# Patient Record
Sex: MALE | Race: WHITE | Employment: FULL TIME | URBAN - METROPOLITAN AREA
[De-identification: names, ages, dates, MRNs, and addresses within clinical notes are randomized per-mention and may not be internally consistent; named-entity substitution may affect disease eponyms.]

---

## 2017-10-30 ENCOUNTER — HOSPITAL ENCOUNTER (OUTPATIENT)
Dept: OCCUPATIONAL MEDICINE | Age: 32
Discharge: HOME | End: 2017-10-30

## 2017-10-30 DIAGNOSIS — T14.90XA INJURY: ICD-10-CM

## 2018-07-25 ENCOUNTER — HOSPITAL ENCOUNTER (OUTPATIENT)
Dept: OCCUPATIONAL MEDICINE | Age: 33
Discharge: HOME | End: 2018-07-25

## 2018-07-25 DIAGNOSIS — T14.90XA INJURY: ICD-10-CM

## 2018-12-03 ENCOUNTER — HOSPITAL ENCOUNTER (OUTPATIENT)
Dept: SURGERY | Age: 33
Discharge: HOME | End: 2018-12-03

## 2018-12-03 VITALS — BODY MASS INDEX: 27.2 KG/M2 | HEIGHT: 70 IN | WEIGHT: 190 LBS

## 2018-12-03 RX ORDER — GABAPENTIN 300 MG/1
300 CAPSULE ORAL AS NEEDED
COMMUNITY
End: 2021-02-08 | Stop reason: ALTCHOICE

## 2018-12-03 NOTE — PERIOP NOTES
Patient verified name and . Patient provided medical/health information and PTA medications to the best of their ability. TYPE  CASE: 1A  Order for consent No Orders found in EHR Labs per surgeon:  None   Labs per anesthesia protocol: none  EKG  :  NA    Instructed patient to continue previous medications as prescribed prior to surgery unless otherwise directed and to take the following medications the day of surgery according to anesthesia guidelines : Neurontin or Norco as instructed if needed . Instructed patient to hold  the following medications: All vitamins,supplements, NSAIDs. Patient instructed on the following:  Arrive at Outpatient entrance, time of arrival to be called the day before by 1700. Responsible adult must drive patient to and from the hospital, stay during surgery, and       patient will need supervision 24 hours after anesthesia  NPO after midnight including gum, mints and ice chips. Shower the night before and the morning of surgery with a non-moisturizing soap. Leave all valuables at home. Bring insurance card and ID. No jewelry and body piercings are to be removed prior to arrival on DOS. No perfumes, oil, powder, colognes, makeup or  lotions on the skin. Patient teach back successful and patient demonstrates knowledge of instruction.

## 2020-07-04 ENCOUNTER — HOSPITAL ENCOUNTER (EMERGENCY)
Age: 35
Discharge: HOME | End: 2020-07-04
Attending: EMERGENCY MEDICINE
Payer: SELF-PAY

## 2020-07-04 VITALS
HEART RATE: 78 BPM | TEMPERATURE: 97.8 F | HEIGHT: 70 IN | SYSTOLIC BLOOD PRESSURE: 166 MMHG | DIASTOLIC BLOOD PRESSURE: 110 MMHG | RESPIRATION RATE: 18 BRPM | OXYGEN SATURATION: 99 % | BODY MASS INDEX: 25.77 KG/M2 | WEIGHT: 180 LBS

## 2020-07-04 DIAGNOSIS — K85.20 ALCOHOL-INDUCED ACUTE PANCREATITIS WITHOUT INFECTION OR NECROSIS: Primary | ICD-10-CM

## 2020-07-04 DIAGNOSIS — R31.9 HEMATURIA, UNSPECIFIED TYPE: ICD-10-CM

## 2020-07-04 LAB
ALBUMIN SERPL-MCNC: 4.5 G/DL (ref 3.5–5)
ALBUMIN/GLOB SERPL: 1.2 {RATIO} (ref 1.2–3.5)
ALP SERPL-CCNC: 91 U/L (ref 50–136)
ALT SERPL-CCNC: 82 U/L (ref 12–65)
ANION GAP SERPL CALC-SCNC: 10 MMOL/L (ref 7–16)
AST SERPL-CCNC: 36 U/L (ref 15–37)
BASOPHILS # BLD: 0.1 K/UL (ref 0–0.2)
BASOPHILS NFR BLD: 1 % (ref 0–2)
BILIRUB SERPL-MCNC: 1.3 MG/DL (ref 0.2–1.1)
BUN SERPL-MCNC: 15 MG/DL (ref 6–23)
CALCIUM SERPL-MCNC: 10.3 MG/DL (ref 8.3–10.4)
CHLORIDE SERPL-SCNC: 98 MMOL/L (ref 98–107)
CO2 SERPL-SCNC: 26 MMOL/L (ref 21–32)
CREAT SERPL-MCNC: 0.99 MG/DL (ref 0.8–1.5)
DIFFERENTIAL METHOD BLD: ABNORMAL
EOSINOPHIL # BLD: 0 K/UL (ref 0–0.8)
EOSINOPHIL NFR BLD: 0 % (ref 0.5–7.8)
ERYTHROCYTE [DISTWIDTH] IN BLOOD BY AUTOMATED COUNT: 12.3 % (ref 11.9–14.6)
GLOBULIN SER CALC-MCNC: 3.7 G/DL (ref 2.3–3.5)
GLUCOSE SERPL-MCNC: 98 MG/DL (ref 65–100)
HCT VFR BLD AUTO: 45.4 % (ref 41.1–50.3)
HGB BLD-MCNC: 16.6 G/DL (ref 13.6–17.2)
IMM GRANULOCYTES # BLD AUTO: 0 K/UL (ref 0–0.5)
IMM GRANULOCYTES NFR BLD AUTO: 0 % (ref 0–5)
LIPASE SERPL-CCNC: 2345 U/L (ref 73–393)
LYMPHOCYTES # BLD: 1 K/UL (ref 0.5–4.6)
LYMPHOCYTES NFR BLD: 9 % (ref 13–44)
MCH RBC QN AUTO: 32.7 PG (ref 26.1–32.9)
MCHC RBC AUTO-ENTMCNC: 36.6 G/DL (ref 31.4–35)
MCV RBC AUTO: 89.4 FL (ref 79.6–97.8)
MONOCYTES # BLD: 0.9 K/UL (ref 0.1–1.3)
MONOCYTES NFR BLD: 8 % (ref 4–12)
NEUTS SEG # BLD: 8.9 K/UL (ref 1.7–8.2)
NEUTS SEG NFR BLD: 82 % (ref 43–78)
NRBC # BLD: 0 K/UL (ref 0–0.2)
PLATELET # BLD AUTO: 212 K/UL (ref 150–450)
PMV BLD AUTO: 9.6 FL (ref 9.4–12.3)
POTASSIUM SERPL-SCNC: 3.4 MMOL/L (ref 3.5–5.1)
PROT SERPL-MCNC: 8.2 G/DL (ref 6.3–8.2)
RBC # BLD AUTO: 5.08 M/UL (ref 4.23–5.6)
SODIUM SERPL-SCNC: 134 MMOL/L (ref 136–145)
WBC # BLD AUTO: 10.9 K/UL (ref 4.3–11.1)

## 2020-07-04 PROCEDURE — 96376 TX/PRO/DX INJ SAME DRUG ADON: CPT

## 2020-07-04 PROCEDURE — 85025 COMPLETE CBC W/AUTO DIFF WBC: CPT

## 2020-07-04 PROCEDURE — 80053 COMPREHEN METABOLIC PANEL: CPT

## 2020-07-04 PROCEDURE — 74011250636 HC RX REV CODE- 250/636: Performed by: EMERGENCY MEDICINE

## 2020-07-04 PROCEDURE — 96374 THER/PROPH/DIAG INJ IV PUSH: CPT

## 2020-07-04 PROCEDURE — 83690 ASSAY OF LIPASE: CPT

## 2020-07-04 PROCEDURE — 99284 EMERGENCY DEPT VISIT MOD MDM: CPT

## 2020-07-04 PROCEDURE — 96372 THER/PROPH/DIAG INJ SC/IM: CPT

## 2020-07-04 PROCEDURE — 96375 TX/PRO/DX INJ NEW DRUG ADDON: CPT

## 2020-07-04 PROCEDURE — 81003 URINALYSIS AUTO W/O SCOPE: CPT

## 2020-07-04 RX ORDER — HYDROMORPHONE HYDROCHLORIDE 1 MG/ML
0.5 INJECTION, SOLUTION INTRAMUSCULAR; INTRAVENOUS; SUBCUTANEOUS
Status: COMPLETED | OUTPATIENT
Start: 2020-07-04 | End: 2020-07-04

## 2020-07-04 RX ORDER — PROMETHAZINE HYDROCHLORIDE 25 MG/1
25 TABLET ORAL
Qty: 12 TAB | Refills: 0 | Status: SHIPPED | OUTPATIENT
Start: 2020-07-04 | End: 2020-07-22 | Stop reason: SDUPTHER

## 2020-07-04 RX ORDER — SUCRALFATE 1 G/1
1 TABLET ORAL 3 TIMES DAILY
Qty: 42 TAB | Refills: 0 | Status: SHIPPED | OUTPATIENT
Start: 2020-07-04 | End: 2020-07-18

## 2020-07-04 RX ORDER — MORPHINE SULFATE 4 MG/ML
4 INJECTION INTRAVENOUS
Status: COMPLETED | OUTPATIENT
Start: 2020-07-04 | End: 2020-07-04

## 2020-07-04 RX ORDER — PROMETHAZINE HYDROCHLORIDE 25 MG/ML
25 INJECTION, SOLUTION INTRAMUSCULAR; INTRAVENOUS
Status: COMPLETED | OUTPATIENT
Start: 2020-07-04 | End: 2020-07-04

## 2020-07-04 RX ADMIN — MORPHINE SULFATE 4 MG: 4 INJECTION INTRAVENOUS at 15:04

## 2020-07-04 RX ADMIN — HYDROMORPHONE HYDROCHLORIDE 0.5 MG: 1 INJECTION, SOLUTION INTRAMUSCULAR; INTRAVENOUS; SUBCUTANEOUS at 15:45

## 2020-07-04 RX ADMIN — PROMETHAZINE HYDROCHLORIDE 25 MG: 25 INJECTION INTRAMUSCULAR; INTRAVENOUS at 15:04

## 2020-07-04 RX ADMIN — SODIUM CHLORIDE 1000 ML: 900 INJECTION, SOLUTION INTRAVENOUS at 15:03

## 2020-07-04 NOTE — ED NOTES
I have reviewed discharge instructions with the patient. The patient verbalized understanding. Patient left ED via Discharge Method: ambulatory to Home with self. Opportunity for questions and clarification provided. Patient given 2 scripts. To continue your aftercare when you leave the hospital, you may receive an automated call from our care team to check in on how you are doing. This is a free service and part of our promise to provide the best care and service to meet your aftercare needs.  If you have questions, or wish to unsubscribe from this service please call [phone]. Thank you for Choosing our [address] [place] [address] Emergency Department.

## 2020-07-04 NOTE — ED PROVIDER NOTES
50-year-old gentleman presents with concerns about pain in his epigastric region. He says that he knows when he drinks alcohol it sometimes gives him pancreatitis but he drank some wine a couple of days ago and started to get this pain yesterday. With this pain he has had some nausea and vomiting but no diarrhea. No fevers or chills. No blood in his stools. I asked if he was an alcoholic but he said he did not think so. I then advised him that if he was willing to suffer through the consequences of pancreatitis knowing that alcohol caused that for him that he likely was an alcoholic. No other associated symptoms. Elements of this note were created using speech recognition software. As such, errors of speech recognition may be present.            Past Medical History:   Diagnosis Date    Carpal tunnel syndrome, left     Cigarette smoker     GERD (gastroesophageal reflux disease)     occassionally with spicy foods    History of pancreatitis     X2    Hypertension     managed well with Lisinopril       Past Surgical History:   Procedure Laterality Date    HX CHOLECYSTECTOMY  2012    HX ORTHOPAEDIC           Family History:   Problem Relation Age of Onset    Hypertension Mother     Heart Attack Father        Social History     Socioeconomic History    Marital status: SINGLE     Spouse name: Not on file    Number of children: Not on file    Years of education: Not on file    Highest education level: Not on file   Occupational History    Not on file   Social Needs    Financial resource strain: Not on file    Food insecurity     Worry: Not on file     Inability: Not on file    Transportation needs     Medical: Not on file     Non-medical: Not on file   Tobacco Use    Smoking status: Current Every Day Smoker     Packs/day: 1.00     Start date: 2/9/2007    Smokeless tobacco: Never Used   Substance and Sexual Activity    Alcohol use: Yes     Comment: social, 3 or less per week    Drug use: No    Sexual activity: Not on file   Lifestyle    Physical activity     Days per week: Not on file     Minutes per session: Not on file    Stress: Not on file   Relationships    Social connections     Talks on phone: Not on file     Gets together: Not on file     Attends Muslim service: Not on file     Active member of club or organization: Not on file     Attends meetings of clubs or organizations: Not on file     Relationship status: Not on file    Intimate partner violence     Fear of current or ex partner: Not on file     Emotionally abused: Not on file     Physically abused: Not on file     Forced sexual activity: Not on file   Other Topics Concern    Not on file   Social History Narrative    Not on file         ALLERGIES: Patient has no known allergies. Review of Systems   Constitutional: Negative for chills, diaphoresis and fever. HENT: Negative for congestion, rhinorrhea and sore throat. Eyes: Negative for redness and visual disturbance. Respiratory: Negative for cough, chest tightness, shortness of breath and wheezing. Cardiovascular: Negative for chest pain and palpitations. Gastrointestinal: Positive for abdominal pain and nausea. Negative for blood in stool, diarrhea and vomiting. Endocrine: Negative for polydipsia and polyuria. Genitourinary: Negative for dysuria and hematuria. Musculoskeletal: Negative for arthralgias, myalgias and neck stiffness. Skin: Negative for rash. Allergic/Immunologic: Negative for environmental allergies and food allergies. Neurological: Negative for dizziness, weakness and headaches. Hematological: Negative for adenopathy. Does not bruise/bleed easily. Psychiatric/Behavioral: Negative for confusion and sleep disturbance. The patient is not nervous/anxious.         Vitals:    07/04/20 1326   BP: (!) 157/99   Pulse: 87   Resp: 16   Temp: 97.4 °F (36.3 °C)   SpO2: 99%   Weight: 81.6 kg (180 lb)   Height: 5' 10\" (1.778 m) Physical Exam  Vitals signs and nursing note reviewed. Constitutional:       General: He is not in acute distress. Appearance: He is well-developed. He is not toxic-appearing. HENT:      Head: Normocephalic and atraumatic. Eyes:      General: No scleral icterus. Right eye: No discharge. Left eye: No discharge. Conjunctiva/sclera: Conjunctivae normal.      Pupils: Pupils are equal, round, and reactive to light. Neck:      Musculoskeletal: Normal range of motion. No neck rigidity. Cardiovascular:      Rate and Rhythm: Normal rate and regular rhythm. Heart sounds: Normal heart sounds. Pulmonary:      Effort: Pulmonary effort is normal. No respiratory distress. Breath sounds: Normal breath sounds. No wheezing or rales. Chest:      Chest wall: No tenderness. Abdominal:      General: Bowel sounds are normal. There is no distension. Palpations: Abdomen is soft. Tenderness: There is abdominal tenderness. There is no guarding or rebound. Comments: Mild diffuse abdominal tenderness with no rebound or guarding   Musculoskeletal: Normal range of motion. General: No tenderness. Lymphadenopathy:      Cervical: No cervical adenopathy. Skin:     General: Skin is warm and dry. Neurological:      General: No focal deficit present. Mental Status: He is alert and oriented to person, place, and time. Psychiatric:         Mood and Affect: Mood normal.         Behavior: Behavior normal.          MDM  Number of Diagnoses or Management Options  Diagnosis management comments: His lipase is elevated. I will treat with some morphine and Phenergan.          Procedures

## 2020-07-04 NOTE — DISCHARGE INSTRUCTIONS
As we discussed, I am concerned about your alcohol use and I think you should contact [facility] to discuss possible rehab options. Please return to the emergency department with any vomiting, fevers, worsening symptoms, or additional concerns. Follow up with [facility], for substance abuse help. Ceasar Henry   [phone]   They have multiple resources to help you. Please call.  www[facility]. org     Other local resources that are available are: The [address] [facility] for inpatient and outpatient substance abuse issues. [place] [phone]  Medication assisted treatment    [address] [place] [address]  [phone]     Suicide Hotline   2-627-HTKSUUO     Narcotics Anonymous   www.na. org  Alcoholics Anonymous  www.aa.org

## 2020-07-04 NOTE — ED TRIAGE NOTES
Patient complaining of upper abdominal pain with nausea and vomiting starting yesterday after drinking 4 glasses of wine on 7/2. Patient advises chronic pancreatitis history. Mask on during triage.

## 2020-07-08 NOTE — PROGRESS NOTES
Called patient to inform him of trace hematuria and need for follow up. Left VM for patient to return call.

## 2020-07-08 NOTE — PROGRESS NOTES
Patient returned call, he provided his name and  for ID. He was informed of the trace hematuria. He will follow up with Dr. Dhruv Pires for recheck. Referral made.

## 2021-10-03 ENCOUNTER — APPOINTMENT (OUTPATIENT)
Dept: ULTRASOUND IMAGING | Age: 36
DRG: 439 | End: 2021-10-03
Attending: INTERNAL MEDICINE

## 2021-10-03 ENCOUNTER — HOSPITAL ENCOUNTER (INPATIENT)
Age: 36
LOS: 3 days | Discharge: HOME | DRG: 439 | End: 2021-10-06
Attending: EMERGENCY MEDICINE | Admitting: INTERNAL MEDICINE

## 2021-10-03 ENCOUNTER — HOSPITAL ENCOUNTER (EMERGENCY)
Age: 36
Discharge: HOME | End: 2021-10-03
Attending: EMERGENCY MEDICINE

## 2021-10-03 VITALS
HEIGHT: 70 IN | SYSTOLIC BLOOD PRESSURE: 185 MMHG | RESPIRATION RATE: 16 BRPM | BODY MASS INDEX: 27.2 KG/M2 | OXYGEN SATURATION: 95 % | HEART RATE: 94 BPM | TEMPERATURE: 98 F | DIASTOLIC BLOOD PRESSURE: 111 MMHG | WEIGHT: 190 LBS

## 2021-10-03 DIAGNOSIS — K85.20 ALCOHOL-INDUCED ACUTE PANCREATITIS, UNSPECIFIED COMPLICATION STATUS: Primary | ICD-10-CM

## 2021-10-03 DIAGNOSIS — K85.20 ALCOHOL-INDUCED ACUTE PANCREATITIS WITHOUT INFECTION OR NECROSIS: ICD-10-CM

## 2021-10-03 DIAGNOSIS — K85.20 ALCOHOL-INDUCED ACUTE PANCREATITIS WITHOUT INFECTION OR NECROSIS: Primary | ICD-10-CM

## 2021-10-03 PROBLEM — I16.0 HYPERTENSIVE URGENCY: Status: ACTIVE | Noted: 2021-10-03

## 2021-10-03 PROBLEM — I16.9 HYPERTENSIVE CRISIS: Status: ACTIVE | Noted: 2021-10-03

## 2021-10-03 LAB
ALBUMIN SERPL-MCNC: 4 G/DL (ref 3.5–5)
ALBUMIN SERPL-MCNC: 4.2 G/DL (ref 3.5–5)
ALBUMIN/GLOB SERPL: 0.9 {RATIO} (ref 1.2–3.5)
ALBUMIN/GLOB SERPL: 1.1 {RATIO} (ref 1.2–3.5)
ALP SERPL-CCNC: 52 U/L (ref 50–136)
ALP SERPL-CCNC: 53 U/L (ref 50–136)
ALT SERPL-CCNC: 52 U/L (ref 12–65)
ALT SERPL-CCNC: 55 U/L (ref 12–65)
ANION GAP SERPL CALC-SCNC: 7 MMOL/L (ref 7–16)
ANION GAP SERPL CALC-SCNC: 8 MMOL/L (ref 7–16)
AST SERPL-CCNC: 42 U/L (ref 15–37)
AST SERPL-CCNC: 60 U/L (ref 15–37)
BASOPHILS # BLD: 0 K/UL (ref 0–0.2)
BASOPHILS # BLD: 0.1 K/UL (ref 0–0.2)
BASOPHILS NFR BLD: 0 % (ref 0–2)
BASOPHILS NFR BLD: 1 % (ref 0–2)
BILIRUB SERPL-MCNC: 0.5 MG/DL (ref 0.2–1.1)
BILIRUB SERPL-MCNC: 0.6 MG/DL (ref 0.2–1.1)
BUN SERPL-MCNC: 8 MG/DL (ref 6–23)
BUN SERPL-MCNC: 8 MG/DL (ref 6–23)
CALCIUM SERPL-MCNC: 9.1 MG/DL (ref 8.3–10.4)
CALCIUM SERPL-MCNC: 9.7 MG/DL (ref 8.3–10.4)
CHLORIDE SERPL-SCNC: 101 MMOL/L (ref 98–107)
CHLORIDE SERPL-SCNC: 105 MMOL/L (ref 98–107)
CO2 SERPL-SCNC: 20 MMOL/L (ref 21–32)
CO2 SERPL-SCNC: 25 MMOL/L (ref 21–32)
CREAT SERPL-MCNC: 0.85 MG/DL (ref 0.8–1.5)
CREAT SERPL-MCNC: 0.96 MG/DL (ref 0.8–1.5)
DIFFERENTIAL METHOD BLD: ABNORMAL
DIFFERENTIAL METHOD BLD: ABNORMAL
EOSINOPHIL # BLD: 0 K/UL (ref 0–0.8)
EOSINOPHIL # BLD: 0 K/UL (ref 0–0.8)
EOSINOPHIL NFR BLD: 0 % (ref 0.5–7.8)
EOSINOPHIL NFR BLD: 0 % (ref 0.5–7.8)
ERYTHROCYTE [DISTWIDTH] IN BLOOD BY AUTOMATED COUNT: 13.2 % (ref 11.9–14.6)
ERYTHROCYTE [DISTWIDTH] IN BLOOD BY AUTOMATED COUNT: 13.2 % (ref 11.9–14.6)
ETHANOL SERPL-MCNC: <3 MG/DL
GLOBULIN SER CALC-MCNC: 4 G/DL (ref 2.3–3.5)
GLOBULIN SER CALC-MCNC: 4.3 G/DL (ref 2.3–3.5)
GLUCOSE SERPL-MCNC: 103 MG/DL (ref 65–100)
GLUCOSE SERPL-MCNC: 94 MG/DL (ref 65–100)
HCT VFR BLD AUTO: 53.7 % (ref 41.1–50.3)
HCT VFR BLD AUTO: 55.1 % (ref 41.1–50.3)
HGB BLD-MCNC: 18 G/DL (ref 13.6–17.2)
HGB BLD-MCNC: 18.5 G/DL (ref 13.6–17.2)
IMM GRANULOCYTES # BLD AUTO: 0 K/UL (ref 0–0.5)
IMM GRANULOCYTES # BLD AUTO: 0 K/UL (ref 0–0.5)
IMM GRANULOCYTES NFR BLD AUTO: 0 % (ref 0–5)
IMM GRANULOCYTES NFR BLD AUTO: 0 % (ref 0–5)
LIPASE SERPL-CCNC: 1707 U/L (ref 73–393)
LIPASE SERPL-CCNC: 594 U/L (ref 73–393)
LYMPHOCYTES # BLD: 1.3 K/UL (ref 0.5–4.6)
LYMPHOCYTES # BLD: 1.3 K/UL (ref 0.5–4.6)
LYMPHOCYTES NFR BLD: 11 % (ref 13–44)
LYMPHOCYTES NFR BLD: 18 % (ref 13–44)
MAGNESIUM SERPL-MCNC: 2.2 MG/DL (ref 1.8–2.4)
MCH RBC QN AUTO: 30.7 PG (ref 26.1–32.9)
MCH RBC QN AUTO: 31.4 PG (ref 26.1–32.9)
MCHC RBC AUTO-ENTMCNC: 33.5 G/DL (ref 31.4–35)
MCHC RBC AUTO-ENTMCNC: 33.6 G/DL (ref 31.4–35)
MCV RBC AUTO: 91.6 FL (ref 79.6–97.8)
MCV RBC AUTO: 93.5 FL (ref 79.6–97.8)
MONOCYTES # BLD: 0.6 K/UL (ref 0.1–1.3)
MONOCYTES # BLD: 1 K/UL (ref 0.1–1.3)
MONOCYTES NFR BLD: 9 % (ref 4–12)
MONOCYTES NFR BLD: 9 % (ref 4–12)
NEUTS SEG # BLD: 5.1 K/UL (ref 1.7–8.2)
NEUTS SEG # BLD: 9.7 K/UL (ref 1.7–8.2)
NEUTS SEG NFR BLD: 73 % (ref 43–78)
NEUTS SEG NFR BLD: 80 % (ref 43–78)
NRBC # BLD: 0 K/UL (ref 0–0.2)
NRBC # BLD: 0 K/UL (ref 0–0.2)
PLATELET # BLD AUTO: 356 K/UL (ref 150–450)
PLATELET # BLD AUTO: 391 K/UL (ref 150–450)
PMV BLD AUTO: 8.8 FL (ref 9.4–12.3)
PMV BLD AUTO: 8.9 FL (ref 9.4–12.3)
POTASSIUM SERPL-SCNC: 3.9 MMOL/L (ref 3.5–5.1)
POTASSIUM SERPL-SCNC: 5.5 MMOL/L (ref 3.5–5.1)
PROT SERPL-MCNC: 8.2 G/DL (ref 6.3–8.2)
PROT SERPL-MCNC: 8.3 G/DL (ref 6.3–8.2)
RBC # BLD AUTO: 5.86 M/UL (ref 4.23–5.6)
RBC # BLD AUTO: 5.89 M/UL (ref 4.23–5.6)
SODIUM SERPL-SCNC: 132 MMOL/L (ref 136–145)
SODIUM SERPL-SCNC: 134 MMOL/L (ref 136–145)
WBC # BLD AUTO: 12.1 K/UL (ref 4.3–11.1)
WBC # BLD AUTO: 7.1 K/UL (ref 4.3–11.1)

## 2021-10-03 PROCEDURE — 83735 ASSAY OF MAGNESIUM: CPT

## 2021-10-03 PROCEDURE — 96375 TX/PRO/DX INJ NEW DRUG ADDON: CPT

## 2021-10-03 PROCEDURE — 85025 COMPLETE CBC W/AUTO DIFF WBC: CPT

## 2021-10-03 PROCEDURE — 76705 ECHO EXAM OF ABDOMEN: CPT

## 2021-10-03 PROCEDURE — 74011250637 HC RX REV CODE- 250/637: Performed by: INTERNAL MEDICINE

## 2021-10-03 PROCEDURE — 65270000029 HC RM PRIVATE

## 2021-10-03 PROCEDURE — 96374 THER/PROPH/DIAG INJ IV PUSH: CPT

## 2021-10-03 PROCEDURE — 80053 COMPREHEN METABOLIC PANEL: CPT

## 2021-10-03 PROCEDURE — 99283 EMERGENCY DEPT VISIT LOW MDM: CPT

## 2021-10-03 PROCEDURE — 74011250636 HC RX REV CODE- 250/636: Performed by: EMERGENCY MEDICINE

## 2021-10-03 PROCEDURE — 81003 URINALYSIS AUTO W/O SCOPE: CPT

## 2021-10-03 PROCEDURE — 74011250637 HC RX REV CODE- 250/637: Performed by: EMERGENCY MEDICINE

## 2021-10-03 PROCEDURE — 96376 TX/PRO/DX INJ SAME DRUG ADON: CPT

## 2021-10-03 PROCEDURE — 82077 ASSAY SPEC XCP UR&BREATH IA: CPT

## 2021-10-03 PROCEDURE — 74011250636 HC RX REV CODE- 250/636: Performed by: NURSE PRACTITIONER

## 2021-10-03 PROCEDURE — 99284 EMERGENCY DEPT VISIT MOD MDM: CPT

## 2021-10-03 PROCEDURE — 83690 ASSAY OF LIPASE: CPT

## 2021-10-03 PROCEDURE — 74011250636 HC RX REV CODE- 250/636: Performed by: INTERNAL MEDICINE

## 2021-10-03 RX ORDER — POLYETHYLENE GLYCOL 3350 17 G/17G
17 POWDER, FOR SOLUTION ORAL DAILY PRN
Status: DISCONTINUED | OUTPATIENT
Start: 2021-10-03 | End: 2021-10-06 | Stop reason: HOSPADM

## 2021-10-03 RX ORDER — ONDANSETRON 2 MG/ML
4 INJECTION INTRAMUSCULAR; INTRAVENOUS
Status: DISCONTINUED | OUTPATIENT
Start: 2021-10-03 | End: 2021-10-06 | Stop reason: HOSPADM

## 2021-10-03 RX ORDER — HYDROCODONE BITARTRATE AND ACETAMINOPHEN 5; 325 MG/1; MG/1
1 TABLET ORAL
Status: DISCONTINUED | OUTPATIENT
Start: 2021-10-03 | End: 2021-10-04

## 2021-10-03 RX ORDER — ONDANSETRON 4 MG/1
4 TABLET, ORALLY DISINTEGRATING ORAL
Qty: 6 TABLET | Refills: 1 | Status: SHIPPED | OUTPATIENT
Start: 2021-10-03 | End: 2021-10-06

## 2021-10-03 RX ORDER — LISINOPRIL 20 MG/1
40 TABLET ORAL DAILY
Status: DISCONTINUED | OUTPATIENT
Start: 2021-10-04 | End: 2021-10-06 | Stop reason: HOSPADM

## 2021-10-03 RX ORDER — SODIUM CHLORIDE 0.9 % (FLUSH) 0.9 %
5-40 SYRINGE (ML) INJECTION EVERY 8 HOURS
Status: DISCONTINUED | OUTPATIENT
Start: 2021-10-03 | End: 2021-10-06 | Stop reason: HOSPADM

## 2021-10-03 RX ORDER — MORPHINE SULFATE 4 MG/ML
4 INJECTION INTRAVENOUS
Status: COMPLETED | OUTPATIENT
Start: 2021-10-03 | End: 2021-10-03

## 2021-10-03 RX ORDER — SODIUM CHLORIDE 0.9 % (FLUSH) 0.9 %
5-40 SYRINGE (ML) INJECTION AS NEEDED
Status: DISCONTINUED | OUTPATIENT
Start: 2021-10-03 | End: 2021-10-06 | Stop reason: HOSPADM

## 2021-10-03 RX ORDER — ONDANSETRON 2 MG/ML
4 INJECTION INTRAMUSCULAR; INTRAVENOUS
Status: COMPLETED | OUTPATIENT
Start: 2021-10-03 | End: 2021-10-03

## 2021-10-03 RX ORDER — HYDROMORPHONE HYDROCHLORIDE 1 MG/ML
1 INJECTION, SOLUTION INTRAMUSCULAR; INTRAVENOUS; SUBCUTANEOUS ONCE
Status: DISCONTINUED | OUTPATIENT
Start: 2021-10-03 | End: 2021-10-03 | Stop reason: ALTCHOICE

## 2021-10-03 RX ORDER — ACETAMINOPHEN 325 MG/1
650 TABLET ORAL
Status: DISCONTINUED | OUTPATIENT
Start: 2021-10-03 | End: 2021-10-06 | Stop reason: HOSPADM

## 2021-10-03 RX ORDER — SUCRALFATE 1 G/1
1 TABLET ORAL ONCE
Status: COMPLETED | OUTPATIENT
Start: 2021-10-03 | End: 2021-10-03

## 2021-10-03 RX ORDER — KETOROLAC TROMETHAMINE 15 MG/ML
15 INJECTION, SOLUTION INTRAMUSCULAR; INTRAVENOUS
Status: DISCONTINUED | OUTPATIENT
Start: 2021-10-03 | End: 2021-10-06 | Stop reason: HOSPADM

## 2021-10-03 RX ORDER — HYDRALAZINE HYDROCHLORIDE 25 MG/1
25 TABLET, FILM COATED ORAL
Status: DISCONTINUED | OUTPATIENT
Start: 2021-10-03 | End: 2021-10-06 | Stop reason: HOSPADM

## 2021-10-03 RX ORDER — KETOROLAC TROMETHAMINE 15 MG/ML
15 INJECTION, SOLUTION INTRAMUSCULAR; INTRAVENOUS
Status: COMPLETED | OUTPATIENT
Start: 2021-10-03 | End: 2021-10-03

## 2021-10-03 RX ORDER — HYDROMORPHONE HYDROCHLORIDE 1 MG/ML
1 INJECTION, SOLUTION INTRAMUSCULAR; INTRAVENOUS; SUBCUTANEOUS ONCE
Status: COMPLETED | OUTPATIENT
Start: 2021-10-03 | End: 2021-10-03

## 2021-10-03 RX ORDER — MORPHINE SULFATE 2 MG/ML
1 INJECTION, SOLUTION INTRAMUSCULAR; INTRAVENOUS
Status: DISCONTINUED | OUTPATIENT
Start: 2021-10-03 | End: 2021-10-03

## 2021-10-03 RX ORDER — ACETAMINOPHEN 650 MG/1
650 SUPPOSITORY RECTAL
Status: DISCONTINUED | OUTPATIENT
Start: 2021-10-03 | End: 2021-10-06 | Stop reason: HOSPADM

## 2021-10-03 RX ORDER — ENOXAPARIN SODIUM 100 MG/ML
40 INJECTION SUBCUTANEOUS EVERY 24 HOURS
Status: DISCONTINUED | OUTPATIENT
Start: 2021-10-03 | End: 2021-10-06 | Stop reason: HOSPADM

## 2021-10-03 RX ORDER — HYDROMORPHONE HYDROCHLORIDE 1 MG/ML
2 INJECTION, SOLUTION INTRAMUSCULAR; INTRAVENOUS; SUBCUTANEOUS
Status: DISCONTINUED | OUTPATIENT
Start: 2021-10-03 | End: 2021-10-03

## 2021-10-03 RX ORDER — PROMETHAZINE HYDROCHLORIDE 25 MG/1
12.5 TABLET ORAL
Status: DISCONTINUED | OUTPATIENT
Start: 2021-10-03 | End: 2021-10-06 | Stop reason: HOSPADM

## 2021-10-03 RX ORDER — HYDROMORPHONE HYDROCHLORIDE 1 MG/ML
3 INJECTION, SOLUTION INTRAMUSCULAR; INTRAVENOUS; SUBCUTANEOUS
Status: DISCONTINUED | OUTPATIENT
Start: 2021-10-03 | End: 2021-10-06 | Stop reason: HOSPADM

## 2021-10-03 RX ORDER — OXYCODONE HYDROCHLORIDE 5 MG/1
5 TABLET ORAL
Status: COMPLETED | OUTPATIENT
Start: 2021-10-03 | End: 2021-10-03

## 2021-10-03 RX ORDER — SODIUM CHLORIDE, SODIUM LACTATE, POTASSIUM CHLORIDE, CALCIUM CHLORIDE 600; 310; 30; 20 MG/100ML; MG/100ML; MG/100ML; MG/100ML
125 INJECTION, SOLUTION INTRAVENOUS CONTINUOUS
Status: DISCONTINUED | OUTPATIENT
Start: 2021-10-03 | End: 2021-10-06

## 2021-10-03 RX ORDER — HYDROMORPHONE HYDROCHLORIDE 1 MG/ML
2 INJECTION, SOLUTION INTRAMUSCULAR; INTRAVENOUS; SUBCUTANEOUS
Status: COMPLETED | OUTPATIENT
Start: 2021-10-03 | End: 2021-10-03

## 2021-10-03 RX ORDER — TRAMADOL HYDROCHLORIDE 50 MG/1
50 TABLET ORAL
Qty: 11 TABLET | Refills: 0 | Status: SHIPPED | OUTPATIENT
Start: 2021-10-03 | End: 2021-10-06

## 2021-10-03 RX ORDER — LORAZEPAM 2 MG/ML
2 INJECTION INTRAMUSCULAR
Status: DISCONTINUED | OUTPATIENT
Start: 2021-10-03 | End: 2021-10-06 | Stop reason: HOSPADM

## 2021-10-03 RX ADMIN — HYDROMORPHONE HYDROCHLORIDE 3 MG: 1 INJECTION, SOLUTION INTRAMUSCULAR; INTRAVENOUS; SUBCUTANEOUS at 21:51

## 2021-10-03 RX ADMIN — HYDRALAZINE HYDROCHLORIDE 25 MG: 25 TABLET, FILM COATED ORAL at 23:23

## 2021-10-03 RX ADMIN — ONDANSETRON 4 MG: 2 INJECTION INTRAMUSCULAR; INTRAVENOUS at 16:24

## 2021-10-03 RX ADMIN — LORAZEPAM 2 MG: 2 INJECTION INTRAMUSCULAR; INTRAVENOUS at 21:57

## 2021-10-03 RX ADMIN — MORPHINE SULFATE 4 MG: 4 INJECTION INTRAVENOUS at 10:41

## 2021-10-03 RX ADMIN — SODIUM CHLORIDE 1000 ML: 900 INJECTION, SOLUTION INTRAVENOUS at 16:24

## 2021-10-03 RX ADMIN — SODIUM CHLORIDE, SODIUM LACTATE, POTASSIUM CHLORIDE, AND CALCIUM CHLORIDE 1000 ML: 600; 310; 30; 20 INJECTION, SOLUTION INTRAVENOUS at 09:33

## 2021-10-03 RX ADMIN — ENOXAPARIN SODIUM 40 MG: 40 INJECTION SUBCUTANEOUS at 21:12

## 2021-10-03 RX ADMIN — HYDROMORPHONE HYDROCHLORIDE 1 MG: 1 INJECTION, SOLUTION INTRAMUSCULAR; INTRAVENOUS; SUBCUTANEOUS at 16:23

## 2021-10-03 RX ADMIN — Medication 1 AMPULE: at 21:11

## 2021-10-03 RX ADMIN — MORPHINE SULFATE 4 MG: 4 INJECTION INTRAVENOUS at 09:32

## 2021-10-03 RX ADMIN — ONDANSETRON 4 MG: 2 INJECTION INTRAMUSCULAR; INTRAVENOUS at 21:57

## 2021-10-03 RX ADMIN — SODIUM CHLORIDE, SODIUM LACTATE, POTASSIUM CHLORIDE, AND CALCIUM CHLORIDE 75 ML/HR: 600; 310; 30; 20 INJECTION, SOLUTION INTRAVENOUS at 18:16

## 2021-10-03 RX ADMIN — HYDRALAZINE HYDROCHLORIDE 25 MG: 25 TABLET, FILM COATED ORAL at 18:22

## 2021-10-03 RX ADMIN — HYDROCODONE BITARTRATE AND ACETAMINOPHEN 1 TABLET: 5; 325 TABLET ORAL at 21:11

## 2021-10-03 RX ADMIN — MORPHINE SULFATE 1 MG: 2 INJECTION, SOLUTION INTRAMUSCULAR; INTRAVENOUS at 17:07

## 2021-10-03 RX ADMIN — HYDROMORPHONE HYDROCHLORIDE 2 MG: 1 INJECTION, SOLUTION INTRAMUSCULAR; INTRAVENOUS; SUBCUTANEOUS at 17:49

## 2021-10-03 RX ADMIN — KETOROLAC TROMETHAMINE 15 MG: 15 INJECTION, SOLUTION INTRAMUSCULAR; INTRAVENOUS at 18:22

## 2021-10-03 RX ADMIN — ONDANSETRON 4 MG: 2 INJECTION INTRAMUSCULAR; INTRAVENOUS at 09:32

## 2021-10-03 RX ADMIN — Medication 10 ML: at 22:00

## 2021-10-03 RX ADMIN — HYDROCODONE BITARTRATE AND ACETAMINOPHEN 1 TABLET: 5; 325 TABLET ORAL at 17:10

## 2021-10-03 RX ADMIN — SODIUM CHLORIDE 1000 ML: 900 INJECTION, SOLUTION INTRAVENOUS at 15:43

## 2021-10-03 RX ADMIN — OXYCODONE 5 MG: 5 TABLET ORAL at 11:21

## 2021-10-03 RX ADMIN — SUCRALFATE 1 G: 1 TABLET ORAL at 09:33

## 2021-10-03 RX ADMIN — KETOROLAC TROMETHAMINE 15 MG: 15 INJECTION, SOLUTION INTRAMUSCULAR; INTRAVENOUS at 10:41

## 2021-10-03 RX ADMIN — Medication 10 ML: at 18:10

## 2021-10-03 NOTE — PROGRESS NOTES
1745-TRANSFER - IN REPORT:    Verbal report received from Mary Jane RN(name) on [address]  being received from ED(unit) for routine progression of care      Report consisted of patients Situation, Background, Assessment and   Recommendations(SBAR). Information from the following report(s) ED Summary was reviewed with the receiving nurse. Opportunity for questions and clarification was provided. Assessment will be completed upon patients arrival to unit and care assumed.

## 2021-10-03 NOTE — ED NOTES
I have reviewed discharge instructions with the patient. The patient verbalized understanding. Patient left ED via Discharge Method: ambulatory to Home with self    Opportunity for questions and clarification provided. Patient given 2 scripts. To continue your aftercare when you leave the hospital, you may receive an automated call from our care team to check in on how you are doing. This is a free service and part of our promise to provide the best care and service to meet your aftercare needs.  If you have questions, or wish to unsubscribe from this service please call [phone]. Thank you for Choosing our [facility] Emergency Department.

## 2021-10-03 NOTE — PROGRESS NOTES
10/03/21 1824   Dual Skin Pressure Injury Assessment   Dual Skin Pressure Injury Assessment WDL   Second Care Provider (Based on [address] [place] [address]) JOSE Figueroa   Skin Integumentary   Skin Integumentary (WDL) WDL    Pressure  Injury Documentation No Pressure Injury Noted-Pressure Ulcer Prevention Initiated   Skin Integrity Tattoos (comment)

## 2021-10-03 NOTE — ED NOTES
39 y.o. M with upper abdominal pain since 0200. Hx of pancreatitis. States had 4 glasses of wine last night. Denies fever or diarrhea. Attempted 5 mg Norco without relief. Patient evaluated initially in triage. Rapid Medical Evaluation was conducted and necessary orders have been placed. I have performed a medical screening exam.  Care will now be transferred to the attending physician in the emergency department.   Maxim Dyson NP 9:14 AM

## 2021-10-03 NOTE — ED PROVIDER NOTES
59-year-old gentleman with a history of alcohol use presents with concerns about recurrent pancreatitis. He says he has had it for 5 times in the past and it always happens after he drinks. He had been drinking last night. He says that he has his burning pain in his epigastric and left upper quadrant. With this he has had some nausea but no vomiting or diarrhea. He said no fevers or chills. No blood in his bowels. No other associated symptoms. Elements of this note were created using speech recognition software. As such, errors of speech recognition may be present.            Past Medical History:   Diagnosis Date    Carpal tunnel syndrome, left     Cigarette smoker     GERD (gastroesophageal reflux disease)     occassionally with spicy foods    History of pancreatitis     X2    Hypertension     managed well with Lisinopril    Pancreatitis        Past Surgical History:   Procedure Laterality Date    HX CHOLECYSTECTOMY  2012    HX ORTHOPAEDIC           Family History:   Problem Relation Age of Onset    Hypertension Mother     Heart Attack Father        Social History     Socioeconomic History    Marital status: SINGLE     Spouse name: Not on file    Number of children: Not on file    Years of education: Not on file    Highest education level: Not on file   Occupational History    Not on file   Tobacco Use    Smoking status: Current Every Day Smoker     Packs/day: 1.00     Start date: 2/9/2007    Smokeless tobacco: Never Used   Substance and Sexual Activity    Alcohol use: Yes     Comment: social, 3 or less per week    Drug use: No    Sexual activity: Not on file   Other Topics Concern    Not on file   Social History Narrative    Not on file     Social Determinants of Health     Financial Resource Strain:     Difficulty of Paying Living Expenses:    Food Insecurity:     Worried About Running Out of Food in the Last Year:     [address] in the Last Year:    Transportation Needs:     Lack of Transportation (Medical):  Lack of Transportation (Non-Medical):    Physical Activity:     Days of Exercise per Week:     Minutes of Exercise per Session:    Stress:     Feeling of Stress :    Social Connections:     Frequency of Communication with Friends and Family:     Frequency of Social Gatherings with Friends and Family:     Attends Congregational Services:     Active Member of Clubs or Organizations:     Attends Club or Organization Meetings:     Marital Status:    Intimate Partner Violence:     Fear of Current or Ex-Partner:     Emotionally Abused:     Physically Abused:     Sexually Abused: ALLERGIES: Patient has no known allergies. Review of Systems   Constitutional: Negative for chills and fever. Respiratory: Negative for cough and shortness of breath. Gastrointestinal: Positive for abdominal pain and nausea. Negative for vomiting. Genitourinary: Negative for dysuria and hematuria. Vitals:    10/03/21 0914   BP: (!) 185/121   Pulse: 94   Resp: 16   Temp: 98 °F (36.7 °C)   SpO2: 98%   Weight: 86.2 kg (190 lb)   Height: 5' 10\" (1.778 m)            Physical Exam  Vitals and nursing note reviewed. Constitutional:       Appearance: He is well-developed. HENT:      Head: Normocephalic and atraumatic. Cardiovascular:      Rate and Rhythm: Normal rate and regular rhythm. Pulmonary:      Effort: Pulmonary effort is normal.      Breath sounds: Normal breath sounds. Abdominal:      Comments: Mild epigastric tenderness with no rebound or guarding. Neurological:      Mental Status: He is alert. MDM  Number of Diagnoses or Management Options  Diagnosis management comments: I will treat with some IV morphine and IV fluids. ED Course as of Oct 03 1249   Olena Alejandra Oct 03, 2021   1014 Patient's lipase is mildly elevated. He was still having some pain after the initial dose of morphine.   I will try some IV Toradol and additional morphine.    [AC] 1113 Patient overall is feeling better. He would like to try some oral pain medicine and liquids and see how it goes. [AC]   3864 Patient is tolerating oral liquids well.   I will discharge him home.    [AC]      ED Course User Index  [AC] Kana Puente MD       Procedures

## 2021-10-03 NOTE — DISCHARGE INSTRUCTIONS
As we discussed, it is important for you to stop drinking alcohol. Please return with any vomiting, blood in your bowels, fevers, worsening symptoms, or additional concerns. Otherwise, please follow-up with your primary care doctor.

## 2021-10-03 NOTE — H&P
[facility] [facility]   History and Physical       Name:  Evi Valencia  Age: 39 y.o. Sex: male   :  1985    MRN:  [id number]   PCP:  Breann Nevarez DO      Admit Date:  10/3/2021  3:26 PM   Presenting Complaint: Abdominal Pain      Reason for Admission:  Acute alcoholic pancreatitis [C07.79]    Assessment & Plan:     Acute alcoholic pancreatitis  Lipase of 590 4 in the AM now increased to 1707 in the afternoon with worsening abdominal pain.  - NPO  - LR IVF  - pain control  - US ABD to look for biliary obstruction  - counseled on alcohol cessation  - ativan PRN for alcohol withdrawal    Hypertensive Crisis   HTN  Likely due to pain. - home lisinopril  - add Hydralazine PRN      Disposition:   Diet: DIET NPO    VTE ppx: Lovenox  CODE STATUS: Full Code    History of Presenting Illness:     Evi Valencia is a 39 y.o. 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 Patient reports drinking 2 beers and 5 glasses of wine yesterday evening and woke up this morning 2 AM with excruciating epigastric pain. He nausea and vomiting and presented to ED. During the initial ED visit, he was noted to have lipase of 594 and was discharged after feeling better in the ED. On returning home, his abdominal pain worsened and was not relieved with oral pain medication before he returned to the emergency room. In the ED, patient is hypertensive and in severe distress due to pain. Laboratory work up is remarkable for lipase of 1707. Review of Systems: 10 systems reviewed and negative except as noted in HPI.       Past Medical History:   Diagnosis Date    Carpal tunnel syndrome, left     Cigarette smoker     GERD (gastroesophageal reflux disease)     occassionally with spicy foods    History of pancreatitis     X2    Hypertension     managed well with Lisinopril    Pancreatitis        Past Surgical History:   Procedure Laterality Date    HX CHOLECYSTECTOMY  2012    HX ORTHOPAEDIC         Family History : reviewed. Family History   Problem Relation Age of Onset    Hypertension Mother     Heart Attack Father         Social History     Tobacco Use    Smoking status: Current Every Day Smoker     Packs/day: 1.00     Start date: 2/9/2007    Smokeless tobacco: Never Used   Substance Use Topics    Alcohol use: Yes     Comment: social, 3 or less per week       No Known Allergies    Immunization History   Administered Date(s) Administered    Covid-19, MODERNA, Mrna, Lnp-s, Pf, 100mcg/0.5mL 08/19/2021    Influenza Vaccine 09/19/2018    Influenza Vaccine (Quad) PF (>6 Mo Flulaval, Fluarix, and >3 Yrs Afluria, Fluzone [id number]) 11/05/2015, 01/18/2020, 10/05/2020    Pneumococcal Polysaccharide (PPSV-23) 02/22/2018         PTA Medications:  Current Outpatient Medications   Medication Instructions    lisinopriL (PRINIVIL, ZESTRIL) 40 mg, Oral, DAILY    ondansetron (ZOFRAN ODT) 4 mg, Oral, EVERY 8 HOURS AS NEEDED    traMADoL (ULTRAM) 50 mg, Oral, EVERY 8 HOURS AS NEEDED       Objective:     Patient Vitals for the past 24 hrs:   Temp Pulse Resp BP SpO2   10/03/21 1639    (!) 189/117 99 %   10/03/21 1528   18     10/03/21 1525 98 °F (36.7 °C) 96  (!) 205/134 99 %       Oxygen Therapy  O2 Sat (%): 99 % (10/03/21 1639)  Pulse via Oximetry: 85 beats per minute (10/03/21 1639)  O2 Device: None (Room air) (10/03/21 1525)    Body mass index is 27.26 kg/m². Physical Exam:     General:     alert, awake, In acute distress. HENT:   normocephalic, atraumatic. Eyes:   anicteric sclerae, moist conjunctiva, PERRL, EOMI  Neck:    supple, non-tender. Trachea midline. Lungs:   CTAB, no wheezing, rhonchi, rales  Cardiac:   RRR, Normal S1 and S2.   Abdomen:   Soft, non distended, TTP in epigastric+RUQ+LUE, +BS, no guarding/rebound  Extremities:   No edema , pedal pulses present, no digital cyanosis  Skin:   Warn, dry, normnal turgor and texture  Neuro:  AAOx3. No gross focal neurological deficit  Psychiatric:  No anxiety, calm, cooperative    Data Reviewed: I have reviewed all labs, meds, and studies. Recent Results (from the past 24 hour(s))   CBC WITH AUTOMATED DIFF    Collection Time: 10/03/21  9:26 AM   Result Value Ref Range    WBC 7.1 4.3 - 11.1 K/uL    RBC 5.89 (H) 4.23 - 5.6 M/uL    HGB 18.5 (H) 13.6 - 17.2 g/dL    HCT 55.1 (H) 41.1 - 50.3 %    MCV 93.5 79.6 - 97.8 FL    MCH 31.4 26.1 - 32.9 PG    MCHC 33.6 31.4 - 35.0 g/dL    RDW 13.2 11.9 - 14.6 %    PLATELET 304 975 - 081 K/uL    MPV 8.8 (L) 9.4 - 12.3 FL    ABSOLUTE NRBC 0.00 0.0 - 0.2 K/uL    DF AUTOMATED      NEUTROPHILS 73 43 - 78 %    LYMPHOCYTES 18 13 - 44 %    MONOCYTES 9 4.0 - 12.0 %    EOSINOPHILS 0 (L) 0.5 - 7.8 %    BASOPHILS 1 0.0 - 2.0 %    IMMATURE GRANULOCYTES 0 0.0 - 5.0 %    ABS. NEUTROPHILS 5.1 1.7 - 8.2 K/UL    ABS. LYMPHOCYTES 1.3 0.5 - 4.6 K/UL    ABS. MONOCYTES 0.6 0.1 - 1.3 K/UL    ABS. EOSINOPHILS 0.0 0.0 - 0.8 K/UL    ABS. BASOPHILS 0.1 0.0 - 0.2 K/UL    ABS. IMM. GRANS. 0.0 0.0 - 0.5 K/UL   METABOLIC PANEL, COMPREHENSIVE    Collection Time: 10/03/21  9:26 AM   Result Value Ref Range    Sodium 132 (L) 136 - 145 mmol/L    Potassium 5.5 (H) 3.5 - 5.1 mmol/L    Chloride 105 98 - 107 mmol/L    CO2 20 (L) 21 - 32 mmol/L    Anion gap 7 7 - 16 mmol/L    Glucose 103 (H) 65 - 100 mg/dL    BUN 8 6 - 23 MG/DL    Creatinine 0.85 0.8 - 1.5 MG/DL    GFR est AA >60 >60 ml/min/1.73m2    GFR est non-AA >60 >60 ml/min/1.73m2    Calcium 9.7 8.3 - 10.4 MG/DL    Bilirubin, total 0.6 0.2 - 1.1 MG/DL    ALT (SGPT) 55 12 - 65 U/L    AST (SGOT) 60 (H) 15 - 37 U/L    Alk.  phosphatase 53 50 - 136 U/L    Protein, total 8.3 (H) 6.3 - 8.2 g/dL    Albumin 4.0 3.5 - 5.0 g/dL    Globulin 4.3 (H) 2.3 - 3.5 g/dL    A-G Ratio 0.9 (L) 1.2 - 3.5     LIPASE    Collection Time: 10/03/21  9:26 AM   Result Value Ref Range    Lipase 594 (H) 73 - 393 U/L CBC WITH AUTOMATED DIFF    Collection Time: 10/03/21  3:38 PM   Result Value Ref Range    WBC 12.1 (H) 4.3 - 11.1 K/uL    RBC 5.86 (H) 4.23 - 5.6 M/uL    HGB 18.0 (H) 13.6 - 17.2 g/dL    HCT 53.7 (H) 41.1 - 50.3 %    MCV 91.6 79.6 - 97.8 FL    MCH 30.7 26.1 - 32.9 PG    MCHC 33.5 31.4 - 35.0 g/dL    RDW 13.2 11.9 - 14.6 %    PLATELET 386 152 - 020 K/uL    MPV 8.9 (L) 9.4 - 12.3 FL    ABSOLUTE NRBC 0.00 0.0 - 0.2 K/uL    DF AUTOMATED      NEUTROPHILS 80 (H) 43 - 78 %    LYMPHOCYTES 11 (L) 13 - 44 %    MONOCYTES 9 4.0 - 12.0 %    EOSINOPHILS 0 (L) 0.5 - 7.8 %    BASOPHILS 0 0.0 - 2.0 %    IMMATURE GRANULOCYTES 0 0.0 - 5.0 %    ABS. NEUTROPHILS 9.7 (H) 1.7 - 8.2 K/UL    ABS. LYMPHOCYTES 1.3 0.5 - 4.6 K/UL    ABS. MONOCYTES 1.0 0.1 - 1.3 K/UL    ABS. EOSINOPHILS 0.0 0.0 - 0.8 K/UL    ABS. BASOPHILS 0.0 0.0 - 0.2 K/UL    ABS. IMM. GRANS. 0.0 0.0 - 0.5 K/UL   LIPASE    Collection Time: 10/03/21  3:38 PM   Result Value Ref Range    Lipase 1,707 (H) 73 - 226 U/L   METABOLIC PANEL, COMPREHENSIVE    Collection Time: 10/03/21  3:38 PM   Result Value Ref Range    Sodium 134 (L) 136 - 145 mmol/L    Potassium 3.9 3.5 - 5.1 mmol/L    Chloride 101 98 - 107 mmol/L    CO2 25 21 - 32 mmol/L    Anion gap 8 7 - 16 mmol/L    Glucose 94 65 - 100 mg/dL    BUN 8 6 - 23 MG/DL    Creatinine 0.96 0.8 - 1.5 MG/DL    GFR est AA >60 >60 ml/min/1.73m2    GFR est non-AA >60 >60 ml/min/1.73m2    Calcium 9.1 8.3 - 10.4 MG/DL    Bilirubin, total 0.5 0.2 - 1.1 MG/DL    ALT (SGPT) 52 12 - 65 U/L    AST (SGOT) 42 (H) 15 - 37 U/L    Alk.  phosphatase 52 50 - 136 U/L    Protein, total 8.2 6.3 - 8.2 g/dL    Albumin 4.2 3.5 - 5.0 g/dL    Globulin 4.0 (H) 2.3 - 3.5 g/dL    A-G Ratio 1.1 (L) 1.2 - 3.5     ETHYL ALCOHOL    Collection Time: 10/03/21  3:38 PM   Result Value Ref Range    ALCOHOL(ETHYL),SERUM <3 MG/DL   MAGNESIUM    Collection Time: 10/03/21  3:38 PM   Result Value Ref Range    Magnesium 2.2 1.8 - 2.4 mg/dL       All Micro Results     None Other Studies:  No results found. Medications:  Medications Administered     HYDROcodone-acetaminophen (NORCO) 5-325 mg per tablet 1 Tablet     Admin Date  10/03/2021 Action  Given Dose  1 Tablet Route  Oral Administered By  Nathan Cantrell RN          HYDROmorphone (DILAUDID) injection 1 mg     Admin Date  10/03/2021 Action  Given Dose  1 mg Route  IntraVENous Administered By  Nathan Cantrell RN          morphine injection 1 mg     Admin Date  10/03/2021 Action  Given Dose  1 mg Route  IntraVENous Administered By  Nathan Cantrell RN          ondansetron [facility]) injection 4 mg     Admin Date  10/03/2021 Action  Given Dose  4 mg Route  IntraVENous Administered By  Nathan Cantrell RN          sodium chloride 0.9 % bolus infusion 1,000 mL     Admin Date  10/03/2021 Action  New Bag Dose  1,000 mL Rate  1,000 mL/hr Route  IntraVENous Administered By  Nathan Cantrell RN           Admin Date  10/03/2021 Action  New Bag Dose  1,000 mL Rate  1,000 mL/hr Route  IntraVENous Administered By  Nathan Cantrell RN                Problem List:     Hospital Problems as of 10/3/2021 Date Reviewed: 2/8/2021        Codes Class Noted - Resolved POA    Acute alcoholic pancreatitis HRG-93-KI: K85.20  ICD-9-CM: 880.0  10/3/2021 - Present Yes        Hypertensive crisis ICD-10-CM: I16.9  ICD-9-CM: 401.9  10/3/2021 - Present Yes        Essential hypertension ICD-10-CM: I10  ICD-9-CM: 401.9  11/30/2016 - Present Yes                 Part of this note was written by using a voice dictation software and the note has been proof read but may still contain some grammatical/other typographical errors.        Robb Huang MD  [facility]  October 3, 2021    5:14 PM

## 2021-10-03 NOTE — ED TRIAGE NOTES
Pt presents to ED c/o epigastric pain and states he feels like he has pancreatitis. Hx of alcohol induced pancreatitis 5 times. States he had 4 glasses of wine yesterday. +Vomiting. No fever/diarrhea. States he probably vomited his lisinopril this morning.

## 2021-10-03 NOTE — ED PROVIDER NOTES
Patient represents to the ER for continued upper abdominal pain and vomiting. Was seen here earlier for acute alcoholic pancreatitis with a lipase of 594. Did get some symptomatic improvement. States that she has been home pain is worsened and now has more vomiting. Denies any hematemesis or melena. Denies any fevers or chills. The history is provided by the patient. Abdominal Pain   This is a recurrent problem. The current episode started more than 2 days ago. The problem has been gradually worsening. The pain is located in the epigastric region and generalized abdominal region. The quality of the pain is aching and cramping. The pain is at a severity of 5/10. The pain is moderate. Associated symptoms include nausea and vomiting. Pertinent negatives include no anorexia, no fever, no flatus, no hematochezia, no constipation, no frequency, no headaches, no arthralgias, no chest pain, no testicular pain and no back pain. The pain is worsened by drinking alcohol. The pain is relieved by nothing. Past workup includes CT scan, ultrasound. His past medical history is significant for pancreatitis.         Past Medical History:   Diagnosis Date    Carpal tunnel syndrome, left     Cigarette smoker     GERD (gastroesophageal reflux disease)     occassionally with spicy foods    History of pancreatitis     X2    Hypertension     managed well with Lisinopril    Pancreatitis        Past Surgical History:   Procedure Laterality Date    HX CHOLECYSTECTOMY  2012    HX ORTHOPAEDIC           Family History:   Problem Relation Age of Onset    Hypertension Mother     Heart Attack Father        Social History     Socioeconomic History    Marital status: SINGLE     Spouse name: Not on file    Number of children: Not on file    Years of education: Not on file    Highest education level: Not on file   Occupational History    Not on file   Tobacco Use    Smoking status: Current Every Day Smoker     Packs/day: 1.00 Start date: 2/9/2007    Smokeless tobacco: Never Used   Substance and Sexual Activity    Alcohol use: Yes     Comment: social, 3 or less per week    Drug use: No    Sexual activity: Not on file   Other Topics Concern    Not on file   Social History Narrative    Not on file     Social Determinants of Health     Financial Resource Strain:     Difficulty of Paying Living Expenses:    Food Insecurity:     Worried About Running Out of Food in the Last Year:     [address] in the Last Year:    Transportation Needs:     Lack of Transportation (Medical):  Lack of Transportation (Non-Medical):    Physical Activity:     Days of Exercise per Week:     Minutes of Exercise per Session:    Stress:     Feeling of Stress :    Social Connections:     Frequency of Communication with Friends and Family:     Frequency of Social Gatherings with Friends and Family:     Attends [employer] Services:     Active Member of Clubs or Organizations:     Attends Club or Organization Meetings:     Marital Status:    Intimate Partner Violence:     Fear of Current or Ex-Partner:     Emotionally Abused:     Physically Abused:     Sexually Abused: ALLERGIES: Patient has no known allergies. Review of Systems   Constitutional: Negative for fever. HENT: Negative for congestion, dental problem, trouble swallowing and voice change. Eyes: Negative for photophobia and redness. Respiratory: Negative for chest tightness, shortness of breath and stridor. Cardiovascular: Negative for chest pain. Gastrointestinal: Positive for abdominal pain, nausea and vomiting. Negative for anorexia, constipation, flatus and hematochezia. Endocrine: Negative for polyuria. Genitourinary: Negative for flank pain, frequency, testicular pain and urgency. Musculoskeletal: Negative for arthralgias, back pain, neck pain and neck stiffness. Skin: Negative for color change and pallor.    Neurological: Negative for tremors, weakness, light-headedness and headaches. Hematological: Negative for adenopathy. Does not bruise/bleed easily. Psychiatric/Behavioral: Negative for behavioral problems and confusion. All other systems reviewed and are negative. Vitals:    10/03/21 1525 10/03/21 1528   BP: (!) 205/134    Pulse: 96    Resp:  18   Temp: 98 °F (36.7 °C)    SpO2: 99%    Weight: 86.2 kg (190 lb)    Height: 5' 10\" (1.778 m)             Physical Exam  Vitals and nursing note reviewed. Constitutional:       General: He is not in acute distress. Appearance: Normal appearance. He is not ill-appearing. HENT:      Head: Normocephalic and atraumatic. Right Ear: External ear normal.      Left Ear: External ear normal.      Nose: Nose normal. No congestion or rhinorrhea. Mouth/Throat:      Pharynx: No oropharyngeal exudate or posterior oropharyngeal erythema. Eyes:      General:         Right eye: No discharge. Left eye: No discharge. Extraocular Movements: Extraocular movements intact. Pupils: Pupils are equal, round, and reactive to light. Cardiovascular:      Rate and Rhythm: Regular rhythm. Tachycardia present. Heart sounds: Normal heart sounds. No murmur heard. No friction rub. Pulmonary:      Effort: Pulmonary effort is normal. No respiratory distress. Breath sounds: Normal breath sounds. No stridor. Abdominal:      General: Abdomen is flat. Tenderness: There is abdominal tenderness. There is no rebound. Musculoskeletal:         General: No swelling, tenderness, deformity or signs of injury. Normal range of motion. Cervical back: Normal range of motion and neck supple. No rigidity or tenderness. Skin:     Capillary Refill: Capillary refill takes less than 2 seconds. Coloration: Skin is not jaundiced or pale. Findings: No bruising. Neurological:      General: No focal deficit present.       Mental Status: He is alert and oriented to person, place, and time. Cranial Nerves: No cranial nerve deficit. Sensory: No sensory deficit. Motor: No weakness. Coordination: Coordination normal.   Psychiatric:         Mood and Affect: Mood normal.         Behavior: Behavior normal.          MDM  Number of Diagnoses or Management Options  Diagnosis management comments: Will repeat labs here, treat symptomatically. Continue to monitor symptoms. Will check blood alcohol level as well    4:45 PM  Labs are significant for an elevated lipase here at 1700 without was 600 before    Patient still endorses pain nausea, still hypertensive here. Plan to discuss case with hospitalist for admission. Amount and/or Complexity of Data Reviewed  Clinical lab tests: ordered and reviewed  Decide to obtain previous medical records or to obtain history from someone other than the patient: yes  Review and summarize past medical records: yes  Discuss the patient with other providers: yes    Risk of Complications, Morbidity, and/or Mortality  Presenting problems: moderate  Diagnostic procedures: moderate  Management options: high  General comments: High risk due to use of parenteral narcotics    Patient Progress  Patient progress: stable         Procedures        Results Include:    Recent Results (from the past 24 hour(s))   CBC WITH AUTOMATED DIFF    Collection Time: 10/03/21  9:26 AM   Result Value Ref Range    WBC 7.1 4.3 - 11.1 K/uL    RBC 5.89 (H) 4.23 - 5.6 M/uL    HGB 18.5 (H) 13.6 - 17.2 g/dL    HCT 55.1 (H) 41.1 - 50.3 %    MCV 93.5 79.6 - 97.8 FL    MCH 31.4 26.1 - 32.9 PG    MCHC 33.6 31.4 - 35.0 g/dL    RDW 13.2 11.9 - 14.6 %    PLATELET 206 277 - 367 K/uL    MPV 8.8 (L) 9.4 - 12.3 FL    ABSOLUTE NRBC 0.00 0.0 - 0.2 K/uL    DF AUTOMATED      NEUTROPHILS 73 43 - 78 %    LYMPHOCYTES 18 13 - 44 %    MONOCYTES 9 4.0 - 12.0 %    EOSINOPHILS 0 (L) 0.5 - 7.8 %    BASOPHILS 1 0.0 - 2.0 %    IMMATURE GRANULOCYTES 0 0.0 - 5.0 %    ABS.  NEUTROPHILS 5.1 1.7 - 8.2 K/UL    ABS. LYMPHOCYTES 1.3 0.5 - 4.6 K/UL    ABS. MONOCYTES 0.6 0.1 - 1.3 K/UL    ABS. EOSINOPHILS 0.0 0.0 - 0.8 K/UL    ABS. BASOPHILS 0.1 0.0 - 0.2 K/UL    ABS. IMM. GRANS. 0.0 0.0 - 0.5 K/UL   METABOLIC PANEL, COMPREHENSIVE    Collection Time: 10/03/21  9:26 AM   Result Value Ref Range    Sodium 132 (L) 136 - 145 mmol/L    Potassium 5.5 (H) 3.5 - 5.1 mmol/L    Chloride 105 98 - 107 mmol/L    CO2 20 (L) 21 - 32 mmol/L    Anion gap 7 7 - 16 mmol/L    Glucose 103 (H) 65 - 100 mg/dL    BUN 8 6 - 23 MG/DL    Creatinine 0.85 0.8 - 1.5 MG/DL    GFR est AA >60 >60 ml/min/1.73m2    GFR est non-AA >60 >60 ml/min/1.73m2    Calcium 9.7 8.3 - 10.4 MG/DL    Bilirubin, total 0.6 0.2 - 1.1 MG/DL    ALT (SGPT) 55 12 - 65 U/L    AST (SGOT) 60 (H) 15 - 37 U/L    Alk. phosphatase 53 50 - 136 U/L    Protein, total 8.3 (H) 6.3 - 8.2 g/dL    Albumin 4.0 3.5 - 5.0 g/dL    Globulin 4.3 (H) 2.3 - 3.5 g/dL    A-G Ratio 0.9 (L) 1.2 - 3.5     LIPASE    Collection Time: 10/03/21  9:26 AM   Result Value Ref Range    Lipase 594 (H) 73 - 393 U/L   CBC WITH AUTOMATED DIFF    Collection Time: 10/03/21  3:38 PM   Result Value Ref Range    WBC 12.1 (H) 4.3 - 11.1 K/uL    RBC 5.86 (H) 4.23 - 5.6 M/uL    HGB 18.0 (H) 13.6 - 17.2 g/dL    HCT 53.7 (H) 41.1 - 50.3 %    MCV 91.6 79.6 - 97.8 FL    MCH 30.7 26.1 - 32.9 PG    MCHC 33.5 31.4 - 35.0 g/dL    RDW 13.2 11.9 - 14.6 %    PLATELET 870 480 - 883 K/uL    MPV 8.9 (L) 9.4 - 12.3 FL    ABSOLUTE NRBC 0.00 0.0 - 0.2 K/uL    DF AUTOMATED      NEUTROPHILS 80 (H) 43 - 78 %    LYMPHOCYTES 11 (L) 13 - 44 %    MONOCYTES 9 4.0 - 12.0 %    EOSINOPHILS 0 (L) 0.5 - 7.8 %    BASOPHILS 0 0.0 - 2.0 %    IMMATURE GRANULOCYTES 0 0.0 - 5.0 %    ABS. NEUTROPHILS 9.7 (H) 1.7 - 8.2 K/UL    ABS. LYMPHOCYTES 1.3 0.5 - 4.6 K/UL    ABS. MONOCYTES 1.0 0.1 - 1.3 K/UL    ABS. EOSINOPHILS 0.0 0.0 - 0.8 K/UL    ABS. BASOPHILS 0.0 0.0 - 0.2 K/UL    ABS. IMM.  GRANS. 0.0 0.0 - 0.5 K/UL   LIPASE    Collection Time: 10/03/21 3:38 PM   Result Value Ref Range    Lipase 1,707 (H) 73 - 763 U/L   METABOLIC PANEL, COMPREHENSIVE    Collection Time: 10/03/21  3:38 PM   Result Value Ref Range    Sodium 134 (L) 136 - 145 mmol/L    Potassium 3.9 3.5 - 5.1 mmol/L    Chloride 101 98 - 107 mmol/L    CO2 25 21 - 32 mmol/L    Anion gap 8 7 - 16 mmol/L    Glucose 94 65 - 100 mg/dL    BUN 8 6 - 23 MG/DL    Creatinine 0.96 0.8 - 1.5 MG/DL    GFR est AA >60 >60 ml/min/1.73m2    GFR est non-AA >60 >60 ml/min/1.73m2    Calcium 9.1 8.3 - 10.4 MG/DL    Bilirubin, total 0.5 0.2 - 1.1 MG/DL    ALT (SGPT) 52 12 - 65 U/L    AST (SGOT) 42 (H) 15 - 37 U/L    Alk. phosphatase 52 50 - 136 U/L    Protein, total 8.2 6.3 - 8.2 g/dL    Albumin 4.2 3.5 - 5.0 g/dL    Globulin 4.0 (H) 2.3 - 3.5 g/dL    A-G Ratio 1.1 (L) 1.2 - 3.5     ETHYL ALCOHOL    Collection Time: 10/03/21  3:38 PM   Result Value Ref Range    ALCOHOL(ETHYL),SERUM <3 MG/DL   MAGNESIUM    Collection Time: 10/03/21  3:38 PM   Result Value Ref Range    Magnesium 2.2 1.8 - 2.4 mg/dL     Voice dictation software was used during the making of this note. This software is not perfect and grammatical and other typographical errors may be present. This note has been proofread, but may still contain errors. Jovon Cool MD; 10/3/2021 @4:47 PM   ===================================================================    I wore appropriate PPE throughout this patient's ED visit.  Jovon Cool MD, 4:47 PM

## 2021-10-03 NOTE — ED NOTES
39 y.o. M with abd pain. Seen here and dx with pancreatitis today. States took two tramadol but pain has worsened. Hypertensive in triage. Appears uncomfortable in triage. Patient evaluated initially in triage. Rapid Medical Evaluation was conducted and necessary orders have been placed. I have performed a medical screening exam.  Care will now be transferred to the attending physician in the emergency department.   Radha Aragon NP 3:26 PM

## 2021-10-03 NOTE — ED TRIAGE NOTES
Return trip to ED d/t increasing pain and vomiting. Pt states he was discharge with tramadol and zofran.

## 2021-10-03 NOTE — ED NOTES
Report called to [facility]. No further questions at this time. [>50% of Time Spent on Counseling and Coordination of Care for  ___] : Greater than 50% of the encounter time was spent on counseling and coordination of care for [unfilled] [Time Spent: ___ minutes] : I have spent [unfilled] minutes of face to face time with the patient

## 2021-10-04 LAB
ANION GAP SERPL CALC-SCNC: 8 MMOL/L (ref 7–16)
BUN SERPL-MCNC: 9 MG/DL (ref 6–23)
CALCIUM SERPL-MCNC: 8.6 MG/DL (ref 8.3–10.4)
CHLORIDE SERPL-SCNC: 103 MMOL/L (ref 98–107)
CO2 SERPL-SCNC: 22 MMOL/L (ref 21–32)
CREAT SERPL-MCNC: 0.74 MG/DL (ref 0.8–1.5)
ERYTHROCYTE [DISTWIDTH] IN BLOOD BY AUTOMATED COUNT: 13.2 % (ref 11.9–14.6)
GLUCOSE SERPL-MCNC: 84 MG/DL (ref 65–100)
HCT VFR BLD AUTO: 48 % (ref 41.1–50.3)
HGB BLD-MCNC: 16.2 G/DL (ref 13.6–17.2)
LIPASE SERPL-CCNC: 2254 U/L (ref 73–393)
MCH RBC QN AUTO: 30.9 PG (ref 26.1–32.9)
MCHC RBC AUTO-ENTMCNC: 33.8 G/DL (ref 31.4–35)
MCV RBC AUTO: 91.6 FL (ref 79.6–97.8)
NRBC # BLD: 0 K/UL (ref 0–0.2)
PLATELET # BLD AUTO: 301 K/UL (ref 150–450)
PMV BLD AUTO: 9.3 FL (ref 9.4–12.3)
POTASSIUM SERPL-SCNC: 4.3 MMOL/L (ref 3.5–5.1)
RBC # BLD AUTO: 5.24 M/UL (ref 4.23–5.6)
SODIUM SERPL-SCNC: 133 MMOL/L (ref 136–145)
WBC # BLD AUTO: 8.7 K/UL (ref 4.3–11.1)

## 2021-10-04 PROCEDURE — 83690 ASSAY OF LIPASE: CPT

## 2021-10-04 PROCEDURE — 65270000029 HC RM PRIVATE

## 2021-10-04 PROCEDURE — 36415 COLL VENOUS BLD VENIPUNCTURE: CPT

## 2021-10-04 PROCEDURE — 80048 BASIC METABOLIC PNL TOTAL CA: CPT

## 2021-10-04 PROCEDURE — 74011250636 HC RX REV CODE- 250/636: Performed by: INTERNAL MEDICINE

## 2021-10-04 PROCEDURE — 85027 COMPLETE CBC AUTOMATED: CPT

## 2021-10-04 PROCEDURE — 74011250637 HC RX REV CODE- 250/637: Performed by: INTERNAL MEDICINE

## 2021-10-04 RX ORDER — HYDROCODONE BITARTRATE AND ACETAMINOPHEN 7.5; 325 MG/1; MG/1
1 TABLET ORAL
Status: DISCONTINUED | OUTPATIENT
Start: 2021-10-04 | End: 2021-10-06 | Stop reason: HOSPADM

## 2021-10-04 RX ORDER — HYDROCODONE BITARTRATE AND ACETAMINOPHEN 5; 325 MG/1; MG/1
2 TABLET ORAL
Status: DISCONTINUED | OUTPATIENT
Start: 2021-10-04 | End: 2021-10-04

## 2021-10-04 RX ORDER — AMLODIPINE BESYLATE 5 MG/1
5 TABLET ORAL DAILY
Status: DISCONTINUED | OUTPATIENT
Start: 2021-10-05 | End: 2021-10-05

## 2021-10-04 RX ADMIN — SODIUM CHLORIDE, SODIUM LACTATE, POTASSIUM CHLORIDE, AND CALCIUM CHLORIDE 125 ML/HR: 600; 310; 30; 20 INJECTION, SOLUTION INTRAVENOUS at 19:35

## 2021-10-04 RX ADMIN — HYDROMORPHONE HYDROCHLORIDE 3 MG: 1 INJECTION, SOLUTION INTRAMUSCULAR; INTRAVENOUS; SUBCUTANEOUS at 12:38

## 2021-10-04 RX ADMIN — SODIUM CHLORIDE, SODIUM LACTATE, POTASSIUM CHLORIDE, AND CALCIUM CHLORIDE 125 ML/HR: 600; 310; 30; 20 INJECTION, SOLUTION INTRAVENOUS at 09:38

## 2021-10-04 RX ADMIN — ENOXAPARIN SODIUM 40 MG: 40 INJECTION SUBCUTANEOUS at 20:25

## 2021-10-04 RX ADMIN — Medication 1 AMPULE: at 20:25

## 2021-10-04 RX ADMIN — HYDROMORPHONE HYDROCHLORIDE 3 MG: 1 INJECTION, SOLUTION INTRAMUSCULAR; INTRAVENOUS; SUBCUTANEOUS at 09:32

## 2021-10-04 RX ADMIN — KETOROLAC TROMETHAMINE 15 MG: 15 INJECTION, SOLUTION INTRAMUSCULAR; INTRAVENOUS at 19:29

## 2021-10-04 RX ADMIN — HYDROCODONE BITARTRATE AND ACETAMINOPHEN 1 TABLET: 7.5; 325 TABLET ORAL at 17:09

## 2021-10-04 RX ADMIN — HYDROCODONE BITARTRATE AND ACETAMINOPHEN 1 TABLET: 5; 325 TABLET ORAL at 03:52

## 2021-10-04 RX ADMIN — ONDANSETRON 4 MG: 2 INJECTION INTRAMUSCULAR; INTRAVENOUS at 09:38

## 2021-10-04 RX ADMIN — LISINOPRIL 40 MG: 20 TABLET ORAL at 07:51

## 2021-10-04 RX ADMIN — HYDRALAZINE HYDROCHLORIDE 25 MG: 25 TABLET, FILM COATED ORAL at 04:00

## 2021-10-04 RX ADMIN — HYDRALAZINE HYDROCHLORIDE 25 MG: 25 TABLET, FILM COATED ORAL at 20:25

## 2021-10-04 RX ADMIN — HYDROCODONE BITARTRATE AND ACETAMINOPHEN 1 TABLET: 5; 325 TABLET ORAL at 07:51

## 2021-10-04 RX ADMIN — HYDROMORPHONE HYDROCHLORIDE 3 MG: 1 INJECTION, SOLUTION INTRAMUSCULAR; INTRAVENOUS; SUBCUTANEOUS at 01:54

## 2021-10-04 RX ADMIN — Medication 1 AMPULE: at 07:51

## 2021-10-04 RX ADMIN — HYDROMORPHONE HYDROCHLORIDE 3 MG: 1 INJECTION, SOLUTION INTRAMUSCULAR; INTRAVENOUS; SUBCUTANEOUS at 16:20

## 2021-10-04 RX ADMIN — KETOROLAC TROMETHAMINE 15 MG: 15 INJECTION, SOLUTION INTRAMUSCULAR; INTRAVENOUS at 13:33

## 2021-10-04 RX ADMIN — Medication 10 ML: at 21:00

## 2021-10-04 RX ADMIN — HYDROMORPHONE HYDROCHLORIDE 3 MG: 1 INJECTION, SOLUTION INTRAMUSCULAR; INTRAVENOUS; SUBCUTANEOUS at 05:41

## 2021-10-04 RX ADMIN — HYDROMORPHONE HYDROCHLORIDE 3 MG: 1 INJECTION, SOLUTION INTRAMUSCULAR; INTRAVENOUS; SUBCUTANEOUS at 20:22

## 2021-10-04 RX ADMIN — HYDROCODONE BITARTRATE AND ACETAMINOPHEN 1 TABLET: 5; 325 TABLET ORAL at 11:56

## 2021-10-04 RX ADMIN — Medication 10 ML: at 05:41

## 2021-10-04 RX ADMIN — HYDROCODONE BITARTRATE AND ACETAMINOPHEN 1 TABLET: 7.5; 325 TABLET ORAL at 21:55

## 2021-10-04 RX ADMIN — KETOROLAC TROMETHAMINE 15 MG: 15 INJECTION, SOLUTION INTRAMUSCULAR; INTRAVENOUS at 06:30

## 2021-10-04 RX ADMIN — KETOROLAC TROMETHAMINE 15 MG: 15 INJECTION, SOLUTION INTRAMUSCULAR; INTRAVENOUS at 00:19

## 2021-10-04 NOTE — PROGRESS NOTES
[facility] Hospitalist Progress Note     Name: Meng Scott Age: 39 y.o. Sex: male  : 1985    MRN:     [id number]    Admit Date:  10/3/2021    Presenting Complaint:   Abdominal Pain      Reason(s) for Admission:   Acute alcoholic pancreatitis [place] [facility] Course and Interval History:     Please refer to the admission H&P for details of presentation. In summary, Meng Scott is a 39 y.o. male with medical history significant for Meng Scott is a 39 y.o. 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 Subjective/24 hr Events (10/04/21) :  Patient is seen and examined at bedside. Reported pain has been somewhat controlled with IV pain medication but still requiring frequent IV and p.o. pain medication to control the pain. Patient denies fever, chills, chest pains, shortness of breath, n/v. Does not have any appetite to try eating. Review of Systems: 10 point review of systems is otherwise negative with the exception of the elements mentioned above. Assessment & Plan   Acute alcoholic pancreatitis  Lipase of 590 4 in the AM now increased to 1707 in the afternoon with worsening abdominal pain. New Lorie with any gallstones or dilated CBD  10/04/21: Continues to have pain and requiring frequent IV Pain meds  - NPO  - LR IVF  - pain control  - counseled on alcohol cessation  - ativan PRN for alcohol withdrawal     Hypertensive Crisis   HTN  Likely due to pain. - home lisinopril  - add Hydralazine PRN     Diet:  DIET NPO  DVT PPx: lovenox  Code: Full Code        I have reviewed and ordered clinical lab tests and independently visualized images, tracing.        Problem List:  Hospital Problems as of 10/4/2021 Date Reviewed: 10/3/2021        Codes Class Noted - Resolved POA    * (Principal) Acute alcoholic pancreatitis APO-91-KW: K85.20  ICD-9-CM: 503.4  10/3/2021 - Present Yes        Hypertensive crisis ICD-10-CM: I16.9  ICD-9-CM: 401.9  10/3/2021 - Present Yes        Essential hypertension ICD-10-CM: I10  ICD-9-CM: 401.9  11/30/2016 - Present Yes                 Objective:     Patient Vitals for the past 24 hrs:   Temp Pulse Resp BP SpO2   10/04/21 1038 98 °F (36.7 °C) 89 18 (!) 165/105 97 %   10/04/21 0745 97.5 °F (36.4 °C) 83 18 (!) 145/86 98 %   10/04/21 0616    (!) 155/108    10/04/21 0336 97.8 °F (36.6 °C) 97 18 (!) 170/105 95 %   10/04/21 0132    (!) 150/114    10/04/21 0130    (!) 160/100    10/04/21 0100    (!) 166/108    10/03/21 2356    (!) 187/117    10/03/21 2317 97.7 °F (36.5 °C) 92 19 (!) 181/106 96 %   10/03/21 1819 97.9 °F (36.6 °C) 87 20 (!) 182/113 96 %   10/03/21 1639    (!) 189/117 99 %   10/03/21 1528   18     10/03/21 1525 98 °F (36.7 °C) 96  (!) 205/134 99 %     Oxygen Therapy  O2 Sat (%): 97 % (10/04/21 1038)  Pulse via Oximetry: 85 beats per minute (10/03/21 1639)  O2 Device: None (Room air) (10/04/21 0024)    Estimated body mass index is 27.26 kg/m² as calculated from the following:    Height as of this encounter: 5' 10\" (1.778 m). Weight as of this encounter: 86.2 kg (190 lb). Intake/Output Summary (Last 24 hours) at 10/4/2021 1419  Last data filed at 10/4/2021 0544  Gross per 24 hour   Intake 492 ml   Output    Net 492 ml           Physical Exam:   General:     alert, awake   Head:   Normocephalic, atraumatic  Eyes:   anicteric sclerae, normal conjunctiva,  EOMI.  ENT:   Nares appear normal, no drainage. Moist oral mucosa  Neck:    supple, non-tender. Trachea midline. Lungs:   CTAB, no wheezing. Respirations even  Cardiac:   RRR, Normal S1 and S2. Abdomen:   Soft, non distended, tender to palpation, +BS, no guarding/rebound  Extremities:   No edema , pedal pulses present, no cyanosis  Skin:   Warm, dry, normal coloration and texture  Neuro:  AAOx3.  No gross focal neurological deficit  Psychiatric:  No anxiety, calm, cooperative    Data Review:  I have reviewed ordered lab tests and independently visualized imaging below:    Last 24hr Labs:  Recent Results (from the past 24 hour(s))   CBC WITH AUTOMATED DIFF    Collection Time: 10/03/21  3:38 PM   Result Value Ref Range    WBC 12.1 (H) 4.3 - 11.1 K/uL    RBC 5.86 (H) 4.23 - 5.6 M/uL    HGB 18.0 (H) 13.6 - 17.2 g/dL    HCT 53.7 (H) 41.1 - 50.3 %    MCV 91.6 79.6 - 97.8 FL    MCH 30.7 26.1 - 32.9 PG    MCHC 33.5 31.4 - 35.0 g/dL    RDW 13.2 11.9 - 14.6 %    PLATELET 425 846 - 297 K/uL    MPV 8.9 (L) 9.4 - 12.3 FL    ABSOLUTE NRBC 0.00 0.0 - 0.2 K/uL    DF AUTOMATED      NEUTROPHILS 80 (H) 43 - 78 %    LYMPHOCYTES 11 (L) 13 - 44 %    MONOCYTES 9 4.0 - 12.0 %    EOSINOPHILS 0 (L) 0.5 - 7.8 %    BASOPHILS 0 0.0 - 2.0 %    IMMATURE GRANULOCYTES 0 0.0 - 5.0 %    ABS. NEUTROPHILS 9.7 (H) 1.7 - 8.2 K/UL    ABS. LYMPHOCYTES 1.3 0.5 - 4.6 K/UL    ABS. MONOCYTES 1.0 0.1 - 1.3 K/UL    ABS. EOSINOPHILS 0.0 0.0 - 0.8 K/UL    ABS. BASOPHILS 0.0 0.0 - 0.2 K/UL    ABS. IMM. GRANS. 0.0 0.0 - 0.5 K/UL   LIPASE    Collection Time: 10/03/21  3:38 PM   Result Value Ref Range    Lipase 1,707 (H) 73 - 786 U/L   METABOLIC PANEL, COMPREHENSIVE    Collection Time: 10/03/21  3:38 PM   Result Value Ref Range    Sodium 134 (L) 136 - 145 mmol/L    Potassium 3.9 3.5 - 5.1 mmol/L    Chloride 101 98 - 107 mmol/L    CO2 25 21 - 32 mmol/L    Anion gap 8 7 - 16 mmol/L    Glucose 94 65 - 100 mg/dL    BUN 8 6 - 23 MG/DL    Creatinine 0.96 0.8 - 1.5 MG/DL    GFR est AA >60 >60 ml/min/1.73m2    GFR est non-AA >60 >60 ml/min/1.73m2    Calcium 9.1 8.3 - 10.4 MG/DL    Bilirubin, total 0.5 0.2 - 1.1 MG/DL    ALT (SGPT) 52 12 - 65 U/L    AST (SGOT) 42 (H) 15 - 37 U/L    Alk.  phosphatase 52 50 - 136 U/L    Protein, total 8.2 6.3 - 8.2 g/dL    Albumin 4.2 3.5 - 5.0 g/dL    Globulin 4.0 (H) 2.3 - 3.5 g/dL    A-G Ratio 1.1 (L) 1.2 - 3.5     ETHYL ALCOHOL    Collection Time: 10/03/21  3:38 PM   Result Value Ref Range    ALCOHOL(ETHYL),SERUM <3 MG/DL   MAGNESIUM    Collection Time: 10/03/21  3:38 PM   Result Value Ref Range    Magnesium 2.2 1.8 - 2.4 mg/dL   METABOLIC PANEL, BASIC    Collection Time: 10/04/21  5:29 AM   Result Value Ref Range    Sodium 133 (L) 136 - 145 mmol/L    Potassium 4.3 3.5 - 5.1 mmol/L    Chloride 103 98 - 107 mmol/L    CO2 22 21 - 32 mmol/L    Anion gap 8 7 - 16 mmol/L    Glucose 84 65 - 100 mg/dL    BUN 9 6 - 23 MG/DL    Creatinine 0.74 (L) 0.8 - 1.5 MG/DL    GFR est AA >60 >60 ml/min/1.73m2    GFR est non-AA >60 >60 ml/min/1.73m2    Calcium 8.6 8.3 - 10.4 MG/DL   CBC W/O DIFF    Collection Time: 10/04/21  5:29 AM   Result Value Ref Range    WBC 8.7 4.3 - 11.1 K/uL    RBC 5.24 4.23 - 5.6 M/uL    HGB 16.2 13.6 - 17.2 g/dL    HCT 48.0 41.1 - 50.3 %    MCV 91.6 79.6 - 97.8 FL    MCH 30.9 26.1 - 32.9 PG    MCHC 33.8 31.4 - 35.0 g/dL    RDW 13.2 11.9 - 14.6 %    PLATELET 685 688 - 427 K/uL    MPV 9.3 (L) 9.4 - 12.3 FL    ABSOLUTE NRBC 0.00 0.0 - 0.2 K/uL   LIPASE    Collection Time: 10/04/21  5:29 AM   Result Value Ref Range    Lipase 2,254 (H) 73 - 393 U/L       All Micro Results     None          EKG Results     None          Other Studies:   ABD LTD    Result Date: 10/3/2021  RIGHT UPPER QUADRANT ULTRASOUND. HISTORY: Acute pancreatitis COMPARISON: None FINDINGS: Status post cholecystectomy. The common bile duct is not dilated, 4.9 mm. Intrahepatic biliary tree is not dilated. Included portion of the pancreas and right kidney are unremarkable. There is diffuse fatty change of the liver. Coarse appearance of the pancreas with possible calcifications. Diffuse fatty change liver. Cholecystectomy. Possible chronic pancreatitis.           Current Meds:   Current Facility-Administered Medications   Medication Dose Route Frequency    HYDROcodone-acetaminophen (NORCO) 7.5-325 mg per tablet 1 Tablet  1 Tablet Oral Q4H PRN    sodium chloride (NS) flush 5-40 mL  5-40 mL IntraVENous Q8H    sodium chloride (NS) flush 5-40 mL  5-40 mL IntraVENous PRN    acetaminophen (TYLENOL) tablet 650 mg  650 mg Oral Q6H PRN    Or    acetaminophen (TYLENOL) suppository 650 mg  650 mg Rectal Q6H PRN    polyethylene glycol (MIRALAX) packet 17 g  17 g Oral DAILY PRN    promethazine (PHENERGAN) tablet 12.5 mg  12.5 mg Oral Q6H PRN    Or    ondansetron (ZOFRAN) injection 4 mg  4 mg IntraVENous Q6H PRN    enoxaparin (LOVENOX) injection 40 mg  40 mg SubCUTAneous Q24H    lactated Ringers infusion  125 mL/hr IntraVENous CONTINUOUS    lisinopriL (PRINIVIL, ZESTRIL) tablet 40 mg  40 mg Oral DAILY    hydrALAZINE (APRESOLINE) tablet 25 mg  25 mg Oral QID PRN    HYDROmorphone (DILAUDID) injection 3 mg  3 mg IntraVENous Q4H PRN    ketorolac (TORADOL) injection 15 mg  15 mg IntraVENous Q6H PRN    LORazepam (ATIVAN) injection 2 mg  2 mg IntraVENous Q4H PRN    alcohol 62% (NOZIN) nasal  1 Ampule  1 Ampule Topical Q12H       Part of this note was written by using a voice dictation software and the note has been proof read but may still contain some grammatical/other typographical errors.     Signed By: Katja Olivier MD   [facility] Hospitalist Service    October 4, 2021  2:19 PM

## 2021-10-04 NOTE — PROGRESS NOTES
END OF SHIFT NOTE:    Intake/Output  10/03 1901 - 10/04 0700  In: 492 [I.V.:492]  Out: -  1x   Voiding: YES  Catheter: NO  Drain:              Stool:  0 occurrences. Emesis:  0 occurrences. VITAL SIGNS  Patient Vitals for the past 12 hrs:   Temp Pulse Resp BP SpO2   10/04/21 0616    (!) 155/108    10/04/21 0336 97.8 °F (36.6 °C) 97 18 (!) 170/105 95 %   10/04/21 0132    (!) 150/114    10/04/21 0130    (!) 160/100    10/04/21 0100    (!) 166/108    10/03/21 2356    (!) 187/117    10/03/21 2317 97.7 °F (36.5 °C) 92 19 (!) 181/106 96 %       Pain Assessment  Pain 1  Pain Scale 1: Numeric (0 - 10) (10/04/21 0630)  Pain Intensity 1: 7 (10/04/21 0630)  Patient Stated Pain Goal: 0 (10/04/21 0611)  Pain Reassessment 1: Yes (10/04/21 0611)  Pain Location 1: Abdomen (10/04/21 0630)  Pain Orientation 1: Mid (10/04/21 0630)  Pain Description 1: Aching;Constant (10/04/21 0630)  Pain Intervention(s) 1: Medication (see MAR) (10/04/21 0630)    Ambulating  Yes    Additional Information: Patient given hydralazine 2x for high bps. MD notified. Pt given dilaudid 3x, norco 2x, and torodol 2x for pain. Patient also given ativan to decrease agitation. Patient's pain went from a 9 to a 6 after 12 hours. Patient resting in bed with no further needs. Shift report given to oncoming nurse at the bedside.     Miguel Jameson RN

## 2021-10-04 NOTE — PROGRESS NOTES
SW reviewed patient's chart and conducted a baseline assessment. Discharge plan at this time is as follows:     Care Management Interventions  PCP Verified by CM: Yes  Mode of Transport at Discharge: Self  Transition of Care Consult (CM Consult): Discharge Planning  Support Systems: Parent(s)  Confirm Follow Up Transport: Family  Discharge Location  Discharge Placement: Home with outpatient services      *Please note that discharge plans can change throughout an inpatient admission.  Ensure that you are referring to the most recent social work/nurse case management note for current discharge plan*     Ana Garcia, [address] [place] [address] Work   [facility]    * [email or web address]

## 2021-10-04 NOTE — PROGRESS NOTES
[facility] met with patient who confirmed demographic information. Patient sees Dr. Wyatt Najera for primary care and is current. Patient is currently uninsured and pays to see his PCP out-of-pocket. AUTUMN provided the patient with self-pay resources including [place], [address]. Patient has already completed a financial assistance program application with Phong Rivera from [facility]. Patient endorses a history of alcohol abuse, advises that this has been an issue for six years. Patient reports that he drinks three 16oz containers of wine a day. He has never accessed addictions recovery resources that are outpatient or inpatient. [facility]UMN provided the patient with a packet of resources including [address] [place] [address], [address], and [employer] with verbal education on each. Patient accepted resources and verbalized understanding. No other needs identified by [facility].      Lori Ray, [facility]    [facility]    * [email or web address]

## 2021-10-04 NOTE — PROGRESS NOTES
Problem: Falls - Risk of  Goal: *Absence of Falls  Description: Document Sakina Pereyra Fall Risk and appropriate interventions in the flowsheet.   Outcome: Progressing Towards Goal  Note: Fall Risk Interventions:            Medication Interventions: Evaluate medications/consider consulting pharmacy         History of Falls Interventions: Evaluate medications/consider consulting pharmacy

## 2021-10-05 LAB
ANION GAP SERPL CALC-SCNC: 8 MMOL/L (ref 7–16)
BUN SERPL-MCNC: 8 MG/DL (ref 6–23)
CALCIUM SERPL-MCNC: 8.6 MG/DL (ref 8.3–10.4)
CHLORIDE SERPL-SCNC: 101 MMOL/L (ref 98–107)
CO2 SERPL-SCNC: 23 MMOL/L (ref 21–32)
CREAT SERPL-MCNC: 0.8 MG/DL (ref 0.8–1.5)
ERYTHROCYTE [DISTWIDTH] IN BLOOD BY AUTOMATED COUNT: 12.9 % (ref 11.9–14.6)
GLUCOSE SERPL-MCNC: 80 MG/DL (ref 65–100)
HCT VFR BLD AUTO: 43.5 % (ref 41.1–50.3)
HGB BLD-MCNC: 14.5 G/DL (ref 13.6–17.2)
LIPASE SERPL-CCNC: 1191 U/L (ref 73–393)
MCH RBC QN AUTO: 30.5 PG (ref 26.1–32.9)
MCHC RBC AUTO-ENTMCNC: 33.3 G/DL (ref 31.4–35)
MCV RBC AUTO: 91.6 FL (ref 79.6–97.8)
NRBC # BLD: 0 K/UL (ref 0–0.2)
PLATELET # BLD AUTO: 262 K/UL (ref 150–450)
PMV BLD AUTO: 9.4 FL (ref 9.4–12.3)
POTASSIUM SERPL-SCNC: 3.6 MMOL/L (ref 3.5–5.1)
RBC # BLD AUTO: 4.75 M/UL (ref 4.23–5.6)
SODIUM SERPL-SCNC: 132 MMOL/L (ref 136–145)
WBC # BLD AUTO: 5.1 K/UL (ref 4.3–11.1)

## 2021-10-05 PROCEDURE — 65270000029 HC RM PRIVATE

## 2021-10-05 PROCEDURE — 74011250636 HC RX REV CODE- 250/636: Performed by: INTERNAL MEDICINE

## 2021-10-05 PROCEDURE — 83690 ASSAY OF LIPASE: CPT

## 2021-10-05 PROCEDURE — 74011250637 HC RX REV CODE- 250/637: Performed by: FAMILY MEDICINE

## 2021-10-05 PROCEDURE — 74011250637 HC RX REV CODE- 250/637: Performed by: INTERNAL MEDICINE

## 2021-10-05 PROCEDURE — 36415 COLL VENOUS BLD VENIPUNCTURE: CPT

## 2021-10-05 PROCEDURE — 80048 BASIC METABOLIC PNL TOTAL CA: CPT

## 2021-10-05 PROCEDURE — 85027 COMPLETE CBC AUTOMATED: CPT

## 2021-10-05 RX ORDER — AMLODIPINE BESYLATE 5 MG/1
5 TABLET ORAL ONCE
Status: COMPLETED | OUTPATIENT
Start: 2021-10-05 | End: 2021-10-05

## 2021-10-05 RX ORDER — AMLODIPINE BESYLATE 10 MG/1
10 TABLET ORAL DAILY
Status: DISCONTINUED | OUTPATIENT
Start: 2021-10-06 | End: 2021-10-06 | Stop reason: HOSPADM

## 2021-10-05 RX ORDER — IBUPROFEN 200 MG
1 TABLET ORAL EVERY 24 HOURS
Status: DISCONTINUED | OUTPATIENT
Start: 2021-10-05 | End: 2021-10-06 | Stop reason: HOSPADM

## 2021-10-05 RX ADMIN — HYDROMORPHONE HYDROCHLORIDE 3 MG: 1 INJECTION, SOLUTION INTRAMUSCULAR; INTRAVENOUS; SUBCUTANEOUS at 00:17

## 2021-10-05 RX ADMIN — AMLODIPINE BESYLATE 5 MG: 5 TABLET ORAL at 08:06

## 2021-10-05 RX ADMIN — KETOROLAC TROMETHAMINE 15 MG: 15 INJECTION, SOLUTION INTRAMUSCULAR; INTRAVENOUS at 14:53

## 2021-10-05 RX ADMIN — HYDROMORPHONE HYDROCHLORIDE 3 MG: 1 INJECTION, SOLUTION INTRAMUSCULAR; INTRAVENOUS; SUBCUTANEOUS at 12:59

## 2021-10-05 RX ADMIN — HYDROMORPHONE HYDROCHLORIDE 3 MG: 1 INJECTION, SOLUTION INTRAMUSCULAR; INTRAVENOUS; SUBCUTANEOUS at 17:03

## 2021-10-05 RX ADMIN — LORAZEPAM 2 MG: 2 INJECTION INTRAMUSCULAR; INTRAVENOUS at 00:27

## 2021-10-05 RX ADMIN — Medication 10 ML: at 05:30

## 2021-10-05 RX ADMIN — HYDROMORPHONE HYDROCHLORIDE 3 MG: 1 INJECTION, SOLUTION INTRAMUSCULAR; INTRAVENOUS; SUBCUTANEOUS at 09:03

## 2021-10-05 RX ADMIN — HYDROMORPHONE HYDROCHLORIDE 3 MG: 1 INJECTION, SOLUTION INTRAMUSCULAR; INTRAVENOUS; SUBCUTANEOUS at 04:20

## 2021-10-05 RX ADMIN — HYDRALAZINE HYDROCHLORIDE 25 MG: 25 TABLET, FILM COATED ORAL at 08:07

## 2021-10-05 RX ADMIN — HYDROCODONE BITARTRATE AND ACETAMINOPHEN 1 TABLET: 7.5; 325 TABLET ORAL at 18:51

## 2021-10-05 RX ADMIN — Medication 10 ML: at 21:56

## 2021-10-05 RX ADMIN — Medication 10 ML: at 14:55

## 2021-10-05 RX ADMIN — HYDROMORPHONE HYDROCHLORIDE 3 MG: 1 INJECTION, SOLUTION INTRAMUSCULAR; INTRAVENOUS; SUBCUTANEOUS at 21:08

## 2021-10-05 RX ADMIN — HYDRALAZINE HYDROCHLORIDE 25 MG: 25 TABLET, FILM COATED ORAL at 14:53

## 2021-10-05 RX ADMIN — SODIUM CHLORIDE, SODIUM LACTATE, POTASSIUM CHLORIDE, AND CALCIUM CHLORIDE 125 ML/HR: 600; 310; 30; 20 INJECTION, SOLUTION INTRAVENOUS at 17:06

## 2021-10-05 RX ADMIN — SODIUM CHLORIDE, SODIUM LACTATE, POTASSIUM CHLORIDE, AND CALCIUM CHLORIDE 125 ML/HR: 600; 310; 30; 20 INJECTION, SOLUTION INTRAVENOUS at 03:15

## 2021-10-05 RX ADMIN — LISINOPRIL 40 MG: 20 TABLET ORAL at 08:06

## 2021-10-05 RX ADMIN — LORAZEPAM 2 MG: 2 INJECTION INTRAMUSCULAR; INTRAVENOUS at 22:38

## 2021-10-05 RX ADMIN — KETOROLAC TROMETHAMINE 15 MG: 15 INJECTION, SOLUTION INTRAMUSCULAR; INTRAVENOUS at 22:38

## 2021-10-05 RX ADMIN — AMLODIPINE BESYLATE 5 MG: 5 TABLET ORAL at 10:15

## 2021-10-05 RX ADMIN — HYDROCODONE BITARTRATE AND ACETAMINOPHEN 1 TABLET: 7.5; 325 TABLET ORAL at 10:15

## 2021-10-05 RX ADMIN — HYDROCODONE BITARTRATE AND ACETAMINOPHEN 1 TABLET: 7.5; 325 TABLET ORAL at 03:15

## 2021-10-05 RX ADMIN — ENOXAPARIN SODIUM 40 MG: 40 INJECTION SUBCUTANEOUS at 21:00

## 2021-10-05 RX ADMIN — SODIUM CHLORIDE, SODIUM LACTATE, POTASSIUM CHLORIDE, AND CALCIUM CHLORIDE 125 ML/HR: 600; 310; 30; 20 INJECTION, SOLUTION INTRAVENOUS at 11:38

## 2021-10-05 RX ADMIN — Medication 1 AMPULE: at 21:00

## 2021-10-05 RX ADMIN — KETOROLAC TROMETHAMINE 15 MG: 15 INJECTION, SOLUTION INTRAMUSCULAR; INTRAVENOUS at 01:35

## 2021-10-05 RX ADMIN — Medication 1 AMPULE: at 08:06

## 2021-10-05 RX ADMIN — KETOROLAC TROMETHAMINE 15 MG: 15 INJECTION, SOLUTION INTRAMUSCULAR; INTRAVENOUS at 08:07

## 2021-10-05 NOTE — PROGRESS NOTES
Problem: Falls - Risk of  Goal: *Absence of Falls  Description: Document Yanira Velarde Fall Risk and appropriate interventions in the flowsheet.   Outcome: Progressing Towards Goal  Note: Fall Risk Interventions:            Medication Interventions: Evaluate medications/consider consulting pharmacy, Patient to call before getting OOB, Teach patient to arise slowly         History of Falls Interventions: Evaluate medications/consider consulting pharmacy, Investigate reason for fall         Problem: Pain  Goal: *Control of Pain  Outcome: Not Progressing Towards Goal

## 2021-10-05 NOTE — PROGRESS NOTES
[facility] Hospitalist Progress Note     Name: Kassidy Uribe Age: 39 y.o. Sex: male  : 1985    MRN:     [id number]    Admit Date:  10/3/2021    Presenting Complaint:   Abdominal Pain      Reason(s) for Admission:   Acute alcoholic pancreatitis [facility] Course and Interval History:     Please refer to the admission H&P for details of presentation. In summary, Kassidy Uribe is a 39 y.o. male with medical history significant for Kassidy Uribe is a 39 y.o. 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 Admitted for acute alcoholic pancreatitis. Ultrasound negative for any gallstones or dilated CBD. Subjective/24 hr Events (10/05/21) :  Patient is seen and examined at bedside. Continues to require frequent IV pain medication but reports improvement in pain. Patient denies fever, chills, chest pains, shortness of breath, n/v.     Review of Systems: 10 point review of systems is otherwise negative with the exception of the elements mentioned above. Assessment & Plan   Acute alcoholic pancreatitis  Lipase of 590 4 in the AM now increased to 1707 in the afternoon with worsening abdominal pain. New Lorie with any gallstones or dilated CBD  10/05/21: Continues to have pain and requiring frequent IV Pain meds  - start clears  - LR IVF  - pain control  - counseled on alcohol cessation  - ativan PRN for alcohol withdrawal     Hypertensive Crisis   HTN  Likely due to pain. - home lisinopril  - add amlodipine due to hypertensive episodes  - add Hydralazine PRN     Diet:  ADULT DIET Clear Liquid  DVT PPx: lovenox  Code: Full Code        I have reviewed and ordered clinical lab tests and independently visualized images, tracing.        Problem List:  Hospital Problems as of 10/5/2021 Date Reviewed: 10/3/2021        Codes Class Noted - Resolved POA    * (Principal) Acute alcoholic pancreatitis ICD-10-CM: K85.20  ICD-9-CM: 540.2  10/3/2021 - Present Yes        Hypertensive crisis ICD-10-CM: I16.9  ICD-9-CM: 401.9  10/3/2021 - Present Yes        Essential hypertension ICD-10-CM: I10  ICD-9-CM: 401.9  11/30/2016 - Present Yes                 Objective:     Patient Vitals for the past 24 hrs:   Temp Pulse Resp BP SpO2   10/05/21 1145 98.1 °F (36.7 °C) 94  (!) 168/97 99 %   10/05/21 0850    (!) 162/108    10/05/21 0744 97.7 °F (36.5 °C) (!) 102 20 (!) 183/123 98 %   10/05/21 0319 97.4 °F (36.3 °C) 85 19 (!) 143/107 99 %   10/05/21 0019 97.3 °F (36.3 °C) 95 20 (!) 157/94 99 %   10/04/21 1943 98.3 °F (36.8 °C) 79 16 (!) 160/106 97 %   10/04/21 1501 97.3 °F (36.3 °C) 94 18 (!) 156/90 97 %     Oxygen Therapy  O2 Sat (%): 99 % (10/05/21 1145)  Pulse via Oximetry: 85 beats per minute (10/03/21 1639)  O2 Device: None (Room air) (10/05/21 0019)    Estimated body mass index is 27.26 kg/m² as calculated from the following:    Height as of this encounter: 5' 10\" (1.778 m). Weight as of this encounter: 86.2 kg (190 lb). Intake/Output Summary (Last 24 hours) at 10/5/2021 1155  Last data filed at 10/5/2021 8253  Gross per 24 hour   Intake 2410 ml   Output    Net 2410 ml           Physical Exam:   General:     alert, awake   Head:   Normocephalic, atraumatic  Eyes:   anicteric sclerae, normal conjunctiva,  EOMI.  ENT:   Nares appear normal, no drainage. Moist oral mucosa  Neck:    supple, non-tender. Trachea midline. Lungs:   CTAB, no wheezing. Respirations even  Cardiac:   RRR, Normal S1 and S2. Abdomen:   Soft, non distended, nontender to palpation, +BS, no guarding/rebound  Extremities:   No edema , pedal pulses present, no cyanosis  Skin:   Warm, dry, normal coloration and texture  Neuro:  AAOx3.  No gross focal neurological deficit  Psychiatric:  No anxiety, calm, cooperative    Data Review:  I have reviewed ordered lab tests and independently visualized imaging below:    Last 24hr Labs:  Recent Results (from the past 24 hour(s))   METABOLIC PANEL, BASIC    Collection Time: 10/05/21  5:34 AM   Result Value Ref Range    Sodium 132 (L) 136 - 145 mmol/L    Potassium 3.6 3.5 - 5.1 mmol/L    Chloride 101 98 - 107 mmol/L    CO2 23 21 - 32 mmol/L    Anion gap 8 7 - 16 mmol/L    Glucose 80 65 - 100 mg/dL    BUN 8 6 - 23 MG/DL    Creatinine 0.80 0.8 - 1.5 MG/DL    GFR est AA >60 >60 ml/min/1.73m2    GFR est non-AA >60 >60 ml/min/1.73m2    Calcium 8.6 8.3 - 10.4 MG/DL   CBC W/O DIFF    Collection Time: 10/05/21  5:34 AM   Result Value Ref Range    WBC 5.1 4.3 - 11.1 K/uL    RBC 4.75 4.23 - 5.6 M/uL    HGB 14.5 13.6 - 17.2 g/dL    HCT 43.5 41.1 - 50.3 %    MCV 91.6 79.6 - 97.8 FL    MCH 30.5 26.1 - 32.9 PG    MCHC 33.3 31.4 - 35.0 g/dL    RDW 12.9 11.9 - 14.6 %    PLATELET 549 151 - 496 K/uL    MPV 9.4 9.4 - 12.3 FL    ABSOLUTE NRBC 0.00 0.0 - 0.2 K/uL   LIPASE    Collection Time: 10/05/21  5:34 AM   Result Value Ref Range    Lipase 1,191 (H) 73 - 393 U/L       All Micro Results     None          EKG Results     None          Other Studies:   ABD LTD    Result Date: 10/3/2021  RIGHT UPPER QUADRANT ULTRASOUND. HISTORY: Acute pancreatitis COMPARISON: None FINDINGS: Status post cholecystectomy. The common bile duct is not dilated, 4.9 mm. Intrahepatic biliary tree is not dilated. Included portion of the pancreas and right kidney are unremarkable. There is diffuse fatty change of the liver. Coarse appearance of the pancreas with possible calcifications. Diffuse fatty change liver. Cholecystectomy. Possible chronic pancreatitis.           Current Meds:   Current Facility-Administered Medications   Medication Dose Route Frequency    [START ON 10/6/2021] amLODIPine (NORVASC) tablet 10 mg  10 mg Oral DAILY    HYDROcodone-acetaminophen (NORCO) 7.5-325 mg per tablet 1 Tablet  1 Tablet Oral Q4H PRN    sodium chloride (NS) flush 5-40 mL  5-40 mL IntraVENous Q8H    sodium chloride (NS) flush 5-40 mL  5-40 mL IntraVENous PRN    acetaminophen (TYLENOL) tablet 650 mg  650 mg Oral Q6H PRN    Or    acetaminophen (TYLENOL) suppository 650 mg  650 mg Rectal Q6H PRN    polyethylene glycol (MIRALAX) packet 17 g  17 g Oral DAILY PRN    promethazine (PHENERGAN) tablet 12.5 mg  12.5 mg Oral Q6H PRN    Or    ondansetron (ZOFRAN) injection 4 mg  4 mg IntraVENous Q6H PRN    enoxaparin (LOVENOX) injection 40 mg  40 mg SubCUTAneous Q24H    lactated Ringers infusion  125 mL/hr IntraVENous CONTINUOUS    lisinopriL (PRINIVIL, ZESTRIL) tablet 40 mg  40 mg Oral DAILY    hydrALAZINE (APRESOLINE) tablet 25 mg  25 mg Oral QID PRN    HYDROmorphone (DILAUDID) injection 3 mg  3 mg IntraVENous Q4H PRN    ketorolac (TORADOL) injection 15 mg  15 mg IntraVENous Q6H PRN    LORazepam (ATIVAN) injection 2 mg  2 mg IntraVENous Q4H PRN    alcohol 62% (NOZIN) nasal  1 Ampule  1 Ampule Topical Q12H       Part of this note was written by using a voice dictation software and the note has been proof read but may still contain some grammatical/other typographical errors.     Signed By: Chilango Max MD   [facility] Hospitalist Service    October 5, 2021  2:19 PM

## 2021-10-05 NOTE — PROGRESS NOTES
END OF SHIFT NOTE:    Intake/Output  10/04 1901 - 10/05 0700  In: 2410 [I.V.:2410]  Out: -  4x per patient  Voiding: YES  Catheter: NO  Drain:              Stool:  0 occurrences. Emesis:  0 occurrences. VITAL SIGNS  Patient Vitals for the past 12 hrs:   Temp Pulse Resp BP SpO2   10/05/21 0319 97.4 °F (36.3 °C) 85 19 (!) 143/107 99 %   10/05/21 0019 97.3 °F (36.3 °C) 95 20 (!) 157/94 99 %   10/04/21 1943 98.3 °F (36.8 °C) 79 16 (!) 160/106 97 %       Pain Assessment  Pain 1  Pain Scale 1: Visual (10/05/21 0454)  Pain Intensity 1: 0 (10/05/21 0454)  Patient Stated Pain Goal: 0 (10/05/21 0454)  Pain Reassessment 1: Patient resting w/respiratory rate greater than 10 (10/05/21 0454)  Pain Location 1: Abdomen (10/05/21 0420)  Pain Orientation 1: Mid (10/05/21 0420)  Pain Description 1: Constant (10/05/21 0420)  Pain Intervention(s) 1: Medication (see MAR) (10/05/21 0420)    Ambulating  Yes    Additional Information: Patient give dilaudid 3x (every 4 hours) for pain. Patient given torodol 2x. Patient given norco 2x. Patient given hydralazine 1x for dbp. Patient resting in bed with no further needs.       Abhay Lees RN

## 2021-10-05 NOTE — PROGRESS NOTES
Patient Vitals for the past 12 hrs:   Temp Pulse Resp BP SpO2   10/05/21 1444 98 °F (36.7 °C) 87 19 (!) 189/109 97 %   10/05/21 1145 98.1 °F (36.7 °C) 94  (!) 168/97 99 %   10/05/21 0850    (!) 162/108    10/05/21 0744 97.7 °F (36.5 °C) (!) 102 20 (!) 183/123 98 %     Pt started on Clear Liquid diet today. Complained of increased pain with attempting to eat. Increased bp throughout the day. PRN hydralazine given x's 2. Dose of norvasc given when MD Mooney paged about continued increased bp despite PRN med. PRN dilaudid 3mg IV given x's 3. PRN toradol 15mg IV given x's 2. PRN norco 1 tablet given x's 2. Pt found to be unhooking himself from his IV pump and leaving the floor. Educated that this was not appropriate or safe and he would not be given pain medicines if he continued to do so for safety reasons. Verbalized understanding.     Bedside shift report given to Elizabeth Mahoney

## 2021-10-06 VITALS
OXYGEN SATURATION: 100 % | HEIGHT: 70 IN | SYSTOLIC BLOOD PRESSURE: 154 MMHG | WEIGHT: 190 LBS | TEMPERATURE: 97.5 F | HEART RATE: 95 BPM | RESPIRATION RATE: 16 BRPM | BODY MASS INDEX: 27.2 KG/M2 | DIASTOLIC BLOOD PRESSURE: 98 MMHG

## 2021-10-06 LAB
ANION GAP SERPL CALC-SCNC: 7 MMOL/L (ref 7–16)
BUN SERPL-MCNC: 7 MG/DL (ref 6–23)
CALCIUM SERPL-MCNC: 9 MG/DL (ref 8.3–10.4)
CHLORIDE SERPL-SCNC: 100 MMOL/L (ref 98–107)
CO2 SERPL-SCNC: 26 MMOL/L (ref 21–32)
CREAT SERPL-MCNC: 0.73 MG/DL (ref 0.8–1.5)
ERYTHROCYTE [DISTWIDTH] IN BLOOD BY AUTOMATED COUNT: 12.8 % (ref 11.9–14.6)
GLUCOSE SERPL-MCNC: 129 MG/DL (ref 65–100)
HCT VFR BLD AUTO: 44.6 % (ref 41.1–50.3)
HGB BLD-MCNC: 15.2 G/DL (ref 13.6–17.2)
MCH RBC QN AUTO: 30.8 PG (ref 26.1–32.9)
MCHC RBC AUTO-ENTMCNC: 34.1 G/DL (ref 31.4–35)
MCV RBC AUTO: 90.3 FL (ref 79.6–97.8)
NRBC # BLD: 0 K/UL (ref 0–0.2)
PLATELET # BLD AUTO: 272 K/UL (ref 150–450)
PMV BLD AUTO: 9.6 FL (ref 9.4–12.3)
POTASSIUM SERPL-SCNC: 3.6 MMOL/L (ref 3.5–5.1)
RBC # BLD AUTO: 4.94 M/UL (ref 4.23–5.6)
SODIUM SERPL-SCNC: 133 MMOL/L (ref 136–145)
WBC # BLD AUTO: 4.9 K/UL (ref 4.3–11.1)

## 2021-10-06 PROCEDURE — 85027 COMPLETE CBC AUTOMATED: CPT

## 2021-10-06 PROCEDURE — 36415 COLL VENOUS BLD VENIPUNCTURE: CPT

## 2021-10-06 PROCEDURE — 74011250637 HC RX REV CODE- 250/637: Performed by: FAMILY MEDICINE

## 2021-10-06 PROCEDURE — 80048 BASIC METABOLIC PNL TOTAL CA: CPT

## 2021-10-06 PROCEDURE — 74011250636 HC RX REV CODE- 250/636: Performed by: INTERNAL MEDICINE

## 2021-10-06 PROCEDURE — 74011250637 HC RX REV CODE- 250/637: Performed by: INTERNAL MEDICINE

## 2021-10-06 RX ORDER — AMLODIPINE BESYLATE 10 MG/1
10 TABLET ORAL DAILY
Qty: 30 TABLET | Refills: 1 | Status: SHIPPED | OUTPATIENT
Start: 2021-10-07

## 2021-10-06 RX ORDER — BISMUTH SUBSALICYLATE 262 MG/15ML
30 LIQUID ORAL ONCE
Status: COMPLETED | OUTPATIENT
Start: 2021-10-06 | End: 2021-10-06

## 2021-10-06 RX ORDER — CLONIDINE HYDROCHLORIDE 0.1 MG/1
0.1 TABLET ORAL
Status: DISCONTINUED | OUTPATIENT
Start: 2021-10-06 | End: 2021-10-06 | Stop reason: HOSPADM

## 2021-10-06 RX ORDER — CARVEDILOL 6.25 MG/1
6.25 TABLET ORAL 2 TIMES DAILY WITH MEALS
Status: DISCONTINUED | OUTPATIENT
Start: 2021-10-06 | End: 2021-10-06

## 2021-10-06 RX ORDER — POLYETHYLENE GLYCOL 3350 17 G/17G
17 POWDER, FOR SOLUTION ORAL ONCE
Status: COMPLETED | OUTPATIENT
Start: 2021-10-06 | End: 2021-10-06

## 2021-10-06 RX ORDER — HYDROCODONE BITARTRATE AND ACETAMINOPHEN 7.5; 325 MG/1; MG/1
1 TABLET ORAL
Qty: 12 TABLET | Refills: 0 | Status: SHIPPED | OUTPATIENT
Start: 2021-10-06 | End: 2021-10-09

## 2021-10-06 RX ADMIN — HYDRALAZINE HYDROCHLORIDE 25 MG: 25 TABLET, FILM COATED ORAL at 01:39

## 2021-10-06 RX ADMIN — HYDROCODONE BITARTRATE AND ACETAMINOPHEN 1 TABLET: 7.5; 325 TABLET ORAL at 06:28

## 2021-10-06 RX ADMIN — HYDROMORPHONE HYDROCHLORIDE 3 MG: 1 INJECTION, SOLUTION INTRAMUSCULAR; INTRAVENOUS; SUBCUTANEOUS at 08:42

## 2021-10-06 RX ADMIN — HYDROCODONE BITARTRATE AND ACETAMINOPHEN 1 TABLET: 7.5; 325 TABLET ORAL at 10:06

## 2021-10-06 RX ADMIN — LISINOPRIL 40 MG: 20 TABLET ORAL at 08:08

## 2021-10-06 RX ADMIN — Medication 30 ML: at 14:00

## 2021-10-06 RX ADMIN — HYDROMORPHONE HYDROCHLORIDE 3 MG: 1 INJECTION, SOLUTION INTRAMUSCULAR; INTRAVENOUS; SUBCUTANEOUS at 00:52

## 2021-10-06 RX ADMIN — Medication 1 AMPULE: at 08:08

## 2021-10-06 RX ADMIN — HYDROMORPHONE HYDROCHLORIDE 3 MG: 1 INJECTION, SOLUTION INTRAMUSCULAR; INTRAVENOUS; SUBCUTANEOUS at 17:27

## 2021-10-06 RX ADMIN — HYDROCODONE BITARTRATE AND ACETAMINOPHEN 1 TABLET: 7.5; 325 TABLET ORAL at 02:45

## 2021-10-06 RX ADMIN — KETOROLAC TROMETHAMINE 15 MG: 15 INJECTION, SOLUTION INTRAMUSCULAR; INTRAVENOUS at 15:46

## 2021-10-06 RX ADMIN — HYDROMORPHONE HYDROCHLORIDE 3 MG: 1 INJECTION, SOLUTION INTRAMUSCULAR; INTRAVENOUS; SUBCUTANEOUS at 04:59

## 2021-10-06 RX ADMIN — HYDROMORPHONE HYDROCHLORIDE 3 MG: 1 INJECTION, SOLUTION INTRAMUSCULAR; INTRAVENOUS; SUBCUTANEOUS at 13:28

## 2021-10-06 RX ADMIN — Medication 10 ML: at 06:00

## 2021-10-06 RX ADMIN — CLONIDINE HYDROCHLORIDE 0.1 MG: 0.1 TABLET ORAL at 06:28

## 2021-10-06 RX ADMIN — LORAZEPAM 2 MG: 2 INJECTION INTRAMUSCULAR; INTRAVENOUS at 03:56

## 2021-10-06 RX ADMIN — AMLODIPINE BESYLATE 10 MG: 10 TABLET ORAL at 08:09

## 2021-10-06 RX ADMIN — HYDROCODONE BITARTRATE AND ACETAMINOPHEN 1 TABLET: 7.5; 325 TABLET ORAL at 14:29

## 2021-10-06 RX ADMIN — KETOROLAC TROMETHAMINE 15 MG: 15 INJECTION, SOLUTION INTRAMUSCULAR; INTRAVENOUS at 03:56

## 2021-10-06 RX ADMIN — KETOROLAC TROMETHAMINE 15 MG: 15 INJECTION, SOLUTION INTRAMUSCULAR; INTRAVENOUS at 10:10

## 2021-10-06 RX ADMIN — Medication 10 ML: at 14:00

## 2021-10-06 RX ADMIN — POLYETHYLENE GLYCOL 3350 17 G: 17 POWDER, FOR SOLUTION ORAL at 14:29

## 2021-10-06 NOTE — DISCHARGE SUMMARY
[facility] Discharge Summary     Name:    Ewa Bello  39 y.o.  male  :    1985     MRN:   [id number]       Admitting Physician: Mago Remy MD Admit Date:  10/3/2021  3:26 PM   Attending Physician: Jacobo Ashford MD  Primary Care Physician: Sissy Gomez DO       Discharge Physician: Mago Remy MD  Discharge date: 10/06/21   Discharged Condition: Stable    Indication for Admission:   Chief Complaint   Patient presents with    Abdominal Pain        Reasons for hospitalization:  Hospital Problems as of 10/6/2021 Date Reviewed: 10/3/2021        Codes Class Noted - Resolved POA    * (Principal) Acute alcoholic pancreatitis [id number]: K85.20  ICD-9-CM: 478.3  10/3/2021 - Present Yes        Hypertensive crisis ICD-10-CM: I16.9  ICD-9-CM: 401.9  10/3/2021 - Present Yes        Essential hypertension ICD-10-CM: I10  ICD-9-CM: 401.9  2016 - Present Yes                 Discharge Diagnosis: Alcoholic Pancreatitis    Did Patient have Sepsis (YES OR NO): No      Hospital Course :  Please refer to the admission H&P for details of presentation. In summary, Ewa Bello is a 39 y.o. male with past medical history significant for hypertension, recurrent pancreatitis due to alcohol use who presented to ED with acute onset of epigastric pain and nausea and vomiting. Admitted for acute alcoholic pancreatitis. Ultrasound negative for any gallstones or dilated CBD. Patient was started on IVF and made NPO. Pain improved and is now tolerating diet. He was noted to be hypertensive despite home anti-HTN meds and amlodipine was added. Hypertensive episodes were likely related to pain as well. He was counseled on alcohol cessation and for close follow up with PCP for BP measurement. Patient is medically stable for discharge. Patient is to continue taking medications as prescribed and to follow up with PCP on discharge.   Patient is instructed to to call a physician or return to ED if any concerns/symptoms worsened. Discharge summary and encounter summary was sent to PCP electronically via \"Comm Mgt\" link in [facility], if possible. Consults: None     Disposition: Home or Self Care  Diet: DIET ADULT   Code Status: Full Code    Discharge Info:     Current Discharge Medication List      START taking these medications    Details   HYDROcodone-acetaminophen (NORCO) 7.5-325 mg per tablet Take 1 Tablet by mouth every six (6) hours as needed for Pain for up to 3 days. Max Daily Amount: 4 Tablets. Qty: 12 Tablet, Refills: 0  Start date: 10/6/2021, End date: 10/9/2021    Associated Diagnoses: Alcohol-induced acute pancreatitis without infection or necrosis      amLODIPine (NORVASC) 10 mg tablet Take 1 Tablet by mouth daily. Qty: 30 Tablet, Refills: 1  Start date: 10/7/2021         CONTINUE these medications which have NOT CHANGED    Details   traMADoL (Ultram) 50 mg tablet Take 1 Tablet by mouth every eight (8) hours as needed for Pain for up to 3 days. Max Daily Amount: 150 mg.  Qty: 11 Tablet, Refills: 0  Start date: 10/3/2021, End date: 10/6/2021    Associated Diagnoses: Alcohol-induced acute pancreatitis without infection or necrosis      lisinopriL (PRINIVIL, ZESTRIL) 40 mg tablet Take 1 Tab by mouth daily.   Qty: 90 Tab, Refills: 3    Associated Diagnoses: Essential hypertension         STOP taking these medications       ondansetron (Zofran ODT) 4 mg disintegrating tablet Comments:   Reason for Stopping:         HYDROcodone-acetaminophen (NORCO) 5-325 mg per tablet Comments:   Reason for Stopping:               Medications Discontinued During This Encounter   Medication Reason    morphine injection 1 mg     HYDROmorphone (DILAUDID) injection 2 mg     HYDROcodone-acetaminophen (NORCO) 5-325 mg per tablet 1 Tablet     HYDROcodone-acetaminophen (NORCO) 5-325 mg per tablet 2 Tablet     amLODIPine (NORVASC) tablet 5 mg     lactated Ringers infusion     carvediloL (COREG) tablet 6.25 mg          Follow Up Orders:  No orders of the defined types were placed in this encounter. Follow-up Information     Follow up With Specialties Details Why Contact Info    Gio Buckleyer, DO Family Medicine In 2 weeks Optimization of BP [address] [place]   [place] [id number] [address]  [phone]              Discharge Exam:    Patient Vitals for the past 24 hrs:   Temp Pulse Resp BP SpO2   10/06/21 1603 97.5 °F (36.4 °C) 95 16 (!) 154/98 100 %   10/06/21 1150 98 °F (36.7 °C) 96 17 (!) 160/93 98 %   10/06/21 0741 97.8 °F (36.6 °C) 96 18 (!) 164/96 98 %   10/06/21 0321 97.6 °F (36.4 °C) 95 16 (!) 168/111 98 %   10/05/21 2346 98.3 °F (36.8 °C) 91 17 (!) 157/113 98 %   10/05/21 2034 97.9 °F (36.6 °C) 91 18 (!) 155/105 99 %     Oxygen Therapy  O2 Sat (%): 100 % (10/06/21 1603)  Pulse via Oximetry: 85 beats per minute (10/03/21 1639)  O2 Device: None (Room air) (10/06/21 0811)    Estimated body mass index is 27.26 kg/m² as calculated from the following:    Height as of this encounter: 5' 10\" (1.778 m). Weight as of this encounter: 86.2 kg (190 lb). Intake/Output Summary (Last 24 hours) at 10/6/2021 1642  Last data filed at 10/6/2021 0811  Gross per 24 hour   Intake 1431 ml   Output    Net 1431 ml       *Note that automatically entered I/Os may not be accurate; dependent on patient compliance with collection and accurate  by assistants. Physical Exam:     General:                     alert, awake   Head:                          Normocephalic, atraumatic  Eyes:                           anicteric sclerae, normal conjunctiva,  EOMI.  ENT:                            Nares appear normal, no drainage. Moist oral mucosa  Neck:                          supple, non-tender. Trachea midline. Lungs:                        CTAB, no wheezing. Respirations even  Cardiac:                      RRR, Normal S1 and S2.     Abdomen:                   Soft, non distended, nontender to palpation, +BS, no guarding/rebound  Extremities:               No edema , pedal pulses present, no cyanosis  Skin:                           Warm, dry, normal coloration and texture  Neuro:              AAOx3. No gross focal neurological deficit  Psychiatric:                No anxiety, calm, cooperative         All Labs from Last 24 Hrs:  Recent Results (from the past 24 hour(s))   METABOLIC PANEL, BASIC    Collection Time: 10/06/21  6:10 AM   Result Value Ref Range    Sodium 133 (L) 136 - 145 mmol/L    Potassium 3.6 3.5 - 5.1 mmol/L    Chloride 100 98 - 107 mmol/L    CO2 26 21 - 32 mmol/L    Anion gap 7 7 - 16 mmol/L    Glucose 129 (H) 65 - 100 mg/dL    BUN 7 6 - 23 MG/DL    Creatinine 0.73 (L) 0.8 - 1.5 MG/DL    GFR est AA >60 >60 ml/min/1.73m2    GFR est non-AA >60 >60 ml/min/1.73m2    Calcium 9.0 8.3 - 10.4 MG/DL   CBC W/O DIFF    Collection Time: 10/06/21  6:10 AM   Result Value Ref Range    WBC 4.9 4.3 - 11.1 K/uL    RBC 4.94 4.23 - 5.6 M/uL    HGB 15.2 13.6 - 17.2 g/dL    HCT 44.6 41.1 - 50.3 %    MCV 90.3 79.6 - 97.8 FL    MCH 30.8 26.1 - 32.9 PG    MCHC 34.1 31.4 - 35.0 g/dL    RDW 12.8 11.9 - 14.6 %    PLATELET 292 194 - 886 K/uL    MPV 9.6 9.4 - 12.3 FL    ABSOLUTE NRBC 0.00 0.0 - 0.2 K/uL       All Micro Results     None          [unfilled]    Diagnostic Imaging/Tests:   US ABD LTD    Result Date: 10/3/2021  RIGHT UPPER QUADRANT ULTRASOUND. HISTORY: Acute pancreatitis COMPARISON: None FINDINGS: Status post cholecystectomy. The common bile duct is not dilated, 4.9 mm. Intrahepatic biliary tree is not dilated. Included portion of the pancreas and right kidney are unremarkable. There is diffuse fatty change of the liver. Coarse appearance of the pancreas with possible calcifications. Diffuse fatty change liver. Cholecystectomy. Possible chronic pancreatitis. Echocardiogram/EKG results:  No results found for this visit on 10/03/21.     EKG Results     None          No results found for this or any previous visit.    Procedures done this admission:  * No surgery found *        Time spent in patient discharge planning and coordination 38 minutes.       Signed By: George Mast MD   [facility]    October 6, 2021  4:42 PM

## 2022-03-18 PROBLEM — K85.20 ACUTE ALCOHOLIC PANCREATITIS: Status: ACTIVE | Noted: 2021-10-03

## 2022-03-20 PROBLEM — I16.9 HYPERTENSIVE CRISIS: Status: ACTIVE | Noted: 2021-10-03

## 2022-07-30 ENCOUNTER — HOSPITAL ENCOUNTER (EMERGENCY)
Age: 37
Discharge: ANOTHER HOSPITAL | End: 2022-07-30
Attending: EMERGENCY MEDICINE | Admitting: EMERGENCY MEDICINE

## 2022-07-30 ENCOUNTER — HOSPITAL ENCOUNTER (INPATIENT)
Age: 37
LOS: 3 days | Discharge: HOME | DRG: 439 | End: 2022-08-02
Attending: INTERNAL MEDICINE | Admitting: INTERNAL MEDICINE

## 2022-07-30 ENCOUNTER — APPOINTMENT (OUTPATIENT)
Dept: CT IMAGING | Age: 37
End: 2022-07-30

## 2022-07-30 VITALS
OXYGEN SATURATION: 98 % | SYSTOLIC BLOOD PRESSURE: 213 MMHG | DIASTOLIC BLOOD PRESSURE: 130 MMHG | RESPIRATION RATE: 16 BRPM | TEMPERATURE: 98.9 F | HEIGHT: 70 IN | WEIGHT: 185 LBS | BODY MASS INDEX: 26.48 KG/M2 | HEART RATE: 90 BPM

## 2022-07-30 DIAGNOSIS — K85.20 ALCOHOL-INDUCED ACUTE PANCREATITIS, UNSPECIFIED COMPLICATION STATUS: Primary | ICD-10-CM

## 2022-07-30 DIAGNOSIS — R10.9 INTRACTABLE ABDOMINAL PAIN: ICD-10-CM

## 2022-07-30 DIAGNOSIS — F10.10 ALCOHOL ABUSE: Primary | ICD-10-CM

## 2022-07-30 PROBLEM — K85.90 ACUTE PANCREATITIS WITHOUT INFECTION OR NECROSIS: Status: ACTIVE | Noted: 2022-07-30

## 2022-07-30 PROBLEM — D75.1 ERYTHROCYTOSIS: Status: ACTIVE | Noted: 2022-07-30

## 2022-07-30 PROBLEM — R93.5 ABNORMAL CT OF THE ABDOMEN: Status: ACTIVE | Noted: 2022-07-30

## 2022-07-30 PROBLEM — D72.829 LEUKOCYTOSIS: Status: ACTIVE | Noted: 2022-07-30

## 2022-07-30 LAB
ALBUMIN SERPL-MCNC: 4.3 G/DL (ref 3.5–5)
ALBUMIN/GLOB SERPL: 1.4 {RATIO}
ALP SERPL-CCNC: 74 U/L (ref 45–117)
ALT SERPL-CCNC: 32 U/L (ref 13–61)
ANION GAP SERPL CALC-SCNC: 11 MMOL/L (ref 7–16)
AST SERPL-CCNC: 63 U/L (ref 15–37)
BASOPHILS # BLD: 0.1 K/UL (ref 0–0.2)
BASOPHILS NFR BLD: 0 % (ref 0–2)
BILIRUB SERPL-MCNC: 1 MG/DL (ref 0.2–1.1)
BUN SERPL-MCNC: 12 MG/DL (ref 7–18)
CALCIUM SERPL-MCNC: 9 MG/DL (ref 8.3–10.4)
CHLORIDE SERPL-SCNC: 93 MMOL/L (ref 98–107)
CO2 SERPL-SCNC: 31 MMOL/L (ref 21–32)
CREAT SERPL-MCNC: 0.72 MG/DL (ref 0.8–1.5)
DIFFERENTIAL METHOD BLD: ABNORMAL
EOSINOPHIL # BLD: 0 K/UL (ref 0–0.8)
EOSINOPHIL NFR BLD: 0 % (ref 0.5–7.8)
ERYTHROCYTE [DISTWIDTH] IN BLOOD BY AUTOMATED COUNT: 13.2 % (ref 11.9–14.6)
GLOBULIN SER CALC-MCNC: 3 G/DL (ref 2.3–3.5)
GLUCOSE SERPL-MCNC: 110 MG/DL (ref 65–100)
HCT VFR BLD AUTO: 52.9 % (ref 41.1–50.3)
HGB BLD-MCNC: 18.3 G/DL (ref 13.6–17.2)
IMM GRANULOCYTES # BLD AUTO: 0 K/UL (ref 0–0.5)
IMM GRANULOCYTES NFR BLD AUTO: 0 % (ref 0–5)
LIPASE SERPL-CCNC: 520 U/L (ref 13–60)
LYMPHOCYTES # BLD: 1.3 K/UL (ref 0.5–4.6)
LYMPHOCYTES NFR BLD: 9 % (ref 13–44)
MAGNESIUM SERPL-MCNC: 1.8 MG/DL (ref 1.2–2.6)
MCH RBC QN AUTO: 29.9 PG (ref 26.1–32.9)
MCHC RBC AUTO-ENTMCNC: 34.6 G/DL (ref 31.4–35)
MCV RBC AUTO: 86.4 FL (ref 79.6–97.8)
MONOCYTES # BLD: 1.4 K/UL (ref 0.1–1.3)
MONOCYTES NFR BLD: 10 % (ref 4–12)
NEUTS SEG # BLD: 11.7 K/UL (ref 1.7–8.2)
NEUTS SEG NFR BLD: 81 % (ref 43–78)
NRBC # BLD: 0 K/UL (ref 0–0.2)
PLATELET # BLD AUTO: 352 K/UL (ref 150–450)
PMV BLD AUTO: 8.9 FL (ref 9.4–12.3)
POTASSIUM SERPL-SCNC: 5 MMOL/L (ref 3.5–5.1)
PROT SERPL-MCNC: 7.3 G/DL (ref 6.4–8.2)
RBC # BLD AUTO: 6.12 M/UL (ref 4.23–5.6)
SODIUM SERPL-SCNC: 135 MMOL/L (ref 138–145)
WBC # BLD AUTO: 14.5 K/UL (ref 4.3–11.1)

## 2022-07-30 PROCEDURE — 74177 CT ABD & PELVIS W/CONTRAST: CPT

## 2022-07-30 PROCEDURE — 96376 TX/PRO/DX INJ SAME DRUG ADON: CPT

## 2022-07-30 PROCEDURE — 99285 EMERGENCY DEPT VISIT HI MDM: CPT

## 2022-07-30 PROCEDURE — 6360000002 HC RX W HCPCS

## 2022-07-30 PROCEDURE — 6360000002 HC RX W HCPCS: Performed by: EMERGENCY MEDICINE

## 2022-07-30 PROCEDURE — 83735 ASSAY OF MAGNESIUM: CPT

## 2022-07-30 PROCEDURE — 6360000004 HC RX CONTRAST MEDICATION: Performed by: EMERGENCY MEDICINE

## 2022-07-30 PROCEDURE — 96375 TX/PRO/DX INJ NEW DRUG ADDON: CPT

## 2022-07-30 PROCEDURE — 1100000000 HC RM PRIVATE

## 2022-07-30 PROCEDURE — 85025 COMPLETE CBC W/AUTO DIFF WBC: CPT

## 2022-07-30 PROCEDURE — 2580000003 HC RX 258: Performed by: EMERGENCY MEDICINE

## 2022-07-30 PROCEDURE — 83690 ASSAY OF LIPASE: CPT

## 2022-07-30 PROCEDURE — 80053 COMPREHEN METABOLIC PANEL: CPT

## 2022-07-30 PROCEDURE — 96374 THER/PROPH/DIAG INJ IV PUSH: CPT

## 2022-07-30 RX ORDER — MORPHINE SULFATE 10 MG/ML
6 INJECTION, SOLUTION INTRAMUSCULAR; INTRAVENOUS
Status: COMPLETED | OUTPATIENT
Start: 2022-07-30 | End: 2022-07-30

## 2022-07-30 RX ORDER — MAGNESIUM SULFATE IN WATER 40 MG/ML
2000 INJECTION, SOLUTION INTRAVENOUS PRN
Status: DISCONTINUED | OUTPATIENT
Start: 2022-07-30 | End: 2022-08-02 | Stop reason: HOSPADM

## 2022-07-30 RX ORDER — LISINOPRIL 20 MG/1
40 TABLET ORAL DAILY
Status: DISCONTINUED | OUTPATIENT
Start: 2022-07-31 | End: 2022-08-02 | Stop reason: HOSPADM

## 2022-07-30 RX ORDER — SODIUM CHLORIDE 0.9 % (FLUSH) 0.9 %
5-40 SYRINGE (ML) INJECTION EVERY 12 HOURS SCHEDULED
Status: DISCONTINUED | OUTPATIENT
Start: 2022-07-31 | End: 2022-08-02 | Stop reason: HOSPADM

## 2022-07-30 RX ORDER — OXYCODONE HYDROCHLORIDE 5 MG/1
10 TABLET ORAL EVERY 6 HOURS PRN
Status: DISCONTINUED | OUTPATIENT
Start: 2022-07-30 | End: 2022-08-02

## 2022-07-30 RX ORDER — AMLODIPINE BESYLATE 10 MG/1
10 TABLET ORAL DAILY
Status: DISCONTINUED | OUTPATIENT
Start: 2022-07-31 | End: 2022-08-02 | Stop reason: HOSPADM

## 2022-07-30 RX ORDER — POTASSIUM CHLORIDE 20 MEQ/1
40 TABLET, EXTENDED RELEASE ORAL PRN
Status: DISCONTINUED | OUTPATIENT
Start: 2022-07-30 | End: 2022-08-02

## 2022-07-30 RX ORDER — 0.9 % SODIUM CHLORIDE 0.9 %
100 INTRAVENOUS SOLUTION INTRAVENOUS ONCE
Status: COMPLETED | OUTPATIENT
Start: 2022-07-30 | End: 2022-07-30

## 2022-07-30 RX ORDER — SODIUM CHLORIDE, SODIUM LACTATE, POTASSIUM CHLORIDE, CALCIUM CHLORIDE 600; 310; 30; 20 MG/100ML; MG/100ML; MG/100ML; MG/100ML
INJECTION, SOLUTION INTRAVENOUS CONTINUOUS
Status: DISCONTINUED | OUTPATIENT
Start: 2022-07-31 | End: 2022-08-01

## 2022-07-30 RX ORDER — ONDANSETRON 2 MG/ML
4 INJECTION INTRAMUSCULAR; INTRAVENOUS EVERY 6 HOURS PRN
Status: DISCONTINUED | OUTPATIENT
Start: 2022-07-30 | End: 2022-08-02 | Stop reason: HOSPADM

## 2022-07-30 RX ORDER — SODIUM CHLORIDE 0.9 % (FLUSH) 0.9 %
5-40 SYRINGE (ML) INJECTION PRN
Status: DISCONTINUED | OUTPATIENT
Start: 2022-07-30 | End: 2022-08-02 | Stop reason: HOSPADM

## 2022-07-30 RX ORDER — ACETAMINOPHEN 325 MG/1
650 TABLET ORAL EVERY 6 HOURS PRN
Status: DISCONTINUED | OUTPATIENT
Start: 2022-07-30 | End: 2022-08-02 | Stop reason: HOSPADM

## 2022-07-30 RX ORDER — ONDANSETRON 2 MG/ML
4 INJECTION INTRAMUSCULAR; INTRAVENOUS
Status: COMPLETED | OUTPATIENT
Start: 2022-07-30 | End: 2022-07-30

## 2022-07-30 RX ORDER — ONDANSETRON 4 MG/1
4 TABLET, ORALLY DISINTEGRATING ORAL EVERY 8 HOURS PRN
Status: DISCONTINUED | OUTPATIENT
Start: 2022-07-30 | End: 2022-08-02 | Stop reason: HOSPADM

## 2022-07-30 RX ORDER — ONDANSETRON 2 MG/ML
INJECTION INTRAMUSCULAR; INTRAVENOUS
Status: COMPLETED
Start: 2022-07-30 | End: 2022-07-30

## 2022-07-30 RX ORDER — POTASSIUM CHLORIDE 7.45 MG/ML
10 INJECTION INTRAVENOUS PRN
Status: DISCONTINUED | OUTPATIENT
Start: 2022-07-30 | End: 2022-08-02

## 2022-07-30 RX ORDER — ONDANSETRON 2 MG/ML
4 INJECTION INTRAMUSCULAR; INTRAVENOUS ONCE
Status: COMPLETED | OUTPATIENT
Start: 2022-07-30 | End: 2022-07-30

## 2022-07-30 RX ORDER — 0.9 % SODIUM CHLORIDE 0.9 %
1000 INTRAVENOUS SOLUTION INTRAVENOUS
Status: COMPLETED | OUTPATIENT
Start: 2022-07-30 | End: 2022-07-30

## 2022-07-30 RX ORDER — SODIUM CHLORIDE, SODIUM LACTATE, POTASSIUM CHLORIDE, CALCIUM CHLORIDE 600; 310; 30; 20 MG/100ML; MG/100ML; MG/100ML; MG/100ML
INJECTION, SOLUTION INTRAVENOUS CONTINUOUS
Status: ACTIVE | OUTPATIENT
Start: 2022-07-31 | End: 2022-07-31

## 2022-07-30 RX ORDER — 0.9 % SODIUM CHLORIDE 0.9 %
1000 INTRAVENOUS SOLUTION INTRAVENOUS ONCE
Status: COMPLETED | OUTPATIENT
Start: 2022-07-30 | End: 2022-07-30

## 2022-07-30 RX ORDER — SODIUM CHLORIDE 9 MG/ML
INJECTION, SOLUTION INTRAVENOUS PRN
Status: DISCONTINUED | OUTPATIENT
Start: 2022-07-30 | End: 2022-08-02 | Stop reason: HOSPADM

## 2022-07-30 RX ORDER — MORPHINE SULFATE 10 MG/ML
8 INJECTION, SOLUTION INTRAMUSCULAR; INTRAVENOUS
Status: COMPLETED | OUTPATIENT
Start: 2022-07-30 | End: 2022-07-30

## 2022-07-30 RX ORDER — HYDRALAZINE HYDROCHLORIDE 20 MG/ML
10 INJECTION INTRAMUSCULAR; INTRAVENOUS
Status: COMPLETED | OUTPATIENT
Start: 2022-07-30 | End: 2022-07-30

## 2022-07-30 RX ORDER — SODIUM CHLORIDE 0.9 % (FLUSH) 0.9 %
10 SYRINGE (ML) INJECTION
Status: COMPLETED | OUTPATIENT
Start: 2022-07-30 | End: 2022-07-30

## 2022-07-30 RX ORDER — ENOXAPARIN SODIUM 100 MG/ML
40 INJECTION SUBCUTANEOUS DAILY
Status: DISCONTINUED | OUTPATIENT
Start: 2022-07-31 | End: 2022-08-02 | Stop reason: HOSPADM

## 2022-07-30 RX ADMIN — IOHEXOL 100 ML: 350 INJECTION, SOLUTION INTRAVENOUS at 20:05

## 2022-07-30 RX ADMIN — ONDANSETRON 4 MG: 2 INJECTION INTRAMUSCULAR; INTRAVENOUS at 18:41

## 2022-07-30 RX ADMIN — SODIUM CHLORIDE 1000 ML: 900 INJECTION, SOLUTION INTRAVENOUS at 18:41

## 2022-07-30 RX ADMIN — SODIUM CHLORIDE 1000 ML: 900 INJECTION, SOLUTION INTRAVENOUS at 19:50

## 2022-07-30 RX ADMIN — HYDRALAZINE HYDROCHLORIDE 10 MG: 20 INJECTION, SOLUTION INTRAMUSCULAR; INTRAVENOUS at 21:24

## 2022-07-30 RX ADMIN — MORPHINE SULFATE 6 MG: 10 INJECTION, SOLUTION INTRAMUSCULAR; INTRAVENOUS at 19:40

## 2022-07-30 RX ADMIN — SODIUM CHLORIDE 100 ML: 9 INJECTION, SOLUTION INTRAVENOUS at 20:05

## 2022-07-30 RX ADMIN — SODIUM CHLORIDE, PRESERVATIVE FREE 10 ML: 5 INJECTION INTRAVENOUS at 20:05

## 2022-07-30 RX ADMIN — MORPHINE SULFATE 8 MG: 10 INJECTION, SOLUTION INTRAMUSCULAR; INTRAVENOUS at 21:13

## 2022-07-30 RX ADMIN — ONDANSETRON 4 MG: 2 INJECTION INTRAMUSCULAR; INTRAVENOUS at 19:41

## 2022-07-30 ASSESSMENT — PAIN DESCRIPTION - PAIN TYPE: TYPE: ACUTE PAIN

## 2022-07-30 ASSESSMENT — PAIN - FUNCTIONAL ASSESSMENT: PAIN_FUNCTIONAL_ASSESSMENT: 0-10

## 2022-07-30 ASSESSMENT — ENCOUNTER SYMPTOMS
DIARRHEA: 0
SORE THROAT: 0
ABDOMINAL PAIN: 1
BACK PAIN: 0
CONSTIPATION: 0
NAUSEA: 1
COUGH: 0
SHORTNESS OF BREATH: 0
VOMITING: 1
RHINORRHEA: 0
COLOR CHANGE: 0

## 2022-07-30 ASSESSMENT — PAIN SCALES - GENERAL
PAINLEVEL_OUTOF10: 6
PAINLEVEL_OUTOF10: 9
PAINLEVEL_OUTOF10: 10
PAINLEVEL_OUTOF10: 8
PAINLEVEL_OUTOF10: 3

## 2022-07-30 ASSESSMENT — PAIN DESCRIPTION - FREQUENCY: FREQUENCY: CONTINUOUS

## 2022-07-30 ASSESSMENT — PAIN DESCRIPTION - LOCATION: LOCATION: ABDOMEN;BACK

## 2022-07-30 ASSESSMENT — PAIN DESCRIPTION - DESCRIPTORS: DESCRIPTORS: SORE;SQUEEZING;STABBING

## 2022-07-30 ASSESSMENT — PAIN DESCRIPTION - ONSET: ONSET: ON-GOING

## 2022-07-30 NOTE — ED NOTES
Patient admits to being an alcoholic. Last drink was last night.      Colletta Coots, RN  07/30/22 3504

## 2022-07-30 NOTE — ED PROVIDER NOTES
[facility] Emergency Department Provider Note                   PCP:                None Provider               Age: 44 y.o. Sex: male       ICD-10-CM    1. Alcohol-induced acute pancreatitis, unspecified complication status  M93.27       2. Intractable abdominal pain  R10.9           DISPOSITION Decision To Transfer 07/30/2022 08:52:41 PM       MDM  Number of Diagnoses or Management Options  Alcohol-induced acute pancreatitis, unspecified complication status  Intractable abdominal pain  Diagnosis management comments: Patient is a chronic alcoholic with recurrent pancreatitis. Lipase only 520 but CT shows enlarged pancreas with a moderate amount of peripancreatic fluid extending to the left paracolic gutter. Patient has epigastric pain with nausea and vomiting difficult to control. Will admit for fluids and pain meds and further treatment. Amount and/or Complexity of Data Reviewed  Clinical lab tests: ordered and reviewed  Tests in the medicine section of CPT®: ordered and reviewed    Patient Progress  Patient progress: stable       Orders Placed This Encounter   Procedures    CT ABDOMEN PELVIS W IV CONTRAST Additional Contrast? None    CBC with Auto Differential    Comprehensive Metabolic Panel    Magnesium    Lipase        Maame Acosta is a 44 y.o. male who presents to the Emergency Department with chief complaint of  No chief complaint on file. Patient with a history of high blood pressure and pancreatitis. Is a chronic alcoholic. Last episode of pancreatitis was February. Started having epigastric pain radiating to his back around 3:30 AM.  Pain is worsened throughout the day. Here for evaluation. Has had his gallbladder out. Does smoke. The history is provided by the patient. No  was used.    Abdominal Pain  Pain location:  Epigastric  Pain quality: aching and sharp    Pain radiates to:  Back  Pain severity:  Moderate  Duration:  16 hours  Timing: Constant  Progression:  Worsening  Chronicity:  Recurrent  Context: alcohol use    Relieved by:  Nothing  Worsened by:  Nothing  Associated symptoms: nausea and vomiting    Associated symptoms: no chest pain, no chills, no constipation, no cough, no diarrhea, no dysuria, no fatigue, no fever, no hematuria, no shortness of breath and no sore throat    Risk factors: alcohol abuse      All other systems reviewed and are negative. Review of Systems   Constitutional:  Negative for chills, fatigue and fever. HENT:  Negative for rhinorrhea and sore throat. Respiratory:  Negative for cough and shortness of breath. Cardiovascular:  Negative for chest pain and palpitations. Gastrointestinal:  Positive for abdominal pain, nausea and vomiting. Negative for constipation and diarrhea. Genitourinary:  Negative for dysuria and hematuria. Musculoskeletal:  Negative for back pain and neck pain. Skin:  Negative for color change and rash. Neurological:  Negative for numbness and headaches. All other systems reviewed and are negative. Past Medical History:   Diagnosis Date    Carpal tunnel syndrome, left     Cigarette smoker     GERD (gastroesophageal reflux disease)     occassionally with spicy foods    History of pancreatitis     X2    Hypertension     managed well with Lisinopril    Pancreatitis         Past Surgical History:   Procedure Laterality Date    CHOLECYSTECTOMY  2012    ORTHOPEDIC SURGERY          Family History   Problem Relation Age of Onset    Hypertension Mother     Heart Attack Father         Social History     Socioeconomic History    Marital status: Single   Tobacco Use    Smoking status: Every Day     Packs/day: 1.00     Types: Cigarettes     Start date: 2/9/2007    Smokeless tobacco: Never   Substance and Sexual Activity    Alcohol use: Yes    Drug use: No        Allergies: Patient has no known allergies.     Previous Medications    AMLODIPINE (NORVASC) 10 MG TABLET    Take 10 mg by mouth daily    LISINOPRIL (PRINIVIL;ZESTRIL) 40 MG TABLET    Take 40 mg by mouth daily        Vitals signs and nursing note reviewed. Patient Vitals for the past 4 hrs:   Temp Pulse Resp BP SpO2   07/30/22 1938 -- -- -- -- 97 %   07/30/22 1815 98.9 °F (37.2 °C) (!) 106 18 (!) 175/122 100 %          Physical Exam  Vitals and nursing note reviewed. Constitutional:       Appearance: Normal appearance. HENT:      Head: Normocephalic and atraumatic. Cardiovascular:      Rate and Rhythm: Normal rate and regular rhythm. Pulmonary:      Effort: Pulmonary effort is normal.      Breath sounds: Normal breath sounds. No wheezing. Abdominal:      General: Bowel sounds are normal.      Palpations: Abdomen is soft. Tenderness: There is abdominal tenderness (epigastric). Musculoskeletal:         General: No swelling. Normal range of motion. Cervical back: Normal range of motion. No tenderness. Skin:     General: Skin is warm and dry. Neurological:      Mental Status: He is alert. Procedures      Labs Reviewed   CBC WITH AUTO DIFFERENTIAL - Abnormal; Notable for the following components:       Result Value    WBC 14.5 (*)     RBC 6.12 (*)     Hemoglobin 18.3 (*)     Hematocrit 52.9 (*)     MPV 8.9 (*)     Seg Neutrophils 81 (*)     Lymphocytes 9 (*)     Eosinophils % 0 (*)     Segs Absolute 11.7 (*)     Absolute Mono # 1.4 (*)     All other components within normal limits   COMPREHENSIVE METABOLIC PANEL - Abnormal; Notable for the following components:    Sodium 135 (*)     Chloride 93 (*)     Glucose 110 (*)     Creatinine 0.72 (*)     AST 63 (*)     All other components within normal limits   LIPASE - Abnormal; Notable for the following components:    Lipase 520 (*)     All other components within normal limits   MAGNESIUM        CT ABDOMEN PELVIS W IV CONTRAST Additional Contrast? None   Final Result   Findings described above are compatible with acute pancreatitis.  I do not   appreciate any convincing evidence to suggest pancreatic necrosis at this time. There are 2 fluid density lesions in the body of the pancreas. These may   represent pseudocysts. Follow-up imaging is recommended to sure that resolution   and to exclude cystic neoplasms. The stomach and distal esophagus are distended with low-density fluid. This may   suggest some degree of gastric outlet obstruction. No convincing evidence of   complete obstruction. Voice dictation software was used during the making of this note. This software is not perfect and grammatical and other typographical errors may be present. This note has not been completely proofread for errors.      Rex Alfaro III, MD  07/30/22 9935

## 2022-07-30 NOTE — ED TRIAGE NOTES
\" I am pretty sure I have pancreatitis. II get it often. It feels like a belt is tightening around my stomach and back. \"  Patient says the pain started this morning. Patient has nausea and vomiting.

## 2022-07-31 PROBLEM — F10.10 ALCOHOL ABUSE: Status: ACTIVE | Noted: 2022-07-31

## 2022-07-31 LAB
ANION GAP SERPL CALC-SCNC: 7 MMOL/L (ref 7–16)
BUN SERPL-MCNC: 7 MG/DL (ref 6–23)
CALCIUM SERPL-MCNC: 8.6 MG/DL (ref 8.3–10.4)
CHLORIDE SERPL-SCNC: 99 MMOL/L (ref 98–107)
CO2 SERPL-SCNC: 25 MMOL/L (ref 21–32)
CREAT SERPL-MCNC: 0.7 MG/DL (ref 0.8–1.5)
ERYTHROCYTE [DISTWIDTH] IN BLOOD BY AUTOMATED COUNT: 13.9 % (ref 11.9–14.6)
GLUCOSE SERPL-MCNC: 79 MG/DL (ref 65–100)
HCT VFR BLD AUTO: 55.7 % (ref 41.1–50.3)
HGB BLD-MCNC: 18.6 G/DL (ref 13.6–17.2)
MCH RBC QN AUTO: 29.2 PG (ref 26.1–32.9)
MCHC RBC AUTO-ENTMCNC: 33.4 G/DL (ref 31.4–35)
MCV RBC AUTO: 87.6 FL (ref 79.6–97.8)
NRBC # BLD: 0 K/UL (ref 0–0.2)
PHOSPHATE SERPL-MCNC: 3.6 MG/DL (ref 2.5–4.5)
PLATELET # BLD AUTO: 338 K/UL (ref 150–450)
PMV BLD AUTO: 10 FL (ref 9.4–12.3)
POTASSIUM SERPL-SCNC: 4.2 MMOL/L (ref 3.5–5.1)
RBC # BLD AUTO: 6.36 M/UL (ref 4.23–5.6)
SODIUM SERPL-SCNC: 131 MMOL/L (ref 138–145)
TRIGL SERPL-MCNC: 103 MG/DL (ref 35–150)
WBC # BLD AUTO: 13 K/UL (ref 4.3–11.1)

## 2022-07-31 PROCEDURE — 6360000002 HC RX W HCPCS: Performed by: FAMILY MEDICINE

## 2022-07-31 PROCEDURE — 85027 COMPLETE CBC AUTOMATED: CPT

## 2022-07-31 PROCEDURE — 2580000003 HC RX 258: Performed by: INTERNAL MEDICINE

## 2022-07-31 PROCEDURE — 1100000000 HC RM PRIVATE

## 2022-07-31 PROCEDURE — 36415 COLL VENOUS BLD VENIPUNCTURE: CPT

## 2022-07-31 PROCEDURE — 84478 ASSAY OF TRIGLYCERIDES: CPT

## 2022-07-31 PROCEDURE — 6370000000 HC RX 637 (ALT 250 FOR IP): Performed by: PHYSICIAN ASSISTANT

## 2022-07-31 PROCEDURE — 94760 N-INVAS EAR/PLS OXIMETRY 1: CPT

## 2022-07-31 PROCEDURE — 80048 BASIC METABOLIC PNL TOTAL CA: CPT

## 2022-07-31 PROCEDURE — 6360000002 HC RX W HCPCS: Performed by: INTERNAL MEDICINE

## 2022-07-31 PROCEDURE — 2500000003 HC RX 250 WO HCPCS: Performed by: FAMILY MEDICINE

## 2022-07-31 PROCEDURE — 6370000000 HC RX 637 (ALT 250 FOR IP): Performed by: INTERNAL MEDICINE

## 2022-07-31 PROCEDURE — 84100 ASSAY OF PHOSPHORUS: CPT

## 2022-07-31 RX ORDER — CLONIDINE HYDROCHLORIDE 0.1 MG/1
0.1 TABLET ORAL 3 TIMES DAILY
Status: DISCONTINUED | OUTPATIENT
Start: 2022-07-31 | End: 2022-08-02 | Stop reason: HOSPADM

## 2022-07-31 RX ORDER — LANOLIN ALCOHOL/MO/W.PET/CERES
100 CREAM (GRAM) TOPICAL DAILY
Status: DISCONTINUED | OUTPATIENT
Start: 2022-07-31 | End: 2022-08-02 | Stop reason: HOSPADM

## 2022-07-31 RX ORDER — FOLIC ACID 1 MG/1
1 TABLET ORAL DAILY
Status: DISCONTINUED | OUTPATIENT
Start: 2022-07-31 | End: 2022-08-01

## 2022-07-31 RX ORDER — NICOTINE 21 MG/24HR
1 PATCH, TRANSDERMAL 24 HOURS TRANSDERMAL DAILY
Status: DISCONTINUED | OUTPATIENT
Start: 2022-07-31 | End: 2022-08-02 | Stop reason: HOSPADM

## 2022-07-31 RX ORDER — METOPROLOL TARTRATE 5 MG/5ML
5 INJECTION INTRAVENOUS ONCE
Status: COMPLETED | OUTPATIENT
Start: 2022-07-31 | End: 2022-07-31

## 2022-07-31 RX ORDER — HYDRALAZINE HYDROCHLORIDE 20 MG/ML
10 INJECTION INTRAMUSCULAR; INTRAVENOUS EVERY 6 HOURS PRN
Status: DISCONTINUED | OUTPATIENT
Start: 2022-07-31 | End: 2022-08-02 | Stop reason: HOSPADM

## 2022-07-31 RX ADMIN — HYDROMORPHONE HYDROCHLORIDE 1 MG: 1 INJECTION, SOLUTION INTRAMUSCULAR; INTRAVENOUS; SUBCUTANEOUS at 18:31

## 2022-07-31 RX ADMIN — AMLODIPINE BESYLATE 10 MG: 10 TABLET ORAL at 07:28

## 2022-07-31 RX ADMIN — HYDROMORPHONE HYDROCHLORIDE 1 MG: 1 INJECTION, SOLUTION INTRAMUSCULAR; INTRAVENOUS; SUBCUTANEOUS at 15:36

## 2022-07-31 RX ADMIN — CLONIDINE HYDROCHLORIDE 0.1 MG: 0.1 TABLET ORAL at 12:16

## 2022-07-31 RX ADMIN — OXYCODONE 10 MG: 5 TABLET ORAL at 07:28

## 2022-07-31 RX ADMIN — HYDROMORPHONE HYDROCHLORIDE 1 MG: 1 INJECTION, SOLUTION INTRAMUSCULAR; INTRAVENOUS; SUBCUTANEOUS at 09:28

## 2022-07-31 RX ADMIN — HYDROMORPHONE HYDROCHLORIDE 1 MG: 1 INJECTION, SOLUTION INTRAMUSCULAR; INTRAVENOUS; SUBCUTANEOUS at 22:13

## 2022-07-31 RX ADMIN — SODIUM CHLORIDE, POTASSIUM CHLORIDE, SODIUM LACTATE AND CALCIUM CHLORIDE: 600; 310; 30; 20 INJECTION, SOLUTION INTRAVENOUS at 03:32

## 2022-07-31 RX ADMIN — OXYCODONE 10 MG: 5 TABLET ORAL at 20:16

## 2022-07-31 RX ADMIN — ENOXAPARIN SODIUM 40 MG: 100 INJECTION SUBCUTANEOUS at 07:28

## 2022-07-31 RX ADMIN — HYDROMORPHONE HYDROCHLORIDE 1 MG: 1 INJECTION, SOLUTION INTRAMUSCULAR; INTRAVENOUS; SUBCUTANEOUS at 12:16

## 2022-07-31 RX ADMIN — HYDROMORPHONE HYDROCHLORIDE 1 MG: 1 INJECTION, SOLUTION INTRAMUSCULAR; INTRAVENOUS; SUBCUTANEOUS at 06:27

## 2022-07-31 RX ADMIN — OXYCODONE 10 MG: 5 TABLET ORAL at 13:57

## 2022-07-31 RX ADMIN — CLONIDINE HYDROCHLORIDE 0.1 MG: 0.1 TABLET ORAL at 20:16

## 2022-07-31 RX ADMIN — SODIUM CHLORIDE, POTASSIUM CHLORIDE, SODIUM LACTATE AND CALCIUM CHLORIDE: 600; 310; 30; 20 INJECTION, SOLUTION INTRAVENOUS at 00:26

## 2022-07-31 RX ADMIN — HYDROMORPHONE HYDROCHLORIDE 1 MG: 1 INJECTION, SOLUTION INTRAMUSCULAR; INTRAVENOUS; SUBCUTANEOUS at 00:26

## 2022-07-31 RX ADMIN — HYDRALAZINE HYDROCHLORIDE 10 MG: 20 INJECTION INTRAMUSCULAR; INTRAVENOUS at 04:15

## 2022-07-31 RX ADMIN — FOLIC ACID 1 MG: 1 TABLET ORAL at 07:28

## 2022-07-31 RX ADMIN — SODIUM CHLORIDE, POTASSIUM CHLORIDE, SODIUM LACTATE AND CALCIUM CHLORIDE: 600; 310; 30; 20 INJECTION, SOLUTION INTRAVENOUS at 12:17

## 2022-07-31 RX ADMIN — OXYCODONE 10 MG: 5 TABLET ORAL at 01:26

## 2022-07-31 RX ADMIN — METOPROLOL TARTRATE 5 MG: 5 INJECTION INTRAVENOUS at 05:42

## 2022-07-31 RX ADMIN — LISINOPRIL 40 MG: 20 TABLET ORAL at 07:28

## 2022-07-31 RX ADMIN — Medication 100 MG: at 07:28

## 2022-07-31 RX ADMIN — HYDROMORPHONE HYDROCHLORIDE 1 MG: 1 INJECTION, SOLUTION INTRAMUSCULAR; INTRAVENOUS; SUBCUTANEOUS at 03:31

## 2022-07-31 RX ADMIN — SODIUM CHLORIDE, PRESERVATIVE FREE 5 ML: 5 INJECTION INTRAVENOUS at 20:21

## 2022-07-31 ASSESSMENT — PAIN SCALES - GENERAL
PAINLEVEL_OUTOF10: 9
PAINLEVEL_OUTOF10: 9
PAINLEVEL_OUTOF10: 8
PAINLEVEL_OUTOF10: 6
PAINLEVEL_OUTOF10: 6
PAINLEVEL_OUTOF10: 8
PAINLEVEL_OUTOF10: 10
PAINLEVEL_OUTOF10: 4
PAINLEVEL_OUTOF10: 10
PAINLEVEL_OUTOF10: 6
PAINLEVEL_OUTOF10: 5
PAINLEVEL_OUTOF10: 3
PAINLEVEL_OUTOF10: 6
PAINLEVEL_OUTOF10: 6
PAINLEVEL_OUTOF10: 3
PAINLEVEL_OUTOF10: 6
PAINLEVEL_OUTOF10: 9
PAINLEVEL_OUTOF10: 8

## 2022-07-31 ASSESSMENT — PAIN DESCRIPTION - DESCRIPTORS
DESCRIPTORS: TENDER
DESCRIPTORS: DISCOMFORT
DESCRIPTORS: ACHING;PRESSURE
DESCRIPTORS: ACHING
DESCRIPTORS: ACHING;PRESSURE;DISCOMFORT
DESCRIPTORS: SHOOTING;SHARP
DESCRIPTORS: STABBING

## 2022-07-31 ASSESSMENT — PAIN DESCRIPTION - ORIENTATION: ORIENTATION: LEFT

## 2022-07-31 ASSESSMENT — PAIN DESCRIPTION - LOCATION
LOCATION: ABDOMEN

## 2022-07-31 ASSESSMENT — PAIN - FUNCTIONAL ASSESSMENT
PAIN_FUNCTIONAL_ASSESSMENT: ACTIVITIES ARE NOT PREVENTED

## 2022-07-31 NOTE — ED NOTES
Dial aware of BP. Patient reports he normally takes lisinopril but has been out of this medicine for a while. Orders received for hydralazine.       Elijah Coley RN  07/30/22 2117 [Dear  ___] : Dear  [unfilled], [Consult Letter:] : I had the pleasure of evaluating your patient, [unfilled]. [( Thank you for referring [unfilled] for consultation for _____ )] : Thank you for referring [unfilled] for consultation for [unfilled] [Please see my note below.] : Please see my note below. [Consult Closing:] : Thank you very much for allowing me to participate in the care of this patient.  If you have any questions, please do not hesitate to contact me. [Sincerely,] : Sincerely, [DrRicardo  ___] : Dr. CARREON [FreeTextEntry3] : Dejon Sparrow MD\par Attending\par [facility]\par  [FreeTextEntry2] : Renae Ruiz MD\par [address] [facility] \[place] [address]

## 2022-07-31 NOTE — ED NOTES
TRANSFER - OUT REPORT:    Verbal report given to Layo Nichols RN on Nikki Vera  being transferred to [facility] for routine progression of patient care       Report consisted of patient's Situation, Background, Assessment and   Recommendations(SBAR). Information from the following report(s) ED SBAR was reviewed with the receiving nurse. Lines:   Peripheral IV 07/30/22 Right Antecubital (Active)        Opportunity for questions and clarification was provided.       Patient transported with:  Brant Vaca RN  07/30/22 2860

## 2022-07-31 NOTE — PROGRESS NOTES
Pt is stable with bed in lowest position, wheels locked, and call light within reach. Hourly rounds completed. Pt's BP have been high. MD notified and meds given per order. PT's BP is still high after meds given manual BP checks ordered. Pain treated per MAR. IV is patent and dressing is clean, dry, and intact. Report to be given to day shift nurse.

## 2022-07-31 NOTE — PROGRESS NOTES
normal, no drainage. Moist oral mucosa  Neck:  No restricted ROM. Trachea midline   CV:   Mild tachycardia; no m/r/g. No jugular venous distension. Lungs:   CTAB. No wheezing, rhonchi, or rales. Symmetric expansion. Abdomen:   Hypoactive Bsx4; no guarding, nondistended. Extremities: No cyanosis or clubbing. No edema  Skin:     No rashes and normal coloration. Warm and dry. Neuro:  CN II-XII grossly intact. Sensation intact. A&Ox3  Psych:  Normal mood and affect.       I have personally reviewed labs and tests showing:  Recent Labs:  Recent Results (from the past 48 hour(s))   CBC with Auto Differential    Collection Time: 07/30/22  6:34 PM   Result Value Ref Range    WBC 14.5 (H) 4.3 - 11.1 K/uL    RBC 6.12 (H) 4.23 - 5.60 M/uL    Hemoglobin 18.3 (H) 13.6 - 17.2 g/dL    Hematocrit 52.9 (H) 41.1 - 50.3 %    MCV 86.4 79.6 - 97.8 FL    MCH 29.9 26.1 - 32.9 PG    MCHC 34.6 31.4 - 35.0 g/dL    RDW 13.2 11.9 - 14.6 %    Platelets 959 779 - 345 K/uL    MPV 8.9 (L) 9.4 - 12.3 FL    nRBC 0.00 0.0 - 0.2 K/uL    Differential Type AUTOMATED      Seg Neutrophils 81 (H) 43 - 78 %    Lymphocytes 9 (L) 13 - 44 %    Monocytes 10 4.0 - 12.0 %    Eosinophils % 0 (L) 0.5 - 7.8 %    Basophils 0 0.0 - 2.0 %    Immature Granulocytes 0 0.0 - 5.0 %    Segs Absolute 11.7 (H) 1.7 - 8.2 K/UL    Absolute Lymph # 1.3 0.5 - 4.6 K/UL    Absolute Mono # 1.4 (H) 0.1 - 1.3 K/UL    Absolute Eos # 0.0 0.0 - 0.8 K/UL    Basophils Absolute 0.1 0.0 - 0.2 K/UL    Absolute Immature Granulocyte 0.0 0.0 - 0.5 K/UL   Comprehensive Metabolic Panel    Collection Time: 07/30/22  6:34 PM   Result Value Ref Range    Sodium 135 (L) 138 - 145 mmol/L    Potassium 5.0 3.5 - 5.1 mmol/L    Chloride 93 (L) 98 - 107 mmol/L    CO2 31 21 - 32 mmol/L    Anion Gap 11 7.0 - 16.0 mmol/L    Glucose 110 (H) 65 - 100 mg/dL    BUN 12 7.0 - 18.0 MG/DL    Creatinine 0.72 (L) 0.8 - 1.5 MG/DL    GFR African American >157 >60 ml/min/1.73m2    GFR Non- >60 >60 ml/min/1.73m2    Calcium 9.0 8.3 - 10.4 MG/DL    Total Bilirubin 1.0 0.2 - 1.1 MG/DL    ALT 32 13.0 - 61.0 U/L    AST 63 (H) 15 - 37 U/L    Alk Phosphatase 74 45.0 - 117.0 U/L    Total Protein 7.3 6.4 - 8.2 g/dL    Albumin 4.3 3.5 - 5.0 g/dL    Globulin 3.0 2.3 - 3.5 g/dL    Albumin/Globulin Ratio 1.4     Magnesium    Collection Time: 07/30/22  6:34 PM   Result Value Ref Range    Magnesium 1.8 1.2 - 2.6 mg/dL   Lipase    Collection Time: 07/30/22  6:34 PM   Result Value Ref Range    Lipase 520 (H) 13 - 60 U/L   Triglyceride    Collection Time: 07/31/22  7:30 AM   Result Value Ref Range    Triglycerides 103 35 - 150 MG/DL   Basic Metabolic Panel w/ Reflex to MG    Collection Time: 07/31/22  7:30 AM   Result Value Ref Range    Sodium 131 (L) 138 - 145 mmol/L    Potassium 4.2 3.5 - 5.1 mmol/L    Chloride 99 98 - 107 mmol/L    CO2 25 21 - 32 mmol/L    Anion Gap 7 7 - 16 mmol/L    Glucose 79 65 - 100 mg/dL    BUN 7 6 - 23 MG/DL    Creatinine 0.70 (L) 0.8 - 1.5 MG/DL    GFR African American >60 >60 ml/min/1.73m2    GFR Non- >60 >60 ml/min/1.73m2    Calcium 8.6 8.3 - 10.4 MG/DL   Phosphorus    Collection Time: 07/31/22  7:30 AM   Result Value Ref Range    Phosphorus 3.6 2.5 - 4.5 MG/DL   CBC    Collection Time: 07/31/22  7:30 AM   Result Value Ref Range    WBC 13.0 (H) 4.3 - 11.1 K/uL    RBC 6.36 (H) 4.23 - 5.6 M/uL    Hemoglobin 18.6 (H) 13.6 - 17.2 g/dL    Hematocrit 55.7 (H) 41.1 - 50.3 %    MCV 87.6 79.6 - 97.8 FL    MCH 29.2 26.1 - 32.9 PG    MCHC 33.4 31.4 - 35.0 g/dL    RDW 13.9 11.9 - 14.6 %    Platelets 304 946 - 232 K/uL    MPV 10.0 9.4 - 12.3 FL    nRBC 0.00 0.0 - 0.2 K/uL       I have personally reviewed imaging studies showing:   Other Studies:  No orders to display       Current Meds:  Current Facility-Administered Medications   Medication Dose Route Frequency    thiamine tablet 100 mg  100 mg Oral Daily    folic acid (FOLVITE) tablet 1 mg  1 mg Oral Daily    hydrALAZINE (APRESOLINE) injection 10 mg  10 mg IntraVENous Q6H PRN    amLODIPine (NORVASC) tablet 10 mg  10 mg Oral Daily    lisinopril (PRINIVIL;ZESTRIL) tablet 40 mg  40 mg Oral Daily    lactated ringers infusion   IntraVENous Continuous    Followed by    lactated ringers infusion   IntraVENous Continuous    sodium chloride flush 0.9 % injection 5-40 mL  5-40 mL IntraVENous 2 times per day    sodium chloride flush 0.9 % injection 5-40 mL  5-40 mL IntraVENous PRN    0.9 % sodium chloride infusion   IntraVENous PRN    ondansetron (ZOFRAN-ODT) disintegrating tablet 4 mg  4 mg Oral Q8H PRN    Or    ondansetron (ZOFRAN) injection 4 mg  4 mg IntraVENous Q6H PRN    enoxaparin (LOVENOX) injection 40 mg  40 mg SubCUTAneous Daily    potassium chloride (KLOR-CON M) extended release tablet 40 mEq  40 mEq Oral PRN    Or    potassium bicarb-citric acid (EFFER-K) effervescent tablet 40 mEq  40 mEq Oral PRN    Or    potassium chloride 10 mEq/100 mL IVPB (Peripheral Line)  10 mEq IntraVENous PRN    magnesium sulfate 2000 mg in 50 mL IVPB premix  2,000 mg IntraVENous PRN    acetaminophen (TYLENOL) tablet 650 mg  650 mg Oral Q6H PRN    HYDROmorphone (DILAUDID) injection 1 mg  1 mg IntraVENous Q3H PRN    oxyCODONE (ROXICODONE) immediate release tablet 10 mg  10 mg Oral Q6H PRN       Signed:  Roxy Bales    Part of this note may have been written by using a voice dictation software. The note has been proof read but may still contain some grammatical/other typographical errors.

## 2022-07-31 NOTE — H&P
Hospitalist History and Physical   Admit Date:  2022 10:22 PM   Name:  Mary Lou Honeycutt   Age:  44 y.o. Sex:  male  :  1985   MRN:  [id number]   Room:  [facility]    Presenting Complaint: No chief complaint on file. Reason(s) for Admission: Acute pancreatitis without infection or necrosis [K85.90]     History of Present Illness:   Mary Lou Honeycutt is a 44 y.o. male with medical history of HTN and ETOH abuse with chronic pancreatitis who presented to [facility] ER on 22 with 24 hours of worsening abdominal and back pain with nausea after drinking the night before. He states that he had some Beatbox drinks and quit around 2200 but then woke up around 0300 with abdominal pain and back pain. He took the day to drink plenty of fluids but the pain didn't go away so he finally went to the ER since it was like previous episodes of pancreatitis. He also complains of persistent nausea but no vomiting. Denies CP/SOB. Denies F/C. Denies diarrhea. Review of Systems:  10 systems reviewed and negative except as noted in HPI.   Assessment & Plan:     Principal Problem:    Acute pancreatitis without infection or necrosis  - Lipase 520  - CTAP consistent with acute pancreatitis with 2 pseudocysts  - Start LR @ 250 ml/hr then to 150 ml/hr  - CLD  - Check triglycerides  - CTAP doesn't show gallstones  - Calcium normal  - Pain control    Active Problems:    Leukocytosis  - Likely reactive  - Start IVFS  - Check CBC daily  - No s/s of active infection      Essential hypertension  - Continue home meds      Erythrocytosis  - Hgb 18.3  - Likely due to dehydration  - Start LR  - Check CBC daily      Abnormal CT of the abdomen  - CTAP shows gastric edema with possible partial GGO  - If continues to have nausea or vomiting, may need GI for EGD      Chronic pancreatitis (HCC)      Alcohol abuse  - No s/s of withdrawal  - Start Thiamine + Folic Acid      Dispo/Discharge Planning:     Home in 2-3 days    Diet: Clear kg).  No intake or output data in the 24 hours ending 07/31/22 0028      Physical Exam:  Height 5' 10\" (1.778 m), weight 185 lb (83.9 kg). General:    Well nourished. Appears in moderate pain. Head:  Normocephalic, atraumatic  Eyes:  Sclerae appear normal.  Pupils equally round. ENT:  Nares appear normal, no drainage. Moist oral mucosa  Neck:  No restricted ROM. Trachea midline   CV:   Tachy, reg rhythm. No m/r/g. No jugular venous distension. Lungs:   CTAB. No wheezing, rhonchi, or rales. Symmetric expansion. Abdomen: Bowel sounds present. Soft, tender, nondistended. Extremities: No cyanosis or clubbing. No edema  Skin:     No rashes and normal coloration. Warm and dry. Neuro:  CN II-XII grossly intact. Sensation intact. A&Ox3  Psych:  Normal mood and affect.       I have personally reviewed labs and tests showing:  Recent Labs:  Recent Results (from the past 24 hour(s))   CBC with Auto Differential    Collection Time: 07/30/22  6:34 PM   Result Value Ref Range    WBC 14.5 (H) 4.3 - 11.1 K/uL    RBC 6.12 (H) 4.23 - 5.60 M/uL    Hemoglobin 18.3 (H) 13.6 - 17.2 g/dL    Hematocrit 52.9 (H) 41.1 - 50.3 %    MCV 86.4 79.6 - 97.8 FL    MCH 29.9 26.1 - 32.9 PG    MCHC 34.6 31.4 - 35.0 g/dL    RDW 13.2 11.9 - 14.6 %    Platelets 945 945 - 266 K/uL    MPV 8.9 (L) 9.4 - 12.3 FL    nRBC 0.00 0.0 - 0.2 K/uL    Differential Type AUTOMATED      Seg Neutrophils 81 (H) 43 - 78 %    Lymphocytes 9 (L) 13 - 44 %    Monocytes 10 4.0 - 12.0 %    Eosinophils % 0 (L) 0.5 - 7.8 %    Basophils 0 0.0 - 2.0 %    Immature Granulocytes 0 0.0 - 5.0 %    Segs Absolute 11.7 (H) 1.7 - 8.2 K/UL    Absolute Lymph # 1.3 0.5 - 4.6 K/UL    Absolute Mono # 1.4 (H) 0.1 - 1.3 K/UL    Absolute Eos # 0.0 0.0 - 0.8 K/UL    Basophils Absolute 0.1 0.0 - 0.2 K/UL    Absolute Immature Granulocyte 0.0 0.0 - 0.5 K/UL   Comprehensive Metabolic Panel    Collection Time: 07/30/22  6:34 PM   Result Value Ref Range    Sodium 135 (L) 138 - 145 mmol/L    Potassium 5.0 3.5 - 5.1 mmol/L    Chloride 93 (L) 98 - 107 mmol/L    CO2 31 21 - 32 mmol/L    Anion Gap 11 7.0 - 16.0 mmol/L    Glucose 110 (H) 65 - 100 mg/dL    BUN 12 7.0 - 18.0 MG/DL    Creatinine 0.72 (L) 0.8 - 1.5 MG/DL    GFR African American >157 >60 ml/min/1.73m2    GFR Non- >60 >60 ml/min/1.73m2    Calcium 9.0 8.3 - 10.4 MG/DL    Total Bilirubin 1.0 0.2 - 1.1 MG/DL    ALT 32 13.0 - 61.0 U/L    AST 63 (H) 15 - 37 U/L    Alk Phosphatase 74 45.0 - 117.0 U/L    Total Protein 7.3 6.4 - 8.2 g/dL    Albumin 4.3 3.5 - 5.0 g/dL    Globulin 3.0 2.3 - 3.5 g/dL    Albumin/Globulin Ratio 1.4     Magnesium    Collection Time: 07/30/22  6:34 PM   Result Value Ref Range    Magnesium 1.8 1.2 - 2.6 mg/dL   Lipase    Collection Time: 07/30/22  6:34 PM   Result Value Ref Range    Lipase 520 (H) 13 - 60 U/L       I have personally reviewed imaging studies showing:  CT ABDOMEN PELVIS W IV CONTRAST Additional Contrast? None    Result Date: 7/30/2022  EXAM: CT ABDOMEN PELVIS W IV CONTRAST HISTORY: abd pain. TECHNIQUE: Axial images of the abdomen and pelvis were performed following the administration of intravenous contrast. Images were obtained in the axial plane and coronal reformatted images were created and reviewed. Dose reduction technique used: Automated exposure control/Adjustment of the mA and/or kV according to patient size/Use of iterative reconstruction technique. 100 mL of Isovue-370 were utilized. COMPARISON: None. FINDINGS: The visualized lung bases and mediastinum are unremarkable. The liver, spleen, adrenal glands, and kidneys are within normal limits. The bladder appears grossly normal. The gallbladder has been removed. The pancreas is enlarged and contains two, circumscribed, hypodense lesions in the body. The largest of these is noted in the proximal body and measures 1.3 cm. There is no convincing evidence of necrosis in the pancreas at this time.  There is a moderate amount of peripancreatic fluid some of which extends to the left paracolic gutter. Distal esophagus and stomach are distended and filled with low density fluid. Small and large bowel are without evidence of obstruction. A structure felt to represent the appendix is seen in the right lower quadrant and appears grossly normal. No evidence of free air. No abdominal aortic aneurysm. There are a few prominent retroperitoneal lymph nodes. Osseous structures are without evidence of acute fracture or suspicious lesion. Findings described above are compatible with acute pancreatitis. I do not appreciate any convincing evidence to suggest pancreatic necrosis at this time. There are 2 fluid density lesions in the body of the pancreas. These may represent pseudocysts. Follow-up imaging is recommended to sure that resolution and to exclude cystic neoplasms. The stomach and distal esophagus are distended with low-density fluid. This may suggest some degree of gastric outlet obstruction. No convincing evidence of complete obstruction. Echocardiogram:  No results found for this or any previous visit.       Meds previously ordered:  Orders Placed This Encounter   Medications    amLODIPine (NORVASC) tablet 10 mg    lisinopril (PRINIVIL;ZESTRIL) tablet 40 mg    FOLLOWED BY Linked Order Group     lactated ringers infusion     lactated ringers infusion    sodium chloride flush 0.9 % injection 5-40 mL    sodium chloride flush 0.9 % injection 5-40 mL    0.9 % sodium chloride infusion    OR Linked Order Group     ondansetron (ZOFRAN-ODT) disintegrating tablet 4 mg     ondansetron (ZOFRAN) injection 4 mg    enoxaparin (LOVENOX) injection 40 mg     Order Specific Question:   Indication of Use     Answer:   Prophylaxis-DVT/PE    OR Linked Order Group     potassium chloride (KLOR-CON M) extended release tablet 40 mEq     potassium bicarb-citric acid (EFFER-K) effervescent tablet 40 mEq     potassium chloride 10 mEq/100 mL IVPB (Peripheral Line) magnesium sulfate 2000 mg in 50 mL IVPB premix    acetaminophen (TYLENOL) tablet 650 mg    HYDROmorphone (DILAUDID) injection 1 mg    oxyCODONE (ROXICODONE) immediate release tablet 10 mg    thiamine tablet 813 mg    folic acid (FOLVITE) tablet 1 mg         Signed:  Tamera Ballard DO    Part of this note may have been written by using a voice dictation software. The note has been proof read but may still contain some grammatical/other typographical errors.

## 2022-07-31 NOTE — PROGRESS NOTES
Hourly rounds completed. Patient is alert and oriented. Ambulates in room. Medicated for pain per MAR. Pt denies needs at this time. Bed in low position, locked and call light/personal items within reach. Will continue to monitor and report to night shift nurse.

## 2022-07-31 NOTE — PROGRESS NOTES
TRANSFER - IN REPORT:    Verbal report received from ELI Avelar on Wyn Puls  being received from ED for routine progression of patient care      Report consisted of patient's Situation, Background, Assessment and   Recommendations(SBAR). Information from the following report(s) Nurse Handoff Report, ED SBAR, Intake/Output, MAR, and Recent Results was reviewed with the receiving nurse. Opportunity for questions and clarification was provided.

## 2022-08-01 LAB
ALBUMIN SERPL-MCNC: 3.1 G/DL (ref 3.5–5)
ALBUMIN/GLOB SERPL: 0.9 {RATIO} (ref 1.2–3.5)
ALP SERPL-CCNC: 52 U/L (ref 50–136)
ALT SERPL-CCNC: 26 U/L (ref 12–65)
ANION GAP SERPL CALC-SCNC: 7 MMOL/L (ref 7–16)
AST SERPL-CCNC: 23 U/L (ref 15–37)
BILIRUB DIRECT SERPL-MCNC: 0.2 MG/DL
BILIRUB SERPL-MCNC: 1 MG/DL (ref 0.2–1.1)
BUN SERPL-MCNC: 9 MG/DL (ref 6–23)
CALCIUM SERPL-MCNC: 8.8 MG/DL (ref 8.3–10.4)
CHLORIDE SERPL-SCNC: 104 MMOL/L (ref 98–107)
CO2 SERPL-SCNC: 23 MMOL/L (ref 21–32)
CREAT SERPL-MCNC: 0.7 MG/DL (ref 0.8–1.5)
ERYTHROCYTE [DISTWIDTH] IN BLOOD BY AUTOMATED COUNT: 13.5 % (ref 11.9–14.6)
GLOBULIN SER CALC-MCNC: 3.4 G/DL (ref 2.3–3.5)
GLUCOSE SERPL-MCNC: 85 MG/DL (ref 65–100)
HCT VFR BLD AUTO: 49.9 % (ref 41.1–50.3)
HGB BLD-MCNC: 16.7 G/DL (ref 13.6–17.2)
MCH RBC QN AUTO: 29.7 PG (ref 26.1–32.9)
MCHC RBC AUTO-ENTMCNC: 33.5 G/DL (ref 31.4–35)
MCV RBC AUTO: 88.6 FL (ref 79.6–97.8)
NRBC # BLD: 0 K/UL (ref 0–0.2)
PLATELET # BLD AUTO: 273 K/UL (ref 150–450)
PMV BLD AUTO: 9.8 FL (ref 9.4–12.3)
POTASSIUM SERPL-SCNC: 4.2 MMOL/L (ref 3.5–5.1)
PROT SERPL-MCNC: 6.5 G/DL (ref 6.3–8.2)
RBC # BLD AUTO: 5.63 M/UL (ref 4.23–5.6)
SODIUM SERPL-SCNC: 134 MMOL/L (ref 136–145)
WBC # BLD AUTO: 8.1 K/UL (ref 4.3–11.1)

## 2022-08-01 PROCEDURE — 6370000000 HC RX 637 (ALT 250 FOR IP): Performed by: HOSPITALIST

## 2022-08-01 PROCEDURE — 80076 HEPATIC FUNCTION PANEL: CPT

## 2022-08-01 PROCEDURE — 6370000000 HC RX 637 (ALT 250 FOR IP): Performed by: INTERNAL MEDICINE

## 2022-08-01 PROCEDURE — 6360000002 HC RX W HCPCS: Performed by: INTERNAL MEDICINE

## 2022-08-01 PROCEDURE — 80048 BASIC METABOLIC PNL TOTAL CA: CPT

## 2022-08-01 PROCEDURE — 2580000003 HC RX 258: Performed by: HOSPITALIST

## 2022-08-01 PROCEDURE — 6370000000 HC RX 637 (ALT 250 FOR IP): Performed by: PHYSICIAN ASSISTANT

## 2022-08-01 PROCEDURE — 2580000003 HC RX 258: Performed by: INTERNAL MEDICINE

## 2022-08-01 PROCEDURE — 85027 COMPLETE CBC AUTOMATED: CPT

## 2022-08-01 PROCEDURE — 36415 COLL VENOUS BLD VENIPUNCTURE: CPT

## 2022-08-01 PROCEDURE — 1100000000 HC RM PRIVATE

## 2022-08-01 RX ORDER — SENNA PLUS 8.6 MG/1
2 TABLET ORAL 2 TIMES DAILY
Status: DISCONTINUED | OUTPATIENT
Start: 2022-08-01 | End: 2022-08-02

## 2022-08-01 RX ORDER — BISACODYL 10 MG
10 SUPPOSITORY, RECTAL RECTAL DAILY PRN
Status: DISCONTINUED | OUTPATIENT
Start: 2022-08-01 | End: 2022-08-02 | Stop reason: HOSPADM

## 2022-08-01 RX ORDER — DIAZEPAM 5 MG/ML
5 INJECTION, SOLUTION INTRAMUSCULAR; INTRAVENOUS EVERY 4 HOURS PRN
Status: DISCONTINUED | OUTPATIENT
Start: 2022-08-01 | End: 2022-08-02

## 2022-08-01 RX ORDER — DEXTROSE AND SODIUM CHLORIDE 5; .45 G/100ML; G/100ML
INJECTION, SOLUTION INTRAVENOUS CONTINUOUS
Status: DISCONTINUED | OUTPATIENT
Start: 2022-08-01 | End: 2022-08-02 | Stop reason: HOSPADM

## 2022-08-01 RX ORDER — ZOLPIDEM TARTRATE 5 MG/1
5 TABLET ORAL NIGHTLY PRN
Status: DISCONTINUED | OUTPATIENT
Start: 2022-08-01 | End: 2022-08-02

## 2022-08-01 RX ORDER — MAGNESIUM HYDROXIDE/ALUMINUM HYDROXICE/SIMETHICONE 120; 1200; 1200 MG/30ML; MG/30ML; MG/30ML
30 SUSPENSION ORAL EVERY 6 HOURS PRN
Status: DISCONTINUED | OUTPATIENT
Start: 2022-08-01 | End: 2022-08-02 | Stop reason: HOSPADM

## 2022-08-01 RX ADMIN — HYDROMORPHONE HYDROCHLORIDE 1 MG: 1 INJECTION, SOLUTION INTRAMUSCULAR; INTRAVENOUS; SUBCUTANEOUS at 01:23

## 2022-08-01 RX ADMIN — HYDROMORPHONE HYDROCHLORIDE 1 MG: 1 INJECTION, SOLUTION INTRAMUSCULAR; INTRAVENOUS; SUBCUTANEOUS at 07:59

## 2022-08-01 RX ADMIN — CLONIDINE HYDROCHLORIDE 0.1 MG: 0.1 TABLET ORAL at 20:35

## 2022-08-01 RX ADMIN — HYDROMORPHONE HYDROCHLORIDE 1 MG: 1 INJECTION, SOLUTION INTRAMUSCULAR; INTRAVENOUS; SUBCUTANEOUS at 23:38

## 2022-08-01 RX ADMIN — ALUMINUM HYDROXIDE, MAGNESIUM HYDROXIDE, AND SIMETHICONE 30 ML: 200; 200; 20 SUSPENSION ORAL at 21:43

## 2022-08-01 RX ADMIN — SODIUM CHLORIDE, PRESERVATIVE FREE 10 ML: 5 INJECTION INTRAVENOUS at 20:33

## 2022-08-01 RX ADMIN — ENOXAPARIN SODIUM 40 MG: 100 INJECTION SUBCUTANEOUS at 07:59

## 2022-08-01 RX ADMIN — OXYCODONE 10 MG: 5 TABLET ORAL at 15:14

## 2022-08-01 RX ADMIN — OXYCODONE 10 MG: 5 TABLET ORAL at 21:44

## 2022-08-01 RX ADMIN — DEXTROSE AND SODIUM CHLORIDE: 5; 450 INJECTION, SOLUTION INTRAVENOUS at 09:24

## 2022-08-01 RX ADMIN — AMLODIPINE BESYLATE 10 MG: 10 TABLET ORAL at 07:59

## 2022-08-01 RX ADMIN — CLONIDINE HYDROCHLORIDE 0.1 MG: 0.1 TABLET ORAL at 14:09

## 2022-08-01 RX ADMIN — FOLIC ACID 1 MG: 1 TABLET ORAL at 07:59

## 2022-08-01 RX ADMIN — OXYCODONE 10 MG: 5 TABLET ORAL at 03:25

## 2022-08-01 RX ADMIN — OXYCODONE 10 MG: 5 TABLET ORAL at 09:25

## 2022-08-01 RX ADMIN — SENNOSIDES 17.2 MG: 8.6 TABLET, FILM COATED ORAL at 09:24

## 2022-08-01 RX ADMIN — DEXTROSE AND SODIUM CHLORIDE: 5; 450 INJECTION, SOLUTION INTRAVENOUS at 23:41

## 2022-08-01 RX ADMIN — HYDROMORPHONE HYDROCHLORIDE 1 MG: 1 INJECTION, SOLUTION INTRAMUSCULAR; INTRAVENOUS; SUBCUTANEOUS at 14:09

## 2022-08-01 RX ADMIN — HYDROMORPHONE HYDROCHLORIDE 1 MG: 1 INJECTION, SOLUTION INTRAMUSCULAR; INTRAVENOUS; SUBCUTANEOUS at 10:58

## 2022-08-01 RX ADMIN — CLONIDINE HYDROCHLORIDE 0.1 MG: 0.1 TABLET ORAL at 07:59

## 2022-08-01 RX ADMIN — Medication 100 MG: at 07:59

## 2022-08-01 RX ADMIN — HYDROMORPHONE HYDROCHLORIDE 1 MG: 1 INJECTION, SOLUTION INTRAMUSCULAR; INTRAVENOUS; SUBCUTANEOUS at 17:23

## 2022-08-01 RX ADMIN — SODIUM CHLORIDE, POTASSIUM CHLORIDE, SODIUM LACTATE AND CALCIUM CHLORIDE: 600; 310; 30; 20 INJECTION, SOLUTION INTRAVENOUS at 00:45

## 2022-08-01 RX ADMIN — SENNOSIDES 17.2 MG: 8.6 TABLET, FILM COATED ORAL at 20:35

## 2022-08-01 RX ADMIN — HYDROMORPHONE HYDROCHLORIDE 1 MG: 1 INJECTION, SOLUTION INTRAMUSCULAR; INTRAVENOUS; SUBCUTANEOUS at 05:00

## 2022-08-01 RX ADMIN — HYDROMORPHONE HYDROCHLORIDE 1 MG: 1 INJECTION, SOLUTION INTRAMUSCULAR; INTRAVENOUS; SUBCUTANEOUS at 20:34

## 2022-08-01 RX ADMIN — LISINOPRIL 40 MG: 20 TABLET ORAL at 07:59

## 2022-08-01 ASSESSMENT — PAIN DESCRIPTION - LOCATION
LOCATION: ABDOMEN
LOCATION: ABDOMEN;BACK
LOCATION: ABDOMEN

## 2022-08-01 ASSESSMENT — PAIN - FUNCTIONAL ASSESSMENT
PAIN_FUNCTIONAL_ASSESSMENT: ACTIVITIES ARE NOT PREVENTED

## 2022-08-01 ASSESSMENT — PAIN SCALES - GENERAL
PAINLEVEL_OUTOF10: 8
PAINLEVEL_OUTOF10: 3
PAINLEVEL_OUTOF10: 5
PAINLEVEL_OUTOF10: 0
PAINLEVEL_OUTOF10: 0
PAINLEVEL_OUTOF10: 7
PAINLEVEL_OUTOF10: 5
PAINLEVEL_OUTOF10: 6
PAINLEVEL_OUTOF10: 6
PAINLEVEL_OUTOF10: 8
PAINLEVEL_OUTOF10: 3
PAINLEVEL_OUTOF10: 8
PAINLEVEL_OUTOF10: 6
PAINLEVEL_OUTOF10: 9
PAINLEVEL_OUTOF10: 8

## 2022-08-01 ASSESSMENT — PAIN DESCRIPTION - ORIENTATION: ORIENTATION: MID

## 2022-08-01 ASSESSMENT — PAIN DESCRIPTION - DESCRIPTORS
DESCRIPTORS: ACHING;SHARP;PRESSURE
DESCRIPTORS: ACHING
DESCRIPTORS: ACHING
DESCRIPTORS: ACHING;PRESSURE;DISCOMFORT
DESCRIPTORS: ACHING
DESCRIPTORS: ACHING;PRESSURE;DISCOMFORT

## 2022-08-01 NOTE — CARE COORDINATION
[facility] 6 MED SURG  [address] [address]  Phone: [phone]             August 1, 2022    Patient: Cyndi Walter   YOB: 1985   Date of Visit: 7/30/2022       To Whom It May Concern:    Mr. Cyndi Walter was seen and treated in our [facility] emergency department  beginning July 30, 2022, and then transferred to [facility]. He is still a patient as of today (August 1, 2022), with discharge date to be determined.           Sincerely,     Homa Silverio RN Case Manager         Signature:__________________________________

## 2022-08-01 NOTE — CARE COORDINATION
Spoke to Mr. Trinity Burgess in room 320 about discharge planning. He lives in [place], [place] ([address]) with his wife and two young children (ages 11 and 10). 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 He does not have health insurance or a PCP. He says he used to have a doctor, but no longer, and that his mother is working to find him one in [place], [address] will ask physician to write for at least a 30 day supply of BP meds (e.g., amlodipine, clonidine), which are inexpensive or free (e.g., [employer]). We discussed that he needs to completely stop ETOH consumption, and he agrees. We discussed AA and FAVOR, and he says he is looking into a program in [place], and also in [facility].        08/01/22 1440   Service Assessment   Patient Orientation Alert and Oriented   Primary Caregiver Self   Support Systems Spouse/Significant Other   Prior Functional Level Independent in ADLs/IADLs   Condition of Participation: Discharge Planning   The Plan for Transition of Care is related to the following treatment goals: home when stable

## 2022-08-01 NOTE — PROGRESS NOTES
Hospitalist Progress Note   Admit Date:  2022 10:22 PM   Name:  Claudeen Brass   Age:  44 y.o. Sex:  male  :  1985   MRN:  [id number]   Room:  [place] [address] Course & Interval History:     44 y.o. male with medical history of HTN and ETOH abuse with chronic pancreatitis who presented to [facility] ER on 22 with 24 hours of worsening abdominal and back pain with nausea after drinking the night before. He states that he had some Beatbox drinks and quit around 2200 but then woke up around 0300 with abdominal pain and back pain. He took the day to drink plenty of fluids but the pain didn't go away so he finally went to the ER since it was like previous episodes of pancreatitis. He also complains of persistent nausea but no vomiting. Denies CP/SOB. Denies F/C. Denies diarrhea.      Today, pain 6/10, no BM, feels better, on clear liquids    Assessment & Plan:       Acute pancreatitis without infection or necrosis, improving  PO  advanced  - cont IVF, prn pain rx  - encourage oral intake as tolerated  - counseled on importance of ETOH abuse cessation  - triglycerides wnl      Uncontrolled hypertension, improving  - start clonidine tid  - cont norvasc / lisinopril as dosed  - partially exacerbated by pain      Polycythemia  - in setting of stress, N/V (volume depletion) and cigarette smoking  - cont IVF, counseled importance of smoking cessation  - f/u AM CBC      Abnormal CT of the abdomen  - CTAP shows gastric edema with possible partial GGO  - consider GI consult if N/V doesn't improve with improvement of acute pancreatitis      Alcohol abuse, counseled to stop  - advised cessation  - cont thiamine / folic acid  - DT precautions      Cigarette smoker  - nicotine patch  - smoking cessation counseled      Discharge Planning:  home possibly tomorrow    Objective:   Patient Vitals for the past 24 hrs:   Temp Pulse Resp BP SpO2   22 0732 98.2 °F (36.8 °C) 83 18 (!) 145/92 99 %   22 Hematocrit 52.9 (H) 41.1 - 50.3 %    MCV 86.4 79.6 - 97.8 FL    MCH 29.9 26.1 - 32.9 PG    MCHC 34.6 31.4 - 35.0 g/dL    RDW 13.2 11.9 - 14.6 %    Platelets 670 640 - 974 K/uL    MPV 8.9 (L) 9.4 - 12.3 FL    nRBC 0.00 0.0 - 0.2 K/uL    Differential Type AUTOMATED      Seg Neutrophils 81 (H) 43 - 78 %    Lymphocytes 9 (L) 13 - 44 %    Monocytes 10 4.0 - 12.0 %    Eosinophils % 0 (L) 0.5 - 7.8 %    Basophils 0 0.0 - 2.0 %    Immature Granulocytes 0 0.0 - 5.0 %    Segs Absolute 11.7 (H) 1.7 - 8.2 K/UL    Absolute Lymph # 1.3 0.5 - 4.6 K/UL    Absolute Mono # 1.4 (H) 0.1 - 1.3 K/UL    Absolute Eos # 0.0 0.0 - 0.8 K/UL    Basophils Absolute 0.1 0.0 - 0.2 K/UL    Absolute Immature Granulocyte 0.0 0.0 - 0.5 K/UL   Comprehensive Metabolic Panel    Collection Time: 07/30/22  6:34 PM   Result Value Ref Range    Sodium 135 (L) 138 - 145 mmol/L    Potassium 5.0 3.5 - 5.1 mmol/L    Chloride 93 (L) 98 - 107 mmol/L    CO2 31 21 - 32 mmol/L    Anion Gap 11 7.0 - 16.0 mmol/L    Glucose 110 (H) 65 - 100 mg/dL    BUN 12 7.0 - 18.0 MG/DL    Creatinine 0.72 (L) 0.8 - 1.5 MG/DL    GFR African American >157 >60 ml/min/1.73m2    GFR Non- >60 >60 ml/min/1.73m2    Calcium 9.0 8.3 - 10.4 MG/DL    Total Bilirubin 1.0 0.2 - 1.1 MG/DL    ALT 32 13.0 - 61.0 U/L    AST 63 (H) 15 - 37 U/L    Alk Phosphatase 74 45.0 - 117.0 U/L    Total Protein 7.3 6.4 - 8.2 g/dL    Albumin 4.3 3.5 - 5.0 g/dL    Globulin 3.0 2.3 - 3.5 g/dL    Albumin/Globulin Ratio 1.4     Magnesium    Collection Time: 07/30/22  6:34 PM   Result Value Ref Range    Magnesium 1.8 1.2 - 2.6 mg/dL   Lipase    Collection Time: 07/30/22  6:34 PM   Result Value Ref Range    Lipase 520 (H) 13 - 60 U/L   Triglyceride    Collection Time: 07/31/22  7:30 AM   Result Value Ref Range    Triglycerides 103 35 - 150 MG/DL   Basic Metabolic Panel w/ Reflex to MG    Collection Time: 07/31/22  7:30 AM   Result Value Ref Range    Sodium 131 (L) 138 - 145 mmol/L    Potassium 4.2 3.5 - 5.1 mmol/L    Chloride 99 98 - 107 mmol/L    CO2 25 21 - 32 mmol/L    Anion Gap 7 7 - 16 mmol/L    Glucose 79 65 - 100 mg/dL    BUN 7 6 - 23 MG/DL    Creatinine 0.70 (L) 0.8 - 1.5 MG/DL    GFR African American >60 >60 ml/min/1.73m2    GFR Non- >60 >60 ml/min/1.73m2    Calcium 8.6 8.3 - 10.4 MG/DL   Phosphorus    Collection Time: 07/31/22  7:30 AM   Result Value Ref Range    Phosphorus 3.6 2.5 - 4.5 MG/DL   CBC    Collection Time: 07/31/22  7:30 AM   Result Value Ref Range    WBC 13.0 (H) 4.3 - 11.1 K/uL    RBC 6.36 (H) 4.23 - 5.6 M/uL    Hemoglobin 18.6 (H) 13.6 - 17.2 g/dL    Hematocrit 55.7 (H) 41.1 - 50.3 %    MCV 87.6 79.6 - 97.8 FL    MCH 29.2 26.1 - 32.9 PG    MCHC 33.4 31.4 - 35.0 g/dL    RDW 13.9 11.9 - 14.6 %    Platelets 343 303 - 204 K/uL    MPV 10.0 9.4 - 12.3 FL    nRBC 0.00 0.0 - 0.2 K/uL   Basic Metabolic Panel w/ Reflex to MG    Collection Time: 08/01/22  5:41 AM   Result Value Ref Range    Sodium 134 (L) 136 - 145 mmol/L    Potassium 4.2 3.5 - 5.1 mmol/L    Chloride 104 98 - 107 mmol/L    CO2 23 21 - 32 mmol/L    Anion Gap 7 7 - 16 mmol/L    Glucose 85 65 - 100 mg/dL    BUN 9 6 - 23 MG/DL    Creatinine 0.70 (L) 0.8 - 1.5 MG/DL    GFR African American >60 >60 ml/min/1.73m2    GFR Non- >60 >60 ml/min/1.73m2    Calcium 8.8 8.3 - 10.4 MG/DL   CBC    Collection Time: 08/01/22  5:41 AM   Result Value Ref Range    WBC 8.1 4.3 - 11.1 K/uL    RBC 5.63 (H) 4.23 - 5.6 M/uL    Hemoglobin 16.7 13.6 - 17.2 g/dL    Hematocrit 49.9 41.1 - 50.3 %    MCV 88.6 79.6 - 97.8 FL    MCH 29.7 26.1 - 32.9 PG    MCHC 33.5 31.4 - 35.0 g/dL    RDW 13.5 11.9 - 14.6 %    Platelets 069 246 - 122 K/uL    MPV 9.8 9.4 - 12.3 FL    nRBC 0.00 0.0 - 0.2 K/uL   Hepatic Function Panel    Collection Time: 08/01/22  5:41 AM   Result Value Ref Range    Total Protein 6.5 6.3 - 8.2 g/dL    Albumin 3.1 (L) 3.5 - 5.0 g/dL    Globulin 3.4 2.3 - 3.5 g/dL    Albumin/Globulin Ratio 0.9 (L) 1.2 - 3.5      Total Bilirubin 1.0 0.2 - 1.1 MG/DL    Bilirubin, Direct 0.2 <0.4 MG/DL    Alk Phosphatase 52 50 - 136 U/L    AST 23 15 - 37 U/L    ALT 26 12 - 65 U/L       I have personally reviewed imaging studies showing:   Other Studies:  No orders to display       Current Meds:  Current Facility-Administered Medications   Medication Dose Route Frequency    diazePAM (VALIUM) injection 5 mg  5 mg IntraVENous Q4H PRN    dextrose 5 % and 0.45 % sodium chloride infusion   IntraVENous Continuous    senna (SENOKOT) tablet 17.2 mg  2 tablet Oral BID    bisacodyl (DULCOLAX) suppository 10 mg  10 mg Rectal Daily PRN    thiamine tablet 100 mg  100 mg Oral Daily    hydrALAZINE (APRESOLINE) injection 10 mg  10 mg IntraVENous Q6H PRN    cloNIDine (CATAPRES) tablet 0.1 mg  0.1 mg Oral TID    nicotine (NICODERM CQ) 14 MG/24HR 1 patch  1 patch TransDERmal Daily    amLODIPine (NORVASC) tablet 10 mg  10 mg Oral Daily    lisinopril (PRINIVIL;ZESTRIL) tablet 40 mg  40 mg Oral Daily    sodium chloride flush 0.9 % injection 5-40 mL  5-40 mL IntraVENous 2 times per day    sodium chloride flush 0.9 % injection 5-40 mL  5-40 mL IntraVENous PRN    0.9 % sodium chloride infusion   IntraVENous PRN    ondansetron (ZOFRAN-ODT) disintegrating tablet 4 mg  4 mg Oral Q8H PRN    Or    ondansetron (ZOFRAN) injection 4 mg  4 mg IntraVENous Q6H PRN    enoxaparin (LOVENOX) injection 40 mg  40 mg SubCUTAneous Daily    potassium chloride (KLOR-CON M) extended release tablet 40 mEq  40 mEq Oral PRN    Or    potassium bicarb-citric acid (EFFER-K) effervescent tablet 40 mEq  40 mEq Oral PRN    Or    potassium chloride 10 mEq/100 mL IVPB (Peripheral Line)  10 mEq IntraVENous PRN    magnesium sulfate 2000 mg in 50 mL IVPB premix  2,000 mg IntraVENous PRN    acetaminophen (TYLENOL) tablet 650 mg  650 mg Oral Q6H PRN    HYDROmorphone (DILAUDID) injection 1 mg  1 mg IntraVENous Q3H PRN    oxyCODONE (ROXICODONE) immediate release tablet 10 mg  10 mg Oral Q6H PRN Signed:  Lynn Butts MD    Part of this note may have been written by using a voice dictation software. The note has been proof read but may still contain some grammatical/other typographical errors.

## 2022-08-01 NOTE — PROGRESS NOTES
Resting in bed. IV infusing. Has c/o pain x5, treated per [facility] and is effective. Pt tolerating diet and fluids well. Hourly rounds completed, all needs met this shift. Bed in L/L with call light in reach. Will give report to oncoming nurse.

## 2022-08-02 VITALS
TEMPERATURE: 97.8 F | HEART RATE: 78 BPM | WEIGHT: 166.01 LBS | DIASTOLIC BLOOD PRESSURE: 94 MMHG | RESPIRATION RATE: 20 BRPM | SYSTOLIC BLOOD PRESSURE: 148 MMHG | BODY MASS INDEX: 23.77 KG/M2 | OXYGEN SATURATION: 99 % | HEIGHT: 70 IN

## 2022-08-02 PROCEDURE — 6370000000 HC RX 637 (ALT 250 FOR IP): Performed by: PHYSICIAN ASSISTANT

## 2022-08-02 PROCEDURE — 6370000000 HC RX 637 (ALT 250 FOR IP): Performed by: HOSPITALIST

## 2022-08-02 PROCEDURE — 2580000003 HC RX 258: Performed by: INTERNAL MEDICINE

## 2022-08-02 PROCEDURE — 6360000002 HC RX W HCPCS: Performed by: INTERNAL MEDICINE

## 2022-08-02 PROCEDURE — 6370000000 HC RX 637 (ALT 250 FOR IP): Performed by: INTERNAL MEDICINE

## 2022-08-02 RX ORDER — HYDROCODONE BITARTRATE AND ACETAMINOPHEN 5; 325 MG/1; MG/1
1 TABLET ORAL EVERY 8 HOURS PRN
Qty: 20 TABLET | Refills: 0 | Status: SHIPPED | OUTPATIENT
Start: 2022-08-02 | End: 2022-08-16

## 2022-08-02 RX ORDER — AMLODIPINE BESYLATE 10 MG/1
10 TABLET ORAL DAILY
Qty: 90 TABLET | Refills: 0 | Status: SHIPPED | OUTPATIENT
Start: 2022-08-02 | End: 2022-10-11 | Stop reason: SDUPTHER

## 2022-08-02 RX ORDER — LISINOPRIL 40 MG/1
40 TABLET ORAL DAILY
Qty: 90 TABLET | Refills: 0 | Status: SHIPPED | OUTPATIENT
Start: 2022-08-02 | End: 2022-10-11 | Stop reason: SDUPTHER

## 2022-08-02 RX ORDER — ALPRAZOLAM 1 MG/1
1 TABLET ORAL 3 TIMES DAILY PRN
Qty: 20 TABLET | Refills: 0 | Status: SHIPPED | OUTPATIENT
Start: 2022-08-02 | End: 2022-09-01

## 2022-08-02 RX ORDER — OXYCODONE HYDROCHLORIDE 5 MG/1
5 TABLET ORAL EVERY 6 HOURS PRN
Status: DISCONTINUED | OUTPATIENT
Start: 2022-08-02 | End: 2022-08-02 | Stop reason: HOSPADM

## 2022-08-02 RX ORDER — LANOLIN ALCOHOL/MO/W.PET/CERES
100 CREAM (GRAM) TOPICAL DAILY
Qty: 30 TABLET | Refills: 3 | Status: SHIPPED | OUTPATIENT
Start: 2022-08-03 | End: 2022-08-02 | Stop reason: HOSPADM

## 2022-08-02 RX ADMIN — AMLODIPINE BESYLATE 10 MG: 10 TABLET ORAL at 08:04

## 2022-08-02 RX ADMIN — Medication 100 MG: at 08:04

## 2022-08-02 RX ADMIN — SENNOSIDES 17.2 MG: 8.6 TABLET, FILM COATED ORAL at 08:03

## 2022-08-02 RX ADMIN — CLONIDINE HYDROCHLORIDE 0.1 MG: 0.1 TABLET ORAL at 08:04

## 2022-08-02 RX ADMIN — HYDROMORPHONE HYDROCHLORIDE 1 MG: 1 INJECTION, SOLUTION INTRAMUSCULAR; INTRAVENOUS; SUBCUTANEOUS at 06:23

## 2022-08-02 RX ADMIN — SODIUM CHLORIDE, PRESERVATIVE FREE 10 ML: 5 INJECTION INTRAVENOUS at 08:05

## 2022-08-02 RX ADMIN — ZOLPIDEM TARTRATE 5 MG: 5 TABLET ORAL at 00:08

## 2022-08-02 RX ADMIN — HYDROMORPHONE HYDROCHLORIDE 1 MG: 1 INJECTION, SOLUTION INTRAMUSCULAR; INTRAVENOUS; SUBCUTANEOUS at 02:26

## 2022-08-02 RX ADMIN — HYDROMORPHONE HYDROCHLORIDE 1 MG: 1 INJECTION, SOLUTION INTRAMUSCULAR; INTRAVENOUS; SUBCUTANEOUS at 09:19

## 2022-08-02 RX ADMIN — OXYCODONE 5 MG: 5 TABLET ORAL at 10:48

## 2022-08-02 RX ADMIN — OXYCODONE 10 MG: 5 TABLET ORAL at 03:44

## 2022-08-02 RX ADMIN — ENOXAPARIN SODIUM 40 MG: 100 INJECTION SUBCUTANEOUS at 08:05

## 2022-08-02 RX ADMIN — LISINOPRIL 40 MG: 20 TABLET ORAL at 08:04

## 2022-08-02 ASSESSMENT — PAIN DESCRIPTION - LOCATION
LOCATION: ABDOMEN
LOCATION: BACK;ABDOMEN
LOCATION: BACK
LOCATION: ABDOMEN
LOCATION: ABDOMEN
LOCATION: BACK;ABDOMEN

## 2022-08-02 ASSESSMENT — PAIN SCALES - GENERAL
PAINLEVEL_OUTOF10: 3
PAINLEVEL_OUTOF10: 7
PAINLEVEL_OUTOF10: 3
PAINLEVEL_OUTOF10: 6
PAINLEVEL_OUTOF10: 3
PAINLEVEL_OUTOF10: 9
PAINLEVEL_OUTOF10: 3
PAINLEVEL_OUTOF10: 8
PAINLEVEL_OUTOF10: 5

## 2022-08-02 ASSESSMENT — PAIN DESCRIPTION - DESCRIPTORS
DESCRIPTORS: CRAMPING
DESCRIPTORS: ACHING
DESCRIPTORS: ACHING;DISCOMFORT
DESCRIPTORS: CRAMPING
DESCRIPTORS: ACHING
DESCRIPTORS: CRAMPING

## 2022-08-02 ASSESSMENT — PAIN - FUNCTIONAL ASSESSMENT
PAIN_FUNCTIONAL_ASSESSMENT: ACTIVITIES ARE NOT PREVENTED
PAIN_FUNCTIONAL_ASSESSMENT: ACTIVITIES ARE NOT PREVENTED
PAIN_FUNCTIONAL_ASSESSMENT: PREVENTS OR INTERFERES SOME ACTIVE ACTIVITIES AND ADLS

## 2022-08-02 ASSESSMENT — PAIN DESCRIPTION - ORIENTATION
ORIENTATION: ANTERIOR
ORIENTATION: ANTERIOR
ORIENTATION: ANTERIOR;UPPER
ORIENTATION: MID

## 2022-08-02 ASSESSMENT — PAIN DESCRIPTION - ONSET: ONSET: ON-GOING

## 2022-08-02 ASSESSMENT — PAIN DESCRIPTION - FREQUENCY: FREQUENCY: CONTINUOUS

## 2022-08-02 ASSESSMENT — PAIN DESCRIPTION - PAIN TYPE: TYPE: ACUTE PAIN

## 2022-08-02 NOTE — DISCHARGE SUMMARY
[unfilled]    Physician Discharge Summary     Patient ID:  Jaqueline Laguerre  [id number]  83 y.o.  1985    Admit date: 7/30/2022    Discharge date: 8-2-2022    Diagnosis:  1-   Acute alcoholic pancreatitis without infection or necrosis, recurrent (6th attack), treated with IV fluids, pain control. Improved. 2- Uncontrolled hypertension, improved with treatment  3- Alcohol and tobacco use, counseled to stop. 4- Hyponatremia, mild, resolved. CT abdomen and pelvis: The visualized lung bases and mediastinum are unremarkable. The liver, spleen, adrenal glands, and kidneys are within normal limits. The   bladder appears grossly normal. The gallbladder has been removed. The pancreas is enlarged and contains two, circumscribed, hypodense lesions in   the body. The largest of these is noted in the proximal body and measures 1.3   cm. There is no convincing evidence of necrosis in the pancreas at this time. There is a moderate amount of peripancreatic fluid some of which extends to the   left paracolic gutter. Distal esophagus and stomach are distended and filled with low density fluid. Small and large bowel are without evidence of obstruction. A structure felt to   represent the appendix is seen in the right lower quadrant and appears grossly   normal.       No evidence of free air. No abdominal aortic aneurysm. There are a few prominent   retroperitoneal lymph nodes. Osseous structures are without evidence of acute fracture or suspicious lesion. Impression   Findings described above are compatible with acute pancreatitis. I do not   appreciate any convincing evidence to suggest pancreatic necrosis at this time. There are 2 fluid density lesions in the body of the pancreas. These may   represent pseudocysts. Follow-up imaging is recommended to sure that resolution   and to exclude cystic neoplasms.        The stomach and distal esophagus are distended with low-density fluid. This may   suggest some degree of gastric outlet obstruction. No convincing evidence of   complete obstruction. Hospital course:   44 y.o. male with medical history of HTN and ETOH abuse with chronic pancreatitis who presented to [facility] ER on 7/30/22 with 24 hours of worsening abdominal and back pain with nausea after drinking the night before. He states that he had some Beatbox drinks and quit around 2200 but then woke up around 0300 with abdominal pain and back pain. He took the day to drink plenty of fluids but the pain didn't go away so he finally went to the ER since it was like previous episodes of pancreatitis. He also complains of persistent nausea but no vomiting. Denies CP/SOB. Denies F/C. Denies diarrhea. Patient admitted and treated as above. On day of discharge, patient exam showed minimal epigastric tenderness, tolerated oral intake. No shakes. PCP: None Provider    Patient Instructions:     START taking these medications    Details      HYDROcodone-acetaminophen (NORCO) 5-325 MG per tablet Take 1 tablet by mouth every 8 hours as needed for Pain for up to 14 days. Intended supply: 3 days. Take lowest dose possible to manage pain  Qty: 20 tablet, Refills: 0    Comments: Reduce doses taken as pain becomes manageable  Associated Diagnoses: Alcohol abuse      ALPRAZolam (XANAX) 1 MG tablet Take 1 tablet by mouth 3 times daily as needed for Sleep for up to 30 days. Qty: 20 tablet, Refills: 0    Associated Diagnoses: Alcohol abuse           CONTINUE these medications which have CHANGED    Details   lisinopril (PRINIVIL;ZESTRIL) 40 MG tablet Take 1 tablet by mouth in the morning. Qty: 90 tablet, Refills: 0      amLODIPine (NORVASC) 10 MG tablet Take 1 tablet by mouth in the morning.   Qty: 90 tablet, Refills: 0           Instructions:     PCP in 1-2 weeks, No alcohol, cardiac diet, activity as tolerated    Time 20 min  Please send copy to primary physician.     Signed:  Jayy Warner MD  8/2/2022  9:43 AM

## 2022-08-02 NOTE — PROGRESS NOTES
Resting in bed. IV infusing. Treated for indigestion, sleep, pain per [facility] and is effective. No c/o nausea or vomiting. Hourly rounds completed, all needs met this shift. Bed in L/L with call light in reach. Will give report to oncoming nurse.

## 2022-08-02 NOTE — DISCHARGE INSTRUCTIONS
Learning About Acute Pancreatitis  What is acute pancreatitis? The pancreas is an organ behind the stomach. It makes hormones like insulin that help control how your body uses sugar. It also makes enzymes that help youdigest food. Usually these enzymes flow from the pancreas to the intestines. But if they leak into the pancreas, they can irritate it and cause pain and swelling. Whenthis happens suddenly, it's called acute pancreatitis. What causes it? Most of the time, acute pancreatitis is caused by gallstones or by heavyalcohol use. But several other things can cause it, such as:  High levels of fats in the blood. An injury. A problem after a surgery or a procedure. Certain medicines. What are the symptoms? The main symptom is pain in the upper belly. The pain can be severe. You also may have a fever, nausea, or vomiting. Some people get so sick that they haveproblems breathing. They also may have low blood pressure. How is it diagnosed? Your doctor will diagnose pancreatitis with an exam and by looking at your lab tests. Your doctor may think that you have this problem based on your symptomsand where you have pain in your belly. You may have blood tests of enzymes called amylase and lipase. In pancreatitis,the level of these enzymes is usually much higher than normal.  You also may have imaging tests of the belly. These may include an ultrasound, a CT scan, or an MRI. A special MRI called MRCP can show images of the bileducts. This test can be very helpful when gallstones are causing the problem. How is it treated? Treatment of acute pancreatitis usually takes place in the hospital. It focuses on taking care of pain and supporting your body while your pancreas heals. In severe cases, treatment may occur in an intensive care unit to supportbreathing, treat serious infections, or help raise very low blood pressure. If a gallstone is causing the problem, the gallstone may need to be removed.  This is done during a procedure called ERCP. The doctor puts a scope in your mouth and moves it gently through the stomach and into the ducts from thepancreas and gallbladder. The doctor is then able to see a stone and remove it. Sometimes the gallbladder, which makes gallstones, needs to be removed bysurgery. People with pancreatitis often need a lot of fluid to help support their other organs and their blood pressure. They get fluids through a vein (intravenous, or IV). Instead of food by mouth, nutrition is sometimes given through a veinwhile the pancreas heals. Where can you learn more? Go to [email or web address] and sign in to your V[employer] account. Enter N582 in the [employer] box to learn more about \"Learning About Acute Pancreatitis. \"     If you do not have an account, please click on the \"Sign Up Now\" link. Current as of: September 8, 2021               Content Version: 13.3  © 2006-2022 Healthwise, Incorporated. Care instructions adapted under license by [facility] ([facility]). If you have questions about a medical condition or this instruction, always ask your healthcare professional. [place] [address] any warranty or liability for your use of this information.

## 2022-08-03 ENCOUNTER — TELEPHONE (OUTPATIENT)
Dept: INTERNAL MEDICINE CLINIC | Facility: CLINIC | Age: 37
End: 2022-08-03

## 2022-08-03 NOTE — TELEPHONE ENCOUNTER
Called patient to schedule TCV appt following discharge from [facility] 08/02/2022. Dx Alcohol Abuse. Per patient discharge report Patient stated he has appt with New PCP in 1 -2 weeks.     L/M for patient to call

## 2022-10-11 ENCOUNTER — OFFICE VISIT (OUTPATIENT)
Dept: INTERNAL MEDICINE CLINIC | Facility: CLINIC | Age: 37
End: 2022-10-11

## 2022-10-11 VITALS
HEIGHT: 70 IN | DIASTOLIC BLOOD PRESSURE: 90 MMHG | OXYGEN SATURATION: 97 % | WEIGHT: 184.6 LBS | BODY MASS INDEX: 26.43 KG/M2 | SYSTOLIC BLOOD PRESSURE: 138 MMHG | HEART RATE: 102 BPM

## 2022-10-11 DIAGNOSIS — F10.10 ALCOHOL ABUSE: ICD-10-CM

## 2022-10-11 DIAGNOSIS — K86.0 ALCOHOL-INDUCED CHRONIC PANCREATITIS (HCC): ICD-10-CM

## 2022-10-11 DIAGNOSIS — Z23 ENCOUNTER FOR VACCINATION: ICD-10-CM

## 2022-10-11 DIAGNOSIS — Z72.0 TOBACCO ABUSE: ICD-10-CM

## 2022-10-11 DIAGNOSIS — Z01.89 ENCOUNTER FOR ROUTINE LABORATORY TESTING: ICD-10-CM

## 2022-10-11 DIAGNOSIS — R93.5 ABNORMAL CT OF THE ABDOMEN: ICD-10-CM

## 2022-10-11 DIAGNOSIS — I10 ESSENTIAL HYPERTENSION: Primary | ICD-10-CM

## 2022-10-11 DIAGNOSIS — F90.2 ATTENTION DEFICIT HYPERACTIVITY DISORDER (ADHD), COMBINED TYPE: ICD-10-CM

## 2022-10-11 PROBLEM — K85.90 ACUTE PANCREATITIS WITHOUT INFECTION OR NECROSIS: Status: RESOLVED | Noted: 2022-07-30 | Resolved: 2022-10-11

## 2022-10-11 PROBLEM — D72.829 LEUKOCYTOSIS: Status: RESOLVED | Noted: 2022-07-30 | Resolved: 2022-10-11

## 2022-10-11 PROBLEM — K85.20 ACUTE ALCOHOLIC PANCREATITIS: Status: RESOLVED | Noted: 2021-10-03 | Resolved: 2022-10-11

## 2022-10-11 PROBLEM — F10.20 ALCOHOL USE DISORDER, SEVERE, DEPENDENCE (HCC): Status: ACTIVE | Noted: 2019-08-21

## 2022-10-11 PROBLEM — D75.1 ERYTHROCYTOSIS: Status: RESOLVED | Noted: 2022-07-30 | Resolved: 2022-10-11

## 2022-10-11 PROBLEM — I16.9 HYPERTENSIVE CRISIS: Status: RESOLVED | Noted: 2021-10-03 | Resolved: 2022-10-11

## 2022-10-11 RX ORDER — LISINOPRIL 40 MG/1
40 TABLET ORAL DAILY
Qty: 90 TABLET | Refills: 0 | Status: ON HOLD | OUTPATIENT
Start: 2022-10-11 | End: 2022-10-31 | Stop reason: HOSPADM

## 2022-10-11 RX ORDER — AMLODIPINE BESYLATE 10 MG/1
10 TABLET ORAL DAILY
Qty: 90 TABLET | Refills: 0 | Status: ON HOLD | OUTPATIENT
Start: 2022-10-11

## 2022-10-11 ASSESSMENT — PATIENT HEALTH QUESTIONNAIRE - PHQ9
SUM OF ALL RESPONSES TO PHQ QUESTIONS 1-9: 0
2. FEELING DOWN, DEPRESSED OR HOPELESS: 0
SUM OF ALL RESPONSES TO PHQ QUESTIONS 1-9: 0
1. LITTLE INTEREST OR PLEASURE IN DOING THINGS: 0
SUM OF ALL RESPONSES TO PHQ9 QUESTIONS 1 & 2: 0

## 2022-10-11 ASSESSMENT — ANXIETY QUESTIONNAIRES
1. FEELING NERVOUS, ANXIOUS, OR ON EDGE: 0
4. TROUBLE RELAXING: 0
7. FEELING AFRAID AS IF SOMETHING AWFUL MIGHT HAPPEN: 0
GAD7 TOTAL SCORE: 0
2. NOT BEING ABLE TO STOP OR CONTROL WORRYING: 0
3. WORRYING TOO MUCH ABOUT DIFFERENT THINGS: 0
5. BEING SO RESTLESS THAT IT IS HARD TO SIT STILL: 0
6. BECOMING EASILY ANNOYED OR IRRITABLE: 0

## 2022-10-11 ASSESSMENT — ENCOUNTER SYMPTOMS
WHEEZING: 0
ABDOMINAL DISTENTION: 0
CONSTIPATION: 0
CHEST TIGHTNESS: 0
SHORTNESS OF BREATH: 0
BLOOD IN STOOL: 0
DIARRHEA: 0

## 2022-10-11 NOTE — ASSESSMENT & PLAN NOTE
Asymptomatic, lifestyle modifications recommended and Continue with alcohol abstinence, will follow up with a CT scan as he had previous abnormal findings and possible pseudocyst or changes for acute pancreatitis back in July. Catina Landeros He was congratulated for his efforts to being sober,.   Needs also willing to discuss with counseling and psychiatrist alcohol abuse

## 2022-10-11 NOTE — PATIENT INSTRUCTIONS
A referral has been placed for you. Someone will call you with the details of the appointment and process. Please keep your appointment or be sure to call if you need to reschedule. If you have not received any call after 1 to 2 weeks please contact us. An appointment for you to have a test is being scheduled. Someone will call you with the details of the appointment. Please keep your appointment or be sure to call if you need to reschedule.

## 2022-10-11 NOTE — PROGRESS NOTES
Chief Complaint   Patient presents with    Establish Care     Referral for ADD medications. Denae Eckert is a 44 y.o. male who presents today for Establish Care (Referral for ADD medications. )     New patient, he is , with 2 kids, 9and 10years old. He is an , and currently working in a [employer]. Has a history of chronic pancreatitis, associated with alcohol use, last admission to the hospital was few months ago, CT scan showed abnormal findings, therapy for follow-up, he has not yet completed a CT scan for follow-up, and is interested to completed at this time,    He has been trying to stay sober, he had episodes of 3 to 5 months of sobriety, and sometimes reports flares due to social events. He is a smoker, smoking 1 packet/day, has tried to quit a couple times, and is not quite sure yet if he wants to quit at this time or not. He has a history of hypertension, currently taking lisinopril, amlodipine, reports no side effects, and reports compliant with medications. Today he is concerned about his diagnosis of ADHD, he reports this was sent for early in his life, he believes that back in 1994 he was a started on medications like Ritalin, and later on changed to Adderall which was 35, he has not been taking these medications over the last 5 years, as he was working on the field, but now that he is back to the office, he feels his deficit of attention has been interfering with his job activities. He would like to have a referral.    He declined at flu shot today, and will get Tdap      CT Result (most recent):  CT ABDOMEN PELVIS W IV CONTRAST 07/30/2022    Narrative  EXAM: CT ABDOMEN PELVIS W IV CONTRAST    HISTORY: abd pain. TECHNIQUE: Axial images of the abdomen and pelvis were performed following the  administration of intravenous contrast. Images were obtained in the axial plane  and coronal reformatted images were created and reviewed.     Dose reduction technique used: Automated exposure control/Adjustment of the mA  and/or kV according to patient size/Use of iterative reconstruction technique. 100 mL of Isovue-370 were utilized. COMPARISON: None. FINDINGS:  The visualized lung bases and mediastinum are unremarkable. The liver, spleen, adrenal glands, and kidneys are within normal limits. The  bladder appears grossly normal. The gallbladder has been removed. The pancreas is enlarged and contains two, circumscribed, hypodense lesions in  the body. The largest of these is noted in the proximal body and measures 1.3  cm. There is no convincing evidence of necrosis in the pancreas at this time. There is a moderate amount of peripancreatic fluid some of which extends to the  left paracolic gutter. Distal esophagus and stomach are distended and filled with low density fluid. Small and large bowel are without evidence of obstruction. A structure felt to  represent the appendix is seen in the right lower quadrant and appears grossly  normal.    No evidence of free air. No abdominal aortic aneurysm. There are a few prominent  retroperitoneal lymph nodes. Osseous structures are without evidence of acute fracture or suspicious lesion. Impression  Findings described above are compatible with acute pancreatitis. I do not  appreciate any convincing evidence to suggest pancreatic necrosis at this time. There are 2 fluid density lesions in the body of the pancreas. These may  represent pseudocysts. Follow-up imaging is recommended to sure that resolution  and to exclude cystic neoplasms. The stomach and distal esophagus are distended with low-density fluid. This may  suggest some degree of gastric outlet obstruction. No convincing evidence of  complete obstruction.         Wt Readings from Last 3 Encounters:   10/11/22 184 lb 9.6 oz (83.7 kg)   08/02/22 166 lb 0.1 oz (75.3 kg)   07/30/22 185 lb (83.9 kg)     Vitals:    10/11/22 0901 10/11/22 0945 BP: (!) 144/90 (!) 138/90   Site: Left Upper Arm Left Upper Arm   Position: Sitting Sitting   Pulse: (!) 102    SpO2: 97%    Weight: 184 lb 9.6 oz (83.7 kg)    Height: 5' 10\" (1.778 m)         Assessment and plan:  1. Essential hypertension  Assessment & Plan:   Uncontrolled, continue current medications, medication adherence emphasized and lifestyle modifications recommended   Key Anti-Hypertensive Meds            amLODIPine (NORVASC) 10 MG tablet (Taking)    Sig - Route: Take 1 tablet by mouth daily - Oral    lisinopril (PRINIVIL;ZESTRIL) 40 MG tablet (Taking)    Sig - Route: Take 1 tablet by mouth daily - Oral          Goal <140/90 , follow-up in 2 to 3 months, he will start monitoring his blood pressure at home, lifestyle modification including smoking cessation was recommended  Orders:  -     amLODIPine (NORVASC) 10 MG tablet; Take 1 tablet by mouth daily, Disp-90 tablet, R-0Normal  -     lisinopril (PRINIVIL;ZESTRIL) 40 MG tablet; Take 1 tablet by mouth daily, Disp-90 tablet, R-0Normal  2. Alcohol-induced chronic pancreatitis [facility])  Assessment & Plan:   Asymptomatic, lifestyle modifications recommended and Continue with alcohol abstinence, will follow up with a CT scan as he had previous abnormal findings and possible pseudocyst or changes for acute pancreatitis back in July. Werner Quinones He was congratulated for his efforts to being sober,. Needs also willing to discuss with counseling and psychiatrist alcohol abuse  Orders:  -     [id number] (Psychiatry)  -     CT ABDOMEN PELVIS W WO CONTRAST Additional Contrast? Radiologist Recommendation; Future  3. Abnormal CT of the abdomen  -     CT ABDOMEN PELVIS W WO CONTRAST Additional Contrast? Radiologist Recommendation; Future  4.  Attention deficit hyperactivity disorder (ADHD), combined type  Assessment & Plan:  Referral will be placed for medication and diagnosis management  Orders:  -     [id number] (Psychiatry)  5. Encounter for routine laboratory testing  -     CBC with Auto Differential; Future  -     Comprehensive Metabolic Panel; Future  -     Lipid Panel; Future  -     Hepatitis C Antibody; Future  -     TSH with Reflex; Future  -     HIV 1/2 Ag/Ab, 4TH Generation,W Rflx Confirm; Future  6. Encounter for vaccination  -     Tdap, [address], (age 8 yrs+), IM  7. Alcohol abuse [F10.10 (ICD-10-CM)]  8. Tobacco abuse [Z72.0 (ICD-10-CM)]  Assessment & Plan:   Tobacco cessation was discussed with patient. Patient was instructed about harms of smoking, benefits of quitting. Possible alternatives like counseling, coaching programs, support of friends and family, and/or medications  At least 3-5 minutes spend in counseling. Return in about 3 months (around 1/11/2023) for HTN, smoking cesation. Review of system:    Review of Systems   Constitutional:  Negative for activity change, fatigue and unexpected weight change. Respiratory:  Negative for chest tightness, shortness of breath and wheezing. Cardiovascular:  Negative for chest pain, palpitations and leg swelling. Gastrointestinal:  Negative for abdominal distention, blood in stool, constipation and diarrhea. Genitourinary:  Negative for difficulty urinating and frequency. Musculoskeletal:  Negative for myalgias. Neurological:  Negative for dizziness and headaches. Psychiatric/Behavioral:  Positive for behavioral problems and decreased concentration. The patient is nervous/anxious.         Immunization history:    Immunization History   Administered Date(s) Administered    COVID-19, MODERNA BLUE border, Primary or Immunocompromised, (age 12y+), IM, 100 mcg/0.5mL 08/19/2021    Influenza Virus Vaccine 11/05/2015, 09/19/2018, 01/18/2020, 01/18/2020, 10/05/2020, 10/05/2020    Influenza, FLUARIX, FLULAVAL, FLUZONE (age 10 mo+) AND AFLURIA, (age 1 y+), PF, 0.5mL 11/05/2015, 01/18/2020, 10/05/2020    Pneumococcal Polysaccharide ([id number]) 02/22/2018    Tdap (Boostrix, Adacel) 10/11/2022       Current medications:      Current Outpatient Medications:     amLODIPine (NORVASC) 10 MG tablet, Take 1 tablet by mouth daily, Disp: 90 tablet, Rfl: 0    lisinopril (PRINIVIL;ZESTRIL) 40 MG tablet, Take 1 tablet by mouth daily, Disp: 90 tablet, Rfl: 0      Family history:    Family History   Problem Relation Age of Onset    Hypertension Mother     Heart Attack Father         Past medical history:    Past Medical History:   Diagnosis Date    Acute alcoholic pancreatitis 50/3/[birth year]50    Carpal tunnel syndrome, left     Cigarette smoker     GERD (gastroesophageal reflux disease)     occassionally with spicy foods    History of pancreatitis     X2    Hypertensive crisis 10/3/2021    Pancreatitis           Physical exam:    BP (!) 138/90 (Site: Left Upper Arm, Position: Sitting)   Pulse (!) 102   Ht 5' 10\" (1.778 m)   Wt 184 lb 9.6 oz (83.7 kg)   SpO2 97%   BMI 26.49 kg/m²     Physical Exam  Vitals reviewed. Constitutional:       Appearance: Normal appearance. HENT:      Head: Normocephalic and atraumatic. Cardiovascular:      Rate and Rhythm: Normal rate and regular rhythm. Pulmonary:      Effort: Pulmonary effort is normal.      Breath sounds: Normal breath sounds. Neurological:      General: No focal deficit present. Mental Status: He is alert and oriented to person, place, and time.    Psychiatric:         Mood and Affect: Mood normal.        Recent labs:    No results found for: CHOL  Lab Results   Component Value Date    TRIG 103 07/31/2022       Lab Results   Component Value Date     (L) 08/01/2022    K 4.2 08/01/2022     08/01/2022    CO2 23 08/01/2022    BUN 9 08/01/2022    CREATININE 0.70 (L) 08/01/2022    GLUCOSE 85 08/01/2022    CALCIUM 8.8 08/01/2022    PROT 6.5 08/01/2022    LABALBU 3.1 (L) 08/01/2022    BILITOT 1.0 08/01/2022    ALKPHOS 52 08/01/2022    AST 23 08/01/2022    ALT 26 08/01/2022    LABGLOM >60 08/01/2022 GFRAA >60 08/01/2022    AGRATIO 1.1 (L) 10/03/2021    GLOB 3.4 08/01/2022     Lab Results   Component Value Date    WBC 8.1 08/01/2022    HGB 16.7 08/01/2022    HCT 49.9 08/01/2022    MCV 88.6 08/01/2022     08/01/2022             This document was generated with the aid of voice recognition software. . Please be aware that there may be inadvertent transcription errors not identified and corrected by the [employer]

## 2022-10-11 NOTE — ASSESSMENT & PLAN NOTE
Tobacco cessation was discussed with patient. Patient was instructed about harms of smoking, benefits of quitting. Possible alternatives like counseling, coaching programs, support of friends and family, and/or medications  At least 3-5 minutes spend in counseling.

## 2022-10-11 NOTE — ASSESSMENT & PLAN NOTE
Uncontrolled, continue current medications, medication adherence emphasized and lifestyle modifications recommended   Key Anti-Hypertensive Meds          amLODIPine (NORVASC) 10 MG tablet (Taking)    Sig - Route: Take 1 tablet by mouth daily - Oral    lisinopril (PRINIVIL;ZESTRIL) 40 MG tablet (Taking)    Sig - Route:  Take 1 tablet by mouth daily - Oral        Goal <140/90 , follow-up in 2 to 3 months, he will start monitoring his blood pressure at home, lifestyle modification including smoking cessation was recommended

## 2022-10-17 ENCOUNTER — HOSPITAL ENCOUNTER (EMERGENCY)
Age: 37
Discharge: HOME | End: 2022-10-17
Attending: EMERGENCY MEDICINE

## 2022-10-17 VITALS
WEIGHT: 190 LBS | HEIGHT: 70 IN | DIASTOLIC BLOOD PRESSURE: 68 MMHG | TEMPERATURE: 98.4 F | BODY MASS INDEX: 27.2 KG/M2 | HEART RATE: 64 BPM | OXYGEN SATURATION: 98 % | RESPIRATION RATE: 16 BRPM | SYSTOLIC BLOOD PRESSURE: 118 MMHG

## 2022-10-17 DIAGNOSIS — E86.0 DEHYDRATION: ICD-10-CM

## 2022-10-17 DIAGNOSIS — K85.20 ALCOHOL-INDUCED ACUTE PANCREATITIS, UNSPECIFIED COMPLICATION STATUS: Primary | ICD-10-CM

## 2022-10-17 LAB
ALBUMIN SERPL-MCNC: 3.9 G/DL (ref 3.5–5)
ALBUMIN/GLOB SERPL: 1.1 {RATIO} (ref 0.4–1.6)
ALP SERPL-CCNC: 57 U/L (ref 45–117)
ALT SERPL-CCNC: 19 U/L (ref 13–61)
ANION GAP SERPL CALC-SCNC: 14 MMOL/L (ref 2–11)
APPEARANCE UR: CLEAR
AST SERPL-CCNC: 40 U/L (ref 15–37)
BACTERIA URNS QL MICRO: 0 /HPF
BASOPHILS # BLD: 0.1 K/UL (ref 0–0.2)
BASOPHILS NFR BLD: 1 % (ref 0–2)
BILIRUB SERPL-MCNC: 0.7 MG/DL (ref 0.2–1.1)
BILIRUB UR QL: ABNORMAL
BUN SERPL-MCNC: 12 MG/DL (ref 7–18)
CALCIUM SERPL-MCNC: 9 MG/DL (ref 8.3–10.4)
CASTS URNS QL MICRO: 0 /LPF
CHLORIDE SERPL-SCNC: 96 MMOL/L (ref 98–107)
CO2 SERPL-SCNC: 25 MMOL/L (ref 21–32)
COLOR UR: ABNORMAL
CREAT SERPL-MCNC: 0.83 MG/DL (ref 0.8–1.5)
CRYSTALS URNS QL MICRO: 0 /LPF
DIFFERENTIAL METHOD BLD: ABNORMAL
EOSINOPHIL # BLD: 0.1 K/UL (ref 0–0.8)
EOSINOPHIL NFR BLD: 1 % (ref 0.5–7.8)
EPI CELLS #/AREA URNS HPF: ABNORMAL /HPF
ERYTHROCYTE [DISTWIDTH] IN BLOOD BY AUTOMATED COUNT: 13.6 % (ref 11.9–14.6)
GLOBULIN SER CALC-MCNC: 3.5 G/DL (ref 2.8–4.5)
GLUCOSE SERPL-MCNC: 83 MG/DL (ref 65–100)
GLUCOSE UR STRIP.AUTO-MCNC: NEGATIVE MG/DL
HCT VFR BLD AUTO: 44.5 % (ref 41.1–50.3)
HGB BLD-MCNC: 15.3 G/DL (ref 13.6–17.2)
HGB UR QL STRIP: ABNORMAL
IMM GRANULOCYTES # BLD AUTO: 0 K/UL (ref 0–0.5)
IMM GRANULOCYTES NFR BLD AUTO: 0 % (ref 0–5)
KETONES UR QL STRIP.AUTO: >160 MG/DL
LEUKOCYTE ESTERASE UR QL STRIP.AUTO: NEGATIVE
LIPASE SERPL-CCNC: 291 U/L (ref 13–60)
LYMPHOCYTES # BLD: 1.3 K/UL (ref 0.5–4.6)
LYMPHOCYTES NFR BLD: 17 % (ref 13–44)
MCH RBC QN AUTO: 29 PG (ref 26.1–32.9)
MCHC RBC AUTO-ENTMCNC: 34.4 G/DL (ref 31.4–35)
MCV RBC AUTO: 84.4 FL (ref 82–102)
MONOCYTES # BLD: 0.8 K/UL (ref 0.1–1.3)
MONOCYTES NFR BLD: 10 % (ref 4–12)
MUCOUS THREADS URNS QL MICRO: ABNORMAL /LPF
NEUTS SEG # BLD: 5.5 K/UL (ref 1.7–8.2)
NEUTS SEG NFR BLD: 71 % (ref 43–78)
NITRITE UR QL STRIP.AUTO: POSITIVE
NRBC # BLD: 0 K/UL (ref 0–0.2)
OTHER OBSERVATIONS: ABNORMAL
PH UR STRIP: 7 [PH] (ref 5–9)
PLATELET # BLD AUTO: 433 K/UL (ref 150–450)
PMV BLD AUTO: 9.1 FL (ref 9.4–12.3)
POTASSIUM SERPL-SCNC: 4.4 MMOL/L (ref 3.5–5.1)
PROT SERPL-MCNC: 7.4 G/DL (ref 6.4–8.2)
PROT UR STRIP-MCNC: 100 MG/DL
RBC # BLD AUTO: 5.27 M/UL (ref 4.23–5.6)
RBC #/AREA URNS HPF: 0 /HPF
SODIUM SERPL-SCNC: 135 MMOL/L (ref 133–143)
SP GR UR REFRACTOMETRY: 1.02 (ref 1–1.02)
UROBILINOGEN UR QL STRIP.AUTO: >=8 EU/DL (ref 0.2–1)
WBC # BLD AUTO: 7.7 K/UL (ref 4.3–11.1)
WBC URNS QL MICRO: ABNORMAL /HPF

## 2022-10-17 PROCEDURE — 6370000000 HC RX 637 (ALT 250 FOR IP): Performed by: EMERGENCY MEDICINE

## 2022-10-17 PROCEDURE — 96374 THER/PROPH/DIAG INJ IV PUSH: CPT

## 2022-10-17 PROCEDURE — 85025 COMPLETE CBC W/AUTO DIFF WBC: CPT

## 2022-10-17 PROCEDURE — 96375 TX/PRO/DX INJ NEW DRUG ADDON: CPT

## 2022-10-17 PROCEDURE — 80053 COMPREHEN METABOLIC PANEL: CPT

## 2022-10-17 PROCEDURE — 83690 ASSAY OF LIPASE: CPT

## 2022-10-17 PROCEDURE — 81001 URINALYSIS AUTO W/SCOPE: CPT

## 2022-10-17 PROCEDURE — 2580000003 HC RX 258: Performed by: EMERGENCY MEDICINE

## 2022-10-17 PROCEDURE — 96361 HYDRATE IV INFUSION ADD-ON: CPT

## 2022-10-17 PROCEDURE — 6360000002 HC RX W HCPCS: Performed by: EMERGENCY MEDICINE

## 2022-10-17 PROCEDURE — 99284 EMERGENCY DEPT VISIT MOD MDM: CPT

## 2022-10-17 RX ORDER — HYDROMORPHONE HYDROCHLORIDE 1 MG/ML
1 INJECTION, SOLUTION INTRAMUSCULAR; INTRAVENOUS; SUBCUTANEOUS
Status: COMPLETED | OUTPATIENT
Start: 2022-10-17 | End: 2022-10-17

## 2022-10-17 RX ORDER — HYDROCODONE BITARTRATE AND ACETAMINOPHEN 5; 325 MG/1; MG/1
1 TABLET ORAL
Status: COMPLETED | OUTPATIENT
Start: 2022-10-17 | End: 2022-10-17

## 2022-10-17 RX ORDER — ONDANSETRON 2 MG/ML
4 INJECTION INTRAMUSCULAR; INTRAVENOUS
Status: COMPLETED | OUTPATIENT
Start: 2022-10-17 | End: 2022-10-17

## 2022-10-17 RX ORDER — ONDANSETRON 4 MG/1
4 TABLET, FILM COATED ORAL 3 TIMES DAILY PRN
Qty: 15 TABLET | Refills: 0 | Status: SHIPPED | OUTPATIENT
Start: 2022-10-17 | End: 2022-11-01

## 2022-10-17 RX ORDER — 0.9 % SODIUM CHLORIDE 0.9 %
2000 INTRAVENOUS SOLUTION INTRAVENOUS
Status: COMPLETED | OUTPATIENT
Start: 2022-10-17 | End: 2022-10-17

## 2022-10-17 RX ORDER — MORPHINE SULFATE 2 MG/ML
2 INJECTION, SOLUTION INTRAMUSCULAR; INTRAVENOUS
Status: COMPLETED | OUTPATIENT
Start: 2022-10-17 | End: 2022-10-17

## 2022-10-17 RX ORDER — HYDROCODONE BITARTRATE AND ACETAMINOPHEN 5; 325 MG/1; MG/1
1 TABLET ORAL EVERY 8 HOURS PRN
Qty: 15 TABLET | Refills: 0 | Status: SHIPPED | OUTPATIENT
Start: 2022-10-17 | End: 2022-10-22

## 2022-10-17 RX ORDER — 0.9 % SODIUM CHLORIDE 0.9 %
500 INTRAVENOUS SOLUTION INTRAVENOUS
Status: DISCONTINUED | OUTPATIENT
Start: 2022-10-17 | End: 2022-10-17

## 2022-10-17 RX ADMIN — MORPHINE SULFATE 2 MG: 2 INJECTION, SOLUTION INTRAMUSCULAR; INTRAVENOUS at 11:02

## 2022-10-17 RX ADMIN — HYDROMORPHONE HYDROCHLORIDE 1 MG: 1 INJECTION, SOLUTION INTRAMUSCULAR; INTRAVENOUS; SUBCUTANEOUS at 13:24

## 2022-10-17 RX ADMIN — ONDANSETRON 4 MG: 2 INJECTION INTRAMUSCULAR; INTRAVENOUS at 11:02

## 2022-10-17 RX ADMIN — SODIUM CHLORIDE 2000 ML: 9 INJECTION, SOLUTION INTRAVENOUS at 11:01

## 2022-10-17 RX ADMIN — HYDROCODONE BITARTRATE AND ACETAMINOPHEN 1 TABLET: 5; 325 TABLET ORAL at 13:24

## 2022-10-17 ASSESSMENT — PAIN - FUNCTIONAL ASSESSMENT
PAIN_FUNCTIONAL_ASSESSMENT: 0-10
PAIN_FUNCTIONAL_ASSESSMENT: 0-10

## 2022-10-17 ASSESSMENT — PAIN SCALES - GENERAL
PAINLEVEL_OUTOF10: 5
PAINLEVEL_OUTOF10: 7
PAINLEVEL_OUTOF10: 6
PAINLEVEL_OUTOF10: 8
PAINLEVEL_OUTOF10: 2

## 2022-10-17 ASSESSMENT — ENCOUNTER SYMPTOMS
NAUSEA: 1
ABDOMINAL PAIN: 1

## 2022-10-17 ASSESSMENT — PAIN DESCRIPTION - LOCATION: LOCATION: ABDOMEN

## 2022-10-17 ASSESSMENT — PAIN DESCRIPTION - ORIENTATION: ORIENTATION: LEFT;RIGHT;UPPER

## 2022-10-17 NOTE — Clinical Note
Tesfaye Mirandabenita was seen and treated in our emergency department on 10/17/2022. He may return to work on 10/20/2022. If you have any questions or concerns, please don't hesitate to call.       Hi Green, DO

## 2022-10-17 NOTE — Clinical Note
Senthil Carrizales was seen and treated in our emergency department on 10/17/2022. He may return to work on 10/20/2022. If you have any questions or concerns, please don't hesitate to call.       Tamanna Thurston, DO

## 2022-10-17 NOTE — ED PROVIDER NOTES
Emergency Department Provider Note                   PCP: Ras Tao MD               Age: 44 y.o. Sex: male       ICD-10-CM    1. Alcohol-induced acute pancreatitis, unspecified complication status  C39.96 HYDROcodone-acetaminophen (NORCO) 5-325 MG per tablet      2. Dehydration  E86.0           DISPOSITION Decision To Discharge 10/17/2022 01:00:02 PM       MDM  Number of Diagnoses or Management Options  Alcohol-induced acute pancreatitis, unspecified complication status  Dehydration  Diagnosis management comments: Gallbladder disease, cholecystitis, diverticulitis, appendicitis, appendicitis with rupture,    UTI, pyelonephritis, renal colic, ureteral stone     Peptic ulcer disease, esophagitis, GERD    Pancreatitis, pancreatic pseudocyst,             Amount and/or Complexity of Data Reviewed  Clinical lab tests: reviewed and ordered  Tests in the medicine section of CPT®: ordered and reviewed  Review and summarize past medical records: yes  Independent visualization of images, tracings, or specimens: yes         ED Course as of 10/17/22 1303   Mon Oct 17, 2022   1258 Patient is continue to get his IV fluids. His pain has decreased but only minimally. The patient states that he still wants to be discharged home after these are done. He notes that he can get his wife to come pick him up and we discussed stronger medicine to see if we can get his abdominal pain to settle down better prior to discharge.  [KH]      ED Course User Index  [KH] Eliza Fitzgerald, DO        Orders Placed This Encounter   Procedures    CBC with Diff    CMP    Lipase    Urinalysis w rflx microscopic    Urinalysis, Micro    Diet NPO    Saline lock IV        Medications   HYDROmorphone HCl PF (DILAUDID) injection 1 mg (has no administration in time range)   HYDROcodone-acetaminophen (NORCO) 5-325 MG per tablet 1 tablet (has no administration in time range)   ondansetron (ZOFRAN) injection 4 mg (4 mg IntraVENous Given 10/17/22 1102)   morphine (PF) injection 2 mg (2 mg IntraVENous Given 10/17/22 1102)   0.9 % sodium chloride bolus (2,000 mLs IntraVENous New Bag 10/17/22 1101)       New Prescriptions    HYDROCODONE-ACETAMINOPHEN (NORCO) 5-325 MG PER TABLET    Take 1 tablet by mouth every 8 hours as needed for Pain for up to 5 days. ONDANSETRON (ZOFRAN) 4 MG TABLET    Take 1 tablet by mouth 3 times daily as needed for Nausea or Vomiting        Monik Aldrich is a 44 y.o. male who presents to the Emergency Department with chief complaint of    Chief Complaint   Patient presents with    Abdominal Pain      26-year-old male presenting to the emergency department today complaining of abdominal pain in the epigastric region radiating to his back. The patient has history of alcohol induced pancreatitis. He says he has still has a glass of wine occasionally. The patient notes that he has been having this pain for the last 5 to 7 days. He says this is not as bad as it has been in the past and is try to decrease his food and fluid intake because he does not want to be admitted to the hospital with 2 small children at home. The patient says he has had some nausea and vomiting as well. The patient denied any fevers or chills. All other systems reviewed and are negative unless otherwise stated in the history of present illness section. Review of Systems   Gastrointestinal:  Positive for abdominal pain and nausea. All other systems reviewed and are negative.     Past Medical History:   Diagnosis Date    Acute alcoholic pancreatitis 23/5/2789    Carpal tunnel syndrome, left     Cigarette smoker     GERD (gastroesophageal reflux disease)     occassionally with spicy foods    History of pancreatitis     X2    Hypertensive crisis 10/3/2021    Pancreatitis         Past Surgical History:   Procedure Laterality Date    CHOLECYSTECTOMY  2012    ORTHOPEDIC SURGERY          Family History   Problem Relation Age of Onset    Hypertension Mother     Heart Attack Father         Social History     Socioeconomic History    Marital status:      Spouse name: None    Number of children: None    Years of education: None    Highest education level: None   Tobacco Use    Smoking status: Every Day     Packs/day: 1.00     Types: Cigarettes     Start date: 2/9/2007    Smokeless tobacco: Never   Substance and Sexual Activity    Alcohol use: Yes    Drug use: No        Allergies: Patient has no known allergies. Previous Medications    AMLODIPINE (NORVASC) 10 MG TABLET    Take 1 tablet by mouth daily    LISINOPRIL (PRINIVIL;ZESTRIL) 40 MG TABLET    Take 1 tablet by mouth daily        Vitals signs and nursing note reviewed. Patient Vitals for the past 4 hrs:   Temp Pulse Resp BP SpO2   10/17/22 1145 -- 68 16 118/75 98 %   10/17/22 1104 -- 78 16 (!) 143/90 97 %   10/17/22 0952 97.8 °F (36.6 °C) 96 18 (!) 146/92 99 %          Physical Exam     GENERAL:The patient has Body mass index is 27.26 kg/m². Well-hydrated. VITAL SIGNS: Heart rate, blood pressure, respiratory rate reviewed as recorded in  nurse's notes  EYES: Pupils reactive. Extraocular motion intact. No conjunctival redness or drainage. MOUTH/THROAT: Pharynx clear; airway patent. NECK: Supple, no meningeal signs. Trachea midline. No masses or thyromegaly. LUNGS: Breath sounds clear and equal bilaterally no accessory muscle use. CHEST: No deformity  CARDIOVASCULAR: Regular rate and rhythm  ABDOMEN: Epigastric abdominal tenderness. No guarding or rigidity present. EXTREMITIES: No clubbing or cyanosis. No joint swelling. Normal muscle tone. No  restricted range of motion appreciated. NEUROLOGIC: Sensation is grossly intact. Cranial nerve exam reveals face is  symmetrical, tongue is midline speech is clear. SKIN: No rash or petechiae. Good skin turgor palpated. PSYCHIATRIC: Alert and oriented. Appropriate behavior and judgment.       Procedures        Results for orders placed or performed during the hospital encounter of 10/17/22   CBC with Diff   Result Value Ref Range    WBC 7.7 4.3 - 11.1 K/uL    RBC 5.27 4.23 - 5.60 M/uL    Hemoglobin 15.3 13.6 - 17.2 g/dL    Hematocrit 44.5 41.1 - 50.3 %    MCV 84.4 82.0 - 102.0 FL    MCH 29.0 26.1 - 32.9 PG    MCHC 34.4 31.4 - 35.0 g/dL    RDW 13.6 11.9 - 14.6 %    Platelets 238 472 - 095 K/uL    MPV 9.1 (L) 9.4 - 12.3 FL    nRBC 0.00 0.0 - 0.2 K/uL    Differential Type AUTOMATED      Seg Neutrophils 71 43 - 78 %    Lymphocytes 17 13 - 44 %    Monocytes 10 4.0 - 12.0 %    Eosinophils % 1 0.5 - 7.8 %    Basophils 1 0.0 - 2.0 %    Immature Granulocytes 0 0.0 - 5.0 %    Segs Absolute 5.5 1.7 - 8.2 K/UL    Absolute Lymph # 1.3 0.5 - 4.6 K/UL    Absolute Mono # 0.8 0.1 - 1.3 K/UL    Absolute Eos # 0.1 0.0 - 0.8 K/UL    Basophils Absolute 0.1 0.0 - 0.2 K/UL    Absolute Immature Granulocyte 0.0 0.0 - 0.5 K/UL   CMP   Result Value Ref Range    Sodium 135 133 - 143 mmol/L    Potassium 4.4 3.5 - 5.1 mmol/L    Chloride 96 (L) 98 - 107 mmol/L    CO2 25 21 - 32 mmol/L    Anion Gap 14 (H) 2 - 11 mmol/L    Glucose 83 65 - 100 mg/dL    BUN 12 7.0 - 18.0 MG/DL    Creatinine 0.83 0.8 - 1.5 MG/DL    Est, Glom Filt Rate >60 >60 ml/min/1.73m2    Calcium 9.0 8.3 - 10.4 MG/DL    Total Bilirubin 0.7 0.2 - 1.1 MG/DL    ALT 19 13.0 - 61.0 U/L    AST 40 (H) 15 - 37 U/L    Alk Phosphatase 57 45.0 - 117.0 U/L    Total Protein 7.4 6.4 - 8.2 g/dL    Albumin 3.9 3.5 - 5.0 g/dL    Globulin 3.5 2.8 - 4.5 g/dL    Albumin/Globulin Ratio 1.1 0.4 - 1.6     Lipase   Result Value Ref Range    Lipase 291 (H) 13 - 60 U/L   Urinalysis w rflx microscopic   Result Value Ref Range    Color, UA DARK YELLOW      Appearance CLEAR      Specific Gravity, UA 1.020 1.001 - 1.023      pH, Urine 7.0 5.0 - 9.0      Protein,  (A) NEG mg/dL    Glucose, UA Negative mg/dL    Ketones, Urine >160 (A) NEG mg/dL    Bilirubin Urine MODERATE (A) NEG      Blood, Urine Trace Intact (A) NEG Urobilinogen, Urine >=8.0 0.2 - 1.0 EU/dL    Nitrite, Urine Positive (A) NEG      Leukocyte Esterase, Urine Negative NEG     Urinalysis, Micro   Result Value Ref Range    WBC, UA 0-3 0 /hpf    RBC, UA 0 0 /hpf    Epithelial Cells UA 0-3 0 /hpf    BACTERIA, URINE 0 0 /hpf    Casts 0 0 /lpf    Crystals 0 0 /LPF    Mucus, UA 1+ (H) 0 /lpf    OTHER OBSERVATIONS RESULTS VERIFIED MANUALLY          No orders to display                         Voice dictation software was used during the making of this note. This software is not perfect and grammatical and other typographical errors may be present. This note has not been completely proofread for errors.        Eliza Fitzgerald, DO  10/17/22 5608

## 2022-10-17 NOTE — ED TRIAGE NOTES
Pt states he has had upper abd pain that wraps around abd for past week, has hx of pancreatitis, nausea without vomiting. Does not have gallbladder. Pain is constant.

## 2022-10-17 NOTE — DISCHARGE INSTRUCTIONS
Take the least amount of narcotic pain medicine possible to control your epigastric abdominal pain. Try to follow a liquid diet until your symptoms improve enough to progress your diet slowly as tolerated. Make sure you are staying well-hydrated and urinating at least 1-2 times a day. If you start develop worsening symptoms return to the emergency department at that time. Risk of opioid analgesics include, but are not limited to: Overdose they can stop or slow your breathing and lead to death; fractures from falls; drowsiness leading to injury; tolerance, dependence and addiction. You should not operate any motorized vehicles or work from a height greater than ground level when taking opioid analgesics as they increase your fall risks.

## 2022-10-25 ENCOUNTER — HOSPITAL ENCOUNTER (EMERGENCY)
Age: 37
Discharge: ANOTHER HOSPITAL | End: 2022-10-25
Attending: EMERGENCY MEDICINE

## 2022-10-25 ENCOUNTER — HOSPITAL ENCOUNTER (INPATIENT)
Age: 37
LOS: 6 days | Discharge: HOME | DRG: 438 | End: 2022-10-31
Attending: INTERNAL MEDICINE | Admitting: FAMILY MEDICINE

## 2022-10-25 ENCOUNTER — APPOINTMENT (OUTPATIENT)
Dept: CT IMAGING | Age: 37
End: 2022-10-25

## 2022-10-25 VITALS
HEART RATE: 99 BPM | RESPIRATION RATE: 18 BRPM | SYSTOLIC BLOOD PRESSURE: 150 MMHG | OXYGEN SATURATION: 100 % | DIASTOLIC BLOOD PRESSURE: 88 MMHG | TEMPERATURE: 98.6 F | HEIGHT: 70 IN | BODY MASS INDEX: 26.48 KG/M2 | WEIGHT: 185 LBS

## 2022-10-25 DIAGNOSIS — K85.20 ALCOHOL-INDUCED ACUTE PANCREATITIS, UNSPECIFIED COMPLICATION STATUS: Primary | ICD-10-CM

## 2022-10-25 DIAGNOSIS — K86.0 ALCOHOL-INDUCED CHRONIC PANCREATITIS (HCC): Primary | ICD-10-CM

## 2022-10-25 DIAGNOSIS — F10.20 ALCOHOLISM (HCC): ICD-10-CM

## 2022-10-25 PROBLEM — K86.1 ACUTE ON CHRONIC PANCREATITIS (HCC): Status: ACTIVE | Noted: 2022-07-30

## 2022-10-25 PROBLEM — R10.9 ABDOMINAL PAIN: Status: ACTIVE | Noted: 2022-10-25

## 2022-10-25 LAB
ALBUMIN SERPL-MCNC: 3.8 G/DL (ref 3.5–5)
ALBUMIN/GLOB SERPL: 1.2 {RATIO} (ref 0.4–1.6)
ALP SERPL-CCNC: 56 U/L (ref 45–117)
ALT SERPL-CCNC: 11 U/L (ref 13–61)
AMPHET UR QL SCN: NEGATIVE
ANION GAP SERPL CALC-SCNC: 16 MMOL/L (ref 2–11)
APPEARANCE UR: CLEAR
AST SERPL-CCNC: 18 U/L (ref 15–37)
BACTERIA URNS QL MICRO: ABNORMAL /HPF
BARBITURATES UR QL SCN: NEGATIVE
BASOPHILS # BLD: 0 K/UL (ref 0–0.2)
BASOPHILS NFR BLD: 0 % (ref 0–2)
BENZODIAZ UR QL: NEGATIVE
BILIRUB SERPL-MCNC: 0.5 MG/DL (ref 0.2–1.1)
BILIRUB UR QL: ABNORMAL
BUN SERPL-MCNC: 9 MG/DL (ref 7–18)
CALCIUM SERPL-MCNC: 9.2 MG/DL (ref 8.3–10.4)
CANNABINOIDS UR QL SCN: NEGATIVE
CASTS URNS QL MICRO: 0 /LPF
CHLORIDE SERPL-SCNC: 95 MMOL/L (ref 98–107)
CO2 SERPL-SCNC: 22 MMOL/L (ref 21–32)
COCAINE UR QL SCN: NEGATIVE
COLOR UR: ABNORMAL
CREAT SERPL-MCNC: 0.82 MG/DL (ref 0.8–1.5)
CRYSTALS URNS QL MICRO: 0 /LPF
DIFFERENTIAL METHOD BLD: ABNORMAL
EOSINOPHIL # BLD: 0.1 K/UL (ref 0–0.8)
EOSINOPHIL NFR BLD: 1 % (ref 0.5–7.8)
EPI CELLS #/AREA URNS HPF: 0 /HPF
ERYTHROCYTE [DISTWIDTH] IN BLOOD BY AUTOMATED COUNT: 13.4 % (ref 11.9–14.6)
ETHANOL SERPL-MCNC: <10 MG/DL (ref 0–0.08)
GLOBULIN SER CALC-MCNC: 3.3 G/DL (ref 2.8–4.5)
GLUCOSE SERPL-MCNC: 92 MG/DL (ref 65–100)
GLUCOSE UR STRIP.AUTO-MCNC: NEGATIVE MG/DL
HCT VFR BLD AUTO: 44.5 % (ref 41.1–50.3)
HGB BLD-MCNC: 15.5 G/DL (ref 13.6–17.2)
HGB UR QL STRIP: ABNORMAL
IMM GRANULOCYTES # BLD AUTO: 0 K/UL (ref 0–0.5)
IMM GRANULOCYTES NFR BLD AUTO: 0 % (ref 0–5)
KETONES UR QL STRIP.AUTO: >160 MG/DL
LEUKOCYTE ESTERASE UR QL STRIP.AUTO: NEGATIVE
LIPASE SERPL-CCNC: 1018 U/L (ref 13–60)
LYMPHOCYTES # BLD: 1.3 K/UL (ref 0.5–4.6)
LYMPHOCYTES NFR BLD: 14 % (ref 13–44)
MAGNESIUM SERPL-MCNC: 1.8 MG/DL (ref 1.2–2.6)
MCH RBC QN AUTO: 29 PG (ref 26.1–32.9)
MCHC RBC AUTO-ENTMCNC: 34.8 G/DL (ref 31.4–35)
MCV RBC AUTO: 83.3 FL (ref 82–102)
METHADONE UR QL: NEGATIVE
MONOCYTES # BLD: 0.9 K/UL (ref 0.1–1.3)
MONOCYTES NFR BLD: 9 % (ref 4–12)
MUCOUS THREADS URNS QL MICRO: 0 /LPF
NEUTS SEG # BLD: 7.3 K/UL (ref 1.7–8.2)
NEUTS SEG NFR BLD: 76 % (ref 43–78)
NITRITE UR QL STRIP.AUTO: NEGATIVE
NRBC # BLD: 0 K/UL (ref 0–0.2)
OPIATES UR QL: NEGATIVE
OTHER OBSERVATIONS: ABNORMAL
PCP UR QL: NEGATIVE
PH UR STRIP: 7 [PH] (ref 5–9)
PHOSPHATE SERPL-MCNC: 2.8 MG/DL (ref 2.5–4.5)
PLATELET # BLD AUTO: 530 K/UL (ref 150–450)
PMV BLD AUTO: 9 FL (ref 9.4–12.3)
POTASSIUM SERPL-SCNC: 4.3 MMOL/L (ref 3.5–5.1)
PROT SERPL-MCNC: 7.1 G/DL (ref 6.4–8.2)
PROT UR STRIP-MCNC: 100 MG/DL
RBC # BLD AUTO: 5.34 M/UL (ref 4.23–5.6)
RBC #/AREA URNS HPF: ABNORMAL /HPF
SODIUM SERPL-SCNC: 133 MMOL/L (ref 133–143)
SP GR UR REFRACTOMETRY: 1.02 (ref 1–1.02)
UROBILINOGEN UR QL STRIP.AUTO: 1 EU/DL (ref 0.2–1)
WBC # BLD AUTO: 9.7 K/UL (ref 4.3–11.1)
WBC URNS QL MICRO: ABNORMAL /HPF

## 2022-10-25 PROCEDURE — 96375 TX/PRO/DX INJ NEW DRUG ADDON: CPT

## 2022-10-25 PROCEDURE — A4216 STERILE WATER/SALINE, 10 ML: HCPCS | Performed by: EMERGENCY MEDICINE

## 2022-10-25 PROCEDURE — 82077 ASSAY SPEC XCP UR&BREATH IA: CPT

## 2022-10-25 PROCEDURE — 96361 HYDRATE IV INFUSION ADD-ON: CPT

## 2022-10-25 PROCEDURE — 94760 N-INVAS EAR/PLS OXIMETRY 1: CPT

## 2022-10-25 PROCEDURE — 2500000003 HC RX 250 WO HCPCS: Performed by: FAMILY MEDICINE

## 2022-10-25 PROCEDURE — C9113 INJ PANTOPRAZOLE SODIUM, VIA: HCPCS | Performed by: EMERGENCY MEDICINE

## 2022-10-25 PROCEDURE — 80053 COMPREHEN METABOLIC PANEL: CPT

## 2022-10-25 PROCEDURE — 85025 COMPLETE CBC W/AUTO DIFF WBC: CPT

## 2022-10-25 PROCEDURE — 74177 CT ABD & PELVIS W/CONTRAST: CPT

## 2022-10-25 PROCEDURE — 2580000003 HC RX 258: Performed by: EMERGENCY MEDICINE

## 2022-10-25 PROCEDURE — 83735 ASSAY OF MAGNESIUM: CPT

## 2022-10-25 PROCEDURE — 6370000000 HC RX 637 (ALT 250 FOR IP): Performed by: FAMILY MEDICINE

## 2022-10-25 PROCEDURE — 81001 URINALYSIS AUTO W/SCOPE: CPT

## 2022-10-25 PROCEDURE — 96374 THER/PROPH/DIAG INJ IV PUSH: CPT

## 2022-10-25 PROCEDURE — 80307 DRUG TEST PRSMV CHEM ANLYZR: CPT

## 2022-10-25 PROCEDURE — 6360000002 HC RX W HCPCS: Performed by: FAMILY MEDICINE

## 2022-10-25 PROCEDURE — 84100 ASSAY OF PHOSPHORUS: CPT

## 2022-10-25 PROCEDURE — 2580000003 HC RX 258: Performed by: FAMILY MEDICINE

## 2022-10-25 PROCEDURE — 6360000004 HC RX CONTRAST MEDICATION: Performed by: EMERGENCY MEDICINE

## 2022-10-25 PROCEDURE — 6360000002 HC RX W HCPCS: Performed by: EMERGENCY MEDICINE

## 2022-10-25 PROCEDURE — 83690 ASSAY OF LIPASE: CPT

## 2022-10-25 PROCEDURE — 99285 EMERGENCY DEPT VISIT HI MDM: CPT

## 2022-10-25 PROCEDURE — A4216 STERILE WATER/SALINE, 10 ML: HCPCS | Performed by: FAMILY MEDICINE

## 2022-10-25 PROCEDURE — 1100000000 HC RM PRIVATE

## 2022-10-25 RX ORDER — ONDANSETRON 2 MG/ML
4 INJECTION INTRAMUSCULAR; INTRAVENOUS EVERY 6 HOURS PRN
Status: DISCONTINUED | OUTPATIENT
Start: 2022-10-25 | End: 2022-10-31 | Stop reason: HOSPADM

## 2022-10-25 RX ORDER — SODIUM CHLORIDE, SODIUM LACTATE, POTASSIUM CHLORIDE, CALCIUM CHLORIDE 600; 310; 30; 20 MG/100ML; MG/100ML; MG/100ML; MG/100ML
INJECTION, SOLUTION INTRAVENOUS CONTINUOUS
Status: DISCONTINUED | OUTPATIENT
Start: 2022-10-26 | End: 2022-10-31 | Stop reason: HOSPADM

## 2022-10-25 RX ORDER — ENOXAPARIN SODIUM 100 MG/ML
40 INJECTION SUBCUTANEOUS EVERY 24 HOURS
Status: DISCONTINUED | OUTPATIENT
Start: 2022-10-25 | End: 2022-10-31 | Stop reason: HOSPADM

## 2022-10-25 RX ORDER — HYDROMORPHONE HYDROCHLORIDE 1 MG/ML
0.5 INJECTION, SOLUTION INTRAMUSCULAR; INTRAVENOUS; SUBCUTANEOUS
Status: DISCONTINUED | OUTPATIENT
Start: 2022-10-25 | End: 2022-10-25

## 2022-10-25 RX ORDER — ONDANSETRON 4 MG/1
4 TABLET, FILM COATED ORAL 3 TIMES DAILY PRN
Status: DISCONTINUED | OUTPATIENT
Start: 2022-10-25 | End: 2022-10-25 | Stop reason: SDUPTHER

## 2022-10-25 RX ORDER — SODIUM CHLORIDE 0.9 % (FLUSH) 0.9 %
5-40 SYRINGE (ML) INJECTION EVERY 12 HOURS SCHEDULED
Status: DISCONTINUED | OUTPATIENT
Start: 2022-10-25 | End: 2022-10-31 | Stop reason: HOSPADM

## 2022-10-25 RX ORDER — SODIUM CHLORIDE 9 MG/ML
INJECTION, SOLUTION INTRAVENOUS PRN
Status: DISCONTINUED | OUTPATIENT
Start: 2022-10-25 | End: 2022-10-31 | Stop reason: HOSPADM

## 2022-10-25 RX ORDER — MORPHINE SULFATE 4 MG/ML
4 INJECTION INTRAVENOUS ONCE
Status: COMPLETED | OUTPATIENT
Start: 2022-10-25 | End: 2022-10-25

## 2022-10-25 RX ORDER — LISINOPRIL 20 MG/1
40 TABLET ORAL DAILY
Status: DISCONTINUED | OUTPATIENT
Start: 2022-10-26 | End: 2022-10-31 | Stop reason: HOSPADM

## 2022-10-25 RX ORDER — AMLODIPINE BESYLATE 10 MG/1
10 TABLET ORAL DAILY
Status: DISCONTINUED | OUTPATIENT
Start: 2022-10-26 | End: 2022-10-31 | Stop reason: HOSPADM

## 2022-10-25 RX ORDER — LANOLIN ALCOHOL/MO/W.PET/CERES
100 CREAM (GRAM) TOPICAL DAILY
Status: DISCONTINUED | OUTPATIENT
Start: 2022-10-25 | End: 2022-10-31 | Stop reason: HOSPADM

## 2022-10-25 RX ORDER — ONDANSETRON 2 MG/ML
4 INJECTION INTRAMUSCULAR; INTRAVENOUS
Status: COMPLETED | OUTPATIENT
Start: 2022-10-25 | End: 2022-10-25

## 2022-10-25 RX ORDER — SODIUM CHLORIDE, SODIUM LACTATE, POTASSIUM CHLORIDE, CALCIUM CHLORIDE 600; 310; 30; 20 MG/100ML; MG/100ML; MG/100ML; MG/100ML
INJECTION, SOLUTION INTRAVENOUS CONTINUOUS
Status: ACTIVE | OUTPATIENT
Start: 2022-10-25 | End: 2022-10-26

## 2022-10-25 RX ORDER — HYDROMORPHONE HYDROCHLORIDE 1 MG/ML
1 INJECTION, SOLUTION INTRAMUSCULAR; INTRAVENOUS; SUBCUTANEOUS
Status: DISCONTINUED | OUTPATIENT
Start: 2022-10-25 | End: 2022-10-27

## 2022-10-25 RX ORDER — SODIUM CHLORIDE 0.9 % (FLUSH) 0.9 %
5-40 SYRINGE (ML) INJECTION 2 TIMES DAILY
Status: DISCONTINUED | OUTPATIENT
Start: 2022-10-25 | End: 2022-10-25 | Stop reason: HOSPADM

## 2022-10-25 RX ORDER — HYDROMORPHONE HYDROCHLORIDE 1 MG/ML
0.5 INJECTION, SOLUTION INTRAMUSCULAR; INTRAVENOUS; SUBCUTANEOUS
Status: DISCONTINUED | OUTPATIENT
Start: 2022-10-25 | End: 2022-10-27

## 2022-10-25 RX ORDER — HYDROMORPHONE HYDROCHLORIDE 1 MG/ML
1 INJECTION, SOLUTION INTRAMUSCULAR; INTRAVENOUS; SUBCUTANEOUS ONCE
Status: COMPLETED | OUTPATIENT
Start: 2022-10-25 | End: 2022-10-25

## 2022-10-25 RX ORDER — POTASSIUM CHLORIDE 7.45 MG/ML
10 INJECTION INTRAVENOUS PRN
Status: DISCONTINUED | OUTPATIENT
Start: 2022-10-25 | End: 2022-10-31 | Stop reason: HOSPADM

## 2022-10-25 RX ORDER — 0.9 % SODIUM CHLORIDE 0.9 %
100 INTRAVENOUS SOLUTION INTRAVENOUS ONCE
Status: COMPLETED | OUTPATIENT
Start: 2022-10-25 | End: 2022-10-25

## 2022-10-25 RX ORDER — ONDANSETRON 4 MG/1
4 TABLET, ORALLY DISINTEGRATING ORAL EVERY 8 HOURS PRN
Status: DISCONTINUED | OUTPATIENT
Start: 2022-10-25 | End: 2022-10-31 | Stop reason: HOSPADM

## 2022-10-25 RX ORDER — SODIUM CHLORIDE 0.9 % (FLUSH) 0.9 %
5-40 SYRINGE (ML) INJECTION PRN
Status: DISCONTINUED | OUTPATIENT
Start: 2022-10-25 | End: 2022-10-31 | Stop reason: HOSPADM

## 2022-10-25 RX ORDER — SODIUM CHLORIDE, SODIUM LACTATE, POTASSIUM CHLORIDE, AND CALCIUM CHLORIDE .6; .31; .03; .02 G/100ML; G/100ML; G/100ML; G/100ML
1000 INJECTION, SOLUTION INTRAVENOUS ONCE
Status: COMPLETED | OUTPATIENT
Start: 2022-10-25 | End: 2022-10-25

## 2022-10-25 RX ORDER — HYDROMORPHONE HYDROCHLORIDE 1 MG/ML
0.25 INJECTION, SOLUTION INTRAMUSCULAR; INTRAVENOUS; SUBCUTANEOUS
Status: DISCONTINUED | OUTPATIENT
Start: 2022-10-25 | End: 2022-10-25

## 2022-10-25 RX ORDER — MAGNESIUM SULFATE IN WATER 40 MG/ML
2000 INJECTION, SOLUTION INTRAVENOUS PRN
Status: DISCONTINUED | OUTPATIENT
Start: 2022-10-25 | End: 2022-10-31 | Stop reason: HOSPADM

## 2022-10-25 RX ORDER — HYDROMORPHONE HYDROCHLORIDE 1 MG/ML
1 INJECTION, SOLUTION INTRAMUSCULAR; INTRAVENOUS; SUBCUTANEOUS
Status: COMPLETED | OUTPATIENT
Start: 2022-10-25 | End: 2022-10-25

## 2022-10-25 RX ORDER — POTASSIUM CHLORIDE 20 MEQ/1
40 TABLET, EXTENDED RELEASE ORAL PRN
Status: DISCONTINUED | OUTPATIENT
Start: 2022-10-25 | End: 2022-10-31 | Stop reason: HOSPADM

## 2022-10-25 RX ADMIN — SODIUM CHLORIDE, PRESERVATIVE FREE 20 MG: 5 INJECTION INTRAVENOUS at 20:46

## 2022-10-25 RX ADMIN — HYDROMORPHONE HYDROCHLORIDE 1 MG: 1 INJECTION, SOLUTION INTRAMUSCULAR; INTRAVENOUS; SUBCUTANEOUS at 21:35

## 2022-10-25 RX ADMIN — SODIUM CHLORIDE, POTASSIUM CHLORIDE, SODIUM LACTATE AND CALCIUM CHLORIDE 1000 ML: 600; 310; 30; 20 INJECTION, SOLUTION INTRAVENOUS at 08:01

## 2022-10-25 RX ADMIN — HYDROMORPHONE HYDROCHLORIDE 1 MG: 1 INJECTION, SOLUTION INTRAMUSCULAR; INTRAVENOUS; SUBCUTANEOUS at 18:43

## 2022-10-25 RX ADMIN — ENOXAPARIN SODIUM 40 MG: 100 INJECTION SUBCUTANEOUS at 13:45

## 2022-10-25 RX ADMIN — SODIUM CHLORIDE, SODIUM LACTATE, POTASSIUM CHLORIDE, AND CALCIUM CHLORIDE: 600; 310; 30; 20 INJECTION, SOLUTION INTRAVENOUS at 13:45

## 2022-10-25 RX ADMIN — HYDROMORPHONE HYDROCHLORIDE 0.5 MG: 1 INJECTION, SOLUTION INTRAMUSCULAR; INTRAVENOUS; SUBCUTANEOUS at 13:45

## 2022-10-25 RX ADMIN — ONDANSETRON 4 MG: 2 INJECTION INTRAMUSCULAR; INTRAVENOUS at 08:01

## 2022-10-25 RX ADMIN — SODIUM CHLORIDE, PRESERVATIVE FREE 20 MG: 5 INJECTION INTRAVENOUS at 13:45

## 2022-10-25 RX ADMIN — SODIUM CHLORIDE, PRESERVATIVE FREE 10 ML: 5 INJECTION INTRAVENOUS at 20:46

## 2022-10-25 RX ADMIN — IOPAMIDOL 100 ML: 755 INJECTION, SOLUTION INTRAVENOUS at 09:27

## 2022-10-25 RX ADMIN — SODIUM CHLORIDE, SODIUM LACTATE, POTASSIUM CHLORIDE, AND CALCIUM CHLORIDE: 600; 310; 30; 20 INJECTION, SOLUTION INTRAVENOUS at 20:47

## 2022-10-25 RX ADMIN — Medication 100 MG: at 13:45

## 2022-10-25 RX ADMIN — HYDROMORPHONE HYDROCHLORIDE 1 MG: 1 INJECTION, SOLUTION INTRAMUSCULAR; INTRAVENOUS; SUBCUTANEOUS at 10:48

## 2022-10-25 RX ADMIN — MORPHINE SULFATE 4 MG: 4 INJECTION INTRAVENOUS at 08:01

## 2022-10-25 RX ADMIN — HYDROMORPHONE HYDROCHLORIDE 1 MG: 1 INJECTION, SOLUTION INTRAMUSCULAR; INTRAVENOUS; SUBCUTANEOUS at 16:07

## 2022-10-25 RX ADMIN — SODIUM CHLORIDE 40 MG: 9 INJECTION, SOLUTION INTRAMUSCULAR; INTRAVENOUS; SUBCUTANEOUS at 08:01

## 2022-10-25 RX ADMIN — SODIUM CHLORIDE 100 ML: 9 INJECTION, SOLUTION INTRAVENOUS at 09:27

## 2022-10-25 RX ADMIN — SODIUM CHLORIDE, PRESERVATIVE FREE 10 ML: 5 INJECTION INTRAVENOUS at 09:27

## 2022-10-25 ASSESSMENT — ENCOUNTER SYMPTOMS
HEMATEMESIS: 0
EYE ITCHING: 0
DIARRHEA: 0
COUGH: 0
CHEST TIGHTNESS: 0
ABDOMINAL PAIN: 1
VOMITING: 1
CONSTIPATION: 0
SHORTNESS OF BREATH: 0
EYE DISCHARGE: 0
HEMATOCHEZIA: 0
NAUSEA: 1

## 2022-10-25 ASSESSMENT — PAIN SCALES - WONG BAKER: WONGBAKER_NUMERICALRESPONSE: 0

## 2022-10-25 ASSESSMENT — PAIN SCALES - GENERAL
PAINLEVEL_OUTOF10: 10
PAINLEVEL_OUTOF10: 5
PAINLEVEL_OUTOF10: 8
PAINLEVEL_OUTOF10: 10
PAINLEVEL_OUTOF10: 10
PAINLEVEL_OUTOF10: 8
PAINLEVEL_OUTOF10: 5
PAINLEVEL_OUTOF10: 10

## 2022-10-25 ASSESSMENT — LIFESTYLE VARIABLES
HOW MANY STANDARD DRINKS CONTAINING ALCOHOL DO YOU HAVE ON A TYPICAL DAY: PATIENT DOES NOT DRINK
HOW OFTEN DO YOU HAVE A DRINK CONTAINING ALCOHOL: NEVER
HOW OFTEN DO YOU HAVE A DRINK CONTAINING ALCOHOL: NEVER
HOW MANY STANDARD DRINKS CONTAINING ALCOHOL DO YOU HAVE ON A TYPICAL DAY: PATIENT DOES NOT DRINK

## 2022-10-25 ASSESSMENT — PAIN DESCRIPTION - LOCATION
LOCATION: ABDOMEN

## 2022-10-25 ASSESSMENT — PAIN DESCRIPTION - DESCRIPTORS
DESCRIPTORS: ACHING;BURNING
DESCRIPTORS: ACHING;STABBING
DESCRIPTORS: SHARP;STABBING

## 2022-10-25 ASSESSMENT — PAIN - FUNCTIONAL ASSESSMENT
PAIN_FUNCTIONAL_ASSESSMENT: 0-10
PAIN_FUNCTIONAL_ASSESSMENT: ACTIVITIES ARE NOT PREVENTED

## 2022-10-25 ASSESSMENT — PAIN DESCRIPTION - PAIN TYPE: TYPE: CHRONIC PAIN;ACUTE PAIN

## 2022-10-25 NOTE — H&P
Hospitalist History and Physical   Admit Date:  10/25/2022  1:02 PM   Name:  Colin Alvarez   Age:  44 y.o. Sex:  male  :  1985   MRN:  [id number]   Room:  [facility]/    Presenting Complaint: No chief complaint on file. Reason(s) for Admission: Abdominal pain [R10.9]     History of Present Illness:   Colin Alvarez is a 44 y.o. male with medical history of chronic pancreatitis, hypertension who presented with severe abdominal pain for 1 week. He has periodic flares of his pancreatitis. He knew not to eat and to try and manage at home with his pain medication. He thought he was getting better but then got severely worse and presented to the ED for evaluation. In the emergency department he underwent CT abdomen showing ascites and pseudocysts. Admitted for further evaluation and management. Review of Systems:  10 systems reviewed and negative except as noted in HPI. Assessment & Plan:   * Acute on chronic pancreatitis [facility])  Assessment & Plan  Third episode this year of acute pancreatitis. Unable to tolerate any alcohol without causing flare.   -NPO, advance as tolerated  -Dilaudid IV  -Famotidine  -Consult gastroenterology    Alcohol use disorder, severe, dependence ([address]  Assessment & Plan  -  complete cessation    Abnormal CT of the abdomen  Assessment & Plan  CT noted pseudocysts and ascites    Chronic pancreatitis ([address]  Assessment & Plan  - Consult gastroenterology    Primary hypertension  Assessment & Plan  - Amlodipine, lisinopril          Anticipated discharge needs:   Home with outpatient follow-up    Diet: Diet NPO Exceptions are: Ice Chips  VTE ppx: Enoxaparin  Code status: Full Code    Hospital Problems:  Principal Problem:    Acute on chronic pancreatitis ([address]  Active Problems:    Abnormal CT of the abdomen    Alcohol use disorder, severe, dependence (HCC)    Abdominal pain    Primary hypertension    Chronic pancreatitis ([address]  Resolved Problems:    * No resolved hospital problems.  *       Past History:     Past Medical History:   Diagnosis Date    Acute alcoholic pancreatitis 63/6/2823    Carpal tunnel syndrome, left     Cigarette smoker     GERD (gastroesophageal reflux disease)     occassionally with spicy foods    History of pancreatitis     X2    Hypertensive crisis 10/3/2021    Pancreatitis        Past Surgical History:   Procedure Laterality Date    CHOLECYSTECTOMY  2012    ORTHOPEDIC SURGERY          Social History     Tobacco Use    Smoking status: Every Day     Packs/day: 1.00     Types: Cigarettes     Start date: 2/9/2007    Smokeless tobacco: Never   Substance Use Topics    Alcohol use: Yes      Social History     Substance and Sexual Activity   Drug Use No       Family History   Problem Relation Age of Onset    Hypertension Mother     Heart Attack Father         Immunization History   Administered Date(s) Administered    COVID-19, MODERNA BLUE border, Primary or Immunocompromised, (age 12y+), IM, 100 mcg/0.5mL 08/19/2021    Influenza Virus Vaccine 11/05/2015, 09/19/2018, 01/18/2020, 01/18/2020, 10/05/2020, 10/05/2020    Influenza, FLUARIX, FLULAVAL, FLUZONE (age 10 mo+) AND AFLURIA, (age 1 y+), PF, 0.5mL 11/05/2015, 01/18/2020, 10/05/2020    Pneumococcal Polysaccharide ([id number]) 02/22/2018    Tdap (Boostrix, Adacel) 10/11/2022     No Known Allergies  Prior to Admit Medications:  Current Outpatient Medications   Medication Instructions    amLODIPine (NORVASC) 10 mg, Oral, DAILY    lisinopril (PRINIVIL;ZESTRIL) 40 mg, Oral, DAILY    ondansetron (ZOFRAN) 4 mg, Oral, 3 TIMES DAILY PRN         Objective:   Patient Vitals for the past 24 hrs:   Temp Pulse Resp BP SpO2   10/25/22 1515 97.5 °F (36.4 °C) 90 18 (!) 154/104 97 %   10/25/22 1252 97.6 °F (36.4 °C) 84 17 (!) 156/93 100 %       Oxygen Therapy  SpO2: 97 %  O2 Device: None (Room air)    Estimated body mass index is 26.54 kg/m² as calculated from the following:    Height as of an earlier encounter on 10/25/22: 5' 10\" (1.778 m). Weight as of an earlier encounter on 10/25/22: 185 lb (83.9 kg). No intake or output data in the 24 hours ending 10/25/22 1548      Blood pressure (!) 154/104, pulse 90, temperature 97.5 °F (36.4 °C), temperature source Oral, resp. rate 18, SpO2 97 %. Physical Exam  Vitals and nursing note reviewed. Constitutional:       General: He is in acute distress (moderate). Appearance: He is ill-appearing. He is not diaphoretic. HENT:      Head: Normocephalic and atraumatic. Eyes:      Extraocular Movements: Extraocular movements intact. Cardiovascular:      Rate and Rhythm: Tachycardia present. Pulmonary:      Effort: Pulmonary effort is normal. No respiratory distress. Abdominal:      General: There is no distension. Tenderness: There is abdominal tenderness. Musculoskeletal:         General: No deformity. Skin:     Coloration: Skin is not jaundiced or pale. Neurological:      General: No focal deficit present. Mental Status: He is alert and oriented to person, place, and time. Psychiatric:         Mood and Affect: Mood normal.         Behavior: Behavior normal. Behavior is cooperative.          I have personally reviewed labs and tests showing:  Recent Labs:  Recent Results (from the past 24 hour(s))   CBC with Auto Differential    Collection Time: 10/25/22  7:50 AM   Result Value Ref Range    WBC 9.7 4.3 - 11.1 K/uL    RBC 5.34 4.23 - 5.60 M/uL    Hemoglobin 15.5 13.6 - 17.2 g/dL    Hematocrit 44.5 41.1 - 50.3 %    MCV 83.3 82.0 - 102.0 FL    MCH 29.0 26.1 - 32.9 PG    MCHC 34.8 31.4 - 35.0 g/dL    RDW 13.4 11.9 - 14.6 %    Platelets 833 (H) 120 - 450 K/uL    MPV 9.0 (L) 9.4 - 12.3 FL    nRBC 0.00 0.0 - 0.2 K/uL    Differential Type AUTOMATED      Seg Neutrophils 76 43 - 78 %    Lymphocytes 14 13 - 44 %    Monocytes 9 4.0 - 12.0 %    Eosinophils % 1 0.5 - 7.8 %    Basophils 0 0.0 - 2.0 %    Immature Granulocytes 0 0.0 - 5.0 %    Segs Absolute 7.3 1.7 - 8.2 K/UL Absolute Lymph # 1.3 0.5 - 4.6 K/UL    Absolute Mono # 0.9 0.1 - 1.3 K/UL    Absolute Eos # 0.1 0.0 - 0.8 K/UL    Basophils Absolute 0.0 0.0 - 0.2 K/UL    Absolute Immature Granulocyte 0.0 0.0 - 0.5 K/UL   Comprehensive Metabolic Panel    Collection Time: 10/25/22  7:50 AM   Result Value Ref Range    Sodium 133 133 - 143 mmol/L    Potassium 4.3 3.5 - 5.1 mmol/L    Chloride 95 (L) 98 - 107 mmol/L    CO2 22 21 - 32 mmol/L    Anion Gap 16 (H) 2 - 11 mmol/L    Glucose 92 65 - 100 mg/dL    BUN 9 7.0 - 18.0 MG/DL    Creatinine 0.82 0.8 - 1.5 MG/DL    Est, Glom Filt Rate >60 >60 ml/min/1.73m2    Calcium 9.2 8.3 - 10.4 MG/DL    Total Bilirubin 0.5 0.2 - 1.1 MG/DL    ALT 11 (L) 13.0 - 61.0 U/L    AST 18 15 - 37 U/L    Alk Phosphatase 56 45.0 - 117.0 U/L    Total Protein 7.1 6.4 - 8.2 g/dL    Albumin 3.8 3.5 - 5.0 g/dL    Globulin 3.3 2.8 - 4.5 g/dL    Albumin/Globulin Ratio 1.2 0.4 - 1.6     Lipase    Collection Time: 10/25/22  7:50 AM   Result Value Ref Range    Lipase 1,018 (H) 13 - 60 U/L   Magnesium    Collection Time: 10/25/22  7:50 AM   Result Value Ref Range    Magnesium 1.8 1.2 - 2.6 mg/dL   Phosphorus    Collection Time: 10/25/22  7:50 AM   Result Value Ref Range    Phosphorus 2.8 2.5 - 4.5 MG/DL   Ethanol    Collection Time: 10/25/22  7:50 AM   Result Value Ref Range    Ethanol Lvl <10 (H) 0 MG/DL   Urinalysis    Collection Time: 10/25/22  7:51 AM   Result Value Ref Range    Color, UA CHEKO      Appearance CLEAR      Specific Gravity, UA 1.020 1.001 - 1.023      pH, Urine 7.0 5.0 - 9.0      Protein,  (A) NEG mg/dL    Glucose, UA Negative mg/dL    Ketones, Urine >160 (A) NEG mg/dL    Bilirubin Urine MODERATE (A) NEG      Blood, Urine TRACE (A) NEG      Urobilinogen, Urine 1.0 0.2 - 1.0 EU/dL    Nitrite, Urine Negative NEG      Leukocyte Esterase, Urine Negative NEG     Urine Drug Screen    Collection Time: 10/25/22  7:51 AM   Result Value Ref Range    PCP, Urine Negative NEG      Benzodiazepines, Urine Negative NEG      Cocaine, Urine Negative NEG      Amphetamine, Urine Negative NEG      Methadone, Urine Negative NEG      THC, TH-Cannabinol, Urine Negative NEG      Opiates, Urine Negative NEG      Barbiturates, Urine Negative NEG     Urinalysis, Micro    Collection Time: 10/25/22  7:51 AM   Result Value Ref Range    WBC, UA 0-3 0 /hpf    RBC, UA 0-3 0 /hpf    Epithelial Cells UA 0 0 /hpf    BACTERIA, URINE 1+ (H) 0 /hpf    Casts 0 0 /lpf    Crystals 0 0 /LPF    Mucus, UA 0 0 /lpf    OTHER OBSERVATIONS RESULTS VERIFIED MANUALLY         I have personally reviewed imaging studies showing:  CT ABDOMEN PELVIS W IV CONTRAST Additional Contrast? Radiologist Recommendation    Result Date: 10/25/2022  CT ABDOMEN AND PELVIS WITH CONTRAST. INDICATION: Pancreatitis. COMPARISON: 30 July 2022 TECHNIQUE: 5 mm axial scans from above the diaphragms to the pubic symphysis following oral and 100 cc intravenous contrast without acute complication. Intravenous contrast was given to increase the sensitivity to acute inflammation. Radiation dose reduction techniques were used for this study. Our CT scanners use one or more of the following: Automated exposure control, adjustment of the mA and or kV according to patient size, iterative reconstruction. FINDINGS: -Lung Bases: The lung bases are clear. -Liver: Appears uniform. -Gallbladder: Cholecystectomy clips. -Bile Ducts: Not dilated. -Pancreas: Glandular parenchyma enhances, except for the pseudocysts. There is a 15 mm and parenchymal pseudocyst in the body and also one in the tail. There is a 7 cm x 11 cm pseudocyst lateral to the greater curvature of the stomach A14 mm pseudocyst is somewhat hemidiaphragm. Other small irregular fluid collections between the stomach and pancreas. -Spleen: Uniform and normal size. -Stomach: Fundus is slightly displaced by the larger pseudocyst. -Bowel: The colon is fairly evacuated. Small bowel loops not dilated. -Adrenals: Are normal size. -Kidneys/Ureters: Enhance symmetrically. No hydronephrosis. -Urinary Bladder: Unremarkable. -Reproductive Organs: Prostate seminal vesicles unremarkable. -Lymph Nodes: No grossly enlarged retroperitoneal, mesenteric, or pelvic adenopathy. -Vasculature: Aorta is normal caliber. -Bones: No gross bony lesions. -Other: Small amount of ascites throughout the abdomen. Several small, 1-2 cm pseudocyst and a large, 7 x 11 cm pseudocyst which is lateral to the greater curvature the stomach. Small amount of ascites throughout the abdomen. Echocardiogram:  No results found for this or any previous visit.         Orders Placed This Encounter   Medications    amLODIPine (NORVASC) tablet 10 mg    lisinopril (PRINIVIL;ZESTRIL) tablet 40 mg    DISCONTD: ondansetron (ZOFRAN) tablet 4 mg    thiamine tablet 100 mg    FOLLOWED BY Linked Order Group     lactated ringers infusion     lactated ringers infusion    sodium chloride flush 0.9 % injection 5-40 mL    sodium chloride flush 0.9 % injection 5-40 mL    0.9 % sodium chloride infusion    OR Linked Order Group     ondansetron (ZOFRAN-ODT) disintegrating tablet 4 mg     ondansetron (ZOFRAN) injection 4 mg    enoxaparin (LOVENOX) injection 40 mg     Order Specific Question:   Indication of Use     Answer:   Prophylaxis-DVT/PE    magnesium sulfate 2000 mg in 50 mL IVPB premix    OR Linked Order Group     potassium chloride (KLOR-CON M) extended release tablet 40 mEq     potassium bicarb-citric acid (EFFER-K) effervescent tablet 40 mEq     potassium chloride 10 mEq/100 mL IVPB (Peripheral Line)    OR Linked Order Group     HYDROmorphone HCl PF (DILAUDID) injection 0.25 mg     HYDROmorphone HCl PF (DILAUDID) injection 0.5 mg    famotidine (PEPCID) 20 mg in sodium chloride (PF) 10 mL injection         Signed:  Rebeca Carbajal MD

## 2022-10-25 NOTE — ED PROVIDER NOTES
Emergency Department Provider Note                   PCP: Francesca Balderas MD               Age: 44 y.o. Sex: male       ICD-10-CM    1. Alcohol-induced acute pancreatitis, unspecified complication status  A37.58       2. Alcoholism ([facility] [address]  F10.20           DISPOSITION Decision To Transfer 10/25/2022 12:04:14 PM       MDM  Number of Diagnoses or Management Options  Alcohol-induced acute pancreatitis, unspecified complication status: established, worsening  Alcoholism ([address]): established, worsening  Diagnosis management comments: 59-year-old male patient with history of alcohol induced pancreatitis  States she was seen over a week ago with an episode of pancreatitis. Slowly got better but then over the last 2 to 3 days his symptoms have worsened again  Concerned that he may be dehydrated as well  No fever.   No trauma  Will check labs  Start IV fluids antiemetics Protonix and a dose of morphine for pain control    12:06 PM  Lipase over thousand today  CT abdomen pelvis performed which reveals multiple pseudocysts and mild inflammatory changes and mild ascites    Patient's pain returned and was given Dilaudid which has improved his pain again  Given the pain nausea vomiting and worsening CT findings I will have the hospitalist admit    Dr. Zunilda Hammonds at [place] [facility] has accepted the patient for transfer and admission       Amount and/or Complexity of Data Reviewed  Clinical lab tests: ordered and reviewed  Tests in the radiology section of CPT®: ordered and reviewed  Tests in the medicine section of CPT®: ordered and reviewed  Decide to obtain previous medical records or to obtain history from someone other than the patient: yes  Review and summarize past medical records: yes  Discuss the patient with other providers: yes  Independent visualization of images, tracings, or specimens: yes    Risk of Complications, Morbidity, and/or Mortality  Presenting problems: moderate  Diagnostic procedures: moderate  Management options: moderate  General comments: Elements of this note have been dictated via voice recognition software. Text and phrases may be limited by the accuracy of the software. The chart has been reviewed, but errors may still be present. Patient Progress  Patient progress: improved             Orders Placed This Encounter   Procedures    CT ABDOMEN PELVIS W IV CONTRAST Additional Contrast? Radiologist Recommendation    CBC with Auto Differential    Comprehensive Metabolic Panel    Lipase    Magnesium    Phosphorus    Ethanol    Urinalysis    Urine Drug Screen    Urinalysis, Micro    EKG 12 Lead        Medications   sodium chloride flush 0.9 % injection 5-40 mL (10 mLs IntraVENous Given 10/25/22 0927)   lactated ringers bolus (0 mLs IntraVENous Stopped 10/25/22 1026)   ondansetron (ZOFRAN) injection 4 mg (4 mg IntraVENous Given 10/25/22 0801)   pantoprazole (PROTONIX) 40 mg in sodium chloride (PF) 10 mL injection (40 mg IntraVENous Given 10/25/22 0801)   morphine injection 4 mg (4 mg IntraVENous Given 10/25/22 0801)   0.9 % sodium chloride bolus (0 mLs IntraVENous Stopped 10/25/22 1137)   iopamidol (ISOVUE-370) 76 % injection 100 mL (100 mLs IntraVENous Given 10/25/22 0927)   HYDROmorphone HCl PF (DILAUDID) injection 1 mg (1 mg IntraVENous Given 10/25/22 1048)       New Prescriptions    No medications on file        Shady Mora is a 44 y.o. male who presents to the Emergency Department with chief complaint of    Chief Complaint   Patient presents with    Abdominal Pain     Wrapped all the way around abd. The history is provided by the patient.    Abdominal Pain  Pain location:  Epigastric  Pain quality: bloating, fullness and pressure    Pain radiates to:  Back  Pain severity:  Moderate  Onset quality:  Gradual  Duration:  3 days  Timing:  Constant  Progression:  Waxing and waning  Chronicity:  Recurrent  Context: alcohol use    Relieved by:  Nothing  Worsened by: Eating and vomiting  Ineffective treatments:  Not moving and liquids  Associated symptoms: anorexia, fatigue, nausea and vomiting    Associated symptoms: no chest pain, no chills, no constipation, no cough, no diarrhea, no dysuria, no fever, no hematemesis, no hematochezia, no hematuria and no shortness of breath    Risk factors: alcohol abuse      All other systems reviewed and are negative unless otherwise stated in the history of present illness section. Review of Systems   Constitutional:  Positive for fatigue. Negative for chills and fever. HENT:  Negative for hearing loss, sneezing and tinnitus. Eyes:  Negative for discharge and itching. Respiratory:  Negative for cough, chest tightness and shortness of breath. Cardiovascular:  Negative for chest pain and palpitations. Gastrointestinal:  Positive for abdominal pain, anorexia, nausea and vomiting. Negative for constipation, diarrhea, hematemesis and hematochezia. Endocrine: Negative for cold intolerance, heat intolerance, polydipsia, polyphagia and polyuria. Genitourinary:  Negative for dysuria, hematuria and urgency. Musculoskeletal:  Negative for arthralgias and myalgias. Skin:  Negative for rash and wound. Allergic/Immunologic: Negative for immunocompromised state. Neurological:  Negative for seizures, syncope and headaches. Hematological: Negative. Psychiatric/Behavioral:  Negative for dysphoric mood and self-injury. The patient is not nervous/anxious. All other systems reviewed and are negative.     Past Medical History:   Diagnosis Date    Acute alcoholic pancreatitis 96/2/1459    Carpal tunnel syndrome, left     Cigarette smoker     GERD (gastroesophageal reflux disease)     occassionally with spicy foods    History of pancreatitis     X2    Hypertensive crisis 10/3/2021    Pancreatitis         Past Surgical History:   Procedure Laterality Date    CHOLECYSTECTOMY  2012    ORTHOPEDIC SURGERY          Family History Problem Relation Age of Onset    Hypertension Mother     Heart Attack Father         Social History     Socioeconomic History    Marital status:    Tobacco Use    Smoking status: Every Day     Packs/day: 1.00     Types: Cigarettes     Start date: 2/9/2007    Smokeless tobacco: Never   Substance and Sexual Activity    Alcohol use: Yes    Drug use: No        Allergies: Patient has no known allergies. Previous Medications    AMLODIPINE (NORVASC) 10 MG TABLET    Take 1 tablet by mouth daily    LISINOPRIL (PRINIVIL;ZESTRIL) 40 MG TABLET    Take 1 tablet by mouth daily    ONDANSETRON (ZOFRAN) 4 MG TABLET    Take 1 tablet by mouth 3 times daily as needed for Nausea or Vomiting        Vitals signs and nursing note reviewed. Patient Vitals for the past 4 hrs:   Pulse Resp BP SpO2   10/25/22 0915 89 19 (!) 144/98 100 %          Physical Exam  Vitals and nursing note reviewed. Constitutional:       General: He is in acute distress. Appearance: Normal appearance. He is well-developed and normal weight. HENT:      Head: Normocephalic and atraumatic. Right Ear: External ear normal.      Left Ear: External ear normal.      Nose: Nose normal.      Mouth/Throat:      Mouth: Mucous membranes are moist.      Pharynx: Oropharynx is clear. Eyes:      General: No scleral icterus. Extraocular Movements: Extraocular movements intact. Conjunctiva/sclera: Conjunctivae normal.      Pupils: Pupils are equal, round, and reactive to light. Cardiovascular:      Rate and Rhythm: Normal rate and regular rhythm. Pulses: Normal pulses. Heart sounds: Normal heart sounds. Pulmonary:      Effort: Pulmonary effort is normal. No respiratory distress. Breath sounds: Normal breath sounds. Abdominal:      General: Abdomen is flat. Bowel sounds are normal. There is no distension. Palpations: Abdomen is soft. There is no mass. Tenderness:  There is abdominal tenderness in the epigastric area and periumbilical area. There is no guarding or rebound. Musculoskeletal:         General: No deformity or signs of injury. Normal range of motion. Cervical back: Normal range of motion and neck supple. Skin:     General: Skin is warm and dry. Capillary Refill: Capillary refill takes less than 2 seconds. Neurological:      General: No focal deficit present. Mental Status: He is alert and oriented to person, place, and time. Psychiatric:         Mood and Affect: Mood normal.         Behavior: Behavior normal.         Thought Content: Thought content normal.         Judgment: Judgment normal.        EKG 12 Lead    Date/Time: 10/25/2022 8:24 AM  Performed by: Ben Roach MD  Authorized by: Ben Roach MD     ECG reviewed by ED Physician in the absence of a cardiologist: yes    Previous ECG:     Previous ECG:  Unavailable  Interpretation:     Interpretation: abnormal    Rate:     ECG rate:  92    ECG rate assessment: normal    Rhythm:     Rhythm: sinus rhythm    Ectopy:     Ectopy: none    QRS:     QRS axis:  Normal    QRS conduction: non-specific conduction delay    ST segments:     ST segments:  Non-specific  Other findings:     Other findings: LVH with strain    Comments:      No acute ischemic changes  No prior tracings available for comparison    ED EKG Interpretation  EKG was interpreted in the absence of a cardiologist.      Results for orders placed or performed during the hospital encounter of 10/25/22   CT ABDOMEN PELVIS W IV CONTRAST Additional Contrast? Radiologist Recommendation    Narrative    CT ABDOMEN AND PELVIS WITH CONTRAST. INDICATION: Pancreatitis. COMPARISON: 30 July 2022    TECHNIQUE: 5 mm axial scans from above the diaphragms to the pubic symphysis  following oral and 100 cc intravenous contrast without acute complication. Intravenous contrast was given to increase the sensitivity to acute  inflammation.   Radiation dose reduction techniques were used for this study.   Our CT scanners use one or more of the following: Automated exposure control,  adjustment of the mA and or kV according to patient size, iterative  reconstruction. FINDINGS:   -Lung Bases: The lung bases are clear.    -Liver: Appears uniform. -Gallbladder: Cholecystectomy clips. -Bile Ducts: Not dilated.    -Pancreas:   Glandular parenchyma enhances, except for the pseudocysts. There is a 15 mm and parenchymal pseudocyst in the body and also one in the  tail. There is a 7 cm x 11 cm pseudocyst lateral to the greater curvature of the  stomach  A14 mm pseudocyst is somewhat hemidiaphragm. Other small irregular fluid collections between the stomach and pancreas.    -Spleen: Uniform and normal size.    -Stomach: Fundus is slightly displaced by the larger pseudocyst.  -Bowel: The colon is fairly evacuated. Small bowel loops not dilated. -Adrenals: Are normal size.  -Kidneys/Ureters: Enhance symmetrically. No hydronephrosis. -Urinary Bladder: Unremarkable. -Reproductive Organs: Prostate seminal vesicles unremarkable.    -Lymph Nodes: No grossly enlarged retroperitoneal, mesenteric, or pelvic  adenopathy.  -Vasculature: Aorta is normal caliber.  -Bones: No gross bony lesions.    -Other: Small amount of ascites throughout the abdomen. Impression    Several small, 1-2 cm pseudocyst and a large, 7 x 11 cm pseudocyst  which is lateral to the greater curvature the stomach. Small amount of ascites  throughout the abdomen.    CBC with Auto Differential   Result Value Ref Range    WBC 9.7 4.3 - 11.1 K/uL    RBC 5.34 4.23 - 5.60 M/uL    Hemoglobin 15.5 13.6 - 17.2 g/dL    Hematocrit 44.5 41.1 - 50.3 %    MCV 83.3 82.0 - 102.0 FL    MCH 29.0 26.1 - 32.9 PG    MCHC 34.8 31.4 - 35.0 g/dL    RDW 13.4 11.9 - 14.6 %    Platelets 867 (H) 854 - 450 K/uL    MPV 9.0 (L) 9.4 - 12.3 FL    nRBC 0.00 0.0 - 0.2 K/uL    Differential Type AUTOMATED      Seg Neutrophils 76 43 - 78 %    Lymphocytes 14 13 - 44 %    Monocytes 9 4.0 - 12.0 %    Eosinophils % 1 0.5 - 7.8 %    Basophils 0 0.0 - 2.0 %    Immature Granulocytes 0 0.0 - 5.0 %    Segs Absolute 7.3 1.7 - 8.2 K/UL    Absolute Lymph # 1.3 0.5 - 4.6 K/UL    Absolute Mono # 0.9 0.1 - 1.3 K/UL    Absolute Eos # 0.1 0.0 - 0.8 K/UL    Basophils Absolute 0.0 0.0 - 0.2 K/UL    Absolute Immature Granulocyte 0.0 0.0 - 0.5 K/UL   Comprehensive Metabolic Panel   Result Value Ref Range    Sodium 133 133 - 143 mmol/L    Potassium 4.3 3.5 - 5.1 mmol/L    Chloride 95 (L) 98 - 107 mmol/L    CO2 22 21 - 32 mmol/L    Anion Gap 16 (H) 2 - 11 mmol/L    Glucose 92 65 - 100 mg/dL    BUN 9 7.0 - 18.0 MG/DL    Creatinine 0.82 0.8 - 1.5 MG/DL    Est, Glom Filt Rate >60 >60 ml/min/1.73m2    Calcium 9.2 8.3 - 10.4 MG/DL    Total Bilirubin 0.5 0.2 - 1.1 MG/DL    ALT 11 (L) 13.0 - 61.0 U/L    AST 18 15 - 37 U/L    Alk Phosphatase 56 45.0 - 117.0 U/L    Total Protein 7.1 6.4 - 8.2 g/dL    Albumin 3.8 3.5 - 5.0 g/dL    Globulin 3.3 2.8 - 4.5 g/dL    Albumin/Globulin Ratio 1.2 0.4 - 1.6     Lipase   Result Value Ref Range    Lipase 1,018 (H) 13 - 60 U/L   Magnesium   Result Value Ref Range    Magnesium 1.8 1.2 - 2.6 mg/dL   Phosphorus   Result Value Ref Range    Phosphorus 2.8 2.5 - 4.5 MG/DL   Ethanol   Result Value Ref Range    Ethanol Lvl <10 (H) 0 MG/DL   Urinalysis   Result Value Ref Range    Color, UA CHEKO      Appearance CLEAR      Specific Gravity, UA 1.020 1.001 - 1.023      pH, Urine 7.0 5.0 - 9.0      Protein,  (A) NEG mg/dL    Glucose, UA Negative mg/dL    Ketones, Urine >160 (A) NEG mg/dL    Bilirubin Urine MODERATE (A) NEG      Blood, Urine TRACE (A) NEG      Urobilinogen, Urine 1.0 0.2 - 1.0 EU/dL    Nitrite, Urine Negative NEG      Leukocyte Esterase, Urine Negative NEG     Urine Drug Screen   Result Value Ref Range    PCP, Urine Negative NEG      Benzodiazepines, Urine Negative NEG      Cocaine, Urine Negative NEG      Amphetamine, Urine Negative NEG      Methadone, Urine Negative NEG THC, TH-Cannabinol, Urine Negative NEG      Opiates, Urine Negative NEG      Barbiturates, Urine Negative NEG     Urinalysis, Micro   Result Value Ref Range    WBC, UA 0-3 0 /hpf    RBC, UA 0-3 0 /hpf    Epithelial Cells UA 0 0 /hpf    BACTERIA, URINE 1+ (H) 0 /hpf    Casts 0 0 /lpf    Crystals 0 0 /LPF    Mucus, UA 0 0 /lpf    OTHER OBSERVATIONS RESULTS VERIFIED MANUALLY          CT ABDOMEN PELVIS W IV CONTRAST Additional Contrast? Radiologist Recommendation   Final Result   Several small, 1-2 cm pseudocyst and a large, 7 x 11 cm pseudocyst   which is lateral to the greater curvature the stomach. Small amount of ascites   throughout the abdomen. Voice dictation software was used during the making of this note. This software is not perfect and grammatical and other typographical errors may be present. This note has not been completely proofread for errors.      Rosario Piedra MD  10/25/22 8127

## 2022-10-25 NOTE — ED TRIAGE NOTES
Patient presents today ambulatory upon entering room. Patient states he was here last week for Pancreatitis and he states it's not going away. He says it got better then he started eating and it came back again. He thinks he's dehydrated also.

## 2022-10-25 NOTE — ED NOTES
Report to Mihai Randhawa at [facility] for room 359. Called [facility] for transport set up.       Renny Plunkett, ELI  10/25/22 5324

## 2022-10-25 NOTE — ED NOTES
ES bed request initiated with ELO Jesus house supervisor. Pt to be admitted to Dr. Ally Arreola for Pancreatitis.  Room assignment given: [facility]  [address] [facility]  10/25/22 2751

## 2022-10-25 NOTE — ASSESSMENT & PLAN NOTE
Third episode this year of acute pancreatitis. Unable to tolerate any alcohol without causing flare.   -NPO, advance as tolerated  -Dilaudid IV  -Famotidine  -Consult gastroenterology  10/30/2022: Able t further o wean Dilaudid, gastroenterology working on scheduling E[facility]

## 2022-10-26 LAB
ALBUMIN SERPL-MCNC: 2.7 G/DL (ref 3.5–5)
ALBUMIN/GLOB SERPL: 0.8 {RATIO} (ref 0.4–1.6)
ALP SERPL-CCNC: 49 U/L (ref 50–136)
ALT SERPL-CCNC: 17 U/L (ref 12–65)
ANION GAP SERPL CALC-SCNC: 7 MMOL/L (ref 2–11)
AST SERPL-CCNC: 13 U/L (ref 15–37)
BILIRUB SERPL-MCNC: 0.6 MG/DL (ref 0.2–1.1)
BUN SERPL-MCNC: 10 MG/DL (ref 6–23)
CALCIUM SERPL-MCNC: 9 MG/DL (ref 8.3–10.4)
CHLORIDE SERPL-SCNC: 100 MMOL/L (ref 101–110)
CO2 SERPL-SCNC: 24 MMOL/L (ref 21–32)
CREAT SERPL-MCNC: 0.75 MG/DL (ref 0.8–1.5)
ERYTHROCYTE [DISTWIDTH] IN BLOOD BY AUTOMATED COUNT: 13.4 % (ref 11.9–14.6)
EST. AVERAGE GLUCOSE BLD GHB EST-MCNC: 120 MG/DL
GLOBULIN SER CALC-MCNC: 3.4 G/DL (ref 2.8–4.5)
GLUCOSE SERPL-MCNC: 84 MG/DL (ref 65–100)
HBA1C MFR BLD: 5.8 % (ref 4.8–5.6)
HCT VFR BLD AUTO: 40.6 % (ref 41.1–50.3)
HGB BLD-MCNC: 13.6 G/DL (ref 13.6–17.2)
MCH RBC QN AUTO: 28.2 PG (ref 26.1–32.9)
MCHC RBC AUTO-ENTMCNC: 33.5 G/DL (ref 31.4–35)
MCV RBC AUTO: 84.1 FL (ref 82–102)
NRBC # BLD: 0 K/UL (ref 0–0.2)
PLATELET # BLD AUTO: 433 K/UL (ref 150–450)
PMV BLD AUTO: 9 FL (ref 9.4–12.3)
POTASSIUM SERPL-SCNC: 4.5 MMOL/L (ref 3.5–5.1)
PROT SERPL-MCNC: 6.1 G/DL (ref 6.3–8.2)
RBC # BLD AUTO: 4.83 M/UL (ref 4.23–5.6)
SODIUM SERPL-SCNC: 131 MMOL/L (ref 133–143)
TRIGL SERPL-MCNC: 140 MG/DL (ref 35–150)
TSH W FREE THYROID IF ABNORMAL: 2.38 UIU/ML (ref 0.36–3.74)
WBC # BLD AUTO: 7.6 K/UL (ref 4.3–11.1)

## 2022-10-26 PROCEDURE — 6370000000 HC RX 637 (ALT 250 FOR IP): Performed by: FAMILY MEDICINE

## 2022-10-26 PROCEDURE — 85027 COMPLETE CBC AUTOMATED: CPT

## 2022-10-26 PROCEDURE — A4216 STERILE WATER/SALINE, 10 ML: HCPCS | Performed by: FAMILY MEDICINE

## 2022-10-26 PROCEDURE — 84478 ASSAY OF TRIGLYCERIDES: CPT

## 2022-10-26 PROCEDURE — 84443 ASSAY THYROID STIM HORMONE: CPT

## 2022-10-26 PROCEDURE — 36415 COLL VENOUS BLD VENIPUNCTURE: CPT

## 2022-10-26 PROCEDURE — 6360000002 HC RX W HCPCS: Performed by: FAMILY MEDICINE

## 2022-10-26 PROCEDURE — 2580000003 HC RX 258: Performed by: FAMILY MEDICINE

## 2022-10-26 PROCEDURE — 83036 HEMOGLOBIN GLYCOSYLATED A1C: CPT

## 2022-10-26 PROCEDURE — 2500000003 HC RX 250 WO HCPCS: Performed by: FAMILY MEDICINE

## 2022-10-26 PROCEDURE — 1100000000 HC RM PRIVATE

## 2022-10-26 PROCEDURE — 80053 COMPREHEN METABOLIC PANEL: CPT

## 2022-10-26 PROCEDURE — 6360000002 HC RX W HCPCS: Performed by: HOSPITALIST

## 2022-10-26 RX ORDER — LORAZEPAM 0.5 MG/1
0.5 TABLET ORAL EVERY 6 HOURS PRN
Status: DISCONTINUED | OUTPATIENT
Start: 2022-10-26 | End: 2022-10-27

## 2022-10-26 RX ORDER — HYDROMORPHONE HYDROCHLORIDE 1 MG/ML
0.5 INJECTION, SOLUTION INTRAMUSCULAR; INTRAVENOUS; SUBCUTANEOUS ONCE
Status: COMPLETED | OUTPATIENT
Start: 2022-10-26 | End: 2022-10-26

## 2022-10-26 RX ORDER — NICOTINE 21 MG/24HR
1 PATCH, TRANSDERMAL 24 HOURS TRANSDERMAL DAILY
Status: DISCONTINUED | OUTPATIENT
Start: 2022-10-27 | End: 2022-10-31 | Stop reason: HOSPADM

## 2022-10-26 RX ADMIN — SODIUM CHLORIDE, PRESERVATIVE FREE 20 MG: 5 INJECTION INTRAVENOUS at 08:59

## 2022-10-26 RX ADMIN — HYDROMORPHONE HYDROCHLORIDE 1 MG: 1 INJECTION, SOLUTION INTRAMUSCULAR; INTRAVENOUS; SUBCUTANEOUS at 03:45

## 2022-10-26 RX ADMIN — SODIUM CHLORIDE, PRESERVATIVE FREE 10 ML: 5 INJECTION INTRAVENOUS at 21:43

## 2022-10-26 RX ADMIN — Medication 100 MG: at 08:59

## 2022-10-26 RX ADMIN — SODIUM CHLORIDE, PRESERVATIVE FREE 10 ML: 5 INJECTION INTRAVENOUS at 08:59

## 2022-10-26 RX ADMIN — AMLODIPINE BESYLATE 10 MG: 10 TABLET ORAL at 08:59

## 2022-10-26 RX ADMIN — SODIUM CHLORIDE, SODIUM LACTATE, POTASSIUM CHLORIDE, AND CALCIUM CHLORIDE: 600; 310; 30; 20 INJECTION, SOLUTION INTRAVENOUS at 16:04

## 2022-10-26 RX ADMIN — HYDROMORPHONE HYDROCHLORIDE 1 MG: 1 INJECTION, SOLUTION INTRAMUSCULAR; INTRAVENOUS; SUBCUTANEOUS at 06:20

## 2022-10-26 RX ADMIN — SODIUM CHLORIDE, PRESERVATIVE FREE 20 MG: 5 INJECTION INTRAVENOUS at 21:43

## 2022-10-26 RX ADMIN — HYDROMORPHONE HYDROCHLORIDE 1 MG: 1 INJECTION, SOLUTION INTRAMUSCULAR; INTRAVENOUS; SUBCUTANEOUS at 09:46

## 2022-10-26 RX ADMIN — SODIUM CHLORIDE, SODIUM LACTATE, POTASSIUM CHLORIDE, AND CALCIUM CHLORIDE: 600; 310; 30; 20 INJECTION, SOLUTION INTRAVENOUS at 21:41

## 2022-10-26 RX ADMIN — HYDROMORPHONE HYDROCHLORIDE 1 MG: 1 INJECTION, SOLUTION INTRAMUSCULAR; INTRAVENOUS; SUBCUTANEOUS at 21:49

## 2022-10-26 RX ADMIN — ENOXAPARIN SODIUM 40 MG: 100 INJECTION SUBCUTANEOUS at 09:13

## 2022-10-26 RX ADMIN — HYDROMORPHONE HYDROCHLORIDE 1 MG: 1 INJECTION, SOLUTION INTRAMUSCULAR; INTRAVENOUS; SUBCUTANEOUS at 12:52

## 2022-10-26 RX ADMIN — HYDROMORPHONE HYDROCHLORIDE 1 MG: 1 INJECTION, SOLUTION INTRAMUSCULAR; INTRAVENOUS; SUBCUTANEOUS at 15:58

## 2022-10-26 RX ADMIN — SODIUM CHLORIDE, SODIUM LACTATE, POTASSIUM CHLORIDE, AND CALCIUM CHLORIDE: 600; 310; 30; 20 INJECTION, SOLUTION INTRAVENOUS at 01:56

## 2022-10-26 RX ADMIN — HYDROMORPHONE HYDROCHLORIDE 1 MG: 1 INJECTION, SOLUTION INTRAMUSCULAR; INTRAVENOUS; SUBCUTANEOUS at 00:32

## 2022-10-26 RX ADMIN — HYDROMORPHONE HYDROCHLORIDE 0.5 MG: 1 INJECTION, SOLUTION INTRAMUSCULAR; INTRAVENOUS; SUBCUTANEOUS at 23:17

## 2022-10-26 RX ADMIN — LISINOPRIL 40 MG: 20 TABLET ORAL at 08:59

## 2022-10-26 RX ADMIN — HYDROMORPHONE HYDROCHLORIDE 1 MG: 1 INJECTION, SOLUTION INTRAMUSCULAR; INTRAVENOUS; SUBCUTANEOUS at 18:59

## 2022-10-26 ASSESSMENT — PAIN SCALES - GENERAL
PAINLEVEL_OUTOF10: 10
PAINLEVEL_OUTOF10: 9
PAINLEVEL_OUTOF10: 8
PAINLEVEL_OUTOF10: 0
PAINLEVEL_OUTOF10: 10
PAINLEVEL_OUTOF10: 8
PAINLEVEL_OUTOF10: 8

## 2022-10-26 ASSESSMENT — PAIN DESCRIPTION - DESCRIPTORS
DESCRIPTORS: ACHING

## 2022-10-26 ASSESSMENT — PAIN SCALES - WONG BAKER
WONGBAKER_NUMERICALRESPONSE: 0
WONGBAKER_NUMERICALRESPONSE: 0

## 2022-10-26 ASSESSMENT — PAIN DESCRIPTION - LOCATION
LOCATION: ABDOMEN

## 2022-10-26 ASSESSMENT — PAIN - FUNCTIONAL ASSESSMENT
PAIN_FUNCTIONAL_ASSESSMENT: ACTIVITIES ARE NOT PREVENTED

## 2022-10-26 NOTE — CONSULTS
[facility] Consult Note       Primary GI Physician: Dr Nimo Prado    Referring Provider:  Rebeca Carbajal MD    Consult Date:  10/26/2022    Admit Date:  10/25/2022    Chief Complaint:  chronic pancreatitis    Subjective:     History of Present Illness:  Patient is a 44 y.o. male with PMH including but not limited to chronic pancreatitis and HTN, who is seen in consultation at the request of Dr. Rebeca Carbajal for chronic pancreatitis. Patient presented to the emergency dept early this morning with c/o severe abdominal pain for 1 week. . He has periodic flares of his pancreatitis. He knew not to eat and to try and manage at home with his pain medication. He thought he was getting better but then got severely worse and decided to come to hospital.  In the emergency department he underwent CT abdomen showing ascites and pseudocysts. Admitted for further evaluation and management. Patient reports that this episode begun roughly 2 weeks ago when he and his wife went out to dinner and he had a glass of wine. He reports that he has these flare ups whenever he drink 1 glass of wine or even 1 beer. He denies hematochezia or melena, denies hematemesis. Endorses yellowish loose stools the past few days. His first episode of pancreatitis was roughly 11 years ago and onset was alcohol induced. Patient last seen in our office on 2/25/2021 with Dr Bryan Thurman for acute recurrent pancreatitis. At that visit he was offered repeat labs and abdominal ultrasound, but opted to wait since he was self pay. No further follow up.     Never had Colonoscopy or EGD        PMH:  Past Medical History:   Diagnosis Date    Acute alcoholic pancreatitis 10/0/8850    Carpal tunnel syndrome, left     Cigarette smoker     GERD (gastroesophageal reflux disease)     occassionally with spicy foods    History of pancreatitis     X2    Hypertensive crisis 10/3/2021    Pancreatitis        PSH:  Past Surgical History: Procedure Laterality Date    CHOLECYSTECTOMY  2012    ORTHOPEDIC SURGERY         Allergies:  No Known Allergies    Home Medications:  Prior to Admission medications    Medication Sig Start Date End Date Taking? Authorizing Provider   ondansetron (ZOFRAN) 4 MG tablet Take 1 tablet by mouth 3 times daily as needed for Nausea or Vomiting 10/17/22   Justin Perdomo DO   amLODIPine (NORVASC) 10 MG tablet Take 1 tablet by mouth daily 10/11/22   Daniel Montenegro MD   lisinopril (PRINIVIL;ZESTRIL) 40 MG tablet Take 1 tablet by mouth daily 10/11/22   Daniel Montenegro MD   thiamine 100 MG tablet Take 1 tablet by mouth in the morning.  8/3/22 8/2/22  Audi Chavarria MD       Hospital Medications:  Current Facility-Administered Medications   Medication Dose Route Frequency    amLODIPine (NORVASC) tablet 10 mg  10 mg Oral Daily    lisinopril (PRINIVIL;ZESTRIL) tablet 40 mg  40 mg Oral Daily    thiamine tablet 100 mg  100 mg Oral Daily    lactated ringers infusion   IntraVENous Continuous    sodium chloride flush 0.9 % injection 5-40 mL  5-40 mL IntraVENous 2 times per day    sodium chloride flush 0.9 % injection 5-40 mL  5-40 mL IntraVENous PRN    0.9 % sodium chloride infusion   IntraVENous PRN    ondansetron (ZOFRAN-ODT) disintegrating tablet 4 mg  4 mg Oral Q8H PRN    Or    ondansetron (ZOFRAN) injection 4 mg  4 mg IntraVENous Q6H PRN    enoxaparin (LOVENOX) injection 40 mg  40 mg SubCUTAneous Q24H    magnesium sulfate 2000 mg in 50 mL IVPB premix  2,000 mg IntraVENous PRN    potassium chloride (KLOR-CON M) extended release tablet 40 mEq  40 mEq Oral PRN    Or    potassium bicarb-citric acid (EFFER-K) effervescent tablet 40 mEq  40 mEq Oral PRN    Or    potassium chloride 10 mEq/100 mL IVPB (Peripheral Line)  10 mEq IntraVENous PRN    famotidine (PEPCID) 20 mg in sodium chloride (PF) 10 mL injection  20 mg IntraVENous BID    HYDROmorphone HCl PF (DILAUDID) injection 0.5 mg  0.5 mg IntraVENous Q3H PRN    Or HYDROmorphone HCl PF (DILAUDID) injection 1 mg  1 mg IntraVENous Q3H PRN       Social History:  Social History     Tobacco Use    Smoking status: Every Day     Packs/day: 1.00     Types: Cigarettes     Start date: 2/9/2007    Smokeless tobacco: Never   Substance Use Topics    Alcohol use: Yes       Family History:  Family History   Problem Relation Age of Onset    Hypertension Mother     Heart Attack Father        Review of Systems:  A detailed 10 system ROS is obtained, with pertinent positives as listed above. All others are negative. Diet:  NPO    Objective:     Physical Exam:  Vitals:  BP (!) 141/94   Pulse 74   Temp 97.7 °F (36.5 °C) (Oral)   Resp 18   SpO2 95%   Gen:  Pt is alert, cooperative, no acute distress  Skin:  Extremities and face reveal no rashes. HEENT: Sclerae anicteric. Extra-occular muscles are intact. No oral ulcers. No abnormal pigmentation of the lips. The neck is supple. Cardiovascular: Regular rate and rhythm. No murmurs, gallops, or rubs. Respiratory:  Comfortable breathing with no accessory muscle use. Clear breath sounds anteriorly with no wheezes, rales, or rhonchi. GI:  Abdomen nondistended, soft, RUQ tenderness. Normal active bowel sounds. No enlargement of the liver or spleen. No masses palpable. Rectal:  Deferred  Musculoskeletal:  No pitting edema of the lower legs. Neurological:  Gross memory appears intact. Patient is alert and oriented. Psychiatric:  Mood appears appropriate with judgement intact. opathy. Laboratory:    Recent Labs     10/25/22  0750 10/26/22  0558   WBC 9.7 7.6   HGB 15.5 13.6   HCT 44.5 40.6*   * 433   MCV 83.3 84.1    131*   K 4.3 4.5   CL 95* 100*   CO2 22 24   BUN 9 10   CREATININE 0.82 0.75*   CALCIUM 9.2 9.0   MG 1.8  --    PHOS 2.8  --    GLUCOSE 92 84   ALKPHOS 56 49*   AST 18 13*   ALT 11* 17   BILITOT 0.5 0.6   ALBUMIN 1.2 0.8   PROT 7.1 6.1*   LIPASE 1,018*  --      CT ABDOMEN AND PELVIS WITH CONTRAST. INDICATION: Pancreatitis. COMPARISON: 30 July 2022       TECHNIQUE: 5 mm axial scans from above the diaphragms to the pubic symphysis   following oral and 100 cc intravenous contrast without acute complication. Intravenous contrast was given to increase the sensitivity to acute   inflammation. Radiation dose reduction techniques were used for this study. Our CT scanners use one or more of the following: Automated exposure control,   adjustment of the mA and or kV according to patient size, iterative   reconstruction. FINDINGS:    -Lung Bases: The lung bases are clear.       -Liver: Appears uniform. -Gallbladder: Cholecystectomy clips. -Bile Ducts: Not dilated.       -Pancreas:    Glandular parenchyma enhances, except for the pseudocysts. There is a 15 mm and parenchymal pseudocyst in the body and also one in the   tail. There is a 7 cm x 11 cm pseudocyst lateral to the greater curvature of the   stomach   A14 mm pseudocyst is somewhat hemidiaphragm. Other small irregular fluid collections between the stomach and pancreas.       -Spleen: Uniform and normal size.       -Stomach: Fundus is slightly displaced by the larger pseudocyst.   -Bowel: The colon is fairly evacuated. Small bowel loops not dilated. -Adrenals: Are normal size.   -Kidneys/Ureters: Enhance symmetrically. No hydronephrosis. -Urinary Bladder: Unremarkable. -Reproductive Organs: Prostate seminal vesicles unremarkable.       -Lymph Nodes: No grossly enlarged retroperitoneal, mesenteric, or pelvic   adenopathy.   -Vasculature: Aorta is normal caliber.   -Bones: No gross bony lesions.       -Other: Small amount of ascites throughout the abdomen. Impression   Several small, 1-2 cm pseudocyst and a large, 7 x 11 cm pseudocyst   which is lateral to the greater curvature the stomach. Small amount of ascites   throughout the abdomen.        Assessment:     Principal Problem:    Acute on chronic pancreatitis [facility])  Active Problems:    Abnormal CT of the abdomen    Alcohol use disorder, severe, dependence (HCC)    Abdominal pain    Primary hypertension    Chronic pancreatitis ([address]  Resolved Problems:    * No resolved hospital problems. *  Patient is a 44 y.o. male patient known to Dr Evan Mireles with PMH including but not limited to chronic pancreatitis and HTN , who is seen in consultation at the request of Dr. Mitch Alva for chronic pancreatis, third episode this year. CT abdomen pelvis performed which reveals multiple pseudocysts and mild inflammatory changes and mild ascites. Onset of these sx after drinking wine. Etiologies of pancreatitis include: Drug induced, hypertriglyceridemia, Gallstones, ETOH, biliary sludge, Trauma, autoimmune. Given his history and recent recurrence after ingesting wine, this is clearly ETOH induced. Plan:     Continue supportive iv fluids and pain medication per primary team  Continue PPI  ETOH abstinence discussed  May do trial of clear liquids    Patient is seen and examined in collaboration with Dr. Kris Dempsey. Assessment and plan as per Dr. Kris Dempsey.

## 2022-10-26 NOTE — PROGRESS NOTES
Hospitalist Progress Note   Admit Date:  10/25/2022  1:02 PM   Name:  Norris Lorenzana   Age:  44 y.o. Sex:  male  :  1985   MRN:  [id number]   Room:  [facility]    Presenting Complaint: No chief complaint on file. Reason(s) for Admission: Abdominal pain [R10.9]     Hospital Course:     Copied from admission history and physical HPI:  Norris Lorenzana is a 44 y.o. male with medical history of chronic pancreatitis, hypertension who presented with severe abdominal pain for 1 week. He has periodic flares of his pancreatitis. He knew not to eat and to try and manage at home with his pain medication. He thought he was getting better but then got severely worse and presented to the ED for evaluation. In the emergency department he underwent CT abdomen showing ascites and pseudocysts. Admitted for further evaluation and management. Subjective & 24hr Events (10/26/22): Feeling better wants to try clear liquids. GI agrees patient may be candidate for a GI liquid trial.  Pain still significant and he reports duration not effective-receiving IV Dilaudid every 3 hours and not increasing dosing. Counseled patient regarding resume strict n.p.o. if abdominal pain worse with liquids. He appears to understand. Denies diarrhea. No shaking chills or high fevers. No chest pain. Review of Systems:  10 systems reviewed and negative except as noted in HPI. Assessment & Plan:   * Acute on chronic pancreatitis [facility])  Assessment & Plan  Third episode this year of acute pancreatitis. Unable to tolerate any alcohol without causing flare. 10/26--likely alcohol induced. Appreciate GI follow-up and recommendations. Try clear liquids. Follow-up lipase tomorrow. Continue current analgesia dosing. Alcohol use disorder, severe, dependence ([facility] [address]  Assessment & Plan  10/26--monitor for withdrawal symptoms. Thiamine.   As needed benzodiazepine     Abnormal CT of the abdomen  Assessment & Plan  CT noted pseudocysts and ascites     Chronic pancreatitis [facility])  Assessment & Plan  10/26--known to [id number] and recommendations appreciated. Primary hypertension  Assessment & Plan  - Amlodipine, lisinopril  10/26-continue same             Anticipated discharge needs:   Home with outpatient follow-up     Diet: Clear liquids. VTE ppx: Enoxaparin  Code status: Full Code              Hospital Problems:  Principal Problem:    Acute on chronic pancreatitis ([facility] [address]  Active Problems:    Abnormal CT of the abdomen    Alcohol use disorder, severe, dependence (HCC)    Abdominal pain    Primary hypertension    Chronic pancreatitis ([facility] [address]  Resolved Problems:    * No resolved hospital problems. *      Objective:   Patient Vitals for the past 24 hrs:   Temp Pulse Resp BP SpO2   10/26/22 1124 98.2 °F (36.8 °C) 86 -- (!) 155/93 98 %   10/26/22 0724 97.7 °F (36.5 °C) 74 18 (!) 141/94 95 %   10/26/22 0650 -- -- 18 -- --   10/26/22 0620 -- -- 18 -- --   10/26/22 0415 -- -- 16 -- --   10/26/22 0358 98.6 °F (37 °C) 80 18 (!) 146/90 96 %   10/26/22 0345 -- -- 18 -- --   10/26/22 0102 -- -- 16 -- --   10/26/22 0032 -- -- 18 -- --   10/25/22 2310 98.4 °F (36.9 °C) 83 16 (!) 156/95 96 %   10/25/22 2205 -- -- 18 -- --   10/25/22 2135 -- -- 18 -- --   10/25/22 1914 99 °F (37.2 °C) 90 18 (!) 161/101 97 %   10/25/22 1608 -- 99 -- -- 99 %   10/25/22 1515 97.5 °F (36.4 °C) 90 18 (!) 154/104 97 %   10/25/22 1252 97.6 °F (36.4 °C) 84 17 (!) 156/93 100 %       Oxygen Therapy  SpO2: 98 %  Pulse Oximeter Device Mode: Intermittent  Pulse Oximeter Device Location: Finger  O2 Device: None (Room air)    Estimated body mass index is 26.54 kg/m² as calculated from the following:    Height as of an earlier encounter on 10/25/22: 5' 10\" (1.778 m). Weight as of an earlier encounter on 10/25/22: 185 lb (83.9 kg).   No intake or output data in the 24 hours ending 10/26/22 1150      Physical Exam:     Blood pressure (!) 155/93, pulse 86, temperature 98.2 °F (36.8 °C), temperature source Oral, resp. rate 18, SpO2 98 %. General:    Well nourished. Head:  Normocephalic, atraumatic  Eyes:  Sclerae appear normal.  Pupils equally round. ENT:  Nares appear normal, no drainage. Moist oral mucosa  Neck:  No restricted ROM. Trachea midline   CV:   RRR. No m/r/g. No jugular venous distension. Lungs:   CTAB. No wheezing, rhonchi, or rales. Symmetric expansion. Abdomen:   Tender diffusely-no distention. Infrequent bowel sounds. No palpable masses. Extremities: No cyanosis or clubbing. No edema  Skin:     No rashes and normal coloration. Warm and dry. Neuro:  CN II-XII grossly intact. Sensation intact. A&Ox3  Psych:  Normal mood and affect.       I have personally reviewed labs and tests showing:  Recent Labs:  Recent Results (from the past 48 hour(s))   CBC with Auto Differential    Collection Time: 10/25/22  7:50 AM   Result Value Ref Range    WBC 9.7 4.3 - 11.1 K/uL    RBC 5.34 4.23 - 5.60 M/uL    Hemoglobin 15.5 13.6 - 17.2 g/dL    Hematocrit 44.5 41.1 - 50.3 %    MCV 83.3 82.0 - 102.0 FL    MCH 29.0 26.1 - 32.9 PG    MCHC 34.8 31.4 - 35.0 g/dL    RDW 13.4 11.9 - 14.6 %    Platelets 919 (H) 481 - 450 K/uL    MPV 9.0 (L) 9.4 - 12.3 FL    nRBC 0.00 0.0 - 0.2 K/uL    Differential Type AUTOMATED      Seg Neutrophils 76 43 - 78 %    Lymphocytes 14 13 - 44 %    Monocytes 9 4.0 - 12.0 %    Eosinophils % 1 0.5 - 7.8 %    Basophils 0 0.0 - 2.0 %    Immature Granulocytes 0 0.0 - 5.0 %    Segs Absolute 7.3 1.7 - 8.2 K/UL    Absolute Lymph # 1.3 0.5 - 4.6 K/UL    Absolute Mono # 0.9 0.1 - 1.3 K/UL    Absolute Eos # 0.1 0.0 - 0.8 K/UL    Basophils Absolute 0.0 0.0 - 0.2 K/UL    Absolute Immature Granulocyte 0.0 0.0 - 0.5 K/UL   Comprehensive Metabolic Panel    Collection Time: 10/25/22  7:50 AM   Result Value Ref Range    Sodium 133 133 - 143 mmol/L    Potassium 4.3 3.5 - 5.1 mmol/L    Chloride 95 (L) 98 - 107 mmol/L    CO2 22 21 - 32 mmol/L    Anion Gap 16 (H) 2 - 11 mmol/L Glucose 92 65 - 100 mg/dL    BUN 9 7.0 - 18.0 MG/DL    Creatinine 0.82 0.8 - 1.5 MG/DL    Est, Glom Filt Rate >60 >60 ml/min/1.73m2    Calcium 9.2 8.3 - 10.4 MG/DL    Total Bilirubin 0.5 0.2 - 1.1 MG/DL    ALT 11 (L) 13.0 - 61.0 U/L    AST 18 15 - 37 U/L    Alk Phosphatase 56 45.0 - 117.0 U/L    Total Protein 7.1 6.4 - 8.2 g/dL    Albumin 3.8 3.5 - 5.0 g/dL    Globulin 3.3 2.8 - 4.5 g/dL    Albumin/Globulin Ratio 1.2 0.4 - 1.6     Lipase    Collection Time: 10/25/22  7:50 AM   Result Value Ref Range    Lipase 1,018 (H) 13 - 60 U/L   Magnesium    Collection Time: 10/25/22  7:50 AM   Result Value Ref Range    Magnesium 1.8 1.2 - 2.6 mg/dL   Phosphorus    Collection Time: 10/25/22  7:50 AM   Result Value Ref Range    Phosphorus 2.8 2.5 - 4.5 MG/DL   Ethanol    Collection Time: 10/25/22  7:50 AM   Result Value Ref Range    Ethanol Lvl <10 (H) 0 MG/DL   Urinalysis    Collection Time: 10/25/22  7:51 AM   Result Value Ref Range    Color, UA CHEKO      Appearance CLEAR      Specific Gravity, UA 1.020 1.001 - 1.023      pH, Urine 7.0 5.0 - 9.0      Protein,  (A) NEG mg/dL    Glucose, UA Negative mg/dL    Ketones, Urine >160 (A) NEG mg/dL    Bilirubin Urine MODERATE (A) NEG      Blood, Urine TRACE (A) NEG      Urobilinogen, Urine 1.0 0.2 - 1.0 EU/dL    Nitrite, Urine Negative NEG      Leukocyte Esterase, Urine Negative NEG     Urine Drug Screen    Collection Time: 10/25/22  7:51 AM   Result Value Ref Range    PCP, Urine Negative NEG      Benzodiazepines, Urine Negative NEG      Cocaine, Urine Negative NEG      Amphetamine, Urine Negative NEG      Methadone, Urine Negative NEG      THC, TH-Cannabinol, Urine Negative NEG      Opiates, Urine Negative NEG      Barbiturates, Urine Negative NEG     Urinalysis, Micro    Collection Time: 10/25/22  7:51 AM   Result Value Ref Range    WBC, UA 0-3 0 /hpf    RBC, UA 0-3 0 /hpf    Epithelial Cells UA 0 0 /hpf    BACTERIA, URINE 1+ (H) 0 /hpf    Casts 0 0 /lpf    Crystals 0 0 /LPF Mucus, UA 0 0 /lpf    OTHER OBSERVATIONS RESULTS VERIFIED MANUALLY     Triglyceride    Collection Time: 10/26/22  5:58 AM   Result Value Ref Range    Triglycerides 140 35 - 150 MG/DL   CBC    Collection Time: 10/26/22  5:58 AM   Result Value Ref Range    WBC 7.6 4.3 - 11.1 K/uL    RBC 4.83 4.23 - 5.6 M/uL    Hemoglobin 13.6 13.6 - 17.2 g/dL    Hematocrit 40.6 (L) 41.1 - 50.3 %    MCV 84.1 82.0 - 102.0 FL    MCH 28.2 26.1 - 32.9 PG    MCHC 33.5 31.4 - 35.0 g/dL    RDW 13.4 11.9 - 14.6 %    Platelets 405 670 - 441 K/uL    MPV 9.0 (L) 9.4 - 12.3 FL    nRBC 0.00 0.0 - 0.2 K/uL   Comprehensive Metabolic Panel w/ Reflex to MG    Collection Time: 10/26/22  5:58 AM   Result Value Ref Range    Sodium 131 (L) 133 - 143 mmol/L    Potassium 4.5 3.5 - 5.1 mmol/L    Chloride 100 (L) 101 - 110 mmol/L    CO2 24 21 - 32 mmol/L    Anion Gap 7 2 - 11 mmol/L    Glucose 84 65 - 100 mg/dL    BUN 10 6 - 23 MG/DL    Creatinine 0.75 (L) 0.8 - 1.5 MG/DL    Est, Glom Filt Rate >60 >60 ml/min/1.73m2    Calcium 9.0 8.3 - 10.4 MG/DL    Total Bilirubin 0.6 0.2 - 1.1 MG/DL    ALT 17 12 - 65 U/L    AST 13 (L) 15 - 37 U/L    Alk Phosphatase 49 (L) 50 - 136 U/L    Total Protein 6.1 (L) 6.3 - 8.2 g/dL    Albumin 2.7 (L) 3.5 - 5.0 g/dL    Globulin 3.4 2.8 - 4.5 g/dL    Albumin/Globulin Ratio 0.8 0.4 - 1.6     Hemoglobin A1C    Collection Time: 10/26/22  5:58 AM   Result Value Ref Range    Hemoglobin A1C 5.8 (H) 4.8 - 5.6 %    eAG 120 mg/dL   TSH with Reflex    Collection Time: 10/26/22  5:58 AM   Result Value Ref Range    TSH w Free Thyroid if Abnormal 2.38 0.358 - 3.740 UIU/ML       I have personally reviewed imaging studies showing:   Other Studies:  No orders to display       Current Meds:  Current Facility-Administered Medications   Medication Dose Route Frequency    amLODIPine (NORVASC) tablet 10 mg  10 mg Oral Daily    lisinopril (PRINIVIL;ZESTRIL) tablet 40 mg  40 mg Oral Daily    thiamine tablet 100 mg  100 mg Oral Daily    lactated ringers infusion   IntraVENous Continuous    sodium chloride flush 0.9 % injection 5-40 mL  5-40 mL IntraVENous 2 times per day    sodium chloride flush 0.9 % injection 5-40 mL  5-40 mL IntraVENous PRN    0.9 % sodium chloride infusion   IntraVENous PRN    ondansetron (ZOFRAN-ODT) disintegrating tablet 4 mg  4 mg Oral Q8H PRN    Or    ondansetron (ZOFRAN) injection 4 mg  4 mg IntraVENous Q6H PRN    enoxaparin (LOVENOX) injection 40 mg  40 mg SubCUTAneous Q24H    magnesium sulfate 2000 mg in 50 mL IVPB premix  2,000 mg IntraVENous PRN    potassium chloride (KLOR-CON M) extended release tablet 40 mEq  40 mEq Oral PRN    Or    potassium bicarb-citric acid (EFFER-K) effervescent tablet 40 mEq  40 mEq Oral PRN    Or    potassium chloride 10 mEq/100 mL IVPB (Peripheral Line)  10 mEq IntraVENous PRN    famotidine (PEPCID) 20 mg in sodium chloride (PF) 10 mL injection  20 mg IntraVENous BID    HYDROmorphone HCl PF (DILAUDID) injection 0.5 mg  0.5 mg IntraVENous Q3H PRN    Or    HYDROmorphone HCl PF (DILAUDID) injection 1 mg  1 mg IntraVENous Q3H PRN       Signed:  Angela Mitchell DO    Part of this note may have been written by using a voice dictation software. The note has been proof read but may still contain some grammatical/other typographical errors.

## 2022-10-26 NOTE — PROGRESS NOTES
Patient requested pain med. States abdomen begin to hurt again after he ate [place]. Dilaudid 1 mg IV given.

## 2022-10-26 NOTE — PROGRESS NOTES
Physician Progress Note      PATIENT:               Gilliland Grand  CSN #:                  [id number]  :                       1985  ADMIT DATE:       10/25/2022 1:02 PM  DISCH DATE:  RESPONDING  PROVIDER #:        Robbin Cristina DO          QUERY TEXT:    Patient admitted with pancreatitis and noted to have tachycardia and   tachypnea. If possible, please document in progress notes and discharge   summary if you are evaluating and/or treating any of the following: The medical record reflects the following:  Risk Factors: 44 y.o. male with medical history of chronic pancreatitis,   hypertension who presented with severe abdominal pain for 1 week. He has   periodic flares of his pancreatitis. Clinical Indicators: Tachycardia and tachypnea  Treatment: Monitor VS    Thank you,  Adeline Wheeler RN, BSN, [email or web address]  . Options provided:  -- SIRS of non-infectious origin due to pancreatitis without acute organ   dysfunction  -- Other - I will add my own diagnosis  -- Disagree - Not applicable / Not valid  -- Disagree - Clinically unable to determine / Unknown  -- Refer to Clinical Documentation Reviewer    PROVIDER RESPONSE TEXT:    This patient has SIRS of non-infectious origin due to pancreatitis without   acute organ dysfunction.     Query created by: Mitra Velazquez on 10/26/2022 9:53 AM      Electronically signed by:  Robbin Cristina DO 10/26/2022 3:30 PM

## 2022-10-26 NOTE — CARE COORDINATION
10/26/22 1210   Service Assessment   Patient Orientation Alert and Oriented   Cognition Alert   History Provided By Patient   Primary Caregiver Self   Support Systems Spouse/Significant Other   PCP Verified by CM Yes   Prior Functional Level Independent in ADLs/IADLs   Current Functional Level Independent in ADLs/IADLs   Can patient return to prior living arrangement Yes   Ability to make needs known: Good   Family able to assist with home care needs: Yes   Would you like for me to discuss the discharge plan with any other family members/significant others, and if so, who? No   Financial Resources Other (Comment)  (None. He confirmed he has spoken with [email or web address] Other (Comment)  (He was provided with resources for substance abuse, including [facility], [place].)   Social/Functional History   Lives With Spouse; Other (comment)  (spouse and children)   Type of [id number]   (None)   ADL Assistance Independent   Mode of Transportation Car   Discharge Planning   Type of [facility] Spouse/Significant Other;Children   Current Services Prior To Admission None   Potential Assistance Needed Prescription Assistance   DME Ordered? No   Potential Assistance Purchasing Medications Yes   Type of Home Care Services None   Patient expects to be discharged to: [facility] Discharge   Services At/After Discharge Community Resources     RN DONNA met with the patient at the bedside and discussed discharge planning. He lives with his wife and children and stated he is independent of ADLs and does not use DMEs or external services. He was provided with resources for [address] [place] [address]. He confirmed he has a PCP. He was provided with resources for substance abuse and a GoodRx card. RN DONNA encouraged him to ask questions.  He verbalized understanding and agreement with the discharge plan.

## 2022-10-27 LAB
ALBUMIN SERPL-MCNC: 2.8 G/DL (ref 3.5–5)
ALBUMIN SERPL-MCNC: 2.9 G/DL (ref 3.5–5)
ALBUMIN/GLOB SERPL: 0.7 {RATIO} (ref 0.4–1.6)
ALBUMIN/GLOB SERPL: 0.8 {RATIO} (ref 0.4–1.6)
ALP SERPL-CCNC: 45 U/L (ref 50–136)
ALP SERPL-CCNC: 49 U/L (ref 50–136)
ALT SERPL-CCNC: 19 U/L (ref 12–65)
ALT SERPL-CCNC: 20 U/L (ref 12–65)
ANION GAP SERPL CALC-SCNC: 7 MMOL/L (ref 2–11)
ANION GAP SERPL CALC-SCNC: 7 MMOL/L (ref 2–11)
AST SERPL-CCNC: 12 U/L (ref 15–37)
AST SERPL-CCNC: 15 U/L (ref 15–37)
BASOPHILS # BLD: 0 K/UL (ref 0–0.2)
BASOPHILS NFR BLD: 0 % (ref 0–2)
BILIRUB SERPL-MCNC: 0.6 MG/DL (ref 0.2–1.1)
BILIRUB SERPL-MCNC: 0.6 MG/DL (ref 0.2–1.1)
BUN SERPL-MCNC: 11 MG/DL (ref 6–23)
BUN SERPL-MCNC: 9 MG/DL (ref 6–23)
CALCIUM SERPL-MCNC: 8.7 MG/DL (ref 8.3–10.4)
CALCIUM SERPL-MCNC: 8.9 MG/DL (ref 8.3–10.4)
CHLORIDE SERPL-SCNC: 100 MMOL/L (ref 101–110)
CHLORIDE SERPL-SCNC: 98 MMOL/L (ref 101–110)
CO2 SERPL-SCNC: 24 MMOL/L (ref 21–32)
CO2 SERPL-SCNC: 26 MMOL/L (ref 21–32)
CREAT SERPL-MCNC: 0.72 MG/DL (ref 0.8–1.5)
CREAT SERPL-MCNC: 0.72 MG/DL (ref 0.8–1.5)
DIFFERENTIAL METHOD BLD: ABNORMAL
EOSINOPHIL # BLD: 0 K/UL (ref 0–0.8)
EOSINOPHIL NFR BLD: 0 % (ref 0.5–7.8)
ERYTHROCYTE [DISTWIDTH] IN BLOOD BY AUTOMATED COUNT: 13.1 % (ref 11.9–14.6)
ERYTHROCYTE [DISTWIDTH] IN BLOOD BY AUTOMATED COUNT: 13.3 % (ref 11.9–14.6)
GLOBULIN SER CALC-MCNC: 3.8 G/DL (ref 2.8–4.5)
GLOBULIN SER CALC-MCNC: 3.8 G/DL (ref 2.8–4.5)
GLUCOSE SERPL-MCNC: 102 MG/DL (ref 65–100)
GLUCOSE SERPL-MCNC: 106 MG/DL (ref 65–100)
HCT VFR BLD AUTO: 39.2 % (ref 41.1–50.3)
HCT VFR BLD AUTO: 41.3 % (ref 41.1–50.3)
HGB BLD-MCNC: 13.6 G/DL (ref 13.6–17.2)
HGB BLD-MCNC: 13.9 G/DL (ref 13.6–17.2)
IMM GRANULOCYTES # BLD AUTO: 0 K/UL (ref 0–0.5)
IMM GRANULOCYTES NFR BLD AUTO: 0 % (ref 0–5)
LIPASE SERPL-CCNC: 863 U/L (ref 73–393)
LIPASE SERPL-CCNC: 936 U/L (ref 73–393)
LYMPHOCYTES # BLD: 0.9 K/UL (ref 0.5–4.6)
LYMPHOCYTES NFR BLD: 7 % (ref 13–44)
MCH RBC QN AUTO: 28 PG (ref 26.1–32.9)
MCH RBC QN AUTO: 28.6 PG (ref 26.1–32.9)
MCHC RBC AUTO-ENTMCNC: 33.7 G/DL (ref 31.4–35)
MCHC RBC AUTO-ENTMCNC: 34.7 G/DL (ref 31.4–35)
MCV RBC AUTO: 82.5 FL (ref 82–102)
MCV RBC AUTO: 83.3 FL (ref 82–102)
MONOCYTES # BLD: 1.4 K/UL (ref 0.1–1.3)
MONOCYTES NFR BLD: 11 % (ref 4–12)
NEUTS SEG # BLD: 9.9 K/UL (ref 1.7–8.2)
NEUTS SEG NFR BLD: 81 % (ref 43–78)
NRBC # BLD: 0 K/UL (ref 0–0.2)
NRBC # BLD: 0 K/UL (ref 0–0.2)
PLATELET # BLD AUTO: 373 K/UL (ref 150–450)
PLATELET # BLD AUTO: 441 K/UL (ref 150–450)
PMV BLD AUTO: 9.1 FL (ref 9.4–12.3)
PMV BLD AUTO: 9.3 FL (ref 9.4–12.3)
POTASSIUM SERPL-SCNC: 3.9 MMOL/L (ref 3.5–5.1)
POTASSIUM SERPL-SCNC: 4.1 MMOL/L (ref 3.5–5.1)
PROT SERPL-MCNC: 6.6 G/DL (ref 6.3–8.2)
PROT SERPL-MCNC: 6.7 G/DL (ref 6.3–8.2)
RBC # BLD AUTO: 4.75 M/UL (ref 4.23–5.6)
RBC # BLD AUTO: 4.96 M/UL (ref 4.23–5.6)
SODIUM SERPL-SCNC: 131 MMOL/L (ref 133–143)
SODIUM SERPL-SCNC: 131 MMOL/L (ref 133–143)
WBC # BLD AUTO: 12.3 K/UL (ref 4.3–11.1)
WBC # BLD AUTO: 13.4 K/UL (ref 4.3–11.1)

## 2022-10-27 PROCEDURE — 6370000000 HC RX 637 (ALT 250 FOR IP): Performed by: INTERNAL MEDICINE

## 2022-10-27 PROCEDURE — 85025 COMPLETE CBC W/AUTO DIFF WBC: CPT

## 2022-10-27 PROCEDURE — 80053 COMPREHEN METABOLIC PANEL: CPT

## 2022-10-27 PROCEDURE — 2580000003 HC RX 258: Performed by: FAMILY MEDICINE

## 2022-10-27 PROCEDURE — 36415 COLL VENOUS BLD VENIPUNCTURE: CPT

## 2022-10-27 PROCEDURE — 94760 N-INVAS EAR/PLS OXIMETRY 1: CPT

## 2022-10-27 PROCEDURE — 6360000002 HC RX W HCPCS: Performed by: FAMILY MEDICINE

## 2022-10-27 PROCEDURE — 2500000003 HC RX 250 WO HCPCS: Performed by: FAMILY MEDICINE

## 2022-10-27 PROCEDURE — 85027 COMPLETE CBC AUTOMATED: CPT

## 2022-10-27 PROCEDURE — 1100000000 HC RM PRIVATE

## 2022-10-27 PROCEDURE — 6360000002 HC RX W HCPCS: Performed by: INTERNAL MEDICINE

## 2022-10-27 PROCEDURE — 6370000000 HC RX 637 (ALT 250 FOR IP): Performed by: FAMILY MEDICINE

## 2022-10-27 PROCEDURE — 2580000003 HC RX 258: Performed by: INTERNAL MEDICINE

## 2022-10-27 PROCEDURE — 83690 ASSAY OF LIPASE: CPT

## 2022-10-27 PROCEDURE — 6370000000 HC RX 637 (ALT 250 FOR IP): Performed by: HOSPITALIST

## 2022-10-27 RX ORDER — LORAZEPAM 2 MG/ML
1 INJECTION INTRAMUSCULAR EVERY 6 HOURS PRN
Status: DISCONTINUED | OUTPATIENT
Start: 2022-10-27 | End: 2022-10-28

## 2022-10-27 RX ORDER — NALOXONE HYDROCHLORIDE 0.4 MG/ML
0.4 INJECTION, SOLUTION INTRAMUSCULAR; INTRAVENOUS; SUBCUTANEOUS PRN
Status: DISCONTINUED | OUTPATIENT
Start: 2022-10-27 | End: 2022-10-31 | Stop reason: HOSPADM

## 2022-10-27 RX ORDER — DIAZEPAM 5 MG/ML
5 INJECTION, SOLUTION INTRAMUSCULAR; INTRAVENOUS EVERY 4 HOURS PRN
Status: DISCONTINUED | OUTPATIENT
Start: 2022-10-27 | End: 2022-10-27 | Stop reason: RX

## 2022-10-27 RX ORDER — PROCHLORPERAZINE EDISYLATE 5 MG/ML
10 INJECTION INTRAMUSCULAR; INTRAVENOUS EVERY 6 HOURS PRN
Status: DISCONTINUED | OUTPATIENT
Start: 2022-10-27 | End: 2022-10-31 | Stop reason: HOSPADM

## 2022-10-27 RX ORDER — PROMETHAZINE HYDROCHLORIDE 25 MG/ML
12.5 INJECTION, SOLUTION INTRAMUSCULAR; INTRAVENOUS ONCE
Status: DISCONTINUED | OUTPATIENT
Start: 2022-10-27 | End: 2022-10-27 | Stop reason: RX

## 2022-10-27 RX ORDER — HYDROMORPHONE HYDROCHLORIDE 1 MG/ML
1 INJECTION, SOLUTION INTRAMUSCULAR; INTRAVENOUS; SUBCUTANEOUS ONCE
Status: COMPLETED | OUTPATIENT
Start: 2022-10-27 | End: 2022-10-27

## 2022-10-27 RX ORDER — HYDROMORPHONE HYDROCHLORIDE 1 MG/ML
1 INJECTION, SOLUTION INTRAMUSCULAR; INTRAVENOUS; SUBCUTANEOUS
Status: DISCONTINUED | OUTPATIENT
Start: 2022-10-27 | End: 2022-10-29

## 2022-10-27 RX ORDER — HYDROMORPHONE HYDROCHLORIDE 1 MG/ML
2 INJECTION, SOLUTION INTRAMUSCULAR; INTRAVENOUS; SUBCUTANEOUS EVERY 4 HOURS PRN
Status: DISCONTINUED | OUTPATIENT
Start: 2022-10-27 | End: 2022-10-29

## 2022-10-27 RX ORDER — PROMETHAZINE HYDROCHLORIDE 25 MG/ML
12.5 INJECTION, SOLUTION INTRAMUSCULAR; INTRAVENOUS EVERY 6 HOURS PRN
Status: DISCONTINUED | OUTPATIENT
Start: 2022-10-27 | End: 2022-10-27 | Stop reason: RX

## 2022-10-27 RX ADMIN — PROCHLORPERAZINE EDISYLATE 10 MG: 5 INJECTION INTRAMUSCULAR; INTRAVENOUS at 18:20

## 2022-10-27 RX ADMIN — HYDROMORPHONE HYDROCHLORIDE 1 MG: 1 INJECTION, SOLUTION INTRAMUSCULAR; INTRAVENOUS; SUBCUTANEOUS at 09:37

## 2022-10-27 RX ADMIN — HYDROMORPHONE HYDROCHLORIDE 1 MG: 1 INJECTION, SOLUTION INTRAMUSCULAR; INTRAVENOUS; SUBCUTANEOUS at 13:07

## 2022-10-27 RX ADMIN — ONDANSETRON 4 MG: 2 INJECTION INTRAMUSCULAR; INTRAVENOUS at 03:01

## 2022-10-27 RX ADMIN — ONDANSETRON 4 MG: 2 INJECTION INTRAMUSCULAR; INTRAVENOUS at 15:20

## 2022-10-27 RX ADMIN — LORAZEPAM 1 MG: 2 INJECTION INTRAMUSCULAR at 18:56

## 2022-10-27 RX ADMIN — LISINOPRIL 40 MG: 20 TABLET ORAL at 09:38

## 2022-10-27 RX ADMIN — HYDROMORPHONE HYDROCHLORIDE 1 MG: 1 INJECTION, SOLUTION INTRAMUSCULAR; INTRAVENOUS; SUBCUTANEOUS at 04:01

## 2022-10-27 RX ADMIN — ENOXAPARIN SODIUM 40 MG: 100 INJECTION SUBCUTANEOUS at 13:26

## 2022-10-27 RX ADMIN — HYDROMORPHONE HYDROCHLORIDE 1 MG: 1 INJECTION, SOLUTION INTRAMUSCULAR; INTRAVENOUS; SUBCUTANEOUS at 15:53

## 2022-10-27 RX ADMIN — LORAZEPAM 0.5 MG: 0.5 TABLET ORAL at 13:26

## 2022-10-27 RX ADMIN — SODIUM CHLORIDE, PRESERVATIVE FREE 20 MG: 5 INJECTION INTRAVENOUS at 08:48

## 2022-10-27 RX ADMIN — HYDROMORPHONE HYDROCHLORIDE 1 MG: 1 INJECTION, SOLUTION INTRAMUSCULAR; INTRAVENOUS; SUBCUTANEOUS at 23:06

## 2022-10-27 RX ADMIN — HYDROMORPHONE HYDROCHLORIDE 1 MG: 1 INJECTION, SOLUTION INTRAMUSCULAR; INTRAVENOUS; SUBCUTANEOUS at 06:46

## 2022-10-27 RX ADMIN — HYDROMORPHONE HYDROCHLORIDE 1 MG: 1 INJECTION, SOLUTION INTRAMUSCULAR; INTRAVENOUS; SUBCUTANEOUS at 18:05

## 2022-10-27 RX ADMIN — SODIUM CHLORIDE, PRESERVATIVE FREE 20 MG: 5 INJECTION INTRAVENOUS at 19:41

## 2022-10-27 RX ADMIN — HYDROMORPHONE HYDROCHLORIDE 1 MG: 1 INJECTION, SOLUTION INTRAMUSCULAR; INTRAVENOUS; SUBCUTANEOUS at 01:04

## 2022-10-27 RX ADMIN — SODIUM CHLORIDE, SODIUM LACTATE, POTASSIUM CHLORIDE, AND CALCIUM CHLORIDE: 600; 310; 30; 20 INJECTION, SOLUTION INTRAVENOUS at 23:08

## 2022-10-27 RX ADMIN — SODIUM CHLORIDE, SODIUM LACTATE, POTASSIUM CHLORIDE, AND CALCIUM CHLORIDE: 600; 310; 30; 20 INJECTION, SOLUTION INTRAVENOUS at 17:18

## 2022-10-27 RX ADMIN — SODIUM CHLORIDE, PRESERVATIVE FREE 10 ML: 5 INJECTION INTRAVENOUS at 19:44

## 2022-10-27 RX ADMIN — Medication 100 MG: at 09:38

## 2022-10-27 RX ADMIN — HYDROMORPHONE HYDROCHLORIDE 1 MG: 1 INJECTION, SOLUTION INTRAMUSCULAR; INTRAVENOUS; SUBCUTANEOUS at 19:41

## 2022-10-27 RX ADMIN — ONDANSETRON 4 MG: 2 INJECTION INTRAMUSCULAR; INTRAVENOUS at 08:56

## 2022-10-27 RX ADMIN — SODIUM CHLORIDE, SODIUM LACTATE, POTASSIUM CHLORIDE, AND CALCIUM CHLORIDE: 600; 310; 30; 20 INJECTION, SOLUTION INTRAVENOUS at 10:49

## 2022-10-27 RX ADMIN — HYDROMORPHONE HYDROCHLORIDE 2 MG: 1 INJECTION, SOLUTION INTRAMUSCULAR; INTRAVENOUS; SUBCUTANEOUS at 21:05

## 2022-10-27 RX ADMIN — AMLODIPINE BESYLATE 10 MG: 10 TABLET ORAL at 09:38

## 2022-10-27 RX ADMIN — SODIUM CHLORIDE, SODIUM LACTATE, POTASSIUM CHLORIDE, AND CALCIUM CHLORIDE: 600; 310; 30; 20 INJECTION, SOLUTION INTRAVENOUS at 04:00

## 2022-10-27 ASSESSMENT — PAIN DESCRIPTION - LOCATION
LOCATION: ABDOMEN

## 2022-10-27 ASSESSMENT — PAIN SCALES - GENERAL
PAINLEVEL_OUTOF10: 9
PAINLEVEL_OUTOF10: 10
PAINLEVEL_OUTOF10: 9
PAINLEVEL_OUTOF10: 8
PAINLEVEL_OUTOF10: 4
PAINLEVEL_OUTOF10: 10

## 2022-10-27 ASSESSMENT — PAIN DESCRIPTION - ORIENTATION: ORIENTATION: RIGHT;LEFT

## 2022-10-27 ASSESSMENT — PAIN DESCRIPTION - DESCRIPTORS
DESCRIPTORS: ACHING

## 2022-10-27 ASSESSMENT — PAIN - FUNCTIONAL ASSESSMENT: PAIN_FUNCTIONAL_ASSESSMENT: PREVENTS OR INTERFERES SOME ACTIVE ACTIVITIES AND ADLS

## 2022-10-27 ASSESSMENT — PAIN DESCRIPTION - PAIN TYPE: TYPE: ACUTE PAIN

## 2022-10-27 NOTE — PLAN OF CARE
Problem: Discharge Planning  Goal: Discharge to home or other facility with appropriate resources  Outcome: Progressing     Problem: Pain  Goal: Verbalizes/displays adequate comfort level or baseline comfort level  Outcome: Progressing   Still with nausea/vomiting, dry heaves, pain, poor appetite.

## 2022-10-27 NOTE — PROGRESS NOTES
Hospitalist Progress Note   Admit Date:  10/25/2022  1:02 PM   Name:  Lazaro Thorne   Age:  44 y.o. Sex:  male  :  1985   MRN:  [id number]   Room:  [facility]/01    Presenting Complaint: No chief complaint on file. Reason(s) for Admission: Abdominal pain [R10.9]     Hospital Course:     Copied from admission history and physical HPI:  Lazaro Thorne is a 44 y.o. male with medical history of chronic pancreatitis, hypertension who presented with severe abdominal pain for 1 week. He has periodic flares of his pancreatitis. He knew not to eat and to try and manage at home with his pain medication. He thought he was getting better but then got severely worse and presented to the ED for evaluation. In the emergency department he underwent CT abdomen showing ascites and pseudocysts. Admitted for further evaluation and management. Subjective & 24hr Events (10/27/22): Claiming duration of IV Dilaudid every 3 hours 1.5 mg needed more frequently. Discussed with him high dose of IV narcotics equivalent to 10 mg IV every 3 hours morphine sulfate. No significant tachycardia or hypotension-we will continue current regimen regarding risk/benefit issues. Respiratory rate is stable. Patient alert. Lipase not significantly improved. GI consult reviewed and felt that etiology is alcohol. Increased CBC noted but no high fevers. No vomiting. No chest pain. No shaking chills. Review of Systems:  10 systems reviewed and negative except as noted in HPI. Assessment & Plan:   * Acute on chronic pancreatitis [facility])  Assessment & Plan  Third episode this year of acute pancreatitis. Unable to tolerate any alcohol without causing flare. 10/26--likely alcohol induced. Continue current care-not advancing diet given persistent pain and persistent elevated lipase. If no better next 24 hours consider reimaging and ask GI for repeat opinion? Follow-up CBC.      Alcohol use disorder, severe, dependence [facility])  Assessment & Plan  10/27--continue thiamine and as needed Ativan oral-would need to convert to IV if clinically worsens. No significant withdrawal symptoms as of this time. Abnormal CT of the abdomen  Assessment & Plan  CT noted pseudocysts and ascites  10/27--as above follow-up leukocytosis and if pain not better consider reimage     Chronic pancreatitis [facility])  Assessment & Plan  10/26--known to [id number] and recommendations appreciated. Primary hypertension  Assessment & Plan  - Amlodipine, lisinopril  10/26-continue same               Anticipated discharge needs:   Home with outpatient follow-up     Diet: Clear liquids. VTE ppx: Enoxaparin  Code status: Full Code              Hospital Problems:  Principal Problem:    Acute on chronic pancreatitis ([facility] [address]  Active Problems:    Abnormal CT of the abdomen    Alcohol use disorder, severe, dependence (HCC)    Abdominal pain    Primary hypertension    Chronic pancreatitis ([facility] [address]  Resolved Problems:    * No resolved hospital problems. *      Objective:   Patient Vitals for the past 24 hrs:   Temp Pulse Resp BP SpO2   10/27/22 1016 -- 65 -- -- 98 %   10/27/22 0734 98.4 °F (36.9 °C) 81 18 134/88 96 %   10/27/22 0431 -- -- 17 -- --   10/27/22 0340 97.7 °F (36.5 °C) 94 18 (!) 150/98 99 %   10/27/22 0134 -- -- 16 -- --   10/26/22 2347 -- -- 16 -- --   10/26/22 2235 98.6 °F (37 °C) 78 16 (!) 148/88 97 %   10/26/22 2219 -- -- 16 -- --   10/26/22 1953 98.2 °F (36.8 °C) 77 16 (!) 143/90 97 %   10/26/22 1929 -- -- 17 -- --   10/26/22 1520 98.2 °F (36.8 °C) 99 18 (!) 142/90 97 %         Oxygen Therapy  SpO2: 98 %  Pulse Oximeter Device Mode: Intermittent  Pulse Oximeter Device Location: Right, Finger  O2 Device: None (Room air)    Estimated body mass index is 26.54 kg/m² as calculated from the following:    Height as of an earlier encounter on 10/25/22: 5' 10\" (1.778 m). Weight as of an earlier encounter on 10/25/22: 185 lb (83.9 kg).     Intake/Output Summary (Last 24 hours) at 10/27/2022 1153  Last data filed at 10/27/2022 0901  Gross per 24 hour   Intake 2840 ml   Output 150 ml   Net 2690 ml           Physical Exam:     Blood pressure 134/88, pulse 65, temperature 98.4 °F (36.9 °C), temperature source Oral, resp. rate 18, SpO2 98 %. physical Exam:   General:                      Alert, cooperative, no distress, appears stated age. Eyes:                                           Conjunctivae/corneas clear. Pupils equally round and reactive to light, extraocular movements intact. Lungs:                       Clear to auscultation bilaterally. No wheezing. Heart:                     Regular rate and rhythm, S1, S2 normal, no murmur, click, rub or gallop. Abdomen:                       Soft, non-tender. Bowel sounds normal. No masses,  No organomegaly. Extremities:                     Extremities normal, atraumatic, no cyanosis or increased peripheral edema. Neurologic:                     CNII-XII intact. Normal strength, sensation and reflexes throughout. Lab/Data Review: All lab results for the last 24 hours reviewed.          I have personally reviewed labs and tests showing:  Recent Labs:  Recent Results (from the past 48 hour(s))   Triglyceride    Collection Time: 10/26/22  5:58 AM   Result Value Ref Range    Triglycerides 140 35 - 150 MG/DL   CBC    Collection Time: 10/26/22  5:58 AM   Result Value Ref Range    WBC 7.6 4.3 - 11.1 K/uL    RBC 4.83 4.23 - 5.6 M/uL    Hemoglobin 13.6 13.6 - 17.2 g/dL    Hematocrit 40.6 (L) 41.1 - 50.3 %    MCV 84.1 82.0 - 102.0 FL    MCH 28.2 26.1 - 32.9 PG    MCHC 33.5 31.4 - 35.0 g/dL    RDW 13.4 11.9 - 14.6 %    Platelets 805 612 - 751 K/uL    MPV 9.0 (L) 9.4 - 12.3 FL    nRBC 0.00 0.0 - 0.2 K/uL   Comprehensive Metabolic Panel w/ Reflex to MG    Collection Time: 10/26/22  5:58 AM   Result Value Ref Range    Sodium 131 (L) 133 - 143 mmol/L    Potassium 4.5 3.5 - 5.1 mmol/L    Chloride 100 (L) 101 - 110 mmol/L CO2 24 21 - 32 mmol/L    Anion Gap 7 2 - 11 mmol/L    Glucose 84 65 - 100 mg/dL    BUN 10 6 - 23 MG/DL    Creatinine 0.75 (L) 0.8 - 1.5 MG/DL    Est, Glom Filt Rate >60 >60 ml/min/1.73m2    Calcium 9.0 8.3 - 10.4 MG/DL    Total Bilirubin 0.6 0.2 - 1.1 MG/DL    ALT 17 12 - 65 U/L    AST 13 (L) 15 - 37 U/L    Alk Phosphatase 49 (L) 50 - 136 U/L    Total Protein 6.1 (L) 6.3 - 8.2 g/dL    Albumin 2.7 (L) 3.5 - 5.0 g/dL    Globulin 3.4 2.8 - 4.5 g/dL    Albumin/Globulin Ratio 0.8 0.4 - 1.6     Hemoglobin A1C    Collection Time: 10/26/22  5:58 AM   Result Value Ref Range    Hemoglobin A1C 5.8 (H) 4.8 - 5.6 %    eAG 120 mg/dL   TSH with Reflex    Collection Time: 10/26/22  5:58 AM   Result Value Ref Range    TSH w Free Thyroid if Abnormal 2.38 0.358 - 3.740 UIU/ML   CBC    Collection Time: 10/27/22  6:05 AM   Result Value Ref Range    WBC 13.4 (H) 4.3 - 11.1 K/uL    RBC 4.96 4.23 - 5.6 M/uL    Hemoglobin 13.9 13.6 - 17.2 g/dL    Hematocrit 41.3 41.1 - 50.3 %    MCV 83.3 82.0 - 102.0 FL    MCH 28.0 26.1 - 32.9 PG    MCHC 33.7 31.4 - 35.0 g/dL    RDW 13.3 11.9 - 14.6 %    Platelets 402 228 - 074 K/uL    MPV 9.3 (L) 9.4 - 12.3 FL    nRBC 0.00 0.0 - 0.2 K/uL   Comprehensive Metabolic Panel w/ Reflex to MG    Collection Time: 10/27/22  6:05 AM   Result Value Ref Range    Sodium 131 (L) 133 - 143 mmol/L    Potassium 4.1 3.5 - 5.1 mmol/L    Chloride 100 (L) 101 - 110 mmol/L    CO2 24 21 - 32 mmol/L    Anion Gap 7 2 - 11 mmol/L    Glucose 102 (H) 65 - 100 mg/dL    BUN 9 6 - 23 MG/DL    Creatinine 0.72 (L) 0.8 - 1.5 MG/DL    Est, Glom Filt Rate >60 >60 ml/min/1.73m2    Calcium 8.7 8.3 - 10.4 MG/DL    Total Bilirubin 0.6 0.2 - 1.1 MG/DL    ALT 20 12 - 65 U/L    AST 15 15 - 37 U/L    Alk Phosphatase 49 (L) 50 - 136 U/L    Total Protein 6.7 6.3 - 8.2 g/dL    Albumin 2.9 (L) 3.5 - 5.0 g/dL    Globulin 3.8 2.8 - 4.5 g/dL    Albumin/Globulin Ratio 0.8 0.4 - 1.6     Lipase    Collection Time: 10/27/22  6:05 AM   Result Value Ref Range Lipase 936 (H) 73 - 393 U/L       I have personally reviewed imaging studies showing: Other Studies:  No orders to display       Current Meds:  Current Facility-Administered Medications   Medication Dose Route Frequency    LORazepam (ATIVAN) tablet 0.5 mg  0.5 mg Oral Q6H PRN    nicotine (NICODERM CQ) 14 MG/24HR 1 patch  1 patch TransDERmal Daily    amLODIPine (NORVASC) tablet 10 mg  10 mg Oral Daily    lisinopril (PRINIVIL;ZESTRIL) tablet 40 mg  40 mg Oral Daily    thiamine tablet 100 mg  100 mg Oral Daily    lactated ringers infusion   IntraVENous Continuous    sodium chloride flush 0.9 % injection 5-40 mL  5-40 mL IntraVENous 2 times per day    sodium chloride flush 0.9 % injection 5-40 mL  5-40 mL IntraVENous PRN    0.9 % sodium chloride infusion   IntraVENous PRN    ondansetron (ZOFRAN-ODT) disintegrating tablet 4 mg  4 mg Oral Q8H PRN    Or    ondansetron (ZOFRAN) injection 4 mg  4 mg IntraVENous Q6H PRN    enoxaparin (LOVENOX) injection 40 mg  40 mg SubCUTAneous Q24H    magnesium sulfate 2000 mg in 50 mL IVPB premix  2,000 mg IntraVENous PRN    potassium chloride (KLOR-CON M) extended release tablet 40 mEq  40 mEq Oral PRN    Or    potassium bicarb-citric acid (EFFER-K) effervescent tablet 40 mEq  40 mEq Oral PRN    Or    potassium chloride 10 mEq/100 mL IVPB (Peripheral Line)  10 mEq IntraVENous PRN    famotidine (PEPCID) 20 mg in sodium chloride (PF) 10 mL injection  20 mg IntraVENous BID    HYDROmorphone HCl PF (DILAUDID) injection 0.5 mg  0.5 mg IntraVENous Q3H PRN    Or    HYDROmorphone HCl PF (DILAUDID) injection 1 mg  1 mg IntraVENous Q3H PRN       Signed:  Olga Shipman DO    Part of this note may have been written by using a voice dictation software. The note has been proof read but may still contain some grammatical/other typographical errors.

## 2022-10-27 NOTE — PROGRESS NOTES
[facility] Progress Note         Admit Date:  10/25/2022    Today's Date:  10/27/2022    CC:  chronic pancreatitis    Subjective:     Patient actively vomiting and in pain during assessment. Medications:   Current Facility-Administered Medications   Medication Dose Route Frequency    LORazepam (ATIVAN) tablet 0.5 mg  0.5 mg Oral Q6H PRN    nicotine (NICODERM CQ) 14 MG/24HR 1 patch  1 patch TransDERmal Daily    amLODIPine (NORVASC) tablet 10 mg  10 mg Oral Daily    lisinopril (PRINIVIL;ZESTRIL) tablet 40 mg  40 mg Oral Daily    thiamine tablet 100 mg  100 mg Oral Daily    lactated ringers infusion   IntraVENous Continuous    sodium chloride flush 0.9 % injection 5-40 mL  5-40 mL IntraVENous 2 times per day    sodium chloride flush 0.9 % injection 5-40 mL  5-40 mL IntraVENous PRN    0.9 % sodium chloride infusion   IntraVENous PRN    ondansetron (ZOFRAN-ODT) disintegrating tablet 4 mg  4 mg Oral Q8H PRN    Or    ondansetron (ZOFRAN) injection 4 mg  4 mg IntraVENous Q6H PRN    enoxaparin (LOVENOX) injection 40 mg  40 mg SubCUTAneous Q24H    magnesium sulfate 2000 mg in 50 mL IVPB premix  2,000 mg IntraVENous PRN    potassium chloride (KLOR-CON M) extended release tablet 40 mEq  40 mEq Oral PRN    Or    potassium bicarb-citric acid (EFFER-K) effervescent tablet 40 mEq  40 mEq Oral PRN    Or    potassium chloride 10 mEq/100 mL IVPB (Peripheral Line)  10 mEq IntraVENous PRN    famotidine (PEPCID) 20 mg in sodium chloride (PF) 10 mL injection  20 mg IntraVENous BID    HYDROmorphone HCl PF (DILAUDID) injection 0.5 mg  0.5 mg IntraVENous Q3H PRN    Or    HYDROmorphone HCl PF (DILAUDID) injection 1 mg  1 mg IntraVENous Q3H PRN       Review of Systems:  ROS was obtained, with pertinent positives as listed above. No chest pain or SOB.     Diet: NPO    Objective:   Vitals:  /88   Pulse 65   Temp 98.4 °F (36.9 °C) (Oral)   Resp 18   SpO2 98%   Intake/Output:  10/27 0701 - 10/27 1900  In: 2000 [I.V.:2000]  Out: 150   10/25 1901 - 10/27 0700  In: 840 [P.O.:840]  Out: -   Exam:  General appearance: uncomfortable, actively vomiting unable to complete assessment   Neuro:  alert and oriented    Data Review (Labs):    Recent Labs     10/25/22  0750 10/26/22  0558 10/27/22  0605   WBC 9.7 7.6 13.4*   HGB 15.5 13.6 13.9   HCT 44.5 40.6* 41.3   * 433 441   MCV 83.3 84.1 83.3    131* 131*   K 4.3 4.5 4.1   CL 95* 100* 100*   CO2 22 24 24   BUN 9 10 9   CREATININE 0.82 0.75* 0.72*   CALCIUM 9.2 9.0 8.7   MG 1.8  --   --    PHOS 2.8  --   --    GLUCOSE 92 84 102*   ALKPHOS 56 49* 49*   AST 18 13* 15   ALT 11* 17 20   BILITOT 0.5 0.6 0.6   ALBUMIN 1.2 0.8 0.8   PROT 7.1 6.1* 6.7   LIPASE 1,018*  --  936*       Assessment:     Principal Problem:    Acute on chronic pancreatitis (HCC)  Active Problems:    Abnormal CT of the abdomen    Alcohol use disorder, severe, dependence (HCC)    Abdominal pain    Primary hypertension    Chronic pancreatitis (HCC)  Resolved Problems:    * No resolved hospital problems. *  Patient is a 44 y.o. male patient known to Dr Kev Cameron with PMH including but not limited to chronic pancreatitis and HTN , who is seen in consultation at the request of Dr. Lisa Youngblood for chronic pancreatis, third episode this year. CT abdomen pelvis performed which reveals multiple pseudocysts and mild inflammatory changes and mild ascites. Onset of these sx after drinking wine. Etiologies of pancreatitis include: Drug induced, hypertriglyceridemia, Gallstones, ETOH, biliary sludge, Trauma, autoimmune. Given his history and recent recurrence after ingesting wine, this is clearly ETOH induced. Continues pain, patient receiving 1.5 mg Dilauded Q 3 hours and this does not work for longer than an hour per nursing staff.        Plan:     Continue supportive iv fluids and pain medication per primary team  Continue PPI  ETOH abstinence discussed  May need psuedocyst drainage    Patient is seen and examined in collaboration with Dr. Steven Lock. Assessment and plan as per Dr. Steven Lock.

## 2022-10-28 LAB
ALBUMIN SERPL-MCNC: 2.4 G/DL (ref 3.5–5)
ALBUMIN/GLOB SERPL: 0.7 {RATIO} (ref 0.4–1.6)
ALP SERPL-CCNC: 37 U/L (ref 50–136)
ALT SERPL-CCNC: 15 U/L (ref 12–65)
ANION GAP SERPL CALC-SCNC: 7 MMOL/L (ref 2–11)
AST SERPL-CCNC: 13 U/L (ref 15–37)
BILIRUB SERPL-MCNC: 0.8 MG/DL (ref 0.2–1.1)
BUN SERPL-MCNC: 23 MG/DL (ref 6–23)
CALCIUM SERPL-MCNC: 8.6 MG/DL (ref 8.3–10.4)
CHLORIDE SERPL-SCNC: 99 MMOL/L (ref 101–110)
CO2 SERPL-SCNC: 25 MMOL/L (ref 21–32)
CREAT SERPL-MCNC: 1.6 MG/DL (ref 0.8–1.5)
ERYTHROCYTE [DISTWIDTH] IN BLOOD BY AUTOMATED COUNT: 13.2 % (ref 11.9–14.6)
GLOBULIN SER CALC-MCNC: 3.6 G/DL (ref 2.8–4.5)
GLUCOSE SERPL-MCNC: 127 MG/DL (ref 65–100)
HCT VFR BLD AUTO: 39.8 % (ref 41.1–50.3)
HGB BLD-MCNC: 13.7 G/DL (ref 13.6–17.2)
LIPASE SERPL-CCNC: 2196 U/L (ref 73–393)
MCH RBC QN AUTO: 28.8 PG (ref 26.1–32.9)
MCHC RBC AUTO-ENTMCNC: 34.4 G/DL (ref 31.4–35)
MCV RBC AUTO: 83.8 FL (ref 82–102)
NRBC # BLD: 0 K/UL (ref 0–0.2)
PLATELET # BLD AUTO: 449 K/UL (ref 150–450)
PMV BLD AUTO: 9.1 FL (ref 9.4–12.3)
POTASSIUM SERPL-SCNC: 4.8 MMOL/L (ref 3.5–5.1)
PROT SERPL-MCNC: 6 G/DL (ref 6.3–8.2)
RBC # BLD AUTO: 4.75 M/UL (ref 4.23–5.6)
SODIUM SERPL-SCNC: 131 MMOL/L (ref 133–143)
WBC # BLD AUTO: 12.6 K/UL (ref 4.3–11.1)

## 2022-10-28 PROCEDURE — 2580000003 HC RX 258: Performed by: INTERNAL MEDICINE

## 2022-10-28 PROCEDURE — A4216 STERILE WATER/SALINE, 10 ML: HCPCS | Performed by: FAMILY MEDICINE

## 2022-10-28 PROCEDURE — 6360000002 HC RX W HCPCS: Performed by: INTERNAL MEDICINE

## 2022-10-28 PROCEDURE — 2580000003 HC RX 258: Performed by: FAMILY MEDICINE

## 2022-10-28 PROCEDURE — 6370000000 HC RX 637 (ALT 250 FOR IP): Performed by: FAMILY MEDICINE

## 2022-10-28 PROCEDURE — 1100000000 HC RM PRIVATE

## 2022-10-28 PROCEDURE — 80053 COMPREHEN METABOLIC PANEL: CPT

## 2022-10-28 PROCEDURE — 36415 COLL VENOUS BLD VENIPUNCTURE: CPT

## 2022-10-28 PROCEDURE — 6360000002 HC RX W HCPCS: Performed by: FAMILY MEDICINE

## 2022-10-28 PROCEDURE — 2500000003 HC RX 250 WO HCPCS: Performed by: FAMILY MEDICINE

## 2022-10-28 PROCEDURE — 6370000000 HC RX 637 (ALT 250 FOR IP): Performed by: NURSE PRACTITIONER

## 2022-10-28 PROCEDURE — 83690 ASSAY OF LIPASE: CPT

## 2022-10-28 PROCEDURE — 85027 COMPLETE CBC AUTOMATED: CPT

## 2022-10-28 RX ORDER — BISACODYL 10 MG
10 SUPPOSITORY, RECTAL RECTAL DAILY PRN
Status: DISCONTINUED | OUTPATIENT
Start: 2022-10-28 | End: 2022-10-29 | Stop reason: SDUPTHER

## 2022-10-28 RX ORDER — POLYETHYLENE GLYCOL 3350 17 G/17G
17 POWDER, FOR SOLUTION ORAL 2 TIMES DAILY
Status: DISCONTINUED | OUTPATIENT
Start: 2022-10-28 | End: 2022-10-31 | Stop reason: HOSPADM

## 2022-10-28 RX ORDER — LORAZEPAM 2 MG/ML
1 INJECTION INTRAMUSCULAR EVERY 6 HOURS PRN
Status: DISCONTINUED | OUTPATIENT
Start: 2022-10-28 | End: 2022-10-31 | Stop reason: HOSPADM

## 2022-10-28 RX ADMIN — HYDROMORPHONE HYDROCHLORIDE 2 MG: 1 INJECTION, SOLUTION INTRAMUSCULAR; INTRAVENOUS; SUBCUTANEOUS at 20:59

## 2022-10-28 RX ADMIN — HYDROMORPHONE HYDROCHLORIDE 2 MG: 1 INJECTION, SOLUTION INTRAMUSCULAR; INTRAVENOUS; SUBCUTANEOUS at 01:05

## 2022-10-28 RX ADMIN — HYDROMORPHONE HYDROCHLORIDE 2 MG: 1 INJECTION, SOLUTION INTRAMUSCULAR; INTRAVENOUS; SUBCUTANEOUS at 16:38

## 2022-10-28 RX ADMIN — POLYETHYLENE GLYCOL 3350 17 G: 17 POWDER, FOR SOLUTION ORAL at 20:11

## 2022-10-28 RX ADMIN — HYDROMORPHONE HYDROCHLORIDE 1 MG: 1 INJECTION, SOLUTION INTRAMUSCULAR; INTRAVENOUS; SUBCUTANEOUS at 08:13

## 2022-10-28 RX ADMIN — Medication 100 MG: at 08:14

## 2022-10-28 RX ADMIN — SODIUM CHLORIDE, SODIUM LACTATE, POTASSIUM CHLORIDE, AND CALCIUM CHLORIDE: 600; 310; 30; 20 INJECTION, SOLUTION INTRAVENOUS at 11:33

## 2022-10-28 RX ADMIN — HYDROMORPHONE HYDROCHLORIDE 1 MG: 1 INJECTION, SOLUTION INTRAMUSCULAR; INTRAVENOUS; SUBCUTANEOUS at 18:32

## 2022-10-28 RX ADMIN — HYDROMORPHONE HYDROCHLORIDE 1 MG: 1 INJECTION, SOLUTION INTRAMUSCULAR; INTRAVENOUS; SUBCUTANEOUS at 14:09

## 2022-10-28 RX ADMIN — HYDROMORPHONE HYDROCHLORIDE 1 MG: 1 INJECTION, SOLUTION INTRAMUSCULAR; INTRAVENOUS; SUBCUTANEOUS at 03:17

## 2022-10-28 RX ADMIN — HYDROMORPHONE HYDROCHLORIDE 2 MG: 1 INJECTION, SOLUTION INTRAMUSCULAR; INTRAVENOUS; SUBCUTANEOUS at 06:15

## 2022-10-28 RX ADMIN — SODIUM CHLORIDE, SODIUM LACTATE, POTASSIUM CHLORIDE, AND CALCIUM CHLORIDE: 600; 310; 30; 20 INJECTION, SOLUTION INTRAVENOUS at 16:44

## 2022-10-28 RX ADMIN — ENOXAPARIN SODIUM 40 MG: 100 INJECTION SUBCUTANEOUS at 14:08

## 2022-10-28 RX ADMIN — SODIUM CHLORIDE, PRESERVATIVE FREE 20 MG: 5 INJECTION INTRAVENOUS at 20:11

## 2022-10-28 RX ADMIN — SODIUM CHLORIDE, SODIUM LACTATE, POTASSIUM CHLORIDE, AND CALCIUM CHLORIDE: 600; 310; 30; 20 INJECTION, SOLUTION INTRAVENOUS at 03:22

## 2022-10-28 RX ADMIN — SODIUM CHLORIDE, PRESERVATIVE FREE 10 ML: 5 INJECTION INTRAVENOUS at 20:11

## 2022-10-28 RX ADMIN — SODIUM CHLORIDE, PRESERVATIVE FREE 20 MG: 5 INJECTION INTRAVENOUS at 08:14

## 2022-10-28 RX ADMIN — HYDROMORPHONE HYDROCHLORIDE 1 MG: 1 INJECTION, SOLUTION INTRAMUSCULAR; INTRAVENOUS; SUBCUTANEOUS at 11:30

## 2022-10-28 ASSESSMENT — PAIN DESCRIPTION - ORIENTATION
ORIENTATION: UPPER

## 2022-10-28 ASSESSMENT — PAIN DESCRIPTION - LOCATION
LOCATION: ABDOMEN

## 2022-10-28 ASSESSMENT — PAIN SCALES - GENERAL
PAINLEVEL_OUTOF10: 7
PAINLEVEL_OUTOF10: 8
PAINLEVEL_OUTOF10: 7
PAINLEVEL_OUTOF10: 5
PAINLEVEL_OUTOF10: 4
PAINLEVEL_OUTOF10: 7
PAINLEVEL_OUTOF10: 8
PAINLEVEL_OUTOF10: 0
PAINLEVEL_OUTOF10: 3
PAINLEVEL_OUTOF10: 5
PAINLEVEL_OUTOF10: 8
PAINLEVEL_OUTOF10: 4
PAINLEVEL_OUTOF10: 4

## 2022-10-28 ASSESSMENT — PAIN DESCRIPTION - DIRECTION
RADIATING_TOWARDS: LEFT TO RIGHT
RADIATING_TOWARDS: LEFT AND RIGHT

## 2022-10-28 ASSESSMENT — PAIN DESCRIPTION - DESCRIPTORS
DESCRIPTORS: SHARP
DESCRIPTORS: ACHING
DESCRIPTORS: SHARP
DESCRIPTORS: ACHING
DESCRIPTORS: SHARP
DESCRIPTORS: ACHING

## 2022-10-28 NOTE — PROGRESS NOTES
[facility] Progress Note         Admit Date:  10/25/2022    Today's Date:  10/28/2022    CC:  chronic pancreatitis    Subjective:     Patient was seen by MD    Medications:   Current Facility-Administered Medications   Medication Dose Route Frequency    HYDROmorphone HCl PF (DILAUDID) injection 1 mg  1 mg IntraVENous Q3H PRN    naloxone (NARCAN) injection 0.4 mg  0.4 mg IntraVENous PRN    prochlorperazine (COMPAZINE) injection 10 mg  10 mg IntraVENous Q6H PRN    HYDROmorphone HCl PF (DILAUDID) injection 2 mg  2 mg IntraVENous Q4H PRN    LORazepam (ATIVAN) injection 1 mg  1 mg IntraVENous Q6H PRN    nicotine (NICODERM CQ) 14 MG/24HR 1 patch  1 patch TransDERmal Daily    [Held by provider] amLODIPine (NORVASC) tablet 10 mg  10 mg Oral Daily    [Held by provider] lisinopril (PRINIVIL;ZESTRIL) tablet 40 mg  40 mg Oral Daily    thiamine tablet 100 mg  100 mg Oral Daily    lactated ringers infusion   IntraVENous Continuous    sodium chloride flush 0.9 % injection 5-40 mL  5-40 mL IntraVENous 2 times per day    sodium chloride flush 0.9 % injection 5-40 mL  5-40 mL IntraVENous PRN    0.9 % sodium chloride infusion   IntraVENous PRN    ondansetron (ZOFRAN-ODT) disintegrating tablet 4 mg  4 mg Oral Q8H PRN    Or    ondansetron (ZOFRAN) injection 4 mg  4 mg IntraVENous Q6H PRN    enoxaparin (LOVENOX) injection 40 mg  40 mg SubCUTAneous Q24H    magnesium sulfate 2000 mg in 50 mL IVPB premix  2,000 mg IntraVENous PRN    potassium chloride (KLOR-CON M) extended release tablet 40 mEq  40 mEq Oral PRN    Or    potassium bicarb-citric acid (EFFER-K) effervescent tablet 40 mEq  40 mEq Oral PRN    Or    potassium chloride 10 mEq/100 mL IVPB (Peripheral Line)  10 mEq IntraVENous PRN    famotidine (PEPCID) 20 mg in sodium chloride (PF) 10 mL injection  20 mg IntraVENous BID       Review of Systems:  ROS was obtained, with pertinent positives as listed above. No chest pain or SOB.     Diet: NPO    Objective: Vitals:  /62   Pulse 96   Temp 98.1 °F (36.7 °C) (Oral)   Resp 16   SpO2 96%   Intake/Output:  No intake/output data recorded. 10/26 1901 - 10/28 0700  In: 3840 [P.O.:840; I.V.:3000]  Out: 550   Exam:  General appearance: uncomfortable, actively vomiting unable to complete assessment   Neuro:  alert and oriented    Data Review (Labs):    Recent Labs     10/26/22  0558 10/27/22  0605 10/27/22  1831 10/28/22  0532   WBC 7.6 13.4* 12.3* 12.6*   HGB 13.6 13.9 13.6 13.7   HCT 40.6* 41.3 39.2* 39.8*    441 373 449   MCV 84.1 83.3 82.5 83.8   * 131* 131* 131*   K 4.5 4.1 3.9 4.8   * 100* 98* 99*   CO2 24 24 26 25   BUN 10 9 11 23   CREATININE 0.75* 0.72* 0.72* 1.60*   CALCIUM 9.0 8.7 8.9 8.6   GLUCOSE 84 102* 106* 127*   ALKPHOS 49* 49* 45* 37*   AST 13* 15 12* 13*   ALT 17 20 19 15   BILITOT 0.6 0.6 0.6 0.8   ALBUMIN 0.8 0.8 0.7 0.7   PROT 6.1* 6.7 6.6 6.0*   LIPASE  --  936* 863* 2,196*       Assessment:     Principal Problem:    Acute on chronic pancreatitis (HCC)  Active Problems:    Abnormal CT of the abdomen    Alcohol use disorder, severe, dependence (HCC)    Abdominal pain    Primary hypertension    Chronic pancreatitis (HCC)  Resolved Problems:    * No resolved hospital problems. *  Patient is a 44 y.o. male patient known to Dr Evan Mireles with PMH including but not limited to chronic pancreatitis and HTN , who is seen in consultation at the request of Dr. Mitch Alva for chronic pancreatis, third episode this year. CT abdomen pelvis performed which reveals multiple pseudocysts and mild inflammatory changes and mild ascites. Onset of these sx after drinking wine. Etiologies of pancreatitis include: Drug induced, hypertriglyceridemia, Gallstones, ETOH, biliary sludge, Trauma, autoimmune. Given his history and recent recurrence after ingesting wine, this is clearly ETOH induced.         White count slightly improved however lipase increased       Plan:     Continue supportive iv fluids and pain medication per primary team  Continue PPI  ETOH abstinence discussed  May need psuedocyst drainage  Miralax BID and Dulcolax suppositories    More comfortable today, increased creatinine is concerning, however I am encouraged that his wbc is stable. Continue IVF, NPO except meds    Patient is seen and examined in collaboration with Dr. Mirella Ramirez. Assessment and plan as per Dr. Mirella Ramirez.

## 2022-10-28 NOTE — PROGRESS NOTES
Shift exam complete. A/OX4. States pain to upper abdomen that radiates from left to right. PRN Dilaudid given. BP is lower, 105/62, has been trending down all night. Dr Jose G Che made aware and held BP meds. States no BM in 5 days, hyperactive BS. Bed in l/l position, call light in lap, SR X2. Ambulates in room independently.

## 2022-10-28 NOTE — PROGRESS NOTES
Hospitalist Progress Note   Admit Date:  10/25/2022  1:02 PM   Name:  Lazaro Thorne   Age:  44 y.o. Sex:  male  :  1985   MRN:  [id number]   Room:  [facility]/    Presenting Complaint: No chief complaint on file. Reason(s) for Admission: Abdominal pain [R10.9]     Hospital Course:   44 y.o. male with medical history of chronic pancreatitis, hypertension who presented with severe abdominal pain for 1 week. He has periodic flares of his pancreatitis. He knew not to eat and to try and manage at home with his pain medication. He thought he was getting better but then got severely worse and presented to the ED for evaluation. In the emergency department he underwent CT abdomen showing ascites and pseudocysts. Admitted for further evaluation and management. Subjective & 24hr Events (10/28/22): Continues to have considerable abdominal pain. No clear evidence of infection, white count remains slightly elevated but grossly unchanged. Some discussion of surgery but timing is unclear. Assessment & Plan:   * Acute on chronic pancreatitis [facility])  Assessment & Plan  Third episode this year of acute pancreatitis. Unable to tolerate any alcohol without causing flare.   -NPO, advance as tolerated  -Dilaudid IV  -Famotidine  -Consult gastroenterology  10/28/2022: Continues to require high levels of Dilaudid, may require antibiotics, white count stable    Alcohol use disorder, severe, dependence ([facility] [address]  Assessment & Plan  -  complete cessation    Abnormal CT of the abdomen  Assessment & Plan  CT noted pseudocysts and ascites    Chronic pancreatitis [facility])  Assessment & Plan  - Consult gastroenterology    Primary hypertension  Assessment & Plan  - Amlodipine, lisinopril          Anticipated discharge needs:    Home with outpatient follow-up    Diet:  Diet NPO Exceptions are: Ice Chips, Sips of Water with Meds  DVT PPx: Enoxaparin  Code status: Full Code    Hospital Problems:  Principal Problem: Acute on chronic pancreatitis (HCC)  Active Problems:    Abnormal CT of the abdomen    Alcohol use disorder, severe, dependence (HCC)    Abdominal pain    Primary hypertension    Chronic pancreatitis ([facility] [address]  Resolved Problems:    * No resolved hospital problems. *      Objective:   Patient Vitals for the past 24 hrs:   Temp Pulse Resp BP SpO2   10/28/22 0736 98.1 °F (36.7 °C) 96 16 105/62 96 %   10/28/22 0645 -- -- 17 -- --   10/28/22 0424 97.5 °F (36.4 °C) (!) 101 18 108/67 97 %   10/28/22 0347 -- -- 18 -- --   10/28/22 0135 -- -- 18 -- --   10/27/22 2336 -- -- 19 -- --   10/27/22 2300 -- 100 -- -- --   10/27/22 2256 98.4 °F (36.9 °C) (!) 124 20 117/85 96 %   10/27/22 2135 -- -- 17 -- --   10/27/22 2011 -- -- 17 -- --   10/27/22 1941 98.1 °F (36.7 °C) 88 16 137/84 99 %   10/27/22 1551 98.2 °F (36.8 °C) (!) 108 17 (!) 149/88 99 %   10/27/22 1235 97.3 °F (36.3 °C) 97 16 124/87 98 %   10/27/22 1016 -- 65 -- -- 98 %       Oxygen Therapy  SpO2: 96 %  Pulse Oximeter Device Mode: Intermittent  Pulse Oximeter Device Location: Right, Finger  O2 Device: None (Room air)    Estimated body mass index is 26.54 kg/m² as calculated from the following:    Height as of an earlier encounter on 10/25/22: 5' 10\" (1.778 m). Weight as of an earlier encounter on 10/25/22: 185 lb (83.9 kg). Intake/Output Summary (Last 24 hours) at 10/28/2022 0834  Last data filed at 10/27/2022 1729  Gross per 24 hour   Intake 3000 ml   Output 550 ml   Net 2450 ml         Blood pressure 105/62, pulse 96, temperature 98.1 °F (36.7 °C), temperature source Oral, resp. rate 16, SpO2 96 %. Physical Exam  Vitals and nursing note reviewed. Constitutional:       General: He is in acute distress (mild). Appearance: He is ill-appearing and diaphoretic. HENT:      Head: Normocephalic and atraumatic. Eyes:      Extraocular Movements: Extraocular movements intact. Cardiovascular:      Rate and Rhythm: Tachycardia present.    Pulmonary:      Effort: Pulmonary effort is normal. No respiratory distress. Abdominal:      General: There is no distension. Tenderness: There is abdominal tenderness. Musculoskeletal:         General: No deformity. Skin:     Coloration: Skin is not jaundiced or pale. Neurological:      General: No focal deficit present. Mental Status: He is alert and oriented to person, place, and time. Psychiatric:         Mood and Affect: Mood normal.         Behavior: Behavior normal. Behavior is cooperative.          I have personally reviewed labs and tests showing:  Recent Labs:  Recent Results (from the past 48 hour(s))   CBC    Collection Time: 10/27/22  6:05 AM   Result Value Ref Range    WBC 13.4 (H) 4.3 - 11.1 K/uL    RBC 4.96 4.23 - 5.6 M/uL    Hemoglobin 13.9 13.6 - 17.2 g/dL    Hematocrit 41.3 41.1 - 50.3 %    MCV 83.3 82.0 - 102.0 FL    MCH 28.0 26.1 - 32.9 PG    MCHC 33.7 31.4 - 35.0 g/dL    RDW 13.3 11.9 - 14.6 %    Platelets 624 299 - 029 K/uL    MPV 9.3 (L) 9.4 - 12.3 FL    nRBC 0.00 0.0 - 0.2 K/uL   Comprehensive Metabolic Panel w/ Reflex to MG    Collection Time: 10/27/22  6:05 AM   Result Value Ref Range    Sodium 131 (L) 133 - 143 mmol/L    Potassium 4.1 3.5 - 5.1 mmol/L    Chloride 100 (L) 101 - 110 mmol/L    CO2 24 21 - 32 mmol/L    Anion Gap 7 2 - 11 mmol/L    Glucose 102 (H) 65 - 100 mg/dL    BUN 9 6 - 23 MG/DL    Creatinine 0.72 (L) 0.8 - 1.5 MG/DL    Est, Glom Filt Rate >60 >60 ml/min/1.73m2    Calcium 8.7 8.3 - 10.4 MG/DL    Total Bilirubin 0.6 0.2 - 1.1 MG/DL    ALT 20 12 - 65 U/L    AST 15 15 - 37 U/L    Alk Phosphatase 49 (L) 50 - 136 U/L    Total Protein 6.7 6.3 - 8.2 g/dL    Albumin 2.9 (L) 3.5 - 5.0 g/dL    Globulin 3.8 2.8 - 4.5 g/dL    Albumin/Globulin Ratio 0.8 0.4 - 1.6     Lipase    Collection Time: 10/27/22  6:05 AM   Result Value Ref Range    Lipase 936 (H) 73 - 393 U/L   Comprehensive Metabolic Panel    Collection Time: 10/27/22  6:31 PM   Result Value Ref Range    Sodium 131 (L) 133 - 143 mmol/L    Potassium 3.9 3.5 - 5.1 mmol/L    Chloride 98 (L) 101 - 110 mmol/L    CO2 26 21 - 32 mmol/L    Anion Gap 7 2 - 11 mmol/L    Glucose 106 (H) 65 - 100 mg/dL    BUN 11 6 - 23 MG/DL    Creatinine 0.72 (L) 0.8 - 1.5 MG/DL    Est, Glom Filt Rate >60 >60 ml/min/1.73m2    Calcium 8.9 8.3 - 10.4 MG/DL    Total Bilirubin 0.6 0.2 - 1.1 MG/DL    ALT 19 12 - 65 U/L    AST 12 (L) 15 - 37 U/L    Alk Phosphatase 45 (L) 50 - 136 U/L    Total Protein 6.6 6.3 - 8.2 g/dL    Albumin 2.8 (L) 3.5 - 5.0 g/dL    Globulin 3.8 2.8 - 4.5 g/dL    Albumin/Globulin Ratio 0.7 0.4 - 1.6     Lipase    Collection Time: 10/27/22  6:31 PM   Result Value Ref Range    Lipase 863 (H) 73 - 393 U/L   CBC with Auto Differential    Collection Time: 10/27/22  6:31 PM   Result Value Ref Range    WBC 12.3 (H) 4.3 - 11.1 K/uL    RBC 4.75 4.23 - 5.6 M/uL    Hemoglobin 13.6 13.6 - 17.2 g/dL    Hematocrit 39.2 (L) 41.1 - 50.3 %    MCV 82.5 82.0 - 102.0 FL    MCH 28.6 26.1 - 32.9 PG    MCHC 34.7 31.4 - 35.0 g/dL    RDW 13.1 11.9 - 14.6 %    Platelets 773 582 - 073 K/uL    MPV 9.1 (L) 9.4 - 12.3 FL    nRBC 0.00 0.0 - 0.2 K/uL    Differential Type AUTOMATED      Seg Neutrophils 81 (H) 43 - 78 %    Lymphocytes 7 (L) 13 - 44 %    Monocytes 11 4.0 - 12.0 %    Eosinophils % 0 (L) 0.5 - 7.8 %    Basophils 0 0.0 - 2.0 %    Immature Granulocytes 0 0.0 - 5.0 %    Segs Absolute 9.9 (H) 1.7 - 8.2 K/UL    Absolute Lymph # 0.9 0.5 - 4.6 K/UL    Absolute Mono # 1.4 (H) 0.1 - 1.3 K/UL    Absolute Eos # 0.0 0.0 - 0.8 K/UL    Basophils Absolute 0.0 0.0 - 0.2 K/UL    Absolute Immature Granulocyte 0.0 0.0 - 0.5 K/UL   CBC    Collection Time: 10/28/22  5:32 AM   Result Value Ref Range    WBC 12.6 (H) 4.3 - 11.1 K/uL    RBC 4.75 4.23 - 5.6 M/uL    Hemoglobin 13.7 13.6 - 17.2 g/dL    Hematocrit 39.8 (L) 41.1 - 50.3 %    MCV 83.8 82.0 - 102.0 FL    MCH 28.8 26.1 - 32.9 PG    MCHC 34.4 31.4 - 35.0 g/dL    RDW 13.2 11.9 - 14.6 %    Platelets 235 610 - 009 K/uL    MPV 9.1 (L) 9.4 - 12.3 FL    nRBC 0.00 0.0 - 0.2 K/uL   Comprehensive Metabolic Panel w/ Reflex to MG    Collection Time: 10/28/22  5:32 AM   Result Value Ref Range    Sodium 131 (L) 133 - 143 mmol/L    Potassium 4.8 3.5 - 5.1 mmol/L    Chloride 99 (L) 101 - 110 mmol/L    CO2 25 21 - 32 mmol/L    Anion Gap 7 2 - 11 mmol/L    Glucose 127 (H) 65 - 100 mg/dL    BUN 23 6 - 23 MG/DL    Creatinine 1.60 (H) 0.8 - 1.5 MG/DL    Est, Glom Filt Rate 56 (L) >60 ml/min/1.73m2    Calcium 8.6 8.3 - 10.4 MG/DL    Total Bilirubin 0.8 0.2 - 1.1 MG/DL    ALT 15 12 - 65 U/L    AST 13 (L) 15 - 37 U/L    Alk Phosphatase 37 (L) 50 - 136 U/L    Total Protein 6.0 (L) 6.3 - 8.2 g/dL    Albumin 2.4 (L) 3.5 - 5.0 g/dL    Globulin 3.6 2.8 - 4.5 g/dL    Albumin/Globulin Ratio 0.7 0.4 - 1.6     Lipase    Collection Time: 10/28/22  5:32 AM   Result Value Ref Range    Lipase 2,196 (H) 73 - 393 U/L       I have personally reviewed imaging studies showing:   Other Studies:  No orders to display       Current Meds:  Current Facility-Administered Medications   Medication Dose Route Frequency    HYDROmorphone HCl PF (DILAUDID) injection 1 mg  1 mg IntraVENous Q3H PRN    naloxone (NARCAN) injection 0.4 mg  0.4 mg IntraVENous PRN    prochlorperazine (COMPAZINE) injection 10 mg  10 mg IntraVENous Q6H PRN    HYDROmorphone HCl PF (DILAUDID) injection 2 mg  2 mg IntraVENous Q4H PRN    LORazepam (ATIVAN) injection 1 mg  1 mg IntraVENous Q6H PRN    nicotine (NICODERM CQ) 14 MG/24HR 1 patch  1 patch TransDERmal Daily    [Held by provider] amLODIPine (NORVASC) tablet 10 mg  10 mg Oral Daily    [Held by provider] lisinopril (PRINIVIL;ZESTRIL) tablet 40 mg  40 mg Oral Daily    thiamine tablet 100 mg  100 mg Oral Daily    lactated ringers infusion   IntraVENous Continuous    sodium chloride flush 0.9 % injection 5-40 mL  5-40 mL IntraVENous 2 times per day    sodium chloride flush 0.9 % injection 5-40 mL  5-40 mL IntraVENous PRN    0.9 % sodium chloride infusion   IntraVENous PRN ondansetron (ZOFRAN-ODT) disintegrating tablet 4 mg  4 mg Oral Q8H PRN    Or    ondansetron (ZOFRAN) injection 4 mg  4 mg IntraVENous Q6H PRN    enoxaparin (LOVENOX) injection 40 mg  40 mg SubCUTAneous Q24H    magnesium sulfate 2000 mg in 50 mL IVPB premix  2,000 mg IntraVENous PRN    potassium chloride (KLOR-CON M) extended release tablet 40 mEq  40 mEq Oral PRN    Or    potassium bicarb-citric acid (EFFER-K) effervescent tablet 40 mEq  40 mEq Oral PRN    Or    potassium chloride 10 mEq/100 mL IVPB (Peripheral Line)  10 mEq IntraVENous PRN    famotidine (PEPCID) 20 mg in sodium chloride (PF) 10 mL injection  20 mg IntraVENous BID       Signed:  Randy Conteh MD

## 2022-10-28 NOTE — CARE COORDINATION
Discharge planning was discussed with the Interdisciplinary Team. Per the attending physician, the patient is requiring IV pain control and is not yet medically ready for discharge.

## 2022-10-29 LAB
ANION GAP SERPL CALC-SCNC: 4 MMOL/L (ref 2–11)
BASOPHILS # BLD: 0 K/UL (ref 0–0.2)
BASOPHILS NFR BLD: 0 % (ref 0–2)
BUN SERPL-MCNC: 16 MG/DL (ref 6–23)
CALCIUM SERPL-MCNC: 8.8 MG/DL (ref 8.3–10.4)
CHLORIDE SERPL-SCNC: 96 MMOL/L (ref 101–110)
CO2 SERPL-SCNC: 28 MMOL/L (ref 21–32)
CREAT SERPL-MCNC: 0.9 MG/DL (ref 0.8–1.5)
DIFFERENTIAL METHOD BLD: ABNORMAL
EOSINOPHIL # BLD: 0 K/UL (ref 0–0.8)
EOSINOPHIL NFR BLD: 0 % (ref 0.5–7.8)
ERYTHROCYTE [DISTWIDTH] IN BLOOD BY AUTOMATED COUNT: 13.2 % (ref 11.9–14.6)
GLUCOSE SERPL-MCNC: 85 MG/DL (ref 65–100)
HCT VFR BLD AUTO: 31.7 % (ref 41.1–50.3)
HGB BLD-MCNC: 10.6 G/DL (ref 13.6–17.2)
IMM GRANULOCYTES # BLD AUTO: 0 K/UL (ref 0–0.5)
IMM GRANULOCYTES NFR BLD AUTO: 0 % (ref 0–5)
LYMPHOCYTES # BLD: 1 K/UL (ref 0.5–4.6)
LYMPHOCYTES NFR BLD: 9 % (ref 13–44)
MCH RBC QN AUTO: 28.3 PG (ref 26.1–32.9)
MCHC RBC AUTO-ENTMCNC: 33.4 G/DL (ref 31.4–35)
MCV RBC AUTO: 84.5 FL (ref 82–102)
MONOCYTES # BLD: 1.3 K/UL (ref 0.1–1.3)
MONOCYTES NFR BLD: 12 % (ref 4–12)
NEUTS SEG # BLD: 8.2 K/UL (ref 1.7–8.2)
NEUTS SEG NFR BLD: 78 % (ref 43–78)
NRBC # BLD: 0 K/UL (ref 0–0.2)
PLATELET # BLD AUTO: 374 K/UL (ref 150–450)
PMV BLD AUTO: 9.2 FL (ref 9.4–12.3)
POTASSIUM SERPL-SCNC: 4.3 MMOL/L (ref 3.5–5.1)
RBC # BLD AUTO: 3.75 M/UL (ref 4.23–5.6)
SODIUM SERPL-SCNC: 128 MMOL/L (ref 133–143)
WBC # BLD AUTO: 10.5 K/UL (ref 4.3–11.1)

## 2022-10-29 PROCEDURE — 2580000003 HC RX 258: Performed by: INTERNAL MEDICINE

## 2022-10-29 PROCEDURE — 6370000000 HC RX 637 (ALT 250 FOR IP): Performed by: NURSE PRACTITIONER

## 2022-10-29 PROCEDURE — 6360000002 HC RX W HCPCS: Performed by: HOSPITALIST

## 2022-10-29 PROCEDURE — 1100000000 HC RM PRIVATE

## 2022-10-29 PROCEDURE — 2500000003 HC RX 250 WO HCPCS: Performed by: FAMILY MEDICINE

## 2022-10-29 PROCEDURE — 6360000002 HC RX W HCPCS: Performed by: INTERNAL MEDICINE

## 2022-10-29 PROCEDURE — 36415 COLL VENOUS BLD VENIPUNCTURE: CPT

## 2022-10-29 PROCEDURE — 2580000003 HC RX 258: Performed by: FAMILY MEDICINE

## 2022-10-29 PROCEDURE — 6360000002 HC RX W HCPCS: Performed by: FAMILY MEDICINE

## 2022-10-29 PROCEDURE — 6370000000 HC RX 637 (ALT 250 FOR IP): Performed by: HOSPITALIST

## 2022-10-29 PROCEDURE — 80048 BASIC METABOLIC PNL TOTAL CA: CPT

## 2022-10-29 PROCEDURE — 85025 COMPLETE CBC W/AUTO DIFF WBC: CPT

## 2022-10-29 PROCEDURE — A4216 STERILE WATER/SALINE, 10 ML: HCPCS | Performed by: FAMILY MEDICINE

## 2022-10-29 PROCEDURE — 6370000000 HC RX 637 (ALT 250 FOR IP): Performed by: FAMILY MEDICINE

## 2022-10-29 RX ORDER — BISACODYL 10 MG
10 SUPPOSITORY, RECTAL RECTAL DAILY PRN
Status: DISCONTINUED | OUTPATIENT
Start: 2022-10-29 | End: 2022-10-31 | Stop reason: HOSPADM

## 2022-10-29 RX ORDER — HYDROMORPHONE HYDROCHLORIDE 1 MG/ML
2 INJECTION, SOLUTION INTRAMUSCULAR; INTRAVENOUS; SUBCUTANEOUS
Status: DISCONTINUED | OUTPATIENT
Start: 2022-10-30 | End: 2022-10-31

## 2022-10-29 RX ORDER — OXYCODONE HYDROCHLORIDE 5 MG/1
5 TABLET ORAL EVERY 4 HOURS PRN
Status: DISCONTINUED | OUTPATIENT
Start: 2022-10-29 | End: 2022-10-31

## 2022-10-29 RX ADMIN — SODIUM CHLORIDE, SODIUM LACTATE, POTASSIUM CHLORIDE, AND CALCIUM CHLORIDE: 600; 310; 30; 20 INJECTION, SOLUTION INTRAVENOUS at 11:01

## 2022-10-29 RX ADMIN — HYDROMORPHONE HYDROCHLORIDE 1 MG: 1 INJECTION, SOLUTION INTRAMUSCULAR; INTRAVENOUS; SUBCUTANEOUS at 20:33

## 2022-10-29 RX ADMIN — POLYETHYLENE GLYCOL 3350 17 G: 17 POWDER, FOR SOLUTION ORAL at 08:40

## 2022-10-29 RX ADMIN — ENOXAPARIN SODIUM 40 MG: 100 INJECTION SUBCUTANEOUS at 16:42

## 2022-10-29 RX ADMIN — HYDROMORPHONE HYDROCHLORIDE 1 MG: 1 INJECTION, SOLUTION INTRAMUSCULAR; INTRAVENOUS; SUBCUTANEOUS at 00:26

## 2022-10-29 RX ADMIN — HYDROMORPHONE HYDROCHLORIDE 2 MG: 1 INJECTION, SOLUTION INTRAMUSCULAR; INTRAVENOUS; SUBCUTANEOUS at 16:33

## 2022-10-29 RX ADMIN — SODIUM CHLORIDE, PRESERVATIVE FREE 20 MG: 5 INJECTION INTRAVENOUS at 20:38

## 2022-10-29 RX ADMIN — POLYETHYLENE GLYCOL 3350 17 G: 17 POWDER, FOR SOLUTION ORAL at 20:36

## 2022-10-29 RX ADMIN — SODIUM CHLORIDE, SODIUM LACTATE, POTASSIUM CHLORIDE, AND CALCIUM CHLORIDE: 600; 310; 30; 20 INJECTION, SOLUTION INTRAVENOUS at 20:44

## 2022-10-29 RX ADMIN — SODIUM CHLORIDE, SODIUM LACTATE, POTASSIUM CHLORIDE, AND CALCIUM CHLORIDE: 600; 310; 30; 20 INJECTION, SOLUTION INTRAVENOUS at 00:26

## 2022-10-29 RX ADMIN — SODIUM CHLORIDE, PRESERVATIVE FREE 20 MG: 5 INJECTION INTRAVENOUS at 08:40

## 2022-10-29 RX ADMIN — HYDROMORPHONE HYDROCHLORIDE 2 MG: 1 INJECTION, SOLUTION INTRAMUSCULAR; INTRAVENOUS; SUBCUTANEOUS at 23:58

## 2022-10-29 RX ADMIN — SODIUM CHLORIDE, PRESERVATIVE FREE 10 ML: 5 INJECTION INTRAVENOUS at 20:37

## 2022-10-29 RX ADMIN — Medication 100 MG: at 08:40

## 2022-10-29 RX ADMIN — HYDROMORPHONE HYDROCHLORIDE 2 MG: 1 INJECTION, SOLUTION INTRAMUSCULAR; INTRAVENOUS; SUBCUTANEOUS at 04:23

## 2022-10-29 RX ADMIN — HYDROMORPHONE HYDROCHLORIDE 2 MG: 1 INJECTION, SOLUTION INTRAMUSCULAR; INTRAVENOUS; SUBCUTANEOUS at 12:16

## 2022-10-29 RX ADMIN — HYDROMORPHONE HYDROCHLORIDE 1 MG: 1 INJECTION, SOLUTION INTRAMUSCULAR; INTRAVENOUS; SUBCUTANEOUS at 08:41

## 2022-10-29 ASSESSMENT — PAIN DESCRIPTION - LOCATION
LOCATION: ABDOMEN

## 2022-10-29 ASSESSMENT — PAIN DESCRIPTION - DESCRIPTORS
DESCRIPTORS: ACHING
DESCRIPTORS: ACHING
DESCRIPTORS: PRESSURE;SHARP
DESCRIPTORS: ACHING;SHARP
DESCRIPTORS: ACHING;SHARP

## 2022-10-29 ASSESSMENT — PAIN SCALES - GENERAL
PAINLEVEL_OUTOF10: 8
PAINLEVEL_OUTOF10: 2
PAINLEVEL_OUTOF10: 8
PAINLEVEL_OUTOF10: 0
PAINLEVEL_OUTOF10: 5
PAINLEVEL_OUTOF10: 8
PAINLEVEL_OUTOF10: 6
PAINLEVEL_OUTOF10: 8
PAINLEVEL_OUTOF10: 8

## 2022-10-29 ASSESSMENT — PAIN - FUNCTIONAL ASSESSMENT
PAIN_FUNCTIONAL_ASSESSMENT: ACTIVITIES ARE NOT PREVENTED
PAIN_FUNCTIONAL_ASSESSMENT: ACTIVITIES ARE NOT PREVENTED

## 2022-10-29 NOTE — PROGRESS NOTES
10/29/2022    Admit Date: 10/25/2022    Subjective:   CHIEF COMPLAINT  HPI      More comfortable than yesterday, No BM    Medications-reviewed and adjusted as appropriate   IV FLUIDS-reviewed      FAM HX-per H&P   SH-per H&P         Past Medical History:   Diagnosis Date    Acute alcoholic pancreatitis 31/6/5233    Carpal tunnel syndrome, left     Cigarette smoker     GERD (gastroesophageal reflux disease)     occassionally with spicy foods    History of pancreatitis     X2    Hypertensive crisis 10/3/2021    Pancreatitis                Past Surgical History:   Procedure Laterality Date    CHOLECYSTECTOMY  2012    ORTHOPEDIC SURGERY         ROS--                 RESP-neg            CARDIAC-neg                       -neg             Further ROS as per PMH and PSH- see problem list                            Objective:     Visit Vitals  /71   Pulse 83   Temp 97.9 °F (36.6 °C) (Oral)   Resp 16   SpO2 94%       No intake or output data in the 24 hours ending 10/29/22 1131    EXAM:     NEURO-a&o    HEENT-wnl    LUNGS-clear       COR-rrr        ABD-soft , min tenderness, no rebound, good bs        EXT-no edema                         LABS-  Lab Results   Component Value Date/Time    WBC 10.5 10/29/2022 08:26 AM    RBC 3.75 10/29/2022 08:26 AM    HGB 10.6 10/29/2022 08:26 AM    HCT 31.7 10/29/2022 08:26 AM     10/29/2022 08:26 AM     Lab Results   Component Value Date/Time     10/29/2022 08:26 AM    K 4.3 10/29/2022 08:26 AM    CL 96 10/29/2022 08:26 AM    CO2 28 10/29/2022 08:26 AM    BUN 16 10/29/2022 08:26 AM    GFRAA >60 08/01/2022 05:41 AM    MG 1.8 10/25/2022 07:50 AM    PHOS 2.8 10/25/2022 07:50 AM    GLOB 3.6 10/28/2022 05:32 AM    ALT 15 10/28/2022 05:32 AM           RADIOLOGY-  TRANSFUSION-    Assessment:     Principal Problem:    Acute on chronic pancreatitis (HCC)  Active Problems:    Abnormal CT of the abdomen    Alcohol use disorder, severe, dependence (HCC)    Abdominal pain    Primary hypertension    Chronic pancreatitis ([facility] [address]  Resolved Problems:    * No resolved hospital problems. *      Plan:     Acute on Chronic pancreatitis-  More comfortable today, creatinine has normalized, wbc trending downward. EUS with cystgastrostomy once acute issues have further improved.   No BM, I will add vinay Mcneil MD

## 2022-10-29 NOTE — PROGRESS NOTES
Hospitalist Progress Note   Admit Date:  10/25/2022  1:02 PM   Name:  Hardy Martínez   Age:  44 y.o. Sex:  male  :  1985   MRN:  [id number]   Room:  [facility]    Presenting Complaint: No chief complaint on file. Reason(s) for Admission: Abdominal pain [R10.9]     Hospital Course:   44 y.o. male with medical history of chronic pancreatitis, hypertension who presented with severe abdominal pain for 1 week. He has periodic flares of his pancreatitis. He knew not to eat and to try and manage at home with his pain medication. He thought he was getting better but then got severely worse and presented to the ED for evaluation. In the emergency department he underwent CT abdomen showing ascites and pseudocysts. Admitted for further evaluation and management. Subjective & 24hr Events (10/29/22):   Pain now much better controlled. White count is normalized. Creatinine is normalized. Decreasing fluid rate for hyponatremia. Gastroenterology anticipates procedure, schedule not yet known. Assessment & Plan:   * Acute on chronic pancreatitis [facility])  Assessment & Plan  Third episode this year of acute pancreatitis. Unable to tolerate any alcohol without causing flare.   -NPO, advance as tolerated  -Dilaudid IV  -Famotidine  -Consult gastroenterology  10/29/2022: Able to wean Dilaudid, gastroenterology working on scheduling EUS    Alcohol use disorder, severe, dependence ([facility] [address]  Assessment & Plan  -  complete cessation    Abnormal CT of the abdomen  Assessment & Plan  CT noted pseudocysts and ascites    Chronic pancreatitis [facility])  Assessment & Plan  - Consult gastroenterology    Primary hypertension  Assessment & Plan  - Amlodipine, lisinopril          Anticipated discharge needs:    Home with outpatient follow-up    Diet:  Diet NPO Exceptions are: Ice Chips, Sips of Water with Meds  DVT PPx: Enoxaparin  Code status: Full Code    Hospital Problems:  Principal Problem:    Acute on chronic pancreatitis ([facility] [address]  Active Problems:    Abnormal CT of the abdomen    Alcohol use disorder, severe, dependence (HCC)    Abdominal pain    Primary hypertension    Chronic pancreatitis ([facility] [address]  Resolved Problems:    * No resolved hospital problems. *      Objective:   Patient Vitals for the past 24 hrs:   Temp Pulse Resp BP SpO2   10/29/22 1545 98.6 °F (37 °C) 95 17 114/70 97 %   10/29/22 1153 98.1 °F (36.7 °C) 95 17 112/79 98 %   10/29/22 0718 97.9 °F (36.6 °C) 83 16 100/71 94 %   10/29/22 0453 -- -- 18 -- --   10/29/22 0418 98.1 °F (36.7 °C) 98 18 113/75 95 %   10/29/22 0056 -- -- 18 -- --   10/28/22 2345 -- (!) 107 -- -- --   10/28/22 2257 99.7 °F (37.6 °C) (!) 114 18 (!) 100/57 94 %   10/28/22 2129 -- -- 18 -- --   10/28/22 1925 98.6 °F (37 °C) (!) 105 18 133/67 95 %   10/28/22 1902 -- -- 18 -- --       Oxygen Therapy  SpO2: 97 %  Pulse Oximeter Device Mode: Intermittent  Pulse Oximeter Device Location: Right, Finger  O2 Device: None (Room air)    Estimated body mass index is 26.54 kg/m² as calculated from the following:    Height as of an earlier encounter on 10/25/22: 5' 10\" (1.778 m). Weight as of an earlier encounter on 10/25/22: 185 lb (83.9 kg). No intake or output data in the 24 hours ending 10/29/22 1731        Blood pressure 114/70, pulse 95, temperature 98.6 °F (37 °C), temperature source Oral, resp. rate 17, SpO2 97 %. Physical Exam  Vitals and nursing note reviewed. Constitutional:       General: He is not in acute distress. Appearance: He is not ill-appearing or diaphoretic. HENT:      Head: Normocephalic and atraumatic. Eyes:      Extraocular Movements: Extraocular movements intact. Cardiovascular:      Rate and Rhythm: Normal rate. Pulmonary:      Effort: Pulmonary effort is normal. No respiratory distress. Abdominal:      General: There is no distension. Tenderness: There is abdominal tenderness. Musculoskeletal:         General: No deformity.    Skin:     Coloration: Skin is not jaundiced or pale. Neurological:      General: No focal deficit present. Mental Status: He is alert and oriented to person, place, and time. Psychiatric:         Mood and Affect: Mood normal.         Behavior: Behavior normal. Behavior is cooperative.          I have personally reviewed labs and tests showing:  Recent Labs:  Recent Results (from the past 48 hour(s))   Comprehensive Metabolic Panel    Collection Time: 10/27/22  6:31 PM   Result Value Ref Range    Sodium 131 (L) 133 - 143 mmol/L    Potassium 3.9 3.5 - 5.1 mmol/L    Chloride 98 (L) 101 - 110 mmol/L    CO2 26 21 - 32 mmol/L    Anion Gap 7 2 - 11 mmol/L    Glucose 106 (H) 65 - 100 mg/dL    BUN 11 6 - 23 MG/DL    Creatinine 0.72 (L) 0.8 - 1.5 MG/DL    Est, Glom Filt Rate >60 >60 ml/min/1.73m2    Calcium 8.9 8.3 - 10.4 MG/DL    Total Bilirubin 0.6 0.2 - 1.1 MG/DL    ALT 19 12 - 65 U/L    AST 12 (L) 15 - 37 U/L    Alk Phosphatase 45 (L) 50 - 136 U/L    Total Protein 6.6 6.3 - 8.2 g/dL    Albumin 2.8 (L) 3.5 - 5.0 g/dL    Globulin 3.8 2.8 - 4.5 g/dL    Albumin/Globulin Ratio 0.7 0.4 - 1.6     Lipase    Collection Time: 10/27/22  6:31 PM   Result Value Ref Range    Lipase 863 (H) 73 - 393 U/L   CBC with Auto Differential    Collection Time: 10/27/22  6:31 PM   Result Value Ref Range    WBC 12.3 (H) 4.3 - 11.1 K/uL    RBC 4.75 4.23 - 5.6 M/uL    Hemoglobin 13.6 13.6 - 17.2 g/dL    Hematocrit 39.2 (L) 41.1 - 50.3 %    MCV 82.5 82.0 - 102.0 FL    MCH 28.6 26.1 - 32.9 PG    MCHC 34.7 31.4 - 35.0 g/dL    RDW 13.1 11.9 - 14.6 %    Platelets 780 921 - 369 K/uL    MPV 9.1 (L) 9.4 - 12.3 FL    nRBC 0.00 0.0 - 0.2 K/uL    Differential Type AUTOMATED      Seg Neutrophils 81 (H) 43 - 78 %    Lymphocytes 7 (L) 13 - 44 %    Monocytes 11 4.0 - 12.0 %    Eosinophils % 0 (L) 0.5 - 7.8 %    Basophils 0 0.0 - 2.0 %    Immature Granulocytes 0 0.0 - 5.0 %    Segs Absolute 9.9 (H) 1.7 - 8.2 K/UL    Absolute Lymph # 0.9 0.5 - 4.6 K/UL    Absolute Mono # 1.4 (H) 0.1 - 1.3 K/UL    Absolute Eos # 0.0 0.0 - 0.8 K/UL    Basophils Absolute 0.0 0.0 - 0.2 K/UL    Absolute Immature Granulocyte 0.0 0.0 - 0.5 K/UL   CBC    Collection Time: 10/28/22  5:32 AM   Result Value Ref Range    WBC 12.6 (H) 4.3 - 11.1 K/uL    RBC 4.75 4.23 - 5.6 M/uL    Hemoglobin 13.7 13.6 - 17.2 g/dL    Hematocrit 39.8 (L) 41.1 - 50.3 %    MCV 83.8 82.0 - 102.0 FL    MCH 28.8 26.1 - 32.9 PG    MCHC 34.4 31.4 - 35.0 g/dL    RDW 13.2 11.9 - 14.6 %    Platelets 823 603 - 808 K/uL    MPV 9.1 (L) 9.4 - 12.3 FL    nRBC 0.00 0.0 - 0.2 K/uL   Comprehensive Metabolic Panel w/ Reflex to MG    Collection Time: 10/28/22  5:32 AM   Result Value Ref Range    Sodium 131 (L) 133 - 143 mmol/L    Potassium 4.8 3.5 - 5.1 mmol/L    Chloride 99 (L) 101 - 110 mmol/L    CO2 25 21 - 32 mmol/L    Anion Gap 7 2 - 11 mmol/L    Glucose 127 (H) 65 - 100 mg/dL    BUN 23 6 - 23 MG/DL    Creatinine 1.60 (H) 0.8 - 1.5 MG/DL    Est, Glom Filt Rate 56 (L) >60 ml/min/1.73m2    Calcium 8.6 8.3 - 10.4 MG/DL    Total Bilirubin 0.8 0.2 - 1.1 MG/DL    ALT 15 12 - 65 U/L    AST 13 (L) 15 - 37 U/L    Alk Phosphatase 37 (L) 50 - 136 U/L    Total Protein 6.0 (L) 6.3 - 8.2 g/dL    Albumin 2.4 (L) 3.5 - 5.0 g/dL    Globulin 3.6 2.8 - 4.5 g/dL    Albumin/Globulin Ratio 0.7 0.4 - 1.6     Lipase    Collection Time: 10/28/22  5:32 AM   Result Value Ref Range    Lipase 2,196 (H) 73 - 393 U/L   Basic Metabolic Panel w/ Reflex to MG    Collection Time: 10/29/22  8:26 AM   Result Value Ref Range    Sodium 128 (L) 133 - 143 mmol/L    Potassium 4.3 3.5 - 5.1 mmol/L    Chloride 96 (L) 101 - 110 mmol/L    CO2 28 21 - 32 mmol/L    Anion Gap 4 2 - 11 mmol/L    Glucose 85 65 - 100 mg/dL    BUN 16 6 - 23 MG/DL    Creatinine 0.90 0.8 - 1.5 MG/DL    Est, Glom Filt Rate >60 >60 ml/min/1.73m2    Calcium 8.8 8.3 - 10.4 MG/DL   CBC with Auto Differential    Collection Time: 10/29/22  8:26 AM   Result Value Ref Range    WBC 10.5 4.3 - 11.1 K/uL    RBC 3.75 (L) 4.23 - 5.6 M/uL Hemoglobin 10.6 (L) 13.6 - 17.2 g/dL    Hematocrit 31.7 (L) 41.1 - 50.3 %    MCV 84.5 82.0 - 102.0 FL    MCH 28.3 26.1 - 32.9 PG    MCHC 33.4 31.4 - 35.0 g/dL    RDW 13.2 11.9 - 14.6 %    Platelets 456 208 - 390 K/uL    MPV 9.2 (L) 9.4 - 12.3 FL    nRBC 0.00 0.0 - 0.2 K/uL    Differential Type AUTOMATED      Seg Neutrophils 78 43 - 78 %    Lymphocytes 9 (L) 13 - 44 %    Monocytes 12 4.0 - 12.0 %    Eosinophils % 0 (L) 0.5 - 7.8 %    Basophils 0 0.0 - 2.0 %    Immature Granulocytes 0 0.0 - 5.0 %    Segs Absolute 8.2 1.7 - 8.2 K/UL    Absolute Lymph # 1.0 0.5 - 4.6 K/UL    Absolute Mono # 1.3 0.1 - 1.3 K/UL    Absolute Eos # 0.0 0.0 - 0.8 K/UL    Basophils Absolute 0.0 0.0 - 0.2 K/UL    Absolute Immature Granulocyte 0.0 0.0 - 0.5 K/UL       I have personally reviewed imaging studies showing:   Other Studies:  No orders to display       Current Meds:  Current Facility-Administered Medications   Medication Dose Route Frequency    bisacodyl (DULCOLAX) suppository 10 mg  10 mg Rectal Daily PRN    LORazepam (ATIVAN) injection 1 mg  1 mg IntraVENous Q6H PRN    polyethylene glycol (GLYCOLAX) packet 17 g  17 g Oral BID    HYDROmorphone HCl PF (DILAUDID) injection 1 mg  1 mg IntraVENous Q3H PRN    naloxone (NARCAN) injection 0.4 mg  0.4 mg IntraVENous PRN    prochlorperazine (COMPAZINE) injection 10 mg  10 mg IntraVENous Q6H PRN    HYDROmorphone HCl PF (DILAUDID) injection 2 mg  2 mg IntraVENous Q4H PRN    nicotine (NICODERM CQ) 14 MG/24HR 1 patch  1 patch TransDERmal Daily    [Held by provider] amLODIPine (NORVASC) tablet 10 mg  10 mg Oral Daily    [Held by provider] lisinopril (PRINIVIL;ZESTRIL) tablet 40 mg  40 mg Oral Daily    thiamine tablet 100 mg  100 mg Oral Daily    lactated ringers infusion   IntraVENous Continuous    sodium chloride flush 0.9 % injection 5-40 mL  5-40 mL IntraVENous 2 times per day    sodium chloride flush 0.9 % injection 5-40 mL  5-40 mL IntraVENous PRN    0.9 % sodium chloride infusion IntraVENous PRN    ondansetron (ZOFRAN-ODT) disintegrating tablet 4 mg  4 mg Oral Q8H PRN    Or    ondansetron (ZOFRAN) injection 4 mg  4 mg IntraVENous Q6H PRN    enoxaparin (LOVENOX) injection 40 mg  40 mg SubCUTAneous Q24H    magnesium sulfate 2000 mg in 50 mL IVPB premix  2,000 mg IntraVENous PRN    potassium chloride (KLOR-CON M) extended release tablet 40 mEq  40 mEq Oral PRN    Or    potassium bicarb-citric acid (EFFER-K) effervescent tablet 40 mEq  40 mEq Oral PRN    Or    potassium chloride 10 mEq/100 mL IVPB (Peripheral Line)  10 mEq IntraVENous PRN    famotidine (PEPCID) 20 mg in sodium chloride (PF) 10 mL injection  20 mg IntraVENous BID       Signed:  Rebeca Carbajal MD

## 2022-10-30 LAB
ALBUMIN SERPL-MCNC: 2.3 G/DL (ref 3.5–5)
ANION GAP SERPL CALC-SCNC: 7 MMOL/L (ref 2–11)
BUN SERPL-MCNC: 9 MG/DL (ref 6–23)
CALCIUM SERPL-MCNC: 8.9 MG/DL (ref 8.3–10.4)
CHLORIDE SERPL-SCNC: 95 MMOL/L (ref 101–110)
CO2 SERPL-SCNC: 27 MMOL/L (ref 21–32)
CREAT SERPL-MCNC: 0.86 MG/DL (ref 0.8–1.5)
ERYTHROCYTE [DISTWIDTH] IN BLOOD BY AUTOMATED COUNT: 13.3 % (ref 11.9–14.6)
GLUCOSE SERPL-MCNC: 90 MG/DL (ref 65–100)
HCT VFR BLD AUTO: 34.7 % (ref 41.1–50.3)
HGB BLD-MCNC: 11.7 G/DL (ref 13.6–17.2)
MCH RBC QN AUTO: 28.3 PG (ref 26.1–32.9)
MCHC RBC AUTO-ENTMCNC: 33.7 G/DL (ref 31.4–35)
MCV RBC AUTO: 84 FL (ref 82–102)
NRBC # BLD: 0 K/UL (ref 0–0.2)
PHOSPHATE SERPL-MCNC: 3.1 MG/DL (ref 2.5–4.5)
PLATELET # BLD AUTO: 449 K/UL (ref 150–450)
PMV BLD AUTO: 9.5 FL (ref 9.4–12.3)
POTASSIUM SERPL-SCNC: 4.2 MMOL/L (ref 3.5–5.1)
RBC # BLD AUTO: 4.13 M/UL (ref 4.23–5.6)
SODIUM SERPL-SCNC: 129 MMOL/L (ref 133–143)
WBC # BLD AUTO: 12.1 K/UL (ref 4.3–11.1)

## 2022-10-30 PROCEDURE — A4216 STERILE WATER/SALINE, 10 ML: HCPCS | Performed by: FAMILY MEDICINE

## 2022-10-30 PROCEDURE — 1100000000 HC RM PRIVATE

## 2022-10-30 PROCEDURE — 85027 COMPLETE CBC AUTOMATED: CPT

## 2022-10-30 PROCEDURE — 84100 ASSAY OF PHOSPHORUS: CPT

## 2022-10-30 PROCEDURE — 6370000000 HC RX 637 (ALT 250 FOR IP): Performed by: NURSE PRACTITIONER

## 2022-10-30 PROCEDURE — 2500000003 HC RX 250 WO HCPCS: Performed by: FAMILY MEDICINE

## 2022-10-30 PROCEDURE — 2580000003 HC RX 258: Performed by: FAMILY MEDICINE

## 2022-10-30 PROCEDURE — 36415 COLL VENOUS BLD VENIPUNCTURE: CPT

## 2022-10-30 PROCEDURE — 6370000000 HC RX 637 (ALT 250 FOR IP): Performed by: HOSPITALIST

## 2022-10-30 PROCEDURE — 6360000002 HC RX W HCPCS: Performed by: HOSPITALIST

## 2022-10-30 PROCEDURE — 6370000000 HC RX 637 (ALT 250 FOR IP): Performed by: FAMILY MEDICINE

## 2022-10-30 PROCEDURE — 80048 BASIC METABOLIC PNL TOTAL CA: CPT

## 2022-10-30 PROCEDURE — 82040 ASSAY OF SERUM ALBUMIN: CPT

## 2022-10-30 RX ORDER — CALCIUM CARBONATE 200(500)MG
500 TABLET,CHEWABLE ORAL 3 TIMES DAILY PRN
Status: DISCONTINUED | OUTPATIENT
Start: 2022-10-30 | End: 2022-10-31 | Stop reason: HOSPADM

## 2022-10-30 RX ADMIN — ANTACID TABLETS 500 MG: 500 TABLET, CHEWABLE ORAL at 08:58

## 2022-10-30 RX ADMIN — HYDROMORPHONE HYDROCHLORIDE 2 MG: 1 INJECTION, SOLUTION INTRAMUSCULAR; INTRAVENOUS; SUBCUTANEOUS at 11:56

## 2022-10-30 RX ADMIN — SODIUM CHLORIDE, SODIUM LACTATE, POTASSIUM CHLORIDE, AND CALCIUM CHLORIDE: 600; 310; 30; 20 INJECTION, SOLUTION INTRAVENOUS at 11:56

## 2022-10-30 RX ADMIN — HYDROMORPHONE HYDROCHLORIDE 2 MG: 1 INJECTION, SOLUTION INTRAMUSCULAR; INTRAVENOUS; SUBCUTANEOUS at 21:21

## 2022-10-30 RX ADMIN — SODIUM CHLORIDE, PRESERVATIVE FREE 10 ML: 5 INJECTION INTRAVENOUS at 20:40

## 2022-10-30 RX ADMIN — POLYETHYLENE GLYCOL 3350 17 G: 17 POWDER, FOR SOLUTION ORAL at 20:39

## 2022-10-30 RX ADMIN — HYDROMORPHONE HYDROCHLORIDE 2 MG: 1 INJECTION, SOLUTION INTRAMUSCULAR; INTRAVENOUS; SUBCUTANEOUS at 18:15

## 2022-10-30 RX ADMIN — SODIUM CHLORIDE, PRESERVATIVE FREE 20 MG: 5 INJECTION INTRAVENOUS at 08:59

## 2022-10-30 RX ADMIN — HYDROMORPHONE HYDROCHLORIDE 2 MG: 1 INJECTION, SOLUTION INTRAMUSCULAR; INTRAVENOUS; SUBCUTANEOUS at 03:02

## 2022-10-30 RX ADMIN — HYDROMORPHONE HYDROCHLORIDE 2 MG: 1 INJECTION, SOLUTION INTRAMUSCULAR; INTRAVENOUS; SUBCUTANEOUS at 15:04

## 2022-10-30 RX ADMIN — SODIUM CHLORIDE, PRESERVATIVE FREE 20 MG: 5 INJECTION INTRAVENOUS at 20:43

## 2022-10-30 RX ADMIN — HYDROMORPHONE HYDROCHLORIDE 2 MG: 1 INJECTION, SOLUTION INTRAMUSCULAR; INTRAVENOUS; SUBCUTANEOUS at 06:04

## 2022-10-30 RX ADMIN — HYDROMORPHONE HYDROCHLORIDE 2 MG: 1 INJECTION, SOLUTION INTRAMUSCULAR; INTRAVENOUS; SUBCUTANEOUS at 08:59

## 2022-10-30 RX ADMIN — Medication 100 MG: at 08:58

## 2022-10-30 ASSESSMENT — PAIN DESCRIPTION - LOCATION
LOCATION: ABDOMEN

## 2022-10-30 ASSESSMENT — PAIN SCALES - GENERAL
PAINLEVEL_OUTOF10: 9
PAINLEVEL_OUTOF10: 3
PAINLEVEL_OUTOF10: 8
PAINLEVEL_OUTOF10: 9
PAINLEVEL_OUTOF10: 7
PAINLEVEL_OUTOF10: 9
PAINLEVEL_OUTOF10: 7
PAINLEVEL_OUTOF10: 3
PAINLEVEL_OUTOF10: 4
PAINLEVEL_OUTOF10: 7

## 2022-10-30 ASSESSMENT — PAIN - FUNCTIONAL ASSESSMENT
PAIN_FUNCTIONAL_ASSESSMENT: ACTIVITIES ARE NOT PREVENTED

## 2022-10-30 ASSESSMENT — PAIN DESCRIPTION - DESCRIPTORS
DESCRIPTORS: ACHING
DESCRIPTORS: ACHING;PRESSURE
DESCRIPTORS: SHARP
DESCRIPTORS: ACHING;SHARP;PRESSURE
DESCRIPTORS: SHARP
DESCRIPTORS: SHARP

## 2022-10-30 ASSESSMENT — PAIN DESCRIPTION - ORIENTATION: ORIENTATION: MID

## 2022-10-30 NOTE — PROGRESS NOTES
Hospitalist Progress Note   Admit Date:  10/25/2022  1:02 PM   Name:  Hardy Martínez   Age:  44 y.o. Sex:  male  :  1985   MRN:  [id number]   Room:  [facility]    Presenting Complaint: No chief complaint on file. Reason(s) for Admission: Abdominal pain [R10.9]     Hospital Course:   44 y.o. male with medical history of chronic pancreatitis, hypertension who presented with severe abdominal pain for 1 week. He has periodic flares of his pancreatitis. He knew not to eat and to try and manage at home with his pain medication. He thought he was getting better but then got severely worse and presented to the ED for evaluation. In the emergency department he underwent CT abdomen showing ascites and pseudocysts. Admitted for further evaluation and management. Subjective & 24hr Events (10/30/22):   Pain now much better controlled. Bowel movement today. Gastroenterology anticipates procedure, schedule not yet known. Assessment & Plan:   * Acute on chronic pancreatitis [facility])  Assessment & Plan  Third episode this year of acute pancreatitis. Unable to tolerate any alcohol without causing flare.   -NPO, advance as tolerated  -Dilaudid IV  -Famotidine  -Consult gastroenterology  10/29/2022: Able to wean Dilaudid, gastroenterology working on scheduling EUS    Alcohol use disorder, severe, dependence ([facility] [address]  Assessment & Plan  -  complete cessation    Abnormal CT of the abdomen  Assessment & Plan  CT noted pseudocysts and ascites    Chronic pancreatitis [facility])  Assessment & Plan  - Consult gastroenterology    Primary hypertension  Assessment & Plan  - Amlodipine, lisinopril            Anticipated discharge needs:    Home with outpatient follow-up    Diet:  Diet NPO Exceptions are: Ice Chips, Sips of Water with Meds  DVT PPx: Enoxaparin  Code status: Full Code    Hospital Problems:  Principal Problem:    Acute on chronic pancreatitis ([facility] [address]  Active Problems:    Abnormal CT of the abdomen    Alcohol normal.         Behavior: Behavior normal. Behavior is cooperative.          I have personally reviewed labs and tests showing:  Recent Labs:  Recent Results (from the past 48 hour(s))   Basic Metabolic Panel w/ Reflex to MG    Collection Time: 10/29/22  8:26 AM   Result Value Ref Range    Sodium 128 (L) 133 - 143 mmol/L    Potassium 4.3 3.5 - 5.1 mmol/L    Chloride 96 (L) 101 - 110 mmol/L    CO2 28 21 - 32 mmol/L    Anion Gap 4 2 - 11 mmol/L    Glucose 85 65 - 100 mg/dL    BUN 16 6 - 23 MG/DL    Creatinine 0.90 0.8 - 1.5 MG/DL    Est, Glom Filt Rate >60 >60 ml/min/1.73m2    Calcium 8.8 8.3 - 10.4 MG/DL   CBC with Auto Differential    Collection Time: 10/29/22  8:26 AM   Result Value Ref Range    WBC 10.5 4.3 - 11.1 K/uL    RBC 3.75 (L) 4.23 - 5.6 M/uL    Hemoglobin 10.6 (L) 13.6 - 17.2 g/dL    Hematocrit 31.7 (L) 41.1 - 50.3 %    MCV 84.5 82.0 - 102.0 FL    MCH 28.3 26.1 - 32.9 PG    MCHC 33.4 31.4 - 35.0 g/dL    RDW 13.2 11.9 - 14.6 %    Platelets 982 202 - 462 K/uL    MPV 9.2 (L) 9.4 - 12.3 FL    nRBC 0.00 0.0 - 0.2 K/uL    Differential Type AUTOMATED      Seg Neutrophils 78 43 - 78 %    Lymphocytes 9 (L) 13 - 44 %    Monocytes 12 4.0 - 12.0 %    Eosinophils % 0 (L) 0.5 - 7.8 %    Basophils 0 0.0 - 2.0 %    Immature Granulocytes 0 0.0 - 5.0 %    Segs Absolute 8.2 1.7 - 8.2 K/UL    Absolute Lymph # 1.0 0.5 - 4.6 K/UL    Absolute Mono # 1.3 0.1 - 1.3 K/UL    Absolute Eos # 0.0 0.0 - 0.8 K/UL    Basophils Absolute 0.0 0.0 - 0.2 K/UL    Absolute Immature Granulocyte 0.0 0.0 - 0.5 K/UL   Phosphorus    Collection Time: 10/30/22  5:32 AM   Result Value Ref Range    Phosphorus 3.1 2.5 - 4.5 MG/DL   Albumin    Collection Time: 10/30/22  5:32 AM   Result Value Ref Range    Albumin 2.3 (L) 3.5 - 5.0 g/dL   Basic Metabolic Panel w/ Reflex to MG    Collection Time: 10/30/22  5:32 AM   Result Value Ref Range    Sodium 129 (L) 133 - 143 mmol/L    Potassium 4.2 3.5 - 5.1 mmol/L    Chloride 95 (L) 101 - 110 mmol/L    CO2 27 21 - 32 mmol/L    Anion Gap 7 2 - 11 mmol/L    Glucose 90 65 - 100 mg/dL    BUN 9 6 - 23 MG/DL    Creatinine 0.86 0.8 - 1.5 MG/DL    Est, Glom Filt Rate >60 >60 ml/min/1.73m2    Calcium 8.9 8.3 - 10.4 MG/DL   CBC    Collection Time: 10/30/22  5:32 AM   Result Value Ref Range    WBC 12.1 (H) 4.3 - 11.1 K/uL    RBC 4.13 (L) 4.23 - 5.6 M/uL    Hemoglobin 11.7 (L) 13.6 - 17.2 g/dL    Hematocrit 34.7 (L) 41.1 - 50.3 %    MCV 84.0 82.0 - 102.0 FL    MCH 28.3 26.1 - 32.9 PG    MCHC 33.7 31.4 - 35.0 g/dL    RDW 13.3 11.9 - 14.6 %    Platelets 799 800 - 939 K/uL    MPV 9.5 9.4 - 12.3 FL    nRBC 0.00 0.0 - 0.2 K/uL       I have personally reviewed imaging studies showing:   Other Studies:  No orders to display       Current Meds:  Current Facility-Administered Medications   Medication Dose Route Frequency    calcium carbonate (TUMS) chewable tablet 500 mg  500 mg Oral TID PRN    bisacodyl (DULCOLAX) suppository 10 mg  10 mg Rectal Daily PRN    oxyCODONE (ROXICODONE) immediate release tablet 5 mg  5 mg Oral Q4H PRN    HYDROmorphone HCl PF (DILAUDID) injection 2 mg  2 mg IntraVENous Q3H PRN    LORazepam (ATIVAN) injection 1 mg  1 mg IntraVENous Q6H PRN    polyethylene glycol (GLYCOLAX) packet 17 g  17 g Oral BID    naloxone (NARCAN) injection 0.4 mg  0.4 mg IntraVENous PRN    prochlorperazine (COMPAZINE) injection 10 mg  10 mg IntraVENous Q6H PRN    nicotine (NICODERM CQ) 14 MG/24HR 1 patch  1 patch TransDERmal Daily    [Held by provider] amLODIPine (NORVASC) tablet 10 mg  10 mg Oral Daily    [Held by provider] lisinopril (PRINIVIL;ZESTRIL) tablet 40 mg  40 mg Oral Daily    thiamine tablet 100 mg  100 mg Oral Daily    lactated ringers infusion   IntraVENous Continuous    sodium chloride flush 0.9 % injection 5-40 mL  5-40 mL IntraVENous 2 times per day    sodium chloride flush 0.9 % injection 5-40 mL  5-40 mL IntraVENous PRN    0.9 % sodium chloride infusion   IntraVENous PRN    ondansetron (ZOFRAN-ODT) disintegrating tablet 4 mg  4 mg Oral Q8H PRN    Or    ondansetron (ZOFRAN) injection 4 mg  4 mg IntraVENous Q6H PRN    enoxaparin (LOVENOX) injection 40 mg  40 mg SubCUTAneous Q24H    magnesium sulfate 2000 mg in 50 mL IVPB premix  2,000 mg IntraVENous PRN    potassium chloride (KLOR-CON M) extended release tablet 40 mEq  40 mEq Oral PRN    Or    potassium bicarb-citric acid (EFFER-K) effervescent tablet 40 mEq  40 mEq Oral PRN    Or    potassium chloride 10 mEq/100 mL IVPB (Peripheral Line)  10 mEq IntraVENous PRN    famotidine (PEPCID) 20 mg in sodium chloride (PF) 10 mL injection  20 mg IntraVENous BID       Signed:  Wayne Dawkins MD

## 2022-10-30 NOTE — PLAN OF CARE
Problem: Pain  Goal: Verbalizes/displays adequate comfort level or baseline comfort level  Outcome: Progressing     Problem: Discharge Planning  Goal: Discharge to home or other facility with appropriate resources  Outcome: Progressing  Flowsheets (Taken 10/29/2022 2033)  Discharge to home or other facility with appropriate resources: Identify discharge learning needs (meds, wound care, etc)

## 2022-10-30 NOTE — PROGRESS NOTES
[facility] Progress Note         Admit Date:  10/25/2022    Today's Date:  10/30/2022    CC:  Chronic pancreatitis    Subjective:     Pain better controlled today, 3 BM today, denies F/C/N/V    Medications:   Current Facility-Administered Medications   Medication Dose Route Frequency    calcium carbonate (TUMS) chewable tablet 500 mg  500 mg Oral TID PRN    bisacodyl (DULCOLAX) suppository 10 mg  10 mg Rectal Daily PRN    oxyCODONE (ROXICODONE) immediate release tablet 5 mg  5 mg Oral Q4H PRN    HYDROmorphone HCl PF (DILAUDID) injection 2 mg  2 mg IntraVENous Q3H PRN    LORazepam (ATIVAN) injection 1 mg  1 mg IntraVENous Q6H PRN    polyethylene glycol (GLYCOLAX) packet 17 g  17 g Oral BID    naloxone (NARCAN) injection 0.4 mg  0.4 mg IntraVENous PRN    prochlorperazine (COMPAZINE) injection 10 mg  10 mg IntraVENous Q6H PRN    nicotine (NICODERM CQ) 14 MG/24HR 1 patch  1 patch TransDERmal Daily    [Held by provider] amLODIPine (NORVASC) tablet 10 mg  10 mg Oral Daily    [Held by provider] lisinopril (PRINIVIL;ZESTRIL) tablet 40 mg  40 mg Oral Daily    thiamine tablet 100 mg  100 mg Oral Daily    lactated ringers infusion   IntraVENous Continuous    sodium chloride flush 0.9 % injection 5-40 mL  5-40 mL IntraVENous 2 times per day    sodium chloride flush 0.9 % injection 5-40 mL  5-40 mL IntraVENous PRN    0.9 % sodium chloride infusion   IntraVENous PRN    ondansetron (ZOFRAN-ODT) disintegrating tablet 4 mg  4 mg Oral Q8H PRN    Or    ondansetron (ZOFRAN) injection 4 mg  4 mg IntraVENous Q6H PRN    enoxaparin (LOVENOX) injection 40 mg  40 mg SubCUTAneous Q24H    magnesium sulfate 2000 mg in 50 mL IVPB premix  2,000 mg IntraVENous PRN    potassium chloride (KLOR-CON M) extended release tablet 40 mEq  40 mEq Oral PRN    Or    potassium bicarb-citric acid (EFFER-K) effervescent tablet 40 mEq  40 mEq Oral PRN    Or    potassium chloride 10 mEq/100 mL IVPB (Peripheral Line)  10 mEq IntraVENous PRN famotidine (PEPCID) 20 mg in sodium chloride (PF) 10 mL injection  20 mg IntraVENous BID       Review of Systems:  ROS was obtained, with pertinent positives as listed above. No chest pain or SOB. Diet: NPO    Objective:   Vitals:  /84   Pulse 98   Temp 97.9 °F (36.6 °C) (Oral)   Resp 16   SpO2 96%   Intake/Output:  No intake/output data recorded. No intake/output data recorded. Exam:  General appearance: uncomfortable, actively vomiting unable to complete assessment   Neuro:  alert and oriented    Data Review (Labs):    Recent Labs     10/27/22  1831 10/28/22  0532 10/29/22  0826 10/30/22  0532   WBC 12.3* 12.6* 10.5 12.1*   HGB 13.6 13.7 10.6* 11.7*   HCT 39.2* 39.8* 31.7* 34.7*    449 374 449   MCV 82.5 83.8 84.5 84.0   * 131* 128* 129*   K 3.9 4.8 4.3 4.2   CL 98* 99* 96* 95*   CO2 26 25 28 27   BUN 11 23 16 9   CREATININE 0.72* 1.60* 0.90 0.86   CALCIUM 8.9 8.6 8.8 8.9   PHOS  --   --   --  3.1   GLUCOSE 106* 127* 85 90   ALKPHOS 45* 37*  --   --    AST 12* 13*  --   --    ALT 19 15  --   --    BILITOT 0.6 0.8  --   --    ALBUMIN 0.7 0.7  --   --    PROT 6.6 6.0*  --   --    LIPASE 863* 2,196*  --   --          Assessment:     Principal Problem:    Acute on chronic pancreatitis (HCC)  Active Problems:    Abnormal CT of the abdomen    Alcohol use disorder, severe, dependence (HCC)    Abdominal pain    Primary hypertension    Chronic pancreatitis (HCC)  Resolved Problems:    * No resolved hospital problems. *    44 y.o. male patient known to Dr Griselda Lainez with PMH including but not limited to chronic pancreatitis and HTN , who is seen in consultation at the request of Dr. Rox Keith for chronic pancreatis, third episode this year. CT abdomen pelvis performed which reveals multiple pseudocysts and mild inflammatory changes and mild ascites. Onset of these sx after drinking wine.      10/30:  WBC bumped up today to 12.1, hgb stable, K normal, Na low at 129, Cr now normal    Plan: Continue supportive iv fluids and pain medication per primary team  Continue PPI  ETOH abstinence discussed  May need psuedocyst drainage  Miralax BID and Dulcolax suppositories  EUS with cystgastrostomy once acute issues have further improved.

## 2022-10-31 VITALS
DIASTOLIC BLOOD PRESSURE: 68 MMHG | SYSTOLIC BLOOD PRESSURE: 116 MMHG | BODY MASS INDEX: 26.54 KG/M2 | OXYGEN SATURATION: 96 % | RESPIRATION RATE: 16 BRPM | TEMPERATURE: 98.6 F | HEART RATE: 84 BPM | HEIGHT: 70 IN

## 2022-10-31 PROBLEM — F10.20 ALCOHOL USE DISORDER, SEVERE, DEPENDENCE (HCC): Status: RESOLVED | Noted: 2019-08-21 | Resolved: 2022-10-31

## 2022-10-31 PROBLEM — K86.1 ACUTE ON CHRONIC PANCREATITIS (HCC): Status: RESOLVED | Noted: 2022-07-30 | Resolved: 2022-10-31

## 2022-10-31 PROBLEM — N17.9 ACUTE KIDNEY INJURY (NONTRAUMATIC) (HCC): Status: RESOLVED | Noted: 2022-10-31 | Resolved: 2022-10-31

## 2022-10-31 PROBLEM — N17.9 ACUTE KIDNEY INJURY (NONTRAUMATIC) (HCC): Status: ACTIVE | Noted: 2022-10-31

## 2022-10-31 PROBLEM — K85.90 ACUTE ON CHRONIC PANCREATITIS (HCC): Status: RESOLVED | Noted: 2022-07-30 | Resolved: 2022-10-31

## 2022-10-31 PROBLEM — R10.9 ABDOMINAL PAIN: Status: RESOLVED | Noted: 2022-10-25 | Resolved: 2022-10-31

## 2022-10-31 PROBLEM — R93.5 ABNORMAL CT OF THE ABDOMEN: Status: RESOLVED | Noted: 2022-07-30 | Resolved: 2022-10-31

## 2022-10-31 LAB
ALBUMIN SERPL-MCNC: 2.1 G/DL (ref 3.5–5)
ANION GAP SERPL CALC-SCNC: 5 MMOL/L (ref 2–11)
BUN SERPL-MCNC: 9 MG/DL (ref 6–23)
CALCIUM SERPL-MCNC: 8.7 MG/DL (ref 8.3–10.4)
CHLORIDE SERPL-SCNC: 96 MMOL/L (ref 101–110)
CO2 SERPL-SCNC: 28 MMOL/L (ref 21–32)
CREAT SERPL-MCNC: 0.76 MG/DL (ref 0.8–1.5)
ERYTHROCYTE [DISTWIDTH] IN BLOOD BY AUTOMATED COUNT: 13.2 % (ref 11.9–14.6)
GLUCOSE SERPL-MCNC: 129 MG/DL (ref 65–100)
HCT VFR BLD AUTO: 31.3 % (ref 41.1–50.3)
HGB BLD-MCNC: 10.6 G/DL (ref 13.6–17.2)
LIPASE SERPL-CCNC: 293 U/L (ref 73–393)
MCH RBC QN AUTO: 28.3 PG (ref 26.1–32.9)
MCHC RBC AUTO-ENTMCNC: 33.9 G/DL (ref 31.4–35)
MCV RBC AUTO: 83.7 FL (ref 82–102)
NRBC # BLD: 0 K/UL (ref 0–0.2)
PHOSPHATE SERPL-MCNC: 3 MG/DL (ref 2.5–4.5)
PLATELET # BLD AUTO: 440 K/UL (ref 150–450)
PMV BLD AUTO: 9.6 FL (ref 9.4–12.3)
POTASSIUM SERPL-SCNC: 3.8 MMOL/L (ref 3.5–5.1)
RBC # BLD AUTO: 3.74 M/UL (ref 4.23–5.6)
SODIUM SERPL-SCNC: 129 MMOL/L (ref 133–143)
WBC # BLD AUTO: 8.4 K/UL (ref 4.3–11.1)

## 2022-10-31 PROCEDURE — 36415 COLL VENOUS BLD VENIPUNCTURE: CPT

## 2022-10-31 PROCEDURE — 85027 COMPLETE CBC AUTOMATED: CPT

## 2022-10-31 PROCEDURE — 6360000002 HC RX W HCPCS: Performed by: FAMILY MEDICINE

## 2022-10-31 PROCEDURE — 82040 ASSAY OF SERUM ALBUMIN: CPT

## 2022-10-31 PROCEDURE — 2580000003 HC RX 258: Performed by: FAMILY MEDICINE

## 2022-10-31 PROCEDURE — 6360000002 HC RX W HCPCS: Performed by: HOSPITALIST

## 2022-10-31 PROCEDURE — A4216 STERILE WATER/SALINE, 10 ML: HCPCS | Performed by: FAMILY MEDICINE

## 2022-10-31 PROCEDURE — 6370000000 HC RX 637 (ALT 250 FOR IP): Performed by: FAMILY MEDICINE

## 2022-10-31 PROCEDURE — 80048 BASIC METABOLIC PNL TOTAL CA: CPT

## 2022-10-31 PROCEDURE — 83690 ASSAY OF LIPASE: CPT

## 2022-10-31 PROCEDURE — 6370000000 HC RX 637 (ALT 250 FOR IP): Performed by: HOSPITALIST

## 2022-10-31 PROCEDURE — 2500000003 HC RX 250 WO HCPCS: Performed by: FAMILY MEDICINE

## 2022-10-31 PROCEDURE — 84100 ASSAY OF PHOSPHORUS: CPT

## 2022-10-31 RX ORDER — OXYCODONE HYDROCHLORIDE 5 MG/1
2.5 TABLET ORAL
Status: DISCONTINUED | OUTPATIENT
Start: 2022-10-31 | End: 2022-10-31 | Stop reason: HOSPADM

## 2022-10-31 RX ORDER — OXYCODONE HYDROCHLORIDE 5 MG/1
5 TABLET ORAL EVERY 4 HOURS PRN
Status: DISCONTINUED | OUTPATIENT
Start: 2022-10-31 | End: 2022-10-31

## 2022-10-31 RX ORDER — FAMOTIDINE 20 MG/1
20 TABLET, FILM COATED ORAL 2 TIMES DAILY
Qty: 60 TABLET | Refills: 0 | Status: SHIPPED | OUTPATIENT
Start: 2022-10-31 | End: 2022-11-01

## 2022-10-31 RX ORDER — NALOXONE HYDROCHLORIDE 0.4 MG/ML
0.4 INJECTION, SOLUTION INTRAMUSCULAR; INTRAVENOUS; SUBCUTANEOUS PRN
Qty: 2 EACH | Refills: 0 | Status: ON HOLD | OUTPATIENT
Start: 2022-10-31 | End: 2022-12-01 | Stop reason: HOSPADM

## 2022-10-31 RX ORDER — OXYCODONE HYDROCHLORIDE 5 MG/1
5 TABLET ORAL EVERY 4 HOURS PRN
Qty: 18 TABLET | Refills: 0 | Status: ON HOLD | OUTPATIENT
Start: 2022-10-31 | End: 2022-11-10 | Stop reason: HOSPADM

## 2022-10-31 RX ORDER — OXYCODONE HYDROCHLORIDE 5 MG/1
5 TABLET ORAL
Status: DISCONTINUED | OUTPATIENT
Start: 2022-10-31 | End: 2022-10-31 | Stop reason: HOSPADM

## 2022-10-31 RX ORDER — NICOTINE 21 MG/24HR
1 PATCH, TRANSDERMAL 24 HOURS TRANSDERMAL DAILY
Qty: 30 PATCH | Refills: 3 | Status: SHIPPED | OUTPATIENT
Start: 2022-11-01 | End: 2022-11-01

## 2022-10-31 RX ORDER — OXYCODONE HYDROCHLORIDE 5 MG/1
2.5 TABLET ORAL
Status: DISCONTINUED | OUTPATIENT
Start: 2022-10-31 | End: 2022-10-31

## 2022-10-31 RX ADMIN — HYDROMORPHONE HYDROCHLORIDE 2 MG: 1 INJECTION, SOLUTION INTRAMUSCULAR; INTRAVENOUS; SUBCUTANEOUS at 06:30

## 2022-10-31 RX ADMIN — HYDROMORPHONE HYDROCHLORIDE 2 MG: 1 INJECTION, SOLUTION INTRAMUSCULAR; INTRAVENOUS; SUBCUTANEOUS at 12:57

## 2022-10-31 RX ADMIN — HYDROMORPHONE HYDROCHLORIDE 2 MG: 1 INJECTION, SOLUTION INTRAMUSCULAR; INTRAVENOUS; SUBCUTANEOUS at 03:21

## 2022-10-31 RX ADMIN — SODIUM CHLORIDE, SODIUM LACTATE, POTASSIUM CHLORIDE, AND CALCIUM CHLORIDE: 600; 310; 30; 20 INJECTION, SOLUTION INTRAVENOUS at 00:28

## 2022-10-31 RX ADMIN — HYDROMORPHONE HYDROCHLORIDE 2 MG: 1 INJECTION, SOLUTION INTRAMUSCULAR; INTRAVENOUS; SUBCUTANEOUS at 09:41

## 2022-10-31 RX ADMIN — OXYCODONE 5 MG: 5 TABLET ORAL at 11:37

## 2022-10-31 RX ADMIN — ENOXAPARIN SODIUM 40 MG: 100 INJECTION SUBCUTANEOUS at 13:41

## 2022-10-31 RX ADMIN — Medication 100 MG: at 09:41

## 2022-10-31 RX ADMIN — HYDROMORPHONE HYDROCHLORIDE 2 MG: 1 INJECTION, SOLUTION INTRAMUSCULAR; INTRAVENOUS; SUBCUTANEOUS at 00:23

## 2022-10-31 RX ADMIN — SODIUM CHLORIDE, PRESERVATIVE FREE 20 MG: 5 INJECTION INTRAVENOUS at 11:40

## 2022-10-31 RX ADMIN — OXYCODONE 5 MG: 5 TABLET ORAL at 16:15

## 2022-10-31 ASSESSMENT — PAIN DESCRIPTION - LOCATION
LOCATION: ABDOMEN

## 2022-10-31 ASSESSMENT — PAIN SCALES - GENERAL
PAINLEVEL_OUTOF10: 7
PAINLEVEL_OUTOF10: 6
PAINLEVEL_OUTOF10: 8
PAINLEVEL_OUTOF10: 6
PAINLEVEL_OUTOF10: 7
PAINLEVEL_OUTOF10: 4
PAINLEVEL_OUTOF10: 7

## 2022-10-31 ASSESSMENT — PAIN DESCRIPTION - DESCRIPTORS
DESCRIPTORS: ACHING

## 2022-10-31 ASSESSMENT — PAIN - FUNCTIONAL ASSESSMENT
PAIN_FUNCTIONAL_ASSESSMENT: PREVENTS OR INTERFERES SOME ACTIVE ACTIVITIES AND ADLS
PAIN_FUNCTIONAL_ASSESSMENT: ACTIVITIES ARE NOT PREVENTED
PAIN_FUNCTIONAL_ASSESSMENT: ACTIVITIES ARE NOT PREVENTED

## 2022-10-31 ASSESSMENT — PAIN DESCRIPTION - ORIENTATION: ORIENTATION: UPPER

## 2022-10-31 NOTE — CARE COORDINATION
Discharge planning was discussed with the Interdisciplinary Team. He is currently NPO and is not yet medically ready for discharge.

## 2022-10-31 NOTE — PROGRESS NOTES
Comprehensive Nutrition Assessment    Type and Reason for Visit: Initial, NPO/Clear Liquid  Day 5 NPO/CLD    Nutrition Recommendations/Plan:   Meals and Snacks:  Diet: Continue current order  Progression per GI. Pt is currently day 5 of admission, NPO/CLD throughout admission. If anticipated pt to remain NPO/CLD  > than additional 3 days nutrition support should be considered for primary needs. Enteral route preferred if medically appropriate and indicated. 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 Pt reports not eating for 8 days at RD assessment. Wt hx per EMR. Nutrition Background:       PMH remarkable for HTN and pancreatitis. Admitted with chronic pancreatitis, third episode this year. CT abdomen pelvis performed which reveals multiple pseudocysts and mild inflammatory changes and mild ascites. Onset of these sx after drinking wine. Nutrition Interval:  Day 6 of admission,  NPO 10/25, clears started 10/26, npo resumed 10/27. Sipping unsweet tea at RD visit. Pt requesting to eat, stating he is feeling better. Lab Results   Component Value Date    LIPASE 293 10/31/2022   Potential need for EUS with cystogastrostomy  per GI note.       Current Nutrition Therapies:  Diet NPO Exceptions are: Ice Chips, Sips of Water with Meds    Current Intake:   Average Meal Intake: NPO        Anthropometric Measures:  Height: 5' 10\" (177.8 cm)  Current Body Wt: 184 lb 15.5 oz (83.9 kg) (10/25), Weight source: Bed Scale  BMI: 26.5, Overweight (BMI 25.0-29. 9)  Admission Body Weight: 184 lb 15.5 oz (83.9 kg) (10/25 bedscale)  Ideal Body Weight (Kg) (Calculated): 75 kg (166 lbs), 111.4 %  Usual Body Wt: 183 lb (83 kg) (limited EMR hx 2/8/21 IM office), Percent weight change: 1.1       Edema:Peripheral Vascular  Peripheral Vascular (WDL): Within Defined Limits  Edema: None   BMI Category Overweight (BMI 25.0-29. 9)  Estimated Daily Nutrient Needs:  Energy (kcal/day): 1477-5300 (25-30 kcal/kg) (Kcal/kg Weight used: 83.9 kg Admission  Protein (g/day):  (1-1.2 g/kg) Weight Used: (Admission) 83.9 kg  Fluid (ml/day):   (1 ml/kcal)    Nutrition Diagnosis:   Inadequate oral intake related to altered GI function as evidenced by NPO or clear liquid status due to medical condition  Nutrition Interventions:   Food and/or Nutrient Delivery: Continue NPO     Coordination of Nutrition Care: Continue to monitor while inpatient, Interdisciplinary Rounds       Goals: Active Goal: by next RD assessment (Tolerate initiation of nutrition regimen)       Nutrition Monitoring and Evaluation:      Food/Nutrient Intake Outcomes: Diet Advancement/Tolerance  Physical Signs/Symptoms Outcomes: Biochemical Data, GI Status, Weight    Discharge Planning:     Too soon to determine    AYDEN ORR RD  '

## 2022-10-31 NOTE — PLAN OF CARE
Problem: Discharge Planning  Goal: Discharge to home or other facility with appropriate resources  Outcome: Adequate for Discharge     Problem: Pain  Goal: Verbalizes/displays adequate comfort level or baseline comfort level  Outcome: Adequate for Discharge

## 2022-10-31 NOTE — PROGRESS NOTES
[facility] Progress Note         Admit Date:  10/25/2022    Today's Date:  10/31/2022    CC:  Chronic pancreatitis    Subjective:     Still with pain but less. Wants to know if he can eat. Last lipase was 2,196 on 10-28. Denies F/C/N/V    Medications:   Current Facility-Administered Medications   Medication Dose Route Frequency    calcium carbonate (TUMS) chewable tablet 500 mg  500 mg Oral TID PRN    bisacodyl (DULCOLAX) suppository 10 mg  10 mg Rectal Daily PRN    oxyCODONE (ROXICODONE) immediate release tablet 5 mg  5 mg Oral Q4H PRN    HYDROmorphone HCl PF (DILAUDID) injection 2 mg  2 mg IntraVENous Q3H PRN    LORazepam (ATIVAN) injection 1 mg  1 mg IntraVENous Q6H PRN    polyethylene glycol (GLYCOLAX) packet 17 g  17 g Oral BID    naloxone (NARCAN) injection 0.4 mg  0.4 mg IntraVENous PRN    prochlorperazine (COMPAZINE) injection 10 mg  10 mg IntraVENous Q6H PRN    nicotine (NICODERM CQ) 14 MG/24HR 1 patch  1 patch TransDERmal Daily    [Held by provider] amLODIPine (NORVASC) tablet 10 mg  10 mg Oral Daily    [Held by provider] lisinopril (PRINIVIL;ZESTRIL) tablet 40 mg  40 mg Oral Daily    thiamine tablet 100 mg  100 mg Oral Daily    lactated ringers infusion   IntraVENous Continuous    sodium chloride flush 0.9 % injection 5-40 mL  5-40 mL IntraVENous 2 times per day    sodium chloride flush 0.9 % injection 5-40 mL  5-40 mL IntraVENous PRN    0.9 % sodium chloride infusion   IntraVENous PRN    ondansetron (ZOFRAN-ODT) disintegrating tablet 4 mg  4 mg Oral Q8H PRN    Or    ondansetron (ZOFRAN) injection 4 mg  4 mg IntraVENous Q6H PRN    enoxaparin (LOVENOX) injection 40 mg  40 mg SubCUTAneous Q24H    magnesium sulfate 2000 mg in 50 mL IVPB premix  2,000 mg IntraVENous PRN    potassium chloride (KLOR-CON M) extended release tablet 40 mEq  40 mEq Oral PRN    Or    potassium bicarb-citric acid (EFFER-K) effervescent tablet 40 mEq  40 mEq Oral PRN    Or    potassium chloride 10 mEq/100 mL IVPB (Peripheral Line)  10 mEq IntraVENous PRN    famotidine (PEPCID) 20 mg in sodium chloride (PF) 10 mL injection  20 mg IntraVENous BID       Review of Systems:  ROS was obtained, with pertinent positives as listed above. No chest pain or SOB. Diet: NPO    Objective:   Vitals:  /73   Pulse 84   Temp 98.4 °F (36.9 °C)   Resp 16   SpO2 97%   Intake/Output:  No intake/output data recorded. No intake/output data recorded. Exam:  General appearance: uncomfortable, actively vomiting unable to complete assessment   Neuro:  alert and oriented    Data Review (Labs):    Recent Labs     10/29/22  0826 10/30/22  0532 10/31/22  0535   WBC 10.5 12.1* 8.4   HGB 10.6* 11.7* 10.6*   HCT 31.7* 34.7* 31.3*    449 440   MCV 84.5 84.0 83.7   * 129* 129*   K 4.3 4.2 3.8   CL 96* 95* 96*   CO2 28 27 28   BUN 16 9 9   CREATININE 0.90 0.86 0.76*   CALCIUM 8.8 8.9 8.7   PHOS  --  3.1 3.0   GLUCOSE 85 90 129*     CT A/P 10-25-22     Several small, 1-2 cm pseudocyst and a large, 7 x 11 cm pseudocyst   which is lateral to the greater curvature the stomach. Small amount of ascites   throughout the abdomen. Assessment:     Principal Problem:    Acute on chronic pancreatitis (HCC)  Active Problems:    Abnormal CT of the abdomen    Alcohol use disorder, severe, dependence (HCC)    Abdominal pain    Primary hypertension    Chronic pancreatitis ([facility] [address]  Resolved Problems:    * No resolved hospital problems. *    44 y.o. male patient known to Dr Pretty Guzman with PMH including but not limited to chronic pancreatitis and HTN , who is seen in consultation at the request of Dr. Cathleen Vela for chronic pancreatis, third episode this year. CT abdomen pelvis performed which reveals multiple pseudocysts and mild inflammatory changes and mild ascites. Onset of these sx after drinking wine. WBC NL,  Hgb decreased to 10.6. Plan:     Continue supportive iv fluids and pain medication per primary team  Continue Pepcid.   On Lovenox. ETOH abstinence discussed  May need pseudocyst drainage  Miralax BID and Dulcolax suppositories  EUS with cystgastrostomy once acute issues have further improved. Check lipase  NPO for now    ALAINA Johnson MD

## 2022-10-31 NOTE — PROGRESS NOTES
Physician Progress Note      PATIENT:               Félix Fernandez  CSN #:                  [id number]  :                       1985  ADMIT DATE:       10/25/2022 1:02 PM  [address] [place] DATE:  Juljuanykelsi Kwame  PROVIDER #:        Marcus Velarde MD          QUERY TEXT:    Pt admitted with pancreatitis. Pt noted to have elevated creatinine. If   possible, please document in the progress notes and discharge summary if you   are evaluating and/or treating any of the following: The medical record reflects the following:  Risk Factors: 44 y.o. male with medical history of chronic pancreatitis,   hypertension who presented with severe abdominal pain for 1 week. He has   periodic flares of his pancreatitis. Clinical Indicators: Creatinine . 82, .75, .72, 1.60, .90, .86, .76  Treatment: Serial labs, IVF's    Defined by Kidney Disease Improving Global Outcomes (KDIGO) clinical practice   guideline for acute kidney injury:  -Increase in SCr by greater than or equal to 0.3 mg/dl within 48 hours; or  -Increase or decrease in SCr to greater than or equal to 1.5 times baseline,   which is known or presumed to have occurred within the prior 7 days; or  -Urine volume < 0.5ml/kg/h for 6 hours    Thank you,  Sharee Morgan RN, BSN, CDI  [email or web address]  . Options provided:  -- Acute kidney failure  -- Acute kidney failure with acute tubular necrosis  -- Acute kidney injury  -- Other - I will add my own diagnosis  -- Disagree - Not applicable / Not valid  -- Disagree - Clinically unable to determine / Unknown  -- Refer to Clinical Documentation Reviewer    PROVIDER RESPONSE TEXT:    This patient has an Acute kidney injury.     Query created by: Vaishali Krishna on 10/31/2022 5:19 PM      Electronically signed by:  Marcus Velarde MD 10/31/2022 6:15 PM

## 2022-10-31 NOTE — DISCHARGE SUMMARY
Hospitalist Discharge Summary   Admit Date:  10/25/2022  1:02 PM   DC Note date: 10/31/2022  Name:  Denae Eckert   Age:  44 y.o. Sex:  male  :  1985   MRN:  [id number]   Room:  [facility]  PCP:  Dr Garrett Sarmiento    Presenting Complaint: Abdominal pain   Initial Admission Diagnosis: Abdominal pain [R10.9]     Problem List for this Hospitalization (present on admission):    Principal Problem (Resolved):    Acute on chronic pancreatitis [facility])  Active Problems:    Primary hypertension    Chronic pancreatitis ([facility] [address]  Resolved Problems:    Abnormal CT of the abdomen    Alcohol use disorder, severe, dependence ([facility] [address]    Abdominal pain    Acute kidney injury (nontraumatic) [facility])      Hospital Course:  37M PMHx chronic pancreatitis, hypertension who presented with severe abdominal pain for 1 week. He has periodic flares of his pancreatitis. He knew not to eat and to try and manage at home with his pain medication. He thought he was getting better but then got severely worse and presented to the ED for evaluation. In the emergency department he underwent CT abdomen showing ascites and pseudocysts. Admitted for further evaluation and management. Gastroenterology consulted. Pain treated. Able to resume diet. Outpatient EUS scheduled with Gastroenterology. Determined appropriate for discharge 10/31. Disposition: Home with close follow up  Diet: ADULT DIET; Regular; Low Fat/Low Chol/High Fiber/2 gm Na  Code Status: Full Code    Follow Ups:   Follow-up Information     Carloz Bray MD. Go in 2 day(s). Specialty: Gastroenterology  Why: Routine progression of care at 3:15  Contact information:  SHREYA Contreras [phone] [facility]  Rah Jefferson MD Follow up in 1 week(s).     Specialty: Internal Medicine  Why: Transition of Care Management  Contact information:  [address] [place] [address]  [id number]  [phone]                       Time spent in patient discharge and coordination 52 minutes. Follow up labs/diagnostics (ultimately defer to outpatient provider): Complete alcohol cessation  Outpatient follow up with GI    Plan was discussed with patient, gastroenterologist, nurse, . All questions answered. Patient was stable at time of discharge. Instructions given to call a physician or return if any concerns. Current Discharge Medication List        START taking these medications    Details   famotidine (PEPCID) 20 MG tablet Take 1 tablet by mouth 2 times daily  Qty: 60 tablet, Refills: 0      naloxone (NARCAN) 0.4 MG/ML injection Infuse 1 mL intravenously as needed (shortness of breath)  Qty: 2 each, Refills: 0      nicotine (NICODERM CQ) 14 MG/24HR Place 1 patch onto the skin daily  Qty: 30 patch, Refills: 3      oxyCODONE (ROXICODONE) 5 MG immediate release tablet Take 1 tablet by mouth every 4 hours as needed for Pain for up to 3 days. Qty: 18 tablet, Refills: 0    Comments: Reduce doses taken as pain becomes manageable  Associated Diagnoses: Alcohol-induced chronic pancreatitis ([facility] [address]           CONTINUE these medications which have NOT CHANGED    Details   ondansetron (ZOFRAN) 4 MG tablet Take 1 tablet by mouth 3 times daily as needed for Nausea or Vomiting  Qty: 15 tablet, Refills: 0      amLODIPine (NORVASC) 10 MG tablet Take 1 tablet by mouth daily  Qty: 90 tablet, Refills: 0    Associated Diagnoses: Essential hypertension           STOP taking these medications       lisinopril (PRINIVIL;ZESTRIL) 40 MG tablet Comments:   Reason for Stopping:         thiamine 100 MG tablet Comments:   Reason for Stopping:               Procedures done this admission:  * No surgery found *    Consults this admission:  IP CONSULT TO GI    Echocardiogram results:  No results found for this or any previous visit.       Diagnostic Imaging/Tests:   CT ABDOMEN PELVIS W IV CONTRAST Additional Contrast? Radiologist Recommendation    Result Date: 10/25/2022  Several small, 1-2 cm pseudocyst and a large, 7 x 11 cm pseudocyst which is lateral to the greater curvature the stomach. Small amount of ascites throughout the abdomen.        Labs: Results:       BMP, Mg, Phos Recent Labs     10/29/22  0826 10/30/22  0532 10/31/22  0535   * 129* 129*   K 4.3 4.2 3.8   CL 96* 95* 96*   CO2 28 27 28   ANIONGAP 4 7 5   BUN 16 9 9   CREATININE 0.90 0.86 0.76*   LABGLOM >60 >60 >60   CALCIUM 8.8 8.9 8.7   GLUCOSE 85 90 129*   PHOS  --  3.1 3.0      CBC Recent Labs     10/29/22  0826 10/30/22  0532 10/31/22  0535   WBC 10.5 12.1* 8.4   RBC 3.75* 4.13* 3.74*   HGB 10.6* 11.7* 10.6*   HCT 31.7* 34.7* 31.3*   MCV 84.5 84.0 83.7   MCH 28.3 28.3 28.3   MCHC 33.4 33.7 33.9   RDW 13.2 13.3 13.2    449 440   MPV 9.2* 9.5 9.6   NRBC 0.00 0.00 0.00   SEGS 78  --   --    LYMPHOPCT 9*  --   --    EOSRELPCT 0*  --   --    MONOPCT 12  --   --    BASOPCT 0  --   --    IMMGRAN 0  --   --    SEGSABS 8.2  --   --    LYMPHSABS 1.0  --   --    EOSABS 0.0  --   --    MONOSABS 1.3  --   --    BASOSABS 0.0  --   --    ABSIMMGRAN 0.0  --   --       LFT Recent Labs     10/30/22  0532 10/31/22  0535   LABALBU 2.3* 2.1*      Cardiac  No results found for: NTPROBNP, TROPHS   Coags No results found for: PROTIME, INR, APTT   A1c Lab Results   Component Value Date/Time    LABA1C 5.8 10/26/2022 05:58 AM     10/26/2022 05:58 AM      Lipids Lab Results   Component Value Date/Time    TRIG 140 10/26/2022 05:58 AM      Thyroid  Lab Results   Component Value Date/Time    TSHELE 2.38 10/26/2022 05:58 AM        Most Recent UA Lab Results   Component Value Date/Time    COLORU CHEKO 10/25/2022 07:51 AM    APPEARANCE CLEAR 10/25/2022 07:51 AM    SPECGRAV 1.020 10/25/2022 07:51 AM    LABPH 7.0 10/25/2022 07:51 AM    PROTEINU 100 10/25/2022 07:51 AM    GLUCOSEU Negative 10/25/2022 07:51 AM    KETUA >160 10/25/2022 07:51 AM    BILIRUBINUR MODERATE 10/25/2022 07:51 AM    BLOODU TRACE 10/25/2022 07:51 AM    UROBILINOGEN 1.0 10/25/2022 07:51 AM    NITRU Negative 10/25/2022 07:51 AM    LEUKOCYTESUR Negative 10/25/2022 07:51 AM    WBCUA 0-3 10/25/2022 07:51 AM    RBCUA 0-3 10/25/2022 07:51 AM    EPITHUA 0 10/25/2022 07:51 AM    BACTERIA 1+ 10/25/2022 07:51 AM    LABCAST 0 10/25/2022 07:51 AM    MUCUS 0 10/25/2022 07:51 AM        No results for input(s): CULTURE in the last 720 hours.     All Labs from Last 24 Hrs:  Recent Results (from the past 24 hour(s))   Phosphorus    Collection Time: 10/31/22  5:35 AM   Result Value Ref Range    Phosphorus 3.0 2.5 - 4.5 MG/DL   Albumin    Collection Time: 10/31/22  5:35 AM   Result Value Ref Range    Albumin 2.1 (L) 3.5 - 5.0 g/dL   Basic Metabolic Panel w/ Reflex to MG    Collection Time: 10/31/22  5:35 AM   Result Value Ref Range    Sodium 129 (L) 133 - 143 mmol/L    Potassium 3.8 3.5 - 5.1 mmol/L    Chloride 96 (L) 101 - 110 mmol/L    CO2 28 21 - 32 mmol/L    Anion Gap 5 2 - 11 mmol/L    Glucose 129 (H) 65 - 100 mg/dL    BUN 9 6 - 23 MG/DL    Creatinine 0.76 (L) 0.8 - 1.5 MG/DL    Est, Glom Filt Rate >60 >60 ml/min/1.73m2    Calcium 8.7 8.3 - 10.4 MG/DL   CBC    Collection Time: 10/31/22  5:35 AM   Result Value Ref Range    WBC 8.4 4.3 - 11.1 K/uL    RBC 3.74 (L) 4.23 - 5.6 M/uL    Hemoglobin 10.6 (L) 13.6 - 17.2 g/dL    Hematocrit 31.3 (L) 41.1 - 50.3 %    MCV 83.7 82.0 - 102.0 FL    MCH 28.3 26.1 - 32.9 PG    MCHC 33.9 31.4 - 35.0 g/dL    RDW 13.2 11.9 - 14.6 %    Platelets 037 576 - 947 K/uL    MPV 9.6 9.4 - 12.3 FL    nRBC 0.00 0.0 - 0.2 K/uL   Lipase    Collection Time: 10/31/22  5:35 AM   Result Value Ref Range    Lipase 293 73 - 393 U/L       No Known Allergies  Immunization History   Administered Date(s) Administered    COVID-19, MODERNA BLUE border, Primary or Immunocompromised, (age 12y+), IM, 100 mcg/0.5mL 08/19/2021    Influenza Virus Vaccine 11/05/2015, 09/19/2018, 01/18/2020, 01/18/2020, 10/05/2020, 10/05/2020    Influenza, FLUARIX, FLULAVAL, FLUZONE (age 10 mo+) AND AFLURIA, (age 1 y+), PF, 0.5mL 11/05/2015, 01/18/2020, 10/05/2020    Pneumococcal Polysaccharide ([id number]) 02/22/2018    Tdap (Boostrix, Adacel) 10/11/2022       Recent Vital Data:  Patient Vitals for the past 24 hrs:   Temp Pulse Resp BP SpO2   10/31/22 1615 98.6 °F (37 °C) 84 16 116/68 96 %   10/31/22 1327 -- -- 15 -- --   10/31/22 1257 -- -- 16 -- --   10/31/22 1207 -- -- 16 -- --   10/31/22 1121 98.1 °F (36.7 °C) 87 16 131/71 97 %   10/31/22 1011 -- -- 16 -- --   10/31/22 0740 98.4 °F (36.9 °C) 84 16 126/73 97 %   10/31/22 0700 -- -- 16 -- --   10/31/22 0630 -- -- 16 -- --   10/31/22 0321 -- -- 16 -- --   10/31/22 0249 98.8 °F (37.1 °C) 92 16 114/81 98 %   10/31/22 0023 -- -- 17 -- --   10/30/22 2335 98.6 °F (37 °C) 66 16 134/80 --   10/30/22 2121 -- -- 16 -- --   10/30/22 1940 98.5 °F (36.9 °C) 64 16 138/83 100 %       Oxygen Therapy  SpO2: 96 %  Pulse Oximeter Device Mode: Intermittent  Pulse Oximeter Device Location: Right, Finger  O2 Device: None (Room air)    Estimated body mass index is 26.54 kg/m² as calculated from the following:    Height as of this encounter: 5' 10\" (1.778 m). Weight as of an earlier encounter on 10/25/22: 185 lb (83.9 kg). No intake or output data in the 24 hours ending 10/31/22 1820    Physical Exam  Vitals and nursing note reviewed. Constitutional:       General: He is not in acute distress. Appearance: He is not ill-appearing or diaphoretic. HENT:      Head: Normocephalic and atraumatic. Eyes:      Extraocular Movements: Extraocular movements intact. Cardiovascular:      Rate and Rhythm: Normal rate. Pulmonary:      Effort: Pulmonary effort is normal. No respiratory distress. Abdominal:      General: There is no distension. Tenderness: There is no abdominal tenderness. Musculoskeletal:         General: No deformity. Skin:     Coloration: Skin is not jaundiced or pale. Neurological:      General: No focal deficit present.       Mental Status: He is alert and oriented to person, place, and time. Psychiatric:         Mood and Affect: Mood normal.         Behavior: Behavior normal. Behavior is cooperative.      Signed:  Krishna Ledesma MD

## 2022-11-01 ENCOUNTER — PREP FOR PROCEDURE (OUTPATIENT)
Dept: ADMINISTRATIVE | Age: 37
End: 2022-11-01

## 2022-11-01 NOTE — CARE COORDINATION
11/01/22 0840   Service Assessment   Patient Orientation Alert and Oriented   Cognition Alert   History Provided By Patient   Support Systems Spouse/Significant Other   Prior Functional Level Independent in ADLs/IADLs   Current Functional Level Independent in ADLs/IADLs   Can patient return to prior living arrangement Yes   Taurus Noel Other (Comment)  (He was provided with resources for [employer], substance abuse, including [facility], [place] during this admission.)   Social/Functional History   Lives With Spouse; Other (comment)  (spouse and children)   Type of [address]   (None)   ADL Assistance Independent   Mode of Transportation Car   Discharge Planning   Type of [address] Prior To Admission None   Potential Assistance Needed N/A   DME Ordered? No   Potential Assistance Purchasing Medications No   Type of Home Care Services None   Patient expects to be discharged to: [facility] Discharge   Services At/After Discharge None     The patient discharged home with his family. He was provided with community resources during this admission. No case management needs were identified.

## 2022-11-01 NOTE — PROGRESS NOTES
Physician Progress Note      PATIENT:               Sirisha Stoner  CSN #:                  [id number]  :                       1985  ADMIT DATE:       10/25/2022 1:02 PM  Valery Sung DATE:        10/31/2022 7:23 PM  RESPONDING  PROVIDER #:        Tyree Willingham MD          QUERY TEXT:    Patient admitted with pancreatitis with documented SIRS. If possible, please   document in progress notes and discharge summary if you are evaluating and/or   treating any of the following: The medical record reflects the following:  Risk Factors: 44 y.o. male with medical history of chronic pancreatitis,   hypertension who presented with severe abdominal pain for 1 week. He has   periodic flares of his pancreatitis. Clinical Indicators: Documented SIRS and TO  Treatment: Monitor VS, serial labs, IVF's    Thank you,  Sharee Morgan RN, BSN, CDI  [email or web address]  . Options provided:  -- SIRS of non-infectious origin due to pancreatitis with TO  -- Other - I will add my own diagnosis  -- Disagree - Not applicable / Not valid  -- Disagree - Clinically unable to determine / Unknown  -- Refer to Clinical Documentation Reviewer    PROVIDER RESPONSE TEXT:    This patient has SIRS of non-infectious origin due to pancreatitis with TO.     Query created by: Angie Wood on 2022 8:43 AM      Electronically signed by:  Tyree Willingham MD 2022 1:22 PM

## 2022-11-02 ENCOUNTER — HOSPITAL ENCOUNTER (INPATIENT)
Age: 37
LOS: 8 days | Discharge: HOME | DRG: 247 | End: 2022-11-10
Attending: STUDENT IN AN ORGANIZED HEALTH CARE EDUCATION/TRAINING PROGRAM | Admitting: INTERNAL MEDICINE

## 2022-11-02 ENCOUNTER — APPOINTMENT (OUTPATIENT)
Dept: NON INVASIVE DIAGNOSTICS | Age: 37
DRG: 247 | End: 2022-11-02

## 2022-11-02 ENCOUNTER — APPOINTMENT (OUTPATIENT)
Dept: GENERAL RADIOLOGY | Age: 37
DRG: 247 | End: 2022-11-02

## 2022-11-02 DIAGNOSIS — I21.3 ST ELEVATION MYOCARDIAL INFARCTION (STEMI), UNSPECIFIED ARTERY (HCC): Primary | ICD-10-CM

## 2022-11-02 DIAGNOSIS — I21.3 STEMI (ST ELEVATION MYOCARDIAL INFARCTION) (HCC): ICD-10-CM

## 2022-11-02 LAB
ALBUMIN SERPL-MCNC: 2.5 G/DL (ref 3.5–5)
ALBUMIN/GLOB SERPL: 0.5 {RATIO} (ref 0.4–1.6)
ALP SERPL-CCNC: 58 U/L (ref 50–136)
ALT SERPL-CCNC: 23 U/L (ref 12–65)
ANION GAP SERPL CALC-SCNC: 7 MMOL/L (ref 2–11)
AST SERPL-CCNC: 8 U/L (ref 15–37)
BILIRUB SERPL-MCNC: 0.2 MG/DL (ref 0.2–1.1)
BUN SERPL-MCNC: 10 MG/DL (ref 6–23)
CALCIUM SERPL-MCNC: 8.6 MG/DL (ref 8.3–10.4)
CHLORIDE SERPL-SCNC: 99 MMOL/L (ref 101–110)
CO2 SERPL-SCNC: 24 MMOL/L (ref 21–32)
CREAT SERPL-MCNC: 0.9 MG/DL (ref 0.8–1.5)
ECHO AO ASC DIAM: 3.9 CM
ECHO AO ASCENDING AORTA INDEX: 1.97 CM/M2
ECHO AO ROOT DIAM: 3.7 CM
ECHO AO ROOT INDEX: 1.87 CM/M2
ECHO AV AREA PEAK VELOCITY: 2.6 CM2
ECHO AV AREA VTI: 2.5 CM2
ECHO AV AREA/BSA PEAK VELOCITY: 1.3 CM2/M2
ECHO AV AREA/BSA VTI: 1.3 CM2/M2
ECHO AV MEAN GRADIENT: 9 MMHG
ECHO AV MEAN VELOCITY: 1.4 M/S
ECHO AV PEAK GRADIENT: 16 MMHG
ECHO AV PEAK VELOCITY: 2 M/S
ECHO AV VELOCITY RATIO: 0.85
ECHO AV VTI: 38.7 CM
ECHO BSA: 1.99 M2
ECHO BSA: 1.99 M2
ECHO EST RA PRESSURE: 3 MMHG
ECHO IVC PROX: 1.9 CM
ECHO LA AREA 2C: 23.4 CM2
ECHO LA AREA 4C: 23.2 CM2
ECHO LA DIAMETER INDEX: 2.02 CM/M2
ECHO LA DIAMETER: 4 CM
ECHO LA MAJOR AXIS: 6.2 CM
ECHO LA MINOR AXIS: 5.9 CM
ECHO LA TO AORTIC ROOT RATIO: 1.08
ECHO LA VOL BP: 75 ML (ref 18–58)
ECHO LA VOL/BSA BIPLANE: 38 ML/M2 (ref 16–34)
ECHO LV E' LATERAL VELOCITY: 11 CM/S
ECHO LV E' SEPTAL VELOCITY: 8 CM/S
ECHO LV EDV 3D: 208 ML
ECHO LV EDV INDEX 3D: 105 ML/M2
ECHO LV EJECTION FRACTION 3D: 57 %
ECHO LV ESV 3D: 90 ML
ECHO LV ESV INDEX 3D: 45 ML/M2
ECHO LV FRACTIONAL SHORTENING: 36 % (ref 28–44)
ECHO LV INTERNAL DIMENSION DIASTOLE INDEX: 2.37 CM/M2
ECHO LV INTERNAL DIMENSION DIASTOLIC: 4.7 CM (ref 4.2–5.9)
ECHO LV INTERNAL DIMENSION SYSTOLIC INDEX: 1.52 CM/M2
ECHO LV INTERNAL DIMENSION SYSTOLIC: 3 CM
ECHO LV IVSD: 1.3 CM (ref 0.6–1)
ECHO LV MASS 2D: 237.9 G (ref 88–224)
ECHO LV MASS 3D INDEX: 91.9 G/M2
ECHO LV MASS 3D: 182 G
ECHO LV MASS INDEX 2D: 120.1 G/M2 (ref 49–115)
ECHO LV POSTERIOR WALL DIASTOLIC: 1.3 CM (ref 0.6–1)
ECHO LV RELATIVE WALL THICKNESS RATIO: 0.55
ECHO LVOT AREA: 3.1 CM2
ECHO LVOT AV VTI INDEX: 0.81
ECHO LVOT DIAM: 2 CM
ECHO LVOT MEAN GRADIENT: 6 MMHG
ECHO LVOT PEAK GRADIENT: 12 MMHG
ECHO LVOT PEAK VELOCITY: 1.7 M/S
ECHO LVOT STROKE VOLUME INDEX: 49.5 ML/M2
ECHO LVOT SV: 98 ML
ECHO LVOT VTI: 31.2 CM
ECHO MV A VELOCITY: 1.21 M/S
ECHO MV E DECELERATION TIME (DT): 269 MS
ECHO MV E VELOCITY: 0.98 M/S
ECHO MV E/A RATIO: 0.81
ECHO MV E/E' LATERAL: 8.91
ECHO MV E/E' RATIO (AVERAGED): 10.58
ECHO MV E/E' SEPTAL: 12.25
ECHO PULMONARY ARTERY END DIASTOLIC PRESSURE: 5 MMHG
ECHO PV ACCELERATION TIME (AT): 134 MS
ECHO PV MAX VELOCITY: 1.3 M/S
ECHO PV PEAK GRADIENT: 6 MMHG
ECHO PV REGURGITANT MAX VELOCITY: 1.1 M/S
ECHO RIGHT VENTRICULAR SYSTOLIC PRESSURE (RVSP): 24 MMHG
ECHO RV BASAL DIMENSION: 3.7 CM
ECHO RV TAPSE: 1.9 CM (ref 1.7–?)
ECHO TV REGURGITANT MAX VELOCITY: 2.31 M/S
ECHO TV REGURGITANT PEAK GRADIENT: 21 MMHG
EKG ATRIAL RATE: 79 BPM
EKG ATRIAL RATE: 96 BPM
EKG DIAGNOSIS: NORMAL
EKG DIAGNOSIS: NORMAL
EKG P AXIS: 17 DEGREES
EKG P AXIS: 48 DEGREES
EKG P-R INTERVAL: 132 MS
EKG P-R INTERVAL: 152 MS
EKG Q-T INTERVAL: 326 MS
EKG Q-T INTERVAL: 356 MS
EKG QRS DURATION: 100 MS
EKG QRS DURATION: 102 MS
EKG QTC CALCULATION (BAZETT): 408 MS
EKG QTC CALCULATION (BAZETT): 411 MS
EKG R AXIS: -34 DEGREES
EKG R AXIS: 32 DEGREES
EKG T AXIS: 35 DEGREES
EKG T AXIS: 47 DEGREES
EKG VENTRICULAR RATE: 79 BPM
EKG VENTRICULAR RATE: 96 BPM
ERYTHROCYTE [DISTWIDTH] IN BLOOD BY AUTOMATED COUNT: 13.5 % (ref 11.9–14.6)
GLOBULIN SER CALC-MCNC: 4.6 G/DL (ref 2.8–4.5)
GLUCOSE SERPL-MCNC: 167 MG/DL (ref 65–100)
HCT VFR BLD AUTO: 37.4 % (ref 41.1–50.3)
HGB BLD-MCNC: 12.5 G/DL (ref 13.6–17.2)
LV EF: 58 %
LVEF MODALITY: ABNORMAL
MCH RBC QN AUTO: 28.5 PG (ref 26.1–32.9)
MCHC RBC AUTO-ENTMCNC: 33.4 G/DL (ref 31.4–35)
MCV RBC AUTO: 85.4 FL (ref 82–102)
NRBC # BLD: 0 K/UL (ref 0–0.2)
PLATELET # BLD AUTO: 665 K/UL (ref 150–450)
PMV BLD AUTO: 8.5 FL (ref 9.4–12.3)
POTASSIUM SERPL-SCNC: 4.1 MMOL/L (ref 3.5–5.1)
PROT SERPL-MCNC: 7.1 G/DL (ref 6.3–8.2)
RBC # BLD AUTO: 4.38 M/UL (ref 4.23–5.6)
SODIUM SERPL-SCNC: 130 MMOL/L (ref 133–143)
TROPONIN I SERPL HS-MCNC: 300 PG/ML (ref 0–14)
WBC # BLD AUTO: 10.2 K/UL (ref 4.3–11.1)

## 2022-11-02 PROCEDURE — 99223 1ST HOSP IP/OBS HIGH 75: CPT | Performed by: INTERNAL MEDICINE

## 2022-11-02 PROCEDURE — C1725 CATH, TRANSLUMIN NON-LASER: HCPCS | Performed by: INTERNAL MEDICINE

## 2022-11-02 PROCEDURE — 4A023N7 MEASUREMENT OF CARDIAC SAMPLING AND PRESSURE, LEFT HEART, PERCUTANEOUS APPROACH: ICD-10-PCS | Performed by: INTERNAL MEDICINE

## 2022-11-02 PROCEDURE — C1769 GUIDE WIRE: HCPCS | Performed by: INTERNAL MEDICINE

## 2022-11-02 PROCEDURE — 2580000003 HC RX 258: Performed by: INTERNAL MEDICINE

## 2022-11-02 PROCEDURE — 2709999900 HC NON-CHARGEABLE SUPPLY: Performed by: INTERNAL MEDICINE

## 2022-11-02 PROCEDURE — B2111ZZ FLUOROSCOPY OF MULTIPLE CORONARY ARTERIES USING LOW OSMOLAR CONTRAST: ICD-10-PCS | Performed by: INTERNAL MEDICINE

## 2022-11-02 PROCEDURE — 6360000004 HC RX CONTRAST MEDICATION: Performed by: INTERNAL MEDICINE

## 2022-11-02 PROCEDURE — 93005 ELECTROCARDIOGRAM TRACING: CPT | Performed by: STUDENT IN AN ORGANIZED HEALTH CARE EDUCATION/TRAINING PROGRAM

## 2022-11-02 PROCEDURE — B2151ZZ FLUOROSCOPY OF LEFT HEART USING LOW OSMOLAR CONTRAST: ICD-10-PCS | Performed by: INTERNAL MEDICINE

## 2022-11-02 PROCEDURE — 85027 COMPLETE CBC AUTOMATED: CPT

## 2022-11-02 PROCEDURE — 99152 MOD SED SAME PHYS/QHP 5/>YRS: CPT | Performed by: INTERNAL MEDICINE

## 2022-11-02 PROCEDURE — 2500000003 HC RX 250 WO HCPCS: Performed by: INTERNAL MEDICINE

## 2022-11-02 PROCEDURE — 6370000000 HC RX 637 (ALT 250 FOR IP): Performed by: STUDENT IN AN ORGANIZED HEALTH CARE EDUCATION/TRAINING PROGRAM

## 2022-11-02 PROCEDURE — 76376 3D RENDER W/INTRP POSTPROCES: CPT | Performed by: INTERNAL MEDICINE

## 2022-11-02 PROCEDURE — 99285 EMERGENCY DEPT VISIT HI MDM: CPT

## 2022-11-02 PROCEDURE — 6370000000 HC RX 637 (ALT 250 FOR IP): Performed by: INTERNAL MEDICINE

## 2022-11-02 PROCEDURE — 96375 TX/PRO/DX INJ NEW DRUG ADDON: CPT

## 2022-11-02 PROCEDURE — 84484 ASSAY OF TROPONIN QUANT: CPT

## 2022-11-02 PROCEDURE — 027034Z DILATION OF CORONARY ARTERY, ONE ARTERY WITH DRUG-ELUTING INTRALUMINAL DEVICE, PERCUTANEOUS APPROACH: ICD-10-PCS | Performed by: INTERNAL MEDICINE

## 2022-11-02 PROCEDURE — 76376 3D RENDER W/INTRP POSTPROCES: CPT

## 2022-11-02 PROCEDURE — 93306 TTE W/DOPPLER COMPLETE: CPT | Performed by: INTERNAL MEDICINE

## 2022-11-02 PROCEDURE — 99153 MOD SED SAME PHYS/QHP EA: CPT | Performed by: INTERNAL MEDICINE

## 2022-11-02 PROCEDURE — C1894 INTRO/SHEATH, NON-LASER: HCPCS | Performed by: INTERNAL MEDICINE

## 2022-11-02 PROCEDURE — 6360000002 HC RX W HCPCS: Performed by: INTERNAL MEDICINE

## 2022-11-02 PROCEDURE — 93458 L HRT ARTERY/VENTRICLE ANGIO: CPT | Performed by: INTERNAL MEDICINE

## 2022-11-02 PROCEDURE — 92941 PRQ TRLML REVSC TOT OCCL AMI: CPT | Performed by: INTERNAL MEDICINE

## 2022-11-02 PROCEDURE — 2100000000 HC CCU R&B

## 2022-11-02 PROCEDURE — 6370000000 HC RX 637 (ALT 250 FOR IP): Performed by: NURSE PRACTITIONER

## 2022-11-02 PROCEDURE — C9606 PERC D-E COR REVASC W AMI S: HCPCS | Performed by: INTERNAL MEDICINE

## 2022-11-02 PROCEDURE — C1874 STENT, COATED/COV W/DEL SYS: HCPCS | Performed by: INTERNAL MEDICINE

## 2022-11-02 PROCEDURE — 1100000003 HC PRIVATE W/ TELEMETRY

## 2022-11-02 PROCEDURE — 93005 ELECTROCARDIOGRAM TRACING: CPT | Performed by: INTERNAL MEDICINE

## 2022-11-02 PROCEDURE — C1887 CATHETER, GUIDING: HCPCS | Performed by: INTERNAL MEDICINE

## 2022-11-02 PROCEDURE — 6360000002 HC RX W HCPCS: Performed by: STUDENT IN AN ORGANIZED HEALTH CARE EDUCATION/TRAINING PROGRAM

## 2022-11-02 PROCEDURE — 71046 X-RAY EXAM CHEST 2 VIEWS: CPT

## 2022-11-02 PROCEDURE — 96374 THER/PROPH/DIAG INJ IV PUSH: CPT

## 2022-11-02 PROCEDURE — 80053 COMPREHEN METABOLIC PANEL: CPT

## 2022-11-02 DEVICE — STENT RONYX30018UX RESOLUTE ONYX 3.00X18
Type: IMPLANTABLE DEVICE | Site: HEART | Status: FUNCTIONAL
Brand: RESOLUTE ONYX™

## 2022-11-02 RX ORDER — COLCHICINE 0.6 MG/1
0.6 TABLET ORAL DAILY
Status: DISCONTINUED | OUTPATIENT
Start: 2022-11-02 | End: 2022-11-10 | Stop reason: HOSPADM

## 2022-11-02 RX ORDER — HEPARIN SODIUM 200 [USP'U]/100ML
INJECTION, SOLUTION INTRAVENOUS CONTINUOUS PRN
Status: COMPLETED | OUTPATIENT
Start: 2022-11-02 | End: 2022-11-02

## 2022-11-02 RX ORDER — ONDANSETRON 2 MG/ML
4 INJECTION INTRAMUSCULAR; INTRAVENOUS
Status: COMPLETED | OUTPATIENT
Start: 2022-11-02 | End: 2022-11-02

## 2022-11-02 RX ORDER — ASPIRIN 325 MG
325 TABLET ORAL
Status: COMPLETED | OUTPATIENT
Start: 2022-11-02 | End: 2022-11-02

## 2022-11-02 RX ORDER — ATORVASTATIN CALCIUM 40 MG/1
40 TABLET, FILM COATED ORAL NIGHTLY
Status: DISCONTINUED | OUTPATIENT
Start: 2022-11-02 | End: 2022-11-10 | Stop reason: HOSPADM

## 2022-11-02 RX ORDER — SODIUM CHLORIDE 9 MG/ML
INJECTION, SOLUTION INTRAVENOUS PRN
Status: CANCELLED | OUTPATIENT
Start: 2022-11-02

## 2022-11-02 RX ORDER — MORPHINE SULFATE 4 MG/ML
4 INJECTION INTRAVENOUS ONCE
Status: COMPLETED | OUTPATIENT
Start: 2022-11-02 | End: 2022-11-02

## 2022-11-02 RX ORDER — LISINOPRIL 5 MG/1
5 TABLET ORAL DAILY
Status: DISCONTINUED | OUTPATIENT
Start: 2022-11-02 | End: 2022-11-10 | Stop reason: HOSPADM

## 2022-11-02 RX ORDER — OXYCODONE HYDROCHLORIDE 5 MG/1
5 TABLET ORAL EVERY 4 HOURS PRN
Status: DISCONTINUED | OUTPATIENT
Start: 2022-11-02 | End: 2022-11-10 | Stop reason: HOSPADM

## 2022-11-02 RX ORDER — SODIUM CHLORIDE 0.9 % (FLUSH) 0.9 %
5-40 SYRINGE (ML) INJECTION PRN
Status: DISCONTINUED | OUTPATIENT
Start: 2022-11-02 | End: 2022-11-10 | Stop reason: HOSPADM

## 2022-11-02 RX ORDER — NITROGLYCERIN 0.4 MG/1
0.4 TABLET SUBLINGUAL EVERY 5 MIN PRN
Status: DISCONTINUED | OUTPATIENT
Start: 2022-11-02 | End: 2022-11-10 | Stop reason: HOSPADM

## 2022-11-02 RX ORDER — LIDOCAINE HYDROCHLORIDE 10 MG/ML
INJECTION, SOLUTION INFILTRATION; PERINEURAL PRN
Status: COMPLETED | OUTPATIENT
Start: 2022-11-02 | End: 2022-11-02

## 2022-11-02 RX ORDER — ONDANSETRON 4 MG/1
4 TABLET, ORALLY DISINTEGRATING ORAL EVERY 8 HOURS PRN
Status: DISCONTINUED | OUTPATIENT
Start: 2022-11-02 | End: 2022-11-10 | Stop reason: HOSPADM

## 2022-11-02 RX ORDER — SODIUM CHLORIDE 0.9 % (FLUSH) 0.9 %
5-40 SYRINGE (ML) INJECTION EVERY 12 HOURS SCHEDULED
Status: DISCONTINUED | OUTPATIENT
Start: 2022-11-02 | End: 2022-11-10 | Stop reason: HOSPADM

## 2022-11-02 RX ORDER — ASPIRIN 81 MG/1
81 TABLET, CHEWABLE ORAL DAILY
Status: DISCONTINUED | OUTPATIENT
Start: 2022-11-03 | End: 2022-11-10 | Stop reason: HOSPADM

## 2022-11-02 RX ORDER — SODIUM CHLORIDE 0.9 % (FLUSH) 0.9 %
5-40 SYRINGE (ML) INJECTION PRN
Status: CANCELLED | OUTPATIENT
Start: 2022-11-02

## 2022-11-02 RX ORDER — NITROGLYCERIN 0.4 MG/1
0.4 TABLET SUBLINGUAL EVERY 5 MIN PRN
Status: DISCONTINUED | OUTPATIENT
Start: 2022-11-02 | End: 2022-11-02 | Stop reason: SDUPTHER

## 2022-11-02 RX ORDER — HYDROMORPHONE HYDROCHLORIDE 1 MG/ML
1 INJECTION, SOLUTION INTRAMUSCULAR; INTRAVENOUS; SUBCUTANEOUS EVERY 4 HOURS PRN
Status: DISCONTINUED | OUTPATIENT
Start: 2022-11-02 | End: 2022-11-04

## 2022-11-02 RX ORDER — MIDAZOLAM HYDROCHLORIDE 1 MG/ML
INJECTION INTRAMUSCULAR; INTRAVENOUS PRN
Status: COMPLETED | OUTPATIENT
Start: 2022-11-02 | End: 2022-11-02

## 2022-11-02 RX ORDER — NITROGLYCERIN 20 MG/100ML
INJECTION INTRAVENOUS PRN
Status: COMPLETED | OUTPATIENT
Start: 2022-11-02 | End: 2022-11-02

## 2022-11-02 RX ORDER — ONDANSETRON 2 MG/ML
4 INJECTION INTRAMUSCULAR; INTRAVENOUS EVERY 6 HOURS PRN
Status: DISCONTINUED | OUTPATIENT
Start: 2022-11-02 | End: 2022-11-10 | Stop reason: HOSPADM

## 2022-11-02 RX ORDER — PANTOPRAZOLE SODIUM 40 MG/1
40 TABLET, DELAYED RELEASE ORAL
Status: DISCONTINUED | OUTPATIENT
Start: 2022-11-02 | End: 2022-11-10 | Stop reason: HOSPADM

## 2022-11-02 RX ORDER — BIVALIRUDIN 250 MG/5ML
INJECTION, POWDER, LYOPHILIZED, FOR SOLUTION INTRAVENOUS PRN
Status: DISCONTINUED | OUTPATIENT
Start: 2022-11-02 | End: 2022-11-02 | Stop reason: HOSPADM

## 2022-11-02 RX ORDER — POLYETHYLENE GLYCOL 3350 17 G/17G
17 POWDER, FOR SOLUTION ORAL DAILY PRN
Status: DISCONTINUED | OUTPATIENT
Start: 2022-11-02 | End: 2022-11-10 | Stop reason: HOSPADM

## 2022-11-02 RX ORDER — ACETAMINOPHEN 650 MG/1
650 SUPPOSITORY RECTAL EVERY 6 HOURS PRN
Status: DISCONTINUED | OUTPATIENT
Start: 2022-11-02 | End: 2022-11-10 | Stop reason: HOSPADM

## 2022-11-02 RX ORDER — SODIUM CHLORIDE 9 MG/ML
INJECTION, SOLUTION INTRAVENOUS PRN
Status: DISCONTINUED | OUTPATIENT
Start: 2022-11-02 | End: 2022-11-10 | Stop reason: HOSPADM

## 2022-11-02 RX ORDER — SODIUM CHLORIDE 0.9 % (FLUSH) 0.9 %
5-40 SYRINGE (ML) INJECTION EVERY 12 HOURS SCHEDULED
Status: CANCELLED | OUTPATIENT
Start: 2022-11-02

## 2022-11-02 RX ORDER — ACETAMINOPHEN 325 MG/1
650 TABLET ORAL EVERY 6 HOURS PRN
Status: DISCONTINUED | OUTPATIENT
Start: 2022-11-02 | End: 2022-11-10 | Stop reason: HOSPADM

## 2022-11-02 RX ADMIN — PANTOPRAZOLE SODIUM 40 MG: 40 TABLET, DELAYED RELEASE ORAL at 17:18

## 2022-11-02 RX ADMIN — ASPIRIN 325 MG ORAL TABLET 325 MG: 325 PILL ORAL at 11:58

## 2022-11-02 RX ADMIN — METOPROLOL TARTRATE 25 MG: 25 TABLET, FILM COATED ORAL at 13:47

## 2022-11-02 RX ADMIN — ATORVASTATIN CALCIUM 40 MG: 40 TABLET, FILM COATED ORAL at 20:37

## 2022-11-02 RX ADMIN — ONDANSETRON 4 MG: 2 INJECTION INTRAMUSCULAR; INTRAVENOUS at 12:09

## 2022-11-02 RX ADMIN — MORPHINE SULFATE 4 MG: 4 INJECTION INTRAVENOUS at 12:05

## 2022-11-02 RX ADMIN — NITROGLYCERIN 0.4 MG: 0.4 TABLET SUBLINGUAL at 11:56

## 2022-11-02 RX ADMIN — METOPROLOL TARTRATE 25 MG: 25 TABLET, FILM COATED ORAL at 20:37

## 2022-11-02 RX ADMIN — MORPHINE SULFATE 4 MG: 4 INJECTION INTRAVENOUS at 16:41

## 2022-11-02 RX ADMIN — LISINOPRIL 5 MG: 5 TABLET ORAL at 17:18

## 2022-11-02 RX ADMIN — HYDROMORPHONE HYDROCHLORIDE 1 MG: 1 INJECTION, SOLUTION INTRAMUSCULAR; INTRAVENOUS; SUBCUTANEOUS at 17:16

## 2022-11-02 RX ADMIN — HYDROMORPHONE HYDROCHLORIDE 1 MG: 1 INJECTION, SOLUTION INTRAMUSCULAR; INTRAVENOUS; SUBCUTANEOUS at 21:28

## 2022-11-02 RX ADMIN — NITROGLYCERIN 0.4 MG: 0.4 TABLET SUBLINGUAL at 12:03

## 2022-11-02 ASSESSMENT — PAIN DESCRIPTION - ORIENTATION
ORIENTATION: MID
ORIENTATION: LEFT
ORIENTATION: LEFT
ORIENTATION: MID;LOWER;POSTERIOR
ORIENTATION: MID;POSTERIOR;LOWER
ORIENTATION: MID;POSTERIOR;LOWER
ORIENTATION: LEFT

## 2022-11-02 ASSESSMENT — PAIN DESCRIPTION - LOCATION
LOCATION: BACK
LOCATION: CHEST
LOCATION: BACK
LOCATION: CHEST
LOCATION: BACK
LOCATION: CHEST
LOCATION: BACK

## 2022-11-02 ASSESSMENT — ENCOUNTER SYMPTOMS
NAIL CHANGES: 0
COLOR CHANGE: 0
BLOATING: 0
SHORTNESS OF BREATH: 0
BACK PAIN: 0
BLURRED VISION: 0
ABDOMINAL PAIN: 0
COUGH: 0
WHEEZING: 0
DOUBLE VISION: 0
DIARRHEA: 0
NAUSEA: 0

## 2022-11-02 ASSESSMENT — PAIN SCALES - GENERAL
PAINLEVEL_OUTOF10: 6
PAINLEVEL_OUTOF10: 7
PAINLEVEL_OUTOF10: 0
PAINLEVEL_OUTOF10: 8
PAINLEVEL_OUTOF10: 7
PAINLEVEL_OUTOF10: 2
PAINLEVEL_OUTOF10: 7
PAINLEVEL_OUTOF10: 9
PAINLEVEL_OUTOF10: 9

## 2022-11-02 ASSESSMENT — PAIN - FUNCTIONAL ASSESSMENT: PAIN_FUNCTIONAL_ASSESSMENT: 0-10

## 2022-11-02 ASSESSMENT — PAIN DESCRIPTION - DESCRIPTORS
DESCRIPTORS: ACHING
DESCRIPTORS: ACHING;TIGHTNESS
DESCRIPTORS: TEARING
DESCRIPTORS: THROBBING

## 2022-11-02 NOTE — ED PROVIDER NOTES
Emergency Department Provider Note                   PCP:                No primary care provider on file. Age: 44 y.o. Sex: male     No diagnosis found. DISPOSITION          MDM  Number of Diagnoses or Management Options  ST elevation myocardial infarction (STEMI), unspecified artery [facility]): new, needed workup  Diagnosis management comments: 21-year-old male patient with history of chronic pancreatitis awaiting upper endoscopy for pseudocyst drainage developing chest pain in the lobby check-in. This pain has been somewhat intermittent though now constant. He is unwell on my initial evaluation visibly diaphoretic reporting severe pain over the left chest with radiation to left arm. Initial EKG shows enlarged T waves over V2 and V3, similar to previous tracing. Some mild prominence in lead III, ST segment. No indication for STEMI call initially. Upon my initial assessment however patient reports ongoing pain, repeat EKG shows progression of ST changes over lead II, 3, aVF as well as laterally in V5 and V6. This tracing was sent to on-call STEMI specialist and code STEMI was called. Patient has received nitro sublingual, aspirin and 1 dose of morphine for pain control. Critical care time: 36 minutes of critical care time was performed in the emergency department. This was separate from any other procedures listed during the patients emergency department course. The failure to initiate these interventions on an urgent basis would likely have resulted in sudden, clinically significant or life-threatening deterioration in the patients condition.          Amount and/or Complexity of Data Reviewed  Clinical lab tests: ordered and reviewed  Tests in the radiology section of CPT®: ordered and reviewed  Tests in the medicine section of CPT®: ordered and reviewed  Review and summarize past medical records: yes  Discuss the patient with other providers: yes  Independent visualization of images, tracings, or specimens: yes    Risk of Complications, Morbidity, and/or Mortality  Presenting problems: high  Diagnostic procedures: high  Management options: high    Critical Care  Total time providing critical care: 30-74 minutes    Patient Progress  Patient progress: stable       ED Course as of 11/02/22 1202   Wed Nov 02, 2022   1152 KG interpretation: Sinus rhythm, rate of 96, normal axis, prominence to the T wave in lead V2, V3. This is chronic with comparison to previous EKG. Some prominence to the ST segment in lead III and aVF, also appears chronic. [BR]   1201 On initial assessment, patient is in moderate distress reporting crushing pain in the left side of his chest.  He is diaphoretic, initial troponin already elevated. Subsequent EKG shows progression of ST changes over the inferior lateral leads. Decision made to initiate STEMI at this time. [BR]      ED Course User Index  [BR] Louella Hamman, DO        Orders Placed This Encounter   Procedures    XR CHEST (2 VW)    CBC    Comprehensive Metabolic Panel    Troponin    Cardiac Monitor    Pulse Oximetry    EKG 12 Lead    Saline lock IV        Medications   nitroGLYCERIN (NITROSTAT) SL tablet 0.4 mg (0.4 mg SubLINGual Given 11/2/22 1156)   morphine injection 4 mg (has no administration in time range)   ondansetron (ZOFRAN) injection 4 mg (has no administration in time range)   aspirin tablet 325 mg (325 mg Oral Given 11/2/22 1158)       New Prescriptions    No medications on file        Shady Mora is a 44 y.o. male who presents to the Emergency Department with chief complaint of    Chief Complaint   Patient presents with    Chest Pain      41-year-old male patient with history of hypertension and alcohol use presents to this department as a rapid response from the front lobby with reports of chest pain. Patient reports onset of symptoms while waiting in the front lobby around 56.   He developed diaphoresis stating that he vomited just prior to rapid response call. Pain radiates into the left arm and has been somewhat intermittent since onset. At the time my assessment, patient reports severe pain over the left chest with radiation to his arm. He is diaphoretic as well. He reports some mild shortness of breath. He denies any radiation to his back or into the abdomen. Patient denies history of heart disease. He is awaiting upper endoscopy by Dr. Taylor Muñoz for drainage of pseudocyst secondary to chronic pancreatitis. The history is provided by the patient. No  was used. Review of Systems    Past Medical History:   Diagnosis Date    Abdominal pain 10/25/2022    Abnormal CT of the abdomen 26/98/9047    Acute alcoholic pancreatitis 78/45/8466    Acute kidney injury (nontraumatic) ([facility] [address] 10/31/2022    pt denies    Acute on chronic pancreatitis ([facility] [address] 07/30/2022    Alcohol use disorder, severe, dependence ([facility] [address] 08/21/2019    Carpal tunnel syndrome, left     Cigarette smoker     GERD (gastroesophageal reflux disease)     controlled with med    History of pancreatitis     x 4 or 5 times-- last time admitted to hospital 10/10/26/22 x 7 days    Hypertension     controlled with med        Past Surgical History:   Procedure Laterality Date    CHOLECYSTECTOMY  2012    ORTHOPEDIC SURGERY Left     wrist surg--nerve surg    ORTHOPEDIC SURGERY Left     foot surg as child        Family History   Problem Relation Age of Onset    No Known Problems Mother     Heart Attack Father         Social History     Socioeconomic History    Marital status:    Tobacco Use    Smoking status: Every Day     Packs/day: 1.00     Years: 20.00     Pack years: 20.00     Types: Cigarettes    Smokeless tobacco: Never   Vaping Use    Vaping Use: Never used   Substance and Sexual Activity    Alcohol use: Yes     Comment: none since 10/4/22-- had drank 3 glasses of wine daily    Drug use: No         Patient has no known allergies.      Previous Medications AMLODIPINE (NORVASC) 10 MG TABLET    Take 1 tablet by mouth daily    NALOXONE (NARCAN) 0.4 MG/ML INJECTION    Infuse 1 mL intravenously as needed (shortness of breath)    OMEPRAZOLE (PRILOSEC) 20 MG DELAYED RELEASE CAPSULE    Take 20 mg by mouth daily    OXYCODONE (ROXICODONE) 5 MG IMMEDIATE RELEASE TABLET    Take 1 tablet by mouth every 4 hours as needed for Pain for up to 3 days. Vitals signs and nursing note reviewed. Patient Vitals for the past 4 hrs:   Temp Pulse Resp BP SpO2   11/02/22 1047 97.8 °F (36.6 °C) (!) 105 20 (!) 140/67 96 %          Physical Exam     Procedures    Results for orders placed or performed during the hospital encounter of 11/02/22   XR CHEST (2 VW)    Narrative    EXAMINATION: XR CHEST (2 VW) 11/2/2022 11:05 AM    ACCESSION NUMBER: [id number]    COMPARISON: None available    INDICATION: Chest Pain    TECHNIQUE: PA and lateral views of the chest were obtained. FINDINGS:   Support Devices:   *  None    Cardiac Silhouette: Within normal limits in size. Mediastinum: Normal mediastinal contours. Lungs: Bandlike atelectasis/scarring within the left lung base. No other  airspace consolidation. No pneumothorax or sizable pleural effusion. Upper Abdomen: Normal     Miscellaneous: No fracture or suspicious osseous lesion. Impression    1. Left lower lobe bandlike atelectasis/scarring. 2.  Otherwise, clear lungs.      CBC   Result Value Ref Range    WBC 10.2 4.3 - 11.1 K/uL    RBC 4.38 4.23 - 5.6 M/uL    Hemoglobin 12.5 (L) 13.6 - 17.2 g/dL    Hematocrit 37.4 (L) 41.1 - 50.3 %    MCV 85.4 82 - 102 FL    MCH 28.5 26.1 - 32.9 PG    MCHC 33.4 31.4 - 35.0 g/dL    RDW 13.5 11.9 - 14.6 %    Platelets 783 (H) 311 - 450 K/uL    MPV 8.5 (L) 9.4 - 12.3 FL    nRBC 0.00 0.0 - 0.2 K/uL   Comprehensive Metabolic Panel   Result Value Ref Range    Sodium 130 (L) 133 - 143 mmol/L    Potassium 4.1 3.5 - 5.1 mmol/L    Chloride 99 (L) 101 - 110 mmol/L    CO2 24 21 - 32 mmol/L Anion Gap 7 2 - 11 mmol/L    Glucose 167 (H) 65 - 100 mg/dL    BUN 10 6 - 23 MG/DL    Creatinine 0.90 0.8 - 1.5 MG/DL    Est, Glom Filt Rate >60 >60 ml/min/1.73m2    Calcium 8.6 8.3 - 10.4 MG/DL    Total Bilirubin 0.2 0.2 - 1.1 MG/DL    ALT 23 12 - 65 U/L    AST 8 (L) 15 - 37 U/L    Alk Phosphatase 58 50 - 136 U/L    Total Protein 7.1 6.3 - 8.2 g/dL    Albumin 2.5 (L) 3.5 - 5.0 g/dL    Globulin 4.6 (H) 2.8 - 4.5 g/dL    Albumin/Globulin Ratio 0.5 0.4 - 1.6     Troponin   Result Value Ref Range    Troponin, High Sensitivity 300.0 (HH) 0 - 14 pg/mL   EKG 12 Lead   Result Value Ref Range    Ventricular Rate 96 BPM    Atrial Rate 96 BPM    P-R Interval 132 ms    QRS Duration 102 ms    Q-T Interval 326 ms    QTc Calculation (Bazett) 411 ms    P Axis 48 degrees    R Axis 32 degrees    T Axis 47 degrees    Diagnosis Normal sinus rhythm         XR CHEST (2 VW)   Final Result   1. Left lower lobe bandlike atelectasis/scarring. 2.  Otherwise, clear lungs. Voice dictation software was used during the making of this note. This software is not perfect and grammatical and other typographical errors may be present. This note has not been completely proofread for errors.      Jimmie Sanchez, DO  11/02/22 1205       Jimmie Sanchez, DO  11/02/22 1210

## 2022-11-02 NOTE — PROGRESS NOTES
TRANSFER - IN REPORT:    Verbal report received from Ivette Mehta [facility] on Monik Aldrich  being received from [address] [place] [address] for routine progression of patient care      Report consisted of patient's Situation, Background, Assessment and   Recommendations(SBAR). Information from the following report(s) Nurse Handoff Report, Index, Intake/Output, MAR, Recent Results, and Cardiac Rhythm NSR  was reviewed with the receiving nurse. Opportunity for questions and clarification was provided. Assessment completed upon patient's arrival to unit and care assumed.

## 2022-11-02 NOTE — Clinical Note
Contrast Dose Calculator:   Patient's age: 44.   Patient's sex: Male. Patient weight (kg) = 81.6. Creatinine level (mg/dL) = 0.9. Creatinine clearance (mL/min): 128. 44. Contrast concentration (mg/mL) = 370. MACD = 300 mL. Max Contrast dose per Creatinine Cl calculator = 289 mL.

## 2022-11-02 NOTE — ED TRIAGE NOTES
Pt was a rapid response call.  Pt waiting to get GI procedure to his pancreatitis when he developed left chest pain/pressure

## 2022-11-02 NOTE — H&P
[facility] Cardiology History & Physical      Date of  Admission: 2022 11:48 AM     Primary Care Physician: MARI  Primary Cardiologist: MARI  Admitting Physician: Dr. Linnie Severs     CC: STEMI    HPI:  Jung Roberson is a 44 y.o. male with prior h/o pancreatis and HTN. Recent CT scan abd showed ascites and pseudocysts. GI saw him last admission 10/31 and ordred outpt EUS. Patient came into hospital today for EUS and while waiting developed chest pain. He reports left sided chest pain radiating down left arm, scale 10/10. RR called in lobby. He was taken to ED for further care. He reports father  from [place] in his 62s. In ED, labs showed Na 130, HS trop 300 and EKG with ST elevation. He was taken emergently to lab.         Past Medical History:   Diagnosis Date    Abdominal pain 10/25/2022    Abnormal CT of the abdomen     Acute alcoholic pancreatitis     Acute kidney injury (nontraumatic) ([address] 10/31/2022    pt denies    Acute on chronic pancreatitis ([address] 2022    Alcohol use disorder, severe, dependence ([address] 2019    Carpal tunnel syndrome, left     Cigarette smoker     GERD (gastroesophageal reflux disease)     controlled with med    History of pancreatitis     x 4 or 5 times-- last time admitted to hospital 10/10/26/22 x 7 days    Hypertension     controlled with med      Past Surgical History:   Procedure Laterality Date    CHOLECYSTECTOMY      ORTHOPEDIC SURGERY Left     wrist surg--nerve surg    ORTHOPEDIC SURGERY Left     foot surg as child       No Known Allergies   Social History     Socioeconomic History    Marital status:      Spouse name: Not on file    Number of children: Not on file    Years of education: Not on file    Highest education level: Not on file   Occupational History    Not on file   Tobacco Use    Smoking status: Every Day     Packs/day: 1.00     Years: 20.00     Pack years: 20.00     Types: Cigarettes    Smokeless tobacco: Never   Vaping Use Vaping Use: Never used   Substance and Sexual Activity    Alcohol use: Yes     Comment: none since 10/4/22-- had drank 3 glasses of wine daily    Drug use: No    Sexual activity: Not on file   Other Topics Concern    Not on file   Social History Narrative    Not on file     Social Determinants of Health     Financial Resource Strain: Not on file   Food Insecurity: Not on file   Transportation Needs: Not on file   Physical Activity: Not on file   Stress: Not on file   Social Connections: Not on file   Intimate Partner Violence: Not on file   Housing Stability: Not on file     Family History   Problem Relation Age of Onset    No Known Problems Mother     Heart Attack Father         Current Facility-Administered Medications   Medication Dose Route Frequency    nitroGLYCERIN (NITROSTAT) SL tablet 0.4 mg  0.4 mg SubLINGual Q5 Min PRN    heparin (porcine) 1000 UNIT/ML 5,000 Units, nitroGLYCERIN 200 mcg, verapamil (ISOPTIN) 2 mg 6.8 mL syringe    PRN    bivalirudin trifluoroacetate (ANGIOMAX) injection   IntraVENous PRN    bivalirudin trifluoroacetate (ANGIOMAX) 250 mg in sodium chloride 0.9 % 50 mL infusion   IntraVENous Continuous PRN       Review of Systems    Review of Systems   Constitutional: Negative for chills, decreased appetite, diaphoresis, fever, malaise/fatigue, weight gain and weight loss. HENT:  Negative for congestion. Eyes:  Negative for blurred vision and double vision. Cardiovascular:  Positive for chest pain. Negative for dyspnea on exertion, irregular heartbeat, leg swelling, near-syncope, palpitations and syncope. Respiratory:  Negative for cough, shortness of breath and wheezing. Endocrine: Negative for cold intolerance and heat intolerance. Hematologic/Lymphatic: Does not bruise/bleed easily. Skin:  Negative for color change and nail changes. Musculoskeletal:  Negative for back pain, falls and muscle weakness.    Gastrointestinal:  Negative for bloating, abdominal pain, diarrhea, melena and nausea. Genitourinary:  Negative for dysuria and frequency. Neurological:  Negative for dizziness, focal weakness, headaches, light-headedness and weakness. Psychiatric/Behavioral:  Negative for altered mental status. Subjective:   BP (!) 136/100   Pulse (!) 103   Temp 97.8 °F (36.6 °C) (Oral)   Resp 18   Ht 5' 9\" (1.753 m)   Wt 180 lb (81.6 kg)   SpO2 100%   BMI 26.58 kg/m²     No intake/output data recorded. No intake/output data recorded.     Physical Exam:  General: Well Developed, Well Nourished, No Acute Distress  HEENT: pupils equal and round, no abnormalities noted  Neck: supple, no JVD, no carotid bruits  Heart: S1S2 with RRR without murmurs or gallops  Lungs: Clear throughout auscultation bilaterally without adventitious sounds  Abd: soft, nontender, nondistended, with good bowel sounds  Ext: warm, no edema, calves supple/nontender, pulses 2+ bilaterally  Skin: warm and dry  Psychiatric: Normal mood and affect  Neurologic: Alert and oriented X 3    Cardiographics  Telemetry: ST elevation   ECG: St elevation   Echocardiogram: ordered     Labs:   Recent Results (from the past 24 hour(s))   EKG 12 Lead    Collection Time: 11/02/22 10:43 AM   Result Value Ref Range    Ventricular Rate 96 BPM    Atrial Rate 96 BPM    P-R Interval 132 ms    QRS Duration 102 ms    Q-T Interval 326 ms    QTc Calculation (Bazett) 411 ms    P Axis 48 degrees    R Axis 32 degrees    T Axis 47 degrees    Diagnosis Normal sinus rhythm    CBC    Collection Time: 11/02/22 10:53 AM   Result Value Ref Range    WBC 10.2 4.3 - 11.1 K/uL    RBC 4.38 4.23 - 5.6 M/uL    Hemoglobin 12.5 (L) 13.6 - 17.2 g/dL    Hematocrit 37.4 (L) 41.1 - 50.3 %    MCV 85.4 82 - 102 FL    MCH 28.5 26.1 - 32.9 PG    MCHC 33.4 31.4 - 35.0 g/dL    RDW 13.5 11.9 - 14.6 %    Platelets 533 (H) 607 - 450 K/uL    MPV 8.5 (L) 9.4 - 12.3 FL    nRBC 0.00 0.0 - 0.2 K/uL   Comprehensive Metabolic Panel    Collection Time: 11/02/22 10:53 AM Result Value Ref Range    Sodium 130 (L) 133 - 143 mmol/L    Potassium 4.1 3.5 - 5.1 mmol/L    Chloride 99 (L) 101 - 110 mmol/L    CO2 24 21 - 32 mmol/L    Anion Gap 7 2 - 11 mmol/L    Glucose 167 (H) 65 - 100 mg/dL    BUN 10 6 - 23 MG/DL    Creatinine 0.90 0.8 - 1.5 MG/DL    Est, Glom Filt Rate >60 >60 ml/min/1.73m2    Calcium 8.6 8.3 - 10.4 MG/DL    Total Bilirubin 0.2 0.2 - 1.1 MG/DL    ALT 23 12 - 65 U/L    AST 8 (L) 15 - 37 U/L    Alk Phosphatase 58 50 - 136 U/L    Total Protein 7.1 6.3 - 8.2 g/dL    Albumin 2.5 (L) 3.5 - 5.0 g/dL    Globulin 4.6 (H) 2.8 - 4.5 g/dL    Albumin/Globulin Ratio 0.5 0.4 - 1.6     Troponin    Collection Time: 11/02/22 10:53 AM   Result Value Ref Range    Troponin, High Sensitivity 300.0 (HH) 0 - 14 pg/mL       Patient has been seen and examined by Dr. Melanie Dallas  and he agrees with the following assessment and plan:     Assessment/Plan:       Principal Problem:    STEMI (ST elevation myocardial infarction) ([facility]- admit to ICU/CCU. Echo, DAPT, statin (watch LFTs closely), BB, ACE. Active Problems:    Alcohol abuse- needs cessation      Primary hypertension- not controlled; see meds above. Chronic pancreatitis ([facility]- will follow, was scheduled for EUS today. Will need to reschedule outpt once acute issues resolve.    Patient with acute ST elevation myocardial infarction will be taken emergently for cath and primary angioplasty if appropriate    MELISSA Lucero - CNP  11/2/2022 12:25 PM

## 2022-11-02 NOTE — PROGRESS NOTES
Pt clutching the left side of his chest, 324 asa, SL Nitro and 4mg morphine given to pt. 3L/NC placed on pt.

## 2022-11-02 NOTE — PROGRESS NOTES
TRANSFER - OUT REPORT:    Verbal report given to [address] [place] [address] Brine  being transferred to [facility] [place] progression of patient care       Report consisted of patient's Situation, Background, Assessment and   Recommendations(SBAR). Information from the following report(s) Nurse Handoff Report was reviewed with the receiving nurse. Lines:   Peripheral IV 11/02/22 Left Antecubital (Active)        Opportunity for questions and clarification was provided.       Patient transported with:  Monitor and Registered Nurse

## 2022-11-02 NOTE — PROGRESS NOTES
TRANSFER - OUT REPORT:    Verbal report given to Lebron Ferrara RN on Lazaro Thorne  being transferred to [facility] for routine progression of patient care       Report consisted of patient's Situation, Background, Assessment and   Recommendations(SBAR). Information from the following report(s) Nurse Handoff Report was reviewed with the receiving nurse.     STEMI with Dr Hurtado Nurse  1 stent to 1st OM  R Radial  TR band at 10mL  Versed 2mg  Heparin 5000 units  Angiomax bolus and gtt off when current bag hanging is complete  Brilinta 180mg

## 2022-11-02 NOTE — Clinical Note
TRANSFER - IN REPORT:     Verbal report received from: ER. Report consisted of patient's Situation, Background, Assessment and   Recommendations(SBAR). Opportunity for questions and clarification was provided. Assessment completed upon patient's arrival to unit and care assumed.

## 2022-11-03 LAB
ALBUMIN SERPL-MCNC: 2.4 G/DL (ref 3.5–5)
ALBUMIN/GLOB SERPL: 0.5 {RATIO} (ref 0.4–1.6)
ALP SERPL-CCNC: 43 U/L (ref 50–136)
ALT SERPL-CCNC: 23 U/L (ref 12–65)
ANION GAP SERPL CALC-SCNC: 6 MMOL/L (ref 2–11)
AST SERPL-CCNC: 22 U/L (ref 15–37)
BILIRUB SERPL-MCNC: 0.2 MG/DL (ref 0.2–1.1)
BUN SERPL-MCNC: 8 MG/DL (ref 6–23)
CALCIUM SERPL-MCNC: 9.2 MG/DL (ref 8.3–10.4)
CHLORIDE SERPL-SCNC: 102 MMOL/L (ref 101–110)
CHOLEST SERPL-MCNC: 88 MG/DL
CO2 SERPL-SCNC: 25 MMOL/L (ref 21–32)
CREAT SERPL-MCNC: 0.8 MG/DL (ref 0.8–1.5)
EKG ATRIAL RATE: 87 BPM
EKG DIAGNOSIS: NORMAL
EKG P AXIS: 38 DEGREES
EKG P-R INTERVAL: 128 MS
EKG Q-T INTERVAL: 352 MS
EKG QRS DURATION: 98 MS
EKG QTC CALCULATION (BAZETT): 423 MS
EKG R AXIS: 39 DEGREES
EKG T AXIS: 68 DEGREES
EKG VENTRICULAR RATE: 87 BPM
ERYTHROCYTE [DISTWIDTH] IN BLOOD BY AUTOMATED COUNT: 13.4 % (ref 11.9–14.6)
GLOBULIN SER CALC-MCNC: 4.4 G/DL (ref 2.8–4.5)
GLUCOSE SERPL-MCNC: 96 MG/DL (ref 65–100)
HCT VFR BLD AUTO: 39 % (ref 41.1–50.3)
HDLC SERPL-MCNC: 13 MG/DL (ref 40–60)
HDLC SERPL: 6.8 {RATIO}
HGB BLD-MCNC: 12.6 G/DL (ref 13.6–17.2)
LDLC SERPL CALC-MCNC: 58.4 MG/DL
LIPASE SERPL-CCNC: 919 U/L (ref 73–393)
MCH RBC QN AUTO: 28 PG (ref 26.1–32.9)
MCHC RBC AUTO-ENTMCNC: 32.3 G/DL (ref 31.4–35)
MCV RBC AUTO: 86.7 FL (ref 82–102)
NRBC # BLD: 0 K/UL (ref 0–0.2)
PLATELET # BLD AUTO: 752 K/UL (ref 150–450)
PMV BLD AUTO: 8.7 FL (ref 9.4–12.3)
POTASSIUM SERPL-SCNC: 4.2 MMOL/L (ref 3.5–5.1)
PROT SERPL-MCNC: 6.8 G/DL (ref 6.3–8.2)
RBC # BLD AUTO: 4.5 M/UL (ref 4.23–5.6)
SODIUM SERPL-SCNC: 133 MMOL/L (ref 133–143)
TRIGL SERPL-MCNC: 83 MG/DL (ref 35–150)
VLDLC SERPL CALC-MCNC: 16.6 MG/DL (ref 6–23)
WBC # BLD AUTO: 11.7 K/UL (ref 4.3–11.1)

## 2022-11-03 PROCEDURE — 85027 COMPLETE CBC AUTOMATED: CPT

## 2022-11-03 PROCEDURE — 93005 ELECTROCARDIOGRAM TRACING: CPT | Performed by: INTERNAL MEDICINE

## 2022-11-03 PROCEDURE — 6370000000 HC RX 637 (ALT 250 FOR IP): Performed by: NURSE PRACTITIONER

## 2022-11-03 PROCEDURE — 99232 SBSQ HOSP IP/OBS MODERATE 35: CPT | Performed by: INTERNAL MEDICINE

## 2022-11-03 PROCEDURE — 6370000000 HC RX 637 (ALT 250 FOR IP): Performed by: INTERNAL MEDICINE

## 2022-11-03 PROCEDURE — 80053 COMPREHEN METABOLIC PANEL: CPT

## 2022-11-03 PROCEDURE — 6360000002 HC RX W HCPCS: Performed by: INTERNAL MEDICINE

## 2022-11-03 PROCEDURE — 36415 COLL VENOUS BLD VENIPUNCTURE: CPT

## 2022-11-03 PROCEDURE — 2100000000 HC CCU R&B

## 2022-11-03 PROCEDURE — 83690 ASSAY OF LIPASE: CPT

## 2022-11-03 PROCEDURE — 80061 LIPID PANEL: CPT

## 2022-11-03 PROCEDURE — 2580000003 HC RX 258: Performed by: NURSE PRACTITIONER

## 2022-11-03 RX ORDER — ATORVASTATIN CALCIUM 40 MG/1
40 TABLET, FILM COATED ORAL NIGHTLY
Status: DISCONTINUED | OUTPATIENT
Start: 2022-11-03 | End: 2022-11-03 | Stop reason: SDUPTHER

## 2022-11-03 RX ORDER — HYDROMORPHONE HYDROCHLORIDE 1 MG/ML
1 INJECTION, SOLUTION INTRAMUSCULAR; INTRAVENOUS; SUBCUTANEOUS ONCE
Status: COMPLETED | OUTPATIENT
Start: 2022-11-03 | End: 2022-11-03

## 2022-11-03 RX ORDER — LORAZEPAM 1 MG/1
1 TABLET ORAL EVERY 6 HOURS PRN
Status: DISCONTINUED | OUTPATIENT
Start: 2022-11-03 | End: 2022-11-10 | Stop reason: HOSPADM

## 2022-11-03 RX ADMIN — TICAGRELOR 90 MG: 90 TABLET ORAL at 21:23

## 2022-11-03 RX ADMIN — LORAZEPAM 1 MG: 1 TABLET ORAL at 17:13

## 2022-11-03 RX ADMIN — ASPIRIN 81 MG: 81 TABLET, CHEWABLE ORAL at 08:30

## 2022-11-03 RX ADMIN — OXYCODONE 5 MG: 5 TABLET ORAL at 19:44

## 2022-11-03 RX ADMIN — COLCHICINE 0.6 MG: 0.6 TABLET, FILM COATED ORAL at 08:29

## 2022-11-03 RX ADMIN — PANTOPRAZOLE SODIUM 40 MG: 40 TABLET, DELAYED RELEASE ORAL at 05:13

## 2022-11-03 RX ADMIN — LORAZEPAM 1 MG: 1 TABLET ORAL at 09:31

## 2022-11-03 RX ADMIN — TICAGRELOR 90 MG: 90 TABLET ORAL at 09:31

## 2022-11-03 RX ADMIN — ATORVASTATIN CALCIUM 40 MG: 40 TABLET, FILM COATED ORAL at 21:23

## 2022-11-03 RX ADMIN — SODIUM CHLORIDE, PRESERVATIVE FREE 10 ML: 5 INJECTION INTRAVENOUS at 09:11

## 2022-11-03 RX ADMIN — LISINOPRIL 5 MG: 5 TABLET ORAL at 08:29

## 2022-11-03 RX ADMIN — HYDROMORPHONE HYDROCHLORIDE 1 MG: 1 INJECTION, SOLUTION INTRAMUSCULAR; INTRAVENOUS; SUBCUTANEOUS at 13:17

## 2022-11-03 RX ADMIN — HYDROMORPHONE HYDROCHLORIDE 1 MG: 1 INJECTION, SOLUTION INTRAMUSCULAR; INTRAVENOUS; SUBCUTANEOUS at 12:23

## 2022-11-03 RX ADMIN — HYDROMORPHONE HYDROCHLORIDE 1 MG: 1 INJECTION, SOLUTION INTRAMUSCULAR; INTRAVENOUS; SUBCUTANEOUS at 22:04

## 2022-11-03 RX ADMIN — POLYETHYLENE GLYCOL 3350 17 G: 17 POWDER, FOR SOLUTION ORAL at 11:54

## 2022-11-03 RX ADMIN — HYDROMORPHONE HYDROCHLORIDE 1 MG: 1 INJECTION, SOLUTION INTRAMUSCULAR; INTRAVENOUS; SUBCUTANEOUS at 17:14

## 2022-11-03 RX ADMIN — OXYCODONE 5 MG: 5 TABLET ORAL at 05:13

## 2022-11-03 RX ADMIN — OXYCODONE 5 MG: 5 TABLET ORAL at 01:26

## 2022-11-03 RX ADMIN — METOPROLOL TARTRATE 25 MG: 25 TABLET, FILM COATED ORAL at 21:23

## 2022-11-03 RX ADMIN — METOPROLOL TARTRATE 25 MG: 25 TABLET, FILM COATED ORAL at 08:30

## 2022-11-03 RX ADMIN — OXYCODONE 5 MG: 5 TABLET ORAL at 13:03

## 2022-11-03 RX ADMIN — HYDROMORPHONE HYDROCHLORIDE 1 MG: 1 INJECTION, SOLUTION INTRAMUSCULAR; INTRAVENOUS; SUBCUTANEOUS at 12:14

## 2022-11-03 RX ADMIN — OXYCODONE 5 MG: 5 TABLET ORAL at 09:10

## 2022-11-03 ASSESSMENT — PAIN SCALES - GENERAL
PAINLEVEL_OUTOF10: 7
PAINLEVEL_OUTOF10: 7
PAINLEVEL_OUTOF10: 2
PAINLEVEL_OUTOF10: 0
PAINLEVEL_OUTOF10: 0
PAINLEVEL_OUTOF10: 2
PAINLEVEL_OUTOF10: 0
PAINLEVEL_OUTOF10: 0
PAINLEVEL_OUTOF10: 5
PAINLEVEL_OUTOF10: 0
PAINLEVEL_OUTOF10: 0
PAINLEVEL_OUTOF10: 7
PAINLEVEL_OUTOF10: 0
PAINLEVEL_OUTOF10: 7

## 2022-11-03 ASSESSMENT — PAIN DESCRIPTION - DESCRIPTORS
DESCRIPTORS: ACHING

## 2022-11-03 ASSESSMENT — PAIN DESCRIPTION - LOCATION
LOCATION: BACK

## 2022-11-03 ASSESSMENT — PAIN SCALES - WONG BAKER: WONGBAKER_NUMERICALRESPONSE: 2

## 2022-11-03 ASSESSMENT — PAIN DESCRIPTION - ORIENTATION
ORIENTATION: MID;POSTERIOR;LOWER
ORIENTATION: LOWER;MID
ORIENTATION: MID;POSTERIOR;LOWER

## 2022-11-03 NOTE — PLAN OF CARE
Problem: Discharge Planning  Goal: Discharge to home or other facility with appropriate resources  Outcome: Progressing  Flowsheets (Taken 11/2/2022 1915)  Discharge to home or other facility with appropriate resources: Identify barriers to discharge with patient and caregiver     Problem: Pain  Goal: Verbalizes/displays adequate comfort level or baseline comfort level  Outcome: Progressing  Flowsheets (Taken 11/2/2022 1915)  Verbalizes/displays adequate comfort level or baseline comfort level:   Encourage patient to monitor pain and request assistance   Assess pain using appropriate pain scale     Problem: Neurosensory - Adult  Goal: Achieves stable or improved neurological status  Outcome: Progressing  Flowsheets (Taken 11/2/2022 1915)  Achieves stable or improved neurological status:   Assess for and report changes in neurological status   Initiate measures to prevent increased intracranial pressure     Problem: Respiratory - Adult  Goal: Achieves optimal ventilation and oxygenation  Outcome: Progressing  Flowsheets (Taken 11/2/2022 1915)  Achieves optimal ventilation and oxygenation:   Assess for changes in respiratory status   Assess for changes in mentation and behavior     Problem: Cardiovascular - Adult  Goal: Maintains optimal cardiac output and hemodynamic stability  Outcome: Progressing  Flowsheets (Taken 11/2/2022 1915)  Maintains optimal cardiac output and hemodynamic stability:   Monitor blood pressure and heart rate   Monitor urine output and notify Licensed Independent Practitioner for values outside of normal range  Goal: Absence of cardiac dysrhythmias or at baseline  Outcome: Progressing  Flowsheets (Taken 11/2/2022 1915)  Absence of cardiac dysrhythmias or at baseline:   Monitor cardiac rate and rhythm   Assess for signs of decreased cardiac output     Problem: Skin/Tissue Integrity - Adult  Goal: Skin integrity remains intact  Outcome: Progressing  Flowsheets (Taken 11/2/2022 1915)  Skin Integrity Remains Intact:   Monitor for areas of redness and/or skin breakdown   Assess vascular access sites hourly  Goal: Incisions, wounds, or drain sites healing without S/S of infection  Outcome: Progressing  Flowsheets (Taken 11/2/2022 1915)  Incisions, Wounds, or Drain Sites Healing Without Sign and Symptoms of Infection: Implement wound care per orders     Problem: Musculoskeletal - Adult  Goal: Return mobility to safest level of function  Outcome: Progressing  Flowsheets (Taken 11/2/2022 1915)  Return Mobility to Safest Level of Function:   Assess patient stability and activity tolerance for standing, transferring and ambulating with or without assistive devices   Assist with transfers and ambulation using safe patient handling equipment as needed  Goal: Maintain proper alignment of affected body part  Outcome: Progressing  Flowsheets (Taken 11/2/2022 1915)  Maintain proper alignment of affected body part: Support and protect limb and body alignment per provider's orders     Problem: Gastrointestinal - Adult  Goal: Minimal or absence of nausea and vomiting  Outcome: Progressing  Flowsheets (Taken 11/2/2022 1915)  Minimal or absence of nausea and vomiting:   Administer IV fluids as ordered to ensure adequate hydration   Maintain NPO status until nausea and vomiting are resolved     Problem: Genitourinary - Adult  Goal: Absence of urinary retention  Outcome: Progressing  Flowsheets (Taken 11/2/2022 1915)  Absence of urinary retention: Assess patients ability to void and empty bladder  Goal: Urinary catheter remains patent  Outcome: Progressing     Problem: Infection - Adult  Goal: Absence of infection at discharge  Outcome: Progressing  Flowsheets (Taken 11/2/2022 1915)  Absence of infection at discharge:   Assess and monitor for signs and symptoms of infection   Monitor lab/diagnostic results  Goal: Absence of infection during hospitalization  Outcome: Progressing  Flowsheets (Taken 11/2/2022 1915)  Absence of infection during hospitalization:   Assess and monitor for signs and symptoms of infection   Monitor lab/diagnostic results  Goal: Absence of fever/infection during anticipated neutropenic period  Outcome: Progressing  Flowsheets (Taken 11/2/2022 1915)  Absence of fever/infection during anticipated neutropenic period: Monitor white blood cell count     Problem: Metabolic/Fluid and Electrolytes - Adult  Goal: Electrolytes maintained within normal limits  Outcome: Progressing  Flowsheets (Taken 11/2/2022 1915)  Electrolytes maintained within normal limits: Monitor labs and assess patient for signs and symptoms of electrolyte imbalances  Goal: Hemodynamic stability and optimal renal function maintained  Outcome: Progressing  Flowsheets (Taken 11/2/2022 1915)  Hemodynamic stability and optimal renal function maintained: Monitor labs and assess for signs and symptoms of volume excess or deficit  Goal: Glucose maintained within prescribed range  Outcome: Progressing  Flowsheets (Taken 11/2/2022 1915)  Glucose maintained within prescribed range: Monitor blood glucose as ordered     Problem: Hematologic - Adult  Goal: Maintains hematologic stability  Outcome: Progressing  Flowsheets (Taken 11/2/2022 1915)  Maintains hematologic stability: Assess for signs and symptoms of bleeding or hemorrhage

## 2022-11-03 NOTE — CONSULTS
[facility] Consult Note       Primary GI Physician: Dr. Jair Rice    Referring Provider:  Dr. Cruz Landry Date:  11/3/2022    Admit Date:  2022    Chief Complaint:  chronic pancreatitis, abdominal pain     Subjective:     History of Present Illness:  Patient is a 44 y.o. male with PMH including but not limited to chronic pancreatitis, HTN,, hx of cholecystectomy, who is seen in consultation on 11/3/22 at the request of Dr. Joellen Toussaint for chronic pancreatitis and abdominal pain. Patient last seen in our office on 2021 with Dr Ijeoma Pelletier for acute recurrent pancreatitis. At that visit he was offered repeat labs and abdominal ultrasound, but opted to wait since he was self pay. No further follow up. No Colonoscopy or EGD in the past.     Patient seen in hospital consultation on 10/26/22 for chronic pancreatitis. CT A/P with contrast on 10/25/22 showed several small, 1-2 cm pseudocyst and a large, 7 X 11 cm pseudocyst which is lateral to the greater curvature of the stomach. Small amount of ascites throughout the abdomen. Patient was noted to have recurrent pancreatitis induced by ETOH. Patient was scheduled for EUS for further evaluation on 22 with Dr. China Cameron. Patient came into hospital yesterday for EUS and while waiting developed chest pain. He reports left sided chest pain radiating down left arm, scale 10/10. RR called in lobby. He was taken to ED for further care. He reports father  from [place] in his 62s. In ED, labs showed Na 130, HS trop 300 and EKG with ST elevation. He was taken emergently to cath lab. Patient started on Brilinta 90 mg twice daily and Lipitor. Patient reports his LUQ pain is worse than it has ever been before. Pain came on after eating. Taking a deep breath makes pain worse. Patient is receiving Dilaudid IV for pain but reports this is not helping. He denies nausea or vomiting. He reports flatulence.      PMH:  Past Medical History:   Diagnosis Date    Abdominal pain 10/25/2022    Abnormal CT of the abdomen 61/53/5887    Acute alcoholic pancreatitis 14/08/0423    Acute kidney injury (nontraumatic) ([facility] 10/31/2022    pt denies    Acute on chronic pancreatitis ([facility] 07/30/2022    Alcohol use disorder, severe, dependence ([facility] 08/21/2019    Carpal tunnel syndrome, left     Cigarette smoker     GERD (gastroesophageal reflux disease)     controlled with med    History of pancreatitis     x 4 or 5 times-- last time admitted to hospital 10/10/26/22 x 7 days    Hypertension     controlled with med       PSH:  Past Surgical History:   Procedure Laterality Date    CARDIAC PROCEDURE N/A 11/2/2022    LEFT HEART CATH / CORONARY ANGIOGRAPHY performed by Stu Barkley MD at [address] [place] [address] CATH LAB    CARDIAC PROCEDURE N/A 11/2/2022    Percutaneous coronary intervention performed by Stu Barkley MD at [address] [place] [address] CATH LAB    CHOLECYSTECTOMY  2012    ORTHOPEDIC SURGERY Left     wrist surg--nerve surg    ORTHOPEDIC SURGERY Left     foot surg as child       Allergies:  No Known Allergies    Home Medications:  Prior to Admission medications    Medication Sig Start Date End Date Taking? Authorizing Provider   omeprazole (PRILOSEC) 20 MG delayed release capsule Take 20 mg by mouth daily    Historical Provider, MD   naloxone (NARCAN) 0.4 MG/ML injection Infuse 1 mL intravenously as needed (shortness of breath) 10/31/22   Bethany Chavira MD   oxyCODONE (ROXICODONE) 5 MG immediate release tablet Take 1 tablet by mouth every 4 hours as needed for Pain for up to 3 days. 10/31/22 11/3/22  Bethany Chavira MD   amLODIPine (NORVASC) 10 MG tablet Take 1 tablet by mouth daily 10/11/22   Elder Larson MD   thiamine 100 MG tablet Take 1 tablet by mouth in the morning.  8/3/22 8/2/22  Kavin Velasquez MD       Hospital Medications:  Current Facility-Administered Medications   Medication Dose Route Frequency    ticagrelor (BRILINTA) tablet 90 mg  90 mg Oral BID    LORazepam (ATIVAN) tablet 1 mg  1 mg Oral Q6H PRN    pantoprazole (PROTONIX) tablet 40 mg  40 mg Oral QAM AC    oxyCODONE (ROXICODONE) immediate release tablet 5 mg  5 mg Oral Q4H PRN    sodium chloride flush 0.9 % injection 5-40 mL  5-40 mL IntraVENous 2 times per day    sodium chloride flush 0.9 % injection 5-40 mL  5-40 mL IntraVENous PRN    0.9 % sodium chloride infusion   IntraVENous PRN    ondansetron (ZOFRAN-ODT) disintegrating tablet 4 mg  4 mg Oral Q8H PRN    Or    ondansetron (ZOFRAN) injection 4 mg  4 mg IntraVENous Q6H PRN    acetaminophen (TYLENOL) tablet 650 mg  650 mg Oral Q6H PRN    Or    acetaminophen (TYLENOL) suppository 650 mg  650 mg Rectal Q6H PRN    polyethylene glycol (GLYCOLAX) packet 17 g  17 g Oral Daily PRN    aspirin chewable tablet 81 mg  81 mg Oral Daily    atorvastatin (LIPITOR) tablet 40 mg  40 mg Oral Nightly    metoprolol tartrate (LOPRESSOR) tablet 25 mg  25 mg Oral BID    lisinopril (PRINIVIL;ZESTRIL) tablet 5 mg  5 mg Oral Daily    nitroGLYCERIN (NITROSTAT) SL tablet 0.4 mg  0.4 mg SubLINGual Q5 Min PRN    colchicine (COLCRYS) tablet 0.6 mg  0.6 mg Oral Daily    HYDROmorphone HCl PF (DILAUDID) injection 1 mg  1 mg IntraVENous Q4H PRN       Social History:  Social History     Tobacco Use    Smoking status: Every Day     Packs/day: 1.00     Years: 20.00     Pack years: 20.00     Types: Cigarettes    Smokeless tobacco: Never   Substance Use Topics    Alcohol use: Yes     Comment: none since 10/4/22-- had drank 3 glasses of wine daily       Pt denies any history of drug use, blood transfusions, or tattoos. Family History:  Family History   Problem Relation Age of Onset    No Known Problems Mother     Heart Attack Father        Review of Systems:  A detailed 10 system ROS is obtained, with pertinent positives as listed above. All others are negative.     Diet:  Low Fat Regular     Objective:     Physical Exam:  Vitals:  /69   Pulse 75   Temp 97.2 °F (36.2 °C) (Temporal)   Resp 14   Ht 5' 9\" (1.753 m)   Wt 180 lb (81.6 kg)   SpO2 (!) 88%   BMI 26.58 kg/m²   Gen:  Pt is alert, cooperative, appears to be in pain  Skin:  Extremities and face reveal no rashes. HEENT: Sclerae anicteric. Extra-occular muscles are intact. No oral ulcers. No abnormal pigmentation of the lips. The neck is supple. Cardiovascular: Regular rate and rhythm. No murmurs, gallops, or rubs. Respiratory:  Comfortable breathing with no accessory muscle use. Clear breath sounds anteriorly with no wheezes, rales, or rhonchi. GI:  Abdomen nondistended, soft, and diffuse TTP. Normal active bowel sounds. No enlargement of the liver or spleen. No masses palpable. Rectal:  Deferred  Musculoskeletal:  No pitting edema of the lower legs. Neurological:  Gross memory appears intact. Patient is alert and oriented. Psychiatric:  Mood appears appropriate with judgement intact. Lymphatic:  No cervical or supraclavicular adenopathy. Laboratory:    Recent Labs     11/02/22  1053 11/03/22  0539   WBC 10.2 11.7*   HGB 12.5* 12.6*   HCT 37.4* 39.0*   * 752*   MCV 85.4 86.7   * 133   K 4.1 4.2   CL 99* 102   CO2 24 25   BUN 10 8   AST 8* 22   ALT 23 23      CT ABDOMEN AND PELVIS WITH CONTRAST. 10/25/22    Impression   Several small, 1-2 cm pseudocyst and a large, 7 x 11 cm pseudocyst   which is lateral to the greater curvature the stomach. Small amount of ascites   throughout the abdomen. Cardiac procedure 11/2/22      Left heart cath selective coronary angiography left ventriculogram with percutaneous intervention of an acute culprit vessel performed via right radial access without complication     1. Acutely occluded large OM1 which is now status post stenting with a 3.0x18 Tyrell stent postdilated with a 3.5x15 NC balloon    2. Essentially normal LAD and circumflex system    3.   Preserved left ventricular systolic function EF 60 to 65% with mild hypokinesis of the lateral apical wall    4. LVEDP 12-15    Chest x-ray 11/2/22  Impression   1. Left lower lobe bandlike atelectasis/scarring. 2.  Otherwise, clear lungs. Assessment:     Principal Problem:    STEMI (ST elevation myocardial infarction) ([facility]  Active Problems:    Alcohol abuse    Primary hypertension    Chronic pancreatitis ([facility]  Resolved Problems:    * No resolved hospital problems. *    44 y.o. male with PMH including but not limited to chronic pancreatitis, HTN,, hx of cholecystectomy, who is seen in consultation on 11/3/22 at the request of Dr. Becca Mckeon for chronic pancreatitis and abdominal pain. Known pseudocyst on CT 10/25/22 is likely the cause of abdominal pain. Patient developed chest pain yesterday prior to his EUS. In ED, labs showed Na 130, HS trop 300 and EKG with ST elevation. He was taken emergently to cath lab. Patient started on Brilinta 90 mg twice daily and Lipitor. WBC 11.7, RBC 4.50, Hgb 12.6, HCT 39.0, MCV 86.7, . Plan:     - Supportive care, maintain electrolytes, antiemetics  - Clear liquid diet  - Unable to drain pseudocyst until patient is cleared by Cardiology and antiplatelets can be held.    - Reviewing case with Dr. Flavio Lennox, APRN  [facility]     Patient is seen and examined in collaboration with Dr. Amy Curran.  Assessment and plan as per Dr. Crystal Watt.

## 2022-11-03 NOTE — PROGRESS NOTES
Cute onset severe 10 out of 10 abdominal pain. GI consulted. Patient was initially admitted for pseudocyst drainage. EKG does not show any ST changes.   Pending GI recommendations if the symptoms are intractable we may need to hold Brilinta and have an Integrilin bridge until he can undergo drainage of the pseudocyst.

## 2022-11-03 NOTE — PROGRESS NOTES
11/3/2022 8:55 AM    Admit Date: 11/2/2022    Admit Diagnosis: STEMI (ST elevation myocardial infarction) ([facility] [I21.3]  ST elevation myocardial infarction (STEMI), unspecified artery (HCC) [I21.3]      Subjective:   No chest pain or shortness of breath      Objective:    /69   Pulse 75   Temp 97.2 °F (36.2 °C) (Temporal)   Resp 14   Ht 5' 9\" (1.753 m)   Wt 180 lb (81.6 kg)   SpO2 (!) 88%   BMI 26.58 kg/m²     Physical Exam:  Magalene Jaime, Well Nourished, No Acute Distress, Alert & Oriented x 3, appropriate mood. Neck- supple, no JVD  CV- regular rate and rhythm no MRG  Lung- clear bilaterally  Abd- soft, nontender, nondistended  Ext- no edema bilaterally. Skin- warm and dry        Data Review:   Recent Labs     11/03/22  0539      K 4.2   BUN 8   WBC 11.7*   HGB 12.6*   HCT 39.0*   *   HDL 13*       Assessment/Plan:     Active Hospital Problems    STEMI (ST elevation myocardial infarction) (HCC)-doing well. Transfer to the floor. Reinstitute Brilinta 90 mg twice daily.   Start Lipitor      Alcohol abuse      Primary hypertension      Chronic pancreatitis ([facility]

## 2022-11-04 LAB
ALBUMIN SERPL-MCNC: 2.2 G/DL (ref 3.5–5)
ALBUMIN/GLOB SERPL: 0.5 {RATIO} (ref 0.4–1.6)
ALP SERPL-CCNC: 48 U/L (ref 50–136)
ALT SERPL-CCNC: 24 U/L (ref 12–65)
ANION GAP SERPL CALC-SCNC: 6 MMOL/L (ref 2–11)
AST SERPL-CCNC: 22 U/L (ref 15–37)
BILIRUB SERPL-MCNC: 0.4 MG/DL (ref 0.2–1.1)
BUN SERPL-MCNC: 10 MG/DL (ref 6–23)
CALCIUM SERPL-MCNC: 9 MG/DL (ref 8.3–10.4)
CHLORIDE SERPL-SCNC: 97 MMOL/L (ref 101–110)
CO2 SERPL-SCNC: 26 MMOL/L (ref 21–32)
CREAT SERPL-MCNC: 1 MG/DL (ref 0.8–1.5)
GLOBULIN SER CALC-MCNC: 4.6 G/DL (ref 2.8–4.5)
GLUCOSE SERPL-MCNC: 87 MG/DL (ref 65–100)
POTASSIUM SERPL-SCNC: 5 MMOL/L (ref 3.5–5.1)
PROT SERPL-MCNC: 6.8 G/DL (ref 6.3–8.2)
SODIUM SERPL-SCNC: 129 MMOL/L (ref 133–143)

## 2022-11-04 PROCEDURE — 6360000002 HC RX W HCPCS: Performed by: INTERNAL MEDICINE

## 2022-11-04 PROCEDURE — 6370000000 HC RX 637 (ALT 250 FOR IP): Performed by: INTERNAL MEDICINE

## 2022-11-04 PROCEDURE — 2140000001 HC CVICU INTERMEDIATE R&B

## 2022-11-04 PROCEDURE — 2580000003 HC RX 258: Performed by: NURSE PRACTITIONER

## 2022-11-04 PROCEDURE — 99232 SBSQ HOSP IP/OBS MODERATE 35: CPT | Performed by: INTERNAL MEDICINE

## 2022-11-04 PROCEDURE — 36415 COLL VENOUS BLD VENIPUNCTURE: CPT

## 2022-11-04 PROCEDURE — 6370000000 HC RX 637 (ALT 250 FOR IP): Performed by: NURSE PRACTITIONER

## 2022-11-04 PROCEDURE — 80053 COMPREHEN METABOLIC PANEL: CPT

## 2022-11-04 RX ORDER — HYDROMORPHONE HYDROCHLORIDE 1 MG/ML
1.5 INJECTION, SOLUTION INTRAMUSCULAR; INTRAVENOUS; SUBCUTANEOUS EVERY 4 HOURS PRN
Status: DISCONTINUED | OUTPATIENT
Start: 2022-11-04 | End: 2022-11-10 | Stop reason: HOSPADM

## 2022-11-04 RX ORDER — EPTIFIBATIDE 0.75 MG/ML
1 INJECTION, SOLUTION INTRAVENOUS CONTINUOUS
Status: DISCONTINUED | OUTPATIENT
Start: 2022-11-04 | End: 2022-11-09

## 2022-11-04 RX ADMIN — OXYCODONE 5 MG: 5 TABLET ORAL at 16:57

## 2022-11-04 RX ADMIN — HYDROMORPHONE HYDROCHLORIDE 1.5 MG: 1 INJECTION, SOLUTION INTRAMUSCULAR; INTRAVENOUS; SUBCUTANEOUS at 18:27

## 2022-11-04 RX ADMIN — HYDROMORPHONE HYDROCHLORIDE 1 MG: 1 INJECTION, SOLUTION INTRAMUSCULAR; INTRAVENOUS; SUBCUTANEOUS at 02:16

## 2022-11-04 RX ADMIN — OXYCODONE 5 MG: 5 TABLET ORAL at 00:13

## 2022-11-04 RX ADMIN — HYDROMORPHONE HYDROCHLORIDE 1 MG: 1 INJECTION, SOLUTION INTRAMUSCULAR; INTRAVENOUS; SUBCUTANEOUS at 06:23

## 2022-11-04 RX ADMIN — ATORVASTATIN CALCIUM 40 MG: 40 TABLET, FILM COATED ORAL at 20:56

## 2022-11-04 RX ADMIN — SODIUM CHLORIDE, PRESERVATIVE FREE 10 ML: 5 INJECTION INTRAVENOUS at 20:59

## 2022-11-04 RX ADMIN — HYDROMORPHONE HYDROCHLORIDE 1 MG: 1 INJECTION, SOLUTION INTRAMUSCULAR; INTRAVENOUS; SUBCUTANEOUS at 14:29

## 2022-11-04 RX ADMIN — LISINOPRIL 5 MG: 5 TABLET ORAL at 09:21

## 2022-11-04 RX ADMIN — HYDROMORPHONE HYDROCHLORIDE 1.5 MG: 1 INJECTION, SOLUTION INTRAMUSCULAR; INTRAVENOUS; SUBCUTANEOUS at 22:24

## 2022-11-04 RX ADMIN — METOPROLOL TARTRATE 25 MG: 25 TABLET, FILM COATED ORAL at 20:56

## 2022-11-04 RX ADMIN — METOPROLOL TARTRATE 25 MG: 25 TABLET, FILM COATED ORAL at 09:21

## 2022-11-04 RX ADMIN — OXYCODONE 5 MG: 5 TABLET ORAL at 12:47

## 2022-11-04 RX ADMIN — PANTOPRAZOLE SODIUM 40 MG: 40 TABLET, DELAYED RELEASE ORAL at 06:41

## 2022-11-04 RX ADMIN — OXYCODONE 5 MG: 5 TABLET ORAL at 09:07

## 2022-11-04 RX ADMIN — EPTIFIBATIDE 1 MCG/KG/MIN: 0.75 INJECTION INTRAVENOUS at 10:17

## 2022-11-04 RX ADMIN — OXYCODONE 5 MG: 5 TABLET ORAL at 05:10

## 2022-11-04 RX ADMIN — COLCHICINE 0.6 MG: 0.6 TABLET, FILM COATED ORAL at 09:21

## 2022-11-04 RX ADMIN — ASPIRIN 81 MG: 81 TABLET, CHEWABLE ORAL at 09:21

## 2022-11-04 RX ADMIN — OXYCODONE 5 MG: 5 TABLET ORAL at 20:56

## 2022-11-04 RX ADMIN — HYDROMORPHONE HYDROCHLORIDE 1 MG: 1 INJECTION, SOLUTION INTRAMUSCULAR; INTRAVENOUS; SUBCUTANEOUS at 10:22

## 2022-11-04 RX ADMIN — SODIUM CHLORIDE, PRESERVATIVE FREE 10 ML: 5 INJECTION INTRAVENOUS at 12:23

## 2022-11-04 ASSESSMENT — PAIN DESCRIPTION - ORIENTATION
ORIENTATION: MID;POSTERIOR;LOWER
ORIENTATION: MID
ORIENTATION: MID;POSTERIOR;LOWER
ORIENTATION: MID;LOWER;POSTERIOR
ORIENTATION: MID
ORIENTATION: MID;POSTERIOR
ORIENTATION: MID;LOWER;POSTERIOR
ORIENTATION: MID
ORIENTATION: MID;POSTERIOR

## 2022-11-04 ASSESSMENT — PAIN DESCRIPTION - LOCATION
LOCATION: BACK
LOCATION: ABDOMEN
LOCATION: ABDOMEN
LOCATION: BACK
LOCATION: ABDOMEN;BACK
LOCATION: ABDOMEN
LOCATION: ABDOMEN
LOCATION: BACK

## 2022-11-04 ASSESSMENT — PAIN SCALES - GENERAL
PAINLEVEL_OUTOF10: 7
PAINLEVEL_OUTOF10: 8
PAINLEVEL_OUTOF10: 7
PAINLEVEL_OUTOF10: 8
PAINLEVEL_OUTOF10: 3
PAINLEVEL_OUTOF10: 7
PAINLEVEL_OUTOF10: 3
PAINLEVEL_OUTOF10: 7
PAINLEVEL_OUTOF10: 8
PAINLEVEL_OUTOF10: 8
PAINLEVEL_OUTOF10: 10
PAINLEVEL_OUTOF10: 7

## 2022-11-04 ASSESSMENT — PAIN DESCRIPTION - DESCRIPTORS
DESCRIPTORS: ACHING

## 2022-11-04 ASSESSMENT — PAIN - FUNCTIONAL ASSESSMENT: PAIN_FUNCTIONAL_ASSESSMENT: ACTIVITIES ARE NOT PREVENTED

## 2022-11-04 ASSESSMENT — PAIN SCALES - WONG BAKER: WONGBAKER_NUMERICALRESPONSE: 2

## 2022-11-04 NOTE — PROGRESS NOTES
TRANSFER - OUT REPORT:    Verbal report given to Tammy Stahl RN on Sundeep Carlson  being transferred to [facility] for routine progression of patient care       Report consisted of patient's Situation, Background, Assessment and   Recommendations(SBAR). Information from the following report(s) Nurse Handoff Report, Index, Adult Overview, Intake/Output, MAR, and Recent Results was reviewed with the receiving nurse. Lines:       Opportunity for questions and clarification was provided.       Patient transported with:  Registered Nurse

## 2022-11-04 NOTE — PROGRESS NOTES
GI DAILY PROGRESS NOTE    Admit Date:  11/2/2022    Today's Date:  11/4/2022    CC:  pancreatitis    Subjective:     Patient seen and examined this AM. Seems to have worsening sx after eating a fatty meal. On clears now      Medications:   Current Facility-Administered Medications   Medication Dose Route Frequency    ticagrelor (BRILINTA) tablet 90 mg  90 mg Oral BID    LORazepam (ATIVAN) tablet 1 mg  1 mg Oral Q6H PRN    pantoprazole (PROTONIX) tablet 40 mg  40 mg Oral QAM AC    oxyCODONE (ROXICODONE) immediate release tablet 5 mg  5 mg Oral Q4H PRN    sodium chloride flush 0.9 % injection 5-40 mL  5-40 mL IntraVENous 2 times per day    sodium chloride flush 0.9 % injection 5-40 mL  5-40 mL IntraVENous PRN    0.9 % sodium chloride infusion   IntraVENous PRN    ondansetron (ZOFRAN-ODT) disintegrating tablet 4 mg  4 mg Oral Q8H PRN    Or    ondansetron (ZOFRAN) injection 4 mg  4 mg IntraVENous Q6H PRN    acetaminophen (TYLENOL) tablet 650 mg  650 mg Oral Q6H PRN    Or    acetaminophen (TYLENOL) suppository 650 mg  650 mg Rectal Q6H PRN    polyethylene glycol (GLYCOLAX) packet 17 g  17 g Oral Daily PRN    aspirin chewable tablet 81 mg  81 mg Oral Daily    atorvastatin (LIPITOR) tablet 40 mg  40 mg Oral Nightly    metoprolol tartrate (LOPRESSOR) tablet 25 mg  25 mg Oral BID    lisinopril (PRINIVIL;ZESTRIL) tablet 5 mg  5 mg Oral Daily    nitroGLYCERIN (NITROSTAT) SL tablet 0.4 mg  0.4 mg SubLINGual Q5 Min PRN    colchicine (COLCRYS) tablet 0.6 mg  0.6 mg Oral Daily    HYDROmorphone HCl PF (DILAUDID) injection 1 mg  1 mg IntraVENous Q4H PRN       Review of Systems:  ROS was obtained, with pertinent positives as listed above. No chest pain or SOB. Diet:      Objective:   Vitals:  /80   Pulse 85   Temp 98 °F (36.7 °C) (Oral)   Resp 19   Ht 5' 9\" (1.753 m)   Wt 180 lb (81.6 kg)   SpO2 100%   BMI 26.58 kg/m²   Intake/Output:  No intake/output data recorded.   11/02 1901 - 11/04 0700  In: 360 [P.O.:360]  Out: 2500 [Urine:2500]  Exam:  General appearance: alert, cooperative, no distress  Lungs: clear to auscultation bilaterally anteriorly  Heart: regular rate and rhythm  Abdomen: soft, Epigastrium tender. Bowel sounds normal. No masses, no organomegaly  Extremities: extremities normal, atraumatic, no cyanosis or edema  Neuro:  alert and oriented    Data Review (Labs):    Recent Labs     11/02/22  1053 11/03/22  0539 11/04/22  0516   WBC 10.2 11.7*  --    HGB 12.5* 12.6*  --    HCT 37.4* 39.0*  --    * 752*  --    MCV 85.4 86.7  --    * 133 129*   K 4.1 4.2 5.0   CL 99* 102 97*   CO2 24 25 26   BUN 10 8 10   ALT 23 23 24   AST 8* 22 22       Radiology:  [unfilled]    Assessment:     Principal Problem:    STEMI (ST elevation myocardial infarction) ([facility]  Active Problems:    Alcohol abuse    Primary hypertension    Chronic pancreatitis (HCC)  Resolved Problems:    * No resolved hospital problems. *      Plan: Ok with cards starting Integrilin drip as a bridge to planned EUS with axios stent placement for psueocyst drainage next week.   Pain control  Advance diet as tolerated  IVF    Lexi Mendoza MD  [facility], [address]

## 2022-11-04 NOTE — PLAN OF CARE
Problem: Discharge Planning  Goal: Discharge to home or other facility with appropriate resources  11/4/2022 1240 by Zander Lopez RN  Outcome: Adequate for Discharge  11/3/2022 2333 by Arnetha Cabot, RN  Outcome: Progressing  Flowsheets (Taken 11/3/2022 1945)  Discharge to home or other facility with appropriate resources: Identify barriers to discharge with patient and caregiver     Problem: Respiratory - Adult  Goal: Achieves optimal ventilation and oxygenation  11/4/2022 1240 by Zander Lopez RN  Outcome: Adequate for Discharge  11/3/2022 2333 by Arnetha Cabot, RN  Outcome: Progressing  Flowsheets (Taken 11/3/2022 1945)  Achieves optimal ventilation and oxygenation:   Assess for changes in respiratory status   Assess for changes in mentation and behavior     Problem: Skin/Tissue Integrity - Adult  Goal: Incisions, wounds, or drain sites healing without S/S of infection  11/4/2022 1240 by Zander Lopez RN  Outcome: Adequate for Discharge  11/3/2022 2333 by Arnetha Cabot, RN  Outcome: Progressing  Flowsheets (Taken 11/3/2022 1945)  Incisions, Wounds, or Drain Sites Healing Without Sign and Symptoms of Infection:   Implement wound care per orders   Initiate isolation precautions as appropriate

## 2022-11-04 NOTE — PROGRESS NOTES
Discussed with gastroenterology the plan for dealing with his very large pancreatic pseudocyst is going to be to stop Brilinta today start an Integrilin drip as a bridge with the plan for drainage of the pseudocyst next Wednesday.   This of course commence the patient to being inpatient until Wednesday when this can be performed

## 2022-11-04 NOTE — PROGRESS NOTES
am  11/4/2022 7:52 AM    Admit Date: 11/2/2022    Admit Diagnosis: STEMI (ST elevation myocardial infarction) ([facility] [I21.3]  ST elevation myocardial infarction (STEMI), unspecified artery ([facility] [I21.3]      Subjective:    Patient : Here for GI procedure but had STEMI while waiting. S/p PCI now with no cardiac or respiratory symptoms. Still having abd pain. TTE EF 55-60% and normal systolic/diastolic function    Objective:    /80   Pulse 85   Temp 98 °F (36.7 °C) (Oral)   Resp 19   Ht 5' 9\" (1.753 m)   Wt 180 lb (81.6 kg)   SpO2 100%   BMI 26.58 kg/m²     ROS:  General ROS: negative for - chills  Hematological and Lymphatic ROS: negative for - blood clots or jaundice  Respiratory ROS: no cough, shortness of breath, or wheezing  Cardiovascular ROS: no chest pain or dyspnea on exertion  Gastrointestinal ROS: + abdominal pain  Neurological ROS: no TIA or stroke symptoms    Physical Exam:      Physical Examination: General appearance - Appearance: alert, well appearing, and in mild distress.    Neck/lymph - supple, no significant adenopathy  Chest/CV - clear to auscultation, no wheezes, rales or rhonchi, symmetric air entry  Heart - normal rate, regular rhythm, normal S1, S2, no murmurs, rubs, clicks or gallops  Abdomen/GI - soft, nontender, nondistended, no masses or organomegaly   Musculoskeletal - no joint tenderness, deformity or swelling  Extremities - peripheral pulses normal, no pedal edema, no clubbing or cyanosis  Skin - normal coloration and turgor, no rashes, no suspicious skin lesions noted    Current Facility-Administered Medications   Medication Dose Route Frequency    ticagrelor (BRILINTA) tablet 90 mg  90 mg Oral BID    LORazepam (ATIVAN) tablet 1 mg  1 mg Oral Q6H PRN    pantoprazole (PROTONIX) tablet 40 mg  40 mg Oral QAM AC    oxyCODONE (ROXICODONE) immediate release tablet 5 mg  5 mg Oral Q4H PRN    sodium chloride flush 0.9 % injection 5-40 mL  5-40 mL IntraVENous 2 times per day sodium chloride flush 0.9 % injection 5-40 mL  5-40 mL IntraVENous PRN    0.9 % sodium chloride infusion   IntraVENous PRN    ondansetron (ZOFRAN-ODT) disintegrating tablet 4 mg  4 mg Oral Q8H PRN    Or    ondansetron (ZOFRAN) injection 4 mg  4 mg IntraVENous Q6H PRN    acetaminophen (TYLENOL) tablet 650 mg  650 mg Oral Q6H PRN    Or    acetaminophen (TYLENOL) suppository 650 mg  650 mg Rectal Q6H PRN    polyethylene glycol (GLYCOLAX) packet 17 g  17 g Oral Daily PRN    aspirin chewable tablet 81 mg  81 mg Oral Daily    atorvastatin (LIPITOR) tablet 40 mg  40 mg Oral Nightly    metoprolol tartrate (LOPRESSOR) tablet 25 mg  25 mg Oral BID    lisinopril (PRINIVIL;ZESTRIL) tablet 5 mg  5 mg Oral Daily    nitroGLYCERIN (NITROSTAT) SL tablet 0.4 mg  0.4 mg SubLINGual Q5 Min PRN    colchicine (COLCRYS) tablet 0.6 mg  0.6 mg Oral Daily    HYDROmorphone HCl PF (DILAUDID) injection 1 mg  1 mg IntraVENous Q4H PRN       Data Review: data included in this note has been independently reviewed by the author     TELEMETRY: NSR    Assessment/Plan:     Patient Active Problem List   Diagnosis    Primary hypertension    Chronic pancreatitis ([facility]    Alcohol abuse    Attention deficit hyperactivity disorder (ADHD), combined type    Tobacco abuse [Z72.0 (ICD-10-CM)]    STEMI (ST elevation myocardial infarction) ([facility]     PLAN    CAD  STEMI s/p LHC and PCI  On Brilinta, ASA, Lipitor    Chronic pancreatitis  Pseudocyst  7x11 cm lateral to greater curvature of stomach  GI plans supportive care and then drainage once cleared by cardiology and antiplatelets can be held    HTN  Continue meds, 112/80    Jessi Hayden  I have personally seen and evaluated the patient and reviewed the students note and agree with the following assessment and plan and findings. I was present for and observed the key components of this note. Any appropriate additions or editing of the information have been done by me.     Marie Talbot MD, [facility]  Cardiology

## 2022-11-04 NOTE — PLAN OF CARE
Problem: Discharge Planning  Goal: Discharge to home or other facility with appropriate resources  Outcome: Progressing  Flowsheets (Taken 11/3/2022 1945)  Discharge to home or other facility with appropriate resources: Identify barriers to discharge with patient and caregiver     Problem: Pain  Goal: Verbalizes/displays adequate comfort level or baseline comfort level  Outcome: Progressing  Flowsheets  Taken 11/3/2022 2300  Verbalizes/displays adequate comfort level or baseline comfort level:   Encourage patient to monitor pain and request assistance   Assess pain using appropriate pain scale  Taken 11/3/2022 1945  Verbalizes/displays adequate comfort level or baseline comfort level:   Encourage patient to monitor pain and request assistance   Assess pain using appropriate pain scale     Problem: Neurosensory - Adult  Goal: Achieves stable or improved neurological status  Outcome: Progressing  Flowsheets (Taken 11/3/2022 1945)  Achieves stable or improved neurological status: Assess for and report changes in neurological status     Problem: Respiratory - Adult  Goal: Achieves optimal ventilation and oxygenation  Outcome: Progressing  Flowsheets (Taken 11/3/2022 1945)  Achieves optimal ventilation and oxygenation:   Assess for changes in respiratory status   Assess for changes in mentation and behavior     Problem: Cardiovascular - Adult  Goal: Maintains optimal cardiac output and hemodynamic stability  Outcome: Progressing  Flowsheets (Taken 11/3/2022 1945)  Maintains optimal cardiac output and hemodynamic stability:   Monitor blood pressure and heart rate   Monitor urine output and notify Licensed Independent Practitioner for values outside of normal range  Goal: Absence of cardiac dysrhythmias or at baseline  Outcome: Progressing  Flowsheets (Taken 11/3/2022 1945)  Absence of cardiac dysrhythmias or at baseline: Monitor cardiac rate and rhythm     Problem: Skin/Tissue Integrity - Adult  Goal: Skin integrity remains intact  Outcome: Progressing  Flowsheets (Taken 11/3/2022 1945)  Skin Integrity Remains Intact:   Monitor for areas of redness and/or skin breakdown   Assess vascular access sites hourly  Goal: Incisions, wounds, or drain sites healing without S/S of infection  Outcome: Progressing  Flowsheets (Taken 11/3/2022 1945)  Incisions, Wounds, or Drain Sites Healing Without Sign and Symptoms of Infection:   Implement wound care per orders   Initiate isolation precautions as appropriate     Problem: Musculoskeletal - Adult  Goal: Return mobility to safest level of function  Outcome: Progressing  Flowsheets (Taken 11/3/2022 1945)  Return Mobility to Safest Level of Function:   Assess patient stability and activity tolerance for standing, transferring and ambulating with or without assistive devices   Assist with transfers and ambulation using safe patient handling equipment as needed  Goal: Maintain proper alignment of affected body part  Outcome: Progressing  Flowsheets (Taken 11/3/2022 1945)  Maintain proper alignment of affected body part: Support and protect limb and body alignment per provider's orders     Problem: Gastrointestinal - Adult  Goal: Minimal or absence of nausea and vomiting  Outcome: Progressing  Flowsheets (Taken 11/3/2022 1945)  Minimal or absence of nausea and vomiting: Administer IV fluids as ordered to ensure adequate hydration     Problem: Genitourinary - Adult  Goal: Absence of urinary retention  Outcome: Progressing  Flowsheets (Taken 11/3/2022 1945)  Absence of urinary retention:   Assess patients ability to void and empty bladder   Monitor intake/output and perform bladder scan as needed  Goal: Urinary catheter remains patent  Outcome: Progressing  Flowsheets (Taken 11/3/2022 1945)  Urinary catheter remains patent: Assess patency of urinary catheter     Problem: Infection - Adult  Goal: Absence of infection at discharge  Outcome: Progressing  Flowsheets (Taken 11/3/2022 1945)  Absence of infection at discharge:   Assess and monitor for signs and symptoms of infection   Monitor lab/diagnostic results  Goal: Absence of infection during hospitalization  Outcome: Progressing  Flowsheets (Taken 11/3/2022 1945)  Absence of infection during hospitalization:   Assess and monitor for signs and symptoms of infection   Monitor lab/diagnostic results  Goal: Absence of fever/infection during anticipated neutropenic period  Outcome: Progressing  Flowsheets (Taken 11/3/2022 1945)  Absence of fever/infection during anticipated neutropenic period: Monitor white blood cell count     Problem: Metabolic/Fluid and Electrolytes - Adult  Goal: Electrolytes maintained within normal limits  Outcome: Progressing  Flowsheets (Taken 11/3/2022 1945)  Electrolytes maintained within normal limits: Monitor labs and assess patient for signs and symptoms of electrolyte imbalances  Goal: Hemodynamic stability and optimal renal function maintained  Outcome: Progressing  Flowsheets (Taken 11/3/2022 1945)  Hemodynamic stability and optimal renal function maintained: Monitor labs and assess for signs and symptoms of volume excess or deficit  Goal: Glucose maintained within prescribed range  Outcome: Progressing  Flowsheets (Taken 11/3/2022 1945)  Glucose maintained within prescribed range: Monitor blood glucose as ordered     Problem: Hematologic - Adult  Goal: Maintains hematologic stability  Outcome: Progressing  Flowsheets (Taken 11/3/2022 1945)  Maintains hematologic stability: Assess for signs and symptoms of bleeding or hemorrhage

## 2022-11-04 NOTE — CARE COORDINATION
CM continues to follow for discharge planning and/or CM  needs. No CM  needs  noted or voiced at this time. Will continue to monitor and update as needed.

## 2022-11-05 LAB
ALBUMIN SERPL-MCNC: 2.4 G/DL (ref 3.5–5)
ALBUMIN/GLOB SERPL: 0.6 {RATIO} (ref 0.4–1.6)
ALP SERPL-CCNC: 45 U/L (ref 50–136)
ALT SERPL-CCNC: 31 U/L (ref 12–65)
ANION GAP SERPL CALC-SCNC: 7 MMOL/L (ref 2–11)
AST SERPL-CCNC: 26 U/L (ref 15–37)
BILIRUB SERPL-MCNC: 0.5 MG/DL (ref 0.2–1.1)
BUN SERPL-MCNC: 14 MG/DL (ref 6–23)
CALCIUM SERPL-MCNC: 8.8 MG/DL (ref 8.3–10.4)
CHLORIDE SERPL-SCNC: 96 MMOL/L (ref 101–110)
CO2 SERPL-SCNC: 23 MMOL/L (ref 21–32)
CREAT SERPL-MCNC: 1 MG/DL (ref 0.8–1.5)
GLOBULIN SER CALC-MCNC: 4.3 G/DL (ref 2.8–4.5)
GLUCOSE SERPL-MCNC: 80 MG/DL (ref 65–100)
POTASSIUM SERPL-SCNC: 4.6 MMOL/L (ref 3.5–5.1)
PROT SERPL-MCNC: 6.7 G/DL (ref 6.3–8.2)
SODIUM SERPL-SCNC: 126 MMOL/L (ref 133–143)

## 2022-11-05 PROCEDURE — 6360000002 HC RX W HCPCS: Performed by: INTERNAL MEDICINE

## 2022-11-05 PROCEDURE — 80053 COMPREHEN METABOLIC PANEL: CPT

## 2022-11-05 PROCEDURE — 6370000000 HC RX 637 (ALT 250 FOR IP): Performed by: INTERNAL MEDICINE

## 2022-11-05 PROCEDURE — 6370000000 HC RX 637 (ALT 250 FOR IP): Performed by: NURSE PRACTITIONER

## 2022-11-05 PROCEDURE — 2580000003 HC RX 258: Performed by: NURSE PRACTITIONER

## 2022-11-05 PROCEDURE — 2140000001 HC CVICU INTERMEDIATE R&B

## 2022-11-05 PROCEDURE — 99231 SBSQ HOSP IP/OBS SF/LOW 25: CPT | Performed by: INTERNAL MEDICINE

## 2022-11-05 PROCEDURE — 36415 COLL VENOUS BLD VENIPUNCTURE: CPT

## 2022-11-05 RX ADMIN — HYDROMORPHONE HYDROCHLORIDE 1.5 MG: 1 INJECTION, SOLUTION INTRAMUSCULAR; INTRAVENOUS; SUBCUTANEOUS at 06:37

## 2022-11-05 RX ADMIN — LISINOPRIL 5 MG: 5 TABLET ORAL at 08:35

## 2022-11-05 RX ADMIN — HYDROMORPHONE HYDROCHLORIDE 1.5 MG: 1 INJECTION, SOLUTION INTRAMUSCULAR; INTRAVENOUS; SUBCUTANEOUS at 22:37

## 2022-11-05 RX ADMIN — OXYCODONE 5 MG: 5 TABLET ORAL at 00:51

## 2022-11-05 RX ADMIN — ASPIRIN 81 MG: 81 TABLET, CHEWABLE ORAL at 08:35

## 2022-11-05 RX ADMIN — OXYCODONE 5 MG: 5 TABLET ORAL at 16:32

## 2022-11-05 RX ADMIN — HYDROMORPHONE HYDROCHLORIDE 1.5 MG: 1 INJECTION, SOLUTION INTRAMUSCULAR; INTRAVENOUS; SUBCUTANEOUS at 14:27

## 2022-11-05 RX ADMIN — METOPROLOL TARTRATE 25 MG: 25 TABLET, FILM COATED ORAL at 08:35

## 2022-11-05 RX ADMIN — SODIUM CHLORIDE, PRESERVATIVE FREE 10 ML: 5 INJECTION INTRAVENOUS at 20:39

## 2022-11-05 RX ADMIN — SODIUM CHLORIDE, PRESERVATIVE FREE 10 ML: 5 INJECTION INTRAVENOUS at 08:37

## 2022-11-05 RX ADMIN — OXYCODONE 5 MG: 5 TABLET ORAL at 04:42

## 2022-11-05 RX ADMIN — OXYCODONE 5 MG: 5 TABLET ORAL at 08:35

## 2022-11-05 RX ADMIN — PANTOPRAZOLE SODIUM 40 MG: 40 TABLET, DELAYED RELEASE ORAL at 06:37

## 2022-11-05 RX ADMIN — LORAZEPAM 1 MG: 1 TABLET ORAL at 00:51

## 2022-11-05 RX ADMIN — EPTIFIBATIDE 1 MCG/KG/MIN: 0.75 INJECTION INTRAVENOUS at 03:52

## 2022-11-05 RX ADMIN — HYDROMORPHONE HYDROCHLORIDE 1.5 MG: 1 INJECTION, SOLUTION INTRAMUSCULAR; INTRAVENOUS; SUBCUTANEOUS at 18:37

## 2022-11-05 RX ADMIN — ATORVASTATIN CALCIUM 40 MG: 40 TABLET, FILM COATED ORAL at 20:36

## 2022-11-05 RX ADMIN — COLCHICINE 0.6 MG: 0.6 TABLET, FILM COATED ORAL at 08:34

## 2022-11-05 RX ADMIN — HYDROMORPHONE HYDROCHLORIDE 1.5 MG: 1 INJECTION, SOLUTION INTRAMUSCULAR; INTRAVENOUS; SUBCUTANEOUS at 02:42

## 2022-11-05 RX ADMIN — METOPROLOL TARTRATE 25 MG: 25 TABLET, FILM COATED ORAL at 20:36

## 2022-11-05 RX ADMIN — OXYCODONE 5 MG: 5 TABLET ORAL at 12:30

## 2022-11-05 RX ADMIN — POLYETHYLENE GLYCOL 3350 17 G: 17 POWDER, FOR SOLUTION ORAL at 08:39

## 2022-11-05 RX ADMIN — EPTIFIBATIDE 1 MCG/KG/MIN: 0.75 INJECTION INTRAVENOUS at 18:48

## 2022-11-05 RX ADMIN — LORAZEPAM 1 MG: 1 TABLET ORAL at 22:37

## 2022-11-05 RX ADMIN — OXYCODONE 5 MG: 5 TABLET ORAL at 20:37

## 2022-11-05 RX ADMIN — HYDROMORPHONE HYDROCHLORIDE 1.5 MG: 1 INJECTION, SOLUTION INTRAMUSCULAR; INTRAVENOUS; SUBCUTANEOUS at 10:33

## 2022-11-05 ASSESSMENT — PAIN SCALES - GENERAL
PAINLEVEL_OUTOF10: 3
PAINLEVEL_OUTOF10: 8
PAINLEVEL_OUTOF10: 7
PAINLEVEL_OUTOF10: 3
PAINLEVEL_OUTOF10: 8
PAINLEVEL_OUTOF10: 3
PAINLEVEL_OUTOF10: 3
PAINLEVEL_OUTOF10: 9
PAINLEVEL_OUTOF10: 8
PAINLEVEL_OUTOF10: 3
PAINLEVEL_OUTOF10: 8
PAINLEVEL_OUTOF10: 8
PAINLEVEL_OUTOF10: 3

## 2022-11-05 ASSESSMENT — PAIN DESCRIPTION - DESCRIPTORS
DESCRIPTORS: ACHING;SHOOTING
DESCRIPTORS: ACHING
DESCRIPTORS: SHOOTING
DESCRIPTORS: ACHING
DESCRIPTORS: SHOOTING
DESCRIPTORS: ACHING;SHOOTING
DESCRIPTORS: SHOOTING
DESCRIPTORS: ACHING
DESCRIPTORS: ACHING
DESCRIPTORS: SHOOTING

## 2022-11-05 ASSESSMENT — PAIN DESCRIPTION - LOCATION
LOCATION: ABDOMEN;BACK
LOCATION: ABDOMEN;BACK
LOCATION: ABDOMEN
LOCATION: ABDOMEN
LOCATION: ABDOMEN;BACK
LOCATION: ABDOMEN
LOCATION: ABDOMEN
LOCATION: ABDOMEN;BACK
LOCATION: ABDOMEN
LOCATION: ABDOMEN

## 2022-11-05 ASSESSMENT — PAIN DESCRIPTION - ORIENTATION
ORIENTATION: LEFT
ORIENTATION: MID
ORIENTATION: MID;LEFT
ORIENTATION: MID
ORIENTATION: ANTERIOR;LEFT
ORIENTATION: MID
ORIENTATION: MID
ORIENTATION: LEFT
ORIENTATION: MID;LEFT

## 2022-11-05 NOTE — PROGRESS NOTES
[facility] CARDIOLOGY PROGRESS NOTE           11/5/2022 8:46 AM    Admit Date: 11/2/2022      Subjective:   No cp or sob. Objective:      Vitals:    11/05/22 0253 11/05/22 0637 11/05/22 0727 11/05/22 0835   BP: 112/61  124/70 124/70   Pulse: 87  89 86   Resp: 18 18 20 20   Temp: 98.4 °F (36.9 °C)  98.5 °F (36.9 °C)    TempSrc: Oral  Temporal    SpO2: 91%  98%    Weight:       Height:           Physical Exam:  General-No Acute Distress    Data Review:   Recent Labs     11/02/22  1053 11/03/22  0539 11/04/22  0516 11/05/22  0659   * 133 129* 126*   K 4.1 4.2 5.0 4.6   BUN 10 8 10 14   WBC 10.2 11.7*  --   --    HGB 12.5* 12.6*  --   --    HCT 37.4* 39.0*  --   --    * 752*  --   --    CHOL  --  88  --   --    HDL  --  13*  --   --        Assessment/Plan:     Chronic pancreatitis and pseudocyst in need of intervention  2.   3 days s/p stemi and lcx stent on asa and 2b3a infusion in prep for intervention next week  3. Hyponatremia  ///  Cont. current rx.    Await intervention        Francy Angel MD  11/5/2022 8:46 AM

## 2022-11-05 NOTE — PROGRESS NOTES
[facility] Progress Note         Admit Date:  11/2/2022    Today's Date:  11/5/2022    CC:  Pancreatitis     Subjective:     Patient reports abdominal pain continues but has improved with his pain medication regimen. He denies nausea or vomiting. Medications:   Current Facility-Administered Medications   Medication Dose Route Frequency    eptifibatide (INTEGRILIN) 0.75 mg/mL infusion  1 mcg/kg/min IntraVENous Continuous    HYDROmorphone HCl PF (DILAUDID) injection 1.5 mg  1.5 mg IntraVENous Q4H PRN    [Held by provider] ticagrelor (BRILINTA) tablet 90 mg  90 mg Oral BID    LORazepam (ATIVAN) tablet 1 mg  1 mg Oral Q6H PRN    pantoprazole (PROTONIX) tablet 40 mg  40 mg Oral QAM AC    oxyCODONE (ROXICODONE) immediate release tablet 5 mg  5 mg Oral Q4H PRN    sodium chloride flush 0.9 % injection 5-40 mL  5-40 mL IntraVENous 2 times per day    sodium chloride flush 0.9 % injection 5-40 mL  5-40 mL IntraVENous PRN    0.9 % sodium chloride infusion   IntraVENous PRN    ondansetron (ZOFRAN-ODT) disintegrating tablet 4 mg  4 mg Oral Q8H PRN    Or    ondansetron (ZOFRAN) injection 4 mg  4 mg IntraVENous Q6H PRN    acetaminophen (TYLENOL) tablet 650 mg  650 mg Oral Q6H PRN    Or    acetaminophen (TYLENOL) suppository 650 mg  650 mg Rectal Q6H PRN    polyethylene glycol (GLYCOLAX) packet 17 g  17 g Oral Daily PRN    aspirin chewable tablet 81 mg  81 mg Oral Daily    atorvastatin (LIPITOR) tablet 40 mg  40 mg Oral Nightly    metoprolol tartrate (LOPRESSOR) tablet 25 mg  25 mg Oral BID    lisinopril (PRINIVIL;ZESTRIL) tablet 5 mg  5 mg Oral Daily    nitroGLYCERIN (NITROSTAT) SL tablet 0.4 mg  0.4 mg SubLINGual Q5 Min PRN    colchicine (COLCRYS) tablet 0.6 mg  0.6 mg Oral Daily       Review of Systems:  ROS was obtained, with pertinent positives as listed above. No chest pain or SOB.     Diet:  Regular; GI bland    Objective:   Vitals:  BP (!) 96/54   Pulse 86   Temp 97.9 °F (36.6 °C) (Temporal)   Resp 18 Ht 5' 9\" (1.753 m)   Wt 180 lb (81.6 kg)   SpO2 98%   BMI 26.58 kg/m²   Intake/Output:  No intake/output data recorded. 11/03 1901 - 11/05 0700  In: 250 [P.O.:250]  Out: -   Exam:  General appearance: alert, cooperative, no distress  Lungs: clear to auscultation bilaterally anteriorly  Heart: regular rate and rhythm  Abdomen: soft, LUQ TTP. Bowel sounds normal. No masses, no organomegaly  Extremities: extremities normal, atraumatic, no cyanosis or edema  Neuro:  alert and oriented    Data Review (Labs):    Recent Labs     11/03/22  0539 11/04/22  0516 11/05/22  0659   WBC 11.7*  --   --    HGB 12.6*  --   --    HCT 39.0*  --   --    *  --   --    MCV 86.7  --   --     129* 126*   K 4.2 5.0 4.6    97* 96*   CO2 25 26 23   BUN 8 10 14   AST 22 22 26   ALT 23 24 31     Assessment:     Principal Problem:    STEMI (ST elevation myocardial infarction) (HCC)  Active Problems:    Alcohol abuse    Primary hypertension    Chronic pancreatitis (HCC)  Resolved Problems:    * No resolved hospital problems. *    44 y.o. male with PMH including but not limited to chronic pancreatitis, HTN,, hx of cholecystectomy, who is seen in consultation on 11/3/22 at the request of Dr. Vinod Chun for chronic pancreatitis and abdominal pain. Known pseudocyst on CT 10/25/22 is likely the cause of abdominal pain. Patient developed chest pain yesterday prior to his EUS. In ED, labs showed Na 130, HS trop 300 and EKG with ST elevation. He was taken emergently to cath lab. Patient is 3 days s/p stemi and lcx stent on asa and 2b3a infusion in prep for pseudocyst intervention next week. Plan:     - Plan for EUS with axios stent placement for psueocyst drainage next week.   - Pain control  - Supportive care, maintain electrolytes, antiemetics, IVF      MELISSA Grant  [facility]     Patient is seen and examined in collaboration with Dr. Ariel Oro.  Assessment and plan as per Dr. Anne Samayoa.

## 2022-11-06 LAB — PLATELET # BLD AUTO: 541 K/UL (ref 150–450)

## 2022-11-06 PROCEDURE — 2580000003 HC RX 258: Performed by: NURSE PRACTITIONER

## 2022-11-06 PROCEDURE — 6360000002 HC RX W HCPCS: Performed by: INTERNAL MEDICINE

## 2022-11-06 PROCEDURE — 6370000000 HC RX 637 (ALT 250 FOR IP): Performed by: INTERNAL MEDICINE

## 2022-11-06 PROCEDURE — 6370000000 HC RX 637 (ALT 250 FOR IP): Performed by: NURSE PRACTITIONER

## 2022-11-06 PROCEDURE — 85049 AUTOMATED PLATELET COUNT: CPT

## 2022-11-06 PROCEDURE — 99232 SBSQ HOSP IP/OBS MODERATE 35: CPT | Performed by: INTERNAL MEDICINE

## 2022-11-06 PROCEDURE — 36415 COLL VENOUS BLD VENIPUNCTURE: CPT

## 2022-11-06 PROCEDURE — 2140000001 HC CVICU INTERMEDIATE R&B

## 2022-11-06 RX ADMIN — OXYCODONE 5 MG: 5 TABLET ORAL at 15:38

## 2022-11-06 RX ADMIN — HYDROMORPHONE HYDROCHLORIDE 1.5 MG: 1 INJECTION, SOLUTION INTRAMUSCULAR; INTRAVENOUS; SUBCUTANEOUS at 09:33

## 2022-11-06 RX ADMIN — PANTOPRAZOLE SODIUM 40 MG: 40 TABLET, DELAYED RELEASE ORAL at 05:26

## 2022-11-06 RX ADMIN — LISINOPRIL 5 MG: 5 TABLET ORAL at 09:04

## 2022-11-06 RX ADMIN — ATORVASTATIN CALCIUM 40 MG: 40 TABLET, FILM COATED ORAL at 21:35

## 2022-11-06 RX ADMIN — OXYCODONE 5 MG: 5 TABLET ORAL at 19:59

## 2022-11-06 RX ADMIN — METOPROLOL TARTRATE 25 MG: 25 TABLET, FILM COATED ORAL at 09:04

## 2022-11-06 RX ADMIN — OXYCODONE 5 MG: 5 TABLET ORAL at 03:26

## 2022-11-06 RX ADMIN — HYDROMORPHONE HYDROCHLORIDE 1.5 MG: 1 INJECTION, SOLUTION INTRAMUSCULAR; INTRAVENOUS; SUBCUTANEOUS at 05:26

## 2022-11-06 RX ADMIN — LORAZEPAM 1 MG: 1 TABLET ORAL at 15:38

## 2022-11-06 RX ADMIN — OXYCODONE 5 MG: 5 TABLET ORAL at 11:29

## 2022-11-06 RX ADMIN — METOPROLOL TARTRATE 25 MG: 25 TABLET, FILM COATED ORAL at 21:35

## 2022-11-06 RX ADMIN — SODIUM CHLORIDE, PRESERVATIVE FREE 10 ML: 5 INJECTION INTRAVENOUS at 21:41

## 2022-11-06 RX ADMIN — ASPIRIN 81 MG: 81 TABLET, CHEWABLE ORAL at 09:04

## 2022-11-06 RX ADMIN — HYDROMORPHONE HYDROCHLORIDE 1.5 MG: 1 INJECTION, SOLUTION INTRAMUSCULAR; INTRAVENOUS; SUBCUTANEOUS at 01:36

## 2022-11-06 RX ADMIN — COLCHICINE 0.6 MG: 0.6 TABLET, FILM COATED ORAL at 09:04

## 2022-11-06 RX ADMIN — OXYCODONE 5 MG: 5 TABLET ORAL at 00:36

## 2022-11-06 RX ADMIN — HYDROMORPHONE HYDROCHLORIDE 1.5 MG: 1 INJECTION, SOLUTION INTRAMUSCULAR; INTRAVENOUS; SUBCUTANEOUS at 17:37

## 2022-11-06 RX ADMIN — EPTIFIBATIDE 1 MCG/KG/MIN: 0.75 INJECTION INTRAVENOUS at 09:04

## 2022-11-06 RX ADMIN — HYDROMORPHONE HYDROCHLORIDE 1.5 MG: 1 INJECTION, SOLUTION INTRAMUSCULAR; INTRAVENOUS; SUBCUTANEOUS at 13:24

## 2022-11-06 RX ADMIN — SODIUM CHLORIDE, PRESERVATIVE FREE 10 ML: 5 INJECTION INTRAVENOUS at 09:35

## 2022-11-06 RX ADMIN — OXYCODONE 5 MG: 5 TABLET ORAL at 07:36

## 2022-11-06 RX ADMIN — HYDROMORPHONE HYDROCHLORIDE 1.5 MG: 1 INJECTION, SOLUTION INTRAMUSCULAR; INTRAVENOUS; SUBCUTANEOUS at 21:31

## 2022-11-06 ASSESSMENT — PAIN DESCRIPTION - LOCATION
LOCATION: ABDOMEN

## 2022-11-06 ASSESSMENT — PAIN DESCRIPTION - PAIN TYPE: TYPE: CHRONIC PAIN;ACUTE PAIN

## 2022-11-06 ASSESSMENT — PAIN DESCRIPTION - ONSET: ONSET: ON-GOING

## 2022-11-06 ASSESSMENT — PAIN DESCRIPTION - DESCRIPTORS
DESCRIPTORS: ACHING;SHOOTING
DESCRIPTORS: ACHING
DESCRIPTORS: ACHING
DESCRIPTORS: ACHING;SHOOTING

## 2022-11-06 ASSESSMENT — PAIN SCALES - GENERAL
PAINLEVEL_OUTOF10: 0
PAINLEVEL_OUTOF10: 8
PAINLEVEL_OUTOF10: 3
PAINLEVEL_OUTOF10: 8
PAINLEVEL_OUTOF10: 8
PAINLEVEL_OUTOF10: 9
PAINLEVEL_OUTOF10: 8
PAINLEVEL_OUTOF10: 8

## 2022-11-06 ASSESSMENT — PAIN - FUNCTIONAL ASSESSMENT: PAIN_FUNCTIONAL_ASSESSMENT: ACTIVITIES ARE NOT PREVENTED

## 2022-11-06 ASSESSMENT — PAIN DESCRIPTION - FREQUENCY: FREQUENCY: CONTINUOUS

## 2022-11-06 ASSESSMENT — PAIN DESCRIPTION - ORIENTATION
ORIENTATION: MID;LEFT

## 2022-11-06 NOTE — PROGRESS NOTES
GI DAILY PROGRESS NOTE    Admit Date:  11/2/2022    Today's Date:  11/6/2022    CC:  Abdominal pain    Subjective:     Patient seen and examined this AM. No acute issues overnight. Pain has improved. Tolerating diet. Medications:   Current Facility-Administered Medications   Medication Dose Route Frequency    eptifibatide (INTEGRILIN) 0.75 mg/mL infusion  1 mcg/kg/min IntraVENous Continuous    HYDROmorphone HCl PF (DILAUDID) injection 1.5 mg  1.5 mg IntraVENous Q4H PRN    [Held by provider] ticagrelor (BRILINTA) tablet 90 mg  90 mg Oral BID    LORazepam (ATIVAN) tablet 1 mg  1 mg Oral Q6H PRN    pantoprazole (PROTONIX) tablet 40 mg  40 mg Oral QAM AC    oxyCODONE (ROXICODONE) immediate release tablet 5 mg  5 mg Oral Q4H PRN    sodium chloride flush 0.9 % injection 5-40 mL  5-40 mL IntraVENous 2 times per day    sodium chloride flush 0.9 % injection 5-40 mL  5-40 mL IntraVENous PRN    0.9 % sodium chloride infusion   IntraVENous PRN    ondansetron (ZOFRAN-ODT) disintegrating tablet 4 mg  4 mg Oral Q8H PRN    Or    ondansetron (ZOFRAN) injection 4 mg  4 mg IntraVENous Q6H PRN    acetaminophen (TYLENOL) tablet 650 mg  650 mg Oral Q6H PRN    Or    acetaminophen (TYLENOL) suppository 650 mg  650 mg Rectal Q6H PRN    polyethylene glycol (GLYCOLAX) packet 17 g  17 g Oral Daily PRN    aspirin chewable tablet 81 mg  81 mg Oral Daily    atorvastatin (LIPITOR) tablet 40 mg  40 mg Oral Nightly    metoprolol tartrate (LOPRESSOR) tablet 25 mg  25 mg Oral BID    lisinopril (PRINIVIL;ZESTRIL) tablet 5 mg  5 mg Oral Daily    nitroGLYCERIN (NITROSTAT) SL tablet 0.4 mg  0.4 mg SubLINGual Q5 Min PRN    colchicine (COLCRYS) tablet 0.6 mg  0.6 mg Oral Daily       Review of Systems:  ROS was obtained, with pertinent positives as listed above. No chest pain or SOB.     Diet:      Objective:   Vitals:  BP (!) 118/55   Pulse 79   Temp 98.2 °F (36.8 °C) (Oral)   Resp 16   Ht 5' 9\" (1.753 m)   Wt 180 lb (81.6 kg)   SpO2 100%   BMI 26.58 kg/m²   Intake/Output:  11/06 0701 - 11/06 1900  In: 3812 [P.O.:1040]  Out: -   11/04 1901 - 11/06 0700  In: 1800 [P.O.:1800]  Out: -   Exam:  General appearance: alert, cooperative, no distress  Lungs: clear to auscultation bilaterally anteriorly  Heart: regular rate and rhythm  Abdomen: soft, non-tender. Bowel sounds normal. No masses, no organomegaly  Extremities: extremities normal, atraumatic, no cyanosis or edema  Neuro:  alert and oriented    Data Review (Labs):    Recent Labs     11/04/22  0516 11/05/22  0659   * 126*   K 5.0 4.6   CL 97* 96*   CO2 26 23   BUN 10 14   ALT 24 31   AST 22 26       Radiology:  [unfilled]    Assessment:     Principal Problem:    STEMI (ST elevation myocardial infarction) ([facility] [address]  Active Problems:    Alcohol abuse    Primary hypertension    Chronic pancreatitis ([facility] [address]  Resolved Problems:    * No resolved hospital problems.  *      Plan:     EUS with axios stent this week      Josse Huffman MD  [facility] [place]

## 2022-11-06 NOTE — PROGRESS NOTES
[facility] CARDIOLOGY PROGRESS NOTE           11/6/2022 1:14 PM    Admit Date: 11/2/2022      Subjective:   No angina      Objective:      Vitals:    11/06/22 0326 11/06/22 0526 11/06/22 0729 11/06/22 1105   BP: (!) 106/58  115/63 (!) 118/55   Pulse: 78  92 79   Resp: 17 17 19 16   Temp: 97.6 °F (36.4 °C)  98.1 °F (36.7 °C) 98.2 °F (36.8 °C)   TempSrc: Temporal  Oral Oral   SpO2: 98%  97% 100%   Weight:   180 lb (81.6 kg)    Height:           Physical Exam:  General-No Acute Distress    Data Review:   Recent Labs     11/04/22  0516 11/05/22  0659   * 126*   K 5.0 4.6   BUN 10 14       Assessment/Plan: 1. Chronic pancreatitis and pseudocyst in need of intervention  2. 4 days s/p stemi and lcx stent on asa and 2b3a infusion in prep for intervention next week  3.  Hyponatremia    ////    Check platelet count on 9D4P       Dori Mendiola MD  11/6/2022 1:14 PM

## 2022-11-07 PROCEDURE — 6370000000 HC RX 637 (ALT 250 FOR IP): Performed by: INTERNAL MEDICINE

## 2022-11-07 PROCEDURE — 2140000001 HC CVICU INTERMEDIATE R&B

## 2022-11-07 PROCEDURE — 6370000000 HC RX 637 (ALT 250 FOR IP): Performed by: NURSE PRACTITIONER

## 2022-11-07 PROCEDURE — 99232 SBSQ HOSP IP/OBS MODERATE 35: CPT | Performed by: INTERNAL MEDICINE

## 2022-11-07 PROCEDURE — 6360000002 HC RX W HCPCS: Performed by: INTERNAL MEDICINE

## 2022-11-07 PROCEDURE — 2580000003 HC RX 258: Performed by: NURSE PRACTITIONER

## 2022-11-07 RX ADMIN — OXYCODONE 5 MG: 5 TABLET ORAL at 20:27

## 2022-11-07 RX ADMIN — OXYCODONE 5 MG: 5 TABLET ORAL at 16:27

## 2022-11-07 RX ADMIN — OXYCODONE 5 MG: 5 TABLET ORAL at 08:22

## 2022-11-07 RX ADMIN — OXYCODONE 5 MG: 5 TABLET ORAL at 00:06

## 2022-11-07 RX ADMIN — PANTOPRAZOLE SODIUM 40 MG: 40 TABLET, DELAYED RELEASE ORAL at 04:20

## 2022-11-07 RX ADMIN — EPTIFIBATIDE 1 MCG/KG/MIN: 0.75 INJECTION INTRAVENOUS at 22:35

## 2022-11-07 RX ADMIN — SODIUM CHLORIDE, PRESERVATIVE FREE 10 ML: 5 INJECTION INTRAVENOUS at 10:41

## 2022-11-07 RX ADMIN — HYDROMORPHONE HYDROCHLORIDE 1.5 MG: 1 INJECTION, SOLUTION INTRAMUSCULAR; INTRAVENOUS; SUBCUTANEOUS at 02:34

## 2022-11-07 RX ADMIN — ATORVASTATIN CALCIUM 40 MG: 40 TABLET, FILM COATED ORAL at 20:27

## 2022-11-07 RX ADMIN — EPTIFIBATIDE 1 MCG/KG/MIN: 0.75 INJECTION INTRAVENOUS at 02:31

## 2022-11-07 RX ADMIN — COLCHICINE 0.6 MG: 0.6 TABLET, FILM COATED ORAL at 08:22

## 2022-11-07 RX ADMIN — OXYCODONE 5 MG: 5 TABLET ORAL at 12:34

## 2022-11-07 RX ADMIN — HYDROMORPHONE HYDROCHLORIDE 1.5 MG: 1 INJECTION, SOLUTION INTRAMUSCULAR; INTRAVENOUS; SUBCUTANEOUS at 18:34

## 2022-11-07 RX ADMIN — LORAZEPAM 1 MG: 1 TABLET ORAL at 19:37

## 2022-11-07 RX ADMIN — METOPROLOL TARTRATE 25 MG: 25 TABLET, FILM COATED ORAL at 20:27

## 2022-11-07 RX ADMIN — OXYCODONE 5 MG: 5 TABLET ORAL at 04:20

## 2022-11-07 RX ADMIN — METOPROLOL TARTRATE 25 MG: 25 TABLET, FILM COATED ORAL at 08:22

## 2022-11-07 RX ADMIN — HYDROMORPHONE HYDROCHLORIDE 1.5 MG: 1 INJECTION, SOLUTION INTRAMUSCULAR; INTRAVENOUS; SUBCUTANEOUS at 14:41

## 2022-11-07 RX ADMIN — SODIUM CHLORIDE, PRESERVATIVE FREE 10 ML: 5 INJECTION INTRAVENOUS at 20:32

## 2022-11-07 RX ADMIN — HYDROMORPHONE HYDROCHLORIDE 1.5 MG: 1 INJECTION, SOLUTION INTRAMUSCULAR; INTRAVENOUS; SUBCUTANEOUS at 06:44

## 2022-11-07 RX ADMIN — HYDROMORPHONE HYDROCHLORIDE 1.5 MG: 1 INJECTION, SOLUTION INTRAMUSCULAR; INTRAVENOUS; SUBCUTANEOUS at 22:24

## 2022-11-07 RX ADMIN — ASPIRIN 81 MG: 81 TABLET, CHEWABLE ORAL at 08:21

## 2022-11-07 RX ADMIN — LISINOPRIL 5 MG: 5 TABLET ORAL at 08:22

## 2022-11-07 RX ADMIN — POLYETHYLENE GLYCOL 3350 17 G: 17 POWDER, FOR SOLUTION ORAL at 08:22

## 2022-11-07 RX ADMIN — HYDROMORPHONE HYDROCHLORIDE 1.5 MG: 1 INJECTION, SOLUTION INTRAMUSCULAR; INTRAVENOUS; SUBCUTANEOUS at 10:38

## 2022-11-07 ASSESSMENT — PAIN DESCRIPTION - ONSET
ONSET: ON-GOING

## 2022-11-07 ASSESSMENT — PAIN DESCRIPTION - LOCATION
LOCATION: ABDOMEN;BACK
LOCATION: ABDOMEN
LOCATION: ABDOMEN
LOCATION: ABDOMEN;BACK
LOCATION: ABDOMEN
LOCATION: ABDOMEN;BACK
LOCATION: ABDOMEN
LOCATION: ABDOMEN;BACK
LOCATION: ABDOMEN

## 2022-11-07 ASSESSMENT — PAIN SCALES - GENERAL
PAINLEVEL_OUTOF10: 8
PAINLEVEL_OUTOF10: 3
PAINLEVEL_OUTOF10: 4
PAINLEVEL_OUTOF10: 8
PAINLEVEL_OUTOF10: 0
PAINLEVEL_OUTOF10: 7
PAINLEVEL_OUTOF10: 8
PAINLEVEL_OUTOF10: 3
PAINLEVEL_OUTOF10: 8
PAINLEVEL_OUTOF10: 5
PAINLEVEL_OUTOF10: 8
PAINLEVEL_OUTOF10: 0
PAINLEVEL_OUTOF10: 7
PAINLEVEL_OUTOF10: 8
PAINLEVEL_OUTOF10: 0
PAINLEVEL_OUTOF10: 8
PAINLEVEL_OUTOF10: 5
PAINLEVEL_OUTOF10: 7
PAINLEVEL_OUTOF10: 3
PAINLEVEL_OUTOF10: 7
PAINLEVEL_OUTOF10: 3
PAINLEVEL_OUTOF10: 3

## 2022-11-07 ASSESSMENT — PAIN DESCRIPTION - ORIENTATION
ORIENTATION: RIGHT;MID
ORIENTATION: MID
ORIENTATION: LEFT
ORIENTATION: LEFT;MID
ORIENTATION: LEFT

## 2022-11-07 ASSESSMENT — PAIN - FUNCTIONAL ASSESSMENT
PAIN_FUNCTIONAL_ASSESSMENT: ACTIVITIES ARE NOT PREVENTED

## 2022-11-07 ASSESSMENT — PAIN DESCRIPTION - PAIN TYPE
TYPE: ACUTE PAIN
TYPE: ACUTE PAIN;CHRONIC PAIN
TYPE: ACUTE PAIN;CHRONIC PAIN
TYPE: ACUTE PAIN
TYPE: CHRONIC PAIN

## 2022-11-07 ASSESSMENT — PAIN DESCRIPTION - DESCRIPTORS
DESCRIPTORS: SORE
DESCRIPTORS: SHOOTING
DESCRIPTORS: ACHING
DESCRIPTORS: SHOOTING
DESCRIPTORS: SORE
DESCRIPTORS: ACHING
DESCRIPTORS: SQUEEZING
DESCRIPTORS: ACHING

## 2022-11-07 ASSESSMENT — PAIN DESCRIPTION - FREQUENCY
FREQUENCY: CONTINUOUS
FREQUENCY: INTERMITTENT

## 2022-11-07 NOTE — PROGRESS NOTES
Patient requesting more pain medication at this time. Instructed pt he is not due for another hour pt verbalized understanding. Pt sitting up in chair smiling while playing on phone.

## 2022-11-07 NOTE — PROGRESS NOTES
am  11/7/2022 6:39 AM    Admit Date: 11/2/2022    Admit Diagnosis: STEMI (ST elevation myocardial infarction) ([facility] [address] [I21.3]  ST elevation myocardial infarction (STEMI), unspecified artery ([facility] [address] [I21.3]      Subjective:    Patient : Awake and out of bed sitting in chair when seen this morning. Still having abd pain when eating but it is mostly controlled. No n/v, chest pain, SOB currently. Objective:    /68   Pulse 92   Temp 98.1 °F (36.7 °C) (Oral)   Resp 18   Ht 5' 9\" (1.753 m)   Wt 180 lb (81.6 kg)   SpO2 100%   BMI 26.58 kg/m²     ROS:  General ROS: negative for - chills  Hematological and Lymphatic ROS: negative for - blood clots or jaundice  Respiratory ROS: no cough, shortness of breath, or wheezing  Cardiovascular ROS: no chest pain or dyspnea on exertion  Gastrointestinal ROS: abdominal pain, no change in bowel habits, or black or bloody stools  Neurological ROS: no TIA or stroke symptoms    Physical Exam:      Physical Examination: General appearance - Appearance: alert, well appearing, and in no distress.    Neck/lymph - supple, no significant adenopathy  Chest/CV - clear to auscultation, no wheezes, rales or rhonchi, symmetric air entry  Heart - normal rate, regular rhythm, normal S1, S2, no murmurs, rubs, clicks or gallops  Abdomen/GI - soft, nontender, nondistended, no masses or organomegaly   Musculoskeletal - no joint tenderness, deformity or swelling  Extremities - peripheral pulses normal, no pedal edema, no clubbing or cyanosis  Skin - no rashes, no suspicious skin lesions noted    Current Facility-Administered Medications   Medication Dose Route Frequency    eptifibatide (INTEGRILIN) 0.75 mg/mL infusion  1 mcg/kg/min IntraVENous Continuous    HYDROmorphone HCl PF (DILAUDID) injection 1.5 mg  1.5 mg IntraVENous Q4H PRN    [Held by provider] ticagrelor (BRILINTA) tablet 90 mg  90 mg Oral BID    LORazepam (ATIVAN) tablet 1 mg  1 mg Oral Q6H PRN    pantoprazole (PROTONIX) tablet 40 mg 40 mg Oral QAM AC    oxyCODONE (ROXICODONE) immediate release tablet 5 mg  5 mg Oral Q4H PRN    sodium chloride flush 0.9 % injection 5-40 mL  5-40 mL IntraVENous 2 times per day    sodium chloride flush 0.9 % injection 5-40 mL  5-40 mL IntraVENous PRN    0.9 % sodium chloride infusion   IntraVENous PRN    ondansetron (ZOFRAN-ODT) disintegrating tablet 4 mg  4 mg Oral Q8H PRN    Or    ondansetron (ZOFRAN) injection 4 mg  4 mg IntraVENous Q6H PRN    acetaminophen (TYLENOL) tablet 650 mg  650 mg Oral Q6H PRN    Or    acetaminophen (TYLENOL) suppository 650 mg  650 mg Rectal Q6H PRN    polyethylene glycol (GLYCOLAX) packet 17 g  17 g Oral Daily PRN    aspirin chewable tablet 81 mg  81 mg Oral Daily    atorvastatin (LIPITOR) tablet 40 mg  40 mg Oral Nightly    metoprolol tartrate (LOPRESSOR) tablet 25 mg  25 mg Oral BID    lisinopril (PRINIVIL;ZESTRIL) tablet 5 mg  5 mg Oral Daily    nitroGLYCERIN (NITROSTAT) SL tablet 0.4 mg  0.4 mg SubLINGual Q5 Min PRN    colchicine (COLCRYS) tablet 0.6 mg  0.6 mg Oral Daily       Data Review: data included in this note has been independently reviewed by the author     TELEMETRY:     Assessment/Plan:     Patient Active Problem List   Diagnosis    Primary hypertension    Chronic pancreatitis ([facility] [address]    Alcohol abuse    Attention deficit hyperactivity disorder (ADHD), combined type    Tobacco abuse [Z72.0 (ICD-10-CM)]    STEMI (ST elevation myocardial infarction) ([facility] [address]     PLAN    CAD  STEMI s/p LHC and PCI  Now 5 days post-intervention. On Integrilin, ASA, Lipitor     Chronic pancreatitis and pseudocyst  GI plans intervention probably on Wednesday, but maybe Tuesday according to patient. Held Herisau on 11/4 and started IV integrilin     HTN  Continue meds, 117/68        Arden Alvarez  I have personally seen and evaluated the patient and reviewed the students note and agree with the following assessment and plan and findings.  I was present for and observed the key components of this note. Any appropriate additions or editing of the information have been done by me.     Tomasz Clemens MD, [facility] [place]  Cardiology

## 2022-11-07 NOTE — PROGRESS NOTES
[facility] Progress Note         Admit Date:  11/2/2022    Today's Date:  11/7/2022    CC:  Pancreatitis    Subjective:     Patient still with some discomfort when drinking. Medications:   Current Facility-Administered Medications   Medication Dose Route Frequency    eptifibatide (INTEGRILIN) 0.75 mg/mL infusion  1 mcg/kg/min IntraVENous Continuous    HYDROmorphone HCl PF (DILAUDID) injection 1.5 mg  1.5 mg IntraVENous Q4H PRN    [Held by provider] ticagrelor (BRILINTA) tablet 90 mg  90 mg Oral BID    LORazepam (ATIVAN) tablet 1 mg  1 mg Oral Q6H PRN    pantoprazole (PROTONIX) tablet 40 mg  40 mg Oral QAM AC    oxyCODONE (ROXICODONE) immediate release tablet 5 mg  5 mg Oral Q4H PRN    sodium chloride flush 0.9 % injection 5-40 mL  5-40 mL IntraVENous 2 times per day    sodium chloride flush 0.9 % injection 5-40 mL  5-40 mL IntraVENous PRN    0.9 % sodium chloride infusion   IntraVENous PRN    ondansetron (ZOFRAN-ODT) disintegrating tablet 4 mg  4 mg Oral Q8H PRN    Or    ondansetron (ZOFRAN) injection 4 mg  4 mg IntraVENous Q6H PRN    acetaminophen (TYLENOL) tablet 650 mg  650 mg Oral Q6H PRN    Or    acetaminophen (TYLENOL) suppository 650 mg  650 mg Rectal Q6H PRN    polyethylene glycol (GLYCOLAX) packet 17 g  17 g Oral Daily PRN    aspirin chewable tablet 81 mg  81 mg Oral Daily    atorvastatin (LIPITOR) tablet 40 mg  40 mg Oral Nightly    metoprolol tartrate (LOPRESSOR) tablet 25 mg  25 mg Oral BID    lisinopril (PRINIVIL;ZESTRIL) tablet 5 mg  5 mg Oral Daily    nitroGLYCERIN (NITROSTAT) SL tablet 0.4 mg  0.4 mg SubLINGual Q5 Min PRN    colchicine (COLCRYS) tablet 0.6 mg  0.6 mg Oral Daily       Review of Systems:  ROS was obtained, with pertinent positives as listed above. No chest pain or SOB.     Diet:      Objective:   Vitals:  /71   Pulse 85   Temp 98.6 °F (37 °C) (Temporal)   Resp 20   Ht 5' 9\" (1.753 m)   Wt 180 lb (81.6 kg)   SpO2 100%   BMI 26.58 kg/m² Intake/Output:  No intake/output data recorded. 11/05 1901 - 11/07 0700  In: 1520 [P.O.:1520]  Out: -   Exam:  General appearance: alert, cooperative, no distress  Lungs: clear to auscultation bilaterally anteriorly  Heart: regular rate and rhythm  Abdomen: soft, non-tender. Bowel sounds normal. No masses, no organomegaly  Extremities: extremities normal, atraumatic, no cyanosis or edema  Neuro:  alert and oriented    Data Review (Labs):    Recent Labs     11/05/22  0659 11/06/22  1338   PLT  --  541*   *  --    K 4.6  --    CL 96*  --    CO2 23  --    BUN 14  --    AST 26  --    ALT 31  --        Assessment:     Principal Problem:    STEMI (ST elevation myocardial infarction) (HCC)  Active Problems:    Alcohol abuse    Primary hypertension    Chronic pancreatitis ([facility] [address]  Resolved Problems:    * No resolved hospital problems. *      Plan:     42yo male with ETOH pancreatitis complicated by large pseudocyst and recent STEMI. - Brilinta on hold.  On Integrilin gtt  - Plan for EUS with Axios stent on Wed with Dr Bandar Beal for drainage    Raven Conti MD

## 2022-11-07 NOTE — CARE COORDINATION
CM continues to follow for discharge planning and/or CM needs. Pt with scheduled procedure on Wednesday 11/9 and will remain in the hospital until procedure and afterwards for recovery. Discharge plan pending clinical progress. No CM needs noted or voiced at this time. Will continue to monitor and update as needed.

## 2022-11-08 ENCOUNTER — ANESTHESIA EVENT (OUTPATIENT)
Dept: ENDOSCOPY | Age: 37
DRG: 247 | End: 2022-11-08

## 2022-11-08 PROCEDURE — 2580000003 HC RX 258: Performed by: NURSE PRACTITIONER

## 2022-11-08 PROCEDURE — 99232 SBSQ HOSP IP/OBS MODERATE 35: CPT | Performed by: INTERNAL MEDICINE

## 2022-11-08 PROCEDURE — 6370000000 HC RX 637 (ALT 250 FOR IP): Performed by: NURSE PRACTITIONER

## 2022-11-08 PROCEDURE — 6360000002 HC RX W HCPCS: Performed by: INTERNAL MEDICINE

## 2022-11-08 PROCEDURE — 2140000001 HC CVICU INTERMEDIATE R&B

## 2022-11-08 PROCEDURE — 6370000000 HC RX 637 (ALT 250 FOR IP): Performed by: INTERNAL MEDICINE

## 2022-11-08 RX ADMIN — OXYCODONE 5 MG: 5 TABLET ORAL at 00:23

## 2022-11-08 RX ADMIN — OXYCODONE 5 MG: 5 TABLET ORAL at 08:46

## 2022-11-08 RX ADMIN — HYDROMORPHONE HYDROCHLORIDE 1.5 MG: 1 INJECTION, SOLUTION INTRAMUSCULAR; INTRAVENOUS; SUBCUTANEOUS at 10:24

## 2022-11-08 RX ADMIN — HYDROMORPHONE HYDROCHLORIDE 1.5 MG: 1 INJECTION, SOLUTION INTRAMUSCULAR; INTRAVENOUS; SUBCUTANEOUS at 02:24

## 2022-11-08 RX ADMIN — OXYCODONE 5 MG: 5 TABLET ORAL at 12:40

## 2022-11-08 RX ADMIN — METOPROLOL TARTRATE 25 MG: 25 TABLET, FILM COATED ORAL at 08:46

## 2022-11-08 RX ADMIN — HYDROMORPHONE HYDROCHLORIDE 1.5 MG: 1 INJECTION, SOLUTION INTRAMUSCULAR; INTRAVENOUS; SUBCUTANEOUS at 14:36

## 2022-11-08 RX ADMIN — COLCHICINE 0.6 MG: 0.6 TABLET, FILM COATED ORAL at 08:46

## 2022-11-08 RX ADMIN — HYDROMORPHONE HYDROCHLORIDE 1.5 MG: 1 INJECTION, SOLUTION INTRAMUSCULAR; INTRAVENOUS; SUBCUTANEOUS at 22:44

## 2022-11-08 RX ADMIN — PANTOPRAZOLE SODIUM 40 MG: 40 TABLET, DELAYED RELEASE ORAL at 06:16

## 2022-11-08 RX ADMIN — OXYCODONE 5 MG: 5 TABLET ORAL at 16:34

## 2022-11-08 RX ADMIN — POLYETHYLENE GLYCOL 3350 17 G: 17 POWDER, FOR SOLUTION ORAL at 08:46

## 2022-11-08 RX ADMIN — LISINOPRIL 5 MG: 5 TABLET ORAL at 08:46

## 2022-11-08 RX ADMIN — EPTIFIBATIDE 1 MCG/KG/MIN: 0.75 INJECTION INTRAVENOUS at 18:41

## 2022-11-08 RX ADMIN — OXYCODONE 5 MG: 5 TABLET ORAL at 04:20

## 2022-11-08 RX ADMIN — METOPROLOL TARTRATE 25 MG: 25 TABLET, FILM COATED ORAL at 20:50

## 2022-11-08 RX ADMIN — ATORVASTATIN CALCIUM 40 MG: 40 TABLET, FILM COATED ORAL at 20:50

## 2022-11-08 RX ADMIN — ASPIRIN 81 MG: 81 TABLET, CHEWABLE ORAL at 08:46

## 2022-11-08 RX ADMIN — SODIUM CHLORIDE, PRESERVATIVE FREE 10 ML: 5 INJECTION INTRAVENOUS at 20:50

## 2022-11-08 RX ADMIN — HYDROMORPHONE HYDROCHLORIDE 1.5 MG: 1 INJECTION, SOLUTION INTRAMUSCULAR; INTRAVENOUS; SUBCUTANEOUS at 18:43

## 2022-11-08 RX ADMIN — HYDROMORPHONE HYDROCHLORIDE 1.5 MG: 1 INJECTION, SOLUTION INTRAMUSCULAR; INTRAVENOUS; SUBCUTANEOUS at 06:17

## 2022-11-08 RX ADMIN — OXYCODONE 5 MG: 5 TABLET ORAL at 20:50

## 2022-11-08 RX ADMIN — LORAZEPAM 1 MG: 1 TABLET ORAL at 20:50

## 2022-11-08 RX ADMIN — SODIUM CHLORIDE, PRESERVATIVE FREE 10 ML: 5 INJECTION INTRAVENOUS at 12:42

## 2022-11-08 ASSESSMENT — PAIN DESCRIPTION - DESCRIPTORS
DESCRIPTORS: ACHING
DESCRIPTORS: ACHING;SORE
DESCRIPTORS: ACHING;SORE
DESCRIPTORS: SORE
DESCRIPTORS: ACHING
DESCRIPTORS: ACHING
DESCRIPTORS: SORE
DESCRIPTORS: SORE
DESCRIPTORS: ACHING
DESCRIPTORS: SORE

## 2022-11-08 ASSESSMENT — PAIN DESCRIPTION - PAIN TYPE
TYPE: ACUTE PAIN

## 2022-11-08 ASSESSMENT — PAIN SCALES - GENERAL
PAINLEVEL_OUTOF10: 7
PAINLEVEL_OUTOF10: 4
PAINLEVEL_OUTOF10: 4
PAINLEVEL_OUTOF10: 9
PAINLEVEL_OUTOF10: 8
PAINLEVEL_OUTOF10: 9
PAINLEVEL_OUTOF10: 7
PAINLEVEL_OUTOF10: 7
PAINLEVEL_OUTOF10: 9
PAINLEVEL_OUTOF10: 8
PAINLEVEL_OUTOF10: 4
PAINLEVEL_OUTOF10: 5
PAINLEVEL_OUTOF10: 7
PAINLEVEL_OUTOF10: 8
PAINLEVEL_OUTOF10: 4

## 2022-11-08 ASSESSMENT — PAIN DESCRIPTION - LOCATION
LOCATION: BACK
LOCATION: ABDOMEN
LOCATION: BACK;ABDOMEN
LOCATION: ABDOMEN

## 2022-11-08 ASSESSMENT — PAIN DESCRIPTION - FREQUENCY
FREQUENCY: CONTINUOUS

## 2022-11-08 ASSESSMENT — PAIN DESCRIPTION - ONSET
ONSET: ON-GOING

## 2022-11-08 ASSESSMENT — PAIN - FUNCTIONAL ASSESSMENT
PAIN_FUNCTIONAL_ASSESSMENT: ACTIVITIES ARE NOT PREVENTED

## 2022-11-08 ASSESSMENT — PAIN DESCRIPTION - ORIENTATION
ORIENTATION: LEFT
ORIENTATION: MID
ORIENTATION: MID
ORIENTATION: LEFT
ORIENTATION: LEFT
ORIENTATION: LEFT;MID
ORIENTATION: LEFT
ORIENTATION: LEFT;MID

## 2022-11-08 NOTE — PROGRESS NOTES
am  11/8/2022 6:58 AM    Admit Date: 11/2/2022    Admit Diagnosis: STEMI (ST elevation myocardial infarction) ([facility] [address] [I21.3]  ST elevation myocardial infarction (STEMI), unspecified artery ([facility] [address] [I21.3]      Subjective:    Patient : Awake and out of bed sitting in chair when seen this morning. Abd pain is mostly controlled with current treatment. No n/v, chest pain, SOB currently. Objective:    /78   Pulse 72   Temp 97.8 °F (36.6 °C) (Axillary)   Resp 14   Ht 5' 9\" (1.753 m)   Wt 169 lb 6.4 oz (76.8 kg)   SpO2 97%   BMI 25.02 kg/m²     ROS:  General ROS: negative for - chills  Hematological and Lymphatic ROS: negative for - blood clots or jaundice  Respiratory ROS: no cough, shortness of breath, or wheezing  Cardiovascular ROS: no chest pain or dyspnea on exertion  Gastrointestinal ROS: intermittent abdominal pain, no change in bowel habits, or black or bloody stools  Neurological ROS: no TIA or stroke symptoms    Physical Exam:      Physical Examination: General appearance - Appearance: alert, well appearing, and in no distress.    Neck/lymph - supple, no significant adenopathy  Chest/CV - clear to auscultation, no wheezes, rales or rhonchi, symmetric air entry  Heart - normal rate, regular rhythm, normal S1, S2, no murmurs, rubs, clicks or gallops  Abdomen/GI - soft, nontender, nondistended, no masses or organomegaly   Musculoskeletal - no joint tenderness, deformity or swelling  Extremities - peripheral pulses normal, no pedal edema, no clubbing or cyanosis  Skin - no rashes, no suspicious skin lesions noted    Current Facility-Administered Medications   Medication Dose Route Frequency    eptifibatide (INTEGRILIN) 0.75 mg/mL infusion  1 mcg/kg/min IntraVENous Continuous    HYDROmorphone HCl PF (DILAUDID) injection 1.5 mg  1.5 mg IntraVENous Q4H PRN    [Held by provider] ticagrelor (BRILINTA) tablet 90 mg  90 mg Oral BID    LORazepam (ATIVAN) tablet 1 mg  1 mg Oral Q6H PRN    pantoprazole (PROTONIX) tablet 40 mg  40 mg Oral QAM AC    oxyCODONE (ROXICODONE) immediate release tablet 5 mg  5 mg Oral Q4H PRN    sodium chloride flush 0.9 % injection 5-40 mL  5-40 mL IntraVENous 2 times per day    sodium chloride flush 0.9 % injection 5-40 mL  5-40 mL IntraVENous PRN    0.9 % sodium chloride infusion   IntraVENous PRN    ondansetron (ZOFRAN-ODT) disintegrating tablet 4 mg  4 mg Oral Q8H PRN    Or    ondansetron (ZOFRAN) injection 4 mg  4 mg IntraVENous Q6H PRN    acetaminophen (TYLENOL) tablet 650 mg  650 mg Oral Q6H PRN    Or    acetaminophen (TYLENOL) suppository 650 mg  650 mg Rectal Q6H PRN    polyethylene glycol (GLYCOLAX) packet 17 g  17 g Oral Daily PRN    aspirin chewable tablet 81 mg  81 mg Oral Daily    atorvastatin (LIPITOR) tablet 40 mg  40 mg Oral Nightly    metoprolol tartrate (LOPRESSOR) tablet 25 mg  25 mg Oral BID    lisinopril (PRINIVIL;ZESTRIL) tablet 5 mg  5 mg Oral Daily    nitroGLYCERIN (NITROSTAT) SL tablet 0.4 mg  0.4 mg SubLINGual Q5 Min PRN    colchicine (COLCRYS) tablet 0.6 mg  0.6 mg Oral Daily       Data Review: data included in this note has been independently reviewed by the author     TELEMETRY:     Assessment/Plan:     Patient Active Problem List   Diagnosis    Primary hypertension    Chronic pancreatitis ([facility] [address]    Alcohol abuse    Attention deficit hyperactivity disorder (ADHD), combined type    Tobacco abuse [Z72.0 (ICD-10-CM)]    STEMI (ST elevation myocardial infarction) ([facility] [address]     PLAN    CAD  STEMI s/p LHC and PCI  Now 6 days post-intervention. On Integrilin, ASA, Lipitor     Chronic pancreatitis and pseudocyst  GI plans intervention 11/9  Held Brilinta on 11/4 and started IV integrilin     HTN  Continue meds, 117/78        Elenora Mary  I have personally seen and evaluated the patient and reviewed the students note and agree with the following assessment and plan and findings. I was present for and observed the key components of this note.   Any appropriate additions or editing of the information have been done by me.     Fariba Ridley MD, [facility] [place]  Cardiology

## 2022-11-08 NOTE — PROGRESS NOTES
[facility] Progress Note         Admit Date:  11/2/2022    Today's Date:  11/8/2022    CC:  Pancreatitis    Subjective:     Patient still with some discomfort when drinking. Reports BBM qod    Medications:   Current Facility-Administered Medications   Medication Dose Route Frequency    eptifibatide (INTEGRILIN) 0.75 mg/mL infusion  1 mcg/kg/min IntraVENous Continuous    HYDROmorphone HCl PF (DILAUDID) injection 1.5 mg  1.5 mg IntraVENous Q4H PRN    [Held by provider] ticagrelor (BRILINTA) tablet 90 mg  90 mg Oral BID    LORazepam (ATIVAN) tablet 1 mg  1 mg Oral Q6H PRN    pantoprazole (PROTONIX) tablet 40 mg  40 mg Oral QAM AC    oxyCODONE (ROXICODONE) immediate release tablet 5 mg  5 mg Oral Q4H PRN    sodium chloride flush 0.9 % injection 5-40 mL  5-40 mL IntraVENous 2 times per day    sodium chloride flush 0.9 % injection 5-40 mL  5-40 mL IntraVENous PRN    0.9 % sodium chloride infusion   IntraVENous PRN    ondansetron (ZOFRAN-ODT) disintegrating tablet 4 mg  4 mg Oral Q8H PRN    Or    ondansetron (ZOFRAN) injection 4 mg  4 mg IntraVENous Q6H PRN    acetaminophen (TYLENOL) tablet 650 mg  650 mg Oral Q6H PRN    Or    acetaminophen (TYLENOL) suppository 650 mg  650 mg Rectal Q6H PRN    polyethylene glycol (GLYCOLAX) packet 17 g  17 g Oral Daily PRN    aspirin chewable tablet 81 mg  81 mg Oral Daily    atorvastatin (LIPITOR) tablet 40 mg  40 mg Oral Nightly    metoprolol tartrate (LOPRESSOR) tablet 25 mg  25 mg Oral BID    lisinopril (PRINIVIL;ZESTRIL) tablet 5 mg  5 mg Oral Daily    nitroGLYCERIN (NITROSTAT) SL tablet 0.4 mg  0.4 mg SubLINGual Q5 Min PRN    colchicine (COLCRYS) tablet 0.6 mg  0.6 mg Oral Daily       Review of Systems:  ROS was obtained, with pertinent positives as listed above. No chest pain or SOB.     Diet:  Soft    Objective:   Vitals:  /72   Pulse 77   Temp 98.3 °F (36.8 °C) (Oral)   Resp 18   Ht 5' 9\" (1.753 m)   Wt 169 lb 6.4 oz (76.8 kg)   SpO2 97%   BMI

## 2022-11-08 NOTE — PROGRESS NOTES
Cardiac Rehab: Spoke with patient regarding referral to cardiac rehab. Patient meets admission criteria based on STEMI with PCI (10/2/22). Written information about Cardiac Rehab given and reviewed with patient. Discussed lifestyle modifications to promote cardiac wellness. Patient indicated that he may want to participate in the cardiac rehab program if he is approved for financial assistance which he states he has applied. Pt given contact information to the [facility] and he was encouraged to tour our program when in for his follow up appt. His Cardiologist is Dr. Marco Lacy.       Thank you,  MANNY MujicaN, RN  Cardiopulmonary Rehabilitation Nurse Liaison  [facility]

## 2022-11-08 NOTE — PROGRESS NOTES
Pt. Will decide to turn off pump when beeping or disconnect himself from IV line to shower. Pt can take 4-5 showers a day for 20-30 minutes at time off his Integrilin. Pt states that hot showers help relieve his pain. Pt has been informed that this medication is important to prevent his stent from clotting.

## 2022-11-08 NOTE — PROGRESS NOTES
Spiritual Care Visit. Initial visit. Patient was visited by [facility]. Entered by Rosalinda King, Staff Chaplain. Tijerina, Clemencia

## 2022-11-09 ENCOUNTER — ANESTHESIA (OUTPATIENT)
Dept: ENDOSCOPY | Age: 37
DRG: 247 | End: 2022-11-09

## 2022-11-09 ENCOUNTER — APPOINTMENT (OUTPATIENT)
Dept: GENERAL RADIOLOGY | Age: 37
DRG: 247 | End: 2022-11-09

## 2022-11-09 PROCEDURE — 6360000002 HC RX W HCPCS: Performed by: INTERNAL MEDICINE

## 2022-11-09 PROCEDURE — 6370000000 HC RX 637 (ALT 250 FOR IP): Performed by: INTERNAL MEDICINE

## 2022-11-09 PROCEDURE — 3700000000 HC ANESTHESIA ATTENDED CARE: Performed by: INTERNAL MEDICINE

## 2022-11-09 PROCEDURE — 3609018500 HC EGD US SCOPE W/ADJACENT STRUCTURES: Performed by: INTERNAL MEDICINE

## 2022-11-09 PROCEDURE — 6370000000 HC RX 637 (ALT 250 FOR IP): Performed by: NURSE PRACTITIONER

## 2022-11-09 PROCEDURE — 3700000001 HC ADD 15 MINUTES (ANESTHESIA): Performed by: INTERNAL MEDICINE

## 2022-11-09 PROCEDURE — 2709999900 HC NON-CHARGEABLE SUPPLY: Performed by: INTERNAL MEDICINE

## 2022-11-09 PROCEDURE — 99232 SBSQ HOSP IP/OBS MODERATE 35: CPT | Performed by: INTERNAL MEDICINE

## 2022-11-09 PROCEDURE — 6360000002 HC RX W HCPCS: Performed by: NURSE ANESTHETIST, CERTIFIED REGISTERED

## 2022-11-09 PROCEDURE — 0DJ08ZZ INSPECTION OF UPPER INTESTINAL TRACT, VIA NATURAL OR ARTIFICIAL OPENING ENDOSCOPIC: ICD-10-PCS | Performed by: INTERNAL MEDICINE

## 2022-11-09 PROCEDURE — 0F9G40Z DRAINAGE OF PANCREAS WITH DRAINAGE DEVICE, PERCUTANEOUS ENDOSCOPIC APPROACH: ICD-10-PCS | Performed by: INTERNAL MEDICINE

## 2022-11-09 PROCEDURE — C1874 STENT, COATED/COV W/DEL SYS: HCPCS | Performed by: INTERNAL MEDICINE

## 2022-11-09 PROCEDURE — 2500000003 HC RX 250 WO HCPCS: Performed by: NURSE ANESTHETIST, CERTIFIED REGISTERED

## 2022-11-09 PROCEDURE — 2140000001 HC CVICU INTERMEDIATE R&B

## 2022-11-09 PROCEDURE — 2580000003 HC RX 258: Performed by: NURSE ANESTHETIST, CERTIFIED REGISTERED

## 2022-11-09 PROCEDURE — 7100000000 HC PACU RECOVERY - FIRST 15 MIN: Performed by: INTERNAL MEDICINE

## 2022-11-09 PROCEDURE — 6360000002 HC RX W HCPCS: Performed by: NURSE PRACTITIONER

## 2022-11-09 PROCEDURE — 2580000003 HC RX 258: Performed by: NURSE PRACTITIONER

## 2022-11-09 PROCEDURE — 6360000002 HC RX W HCPCS: Performed by: ANESTHESIOLOGY

## 2022-11-09 PROCEDURE — 2580000003 HC RX 258: Performed by: INTERNAL MEDICINE

## 2022-11-09 PROCEDURE — 7100000001 HC PACU RECOVERY - ADDTL 15 MIN: Performed by: INTERNAL MEDICINE

## 2022-11-09 DEVICE — STENT AND ELECTROCAUTERY - ENHANCED DELIVERY SYSTEM
Type: IMPLANTABLE DEVICE | Status: FUNCTIONAL
Brand: AXIOS™

## 2022-11-09 RX ORDER — LIDOCAINE HYDROCHLORIDE 10 MG/ML
1 INJECTION, SOLUTION INFILTRATION; PERINEURAL ONCE
Status: DISCONTINUED | OUTPATIENT
Start: 2022-11-09 | End: 2022-11-09 | Stop reason: HOSPADM

## 2022-11-09 RX ORDER — PROCHLORPERAZINE EDISYLATE 5 MG/ML
5 INJECTION INTRAMUSCULAR; INTRAVENOUS
Status: DISCONTINUED | OUTPATIENT
Start: 2022-11-09 | End: 2022-11-09 | Stop reason: HOSPADM

## 2022-11-09 RX ORDER — HYDROMORPHONE HYDROCHLORIDE 1 MG/ML
1 INJECTION, SOLUTION INTRAMUSCULAR; INTRAVENOUS; SUBCUTANEOUS ONCE
Status: COMPLETED | OUTPATIENT
Start: 2022-11-09 | End: 2022-11-09

## 2022-11-09 RX ORDER — PROPOFOL 10 MG/ML
INJECTION, EMULSION INTRAVENOUS PRN
Status: DISCONTINUED | OUTPATIENT
Start: 2022-11-09 | End: 2022-11-09 | Stop reason: SDUPTHER

## 2022-11-09 RX ORDER — OXYCODONE HYDROCHLORIDE 5 MG/1
5 TABLET ORAL
Status: DISCONTINUED | OUTPATIENT
Start: 2022-11-09 | End: 2022-11-09 | Stop reason: HOSPADM

## 2022-11-09 RX ORDER — HYDROMORPHONE HYDROCHLORIDE 2 MG/ML
0.5 INJECTION, SOLUTION INTRAMUSCULAR; INTRAVENOUS; SUBCUTANEOUS EVERY 5 MIN PRN
Status: DISCONTINUED | OUTPATIENT
Start: 2022-11-09 | End: 2022-11-09 | Stop reason: HOSPADM

## 2022-11-09 RX ORDER — LIDOCAINE HYDROCHLORIDE 20 MG/ML
INJECTION, SOLUTION EPIDURAL; INFILTRATION; INTRACAUDAL; PERINEURAL PRN
Status: DISCONTINUED | OUTPATIENT
Start: 2022-11-09 | End: 2022-11-09 | Stop reason: SDUPTHER

## 2022-11-09 RX ORDER — DIPHENHYDRAMINE HYDROCHLORIDE 50 MG/ML
12.5 INJECTION INTRAMUSCULAR; INTRAVENOUS
Status: DISCONTINUED | OUTPATIENT
Start: 2022-11-09 | End: 2022-11-09 | Stop reason: HOSPADM

## 2022-11-09 RX ORDER — ONDANSETRON 2 MG/ML
INJECTION INTRAMUSCULAR; INTRAVENOUS PRN
Status: DISCONTINUED | OUTPATIENT
Start: 2022-11-09 | End: 2022-11-09 | Stop reason: SDUPTHER

## 2022-11-09 RX ORDER — SODIUM CHLORIDE, SODIUM LACTATE, POTASSIUM CHLORIDE, CALCIUM CHLORIDE 600; 310; 30; 20 MG/100ML; MG/100ML; MG/100ML; MG/100ML
INJECTION, SOLUTION INTRAVENOUS CONTINUOUS
Status: DISCONTINUED | OUTPATIENT
Start: 2022-11-09 | End: 2022-11-09

## 2022-11-09 RX ORDER — DEXAMETHASONE SODIUM PHOSPHATE 10 MG/ML
INJECTION INTRAMUSCULAR; INTRAVENOUS PRN
Status: DISCONTINUED | OUTPATIENT
Start: 2022-11-09 | End: 2022-11-09 | Stop reason: SDUPTHER

## 2022-11-09 RX ORDER — ROCURONIUM BROMIDE 10 MG/ML
INJECTION, SOLUTION INTRAVENOUS PRN
Status: DISCONTINUED | OUTPATIENT
Start: 2022-11-09 | End: 2022-11-09 | Stop reason: SDUPTHER

## 2022-11-09 RX ADMIN — SODIUM CHLORIDE, POTASSIUM CHLORIDE, SODIUM LACTATE AND CALCIUM CHLORIDE: 600; 310; 30; 20 INJECTION, SOLUTION INTRAVENOUS at 13:02

## 2022-11-09 RX ADMIN — ONDANSETRON 4 MG: 2 INJECTION INTRAMUSCULAR; INTRAVENOUS at 02:48

## 2022-11-09 RX ADMIN — OXYCODONE 5 MG: 5 TABLET ORAL at 00:44

## 2022-11-09 RX ADMIN — LISINOPRIL 5 MG: 5 TABLET ORAL at 08:59

## 2022-11-09 RX ADMIN — METOPROLOL TARTRATE 25 MG: 25 TABLET, FILM COATED ORAL at 20:20

## 2022-11-09 RX ADMIN — ATORVASTATIN CALCIUM 40 MG: 40 TABLET, FILM COATED ORAL at 20:20

## 2022-11-09 RX ADMIN — HYDROMORPHONE HYDROCHLORIDE 1.5 MG: 1 INJECTION, SOLUTION INTRAMUSCULAR; INTRAVENOUS; SUBCUTANEOUS at 02:42

## 2022-11-09 RX ADMIN — SUGAMMADEX 200 MG: 100 INJECTION, SOLUTION INTRAVENOUS at 15:05

## 2022-11-09 RX ADMIN — HYDROMORPHONE HYDROCHLORIDE 1.5 MG: 1 INJECTION, SOLUTION INTRAMUSCULAR; INTRAVENOUS; SUBCUTANEOUS at 18:24

## 2022-11-09 RX ADMIN — PHENYLEPHRINE HYDROCHLORIDE 150 MCG: 10 INJECTION INTRAVENOUS at 15:00

## 2022-11-09 RX ADMIN — OXYCODONE 5 MG: 5 TABLET ORAL at 08:59

## 2022-11-09 RX ADMIN — FENTANYL CITRATE 100 MCG: 50 INJECTION INTRAMUSCULAR; INTRAVENOUS at 14:47

## 2022-11-09 RX ADMIN — ROCURONIUM BROMIDE 50 MG: 50 INJECTION, SOLUTION INTRAVENOUS at 14:48

## 2022-11-09 RX ADMIN — PANTOPRAZOLE SODIUM 40 MG: 40 TABLET, DELAYED RELEASE ORAL at 06:32

## 2022-11-09 RX ADMIN — LIDOCAINE HYDROCHLORIDE 100 MG: 20 INJECTION, SOLUTION EPIDURAL; INFILTRATION; INTRACAUDAL; PERINEURAL at 14:47

## 2022-11-09 RX ADMIN — OXYCODONE 5 MG: 5 TABLET ORAL at 20:20

## 2022-11-09 RX ADMIN — LORAZEPAM 1 MG: 1 TABLET ORAL at 20:20

## 2022-11-09 RX ADMIN — HYDROMORPHONE HYDROCHLORIDE 1 MG: 1 INJECTION, SOLUTION INTRAMUSCULAR; INTRAVENOUS; SUBCUTANEOUS at 14:02

## 2022-11-09 RX ADMIN — DEXAMETHASONE SODIUM PHOSPHATE 10 MG: 10 INJECTION INTRAMUSCULAR; INTRAVENOUS at 14:59

## 2022-11-09 RX ADMIN — PHENYLEPHRINE HYDROCHLORIDE 150 MCG: 10 INJECTION INTRAVENOUS at 14:57

## 2022-11-09 RX ADMIN — ASPIRIN 81 MG: 81 TABLET, CHEWABLE ORAL at 08:59

## 2022-11-09 RX ADMIN — HYDROMORPHONE HYDROCHLORIDE 1.5 MG: 1 INJECTION, SOLUTION INTRAMUSCULAR; INTRAVENOUS; SUBCUTANEOUS at 06:32

## 2022-11-09 RX ADMIN — OXYCODONE 5 MG: 5 TABLET ORAL at 04:46

## 2022-11-09 RX ADMIN — HYDROMORPHONE HYDROCHLORIDE 1.5 MG: 1 INJECTION, SOLUTION INTRAMUSCULAR; INTRAVENOUS; SUBCUTANEOUS at 22:18

## 2022-11-09 RX ADMIN — PHENYLEPHRINE HYDROCHLORIDE 150 MCG: 10 INJECTION INTRAVENOUS at 14:53

## 2022-11-09 RX ADMIN — COLCHICINE 0.6 MG: 0.6 TABLET, FILM COATED ORAL at 09:00

## 2022-11-09 RX ADMIN — TICAGRELOR 90 MG: 90 TABLET ORAL at 18:31

## 2022-11-09 RX ADMIN — METOPROLOL TARTRATE 25 MG: 25 TABLET, FILM COATED ORAL at 09:00

## 2022-11-09 RX ADMIN — HYDROMORPHONE HYDROCHLORIDE 1.5 MG: 1 INJECTION, SOLUTION INTRAMUSCULAR; INTRAVENOUS; SUBCUTANEOUS at 10:31

## 2022-11-09 RX ADMIN — SODIUM CHLORIDE, PRESERVATIVE FREE 10 ML: 5 INJECTION INTRAVENOUS at 20:20

## 2022-11-09 RX ADMIN — PROPOFOL 270 MG: 10 INJECTION, EMULSION INTRAVENOUS at 14:47

## 2022-11-09 RX ADMIN — ONDANSETRON 4 MG: 2 INJECTION INTRAMUSCULAR; INTRAVENOUS at 14:59

## 2022-11-09 ASSESSMENT — PAIN DESCRIPTION - DESCRIPTORS
DESCRIPTORS: SHARP
DESCRIPTORS: SHOOTING
DESCRIPTORS: ACHING
DESCRIPTORS: SHOOTING
DESCRIPTORS: SHARP
DESCRIPTORS: SHARP
DESCRIPTORS: ACHING
DESCRIPTORS: ACHING
DESCRIPTORS: SHARP
DESCRIPTORS: ACHING
DESCRIPTORS: ACHING

## 2022-11-09 ASSESSMENT — PAIN SCALES - GENERAL
PAINLEVEL_OUTOF10: 7
PAINLEVEL_OUTOF10: 8
PAINLEVEL_OUTOF10: 2
PAINLEVEL_OUTOF10: 2
PAINLEVEL_OUTOF10: 3
PAINLEVEL_OUTOF10: 7
PAINLEVEL_OUTOF10: 3
PAINLEVEL_OUTOF10: 7
PAINLEVEL_OUTOF10: 6
PAINLEVEL_OUTOF10: 8
PAINLEVEL_OUTOF10: 4
PAINLEVEL_OUTOF10: 4
PAINLEVEL_OUTOF10: 3
PAINLEVEL_OUTOF10: 4
PAINLEVEL_OUTOF10: 3

## 2022-11-09 ASSESSMENT — PAIN DESCRIPTION - ONSET
ONSET: ON-GOING

## 2022-11-09 ASSESSMENT — PAIN DESCRIPTION - PAIN TYPE
TYPE: ACUTE PAIN

## 2022-11-09 ASSESSMENT — PAIN DESCRIPTION - LOCATION
LOCATION: ABDOMEN;BACK
LOCATION: ABDOMEN
LOCATION: ABDOMEN
LOCATION: ABDOMEN;BACK
LOCATION: ABDOMEN
LOCATION: ABDOMEN
LOCATION: ABDOMEN;BACK
LOCATION: ABDOMEN

## 2022-11-09 ASSESSMENT — PAIN DESCRIPTION - ORIENTATION
ORIENTATION: LEFT

## 2022-11-09 ASSESSMENT — PAIN DESCRIPTION - FREQUENCY
FREQUENCY: CONTINUOUS

## 2022-11-09 ASSESSMENT — PAIN - FUNCTIONAL ASSESSMENT
PAIN_FUNCTIONAL_ASSESSMENT: ACTIVITIES ARE NOT PREVENTED
PAIN_FUNCTIONAL_ASSESSMENT: NONE - DENIES PAIN
PAIN_FUNCTIONAL_ASSESSMENT: ACTIVITIES ARE NOT PREVENTED
PAIN_FUNCTIONAL_ASSESSMENT: 0-10
PAIN_FUNCTIONAL_ASSESSMENT: ACTIVITIES ARE NOT PREVENTED
PAIN_FUNCTIONAL_ASSESSMENT: WONG-BAKER FACES

## 2022-11-09 ASSESSMENT — LIFESTYLE VARIABLES: SMOKING_STATUS: 1

## 2022-11-09 NOTE — OP NOTE
Procedure:  Endoscopic ultrasound with Doppler, Cyst gastrostomy    Date of Procedure:  11/9/2022    Patient:  Vanessa Florentino     [birth year]    Indication:  Pancreatic pseudocyst, cyst gastrostomy    Sedation:  General anesthesia     Pre-Procedure Physical Exam:    Mental status:  alert and oriented  Airway:  normal oropharyngeal airway and neck mobility  CV:  regular rate and rhythm  Respiratory:  clear to auscultation    Procedure:  A History and Physical has been performed, and patient medication allergies have been reviewed. Risks of perforation, hemorrhage, adverse drug reaction, and aspiration were discussed. Informed consent was obtained for the procedure, including sedation. Patient was intubated and remained on mechanical ventilation throughout the procedure for airway protection. Patient was placed in left lateral position. The heart rate, oxygen saturations, blood pressure, and response to care were monitored throughout the procedure. The linear echoendoscope was passed through the mouth and advanced under direct vision to the distal duodenum. As the scope was slowly withdrawn, detailed endoscopic images were obtained from the surrounding organs. Cyst gastrostomy was performed. The patient tolerated the procedure well. Findings:     ENDOSCOPIC FINDINGS:  Limited views of the mucosa with the echoendoscope reveals extrinsic compression of the gastric fundus but no other gross abnormalities of the stomach. PANCREAS:  The pancreas is well-visualized from head to tail. Patient has chronic pancreatitis. A cystic collection measuring at least 12 cm in maximal diameter with mature walls is seen adjacent to the fundus of the pancreas. No internal debris is seen. The 15 mm AXIOS stent catheter was advanced into the cyst cavity with 100 W cutting current. The internal and external flanges were successfully deployed. Stent position was confirmed endoscopically and endosonographically.  A large amount of cyst fluid flowed freely into the stomach. Specimen:  No    Estimated Blood Loss:  3 ml    Implant:   AXIOS stent            Impression:     Pancreatic pseudocyst. Cyst gastrostomy was performed as outlined above. Plan:  1. Resume diet as tolerated. 2. Repeat EUS/EGD and possible stent retrieval in 3 weeks.     Signed:  Dorota Reyez MD  11/9/2022  3:11 PM

## 2022-11-09 NOTE — H&P
History and Physical for Endoscopic Ultrasound             Date: 11/9/2022     History of Present Illness:  Patient presents to undergo endoscopic ultrasound.     Past Medical History:   Diagnosis Date    Abdominal pain 10/25/2022    Abnormal CT of the abdomen 91/97/7707    Acute alcoholic pancreatitis 50/03/9115    Acute kidney injury (nontraumatic) ([facility] 10/31/2022    pt denies    Acute on chronic pancreatitis ([facility] 07/30/2022    Alcohol use disorder, severe, dependence ([facility] 08/21/2019    Carpal tunnel syndrome, left     Cigarette smoker     GERD (gastroesophageal reflux disease)     controlled with med    History of pancreatitis     x 4 or 5 times-- last time admitted to hospital 10/10/26/22 x 7 days    Hypertension     controlled with med     Past Surgical History:   Procedure Laterality Date    CARDIAC PROCEDURE N/A 11/2/2022    LEFT HEART CATH / CORONARY ANGIOGRAPHY performed by Christopher Wright MD at [address] [place] [address] CATH LAB    CARDIAC PROCEDURE N/A 11/2/2022    Percutaneous coronary intervention performed by Christopher Wright MD at [address] [place] [address] CATH LAB    CHOLECYSTECTOMY  2012    ORTHOPEDIC SURGERY Left     wrist surg--nerve surg    ORTHOPEDIC SURGERY Left     foot surg as child      Family History   Problem Relation Age of Onset    No Known Problems Mother     Heart Attack Father      Social History     Tobacco Use    Smoking status: Every Day     Packs/day: 1.00     Years: 20.00     Pack years: 20.00     Types: Cigarettes    Smokeless tobacco: Never   Substance Use Topics    Alcohol use: Yes     Comment: none since 10/4/22-- had drank 3 glasses of wine daily        No Known Allergies  Current Facility-Administered Medications   Medication Dose Route Frequency    eptifibatide (INTEGRILIN) 0.75 mg/mL infusion  1 mcg/kg/min IntraVENous Continuous    HYDROmorphone HCl PF (DILAUDID) injection 1.5 mg  1.5 mg IntraVENous Q4H PRN    [Held by provider] ticagrelor (BRILINTA) tablet 90 mg  90 mg Oral BID    LORazepam (ATIVAN) tablet 1 mg  1 mg Oral Q6H PRN    pantoprazole (PROTONIX) tablet 40 mg  40 mg Oral QAM AC    oxyCODONE (ROXICODONE) immediate release tablet 5 mg  5 mg Oral Q4H PRN    sodium chloride flush 0.9 % injection 5-40 mL  5-40 mL IntraVENous 2 times per day    sodium chloride flush 0.9 % injection 5-40 mL  5-40 mL IntraVENous PRN    0.9 % sodium chloride infusion   IntraVENous PRN    ondansetron (ZOFRAN-ODT) disintegrating tablet 4 mg  4 mg Oral Q8H PRN    Or    ondansetron (ZOFRAN) injection 4 mg  4 mg IntraVENous Q6H PRN    acetaminophen (TYLENOL) tablet 650 mg  650 mg Oral Q6H PRN    Or    acetaminophen (TYLENOL) suppository 650 mg  650 mg Rectal Q6H PRN    polyethylene glycol (GLYCOLAX) packet 17 g  17 g Oral Daily PRN    aspirin chewable tablet 81 mg  81 mg Oral Daily    atorvastatin (LIPITOR) tablet 40 mg  40 mg Oral Nightly    metoprolol tartrate (LOPRESSOR) tablet 25 mg  25 mg Oral BID    lisinopril (PRINIVIL;ZESTRIL) tablet 5 mg  5 mg Oral Daily    nitroGLYCERIN (NITROSTAT) SL tablet 0.4 mg  0.4 mg SubLINGual Q5 Min PRN    colchicine (COLCRYS) tablet 0.6 mg  0.6 mg Oral Daily        Review of Systems:  A detailed 10 organ review of systems is obtained with pertinent positives as listed in the History of Present Illness. All others are negative. Objective:     Physical Exam:  /72   Pulse 77   Temp 98.9 °F (37.2 °C) (Temporal)   Resp 16   Ht 5' 9\" (1.753 m)   Wt 169 lb (76.7 kg)   SpO2 100%   BMI 24.96 kg/m²    General:  Alert and oriented. Heart: Regular rate and rhythm  Lungs:  Clear to auscultation bilaterally  Abdomen: Soft, nontender, nondistended    Impression/Plan:     Proceed with EUS as planned. I have discussed with the patient the technique, benefits, alternatives, and risks of the procedure, including medication reaction, immediate or delayed bleeding, or perforation of the gastrointestinal tract.     Signed By: Sourav Schmid MD     November 9, 2022

## 2022-11-09 NOTE — PERIOP NOTE
TRANSFER - OUT REPORT:    Verbal report given to ELI lindsey on Ivy Redding  being transferred to [phone] for routine progression of patient care       Report consisted of patients Situation, Background, Assessment and   Recommendations(SBAR). Information from the following report(s) Nurse Handoff Report and Surgery Report was reviewed with the receiving nurse. Lines:   Peripheral IV 11/08/22 Right Forearm (Active)   Site Assessment Clean, dry & intact 11/09/22 1100   Line Status Infusing;Flushed 11/09/22 1100   Line Care Connections checked and tightened 11/09/22 1100   Phlebitis Assessment No symptoms 11/09/22 1100   Infiltration Assessment 0 11/09/22 1100   Alcohol Cap Used Yes 11/09/22 1100   Dressing Status Clean, dry & intact 11/09/22 1100   Dressing Type Transparent 11/09/22 1100        Opportunity for questions and clarification was provided. Patient transported with:   Monitor  O2 @ 2 liters  Registered Nurse    VTE prophylaxis orders have been written for Ivy Redding. Patient and family given floor number and nurses name. Family updated re: pt status after security code verified.

## 2022-11-09 NOTE — PLAN OF CARE
Problem: Safety - Adult  Goal: Free from fall injury  11/8/2022 2311 by Angela Chang RN  Outcome: Progressing  11/8/2022 1913 by Shayna He RN  Outcome: Progressing  Flowsheets (Taken 11/8/2022 1909)  Free From Fall Injury: Instruct family/caregiver on patient safety     Problem: Pain  Goal: Verbalizes/displays adequate comfort level or baseline comfort level  11/8/2022 2311 by Angela Chang RN  Outcome: Progressing  11/8/2022 1913 by Shayna He RN  Outcome: Progressing

## 2022-11-09 NOTE — ANESTHESIA PRE PROCEDURE
Department of Anesthesiology  Preprocedure Note       Name:  Kenna Gilman   Age:  44 y.o.  :  1985                                          MRN:  [id number]         Date:  2022      Surgeon: Alexandru Allen):  Mehul Alex MD    Procedure: Procedure(s):  EGD/ENDOSCOPIC ULTRASOUND/AXIOS STENT ROOM 2226 **Rep will be present    Medications prior to admission:   Prior to Admission medications    Medication Sig Start Date End Date Taking? Authorizing Provider   omeprazole (PRILOSEC) 20 MG delayed release capsule Take 20 mg by mouth daily    Historical Provider, MD   naloxone (NARCAN) 0.4 MG/ML injection Infuse 1 mL intravenously as needed (shortness of breath) 10/31/22   Yvette Cardona MD   amLODIPine (NORVASC) 10 MG tablet Take 1 tablet by mouth daily 10/11/22   Vanessa Gonzalez MD   thiamine 100 MG tablet Take 1 tablet by mouth in the morning.  8/3/22 8/2/22  Vicenta Pal MD       Current medications:    Current Facility-Administered Medications   Medication Dose Route Frequency Provider Last Rate Last Admin    eptifibatide (INTEGRILIN) 0.75 mg/mL infusion  1 mcg/kg/min IntraVENous Continuous Brianna Snyder MD 6.5 mL/hr at 22 1841 1 mcg/kg/min at 22 1841    HYDROmorphone HCl PF (DILAUDID) injection 1.5 mg  1.5 mg IntraVENous Q4H PRN Iman Rodríguez MD   1.5 mg at 22 8001    [Held by provider] ticagrelor (BRILINTA) tablet 90 mg  90 mg Oral BID Angelita Lane MD   90 mg at 22    LORazepam (ATIVAN) tablet 1 mg  1 mg Oral Q6H PRN Angelita Lane MD   1 mg at 22    pantoprazole (PROTONIX) tablet 40 mg  40 mg Oral QAM AC Mindy Favre, APRN - CNP   40 mg at 22 1157    oxyCODONE (ROXICODONE) immediate release tablet 5 mg  5 mg Oral Q4H PRN Mindy Favre, APRN - CNP   5 mg at 22 5976    sodium chloride flush 0.9 % injection 5-40 mL  5-40 mL IntraVENous 2 times per day Mindy Favre, APRN - CNP   10 mL at 22    sodium chloride denies    [facility] 07/30/2022    Alcohol use disorder, severe, dependence ([facility] 08/21/2019    Carpal tunnel syndrome, left     Cigarette smoker     GERD (gastroesophageal reflux disease)     controlled with med    History of pancreatitis     x 4 or 5 times-- last time admitted to hospital 10/10/26/22 x 7 days    Hypertension     controlled with med       Past Surgical History:        Procedure Laterality Date    CARDIAC PROCEDURE N/A 11/2/2022    LEFT HEART CATH / CORONARY ANGIOGRAPHY performed by Martell Duane, MD at [facility] CATH LAB   [facility] N/A 11/2/2022    Percutaneous coronary intervention performed by Martell Duane, MD at [address] [place] [address] CATH LAB    CHOLECYSTECTOMY  2012 [facility] Left     wrist surg--nerve surg    ORTHOPEDIC SURGERY Left     foot surg as child       Social History:    Social History     Tobacco Use    Smoking status: Every Day     Packs/day: 1.00     Years: 20.00     Pack years: 20.00     Types: Cigarettes    Smokeless tobacco: Never   Substance Use Topics    Alcohol use: Yes     Comment: none since 10/4/22-- had drank 3 glasses of wine daily                                Ready to quit: Not Answered  Counseling given: Not Answered      Vital Signs (Current):   Vitals:    11/08/22 2048 11/08/22 2338 11/09/22 0253 11/09/22 0759   BP: 123/72 122/63 120/72 126/76   Pulse: 83 77 77 70   Resp: 18 16 16 20   Temp: 98.1 °F (36.7 °C) 97.6 °F (36.4 °C) 98.9 °F (37.2 °C) 98.5 °F (36.9 °C)   TempSrc: Oral Oral Temporal Temporal   SpO2: 99% 98% 100% 97%   Weight:   169 lb (76.7 kg)    Height:                                                  BP Readings from Last 3 Encounters:   11/09/22 126/76   10/31/22 116/68   10/25/22 (!) 150/88       NPO Status:                                                                                 BMI:   Wt Readings from Last 3 Encounters:   11/09/22 169 lb (76.7 kg)   11/01/22 185 lb (83.9 kg)   10/25/22 185 lb (83.9 kg)     Body mass index is 24.96 kg/m². CBC:   Lab Results   Component Value Date/Time    WBC 11.7 11/03/2022 05:39 AM    RBC 4.50 11/03/2022 05:39 AM    HGB 12.6 11/03/2022 05:39 AM    HCT 39.0 11/03/2022 05:39 AM    MCV 86.7 11/03/2022 05:39 AM    MCV 95.0 02/08/2021 03:53 PM    RDW 13.4 11/03/2022 05:39 AM     11/06/2022 01:38 PM       CMP:   Lab Results   Component Value Date/Time     11/05/2022 06:59 AM    K 4.6 11/05/2022 06:59 AM    CL 96 11/05/2022 06:59 AM    CO2 23 11/05/2022 06:59 AM    BUN 14 11/05/2022 06:59 AM    CREATININE 1.00 11/05/2022 06:59 AM    GFRAA >60 08/01/2022 05:41 AM    AGRATIO 1.1 10/03/2021 03:38 PM    LABGLOM >60 11/05/2022 06:59 AM    GLUCOSE 80 11/05/2022 06:59 AM    PROT 6.7 11/05/2022 06:59 AM    CALCIUM 8.8 11/05/2022 06:59 AM    BILITOT 0.5 11/05/2022 06:59 AM    ALKPHOS 45 11/05/2022 06:59 AM    ALKPHOS 52 10/03/2021 03:38 PM    AST 26 11/05/2022 06:59 AM    ALT 31 11/05/2022 06:59 AM       POC Tests: No results for input(s): POCGLU, POCNA, POCK, POCCL, POCBUN, POCHEMO, POCHCT in the last 72 hours.     Coags: No results found for: PROTIME, INR, APTT    HCG (If Applicable): No results found for: PREGTESTUR, PREGSERUM, HCG, HCGQUANT     ABGs: No results found for: PHART, PO2ART, EWG9DDD, HDT7ZQB, BEART, C8IPJMXH     Type & Screen (If Applicable):  No results found for: LABABO, LABRH    Drug/Infectious Status (If Applicable):  No results found for: HIV, HEPCAB    COVID-19 Screening (If Applicable): No results found for: COVID19        Anesthesia Evaluation    Airway: Mallampati: II  TM distance: >3 FB   Neck ROM: full  Mouth opening: > = 3 FB   Dental: normal exam         Pulmonary:normal exam  breath sounds clear to auscultation  (+) current smoker                           Cardiovascular:    (+) hypertension:, past MI:, CAD:, CABG/stent (Stent 7 days ago):,         Rhythm: regular  Rate: normal                    Neuro/Psych:   (+) psychiatric history: GI/Hepatic/Renal:   (+) GERD:, liver disease (Ascites and large pancreatic pseudocyst):,           Endo/Other:                     Abdominal:             Vascular: Other Findings:           Anesthesia Plan      general     ASA 3     (Will place A line if needed)        Anesthetic plan and risks discussed with patient.                         Laura Brumfield MD   11/9/2022

## 2022-11-09 NOTE — PROGRESS NOTES
TRANSFER - IN REPORT:    Verbal report received from [facility]. on Norris Lorenzana  being received from [phone] for ordered procedure      Report consisted of patient's Situation, Background, Assessment and   Recommendations(SBAR). Information from the following report(s) Nurse Handoff Report was reviewed with the receiving nurse. Opportunity for questions and clarification was provided. Assessment completed upon patient's arrival to unit and care assumed.

## 2022-11-09 NOTE — PLAN OF CARE
Problem: Safety - Adult  Goal: Free from fall injury  Outcome: Progressing  Flowsheets (Taken 11/8/2022 1909)  Free From Fall Injury: Instruct family/caregiver on patient safety     Problem: Pain  Goal: Verbalizes/displays adequate comfort level or baseline comfort level  Outcome: Progressing

## 2022-11-10 VITALS
TEMPERATURE: 98.2 F | HEART RATE: 68 BPM | BODY MASS INDEX: 25.03 KG/M2 | WEIGHT: 169 LBS | OXYGEN SATURATION: 100 % | RESPIRATION RATE: 17 BRPM | DIASTOLIC BLOOD PRESSURE: 66 MMHG | SYSTOLIC BLOOD PRESSURE: 111 MMHG | HEIGHT: 69 IN

## 2022-11-10 PROCEDURE — 6370000000 HC RX 637 (ALT 250 FOR IP): Performed by: INTERNAL MEDICINE

## 2022-11-10 PROCEDURE — 6370000000 HC RX 637 (ALT 250 FOR IP): Performed by: NURSE PRACTITIONER

## 2022-11-10 PROCEDURE — 6360000002 HC RX W HCPCS: Performed by: INTERNAL MEDICINE

## 2022-11-10 PROCEDURE — 99238 HOSP IP/OBS DSCHRG MGMT 30/<: CPT | Performed by: INTERNAL MEDICINE

## 2022-11-10 RX ORDER — COLCHICINE 0.6 MG/1
0.6 TABLET ORAL DAILY
Qty: 30 TABLET | Refills: 0 | Status: SHIPPED | OUTPATIENT
Start: 2022-11-11

## 2022-11-10 RX ORDER — ATORVASTATIN CALCIUM 40 MG/1
40 TABLET, FILM COATED ORAL NIGHTLY
Qty: 30 TABLET | Refills: 3 | Status: SHIPPED | OUTPATIENT
Start: 2022-11-10

## 2022-11-10 RX ORDER — LISINOPRIL 5 MG/1
5 TABLET ORAL DAILY
Qty: 30 TABLET | Refills: 3 | Status: SHIPPED | OUTPATIENT
Start: 2022-11-11

## 2022-11-10 RX ORDER — NITROGLYCERIN 0.4 MG/1
0.4 TABLET SUBLINGUAL EVERY 5 MIN PRN
Qty: 25 TABLET | Refills: 3 | Status: ON HOLD | OUTPATIENT
Start: 2022-11-10 | End: 2022-12-01 | Stop reason: SDUPTHER

## 2022-11-10 RX ORDER — ASPIRIN 81 MG/1
81 TABLET, CHEWABLE ORAL DAILY
Qty: 30 TABLET | Refills: 3 | Status: SHIPPED | OUTPATIENT
Start: 2022-11-11

## 2022-11-10 RX ADMIN — ASPIRIN 81 MG: 81 TABLET, CHEWABLE ORAL at 08:50

## 2022-11-10 RX ADMIN — COLCHICINE 0.6 MG: 0.6 TABLET, FILM COATED ORAL at 08:50

## 2022-11-10 RX ADMIN — OXYCODONE 5 MG: 5 TABLET ORAL at 00:22

## 2022-11-10 RX ADMIN — METOPROLOL TARTRATE 25 MG: 25 TABLET, FILM COATED ORAL at 08:50

## 2022-11-10 RX ADMIN — LISINOPRIL 5 MG: 5 TABLET ORAL at 08:51

## 2022-11-10 RX ADMIN — PANTOPRAZOLE SODIUM 40 MG: 40 TABLET, DELAYED RELEASE ORAL at 06:18

## 2022-11-10 RX ADMIN — OXYCODONE 5 MG: 5 TABLET ORAL at 08:51

## 2022-11-10 RX ADMIN — HYDROMORPHONE HYDROCHLORIDE 1.5 MG: 1 INJECTION, SOLUTION INTRAMUSCULAR; INTRAVENOUS; SUBCUTANEOUS at 02:24

## 2022-11-10 RX ADMIN — OXYCODONE 5 MG: 5 TABLET ORAL at 04:18

## 2022-11-10 RX ADMIN — TICAGRELOR 90 MG: 90 TABLET ORAL at 08:50

## 2022-11-10 RX ADMIN — HYDROMORPHONE HYDROCHLORIDE 1.5 MG: 1 INJECTION, SOLUTION INTRAMUSCULAR; INTRAVENOUS; SUBCUTANEOUS at 06:18

## 2022-11-10 ASSESSMENT — PAIN DESCRIPTION - FREQUENCY
FREQUENCY: CONTINUOUS

## 2022-11-10 ASSESSMENT — PAIN SCALES - GENERAL
PAINLEVEL_OUTOF10: 7
PAINLEVEL_OUTOF10: 3
PAINLEVEL_OUTOF10: 7
PAINLEVEL_OUTOF10: 7
PAINLEVEL_OUTOF10: 3
PAINLEVEL_OUTOF10: 3
PAINLEVEL_OUTOF10: 7
PAINLEVEL_OUTOF10: 7

## 2022-11-10 ASSESSMENT — PAIN DESCRIPTION - LOCATION
LOCATION: ABDOMEN

## 2022-11-10 ASSESSMENT — PAIN DESCRIPTION - DESCRIPTORS
DESCRIPTORS: ACHING

## 2022-11-10 ASSESSMENT — PAIN - FUNCTIONAL ASSESSMENT
PAIN_FUNCTIONAL_ASSESSMENT: ACTIVITIES ARE NOT PREVENTED

## 2022-11-10 ASSESSMENT — PAIN DESCRIPTION - ONSET
ONSET: AWAKENED FROM SLEEP
ONSET: ON-GOING
ONSET: ON-GOING

## 2022-11-10 ASSESSMENT — PAIN DESCRIPTION - PAIN TYPE
TYPE: ACUTE PAIN

## 2022-11-10 ASSESSMENT — PAIN DESCRIPTION - ORIENTATION
ORIENTATION: LEFT

## 2022-11-10 NOTE — ANESTHESIA POSTPROCEDURE EVALUATION
Department of Anesthesiology  Postprocedure Note    Patient: Denae Eckert  MRN: [id number]  YOB: 1985  Date of evaluation: 11/10/2022      Procedure Summary     Date: 11/09/22 Room / Location: [facility] ENDO FLOURO 1 / [facility] ENDOSCOPY    Anesthesia Start: 9277 Anesthesia Stop: 9933    Procedure: EGD/ENDOSCOPIC ULTRASOUND/AXIOS STENT ROOM 2226 **Rep will be present (Upper GI Region) Diagnosis:       Acute pancreatitis, unspecified complication status, unspecified pancreatitis type      (Acute pancreatitis, unspecified complication status, unspecified pancreatitis type [K85.90])    Surgeons: Carloz Bray MD Responsible Provider: Lamberto Ralph MD    Anesthesia Type: General ASA Status: 3          Anesthesia Type: General    Mack Phase I: Mack Score: 10    Mack Phase II:        Anesthesia Post Evaluation    Patient location during evaluation: PACU  Patient participation: complete - patient participated  Level of consciousness: awake and alert  Airway patency: patent  Nausea & Vomiting: no nausea and no vomiting  Complications: no  Cardiovascular status: hemodynamically stable  Respiratory status: acceptable, nonlabored ventilation and spontaneous ventilation  Hydration status: euvolemic  Comments: /66   Pulse 68   Temp 98.2 °F (36.8 °C) (Temporal)   Resp 17   Ht 5' 9\" (1.753 m)   Wt 169 lb (76.7 kg)   SpO2 100%   BMI 24.96 kg/m²     Multimodal analgesia pain management approach

## 2022-11-10 NOTE — PROGRESS NOTES
[facility] CARDIOLOGY PROGRESS NOTE    11/10/2022 7:11 AM    Admit Date: 11/2/2022        Subjective:   Stable overnight without angina, CHF, or palpitations. Vitals stable and controlled. No other complaints overnight. Tolerating meds well. Objective:      Vitals:    11/09/22 1922 11/09/22 2321 11/10/22 0324 11/10/22 0343   BP: (!) 96/53 (!) 115/59 (!) 110/56    Pulse: 85 82 70    Resp: 16 18 14    Temp: 97.4 °F (36.3 °C) 98.3 °F (36.8 °C) 97.3 °F (36.3 °C)    TempSrc: Oral Temporal Oral    SpO2: 99% 99% 97%    Weight:    169 lb (76.7 kg)   Height:           Physical Exam:  Neck- supple, no JVD  CV- regular rate and rhythm no MRG  Lung- clear bilaterally  Abd- soft, nontender, nondistended  Ext- no edema  Skin- warm and dry    Data Review:   No results for input(s): NA, K, MG, BUN, CREA, GLU, WBC, HGB, HCT, PLT, INR, ABDULLAHI, CHOL, TRIGL, LDL, HDL in the last 72 hours. Assessment and Plan: Active Hospital Problems    *STEMI (ST elevation myocardial infarction) (HCC)-stable, no overnight angina or heart failure. Back on dual antiplatelet therapy. Continue meds and titrate as needed. Mobilize today. Hopeful discharge tomorrow if okay with other physicians. Recheck BMP and magnesium in the morning, replete electrolytes as needed. Alcohol abuse-stable, watch for withdrawal.  Needs cessation counseling in the outpatient setting      Primary hypertension-stable, medically maximized, follow clinically. Stay hydrated      Chronic pancreatitis (HCC)-status post pseudocyst drainage. Back on dual antiplatelet therapy. Recheck labs in the morning. Monitor clinically but feeling much better overall with resolution of abdominal distention after drain placement. Hyponatremia-recheck in the morning. Normal diet.     Katty Mims MD

## 2022-11-10 NOTE — DISCHARGE SUMMARY
[facility] Cardiology Discharge Summary     Patient ID:  Kathi Honeycutt  [id number]  16 y.o.  1985    Admit date: 11/2/2022    Discharge date:  11/10/2022     Admitting Physician: Viki Ocampo MD     Discharge Physician: Dr. Charly Gant    Admission Diagnoses: STEMI (ST elevation myocardial infarction) ([facility] [address] [I21.3]  ST elevation myocardial infarction (STEMI), unspecified artery [facility]) [I21.3]    Discharge Diagnoses:   Patient Active Problem List    Diagnosis    STEMI (ST elevation myocardial infarction) [facility])    Attention deficit hyperactivity disorder (ADHD), combined type    Tobacco abuse [Z72.0 (ICD-10-CM)]    Alcohol abuse    Primary hypertension    Chronic pancreatitis [facility])     Cardiology Procedures this admission:  Left heart catheterization with PCI  EchoCardiogram  Consults: GI    Hospital Course: Patient is a 44 y.o. with prior history pancreatis, HTN and ETOH abuse. Recently admitted with CT scan abd showed ascites and pseudocysts. Patient presented to [facility] for scheduled EUS on 11/2 but while checking in complained of severe 10/10 left sided chest pain with radiation down left arm therefore rapid response was called and patient was taken directly to ED. While in triage in ED, EKG was performed and showed STEMI. Patient was taken emergently for cardiac catheterization by Dr. Leslie Cisneros. Patient was found to have acutely occluded large OM1 with 95% stenosis that was stented with a 3.0 x 18 Resolute Williams TULIO with excellent results there was IVETTE-3 flow after stenting there was a superior branch of the OM that was jailed with the stent but this remained widely patent throughout the case. Patient was also found to have  preserved LV systolic function with EF 60-65% with mild hypokinesis of the lateral apical wall. Patient tolerated the procedure well and was taken to the ICU for recovery.      ECHO performed and showed:   Left Ventricle Normal left ventricular systolic function with a visually estimated EF of 55 - 60%. Left ventricle size is normal. Normal wall thickness. Normal wall motion. Normal diastolic function. Left Atrium Left atrium size is normal.   Right Ventricle Right ventricle size is normal. Normal wall thickness. Normal systolic function. Right Atrium Right atrium size is normal.   Aortic Valve Valve structure is normal. No regurgitation. No stenosis. Mitral Valve Valve structure is normal. No regurgitation. No stenosis noted. Tricuspid Valve Valve structure is normal. No regurgitation. No stenosis noted. Pulmonic Valve Valve structure is normal. No regurgitation. No stenosis noted. Pulmonary Artery Normal pulmonary arteries. Aorta Normal sized annulus, sinus of Valsalva, ascending aorta, aortic arch, descending aorta and abdominal aorta. IVC/Hepatic Veins IVC diameter is less than or equal to 21 mm and decreases greater than 50% during inspiration; therefore the estimated right atrial pressure is normal (~3 mmHg). IVC size is normal.   Pericardium No pericardial effusion. The following morning patient reported 10/10 abdominal pain. EKG negative for any ST changes. Gastroenterology was consulted for abdominal pain, chronic pancreatitis and pseudocyst. Patient was transitioned to Integrilin gtt on 11/4 as a bridge with plan for drainage of pseudocyst on 11/9. Patient underwent endoscopic ultrasound with doppler and cyst gastrostomy with Axious stent placement on 11/9/22. Brilinta was resume on the evening of 11/9. GI plans repeat EUS/EGD and possible stent retrieval in 3 weeks. The morning of discharge, patient was up feeling well without any complaints of chest pain or shortness of breath. Patient's right radial cath site was clean, dry and intact without hematoma or bruit. Patient's labs were stable. Sodium 129. Will repeat BMP on Monday Nov 14th. Patient was seen and examined by Dr. Yvrose Clarke and determined stable and ready for discharge.  Patient was instructed on the importance of medication compliance including taking aspirin and Brilinta everyday without missing a dose. After receiving drug eluting stents, the patient will remain on dual anti-platelet therapy for 1 year. For maximized medical therapy for CAD, patient will continue BB, ACE, and high intensity statin as well. The patient will follow up with [facility] -- TC 7 with Dr David Colmenares on Nov 17th at 10:30 pm in [facility]. Patient has been referred to cardiac rehab. Patient was given 30 day voucher card for Brilinta. DISPOSITION: The patient is being discharged home in stable condition on a low saturated fat, low cholesterol and low salt diet. The patient is instructed to advance activities as tolerated to the limit of fatigue or shortness of breath. The patient is instructed to avoid all heavy lifting, straining, stooping or squatting for 3-5 days. The patient is instructed to watch the cath site for bleeding/oozing; if seen, the patient is instructed to apply firm pressure with a clean cloth and call [facility] at [phone]. The patient is instructed to watch for signs of infection which include: increasing area of redness, fever/hot to touch or purulent drainage at the catheterization site. The patient is instructed not to soak in a bathtub for 7-10 days, but is cleared to shower. The patient is instructed to call the office or return to the ER for immediate evaluation for any shortness of breath or chest pain not relieved by NTG. Discharge Exam: Patient has been seen by Dr. Jeff Pitt: see his progress note for exam details.     /66   Pulse 68   Temp 98.2 °F (36.8 °C) (Temporal)   Resp 17   Ht 5' 9\" (1.753 m)   Wt 169 lb (76.7 kg)   SpO2 100%   BMI 24.96 kg/m²     Lab Results   Component Value Date    WBC 11.7 (H) 11/03/2022    HGB 12.6 (L) 11/03/2022    HCT 39.0 (L) 11/03/2022    MCV 86.7 11/03/2022     (H) 11/06/2022       Lab Results   Component Value Date/Time     11/05/2022 06:59 AM    K 4.6 11/05/2022 06:59 AM    CL 96 11/05/2022 06:59 AM    CO2 23 11/05/2022 06:59 AM    BUN 14 11/05/2022 06:59 AM    CREATININE 1.00 11/05/2022 06:59 AM    GLUCOSE 80 11/05/2022 06:59 AM    CALCIUM 8.8 11/05/2022 06:59 AM        Patient Instructions:   Current Discharge Medication List        START taking these medications    Details   aspirin 81 MG chewable tablet Take 1 tablet by mouth daily  Qty: 30 tablet, Refills: 3      atorvastatin (LIPITOR) 40 MG tablet Take 1 tablet by mouth nightly  Qty: 30 tablet, Refills: 3      colchicine (COLCRYS) 0.6 MG tablet Take 1 tablet by mouth daily  Qty: 30 tablet, Refills: 0      lisinopril (PRINIVIL;ZESTRIL) 5 MG tablet Take 1 tablet by mouth daily  Qty: 30 tablet, Refills: 3      metoprolol tartrate (LOPRESSOR) 25 MG tablet Take 1 tablet by mouth 2 times daily  Qty: 60 tablet, Refills: 3      nitroGLYCERIN (NITROSTAT) 0.4 MG SL tablet Place 1 tablet under the tongue every 5 minutes as needed for Chest pain up to max of 3 total doses. If no relief after 1 dose, call 911. Qty: 25 tablet, Refills: 3      ticagrelor (BRILINTA) 90 MG TABS tablet Take 1 tablet by mouth 2 times daily  Qty: 60 tablet, Refills: 11           CONTINUE these medications which have NOT CHANGED    Details   omeprazole (PRILOSEC) 20 MG delayed release capsule Take 20 mg by mouth daily      naloxone (NARCAN) 0.4 MG/ML injection Infuse 1 mL intravenously as needed (shortness of breath)  Qty: 2 each, Refills: 0           STOP taking these medications       oxyCODONE (ROXICODONE) 5 MG immediate release tablet Comments:   Reason for Stopping:         amLODIPine (NORVASC) 10 MG tablet Comments:   Reason for Stopping:         thiamine 100 MG tablet Comments:   Reason for Stopping:                   Signed:  ANJALI Schumacher  11/10/2022  10:48 AM     Attending addendum:  Clinically ready to go home, see my note from this morning.   Okay to go home per GI recommendations as well. Compliance with medications, especially dual antiplatelet therapy, emphasized. Risk of noncompliance with aspirin and Brilinta discussed with the patient. He understands the risks involved. Strict alcohol cessation discussed. Continue meds as noted above and keep routine follow-up.

## 2022-11-10 NOTE — PROGRESS NOTES
[facility] Progress Note         Admit Date:  11/2/2022    Today's Date:  11/10/2022    CC:  Pancreatitis    Subjective:     Pain has significantly improved since Axios stent placement yesterday. Patient also reports an improvement in abdominal distention. Tolerating food well. 4 bowel movements since yesterday. No bloody or black stools. Denies nausea, vomiting, diarrhea. Medications:   Current Facility-Administered Medications   Medication Dose Route Frequency    ticagrelor (BRILINTA) tablet 90 mg  90 mg Oral BID    HYDROmorphone HCl PF (DILAUDID) injection 1.5 mg  1.5 mg IntraVENous Q4H PRN    LORazepam (ATIVAN) tablet 1 mg  1 mg Oral Q6H PRN    pantoprazole (PROTONIX) tablet 40 mg  40 mg Oral QAM AC    oxyCODONE (ROXICODONE) immediate release tablet 5 mg  5 mg Oral Q4H PRN    sodium chloride flush 0.9 % injection 5-40 mL  5-40 mL IntraVENous 2 times per day    sodium chloride flush 0.9 % injection 5-40 mL  5-40 mL IntraVENous PRN    0.9 % sodium chloride infusion   IntraVENous PRN    ondansetron (ZOFRAN-ODT) disintegrating tablet 4 mg  4 mg Oral Q8H PRN    Or    ondansetron (ZOFRAN) injection 4 mg  4 mg IntraVENous Q6H PRN    acetaminophen (TYLENOL) tablet 650 mg  650 mg Oral Q6H PRN    Or    acetaminophen (TYLENOL) suppository 650 mg  650 mg Rectal Q6H PRN    polyethylene glycol (GLYCOLAX) packet 17 g  17 g Oral Daily PRN    aspirin chewable tablet 81 mg  81 mg Oral Daily    atorvastatin (LIPITOR) tablet 40 mg  40 mg Oral Nightly    metoprolol tartrate (LOPRESSOR) tablet 25 mg  25 mg Oral BID    lisinopril (PRINIVIL;ZESTRIL) tablet 5 mg  5 mg Oral Daily    nitroGLYCERIN (NITROSTAT) SL tablet 0.4 mg  0.4 mg SubLINGual Q5 Min PRN    colchicine (COLCRYS) tablet 0.6 mg  0.6 mg Oral Daily       Review of Systems:  ROS was obtained, with pertinent positives as listed above. No chest pain or SOB.     Diet:  Regular, Low Fat    Objective:   Vitals:  /66   Pulse 68   Temp 98.2 °F (36.8 °C) (Temporal)   Resp 17   Ht 5' 9\" (1.753 m)   Wt 169 lb (76.7 kg)   SpO2 100%   BMI 24.96 kg/m²   Intake/Output:  11/10 0701 - 11/10 1900  In: 325 [P.O.:325]  Out: -   11/08 1901 - 11/10 0700  In: 730 [P.O.:480; I.V.:250]  Out: 0   Exam:  General appearance: alert, cooperative, no distress, sitting up in bed  Lungs: clear to auscultation bilaterally anteriorly  Heart: regular rate and rhythm  Abdomen: soft, non-tender. Bowel sounds normal. No masses, no organomegaly  Extremities: extremities normal, atraumatic, no cyanosis or edema  Neuro:  alert and oriented    Data Review (Labs):    No results for input(s): WBC, HGB, HCT, PLT, MCV, NA, K, CL, CO2, BUN, CREA, CA, MG, GLU, AST, ALT, ALB, TP, AML, INR, APTT in the last 72 hours. Invalid input(s): AP, GPT, TBIL, TBILI, DBIL, CBIL, LPSE, PTP    EGD with EUS w/ Axios stent with Dr. Jerel Hernandez, 11/9/22  Findings:   ENDOSCOPIC FINDINGS:  Limited views of the mucosa with the echoendoscope reveals extrinsic compression of the gastric fundus but no other gross abnormalities of the stomach. PANCREAS:  The pancreas is well-visualized from head to tail. Patient has chronic pancreatitis. A cystic collection measuring at least 12 cm in maximal diameter with mature walls is seen adjacent to the fundus of the pancreas. No internal debris is seen. The 15 mm AXIOS stent catheter was advanced into the cyst cavity with 100 W cutting current. The internal and external flanges were successfully deployed. Stent position was confirmed endoscopically and endosonographically. A large amount of cyst fluid flowed freely into the stomach. Implant:   AXIOS stent    Impression:     Pancreatic pseudocyst. Cyst gastrostomy was performed as outlined above. Plan:  1. Resume diet as tolerated. 2. Repeat EUS/EGD and possible stent retrieval in 3 weeks.     Assessment:     Principal Problem:    STEMI (ST elevation myocardial infarction) ([facility] [address]  Active Problems:    Alcohol abuse    Primary hypertension    Chronic pancreatitis ([facility] [address]  Resolved Problems:    * No resolved hospital problems. *    44yo male with ETOH pancreatitis complicated by large pseudocyst (12cm) and recent STEMI. EGD with EUS on 11/9/22 with axios stent placement. Plan:     - Restarted Brillinta following EGD w EUS w Axios stent placement on 11/9/22  - Will need stent retrieval in 3 weeks. Will request coag hold from, Dr. Yen Pastor, cardiology. - Patient being discharged today. GI will arrange follow up and call patient with appointment. Yan Patel PA-C  Patient seen and examined in collaboration with Dr ANJALI Yusuf. Assessment and plan per Dr Ruchi Yusuf.

## 2022-11-10 NOTE — PLAN OF CARE
Problem: Safety - Adult  Goal: Free from fall injury  11/10/2022 0932 by Irlanda Blakely RN  Outcome: Adequate for Discharge  11/9/2022 2208 by Angela Chang RN  Outcome: Progressing     Problem: Pain  Goal: Verbalizes/displays adequate comfort level or baseline comfort level  11/10/2022 0932 by Irlanda Blakely RN  Outcome: Adequate for Discharge  11/9/2022 2208 by Angela Chang RN  Outcome: Progressing

## 2022-11-10 NOTE — DISCHARGE INSTRUCTIONS
The patient is being discharged home in stable condition on a low saturated fat, low cholesterol and low salt diet. The patient is instructed to advance activities as tolerated to the limit of fatigue or shortness of breath. The patient is instructed to avoid all heavy lifting, straining, stooping or squatting for 3-5 days. The patient is instructed to watch the cath site for bleeding/oozing; if seen, the patient is instructed to apply firm pressure with a clean cloth and call [facility] at [phone]. The patient is instructed to watch for signs of infection which include: increasing area of redness, fever/hot to touch or purulent drainage at the catheterization site. The patient is instructed not to soak in a bathtub for 7-10 days, but is cleared to shower. The patient is instructed to call the office or return to the ER for immediate evaluation for any shortness of breath or chest pain not relieved by NTG.

## 2022-11-10 NOTE — CARE COORDINATION
Pt with discharge orders this day. Pt to return home with spouse and children.  coupon for new medication provided. Resources have been provided for [facility] and [facility] for pt to follow up with. No additional CM needs at discharge.          11/03/22 0133   Service Assessment   Patient Orientation Alert and Oriented   Cognition Alert   History Provided By Medical Record   Primary Caregiver Self   Support Systems Spouse/Significant Other   PCP Verified by CM Yes  (does not have a PCP, has received resources for [facility] and [facility])   Prior Functional Level Independent in ADLs/IADLs   Current Functional Level Independent in ADLs/IADLs   Can patient return to prior living arrangement Yes   Ability to make needs known: Good   Family able to assist with home care needs: Yes   Discharge Planning   Type of Residence House   Living Arrangements Spouse/Significant Other   Current Services Prior To Admission None   Potential Assistance Needed N/A   Type of [address] None   Patient expects to be discharged to: House   Condition of Participation: Discharge Planning   The Plan for Transition of Care is related to the following treatment goals: Home with spouse and family

## 2022-11-11 ENCOUNTER — TELEPHONE (OUTPATIENT)
Dept: CARDIOLOGY CLINIC | Age: 37
End: 2022-11-11

## 2022-11-11 DIAGNOSIS — I21.3 STEMI (ST ELEVATION MYOCARDIAL INFARCTION) (HCC): Primary | ICD-10-CM

## 2022-11-11 DIAGNOSIS — I10 PRIMARY HYPERTENSION: ICD-10-CM

## 2022-11-11 NOTE — TELEPHONE ENCOUNTER
Transitions Of Care    Admit: 11/2/22  Discharge: 11/10/22  DX: STEMI  TC appt: 11/17/22 with Dr. Titi Ansari    Pt states he feels much better. No c/o cp or sob. States radial site has healed completely. No c/o redness, swelling or  drainage. Discharge summary reviewed with pt. Reviewed new medications. Pt states he has been taking a 325mg ASA with Brilinta. I instructed pt to stop ASA 325mg & start ASA 81mg daily WITH the Brilinta. Pt agreed. Pt also states he has picked up & started Atorvastatin, Colchicine, Lisinopril, Metoprolol & NTG. NTG instructions reviewed with pt. Discharge instructions states pt needs to have a BMP drawn on Monday 11/14/22. Order placed. Pt agreed to  have lab drawn Monday and to keep f/u appt with Dr. Titi Ansari on 11/17/22.

## 2022-11-13 ENCOUNTER — HOSPITAL ENCOUNTER (EMERGENCY)
Age: 37
Discharge: ANOTHER HOSPITAL | End: 2022-11-13
Attending: EMERGENCY MEDICINE

## 2022-11-13 ENCOUNTER — APPOINTMENT (OUTPATIENT)
Dept: CT IMAGING | Age: 37
End: 2022-11-13

## 2022-11-13 ENCOUNTER — HOSPITAL ENCOUNTER (INPATIENT)
Age: 37
LOS: 3 days | Discharge: HOME | DRG: 438 | End: 2022-11-16
Attending: INTERNAL MEDICINE | Admitting: STUDENT IN AN ORGANIZED HEALTH CARE EDUCATION/TRAINING PROGRAM

## 2022-11-13 VITALS
DIASTOLIC BLOOD PRESSURE: 74 MMHG | OXYGEN SATURATION: 100 % | RESPIRATION RATE: 17 BRPM | BODY MASS INDEX: 24.44 KG/M2 | HEART RATE: 74 BPM | WEIGHT: 165 LBS | TEMPERATURE: 98.4 F | HEIGHT: 69 IN | SYSTOLIC BLOOD PRESSURE: 112 MMHG

## 2022-11-13 DIAGNOSIS — K86.0 ALCOHOL-INDUCED CHRONIC PANCREATITIS (HCC): Primary | ICD-10-CM

## 2022-11-13 DIAGNOSIS — K86.3 PANCREATIC PSEUDOCYST: Primary | ICD-10-CM

## 2022-11-13 PROBLEM — I25.2 HISTORY OF ST ELEVATION MYOCARDIAL INFARCTION (STEMI): Status: ACTIVE | Noted: 2022-11-13

## 2022-11-13 PROBLEM — I25.10 CAD (CORONARY ARTERY DISEASE): Status: ACTIVE | Noted: 2022-11-13

## 2022-11-13 LAB
ALBUMIN SERPL-MCNC: 3.2 G/DL (ref 3.5–5)
ALBUMIN/GLOB SERPL: 0.9 {RATIO} (ref 0.4–1.6)
ALP SERPL-CCNC: 81 U/L (ref 45–117)
ALT SERPL-CCNC: 20 U/L (ref 13–61)
ANION GAP SERPL CALC-SCNC: 11 MMOL/L (ref 2–11)
APPEARANCE UR: CLEAR
AST SERPL-CCNC: 16 U/L (ref 15–37)
BASOPHILS # BLD: 0.1 K/UL (ref 0–0.2)
BASOPHILS NFR BLD: 1 % (ref 0–2)
BILIRUB SERPL-MCNC: 0.3 MG/DL (ref 0.2–1.1)
BILIRUB UR QL: NEGATIVE
BUN SERPL-MCNC: 12 MG/DL (ref 7–18)
CALCIUM SERPL-MCNC: 9.2 MG/DL (ref 8.3–10.4)
CHLORIDE SERPL-SCNC: 99 MMOL/L (ref 98–107)
CO2 SERPL-SCNC: 25 MMOL/L (ref 21–32)
COLOR UR: YELLOW
CREAT SERPL-MCNC: 0.92 MG/DL (ref 0.8–1.5)
DIFFERENTIAL METHOD BLD: ABNORMAL
EOSINOPHIL # BLD: 0.1 K/UL (ref 0–0.8)
EOSINOPHIL NFR BLD: 1 % (ref 0.5–7.8)
ERYTHROCYTE [DISTWIDTH] IN BLOOD BY AUTOMATED COUNT: 13.8 % (ref 11.9–14.6)
GLOBULIN SER CALC-MCNC: 3.4 G/DL (ref 2.8–4.5)
GLUCOSE SERPL-MCNC: 133 MG/DL (ref 65–100)
GLUCOSE UR STRIP.AUTO-MCNC: NEGATIVE MG/DL
HCT VFR BLD AUTO: 42.2 % (ref 41.1–50.3)
HGB BLD-MCNC: 14.2 G/DL (ref 13.6–17.2)
HGB UR QL STRIP: NEGATIVE
IMM GRANULOCYTES # BLD AUTO: 0.1 K/UL (ref 0–0.5)
IMM GRANULOCYTES NFR BLD AUTO: 1 % (ref 0–5)
KETONES UR QL STRIP.AUTO: NEGATIVE MG/DL
LEUKOCYTE ESTERASE UR QL STRIP.AUTO: NEGATIVE
LIPASE SERPL-CCNC: 172 U/L (ref 13–60)
LYMPHOCYTES # BLD: 1.8 K/UL (ref 0.5–4.6)
LYMPHOCYTES NFR BLD: 15 % (ref 13–44)
MCH RBC QN AUTO: 27.8 PG (ref 26.1–32.9)
MCHC RBC AUTO-ENTMCNC: 33.6 G/DL (ref 31.4–35)
MCV RBC AUTO: 82.6 FL (ref 82–102)
MONOCYTES # BLD: 0.7 K/UL (ref 0.1–1.3)
MONOCYTES NFR BLD: 6 % (ref 4–12)
NEUTS SEG # BLD: 9.6 K/UL (ref 1.7–8.2)
NEUTS SEG NFR BLD: 78 % (ref 43–78)
NITRITE UR QL STRIP.AUTO: NEGATIVE
NRBC # BLD: 0 K/UL (ref 0–0.2)
PH UR STRIP: 7 [PH] (ref 5–9)
PLATELET # BLD AUTO: 688 K/UL (ref 150–450)
PMV BLD AUTO: 7.8 FL (ref 9.4–12.3)
POTASSIUM SERPL-SCNC: 4.4 MMOL/L (ref 3.5–5.1)
PROT SERPL-MCNC: 6.6 G/DL (ref 6.4–8.2)
PROT UR STRIP-MCNC: NEGATIVE MG/DL
RBC # BLD AUTO: 5.11 M/UL (ref 4.23–5.6)
SODIUM SERPL-SCNC: 135 MMOL/L (ref 133–143)
SP GR UR REFRACTOMETRY: 1.01 (ref 1–1.02)
TROPONIN T SERPL HS-MCNC: 42.3 NG/L (ref 0–22)
TROPONIN T SERPL HS-MCNC: 44 NG/L (ref 0–22)
UROBILINOGEN UR QL STRIP.AUTO: 0.2 EU/DL (ref 0.2–1)
WBC # BLD AUTO: 12.3 K/UL (ref 4.3–11.1)

## 2022-11-13 PROCEDURE — 6360000002 HC RX W HCPCS: Performed by: STUDENT IN AN ORGANIZED HEALTH CARE EDUCATION/TRAINING PROGRAM

## 2022-11-13 PROCEDURE — 1100000003 HC PRIVATE W/ TELEMETRY

## 2022-11-13 PROCEDURE — 85025 COMPLETE CBC W/AUTO DIFF WBC: CPT

## 2022-11-13 PROCEDURE — 81003 URINALYSIS AUTO W/O SCOPE: CPT

## 2022-11-13 PROCEDURE — 84484 ASSAY OF TROPONIN QUANT: CPT

## 2022-11-13 PROCEDURE — 2580000003 HC RX 258: Performed by: EMERGENCY MEDICINE

## 2022-11-13 PROCEDURE — 96375 TX/PRO/DX INJ NEW DRUG ADDON: CPT

## 2022-11-13 PROCEDURE — 74177 CT ABD & PELVIS W/CONTRAST: CPT

## 2022-11-13 PROCEDURE — 6370000000 HC RX 637 (ALT 250 FOR IP): Performed by: STUDENT IN AN ORGANIZED HEALTH CARE EDUCATION/TRAINING PROGRAM

## 2022-11-13 PROCEDURE — 80053 COMPREHEN METABOLIC PANEL: CPT

## 2022-11-13 PROCEDURE — 96365 THER/PROPH/DIAG IV INF INIT: CPT

## 2022-11-13 PROCEDURE — 6360000004 HC RX CONTRAST MEDICATION: Performed by: EMERGENCY MEDICINE

## 2022-11-13 PROCEDURE — 96376 TX/PRO/DX INJ SAME DRUG ADON: CPT

## 2022-11-13 PROCEDURE — 2580000003 HC RX 258: Performed by: STUDENT IN AN ORGANIZED HEALTH CARE EDUCATION/TRAINING PROGRAM

## 2022-11-13 PROCEDURE — 99285 EMERGENCY DEPT VISIT HI MDM: CPT

## 2022-11-13 PROCEDURE — 83690 ASSAY OF LIPASE: CPT

## 2022-11-13 PROCEDURE — 6360000002 HC RX W HCPCS: Performed by: EMERGENCY MEDICINE

## 2022-11-13 RX ORDER — MORPHINE SULFATE 4 MG/ML
4 INJECTION, SOLUTION INTRAMUSCULAR; INTRAVENOUS
Status: COMPLETED | OUTPATIENT
Start: 2022-11-13 | End: 2022-11-13

## 2022-11-13 RX ORDER — 0.9 % SODIUM CHLORIDE 0.9 %
100 INTRAVENOUS SOLUTION INTRAVENOUS ONCE
Status: COMPLETED | OUTPATIENT
Start: 2022-11-13 | End: 2022-11-13

## 2022-11-13 RX ORDER — ASPIRIN 81 MG/1
81 TABLET, CHEWABLE ORAL DAILY
Status: DISCONTINUED | OUTPATIENT
Start: 2022-11-14 | End: 2022-11-16 | Stop reason: HOSPADM

## 2022-11-13 RX ORDER — ONDANSETRON 8 MG/1
4 TABLET, ORALLY DISINTEGRATING ORAL EVERY 8 HOURS PRN
Status: DISCONTINUED | OUTPATIENT
Start: 2022-11-13 | End: 2022-11-16 | Stop reason: HOSPADM

## 2022-11-13 RX ORDER — LISINOPRIL 5 MG/1
5 TABLET ORAL DAILY
Status: DISCONTINUED | OUTPATIENT
Start: 2022-11-13 | End: 2022-11-16 | Stop reason: HOSPADM

## 2022-11-13 RX ORDER — SODIUM CHLORIDE 9 MG/ML
INJECTION, SOLUTION INTRAVENOUS PRN
Status: DISCONTINUED | OUTPATIENT
Start: 2022-11-13 | End: 2022-11-16 | Stop reason: HOSPADM

## 2022-11-13 RX ORDER — COLCHICINE 0.6 MG/1
0.6 TABLET ORAL DAILY
Status: DISCONTINUED | OUTPATIENT
Start: 2022-11-13 | End: 2022-11-16 | Stop reason: HOSPADM

## 2022-11-13 RX ORDER — HYDROMORPHONE HYDROCHLORIDE 1 MG/ML
1 INJECTION, SOLUTION INTRAMUSCULAR; INTRAVENOUS; SUBCUTANEOUS
Status: COMPLETED | OUTPATIENT
Start: 2022-11-13 | End: 2022-11-13

## 2022-11-13 RX ORDER — POTASSIUM CHLORIDE 7.45 MG/ML
10 INJECTION INTRAVENOUS PRN
Status: DISCONTINUED | OUTPATIENT
Start: 2022-11-13 | End: 2022-11-16 | Stop reason: HOSPADM

## 2022-11-13 RX ORDER — ONDANSETRON 2 MG/ML
4 INJECTION INTRAMUSCULAR; INTRAVENOUS EVERY 6 HOURS PRN
Status: DISCONTINUED | OUTPATIENT
Start: 2022-11-13 | End: 2022-11-16 | Stop reason: HOSPADM

## 2022-11-13 RX ORDER — SODIUM CHLORIDE 0.9 % (FLUSH) 0.9 %
10 SYRINGE (ML) INJECTION
Status: COMPLETED | OUTPATIENT
Start: 2022-11-13 | End: 2022-11-13

## 2022-11-13 RX ORDER — POLYETHYLENE GLYCOL 3350 17 G/17G
17 POWDER, FOR SOLUTION ORAL DAILY PRN
Status: DISCONTINUED | OUTPATIENT
Start: 2022-11-13 | End: 2022-11-16 | Stop reason: HOSPADM

## 2022-11-13 RX ORDER — SODIUM CHLORIDE 0.9 % (FLUSH) 0.9 %
5-40 SYRINGE (ML) INJECTION EVERY 12 HOURS SCHEDULED
Status: DISCONTINUED | OUTPATIENT
Start: 2022-11-13 | End: 2022-11-16 | Stop reason: HOSPADM

## 2022-11-13 RX ORDER — ACETAMINOPHEN 325 MG/1
650 TABLET ORAL EVERY 6 HOURS PRN
Status: DISCONTINUED | OUTPATIENT
Start: 2022-11-13 | End: 2022-11-16 | Stop reason: HOSPADM

## 2022-11-13 RX ORDER — SODIUM CHLORIDE 9 MG/ML
INJECTION, SOLUTION INTRAVENOUS CONTINUOUS
Status: DISCONTINUED | OUTPATIENT
Start: 2022-11-13 | End: 2022-11-16 | Stop reason: HOSPADM

## 2022-11-13 RX ORDER — PANTOPRAZOLE SODIUM 40 MG/1
40 TABLET, DELAYED RELEASE ORAL
Status: DISCONTINUED | OUTPATIENT
Start: 2022-11-14 | End: 2022-11-16 | Stop reason: HOSPADM

## 2022-11-13 RX ORDER — POTASSIUM CHLORIDE 20 MEQ/1
40 TABLET, EXTENDED RELEASE ORAL PRN
Status: DISCONTINUED | OUTPATIENT
Start: 2022-11-13 | End: 2022-11-16 | Stop reason: HOSPADM

## 2022-11-13 RX ORDER — SODIUM CHLORIDE 0.9 % (FLUSH) 0.9 %
5-40 SYRINGE (ML) INJECTION PRN
Status: DISCONTINUED | OUTPATIENT
Start: 2022-11-13 | End: 2022-11-16 | Stop reason: HOSPADM

## 2022-11-13 RX ORDER — ONDANSETRON 2 MG/ML
4 INJECTION INTRAMUSCULAR; INTRAVENOUS ONCE
Status: COMPLETED | OUTPATIENT
Start: 2022-11-13 | End: 2022-11-13

## 2022-11-13 RX ORDER — MAGNESIUM SULFATE IN WATER 40 MG/ML
2000 INJECTION, SOLUTION INTRAVENOUS PRN
Status: DISCONTINUED | OUTPATIENT
Start: 2022-11-13 | End: 2022-11-16 | Stop reason: HOSPADM

## 2022-11-13 RX ORDER — ATORVASTATIN CALCIUM 40 MG/1
40 TABLET, FILM COATED ORAL NIGHTLY
Status: DISCONTINUED | OUTPATIENT
Start: 2022-11-13 | End: 2022-11-16 | Stop reason: HOSPADM

## 2022-11-13 RX ORDER — NITROGLYCERIN 0.4 MG/1
0.4 TABLET SUBLINGUAL EVERY 5 MIN PRN
Status: DISCONTINUED | OUTPATIENT
Start: 2022-11-13 | End: 2022-11-16 | Stop reason: HOSPADM

## 2022-11-13 RX ORDER — ACETAMINOPHEN 650 MG/1
650 SUPPOSITORY RECTAL EVERY 6 HOURS PRN
Status: DISCONTINUED | OUTPATIENT
Start: 2022-11-13 | End: 2022-11-16 | Stop reason: HOSPADM

## 2022-11-13 RX ADMIN — SODIUM CHLORIDE, PRESERVATIVE FREE 10 ML: 5 INJECTION INTRAVENOUS at 20:13

## 2022-11-13 RX ADMIN — PIPERACILLIN AND TAZOBACTAM 3375 MG: 3; .375 INJECTION, POWDER, FOR SOLUTION INTRAVENOUS at 20:13

## 2022-11-13 RX ADMIN — TICAGRELOR 90 MG: 90 TABLET ORAL at 20:13

## 2022-11-13 RX ADMIN — HYDROMORPHONE HYDROCHLORIDE 1.5 MG: 1 INJECTION, SOLUTION INTRAMUSCULAR; INTRAVENOUS; SUBCUTANEOUS at 21:07

## 2022-11-13 RX ADMIN — HYDROMORPHONE HYDROCHLORIDE 1 MG: 1 INJECTION, SOLUTION INTRAMUSCULAR; INTRAVENOUS; SUBCUTANEOUS at 13:53

## 2022-11-13 RX ADMIN — METOPROLOL TARTRATE 25 MG: 25 TABLET, FILM COATED ORAL at 20:13

## 2022-11-13 RX ADMIN — IOPAMIDOL 100 ML: 755 INJECTION, SOLUTION INTRAVENOUS at 12:33

## 2022-11-13 RX ADMIN — ATORVASTATIN CALCIUM 40 MG: 40 TABLET, FILM COATED ORAL at 20:13

## 2022-11-13 RX ADMIN — ONDANSETRON 4 MG: 2 INJECTION INTRAMUSCULAR; INTRAVENOUS at 11:23

## 2022-11-13 RX ADMIN — HYDROMORPHONE HYDROCHLORIDE 1 MG: 1 INJECTION, SOLUTION INTRAMUSCULAR; INTRAVENOUS; SUBCUTANEOUS at 16:14

## 2022-11-13 RX ADMIN — DIATRIZOATE MEGLUMINE AND DIATRIZOATE SODIUM 15 ML: 660; 100 LIQUID ORAL; RECTAL at 10:22

## 2022-11-13 RX ADMIN — MORPHINE SULFATE 4 MG: 4 INJECTION INTRAVENOUS at 11:23

## 2022-11-13 RX ADMIN — SODIUM CHLORIDE 100 ML: 9 INJECTION, SOLUTION INTRAVENOUS at 12:33

## 2022-11-13 RX ADMIN — PIPERACILLIN AND TAZOBACTAM 3375 MG: 3; .375 INJECTION, POWDER, FOR SOLUTION INTRAVENOUS at 14:18

## 2022-11-13 RX ADMIN — HYDROMORPHONE HYDROCHLORIDE 1 MG: 1 INJECTION, SOLUTION INTRAMUSCULAR; INTRAVENOUS; SUBCUTANEOUS at 18:08

## 2022-11-13 RX ADMIN — SODIUM CHLORIDE, PRESERVATIVE FREE 10 ML: 5 INJECTION INTRAVENOUS at 12:33

## 2022-11-13 RX ADMIN — SODIUM CHLORIDE: 9 INJECTION, SOLUTION INTRAVENOUS at 22:40

## 2022-11-13 ASSESSMENT — PAIN DESCRIPTION - LOCATION
LOCATION: ABDOMEN

## 2022-11-13 ASSESSMENT — PAIN DESCRIPTION - ORIENTATION
ORIENTATION: RIGHT;LEFT
ORIENTATION: RIGHT;LEFT
ORIENTATION: UPPER

## 2022-11-13 ASSESSMENT — PAIN DESCRIPTION - ONSET: ONSET: ON-GOING

## 2022-11-13 ASSESSMENT — PAIN SCALES - GENERAL
PAINLEVEL_OUTOF10: 8
PAINLEVEL_OUTOF10: 3
PAINLEVEL_OUTOF10: 8
PAINLEVEL_OUTOF10: 8
PAINLEVEL_OUTOF10: 4
PAINLEVEL_OUTOF10: 8
PAINLEVEL_OUTOF10: 8

## 2022-11-13 ASSESSMENT — ENCOUNTER SYMPTOMS
SHORTNESS OF BREATH: 0
HEMATOCHEZIA: 0
ABDOMINAL PAIN: 1
HEMATEMESIS: 0
CONSTIPATION: 0
FLATUS: 0
NAUSEA: 0
DIARRHEA: 0
SORE THROAT: 0
BELCHING: 0

## 2022-11-13 ASSESSMENT — PAIN DESCRIPTION - PAIN TYPE: TYPE: ACUTE PAIN

## 2022-11-13 ASSESSMENT — PAIN DESCRIPTION - DESCRIPTORS
DESCRIPTORS: PRESSURE
DESCRIPTORS: SQUEEZING;SHARP
DESCRIPTORS: TIGHTNESS;STABBING

## 2022-11-13 ASSESSMENT — PAIN - FUNCTIONAL ASSESSMENT
PAIN_FUNCTIONAL_ASSESSMENT: 0-10
PAIN_FUNCTIONAL_ASSESSMENT: PREVENTS OR INTERFERES SOME ACTIVE ACTIVITIES AND ADLS
PAIN_FUNCTIONAL_ASSESSMENT: ACTIVITIES ARE NOT PREVENTED

## 2022-11-13 ASSESSMENT — PAIN DESCRIPTION - FREQUENCY: FREQUENCY: CONTINUOUS

## 2022-11-13 NOTE — ED TRIAGE NOTES
Patient states he had surgery on pancreas last Tuesday, believes they placed stent to drain, states he began having 8/10 pain in upper abd on both sides last night. Denies fevers nvd. Denies chest pain. Hx pancreatitis.

## 2022-11-13 NOTE — PROGRESS NOTES
Hourly rounding completed on this shift while pt on floor. PRN pain medication given per MAR. All needs met. Pt is currently resting in bed. Will give report to oncoming nurse.

## 2022-11-13 NOTE — PROGRESS NOTES
TRANSFER - IN REPORT:    Verbal report received from [facility] on Alaina Javed  being received from [facility] ED for routine progression of patient care      Report consisted of patient's Situation, Background, Assessment and   Recommendations(SBAR). Information from the following report(s) ED SBAR was reviewed with the receiving nurse. Opportunity for questions and clarification was provided. Assessment completed upon patient's arrival to unit and care assumed.

## 2022-11-13 NOTE — ED NOTES
TRANSFER - OUT REPORT:    Verbal report given to Apryl Reyes RN on America Reid  being transferred to [address] [place] [address] for routine progression of patient care       Report consisted of patient's Situation, Background, Assessment and   Recommendations(SBAR). Information from the following report(s) Recent Results was reviewed with the receiving nurse. Lines:   Peripheral IV 11/13/22 Left Forearm (Active)   Site Assessment Clean, dry & intact 11/13/22 1019   Line Status Blood return noted; Flushed;Normal saline locked 11/13/22 1019   Dressing Status New dressing applied 11/13/22 1019        Opportunity for questions and clarification was provided.       Patient transported with:  Jessie Steve RN  11/13/22 3921

## 2022-11-13 NOTE — FLOWSHEET NOTE
11/13/22 1702   Dual Clinician Skin Assessment   Dual Skin Assessment (4 Eyes) WDL   Second Clinical  (First and Last Name) Shreyas Arce RN     Pt skin warm, dry, and intact.  Pt has tattoos to right arm and chest.

## 2022-11-13 NOTE — CONSULTS
[facility] Consult Note           Referring Physician:     Consult Date: 2022    Reason for Consult: Pancreatic pseudocyst    History of Present Illness:  Patient is a 44 y.o. male with Etoh pancreatitis who is seen in consultation for abdominal pain and fulness. No n/v. Just had cyst gastrostomy with axios stent 22. Also recent STEMI. Now back with CT showin. New or significantly larger pseudocyst located in the left upper abdominal   quadrant beneath the diaphragm is between the stomach and spleen. This measures   roughly 5 cm. 2.  Increase in the peripancreatic inflammation primarily accumulating in the   lesser sac indicative of pancreatitis. 3.  Stable 1.5 cm cyst in the body of the pancreas. 4.  Postsurgical changes indicative of a cyst gastrostomy at the site of the   prior large pseudocyst which has decreased significantly in size.         Past Medical History:   Diagnosis Date    Abdominal pain 10/25/2022    Abnormal CT of the abdomen     Acute alcoholic pancreatitis     Acute kidney injury (nontraumatic) ([address] 10/31/2022    pt denies    Acute on chronic pancreatitis ([address] 2022    Alcohol use disorder, severe, dependence ([address] 2019    Carpal tunnel syndrome, left     Cigarette smoker     GERD (gastroesophageal reflux disease)     controlled with med    History of pancreatitis     x 4 or 5 times-- last time admitted to hospital 10/10/26/22 x 7 days    Hypertension     controlled with med      Past Surgical History:   Procedure Laterality Date    CARDIAC PROCEDURE N/A 2022    LEFT HEART CATH / CORONARY ANGIOGRAPHY performed by Oseas Rojo MD at [facility] CATH LAB    CARDIAC PROCEDURE N/A 2022    Percutaneous coronary intervention performed by Oseas Rojo MD at [facility] CATH LAB    CHOLECYSTECTOMY      ORTHOPEDIC SURGERY Left     wrist surg--nerve surg    ORTHOPEDIC SURGERY Left     foot surg as child UPPER GASTROINTESTINAL ENDOSCOPY N/A 11/9/2022    EGD/ENDOSCOPIC ULTRASOUND/AXIOS STENT ROOM 2226 **Rep will be present performed by Benjie Ledezma MD at [facility] ENDOSCOPY      Family History   Problem Relation Age of Onset    No Known Problems Mother     Heart Attack Father      Social History     Occupational History    Not on file   Tobacco Use    Smoking status: Every Day     Packs/day: 1.00     Years: 20.00     Pack years: 20.00     Types: Cigarettes    Smokeless tobacco: Never   Vaping Use    Vaping Use: Never used   Substance and Sexual Activity    Alcohol use: Yes     Comment: none since 10/4/22-- had drank 3 glasses of wine daily    Drug use: No    Sexual activity: Not on file       Hospital Medications:  Current Facility-Administered Medications   Medication Dose Route Frequency    piperacillin-tazobactam (ZOSYN) 3,375 mg in sodium chloride 0.9 % 50 mL IVPB (mini-bag)  3,375 mg IntraVENous q8h    atorvastatin (LIPITOR) tablet 40 mg  40 mg Oral Nightly    colchicine (COLCRYS) tablet 0.6 mg  0.6 mg Oral Daily    lisinopril (PRINIVIL;ZESTRIL) tablet 5 mg  5 mg Oral Daily    metoprolol tartrate (LOPRESSOR) tablet 25 mg  25 mg Oral BID    nitroGLYCERIN (NITROSTAT) SL tablet 0.4 mg  0.4 mg SubLINGual Q5 Min PRN    [START ON 11/14/2022] pantoprazole (PROTONIX) tablet 40 mg  40 mg Oral QAM AC    ticagrelor (BRILINTA) tablet 90 mg  90 mg Oral BID    [START ON 11/14/2022] aspirin chewable tablet 81 mg  81 mg Oral Daily    sodium chloride flush 0.9 % injection 5-40 mL  5-40 mL IntraVENous 2 times per day    sodium chloride flush 0.9 % injection 5-40 mL  5-40 mL IntraVENous PRN    0.9 % sodium chloride infusion   IntraVENous PRN    ondansetron (ZOFRAN-ODT) disintegrating tablet 4 mg  4 mg Oral Q8H PRN    Or    ondansetron (ZOFRAN) injection 4 mg  4 mg IntraVENous Q6H PRN    polyethylene glycol (GLYCOLAX) packet 17 g  17 g Oral Daily PRN    acetaminophen (TYLENOL) tablet 650 mg  650 mg Oral Q6H PRN    Or acetaminophen (TYLENOL) suppository 650 mg  650 mg Rectal Q6H PRN    magnesium sulfate 2000 mg in 50 mL IVPB premix  2,000 mg IntraVENous PRN    potassium chloride 10 mEq/100 mL IVPB (Peripheral Line)  10 mEq IntraVENous PRN    potassium chloride (KLOR-CON M) extended release tablet 40 mEq  40 mEq Oral PRN    Or    potassium bicarb-citric acid (EFFER-K) effervescent tablet 40 mEq  40 mEq Oral PRN    Or    potassium chloride 10 mEq/100 mL IVPB (Peripheral Line)  10 mEq IntraVENous PRN    HYDROmorphone (DILAUDID) injection 1 mg  1 mg IntraVENous Q4H PRN       Allergies:  No Known Allergies    Review of Systems:  A comprehensive review of systems was negative except for that written in the History of Present Illness. Objective:     Physical Exam:  Vitals:  Visit Vitals  /67   Pulse 82   Temp 97.5 °F (36.4 °C) (Oral)   Resp 18   Ht 5' 9\" (1.753 m)   Wt 158 lb 3.2 oz (71.8 kg)   SpO2 98%   BMI 23.36 kg/m²       General: No acute distress. Skin:  Extremities and face reveal no rashes. No calvin erythema. No telangiectasias on the chest wall. HEENT: Sclerae anicteric. No oral ulcers. No abnormal pigmentation of the lips. The neck is supple. Cardiovascular: Regular rate and rhythm. No murmurs, gallops, or rubs. Respiratory:  Comfortable breathing  With no accessory muscle use. Clear breath sounds with no wheezes, rales, or rhonchi. GI:  Abdomen nondistended, soft, and nontender. Normal active bowel sounds. No enlargement of the liver or spleen. No masses palpable. Musculoskeletal:  No pitting edema of the lower legs. Extremities have good range of motion. Neurological:  Gross memory appears intact. Patient is alert and oriented. Psychiatric:  Mood appears appropriate with judgement intact. Lymphatic:  No cervical or supraclavicular adenopathy.     Laboratory:    Recent Labs     11/13/22  1019   WBC 12.3*   RBC 5.11   HGB 14.2   HCT 42.2   *      Recent Labs     11/13/22  1019      K 4.4 CL 99   CO2 25   BUN 12     No results for input(s): INR, APTT in the last 72 hours. Invalid input(s): PTP  No results for input(s): ALB, TP, AML in the last 72 hours. Invalid input(s): TBIL, CBIL, SGOT, GPT, AP, LPSE    Assessment:       A 44 y.o. male with Etoh pancreatitis with large pseudocyst s/p axios stent here with another new pseudocyst on CT. Plan:       Emailed Dr. China Cameron to let him know and to see if any role for repeat intervention      Signed By: Reema Devlin.  MD Kulwinder     November 13, 2022

## 2022-11-13 NOTE — ED PROVIDER NOTES
Emergency Department Provider Note                   PCP: Luz Shi MD               Age: 44 y.o. Sex: male       ICD-10-CM    1.  Pancreatic pseudocyst  K86.3           DISPOSITION Decision To Admit 11/13/2022 01:32:29 PM        MDM     Amount and/or Complexity of Data Reviewed  Clinical lab tests: ordered and reviewed  Tests in the radiology section of CPT®: ordered and reviewed  Review and summarize past medical records: yes  Independent visualization of images, tracings, or specimens: yes    Risk of Complications, Morbidity, and/or Mortality  Presenting problems: moderate  Diagnostic procedures: moderate  Management options: moderate    Patient Progress  Patient progress: stable             Orders Placed This Encounter   Procedures    CT ABDOMEN PELVIS W IV CONTRAST Additional Contrast? Oral    CBC with Diff    CMP    Lipase    Urinalysis w rflx microscopic    Troponin T    Troponin T    Diet NPO    EKG 12 Lead    Saline lock IV        Medications   diatrizoate meglumine-sodium (GASTROGRAFIN) 66-10 % solution 15 mL (15 mLs Oral Given 11/13/22 1022)   morphine sulfate (PF) injection 4 mg (4 mg IntraVENous Given 11/13/22 1123)   ondansetron (ZOFRAN) injection 4 mg (4 mg IntraVENous Given 11/13/22 1123)   iopamidol (ISOVUE-370) 76 % injection 100 mL (100 mLs IntraVENous Given 11/13/22 1233)   0.9 % sodium chloride bolus (0 mLs IntraVENous Stopped 11/13/22 1234)   sodium chloride flush 0.9 % injection 10 mL (10 mLs IntraVENous Given 11/13/22 1233)   HYDROmorphone HCl PF (DILAUDID) injection 1 mg (1 mg IntraVENous Given 11/13/22 1353)       New Prescriptions    No medications on file        Emir Hernandez is a 44 y.o. male who presents to the Emergency Department with chief complaint of    Chief Complaint   Patient presents with    Abdominal Pain    Post-op Problem      Patient is a 26-year-old male with a history of chronic pancreatitis with a pancreatic pseudocyst status post cyst gastrostomy 11/9/2022, NSTEMI 11/2/2022 who is currently on Brilinta, tobacco use, cholecystectomy who now presents with increasing upper abdominal pain and left upper quadrant. Patient denies any fevers or chills and denies any nausea vomiting or diarrhea. Patient states the pain is a dull achy sensation currently a 8 out of 10. Patient denies any chest pain or shortness of breath. The history is provided by the patient. Abdominal Pain  Pain location:  LUQ and epigastric  Pain quality: aching and dull    Pain radiates to:  Does not radiate  Pain severity:  Moderate  Onset quality:  Gradual  Duration:  2 days  Timing:  Constant  Progression:  Waxing and waning  Chronicity:  Chronic  Context: alcohol use (No alcohol recently)    Relieved by:  Nothing  Worsened by:  Nothing  Ineffective treatments:  None tried  Associated symptoms: no anorexia, no belching, no chest pain, no chills, no constipation, no diarrhea, no dysuria, no fatigue, no fever, no flatus, no hematemesis, no hematochezia, no nausea, no shortness of breath and no sore throat    Risk factors: multiple surgeries        Review of Systems   Constitutional:  Negative for chills, fatigue and fever. HENT:  Negative for sore throat. Respiratory:  Negative for shortness of breath. Cardiovascular:  Negative for chest pain. Gastrointestinal:  Positive for abdominal pain. Negative for anorexia, constipation, diarrhea, flatus, hematemesis, hematochezia and nausea. Genitourinary:  Negative for dysuria. All other systems reviewed and are negative.     Past Medical History:   Diagnosis Date    Abdominal pain 10/25/2022    Abnormal CT of the abdomen 18/46/9441    Acute alcoholic pancreatitis 93/87/8657    Acute kidney injury (nontraumatic) ([address] 10/31/2022    pt denies    Acute on chronic pancreatitis ([address] 07/30/2022    Alcohol use disorder, severe, dependence ([address] 08/21/2019    Carpal tunnel syndrome, left     Cigarette smoker     GERD (gastroesophageal reflux disease)     controlled with med    History of pancreatitis     x 4 or 5 times-- last time admitted to hospital 10/10/26/22 x 7 days    Hypertension     controlled with med        Past Surgical History:   Procedure Laterality Date    CARDIAC PROCEDURE N/A 11/2/2022    LEFT HEART CATH / CORONARY ANGIOGRAPHY performed by Eddie Richards MD at [facility] N/A 11/2/2022    Percutaneous coronary intervention performed by Eddie Richards MD at [facility] CATH LAB    CHOLECYSTECTOMY  2012    ORTHOPEDIC SURGERY Left     wrist surg--nerve surg    ORTHOPEDIC SURGERY Left     foot surg as child    UPPER GASTROINTESTINAL ENDOSCOPY N/A 11/9/2022    EGD/ENDOSCOPIC ULTRASOUND/AXIOS STENT ROOM 2226 **Rep will be present performed by Scott Sotomayor MD at [facility] ENDOSCOPY        Family History   Problem Relation Age of Onset    No Known Problems Mother     Heart Attack Father         Social History     Socioeconomic History    Marital status:      Spouse name: None    Number of children: None    Years of education: None    Highest education level: None   Tobacco Use    Smoking status: Every Day     Packs/day: 1.00     Years: 20.00     Pack years: 20.00     Types: Cigarettes    Smokeless tobacco: Never   Vaping Use    Vaping Use: Never used   Substance and Sexual Activity    Alcohol use: Yes     Comment: none since 10/4/22-- had drank 3 glasses of wine daily    Drug use: No         Patient has no known allergies.      Previous Medications    ASPIRIN 81 MG CHEWABLE TABLET    Take 1 tablet by mouth daily    ATORVASTATIN (LIPITOR) 40 MG TABLET    Take 1 tablet by mouth nightly    COLCHICINE (COLCRYS) 0.6 MG TABLET    Take 1 tablet by mouth daily    LISINOPRIL (PRINIVIL;ZESTRIL) 5 MG TABLET    Take 1 tablet by mouth daily    METOPROLOL TARTRATE (LOPRESSOR) 25 MG TABLET    Take 1 tablet by mouth 2 times daily    NALOXONE (NARCAN) 0.4 MG/ML INJECTION    Infuse 1 mL intravenously as needed (shortness of breath)    NITROGLYCERIN (NITROSTAT) 0.4 MG SL TABLET    Place 1 tablet under the tongue every 5 minutes as needed for Chest pain up to max of 3 total doses. If no relief after 1 dose, call 911. OMEPRAZOLE (PRILOSEC) 20 MG DELAYED RELEASE CAPSULE    Take 20 mg by mouth daily    TICAGRELOR (BRILINTA) 90 MG TABS TABLET    Take 1 tablet by mouth 2 times daily        Vitals signs and nursing note reviewed. Patient Vitals for the past 4 hrs:   Temp Pulse Resp BP SpO2   11/13/22 1345 -- 70 17 107/72 100 %   11/13/22 1315 98.3 °F (36.8 °C) -- -- -- --   11/13/22 1127 -- 70 16 121/74 100 %   11/13/22 1123 -- -- 20 -- --   11/13/22 1003 -- 80 18 110/82 100 %          Physical Exam  Vitals and nursing note reviewed. Constitutional:       Appearance: Normal appearance. HENT:      Head: Normocephalic and atraumatic. Nose: Nose normal.      Mouth/Throat:      Mouth: Mucous membranes are moist.   Eyes:      Extraocular Movements: Extraocular movements intact. Conjunctiva/sclera: Conjunctivae normal.      Pupils: Pupils are equal, round, and reactive to light. Cardiovascular:      Rate and Rhythm: Normal rate. Pulses: Normal pulses. Heart sounds: Normal heart sounds. Pulmonary:      Effort: Pulmonary effort is normal.      Breath sounds: Normal breath sounds. Abdominal:      General: Abdomen is flat. Tenderness: There is abdominal tenderness in the epigastric area and left upper quadrant. Musculoskeletal:         General: Normal range of motion. Cervical back: Normal range of motion and neck supple. Skin:     General: Skin is warm. Capillary Refill: Capillary refill takes less than 2 seconds. Neurological:      General: No focal deficit present. Mental Status: He is alert and oriented to person, place, and time. Psychiatric:         Mood and Affect: Mood normal.         Behavior: Behavior normal.         Thought Content:  Thought content normal.         Judgment: Judgment normal.        Procedures    Results for orders placed or performed during the hospital encounter of 11/13/22   CT ABDOMEN PELVIS W IV CONTRAST Additional Contrast? Oral    Narrative    CT OF THE ABDOMEN AND PELVIS WITH CONTRAST. CLINICAL INDICATION: Upper abdominal pain, prior cyst gastrostomy of a  pseudocyst    PROCEDURE: Serial thin section axial images obtained from the lung bases through  the proximal femurs following the administration of 100 cc of Isovue 370  intravenous contrast.  Radiation dose reduction techniques were used for this  study. Our CT scanners use one or all of the following: Automated exposure  control, adjusted of the mA and/or kV according to patient size, iterative  reconstruction    COMPARISON: CT abdomen and pelvis dated 10/25/2022    FINDINGS:     CT ABDOMEN: The cyst gastrostomy is appreciated in the left upper abdominal  quadrant. The pseudocyst has decreased in size now measuring 7.9 x 3.5 cm. There is a new or significantly larger pseudocyst now evident located between  the stomach and spleen that measures 5.0 x 3.5 cm. This is superior to the cyst  gastrostomy. There is significant inflammatory stranding surrounding the area  of the surgery. There is increase in the fluid and inflammatory stranding in  the lesser sac with small cystic fluid collections there is a stable small cyst  in the body of the pancreas measures 1.3 cm. There is no significant dilation  of the pancreatic duct. The gallbladder is absent. The liver and spleen are  normal.  The kidneys are normal.  The adrenal glands are normal.  The bowel is  unremarkable. CT PELVIS: The bladder is normal.  The rectum is decompressed. There is trace  free fluid in the cul-de-sac. Sigmoid colon is unremarkable. There is no  inguinal hernia. No aggressive bone lesions identified. Impression    1.   New or significantly larger pseudocyst located in the left upper abdominal  quadrant beneath the diaphragm is between the stomach and spleen. This measures  roughly 5 cm. 2.  Increase in the peripancreatic inflammation primarily accumulating in the  lesser sac indicative of pancreatitis. 3.  Stable 1.5 cm cyst in the body of the pancreas. 4.  Postsurgical changes indicative of a cyst gastrostomy at the site of the  prior large pseudocyst which has decreased significantly in size.      CBC with Diff   Result Value Ref Range    WBC 12.3 (H) 4.3 - 11.1 K/uL    RBC 5.11 4.23 - 5.60 M/uL    Hemoglobin 14.2 13.6 - 17.2 g/dL    Hematocrit 42.2 41.1 - 50.3 %    MCV 82.6 82.0 - 102.0 FL    MCH 27.8 26.1 - 32.9 PG    MCHC 33.6 31.4 - 35.0 g/dL    RDW 13.8 11.9 - 14.6 %    Platelets 714 (H) 604 - 450 K/uL    MPV 7.8 (L) 9.4 - 12.3 FL    nRBC 0.00 0.0 - 0.2 K/uL    Differential Type AUTOMATED      Seg Neutrophils 78 43 - 78 %    Lymphocytes 15 13 - 44 %    Monocytes 6 4.0 - 12.0 %    Eosinophils % 1 0.5 - 7.8 %    Basophils 1 0.0 - 2.0 %    Immature Granulocytes 1 0.0 - 5.0 %    Segs Absolute 9.6 (H) 1.7 - 8.2 K/UL    Absolute Lymph # 1.8 0.5 - 4.6 K/UL    Absolute Mono # 0.7 0.1 - 1.3 K/UL    Absolute Eos # 0.1 0.0 - 0.8 K/UL    Basophils Absolute 0.1 0.0 - 0.2 K/UL    Absolute Immature Granulocyte 0.1 0.0 - 0.5 K/UL   CMP   Result Value Ref Range    Sodium 135 133 - 143 mmol/L    Potassium 4.4 3.5 - 5.1 mmol/L    Chloride 99 98 - 107 mmol/L    CO2 25 21 - 32 mmol/L    Anion Gap 11 2 - 11 mmol/L    Glucose 133 (H) 65 - 100 mg/dL    BUN 12 7.0 - 18.0 MG/DL    Creatinine 0.92 0.8 - 1.5 MG/DL    Est, Glom Filt Rate >60 >60 ml/min/1.73m2    Calcium 9.2 8.3 - 10.4 MG/DL    Total Bilirubin 0.3 0.2 - 1.1 MG/DL    ALT 20 13.0 - 61.0 U/L    AST 16 15 - 37 U/L    Alk Phosphatase 81 45.0 - 117.0 U/L    Total Protein 6.6 6.4 - 8.2 g/dL    Albumin 3.2 (L) 3.5 - 5.0 g/dL    Globulin 3.4 2.8 - 4.5 g/dL    Albumin/Globulin Ratio 0.9 0.4 - 1.6     Lipase   Result Value Ref Range    Lipase 172 (H) 13 - 60 U/L   Urinalysis w rflx microscopic   Result Value Ref Range    Color, UA YELLOW      Appearance CLEAR      Specific Gravity, UA 1.010 1.001 - 1.023      pH, Urine 7.0 5.0 - 9.0      Protein, UA Negative NEG mg/dL    Glucose, UA Negative mg/dL    Ketones, Urine Negative NEG mg/dL    Bilirubin Urine Negative NEG      Blood, Urine Negative NEG      Urobilinogen, Urine 0.2 0.2 - 1.0 EU/dL    Nitrite, Urine Negative NEG      Leukocyte Esterase, Urine Negative NEG     Troponin T   Result Value Ref Range    Troponin T 44.0 (H) 0 - 22 ng/L        CT ABDOMEN PELVIS W IV CONTRAST Additional Contrast? Oral   Final Result   1. New or significantly larger pseudocyst located in the left upper abdominal   quadrant beneath the diaphragm is between the stomach and spleen. This measures   roughly 5 cm. 2.  Increase in the peripancreatic inflammation primarily accumulating in the   lesser sac indicative of pancreatitis. 3.  Stable 1.5 cm cyst in the body of the pancreas. 4.  Postsurgical changes indicative of a cyst gastrostomy at the site of the   prior large pseudocyst which has decreased significantly in size. Voice dictation software was used during the making of this note. This software is not perfect and grammatical and other typographical errors may be present. This note has not been completely proofread for errors.       July Baxter MD  11/13/22 5179

## 2022-11-14 LAB
ALBUMIN SERPL-MCNC: 2.2 G/DL (ref 3.5–5)
ALBUMIN/GLOB SERPL: 0.6 {RATIO} (ref 0.4–1.6)
ALP SERPL-CCNC: 70 U/L (ref 50–136)
ALT SERPL-CCNC: 37 U/L (ref 12–65)
ANION GAP SERPL CALC-SCNC: 4 MMOL/L (ref 2–11)
AST SERPL-CCNC: 10 U/L (ref 15–37)
BASOPHILS # BLD: 0.1 K/UL (ref 0–0.2)
BASOPHILS NFR BLD: 1 % (ref 0–2)
BILIRUB SERPL-MCNC: 0.2 MG/DL (ref 0.2–1.1)
BUN SERPL-MCNC: 13 MG/DL (ref 6–23)
CALCIUM SERPL-MCNC: 8.4 MG/DL (ref 8.3–10.4)
CHLORIDE SERPL-SCNC: 99 MMOL/L (ref 101–110)
CO2 SERPL-SCNC: 29 MMOL/L (ref 21–32)
CREAT SERPL-MCNC: 0.9 MG/DL (ref 0.8–1.5)
DIFFERENTIAL METHOD BLD: ABNORMAL
EOSINOPHIL # BLD: 0.2 K/UL (ref 0–0.8)
EOSINOPHIL NFR BLD: 2 % (ref 0.5–7.8)
ERYTHROCYTE [DISTWIDTH] IN BLOOD BY AUTOMATED COUNT: 14.1 % (ref 11.9–14.6)
GLOBULIN SER CALC-MCNC: 3.4 G/DL (ref 2.8–4.5)
GLUCOSE SERPL-MCNC: 130 MG/DL (ref 65–100)
HCT VFR BLD AUTO: 37.4 % (ref 41.1–50.3)
HCT VFR BLD AUTO: 38.8 % (ref 41.1–50.3)
HGB BLD-MCNC: 11.6 G/DL (ref 13.6–17.2)
HGB BLD-MCNC: 11.9 G/DL (ref 13.6–17.2)
IMM GRANULOCYTES # BLD AUTO: 0.1 K/UL (ref 0–0.5)
IMM GRANULOCYTES NFR BLD AUTO: 1 % (ref 0–5)
LYMPHOCYTES # BLD: 2.1 K/UL (ref 0.5–4.6)
LYMPHOCYTES NFR BLD: 21 % (ref 13–44)
MCH RBC QN AUTO: 27 PG (ref 26.1–32.9)
MCHC RBC AUTO-ENTMCNC: 31 G/DL (ref 31.4–35)
MCV RBC AUTO: 87 FL (ref 82–102)
MONOCYTES # BLD: 1 K/UL (ref 0.1–1.3)
MONOCYTES NFR BLD: 9 % (ref 4–12)
NEUTS SEG # BLD: 6.9 K/UL (ref 1.7–8.2)
NEUTS SEG NFR BLD: 66 % (ref 43–78)
NRBC # BLD: 0 K/UL (ref 0–0.2)
PLATELET # BLD AUTO: 544 K/UL (ref 150–450)
PMV BLD AUTO: 8.4 FL (ref 9.4–12.3)
POTASSIUM SERPL-SCNC: 4.3 MMOL/L (ref 3.5–5.1)
PROT SERPL-MCNC: 5.6 G/DL (ref 6.3–8.2)
RBC # BLD AUTO: 4.3 M/UL (ref 4.23–5.6)
SODIUM SERPL-SCNC: 132 MMOL/L (ref 133–143)
WBC # BLD AUTO: 10.4 K/UL (ref 4.3–11.1)

## 2022-11-14 PROCEDURE — 36415 COLL VENOUS BLD VENIPUNCTURE: CPT

## 2022-11-14 PROCEDURE — 97161 PT EVAL LOW COMPLEX 20 MIN: CPT

## 2022-11-14 PROCEDURE — 85014 HEMATOCRIT: CPT

## 2022-11-14 PROCEDURE — 85025 COMPLETE CBC W/AUTO DIFF WBC: CPT

## 2022-11-14 PROCEDURE — 80053 COMPREHEN METABOLIC PANEL: CPT

## 2022-11-14 PROCEDURE — 6370000000 HC RX 637 (ALT 250 FOR IP): Performed by: STUDENT IN AN ORGANIZED HEALTH CARE EDUCATION/TRAINING PROGRAM

## 2022-11-14 PROCEDURE — 6360000002 HC RX W HCPCS: Performed by: STUDENT IN AN ORGANIZED HEALTH CARE EDUCATION/TRAINING PROGRAM

## 2022-11-14 PROCEDURE — 1100000003 HC PRIVATE W/ TELEMETRY

## 2022-11-14 PROCEDURE — 2580000003 HC RX 258: Performed by: STUDENT IN AN ORGANIZED HEALTH CARE EDUCATION/TRAINING PROGRAM

## 2022-11-14 RX ADMIN — METOPROLOL TARTRATE 25 MG: 25 TABLET, FILM COATED ORAL at 08:54

## 2022-11-14 RX ADMIN — HYDROMORPHONE HYDROCHLORIDE 1.5 MG: 1 INJECTION, SOLUTION INTRAMUSCULAR; INTRAVENOUS; SUBCUTANEOUS at 03:05

## 2022-11-14 RX ADMIN — SODIUM CHLORIDE, PRESERVATIVE FREE 10 ML: 5 INJECTION INTRAVENOUS at 21:21

## 2022-11-14 RX ADMIN — HYDROMORPHONE HYDROCHLORIDE 1.5 MG: 1 INJECTION, SOLUTION INTRAMUSCULAR; INTRAVENOUS; SUBCUTANEOUS at 00:07

## 2022-11-14 RX ADMIN — TICAGRELOR 90 MG: 90 TABLET ORAL at 20:39

## 2022-11-14 RX ADMIN — METOPROLOL TARTRATE 25 MG: 25 TABLET, FILM COATED ORAL at 20:39

## 2022-11-14 RX ADMIN — HYDROMORPHONE HYDROCHLORIDE 1.5 MG: 1 INJECTION, SOLUTION INTRAMUSCULAR; INTRAVENOUS; SUBCUTANEOUS at 18:00

## 2022-11-14 RX ADMIN — HYDROMORPHONE HYDROCHLORIDE 1.5 MG: 1 INJECTION, SOLUTION INTRAMUSCULAR; INTRAVENOUS; SUBCUTANEOUS at 15:13

## 2022-11-14 RX ADMIN — ASPIRIN 81 MG 81 MG: 81 TABLET ORAL at 08:53

## 2022-11-14 RX ADMIN — PIPERACILLIN AND TAZOBACTAM 3375 MG: 3; .375 INJECTION, POWDER, FOR SOLUTION INTRAVENOUS at 12:32

## 2022-11-14 RX ADMIN — LISINOPRIL 5 MG: 5 TABLET ORAL at 08:54

## 2022-11-14 RX ADMIN — SODIUM CHLORIDE: 9 INJECTION, SOLUTION INTRAVENOUS at 12:33

## 2022-11-14 RX ADMIN — TICAGRELOR 90 MG: 90 TABLET ORAL at 08:53

## 2022-11-14 RX ADMIN — PIPERACILLIN AND TAZOBACTAM 3375 MG: 3; .375 INJECTION, POWDER, FOR SOLUTION INTRAVENOUS at 03:06

## 2022-11-14 RX ADMIN — COLCHICINE 0.6 MG: 0.6 TABLET, FILM COATED ORAL at 08:53

## 2022-11-14 RX ADMIN — HYDROMORPHONE HYDROCHLORIDE 1.5 MG: 1 INJECTION, SOLUTION INTRAMUSCULAR; INTRAVENOUS; SUBCUTANEOUS at 12:23

## 2022-11-14 RX ADMIN — SODIUM CHLORIDE, PRESERVATIVE FREE 10 ML: 5 INJECTION INTRAVENOUS at 08:57

## 2022-11-14 RX ADMIN — PANTOPRAZOLE SODIUM 40 MG: 40 TABLET, DELAYED RELEASE ORAL at 08:54

## 2022-11-14 RX ADMIN — HYDROMORPHONE HYDROCHLORIDE 1.5 MG: 1 INJECTION, SOLUTION INTRAMUSCULAR; INTRAVENOUS; SUBCUTANEOUS at 09:03

## 2022-11-14 RX ADMIN — HYDROMORPHONE HYDROCHLORIDE 1.5 MG: 1 INJECTION, SOLUTION INTRAMUSCULAR; INTRAVENOUS; SUBCUTANEOUS at 06:06

## 2022-11-14 RX ADMIN — HYDROMORPHONE HYDROCHLORIDE 1.5 MG: 1 INJECTION, SOLUTION INTRAMUSCULAR; INTRAVENOUS; SUBCUTANEOUS at 21:17

## 2022-11-14 RX ADMIN — ATORVASTATIN CALCIUM 40 MG: 40 TABLET, FILM COATED ORAL at 20:39

## 2022-11-14 RX ADMIN — PIPERACILLIN AND TAZOBACTAM 3375 MG: 3; .375 INJECTION, POWDER, FOR SOLUTION INTRAVENOUS at 20:39

## 2022-11-14 ASSESSMENT — PAIN SCALES - GENERAL
PAINLEVEL_OUTOF10: 6
PAINLEVEL_OUTOF10: 0
PAINLEVEL_OUTOF10: 8
PAINLEVEL_OUTOF10: 6
PAINLEVEL_OUTOF10: 8
PAINLEVEL_OUTOF10: 5
PAINLEVEL_OUTOF10: 0
PAINLEVEL_OUTOF10: 6
PAINLEVEL_OUTOF10: 8
PAINLEVEL_OUTOF10: 4
PAINLEVEL_OUTOF10: 3
PAINLEVEL_OUTOF10: 3
PAINLEVEL_OUTOF10: 2
PAINLEVEL_OUTOF10: 8

## 2022-11-14 ASSESSMENT — PAIN DESCRIPTION - ORIENTATION
ORIENTATION: RIGHT;LEFT

## 2022-11-14 ASSESSMENT — PAIN DESCRIPTION - DESCRIPTORS
DESCRIPTORS: SHARP
DESCRIPTORS: SHARP
DESCRIPTORS: SHARP;THROBBING;TIGHTNESS
DESCRIPTORS: PRESSURE
DESCRIPTORS: PRESSURE
DESCRIPTORS: SHARP
DESCRIPTORS: STABBING
DESCRIPTORS: PRESSURE
DESCRIPTORS: SHARP;PRESSURE
DESCRIPTORS: PRESSURE;SHARP

## 2022-11-14 ASSESSMENT — PAIN DESCRIPTION - LOCATION
LOCATION: ABDOMEN
LOCATION: FLANK
LOCATION: ABDOMEN
LOCATION: FLANK
LOCATION: ABDOMEN
LOCATION: FLANK
LOCATION: ABDOMEN
LOCATION: FLANK
LOCATION: FLANK
LOCATION: ABDOMEN
LOCATION: FLANK

## 2022-11-14 ASSESSMENT — PAIN SCALES - WONG BAKER
WONGBAKER_NUMERICALRESPONSE: 0
WONGBAKER_NUMERICALRESPONSE: 0

## 2022-11-14 ASSESSMENT — PAIN - FUNCTIONAL ASSESSMENT
PAIN_FUNCTIONAL_ASSESSMENT: ACTIVITIES ARE NOT PREVENTED
PAIN_FUNCTIONAL_ASSESSMENT: ACTIVITIES ARE NOT PREVENTED
PAIN_FUNCTIONAL_ASSESSMENT: PREVENTS OR INTERFERES SOME ACTIVE ACTIVITIES AND ADLS
PAIN_FUNCTIONAL_ASSESSMENT: ACTIVITIES ARE NOT PREVENTED

## 2022-11-14 NOTE — CARE COORDINATION
CM spoke with patient to complete initial assessment. Patient alert and oriented x3. States that he lives with his sadaf' and 2 children in a one story home with 0STE. Patient is completely independent with ADL's, still drives and works full time. No DME's. Demographics and PCP confirmed. No hx of HH or STR. Patient just discharged on 11/10 - readmission assessment completed and charted. Previous CM provided patient with information on [address] [place] [address]. DCP is for patient to return home with yosef when medically stable. PT/OT evaluated patient and states no additional therapy needed. CM will continue to follow to assist with any needs that may arise. 11/14/22 0186   Service Assessment   Patient Orientation Alert and Oriented   Cognition Alert   History Provided By Patient   Primary Caregiver Self   Support Systems Spouse/Significant Other   PCP Verified by CM Yes   Prior Functional Level Independent in ADLs/IADLs   Current Functional Level Independent in ADLs/IADLs   Can patient return to prior living arrangement Yes   Ability to make needs known: Good   Social/Functional History   Lives With Spouse   Type of [address] One level   ADL Assistance Independent   Ambulation Assistance Independent   Transfer Assistance Independent   Occupation Full time employment   Discharge Planning   Type of [address] Prior To Admission None   Potential Assistance Needed N/A   DME Ordered?  No   Patient expects to be discharged to: [address] [place] [address] Discharge   Services At/After Discharge None

## 2022-11-14 NOTE — H&P
Hospitalist History and Physical   Admit Date:  2022  4:59 PM   Name:  Fady Lazcano   Age:  44 y.o. Sex:  male  :  1985   MRN:  [id number]   Room:  [facility]    Presenting Complaint: No chief complaint on file. Reason(s) for Admission: Pancreatic pseudocyst [K86.3]     History of Present Illness:   Fady Lazcano is a 44 y.o. male with medical history of ETOH pancreatitis, hypertension, who presented with abdominal pain. Patient had just undergone cystogastrostomy with stent on 2022. Patient at that time also suffered STEMI. Patient continued to have worsening pain in his upper abdomen and described as a dull achy pain rating it 8 out of 10. CT abdomen pelvis showed new pseudocyst in the left upper quadrant between stomach and spleen about 5 cm. Increased JAYA pancreatic inflammation indicated both pancreatitis. Stable 1.5 cm cyst in the body of the pancreas, and postsurgical changes indicate of of cyst gastrostomy at site of prior large pseudocyst which has decreased significantly in size. WBC of 12.3. Review of Systems:  10 systems reviewed and negative except as noted in HPI. Assessment & Plan:   Pancreatic pseudocyst  -GI consulted, appreciate recommendations  - Continue IV fluids  - Analgesics  - N.p.o. after midnight  - Currently on clear liquid diet  - Patient was recently seen  and underwent stent placement for large pseudocyst  - WBC of 12.3  - Antibiotics, Zosyn    History of STEMI/coronary artery disease  - On last admission patient underwent cardiac stent  - Currently on Brilinta  - Patient without any cardiac chest pain, troponins continue to trend down  - Continue aspirin and statin  - Can consider cardiology consult if patient needs to undergo any intervention and recommendations on Brilinta    Hypertension  - Continue home antihypertensives      Anticipated discharge needs:   Dispo pending clinical course    Diet: ADULT DIET;  Clear Liquid  Diet NPO  VTE ppx: Brilinta  Code status: Full Code    Hospital Problems:  Principal Problem:    Pancreatic pseudocyst  Resolved Problems:    * No resolved hospital problems.  *       Past History:     Past Medical History:   Diagnosis Date    Abdominal pain 10/25/2022    Abnormal CT of the abdomen 85/71/5469    Acute alcoholic pancreatitis 34/51/8053    Acute kidney injury (nontraumatic) ([facility] [address] 10/31/2022    pt denies    Acute on chronic pancreatitis ([facility] [address] 07/30/2022    Alcohol use disorder, severe, dependence ([facility] [address] 08/21/2019    Carpal tunnel syndrome, left     Cigarette smoker     GERD (gastroesophageal reflux disease)     controlled with med    History of pancreatitis     x 4 or 5 times-- last time admitted to hospital 10/10/26/22 x 7 days    Hypertension     controlled with med       Past Surgical History:   Procedure Laterality Date    CARDIAC PROCEDURE N/A 11/2/2022    LEFT HEART CATH / CORONARY ANGIOGRAPHY performed by Abhi Adhikari MD at [address] [place] [address] CATH LAB    CARDIAC PROCEDURE N/A 11/2/2022    Percutaneous coronary intervention performed by Abhi Adhikari MD at [address] [place] [address] CATH LAB    CHOLECYSTECTOMY  2012    ORTHOPEDIC SURGERY Left     wrist surg--nerve surg    ORTHOPEDIC SURGERY Left     foot surg as child    UPPER GASTROINTESTINAL ENDOSCOPY N/A 11/9/2022    EGD/ENDOSCOPIC ULTRASOUND/AXIOS STENT ROOM 2226 **Rep will be present performed by Lisa Chambers MD at [facility] ENDOSCOPY        Social History     Tobacco Use    Smoking status: Every Day     Packs/day: 1.00     Years: 20.00     Pack years: 20.00     Types: Cigarettes    Smokeless tobacco: Never   Substance Use Topics    Alcohol use: Yes     Comment: none since 10/4/22-- had drank 3 glasses of wine daily      Social History     Substance and Sexual Activity   Drug Use No       Family History   Problem Relation Age of Onset    No Known Problems Mother     Heart Attack Father         Immunization History   Administered Date(s) Administered    COVID-19, MODERNA JACKIE border, Primary or Immunocompromised, (age 12y+), IM, 100 mcg/0.5mL 08/19/2021    Influenza Virus Vaccine 11/05/2015, 09/19/2018, 01/18/2020, 01/18/2020, 10/05/2020, 10/05/2020    Influenza, FLUARIX, FLULAVAL, [address] (age 10 mo+) AND AFLURIA, (age 1 y+), PF, 0.5mL 11/05/2015, 01/18/2020, 10/05/2020    Pneumococcal Polysaccharide ([id number]) 02/22/2018    Tdap (Boostrix, Adacel) 10/11/2022     No Known Allergies  Prior to Admit Medications:  Current Outpatient Medications   Medication Instructions    aspirin 81 mg, Oral, DAILY    atorvastatin (LIPITOR) 40 mg, Oral, NIGHTLY    colchicine (COLCRYS) 0.6 mg, Oral, DAILY    lisinopril (PRINIVIL;ZESTRIL) 5 mg, Oral, DAILY    metoprolol tartrate (LOPRESSOR) 25 mg, Oral, 2 TIMES DAILY    naloxone (NARCAN) 0.4 mg, IntraVENous, PRN    nitroGLYCERIN (NITROSTAT) 0.4 mg, SubLINGual, EVERY 5 MIN PRN, up to max of 3 total doses. If no relief after 1 dose, call 911. omeprazole (PRILOSEC) 20 mg, Oral, DAILY    ticagrelor (BRILINTA) 90 mg, Oral, 2 TIMES DAILY         Objective:   Patient Vitals for the past 24 hrs:   Temp Pulse Resp BP SpO2   11/13/22 2107 -- -- 20 -- --   11/13/22 1927 97.3 °F (36.3 °C) 85 14 (!) 129/58 98 %   11/13/22 1906 -- 95 -- -- --   11/13/22 1831 -- 80 -- -- --   11/13/22 1802 97.5 °F (36.4 °C) 82 18 101/67 98 %       Oxygen Therapy  SpO2: 98 %  O2 Device: None (Room air)    Estimated body mass index is 23.36 kg/m² as calculated from the following:    Height as of this encounter: 5' 9\" (1.753 m). Weight as of this encounter: 158 lb 3.2 oz (71.8 kg). No intake or output data in the 24 hours ending 11/13/22 2113      Physical Exam:  Blood pressure (!) 129/58, pulse 85, temperature 97.3 °F (36.3 °C), temperature source Oral, resp. rate 20, height 5' 9\" (1.753 m), weight 158 lb 3.2 oz (71.8 kg), SpO2 98 %. General:    Well nourished. Head:  Normocephalic, atraumatic  Eyes:  Sclerae appear normal.  Pupils equally round.   ENT:  Nares appear normal, no drainage. Moist oral mucosa  Neck:  No restricted ROM. Trachea midline   CV:   RRR. No m/r/g. No jugular venous distension. Lungs:   CTAB. No wheezing, rhonchi, or rales. Symmetric expansion. Abdomen: Bowel sounds present. Soft, mild abdominal tenderness, nondistended. Extremities: No cyanosis or clubbing. No edema  Skin:     No rashes and normal coloration. Warm and dry. Neuro:  CN II-XII grossly intact. Sensation intact. A&Ox3  Psych:  Normal mood and affect.       I have personally reviewed labs and tests showing:  Recent Labs:  Recent Results (from the past 24 hour(s))   CBC with Diff    Collection Time: 11/13/22 10:19 AM   Result Value Ref Range    WBC 12.3 (H) 4.3 - 11.1 K/uL    RBC 5.11 4.23 - 5.60 M/uL    Hemoglobin 14.2 13.6 - 17.2 g/dL    Hematocrit 42.2 41.1 - 50.3 %    MCV 82.6 82.0 - 102.0 FL    MCH 27.8 26.1 - 32.9 PG    MCHC 33.6 31.4 - 35.0 g/dL    RDW 13.8 11.9 - 14.6 %    Platelets 435 (H) 672 - 450 K/uL    MPV 7.8 (L) 9.4 - 12.3 FL    nRBC 0.00 0.0 - 0.2 K/uL    Differential Type AUTOMATED      Seg Neutrophils 78 43 - 78 %    Lymphocytes 15 13 - 44 %    Monocytes 6 4.0 - 12.0 %    Eosinophils % 1 0.5 - 7.8 %    Basophils 1 0.0 - 2.0 %    Immature Granulocytes 1 0.0 - 5.0 %    Segs Absolute 9.6 (H) 1.7 - 8.2 K/UL    Absolute Lymph # 1.8 0.5 - 4.6 K/UL    Absolute Mono # 0.7 0.1 - 1.3 K/UL    Absolute Eos # 0.1 0.0 - 0.8 K/UL    Basophils Absolute 0.1 0.0 - 0.2 K/UL    Absolute Immature Granulocyte 0.1 0.0 - 0.5 K/UL   CMP    Collection Time: 11/13/22 10:19 AM   Result Value Ref Range    Sodium 135 133 - 143 mmol/L    Potassium 4.4 3.5 - 5.1 mmol/L    Chloride 99 98 - 107 mmol/L    CO2 25 21 - 32 mmol/L    Anion Gap 11 2 - 11 mmol/L    Glucose 133 (H) 65 - 100 mg/dL    BUN 12 7.0 - 18.0 MG/DL    Creatinine 0.92 0.8 - 1.5 MG/DL    Est, Glom Filt Rate >60 >60 ml/min/1.73m2    Calcium 9.2 8.3 - 10.4 MG/DL    Total Bilirubin 0.3 0.2 - 1.1 MG/DL    ALT 20 13.0 - 61.0 U/L    AST 16 15 - 37 U/L    Alk Phosphatase 81 45.0 - 117.0 U/L    Total Protein 6.6 6.4 - 8.2 g/dL    Albumin 3.2 (L) 3.5 - 5.0 g/dL    Globulin 3.4 2.8 - 4.5 g/dL    Albumin/Globulin Ratio 0.9 0.4 - 1.6     Lipase    Collection Time: 11/13/22 10:19 AM   Result Value Ref Range    Lipase 172 (H) 13 - 60 U/L   Urinalysis w rflx microscopic    Collection Time: 11/13/22 10:19 AM   Result Value Ref Range    Color, UA YELLOW      Appearance CLEAR      Specific Gravity, UA 1.010 1.001 - 1.023      pH, Urine 7.0 5.0 - 9.0      Protein, UA Negative NEG mg/dL    Glucose, UA Negative mg/dL    Ketones, Urine Negative NEG mg/dL    Bilirubin Urine Negative NEG      Blood, Urine Negative NEG      Urobilinogen, Urine 0.2 0.2 - 1.0 EU/dL    Nitrite, Urine Negative NEG      Leukocyte Esterase, Urine Negative NEG     Troponin T    Collection Time: 11/13/22 10:19 AM   Result Value Ref Range    Troponin T 44.0 (H) 0 - 22 ng/L   Troponin T    Collection Time: 11/13/22  1:51 PM   Result Value Ref Range    Troponin T 42.3 (H) 0 - 22 ng/L       I have personally reviewed imaging studies showing:  CT ABDOMEN PELVIS W IV CONTRAST Additional Contrast? Oral    Result Date: 11/13/2022  CT OF THE ABDOMEN AND PELVIS WITH CONTRAST. CLINICAL INDICATION: Upper abdominal pain, prior cyst gastrostomy of a pseudocyst PROCEDURE: Serial thin section axial images obtained from the lung bases through the proximal femurs following the administration of 100 cc of Isovue 370 intravenous contrast.  Radiation dose reduction techniques were used for this study. Our CT scanners use one or all of the following: Automated exposure control, adjusted of the mA and/or kV according to patient size, iterative reconstruction COMPARISON: CT abdomen and pelvis dated 10/25/2022 FINDINGS: CT ABDOMEN: The cyst gastrostomy is appreciated in the left upper abdominal quadrant. The pseudocyst has decreased in size now measuring 7.9 x 3.5 cm.  There is a new or significantly larger pseudocyst now evident located between the stomach and spleen that measures 5.0 x 3.5 cm. This is superior to the cyst gastrostomy. There is significant inflammatory stranding surrounding the area of the surgery. There is increase in the fluid and inflammatory stranding in the lesser sac with small cystic fluid collections there is a stable small cyst in the body of the pancreas measures 1.3 cm. There is no significant dilation of the pancreatic duct. The gallbladder is absent. The liver and spleen are normal.  The kidneys are normal.  The adrenal glands are normal.  The bowel is unremarkable. CT PELVIS: The bladder is normal.  The rectum is decompressed. There is trace free fluid in the cul-de-sac. Sigmoid colon is unremarkable. There is no inguinal hernia. No aggressive bone lesions identified. 1.  New or significantly larger pseudocyst located in the left upper abdominal quadrant beneath the diaphragm is between the stomach and spleen. This measures roughly 5 cm. 2.  Increase in the peripancreatic inflammation primarily accumulating in the lesser sac indicative of pancreatitis. 3.  Stable 1.5 cm cyst in the body of the pancreas. 4.  Postsurgical changes indicative of a cyst gastrostomy at the site of the prior large pseudocyst which has decreased significantly in size. Echocardiogram:  11/02/22    TRANSTHORACIC ECHOCARDIOGRAM (TTE) COMPLETE (CONTRAST/BUBBLE/3D PRN) 11/02/2022  3:31 PM (Final)    Interpretation Summary    Left Ventricle: Normal left ventricular systolic function with a visually estimated EF of 55 - 60%. Left ventricle size is normal. Normal wall thickness. Normal wall motion. Normal diastolic function. Technical qualifiers: Color flow Doppler was performed and pulse wave and/or continuous wave Doppler was performed.     Signed by: Kolton Bardales MD on 11/2/2022  3:31 PM        Orders Placed This Encounter   Medications    piperacillin-tazobactam (ZOSYN) 3,375 mg in sodium chloride 0.9 % 50 mL IVPB (mini-bag)     Order Specific Question:   Antimicrobial Indications     Answer:   Intra-Abdominal Infection    atorvastatin (LIPITOR) tablet 40 mg    colchicine (COLCRYS) tablet 0.6 mg    lisinopril (PRINIVIL;ZESTRIL) tablet 5 mg    metoprolol tartrate (LOPRESSOR) tablet 25 mg    nitroGLYCERIN (NITROSTAT) SL tablet 0.4 mg    pantoprazole (PROTONIX) tablet 40 mg    ticagrelor (BRILINTA) tablet 90 mg    aspirin chewable tablet 81 mg    sodium chloride flush 0.9 % injection 5-40 mL    sodium chloride flush 0.9 % injection 5-40 mL    0.9 % sodium chloride infusion    OR Linked Order Group     ondansetron (ZOFRAN-ODT) disintegrating tablet 4 mg     ondansetron (ZOFRAN) injection 4 mg    polyethylene glycol (GLYCOLAX) packet 17 g    OR Linked Order Group     acetaminophen (TYLENOL) tablet 650 mg     acetaminophen (TYLENOL) suppository 650 mg    magnesium sulfate 2000 mg in 50 mL IVPB premix    potassium chloride 10 mEq/100 mL IVPB (Peripheral Line)    OR Linked Order Group     potassium chloride (KLOR-CON M) extended release tablet 40 mEq     potassium bicarb-citric acid (EFFER-K) effervescent tablet 40 mEq     potassium chloride 10 mEq/100 mL IVPB (Peripheral Line)    DISCONTD: HYDROmorphone (DILAUDID) injection 1 mg    HYDROmorphone (DILAUDID) injection 1.5 mg         Signed:  Tangela Calabrese MD    Part of this note may have been written by using a voice dictation software. The note has been proof read but may still contain some grammatical/other typographical errors.

## 2022-11-14 NOTE — PROGRESS NOTES
[facility] Progress Note         Admit Date:  11/13/2022    Today's Date:  11/14/2022    CC:  Pancreatic pseudocyst    Subjective:     Patient developed recurrent LUQ abd pain yesterday. Abd pain continues. Denies N/V. Patient did have a BM yesterday. No bloody or black stools. Afebrile. Mild elevation of lipase. WBC normal on Zosyn. He denies recent ETOH use. He continues on Brilinta for recent MI.     Medications:   Current Facility-Administered Medications   Medication Dose Route Frequency    piperacillin-tazobactam (ZOSYN) 3,375 mg in sodium chloride 0.9 % 50 mL IVPB (mini-bag)  3,375 mg IntraVENous q8h    atorvastatin (LIPITOR) tablet 40 mg  40 mg Oral Nightly    colchicine (COLCRYS) tablet 0.6 mg  0.6 mg Oral Daily    lisinopril (PRINIVIL;ZESTRIL) tablet 5 mg  5 mg Oral Daily    metoprolol tartrate (LOPRESSOR) tablet 25 mg  25 mg Oral BID    nitroGLYCERIN (NITROSTAT) SL tablet 0.4 mg  0.4 mg SubLINGual Q5 Min PRN    pantoprazole (PROTONIX) tablet 40 mg  40 mg Oral QAM AC    ticagrelor (BRILINTA) tablet 90 mg  90 mg Oral BID    aspirin chewable tablet 81 mg  81 mg Oral Daily    sodium chloride flush 0.9 % injection 5-40 mL  5-40 mL IntraVENous 2 times per day    sodium chloride flush 0.9 % injection 5-40 mL  5-40 mL IntraVENous PRN    0.9 % sodium chloride infusion   IntraVENous PRN    ondansetron (ZOFRAN-ODT) disintegrating tablet 4 mg  4 mg Oral Q8H PRN    Or    ondansetron (ZOFRAN) injection 4 mg  4 mg IntraVENous Q6H PRN    polyethylene glycol (GLYCOLAX) packet 17 g  17 g Oral Daily PRN    acetaminophen (TYLENOL) tablet 650 mg  650 mg Oral Q6H PRN    Or    acetaminophen (TYLENOL) suppository 650 mg  650 mg Rectal Q6H PRN    magnesium sulfate 2000 mg in 50 mL IVPB premix  2,000 mg IntraVENous PRN    potassium chloride 10 mEq/100 mL IVPB (Peripheral Line)  10 mEq IntraVENous PRN    potassium chloride (KLOR-CON M) extended release tablet 40 mEq  40 mEq Oral PRN    Or    potassium bicarb-citric acid (EFFER-K) effervescent tablet 40 mEq  40 mEq Oral PRN    Or    potassium chloride 10 mEq/100 mL IVPB (Peripheral Line)  10 mEq IntraVENous PRN    HYDROmorphone (DILAUDID) injection 1.5 mg  1.5 mg IntraVENous Q3H PRN    0.9 % sodium chloride infusion   IntraVENous Continuous       Review of Systems:  ROS was obtained, with pertinent positives as listed above. No chest pain or SOB. Diet:  NPO    Objective:   Vitals:  BP 97/64   Pulse 71   Temp 97.5 °F (36.4 °C) (Oral)   Resp 12   Ht 5' 9\" (1.753 m)   Wt 158 lb 6.4 oz (71.8 kg)   SpO2 98%   BMI 23.39 kg/m²   Intake/Output:  No intake/output data recorded. No intake/output data recorded. Exam:  General appearance: alert, cooperative, no distress  Lungs: clear to auscultation bilaterally anteriorly  Heart: regular rate and rhythm  Abdomen: soft, TTP over upper abdomen bilaterally. Bowel sounds normal. No masses, no organomegaly  Extremities: extremities normal, atraumatic, no cyanosis or edema  Neuro:  alert and oriented    Data Review (Labs):    Recent Labs     11/13/22  1019 11/14/22  0536   WBC 12.3* 10.4   HGB 14.2 11.6*   HCT 42.2 37.4*   * 544*   MCV 82.6 87.0    132*   K 4.4 4.3   CL 99 99*   CO2 25 29   BUN 12 13   CREATININE 0.92 0.90   CALCIUM 9.2 8.4   GLUCOSE 133* 130*   ALKPHOS 81 70   AST 16 10*   ALT 20 37   BILITOT 0.3 0.2   LABALBU 3.2* 2.2*   PROT 6.6 5.6*   LIPASE 172*  --      CT OF THE ABDOMEN AND PELVIS WITH CONTRAST. 11/13/22       CLINICAL INDICATION: Upper abdominal pain, prior cyst gastrostomy of a   pseudocyst       PROCEDURE: Serial thin section axial images obtained from the lung bases through   the proximal femurs following the administration of 100 cc of Isovue 370   intravenous contrast.  Radiation dose reduction techniques were used for this   study.  Our CT scanners use one or all of the following: Automated exposure   control, adjusted of the mA and/or kV according to patient size, iterative reconstruction       COMPARISON: CT abdomen and pelvis dated 10/25/2022       FINDINGS:        CT ABDOMEN: The cyst gastrostomy is appreciated in the left upper abdominal   quadrant. The pseudocyst has decreased in size now measuring 7.9 x 3.5 cm. There is a new or significantly larger pseudocyst now evident located between   the stomach and spleen that measures 5.0 x 3.5 cm. This is superior to the cyst   gastrostomy. There is significant inflammatory stranding surrounding the area   of the surgery. There is increase in the fluid and inflammatory stranding in   the lesser sac with small cystic fluid collections there is a stable small cyst   in the body of the pancreas measures 1.3 cm. There is no significant dilation   of the pancreatic duct. The gallbladder is absent. The liver and spleen are   normal.  The kidneys are normal.  The adrenal glands are normal.  The bowel is   unremarkable. CT PELVIS: The bladder is normal.  The rectum is decompressed. There is trace   free fluid in the cul-de-sac. Sigmoid colon is unremarkable. There is no   inguinal hernia. No aggressive bone lesions identified. Impression   1. New or significantly larger pseudocyst located in the left upper abdominal   quadrant beneath the diaphragm is between the stomach and spleen. This measures   roughly 5 cm. 2.  Increase in the peripancreatic inflammation primarily accumulating in the   lesser sac indicative of pancreatitis. 3.  Stable 1.5 cm cyst in the body of the pancreas. 4.  Postsurgical changes indicative of a cyst gastrostomy at the site of the   prior large pseudocyst which has decreased significantly in size. Assessment:     Principal Problem:    Pancreatic pseudocyst  Active Problems:    History of ST elevation myocardial infarction (STEMI)    CAD (coronary artery disease)    Primary hypertension    Chronic pancreatitis ([facility] [address]  Resolved Problems:    * No resolved hospital problems. *    Patient is a 44 y.o. male with Etoh pancreatitis who is seen in consultation for abdominal pain and fulness. No n/v. Just had cyst gastrostomy with axios stent 11/9/22. Also recent STEMI. Now back with CT showing new pseudocyst in LUQ ~5cm. Stable 1.5cm cyst in body of pancreas. Remains afebrile. WBC improved at 10.4 on Zosyn. Slight drop in Hgb likely dilutional.       Plan:     - Consider repeat cyst gastrostomy with axios stent. Concerning due to recent MI with Brilinta tx. If debridement felt necessary, may need a bridge. Consider cardiology consult. - Continue Zosyn. - Continue IV fluids and maintain electrolytes. Roxy Bueno    Patient is seen and examined in collaboration with Dr. Heena Rodriguez. Assessment and plan as per Dr. Heena Rodriguez.

## 2022-11-14 NOTE — PROGRESS NOTES
Occupational Therapy Note:    Chart thoroughly reviewed and OT order received. PT evaluated pt this morning stating pt is I and has no needs. OT will DC at this time as well.     Thank you,    Antonietta Ralph, OT    Rehab [facility] [employer]

## 2022-11-14 NOTE — PROGRESS NOTES
Hospitalist Progress Note   Admit Date:  2022  4:59 PM   Name:  Sharee Arambula   Age:  44 y.o. Sex:  male  :  1985   MRN:  [id number]   Room:  [facility]/    Presenting Complaint: No chief complaint on file. Reason(s) for Admission: Pancreatic pseudocyst [K86.3]     Hospital Course:   Sharee Arambula is a 44 y.o. male with medical history of ETOH pancreatitis, hypertension, who presented with abdominal pain. Patient had just undergone cystogastrostomy with stent on 2022. Patient at that time also suffered STEMI. Patient continued to have worsening pain in his upper abdomen and described as a dull achy pain rating it 8 out of 10. CT abdomen pelvis showed new pseudocyst in the left upper quadrant between stomach and spleen about 5 cm. Increased JAYA pancreatic inflammation indicated both pancreatitis. Stable 1.5 cm cyst in the body of the pancreas, and postsurgical changes indicate of of cyst gastrostomy at site of prior large pseudocyst which has decreased significantly in size. Subjective & 24hr Events (22): Pt sitting up in bed, pain reported as 4/10 with use of pain medication. No nausea/vomiting or chest pain reported. Significant other at bedside. Assessment & Plan:     Pancreatic pseudocyst  - wbc 12.3 on admit and empircally started on IV atbx. -GI consulted, appreciate recommendations  - Patient was recently seen  and underwent stent placement for large pseudocyst  - GI recommends waiting 3 weeks from last EUS before repeating procedure if possible. (Will need bridge for next procedure)  - continue to monitor symptoms with resumption of CLD.      History of STEMI/coronary artery disease  - On last admission patient underwent cardiac stent  - Currently on Brilinta   - Patient without any cardiac chest pain, troponins continue to trend down  - Continue aspirin and statin  - Can consider cardiology consult if patient needs to undergo any intervention and recommendations on Brilinta    Hypertension  - Continue home antihypertensives      Anticipated discharge needs:      pending    Diet:  ADULT DIET; Clear Liquid  DVT PPx: brillinta  Code status: Full Code    Hospital Problems:  Principal Problem:    Pancreatic pseudocyst  Active Problems:    History of ST elevation myocardial infarction (STEMI)    CAD (coronary artery disease)    Primary hypertension    Chronic pancreatitis ([address]  Resolved Problems:    * No resolved hospital problems. *      Objective:   Patient Vitals for the past 24 hrs:   Temp Pulse Resp BP SpO2   11/14/22 1543 -- -- 15 -- --   11/14/22 1253 -- -- 14 -- --   11/14/22 1223 -- -- 15 -- --   11/14/22 1153 98 °F (36.7 °C) 79 16 115/63 100 %   11/14/22 1139 -- 72 -- -- --   11/14/22 0933 -- -- 16 -- --   11/14/22 0903 -- -- 16 -- --   11/14/22 0800 -- 72 -- -- --   11/14/22 0755 97.5 °F (36.4 °C) 71 12 97/64 98 %   11/14/22 0636 -- -- 18 -- --   11/14/22 0606 -- -- 20 -- --   11/14/22 0336 97.5 °F (36.4 °C) 77 16 101/83 98 %   11/14/22 0335 -- -- 18 -- --   11/14/22 0305 -- -- 18 -- --   11/14/22 0037 -- -- 20 -- --   11/14/22 0007 -- -- 18 -- --   11/13/22 2323 97.9 °F (36.6 °C) 74 16 103/61 98 %   11/13/22 2137 -- -- 18 -- --   11/13/22 2107 -- -- 20 -- --   11/13/22 1927 97.3 °F (36.3 °C) 85 14 (!) 129/58 98 %   11/13/22 1906 -- 95 -- -- --   11/13/22 1831 -- 80 -- -- --   11/13/22 1802 97.5 °F (36.4 °C) 82 18 101/67 98 %       Oxygen Therapy  SpO2: 100 %  O2 Device: None (Room air)    Estimated body mass index is 23.39 kg/m² as calculated from the following:    Height as of this encounter: 5' 9\" (1.753 m). Weight as of this encounter: 158 lb 6.4 oz (71.8 kg). No intake or output data in the 24 hours ending 11/14/22 1705      Physical Exam:     Blood pressure 115/63, pulse 79, temperature 98 °F (36.7 °C), temperature source Oral, resp. rate 15, height 5' 9\" (1.753 m), weight 158 lb 6.4 oz (71.8 kg), SpO2 100 %. General:    Well nourished. Head:  Normocephalic, atraumatic  Eyes:  Sclerae appear normal.  Pupils equally round. ENT:  Nares appear normal, no drainage. Moist oral mucosa  Neck:  No restricted ROM. Trachea midline   CV:   RRR. No m/r/g. No jugular venous distension. Lungs:   CTAB. No wheezing, rhonchi, or rales. Symmetric expansion. Abdomen: Bowel sounds present. Soft, TTP epigastric LUQ nondistended. Extremities: No cyanosis or clubbing. No edema  Skin:     No rashes and normal coloration. Warm and dry. Neuro:  CN II-XII grossly intact. Sensation intact. A&Ox3  Psych:  Normal mood and affect.       I have personally reviewed labs and tests showing:  Recent Labs:  Recent Results (from the past 48 hour(s))   CBC with Diff    Collection Time: 11/13/22 10:19 AM   Result Value Ref Range    WBC 12.3 (H) 4.3 - 11.1 K/uL    RBC 5.11 4.23 - 5.60 M/uL    Hemoglobin 14.2 13.6 - 17.2 g/dL    Hematocrit 42.2 41.1 - 50.3 %    MCV 82.6 82.0 - 102.0 FL    MCH 27.8 26.1 - 32.9 PG    MCHC 33.6 31.4 - 35.0 g/dL    RDW 13.8 11.9 - 14.6 %    Platelets 134 (H) 284 - 450 K/uL    MPV 7.8 (L) 9.4 - 12.3 FL    nRBC 0.00 0.0 - 0.2 K/uL    Differential Type AUTOMATED      Seg Neutrophils 78 43 - 78 %    Lymphocytes 15 13 - 44 %    Monocytes 6 4.0 - 12.0 %    Eosinophils % 1 0.5 - 7.8 %    Basophils 1 0.0 - 2.0 %    Immature Granulocytes 1 0.0 - 5.0 %    Segs Absolute 9.6 (H) 1.7 - 8.2 K/UL    Absolute Lymph # 1.8 0.5 - 4.6 K/UL    Absolute Mono # 0.7 0.1 - 1.3 K/UL    Absolute Eos # 0.1 0.0 - 0.8 K/UL    Basophils Absolute 0.1 0.0 - 0.2 K/UL    Absolute Immature Granulocyte 0.1 0.0 - 0.5 K/UL   CMP    Collection Time: 11/13/22 10:19 AM   Result Value Ref Range    Sodium 135 133 - 143 mmol/L    Potassium 4.4 3.5 - 5.1 mmol/L    Chloride 99 98 - 107 mmol/L    CO2 25 21 - 32 mmol/L    Anion Gap 11 2 - 11 mmol/L    Glucose 133 (H) 65 - 100 mg/dL    BUN 12 7.0 - 18.0 MG/DL    Creatinine 0.92 0.8 - 1.5 MG/DL    Est, Glom Filt Rate >60 >60 ml/min/1.73m2 Calcium 9.2 8.3 - 10.4 MG/DL    Total Bilirubin 0.3 0.2 - 1.1 MG/DL    ALT 20 13.0 - 61.0 U/L    AST 16 15 - 37 U/L    Alk Phosphatase 81 45.0 - 117.0 U/L    Total Protein 6.6 6.4 - 8.2 g/dL    Albumin 3.2 (L) 3.5 - 5.0 g/dL    Globulin 3.4 2.8 - 4.5 g/dL    Albumin/Globulin Ratio 0.9 0.4 - 1.6     Lipase    Collection Time: 11/13/22 10:19 AM   Result Value Ref Range    Lipase 172 (H) 13 - 60 U/L   Urinalysis w rflx microscopic    Collection Time: 11/13/22 10:19 AM   Result Value Ref Range    Color, UA YELLOW      Appearance CLEAR      Specific Gravity, UA 1.010 1.001 - 1.023      pH, Urine 7.0 5.0 - 9.0      Protein, UA Negative NEG mg/dL    Glucose, UA Negative mg/dL    Ketones, Urine Negative NEG mg/dL    Bilirubin Urine Negative NEG      Blood, Urine Negative NEG      Urobilinogen, Urine 0.2 0.2 - 1.0 EU/dL    Nitrite, Urine Negative NEG      Leukocyte Esterase, Urine Negative NEG     Troponin T    Collection Time: 11/13/22 10:19 AM   Result Value Ref Range    Troponin T 44.0 (H) 0 - 22 ng/L   Troponin T    Collection Time: 11/13/22  1:51 PM   Result Value Ref Range    Troponin T 42.3 (H) 0 - 22 ng/L   Comprehensive Metabolic Panel w/ Reflex to MG    Collection Time: 11/14/22  5:36 AM   Result Value Ref Range    Sodium 132 (L) 133 - 143 mmol/L    Potassium 4.3 3.5 - 5.1 mmol/L    Chloride 99 (L) 101 - 110 mmol/L    CO2 29 21 - 32 mmol/L    Anion Gap 4 2 - 11 mmol/L    Glucose 130 (H) 65 - 100 mg/dL    BUN 13 6 - 23 MG/DL    Creatinine 0.90 0.8 - 1.5 MG/DL    Est, Glom Filt Rate >60 >60 ml/min/1.73m2    Calcium 8.4 8.3 - 10.4 MG/DL    Total Bilirubin 0.2 0.2 - 1.1 MG/DL    ALT 37 12 - 65 U/L    AST 10 (L) 15 - 37 U/L    Alk Phosphatase 70 50 - 136 U/L    Total Protein 5.6 (L) 6.3 - 8.2 g/dL    Albumin 2.2 (L) 3.5 - 5.0 g/dL    Globulin 3.4 2.8 - 4.5 g/dL    Albumin/Globulin Ratio 0.6 0.4 - 1.6     CBC with Auto Differential    Collection Time: 11/14/22  5:36 AM   Result Value Ref Range    WBC 10.4 4.3 - 11.1 K/uL    RBC 4.30 4.23 - 5.6 M/uL    Hemoglobin 11.6 (L) 13.6 - 17.2 g/dL    Hematocrit 37.4 (L) 41.1 - 50.3 %    MCV 87.0 82 - 102 FL    MCH 27.0 26.1 - 32.9 PG    MCHC 31.0 (L) 31.4 - 35.0 g/dL    RDW 14.1 11.9 - 14.6 %    Platelets 575 (H) 793 - 450 K/uL    MPV 8.4 (L) 9.4 - 12.3 FL    nRBC 0.00 0.0 - 0.2 K/uL    Differential Type AUTOMATED      Seg Neutrophils 66 43 - 78 %    Lymphocytes 21 13 - 44 %    Monocytes 9 4.0 - 12.0 %    Eosinophils % 2 0.5 - 7.8 %    Basophils 1 0.0 - 2.0 %    Immature Granulocytes 1 0.0 - 5.0 %    Segs Absolute 6.9 1.7 - 8.2 K/UL    Absolute Lymph # 2.1 0.5 - 4.6 K/UL    Absolute Mono # 1.0 0.1 - 1.3 K/UL    Absolute Eos # 0.2 0.0 - 0.8 K/UL    Basophils Absolute 0.1 0.0 - 0.2 K/UL    Absolute Immature Granulocyte 0.1 0.0 - 0.5 K/UL       I have personally reviewed imaging studies showing:   Other Studies:  No orders to display       Current Meds:  Current Facility-Administered Medications   Medication Dose Route Frequency    piperacillin-tazobactam (ZOSYN) 3,375 mg in sodium chloride 0.9 % 50 mL IVPB (mini-bag)  3,375 mg IntraVENous q8h    atorvastatin (LIPITOR) tablet 40 mg  40 mg Oral Nightly    colchicine (COLCRYS) tablet 0.6 mg  0.6 mg Oral Daily    lisinopril (PRINIVIL;ZESTRIL) tablet 5 mg  5 mg Oral Daily    metoprolol tartrate (LOPRESSOR) tablet 25 mg  25 mg Oral BID    nitroGLYCERIN (NITROSTAT) SL tablet 0.4 mg  0.4 mg SubLINGual Q5 Min PRN    pantoprazole (PROTONIX) tablet 40 mg  40 mg Oral QAM AC    ticagrelor (BRILINTA) tablet 90 mg  90 mg Oral BID    aspirin chewable tablet 81 mg  81 mg Oral Daily    sodium chloride flush 0.9 % injection 5-40 mL  5-40 mL IntraVENous 2 times per day    sodium chloride flush 0.9 % injection 5-40 mL  5-40 mL IntraVENous PRN    0.9 % sodium chloride infusion   IntraVENous PRN    ondansetron (ZOFRAN-ODT) disintegrating tablet 4 mg  4 mg Oral Q8H PRN    Or    ondansetron (ZOFRAN) injection 4 mg  4 mg IntraVENous Q6H PRN    polyethylene glycol (GLYCOLAX) packet 17 g  17 g Oral Daily PRN    acetaminophen (TYLENOL) tablet 650 mg  650 mg Oral Q6H PRN    Or    acetaminophen (TYLENOL) suppository 650 mg  650 mg Rectal Q6H PRN    magnesium sulfate 2000 mg in 50 mL IVPB premix  2,000 mg IntraVENous PRN    potassium chloride 10 mEq/100 mL IVPB (Peripheral Line)  10 mEq IntraVENous PRN    potassium chloride (KLOR-CON M) extended release tablet 40 mEq  40 mEq Oral PRN    Or    potassium bicarb-citric acid (EFFER-K) effervescent tablet 40 mEq  40 mEq Oral PRN    Or    potassium chloride 10 mEq/100 mL IVPB (Peripheral Line)  10 mEq IntraVENous PRN    HYDROmorphone (DILAUDID) injection 1.5 mg  1.5 mg IntraVENous Q3H PRN    0.9 % sodium chloride infusion   IntraVENous Continuous       Signed:  Meghan David DO    Part of this note may have been written by using a voice dictation software. The note has been proof read but may still contain some grammatical/other typographical errors.

## 2022-11-14 NOTE — PROGRESS NOTES
ACUTE PHYSICAL THERAPY GOALS:   (Developed with and agreed upon by patient and/or caregiver.)    Patient at baseline with no need for therapy goals at this time. PHYSICAL THERAPY Initial Assessment, Discharge, and AM  (Link to Caseload Tracking:    Acknowledge Orders  Time In/Out  PT Charge Capture  Rehab Caseload Tracker    Senthil Carrizales is a 44 y.o. male   PRIMARY DIAGNOSIS: Pancreatic pseudocyst  Pancreatic pseudocyst [K86.3]       Reason for Referral: Generalized Muscle Weakness (M62.81)  Inpatient: Payor: /     ASSESSMENT:     REHAB RECOMMENDATIONS:   Recommendation to date pending progress:  Setting:  No further skilled therapy after discharge from hospital    Equipment:    None     ASSESSMENT:  Mr. Germania Nix is a 44year old male who presents with abdominal pain secondary to recurring pancreatitis. This date pt performs mobility including bed mobility, tranfers, standing tasks, and ambulation independently using no AD. Patient's only complaint at this time is some mild abdominal pain. Pt presents as functioning at his baseline, no acute PT intervention is indicated at this time. Will discharge from PT caseload. Please re-consult if any new needs arise. Thank you.       [address] [facility] [address] [id number] AM-PAC 6 Clicks Basic Mobility Inpatient Short Form  AM-PAC Mobility Inpatient   How much difficulty turning over in bed?: None  How much difficulty sitting down on / standing up from a chair with arms?: None  How much difficulty moving from lying on back to sitting on side of bed?: None  How much help from another person moving to and from a bed to a chair?: None  How much help from another person needed to walk in hospital room?: None  How much help from another person for climbing 3-5 steps with a railing?: None  -PAC Inpatient Mobility Raw Score : 24  -PAC Inpatient T-Scale Score : 61.14  Mobility Inpatient CMS 0-100% Score: 0  Mobility Inpatient CMS G-Code Modifier : CH    SUBJECTIVE:   Mr. Germania Nix states, \"I don't know of any reason I need PT\"     Social/Functional Lives With: Spouse (and 2 kids)  Type of Home: House  Home Layout: One level    OBJECTIVE:     PAIN: VITALS / O2: PRECAUTION / Leeann Routt / DRAINS:   Pre Treatment:   Pain Assessment: 0-10  Pain Level: 5  Pain Location: Abdomen      Post Treatment: 5/10 Vitals        Oxygen      IV    RESTRICTIONS/PRECAUTIONS:                    GROSS EVALUATION: Intact Impaired (Comments):   AROM [x]     PROM [x]    Strength [x]     Balance [x]     Posture [] Rounded Shoulders   Sensation []     Coordination []      Tone []     Edema []    Activity Tolerance [x]      []      COGNITION/  PERCEPTION: Intact Impaired (Comments):   Orientation [x]     Vision [x]     Hearing [x]     Cognition  [x]       MOBILITY: I Mod I S SBA CGA Min Mod Max Total  NT x2 Comments:   Bed Mobility    Rolling [x] [] [] [] [] [] [] [] [] [] []    Supine to Sit [x] [] [] [] [] [] [] [] [] [] []    Scooting [x] [] [] [] [] [] [] [] [] [] []    Sit to Supine [x] [] [] [] [] [] [] [] [] [] []    Transfers    Sit to Stand [x] [] [] [] [] [] [] [] [] [] []    Bed to Chair [x] [] [] [] [] [] [] [] [] [] []    Stand to Sit [x] [] [] [] [] [] [] [] [] [] []     [] [] [] [] [] [] [] [] [] [] []    I=Independent, Mod I=Modified Independent, S=Supervision, SBA=Standby Assistance, CGA=Contact Guard Assistance,   Min=Minimal Assistance, Mod=Moderate Assistance, Max=Maximal Assistance, Total=Total Assistance, NT=Not Tested    GAIT: I Mod I S SBA CGA Min Mod Max Total  NT x2 Comments:   Level of Assistance [x] [] [] [] [] [] [] [] [] [] []    Distance 500 feet    DME None    Gait Quality Decreased step clearance    Weightbearing Status      Stairs      I=Independent, Mod I=Modified Independent, S=Supervision, SBA=Standby Assistance, CGA=Contact Guard Assistance,   Min=Minimal Assistance, Mod=Moderate Assistance, Max=Maximal Assistance, Total=Total Assistance, NT=Not Tested    PLAN:   FREQUENCY AND DURATION:   Patient

## 2022-11-14 NOTE — PROGRESS NOTES
Pt is alert and oriented x4. Pt in bed, resting. Bed in low position, wheels locked, call light within reach, instructed to call with any needs. Hourly rounds completed this shift. NAD noted. VSS. Pt has c/o pain, treated per MAR. IV is infusing, and dressing is clean, dry, and intact. Pt has been NPO since MN. Report to be given to day shift nurse.

## 2022-11-15 LAB
ALBUMIN SERPL-MCNC: 2.2 G/DL (ref 3.5–5)
ALBUMIN/GLOB SERPL: 0.6 {RATIO} (ref 0.4–1.6)
ALP SERPL-CCNC: 64 U/L (ref 50–136)
ALT SERPL-CCNC: 20 U/L (ref 12–65)
ANION GAP SERPL CALC-SCNC: 5 MMOL/L (ref 2–11)
AST SERPL-CCNC: 13 U/L (ref 15–37)
BASOPHILS # BLD: 0.1 K/UL (ref 0–0.2)
BASOPHILS NFR BLD: 1 % (ref 0–2)
BILIRUB SERPL-MCNC: 0.2 MG/DL (ref 0.2–1.1)
BUN SERPL-MCNC: 7 MG/DL (ref 6–23)
CALCIUM SERPL-MCNC: 8.5 MG/DL (ref 8.3–10.4)
CHLORIDE SERPL-SCNC: 101 MMOL/L (ref 101–110)
CO2 SERPL-SCNC: 29 MMOL/L (ref 21–32)
CREAT SERPL-MCNC: 0.8 MG/DL (ref 0.8–1.5)
DIFFERENTIAL METHOD BLD: ABNORMAL
EOSINOPHIL # BLD: 0.2 K/UL (ref 0–0.8)
EOSINOPHIL NFR BLD: 2 % (ref 0.5–7.8)
ERYTHROCYTE [DISTWIDTH] IN BLOOD BY AUTOMATED COUNT: 14.2 % (ref 11.9–14.6)
GLOBULIN SER CALC-MCNC: 3.5 G/DL (ref 2.8–4.5)
GLUCOSE SERPL-MCNC: 113 MG/DL (ref 65–100)
HCT VFR BLD AUTO: 34.2 % (ref 41.1–50.3)
HCT VFR BLD AUTO: 34.8 % (ref 41.1–50.3)
HCT VFR BLD AUTO: 36.7 % (ref 41.1–50.3)
HGB BLD-MCNC: 11 G/DL (ref 13.6–17.2)
HGB BLD-MCNC: 11.1 G/DL (ref 13.6–17.2)
HGB BLD-MCNC: 12 G/DL (ref 13.6–17.2)
IMM GRANULOCYTES # BLD AUTO: 0 K/UL (ref 0–0.5)
IMM GRANULOCYTES NFR BLD AUTO: 1 % (ref 0–5)
LIPASE SERPL-CCNC: 1119 U/L (ref 73–393)
LIPASE SERPL-CCNC: 460 U/L (ref 73–393)
LYMPHOCYTES # BLD: 1.6 K/UL (ref 0.5–4.6)
LYMPHOCYTES NFR BLD: 23 % (ref 13–44)
MCH RBC QN AUTO: 27.4 PG (ref 26.1–32.9)
MCHC RBC AUTO-ENTMCNC: 31.9 G/DL (ref 31.4–35)
MCV RBC AUTO: 85.9 FL (ref 82–102)
MONOCYTES # BLD: 0.7 K/UL (ref 0.1–1.3)
MONOCYTES NFR BLD: 10 % (ref 4–12)
NEUTS SEG # BLD: 4.3 K/UL (ref 1.7–8.2)
NEUTS SEG NFR BLD: 63 % (ref 43–78)
NRBC # BLD: 0 K/UL (ref 0–0.2)
PLATELET # BLD AUTO: 433 K/UL (ref 150–450)
PMV BLD AUTO: 8.5 FL (ref 9.4–12.3)
POTASSIUM SERPL-SCNC: 4.1 MMOL/L (ref 3.5–5.1)
PROT SERPL-MCNC: 5.7 G/DL (ref 6.3–8.2)
RBC # BLD AUTO: 4.05 M/UL (ref 4.23–5.6)
SODIUM SERPL-SCNC: 135 MMOL/L (ref 133–143)
WBC # BLD AUTO: 6.8 K/UL (ref 4.3–11.1)

## 2022-11-15 PROCEDURE — 6370000000 HC RX 637 (ALT 250 FOR IP): Performed by: STUDENT IN AN ORGANIZED HEALTH CARE EDUCATION/TRAINING PROGRAM

## 2022-11-15 PROCEDURE — 6370000000 HC RX 637 (ALT 250 FOR IP): Performed by: INTERNAL MEDICINE

## 2022-11-15 PROCEDURE — 1100000003 HC PRIVATE W/ TELEMETRY

## 2022-11-15 PROCEDURE — 36415 COLL VENOUS BLD VENIPUNCTURE: CPT

## 2022-11-15 PROCEDURE — 85014 HEMATOCRIT: CPT

## 2022-11-15 PROCEDURE — 6360000002 HC RX W HCPCS: Performed by: STUDENT IN AN ORGANIZED HEALTH CARE EDUCATION/TRAINING PROGRAM

## 2022-11-15 PROCEDURE — 1100000000 HC RM PRIVATE

## 2022-11-15 PROCEDURE — 2580000003 HC RX 258: Performed by: STUDENT IN AN ORGANIZED HEALTH CARE EDUCATION/TRAINING PROGRAM

## 2022-11-15 PROCEDURE — 83690 ASSAY OF LIPASE: CPT

## 2022-11-15 PROCEDURE — 85025 COMPLETE CBC W/AUTO DIFF WBC: CPT

## 2022-11-15 PROCEDURE — 80053 COMPREHEN METABOLIC PANEL: CPT

## 2022-11-15 PROCEDURE — 85018 HEMOGLOBIN: CPT

## 2022-11-15 RX ORDER — HYDROCODONE BITARTRATE AND ACETAMINOPHEN 10; 325 MG/1; MG/1
1 TABLET ORAL EVERY 6 HOURS PRN
Status: DISCONTINUED | OUTPATIENT
Start: 2022-11-15 | End: 2022-11-16 | Stop reason: HOSPADM

## 2022-11-15 RX ORDER — LANOLIN ALCOHOL/MO/W.PET/CERES
6 CREAM (GRAM) TOPICAL NIGHTLY PRN
Status: DISCONTINUED | OUTPATIENT
Start: 2022-11-15 | End: 2022-11-16 | Stop reason: HOSPADM

## 2022-11-15 RX ADMIN — HYDROMORPHONE HYDROCHLORIDE 1.5 MG: 1 INJECTION, SOLUTION INTRAMUSCULAR; INTRAVENOUS; SUBCUTANEOUS at 06:33

## 2022-11-15 RX ADMIN — METOPROLOL TARTRATE 25 MG: 25 TABLET, FILM COATED ORAL at 20:31

## 2022-11-15 RX ADMIN — TICAGRELOR 90 MG: 90 TABLET ORAL at 20:31

## 2022-11-15 RX ADMIN — PANTOPRAZOLE SODIUM 40 MG: 40 TABLET, DELAYED RELEASE ORAL at 06:33

## 2022-11-15 RX ADMIN — SODIUM CHLORIDE, PRESERVATIVE FREE 10 ML: 5 INJECTION INTRAVENOUS at 09:28

## 2022-11-15 RX ADMIN — COLCHICINE 0.6 MG: 0.6 TABLET, FILM COATED ORAL at 09:21

## 2022-11-15 RX ADMIN — HYDROCODONE BITARTRATE AND ACETAMINOPHEN 1 TABLET: 10; 325 TABLET ORAL at 09:20

## 2022-11-15 RX ADMIN — SODIUM CHLORIDE, PRESERVATIVE FREE 10 ML: 5 INJECTION INTRAVENOUS at 20:31

## 2022-11-15 RX ADMIN — ASPIRIN 81 MG 81 MG: 81 TABLET ORAL at 09:21

## 2022-11-15 RX ADMIN — HYDROCODONE BITARTRATE AND ACETAMINOPHEN 1 TABLET: 10; 325 TABLET ORAL at 09:21

## 2022-11-15 RX ADMIN — TICAGRELOR 90 MG: 90 TABLET ORAL at 09:21

## 2022-11-15 RX ADMIN — HYDROCODONE BITARTRATE AND ACETAMINOPHEN 1 TABLET: 10; 325 TABLET ORAL at 15:33

## 2022-11-15 RX ADMIN — PIPERACILLIN AND TAZOBACTAM 3375 MG: 3; .375 INJECTION, POWDER, FOR SOLUTION INTRAVENOUS at 03:09

## 2022-11-15 RX ADMIN — ATORVASTATIN CALCIUM 40 MG: 40 TABLET, FILM COATED ORAL at 20:31

## 2022-11-15 RX ADMIN — HYDROCODONE BITARTRATE AND ACETAMINOPHEN 1 TABLET: 10; 325 TABLET ORAL at 21:38

## 2022-11-15 RX ADMIN — LISINOPRIL 5 MG: 5 TABLET ORAL at 09:21

## 2022-11-15 RX ADMIN — PIPERACILLIN AND TAZOBACTAM 3375 MG: 3; .375 INJECTION, POWDER, FOR SOLUTION INTRAVENOUS at 12:57

## 2022-11-15 RX ADMIN — Medication 6 MG: at 22:48

## 2022-11-15 RX ADMIN — HYDROMORPHONE HYDROCHLORIDE 1.5 MG: 1 INJECTION, SOLUTION INTRAMUSCULAR; INTRAVENOUS; SUBCUTANEOUS at 00:07

## 2022-11-15 RX ADMIN — HYDROMORPHONE HYDROCHLORIDE 1.5 MG: 1 INJECTION, SOLUTION INTRAMUSCULAR; INTRAVENOUS; SUBCUTANEOUS at 03:09

## 2022-11-15 RX ADMIN — METOPROLOL TARTRATE 25 MG: 25 TABLET, FILM COATED ORAL at 09:21

## 2022-11-15 ASSESSMENT — PAIN DESCRIPTION - LOCATION
LOCATION: FLANK
LOCATION: FLANK
LOCATION: ABDOMEN
LOCATION: ABDOMEN
LOCATION: FLANK

## 2022-11-15 ASSESSMENT — PAIN - FUNCTIONAL ASSESSMENT
PAIN_FUNCTIONAL_ASSESSMENT: ACTIVITIES ARE NOT PREVENTED
PAIN_FUNCTIONAL_ASSESSMENT: PREVENTS OR INTERFERES SOME ACTIVE ACTIVITIES AND ADLS
PAIN_FUNCTIONAL_ASSESSMENT: PREVENTS OR INTERFERES SOME ACTIVE ACTIVITIES AND ADLS

## 2022-11-15 ASSESSMENT — PAIN DESCRIPTION - DESCRIPTORS
DESCRIPTORS: SHARP;PRESSURE
DESCRIPTORS: PRESSURE
DESCRIPTORS: PRESSURE;SHARP
DESCRIPTORS: ACHING;SHARP;STABBING
DESCRIPTORS: ACHING;SHARP;THROBBING;TIGHTNESS

## 2022-11-15 ASSESSMENT — PAIN DESCRIPTION - ORIENTATION
ORIENTATION: RIGHT;LEFT

## 2022-11-15 ASSESSMENT — PAIN SCALES - GENERAL
PAINLEVEL_OUTOF10: 8
PAINLEVEL_OUTOF10: 0
PAINLEVEL_OUTOF10: 7
PAINLEVEL_OUTOF10: 4
PAINLEVEL_OUTOF10: 6
PAINLEVEL_OUTOF10: 6
PAINLEVEL_OUTOF10: 0
PAINLEVEL_OUTOF10: 8
PAINLEVEL_OUTOF10: 0

## 2022-11-15 NOTE — PROGRESS NOTES
Comprehensive Nutrition Assessment    Type and Reason for Visit: Initial, Positive Nutrition Screen  Malnutrition Screening Tool: Malnutrition Screen  Have you recently lost weight without trying?: 14 to 23 pounds (2 points)  Have you been eating poorly because of a decreased appetite?: Yes (1 point)  Malnutrition Screening Tool Score: 3    Nutrition Recommendations/Plan:   Meals and Snacks:  Diet: Continue current order  Nutrition Supplement Therapy:  Medical food supplement therapy:  Initiate Ensure High Protein three times per day (this provides 160 kcal and 16 grams protein per bottle)     Malnutrition Assessment:  Malnutrition Status: At risk for malnutrition (Comment) (potential ~15% weight loss over 1 month if weight accurate, needs NFPE)  NFPE deferred d/t pt sleeping soundly. 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 Pt reports not eating for 8 days at RD assessment. Wt hx per EMR. Based on admission weight 10/31 of 184lb, potential for ~11% weight loss in <1 month if current bed scale weight accurate. Do You Have Any Cultural, Denominational, or Ethnic Food Preferences?: No   Nutrition Background:    Pt with PMH significant for ETOH pancreatitis, hypertension, who presented with abdominal pain. Patient had just undergone cystogastrostomy with stent on 11/9/2022. Patient at that time also suffered STEMI. Patient continued to have worsening pain in his upper abdomen and described as a dull achy pain, presented to [facility]13. CT abdomen pelvis showed new pseudocyst in the left upper quadrant between stomach and spleen. GI recommends waiting 3 weeks from last EUS before repeating procedure if possible. Nutrition Interval:  Pt seen at bedside, sleeping soundly and does not awaken to RD calling name. Lunch tray at bedside, 100% of regular diet consumed.  RD to add ensure high protein in setting of suspected weight loss. Current Nutrition Therapies:  ADULT DIET; Regular; Low Fat/Low Chol/High Fiber/MARYANNE    Current Intake:   Average Meal Intake: % Average Supplements Intake: None Ordered      Anthropometric Measures:  Height: 5' 9\" (175.3 cm)  Current Body Wt: 163 lb 12.8 oz (74.3 kg) (11/15), Weight source: Bed Scale  BMI: 24.2, Normal Weight (BMI 18.5-24. 9)     Ideal Body Weight (Kg) (Calculated): 73 kg (160 lbs), 102.4 %  Usual Body Wt: 184 lb (83.5 kg) (last admission 10/31 and limited EMR data from MD visits), Percent weight change: -11       Edema:Peripheral Vascular  Peripheral Vascular (WDL): Within Defined Limits   BMI Category Normal Weight (BMI 18.5-24. 9)  Estimated Daily Nutrient Needs:  Energy (kcal/day): 8500-9736 (25-30kcal/kg) (Kcal/kg Weight used: 74.3 kg Current  Protein (g/day): 89-97 (1.2-1.3g/kg) Weight Used: (Current) 74.3 kg  Fluid (ml/day):   (1 ml/kcal)    Nutrition Diagnosis:   Inadequate oral intake related to altered GI function, altered GI structure as evidenced by  (Etoh pancreatitis w/ slow diet advancement)    Nutrition Interventions:   Food and/or Nutrient Delivery: Continue Current Diet, Start Oral Nutrition Supplement     Coordination of Nutrition Care: Continue to monitor while inpatient       Goals:       Active Goal: Meet at least 75% of estimated needs, by next RD assessment       Nutrition Monitoring and Evaluation:      Food/Nutrient Intake Outcomes: Food and Nutrient Intake, Supplement Intake  Physical Signs/Symptoms Outcomes: GI Status, Meal Time Behavior, Weight    Discharge Planning:    Continue Oral Nutrition Supplement    Indio Urbina RD, LD  Contact: [phone]

## 2022-11-15 NOTE — PROGRESS NOTES
[facility] Progress Note         Admit Date:  11/13/2022    Today's Date:  11/15/2022    CC:  Pancreatic pseudocyst    Subjective:     Patient anxious to have more of a diet. Appears comfortable and WBC improved. No fevers, vomiting and having good Bms. Lipase increased some but not clinically relevant    Patient developed recurrent LUQ abd pain yesterday. Abd pain continues. Denies N/V. Patient did have a BM yesterday. No bloody or black stools. Afebrile. Mild elevation of lipase. WBC normal on Zosyn. He denies recent ETOH use. He continues on Brilinta for recent MI.     Medications:   Current Facility-Administered Medications   Medication Dose Route Frequency    HYDROcodone-acetaminophen (NORCO)  MG per tablet 1 tablet  1 tablet Oral Q6H PRN    piperacillin-tazobactam (ZOSYN) 3,375 mg in sodium chloride 0.9 % 50 mL IVPB (mini-bag)  3,375 mg IntraVENous q8h    atorvastatin (LIPITOR) tablet 40 mg  40 mg Oral Nightly    colchicine (COLCRYS) tablet 0.6 mg  0.6 mg Oral Daily    lisinopril (PRINIVIL;ZESTRIL) tablet 5 mg  5 mg Oral Daily    metoprolol tartrate (LOPRESSOR) tablet 25 mg  25 mg Oral BID    nitroGLYCERIN (NITROSTAT) SL tablet 0.4 mg  0.4 mg SubLINGual Q5 Min PRN    pantoprazole (PROTONIX) tablet 40 mg  40 mg Oral QAM AC    ticagrelor (BRILINTA) tablet 90 mg  90 mg Oral BID    aspirin chewable tablet 81 mg  81 mg Oral Daily    sodium chloride flush 0.9 % injection 5-40 mL  5-40 mL IntraVENous 2 times per day    sodium chloride flush 0.9 % injection 5-40 mL  5-40 mL IntraVENous PRN    0.9 % sodium chloride infusion   IntraVENous PRN    ondansetron (ZOFRAN-ODT) disintegrating tablet 4 mg  4 mg Oral Q8H PRN    Or    ondansetron (ZOFRAN) injection 4 mg  4 mg IntraVENous Q6H PRN    polyethylene glycol (GLYCOLAX) packet 17 g  17 g Oral Daily PRN    acetaminophen (TYLENOL) tablet 650 mg  650 mg Oral Q6H PRN    Or    acetaminophen (TYLENOL) suppository 650 mg  650 mg Rectal Q6H PRN    magnesium sulfate 2000 mg in 50 mL IVPB premix  2,000 mg IntraVENous PRN    potassium chloride 10 mEq/100 mL IVPB (Peripheral Line)  10 mEq IntraVENous PRN    potassium chloride (KLOR-CON M) extended release tablet 40 mEq  40 mEq Oral PRN    Or    potassium bicarb-citric acid (EFFER-K) effervescent tablet 40 mEq  40 mEq Oral PRN    Or    potassium chloride 10 mEq/100 mL IVPB (Peripheral Line)  10 mEq IntraVENous PRN    0.9 % sodium chloride infusion   IntraVENous Continuous       Review of Systems:  ROS was obtained, with pertinent positives as listed above. No chest pain or SOB. Diet:  clears    Objective:   Vitals:  /87   Pulse 68   Temp 98 °F (36.7 °C) (Oral)   Resp 16   Ht 5' 9\" (1.753 m)   Wt 163 lb 11.2 oz (74.3 kg)   SpO2 99%   BMI 24.17 kg/m²   Intake/Output:  No intake/output data recorded. No intake/output data recorded. Exam:  General appearance: alert, cooperative, no distress  Lungs: clear to auscultation bilaterally anteriorly  Heart: regular rate and rhythm  Abdomen: soft, non-tender. Bowel sounds normal. No masses, no organomegaly  Extremities: extremities normal, atraumatic, no cyanosis or edema  Neuro:  alert and oriented    Data Review (Labs):    Recent Labs     11/13/22  1019 11/14/22  0536 11/14/22  2050 11/15/22  0515 11/15/22  0855   WBC 12.3* 10.4  --  6.8  --    HGB 14.2 11.6* 11.9* 11.1* 11.0*   HCT 42.2 37.4* 38.8* 34.8* 34.2*   * 544*  --  433  --    MCV 82.6 87.0  --  85.9  --     132*  --  135  --    K 4.4 4.3  --  4.1  --    CL 99 99*  --  101  --    CO2 25 29  --  29  --    BUN 12 13  --  7  --    AST 16 10*  --  13*  --    ALT 20 37  --  20  --        Assessment:     Principal Problem:    Pancreatic pseudocyst  Active Problems:    History of ST elevation myocardial infarction (STEMI)    CAD (coronary artery disease)    Primary hypertension    Chronic pancreatitis (HCC)  Resolved Problems:    * No resolved hospital problems.  *    CT OF THE ABDOMEN AND PELVIS WITH CONTRAST. 11/13/22       CLINICAL INDICATION: Upper abdominal pain, prior cyst gastrostomy of a   pseudocyst       PROCEDURE: Serial thin section axial images obtained from the lung bases through   the proximal femurs following the administration of 100 cc of Isovue 370   intravenous contrast.  Radiation dose reduction techniques were used for this   study. Our CT scanners use one or all of the following: Automated exposure   control, adjusted of the mA and/or kV according to patient size, iterative   reconstruction       COMPARISON: CT abdomen and pelvis dated 10/25/2022       FINDINGS:        CT ABDOMEN: The cyst gastrostomy is appreciated in the left upper abdominal   quadrant. The pseudocyst has decreased in size now measuring 7.9 x 3.5 cm. There is a new or significantly larger pseudocyst now evident located between   the stomach and spleen that measures 5.0 x 3.5 cm. This is superior to the cyst   gastrostomy. There is significant inflammatory stranding surrounding the area   of the surgery. There is increase in the fluid and inflammatory stranding in   the lesser sac with small cystic fluid collections there is a stable small cyst   in the body of the pancreas measures 1.3 cm. There is no significant dilation   of the pancreatic duct. The gallbladder is absent. The liver and spleen are   normal.  The kidneys are normal.  The adrenal glands are normal.  The bowel is   unremarkable. CT PELVIS: The bladder is normal.  The rectum is decompressed. There is trace   free fluid in the cul-de-sac. Sigmoid colon is unremarkable. There is no   inguinal hernia. No aggressive bone lesions identified. Impression   1. New or significantly larger pseudocyst located in the left upper abdominal   quadrant beneath the diaphragm is between the stomach and spleen. This measures   roughly 5 cm.    2.  Increase in the peripancreatic inflammation primarily accumulating in the   lesser sac indicative of pancreatitis. 3.  Stable 1.5 cm cyst in the body of the pancreas. 4.  Postsurgical changes indicative of a cyst gastrostomy at the site of the   prior large pseudocyst which has decreased significantly in size. Assessment/Plan: Patient is a 44 y.o. male with Etoh pancreatitis who is seen in consultation for abdominal pain and fulness. No n/v. Just had cyst gastrostomy with axios stent 11/9/22. Also recent STEMI. Now back with CT showing new pseudocyst in LUQ ~5cm. Stable 1.5cm cyst in body of pancreas. Pt did very well clinically and will await further procedure as planned for end of november     Plan:     - Consider repeat cyst gastrostomy with axios stent. Concerning due to recent MI with Brilinta tx. If debridement felt necessary, may need a bridge but will discuss with cardiology - no need for consult  - d/c Zosyn.    - d/c IVF  - increase diet to low fat solid diet     Pro Chase MD  [facility]

## 2022-11-15 NOTE — PROGRESS NOTES
Hospitalist Progress Note   Admit Date:  2022  4:59 PM   Name:  Kike Rucker   Age:  44 y.o. Sex:  male  :  1985   MRN:  [id number]   Room:  [facility]/    Presenting Complaint: No chief complaint on file. Reason(s) for Admission: Pancreatic pseudocyst [K86.3]     Hospital Course:   Kike Rucker is a 44 y.o. male with medical history of ETOH pancreatitis, hypertension, who presented with abdominal pain. Patient had just undergone cystogastrostomy with stent on 2022. Patient at that time also suffered STEMI. Patient continued to have worsening pain in his upper abdomen and described as a dull achy pain rating it 8 out of 10. CT abdomen pelvis showed new pseudocyst in the left upper quadrant between stomach and spleen about 5 cm. Increased JAYA pancreatic inflammation indicated both pancreatitis. Stable 1.5 cm cyst in the body of the pancreas, and postsurgical changes indicate of of cyst gastrostomy at site of prior large pseudocyst which has decreased significantly in size. Subjective & 24hr Events (11/15/22): Pt sitting up in bed, pain reported as controlled on oral pain meds. Tolerated hamburger steak for lunch with minimal discomfort. Lipase has climbed 1119 (from 460). Reports episodes of loose stool this afternoon. Assessment & Plan:     Pancreatic pseudocyst  - wbc 12.3 on admit and empircally started on IV atbx. -GI consulted, appreciate recommendations  - Patient was recently seen  and underwent stent placement for large pseudocyst  - GI recommends waiting 3 weeks from last EUS before repeating procedure if possible. (Will need bridge for next procedure)  - continue to monitor symptoms with resumption of CLD. 11/15 - monitor off zosyn and tolerance of diet. Lipase climbed today but clinically improved. ?DC in AM if pain still improved and tolerating diet.    May need pancreatic enzyme at discharge if diarrhea continues - will d/w GI    History of STEMI/coronary artery disease  - On last admission patient underwent cardiac stent  - Currently on Brilinta   - Patient without any cardiac chest pain, troponins continue to trend down  - Continue aspirin and statin  - Can consider cardiology consult if patient needs to undergo any intervention and recommendations on Brilinta    Hypertension  - Continue home antihypertensives      Anticipated discharge needs:      pending    Diet:  ADULT DIET; Regular; Low Fat/Low Chol/High Fiber/MARYANNE  ADULT ORAL NUTRITION SUPPLEMENT; Breakfast, Lunch, Dinner; Low Calorie/High Protein Oral Supplement  DVT PPx: brillinta  Code status: Full Code    Hospital Problems:  Principal Problem:    Pancreatic pseudocyst  Active Problems:    History of ST elevation myocardial infarction (STEMI)    CAD (coronary artery disease)    Primary hypertension    Chronic pancreatitis ([facility] [address]  Resolved Problems:    * No resolved hospital problems. *      Objective:   Patient Vitals for the past 24 hrs:   Temp Pulse Resp BP SpO2   11/15/22 1603 -- -- 16 -- --   11/15/22 1530 97.3 °F (36.3 °C) 82 16 111/72 100 %   11/15/22 1042 98 °F (36.7 °C) 68 16 109/87 99 %   11/15/22 0950 -- -- 15 -- --   11/15/22 0726 -- 69 -- 103/67 --   11/15/22 0723 97.5 °F (36.4 °C) 63 14 84/60 100 %   11/15/22 0633 -- -- 18 -- --   11/15/22 0628 -- 67 18 116/67 --   11/15/22 0339 -- -- 16 -- --   11/15/22 0311 97.7 °F (36.5 °C) 60 16 (!) 96/59 99 %   11/15/22 0309 -- -- 17 -- --   11/15/22 0234 -- 63 -- -- --   11/15/22 0055 -- 66 -- -- --   11/14/22 2333 98 °F (36.7 °C) 75 16 (!) 124/58 100 %   11/14/22 2132 -- -- 18 -- --   11/14/22 2117 -- -- 18 -- --   11/14/22 2035 -- 70 -- -- --   11/14/22 1930 -- 64 -- -- --   11/14/22 1921 97.4 °F (36.3 °C) 73 16 103/74 100 %         Oxygen Therapy  SpO2: 100 %  O2 Device: None (Room air)    Estimated body mass index is 24.17 kg/m² as calculated from the following:    Height as of this encounter: 5' 9\" (1.753 m).     Weight as of this encounter: 163 lb 11.2 oz (74.3 kg). No intake or output data in the 24 hours ending 11/15/22 1848      Physical Exam:     Blood pressure 111/72, pulse 82, temperature 97.3 °F (36.3 °C), temperature source Oral, resp. rate 16, height 5' 9\" (1.753 m), weight 163 lb 11.2 oz (74.3 kg), SpO2 100 %. General:    Well nourished. Head:  Normocephalic, atraumatic  Eyes:  Sclerae appear normal.  Pupils equally round. ENT:  Nares appear normal, no drainage. Moist oral mucosa  Neck:  No restricted ROM. Trachea midline   CV:   RRR. No m/r/g. No jugular venous distension. Lungs:   CTAB. No wheezing, rhonchi, or rales. Symmetric expansion. Abdomen: Bowel sounds present. Soft, not tender, nondistended. Extremities: No cyanosis or clubbing. No edema  Skin:     No rashes and normal coloration. Warm and dry. Neuro:  CN II-XII grossly intact. Sensation intact. A&Ox3  Psych:  Normal mood and affect.       I have personally reviewed labs and tests showing:  Recent Labs:  Recent Results (from the past 48 hour(s))   Comprehensive Metabolic Panel w/ Reflex to MG    Collection Time: 11/14/22  5:36 AM   Result Value Ref Range    Sodium 132 (L) 133 - 143 mmol/L    Potassium 4.3 3.5 - 5.1 mmol/L    Chloride 99 (L) 101 - 110 mmol/L    CO2 29 21 - 32 mmol/L    Anion Gap 4 2 - 11 mmol/L    Glucose 130 (H) 65 - 100 mg/dL    BUN 13 6 - 23 MG/DL    Creatinine 0.90 0.8 - 1.5 MG/DL    Est, Glom Filt Rate >60 >60 ml/min/1.73m2    Calcium 8.4 8.3 - 10.4 MG/DL    Total Bilirubin 0.2 0.2 - 1.1 MG/DL    ALT 37 12 - 65 U/L    AST 10 (L) 15 - 37 U/L    Alk Phosphatase 70 50 - 136 U/L    Total Protein 5.6 (L) 6.3 - 8.2 g/dL    Albumin 2.2 (L) 3.5 - 5.0 g/dL    Globulin 3.4 2.8 - 4.5 g/dL    Albumin/Globulin Ratio 0.6 0.4 - 1.6     CBC with Auto Differential    Collection Time: 11/14/22  5:36 AM   Result Value Ref Range    WBC 10.4 4.3 - 11.1 K/uL    RBC 4.30 4.23 - 5.6 M/uL    Hemoglobin 11.6 (L) 13.6 - 17.2 g/dL    Hematocrit 37.4 (L) 41.1 - 50.3 %    MCV 87.0 82 - 102 FL    MCH 27.0 26.1 - 32.9 PG    MCHC 31.0 (L) 31.4 - 35.0 g/dL    RDW 14.1 11.9 - 14.6 %    Platelets 818 (H) 903 - 450 K/uL    MPV 8.4 (L) 9.4 - 12.3 FL    nRBC 0.00 0.0 - 0.2 K/uL    Differential Type AUTOMATED      Seg Neutrophils 66 43 - 78 %    Lymphocytes 21 13 - 44 %    Monocytes 9 4.0 - 12.0 %    Eosinophils % 2 0.5 - 7.8 %    Basophils 1 0.0 - 2.0 %    Immature Granulocytes 1 0.0 - 5.0 %    Segs Absolute 6.9 1.7 - 8.2 K/UL    Absolute Lymph # 2.1 0.5 - 4.6 K/UL    Absolute Mono # 1.0 0.1 - 1.3 K/UL    Absolute Eos # 0.2 0.0 - 0.8 K/UL    Basophils Absolute 0.1 0.0 - 0.2 K/UL    Absolute Immature Granulocyte 0.1 0.0 - 0.5 K/UL   Hemoglobin and Hematocrit    Collection Time: 11/14/22  8:50 PM   Result Value Ref Range    Hemoglobin 11.9 (L) 13.6 - 17.2 g/dL    Hematocrit 38.8 (L) 41.1 - 50.3 %   Comprehensive Metabolic Panel    Collection Time: 11/15/22  5:15 AM   Result Value Ref Range    Sodium 135 133 - 143 mmol/L    Potassium 4.1 3.5 - 5.1 mmol/L    Chloride 101 101 - 110 mmol/L    CO2 29 21 - 32 mmol/L    Anion Gap 5 2 - 11 mmol/L    Glucose 113 (H) 65 - 100 mg/dL    BUN 7 6 - 23 MG/DL    Creatinine 0.80 0.8 - 1.5 MG/DL    Est, Glom Filt Rate >60 >60 ml/min/1.73m2    Calcium 8.5 8.3 - 10.4 MG/DL    Total Bilirubin 0.2 0.2 - 1.1 MG/DL    ALT 20 12 - 65 U/L    AST 13 (L) 15 - 37 U/L    Alk Phosphatase 64 50 - 136 U/L    Total Protein 5.7 (L) 6.3 - 8.2 g/dL    Albumin 2.2 (L) 3.5 - 5.0 g/dL    Globulin 3.5 2.8 - 4.5 g/dL    Albumin/Globulin Ratio 0.6 0.4 - 1.6     CBC with Auto Differential    Collection Time: 11/15/22  5:15 AM   Result Value Ref Range    WBC 6.8 4.3 - 11.1 K/uL    RBC 4.05 (L) 4.23 - 5.6 M/uL    Hemoglobin 11.1 (L) 13.6 - 17.2 g/dL    Hematocrit 34.8 (L) 41.1 - 50.3 %    MCV 85.9 82 - 102 FL    MCH 27.4 26.1 - 32.9 PG    MCHC 31.9 31.4 - 35.0 g/dL    RDW 14.2 11.9 - 14.6 %    Platelets 802 271 - 779 K/uL    MPV 8.5 (L) 9.4 - 12.3 FL    nRBC 0.00 0.0 - 0.2 K/uL    Differential Type AUTOMATED      Seg Neutrophils 63 43 - 78 %    Lymphocytes 23 13 - 44 %    Monocytes 10 4.0 - 12.0 %    Eosinophils % 2 0.5 - 7.8 %    Basophils 1 0.0 - 2.0 %    Immature Granulocytes 1 0.0 - 5.0 %    Segs Absolute 4.3 1.7 - 8.2 K/UL    Absolute Lymph # 1.6 0.5 - 4.6 K/UL    Absolute Mono # 0.7 0.1 - 1.3 K/UL    Absolute Eos # 0.2 0.0 - 0.8 K/UL    Basophils Absolute 0.1 0.0 - 0.2 K/UL    Absolute Immature Granulocyte 0.0 0.0 - 0.5 K/UL   Lipase    Collection Time: 11/15/22  5:15 AM   Result Value Ref Range    Lipase 460 (H) 73 - 393 U/L   Hemoglobin and Hematocrit    Collection Time: 11/15/22  8:55 AM   Result Value Ref Range    Hemoglobin 11.0 (L) 13.6 - 17.2 g/dL    Hematocrit 34.2 (L) 41.1 - 50.3 %   Lipase    Collection Time: 11/15/22  3:27 PM   Result Value Ref Range    Lipase 1,119 (H) 73 - 393 U/L       I have personally reviewed imaging studies showing:   Other Studies:  No orders to display       Current Meds:  Current Facility-Administered Medications   Medication Dose Route Frequency    HYDROcodone-acetaminophen (NORCO)  MG per tablet 1 tablet  1 tablet Oral Q6H PRN    melatonin tablet 6 mg  6 mg Oral Nightly PRN    atorvastatin (LIPITOR) tablet 40 mg  40 mg Oral Nightly    colchicine (COLCRYS) tablet 0.6 mg  0.6 mg Oral Daily    lisinopril (PRINIVIL;ZESTRIL) tablet 5 mg  5 mg Oral Daily    metoprolol tartrate (LOPRESSOR) tablet 25 mg  25 mg Oral BID    nitroGLYCERIN (NITROSTAT) SL tablet 0.4 mg  0.4 mg SubLINGual Q5 Min PRN    pantoprazole (PROTONIX) tablet 40 mg  40 mg Oral QAM AC    ticagrelor (BRILINTA) tablet 90 mg  90 mg Oral BID    aspirin chewable tablet 81 mg  81 mg Oral Daily    sodium chloride flush 0.9 % injection 5-40 mL  5-40 mL IntraVENous 2 times per day    sodium chloride flush 0.9 % injection 5-40 mL  5-40 mL IntraVENous PRN    0.9 % sodium chloride infusion   IntraVENous PRN    ondansetron (ZOFRAN-ODT) disintegrating tablet 4 mg  4 mg Oral Q8H PRN    Or ondansetron (ZOFRAN) injection 4 mg  4 mg IntraVENous Q6H PRN    polyethylene glycol (GLYCOLAX) packet 17 g  17 g Oral Daily PRN    acetaminophen (TYLENOL) tablet 650 mg  650 mg Oral Q6H PRN    Or    acetaminophen (TYLENOL) suppository 650 mg  650 mg Rectal Q6H PRN    magnesium sulfate 2000 mg in 50 mL IVPB premix  2,000 mg IntraVENous PRN    potassium chloride 10 mEq/100 mL IVPB (Peripheral Line)  10 mEq IntraVENous PRN    potassium chloride (KLOR-CON M) extended release tablet 40 mEq  40 mEq Oral PRN    Or    potassium bicarb-citric acid (EFFER-K) effervescent tablet 40 mEq  40 mEq Oral PRN    Or    potassium chloride 10 mEq/100 mL IVPB (Peripheral Line)  10 mEq IntraVENous PRN    0.9 % sodium chloride infusion   IntraVENous Continuous       Signed:  Mali Spring DO    Part of this note may have been written by using a voice dictation software. The note has been proof read but may still contain some grammatical/other typographical errors.

## 2022-11-16 VITALS
HEART RATE: 65 BPM | RESPIRATION RATE: 16 BRPM | BODY MASS INDEX: 24.26 KG/M2 | TEMPERATURE: 97.2 F | OXYGEN SATURATION: 100 % | HEIGHT: 69 IN | SYSTOLIC BLOOD PRESSURE: 121 MMHG | DIASTOLIC BLOOD PRESSURE: 77 MMHG | WEIGHT: 163.8 LBS

## 2022-11-16 LAB
ALBUMIN SERPL-MCNC: 2.4 G/DL (ref 3.5–5)
ALBUMIN/GLOB SERPL: 0.6 {RATIO} (ref 0.4–1.6)
ALP SERPL-CCNC: 78 U/L (ref 50–136)
ALT SERPL-CCNC: 25 U/L (ref 12–65)
ANION GAP SERPL CALC-SCNC: 6 MMOL/L (ref 2–11)
AST SERPL-CCNC: 13 U/L (ref 15–37)
BASOPHILS # BLD: 0.1 K/UL (ref 0–0.2)
BASOPHILS NFR BLD: 1 % (ref 0–2)
BILIRUB SERPL-MCNC: 0.2 MG/DL (ref 0.2–1.1)
BUN SERPL-MCNC: 9 MG/DL (ref 6–23)
CALCIUM SERPL-MCNC: 8.7 MG/DL (ref 8.3–10.4)
CHLORIDE SERPL-SCNC: 103 MMOL/L (ref 101–110)
CO2 SERPL-SCNC: 26 MMOL/L (ref 21–32)
CREAT SERPL-MCNC: 0.9 MG/DL (ref 0.8–1.5)
DIFFERENTIAL METHOD BLD: ABNORMAL
EOSINOPHIL # BLD: 0.1 K/UL (ref 0–0.8)
EOSINOPHIL NFR BLD: 2 % (ref 0.5–7.8)
ERYTHROCYTE [DISTWIDTH] IN BLOOD BY AUTOMATED COUNT: 14.3 % (ref 11.9–14.6)
GLOBULIN SER CALC-MCNC: 3.8 G/DL (ref 2.8–4.5)
GLUCOSE SERPL-MCNC: 156 MG/DL (ref 65–100)
HCT VFR BLD AUTO: 37.7 % (ref 41.1–50.3)
HCT VFR BLD AUTO: 38.1 % (ref 41.1–50.3)
HGB BLD-MCNC: 12.3 G/DL (ref 13.6–17.2)
HGB BLD-MCNC: 12.4 G/DL (ref 13.6–17.2)
IMM GRANULOCYTES # BLD AUTO: 0 K/UL (ref 0–0.5)
IMM GRANULOCYTES NFR BLD AUTO: 1 % (ref 0–5)
LIPASE SERPL-CCNC: 821 U/L (ref 73–393)
LYMPHOCYTES # BLD: 1.5 K/UL (ref 0.5–4.6)
LYMPHOCYTES NFR BLD: 23 % (ref 13–44)
MCH RBC QN AUTO: 27.8 PG (ref 26.1–32.9)
MCHC RBC AUTO-ENTMCNC: 32.6 G/DL (ref 31.4–35)
MCV RBC AUTO: 85.3 FL (ref 82–102)
MONOCYTES # BLD: 0.5 K/UL (ref 0.1–1.3)
MONOCYTES NFR BLD: 8 % (ref 4–12)
NEUTS SEG # BLD: 4.1 K/UL (ref 1.7–8.2)
NEUTS SEG NFR BLD: 65 % (ref 43–78)
NRBC # BLD: 0 K/UL (ref 0–0.2)
PLATELET # BLD AUTO: 475 K/UL (ref 150–450)
PMV BLD AUTO: 8.5 FL (ref 9.4–12.3)
POTASSIUM SERPL-SCNC: 4.1 MMOL/L (ref 3.5–5.1)
PROT SERPL-MCNC: 6.2 G/DL (ref 6.3–8.2)
RBC # BLD AUTO: 4.42 M/UL (ref 4.23–5.6)
SODIUM SERPL-SCNC: 135 MMOL/L (ref 133–143)
WBC # BLD AUTO: 6.3 K/UL (ref 4.3–11.1)

## 2022-11-16 PROCEDURE — 6370000000 HC RX 637 (ALT 250 FOR IP): Performed by: STUDENT IN AN ORGANIZED HEALTH CARE EDUCATION/TRAINING PROGRAM

## 2022-11-16 PROCEDURE — 85014 HEMATOCRIT: CPT

## 2022-11-16 PROCEDURE — 85025 COMPLETE CBC W/AUTO DIFF WBC: CPT

## 2022-11-16 PROCEDURE — 6370000000 HC RX 637 (ALT 250 FOR IP): Performed by: INTERNAL MEDICINE

## 2022-11-16 PROCEDURE — 83690 ASSAY OF LIPASE: CPT

## 2022-11-16 PROCEDURE — 80053 COMPREHEN METABOLIC PANEL: CPT

## 2022-11-16 PROCEDURE — 36415 COLL VENOUS BLD VENIPUNCTURE: CPT

## 2022-11-16 PROCEDURE — 2580000003 HC RX 258: Performed by: STUDENT IN AN ORGANIZED HEALTH CARE EDUCATION/TRAINING PROGRAM

## 2022-11-16 RX ORDER — HYDROCODONE BITARTRATE AND ACETAMINOPHEN 10; 325 MG/1; MG/1
1 TABLET ORAL EVERY 6 HOURS PRN
Qty: 20 TABLET | Refills: 0 | Status: ON HOLD | OUTPATIENT
Start: 2022-11-16 | End: 2022-11-22

## 2022-11-16 RX ADMIN — LISINOPRIL 5 MG: 5 TABLET ORAL at 08:52

## 2022-11-16 RX ADMIN — TICAGRELOR 90 MG: 90 TABLET ORAL at 08:51

## 2022-11-16 RX ADMIN — COLCHICINE 0.6 MG: 0.6 TABLET, FILM COATED ORAL at 08:51

## 2022-11-16 RX ADMIN — HYDROCODONE BITARTRATE AND ACETAMINOPHEN 1 TABLET: 10; 325 TABLET ORAL at 10:21

## 2022-11-16 RX ADMIN — PANTOPRAZOLE SODIUM 40 MG: 40 TABLET, DELAYED RELEASE ORAL at 05:08

## 2022-11-16 RX ADMIN — HYDROCODONE BITARTRATE AND ACETAMINOPHEN 1 TABLET: 10; 325 TABLET ORAL at 04:06

## 2022-11-16 RX ADMIN — ASPIRIN 81 MG 81 MG: 81 TABLET ORAL at 08:51

## 2022-11-16 RX ADMIN — SODIUM CHLORIDE, PRESERVATIVE FREE 10 ML: 5 INJECTION INTRAVENOUS at 08:53

## 2022-11-16 ASSESSMENT — PAIN DESCRIPTION - ORIENTATION
ORIENTATION: LEFT;RIGHT
ORIENTATION: RIGHT;LEFT

## 2022-11-16 ASSESSMENT — PAIN DESCRIPTION - LOCATION
LOCATION: ABDOMEN;FLANK
LOCATION: ABDOMEN;FLANK

## 2022-11-16 ASSESSMENT — PAIN DESCRIPTION - DESCRIPTORS
DESCRIPTORS: PRESSURE;ACHING
DESCRIPTORS: PRESSURE

## 2022-11-16 ASSESSMENT — PAIN SCALES - GENERAL
PAINLEVEL_OUTOF10: 6
PAINLEVEL_OUTOF10: 3
PAINLEVEL_OUTOF10: 7
PAINLEVEL_OUTOF10: 0

## 2022-11-16 ASSESSMENT — PAIN SCALES - WONG BAKER: WONGBAKER_NUMERICALRESPONSE: 0

## 2022-11-16 NOTE — DISCHARGE SUMMARY
Hospitalist Discharge Summary   Admit Date:  2022  4:59 PM   DC Note date: 2022  Name:  Shady Mora   Age:  44 y.o. Sex:  male  :  1985   MRN:  [id number]   Room:  [facility]  PCP:  Paige Frey MD    Presenting Complaint: No chief complaint on file. Initial Admission Diagnosis: Pancreatic pseudocyst [K86.3]     Problem List for this Hospitalization (present on admission):    Principal Problem:    Pancreatic pseudocyst  Active Problems:    History of ST elevation myocardial infarction (STEMI)    CAD (coronary artery disease)    Primary hypertension    Chronic pancreatitis ([facility] [address]  Resolved Problems:    * No resolved hospital problems. *      Hospital Course:  Shady Mora is a 44 y.o. male with medical history of ETOH pancreatitis, hypertension, who presented with abdominal pain. Patient had just undergone cystogastrostomy with stent on 2022. Patient at that time also suffered STEMI. Patient continued to have worsening pain in his upper abdomen and described as a dull achy pain rating it 8 out of 10. CT abdomen pelvis showed new pseudocyst in the left upper quadrant between stomach and spleen about 5 cm. Increased JAYA pancreatic inflammation indicated both pancreatitis. Stable 1.5 cm cyst in the body of the pancreas, and postsurgical changes indicate of of cyst gastrostomy at site of prior large pseudocyst which has decreased significantly in size. Patient started empically on atbx but cultures negative. Seen by GI. Pt resumed on low fat diet which he is tolerating. Stable off of antibiotics. He was given the ok to discharge home per d/w GI. Per GI   \"Removal of axios stent scheduled for 22 with Dr. Queen Houston. Discussed with Dr. Israel Jaime (cardiology) yesterday regarding having to hold dual anti-platelet therapy for procedure. From a cardiac standpoint, patient is high risk for Brilinta hold due to recent MI.  Discussed the possibility of re-admission with Integrillin. Will discuss with hospitalist regarding re-admission.   - Patient has cardiology appointment tomorrow with Dr. Harvey Chappell and plans to discuss this further. We will continue discussion with cardiology to create the safest plan for removal of axios stent\"    Hospitalist team agreeable to re-admission for integrillin bridge when deemed necessary per providers as above. Disposition: home  Diet: No diet orders on file  Code Status: Prior    Follow Ups:  Cardiology/ GI follow-ups as above    Time spent in patient discharge and coordination 32 minutes. Follow up labs/diagnostics (ultimately defer to outpatient provider):  NA    Plan was discussed with patient, CM, RN, GI. All questions answered. Patient was stable at time of discharge. Instructions given to call a physician or return if any concerns. Discharge Medication List as of 11/16/2022 12:18 PM        START taking these medications    Details   HYDROcodone-acetaminophen (NORCO)  MG per tablet Take 1 tablet by mouth every 6 hours as needed for Pain for up to 5 days. , Disp-20 tablet, R-0Normal           CONTINUE these medications which have NOT CHANGED    Details   aspirin 81 MG chewable tablet Take 1 tablet by mouth daily, Disp-30 tablet, R-3Normal      atorvastatin (LIPITOR) 40 MG tablet Take 1 tablet by mouth nightly, Disp-30 tablet, R-3Normal      colchicine (COLCRYS) 0.6 MG tablet Take 1 tablet by mouth daily, Disp-30 tablet, R-0Normal      lisinopril (PRINIVIL;ZESTRIL) 5 MG tablet Take 1 tablet by mouth daily, Disp-30 tablet, R-3Normal      metoprolol tartrate (LOPRESSOR) 25 MG tablet Take 1 tablet by mouth 2 times daily, Disp-60 tablet, R-3Normal      nitroGLYCERIN (NITROSTAT) 0.4 MG SL tablet Place 1 tablet under the tongue every 5 minutes as needed for Chest pain up to max of 3 total doses.  If no relief after 1 dose, call 911., Disp-25 tablet, R-3Normal      ticagrelor (BRILINTA) 90 MG TABS tablet Take 1 tablet by mouth 2 times daily, Disp-60 tablet, R-11Normal      omeprazole (PRILOSEC) 20 MG delayed release capsule Take 20 mg by mouth dailyHistorical Med      naloxone (NARCAN) 0.4 MG/ML injection Infuse 1 mL intravenously as needed (shortness of breath), Disp-2 each, R-0Normal           STOP taking these medications       thiamine 100 MG tablet Comments:   Reason for Stopping:               Procedures done this admission:  * No surgery found *    Consults this admission:  IP CONSULT TO GI    Echocardiogram results:  11/02/22    TRANSTHORACIC ECHOCARDIOGRAM (TTE) COMPLETE (CONTRAST/BUBBLE/3D PRN) 11/02/2022  3:31 PM, 11/02/2022 12:00 AM (Final)    Interpretation Summary    Left Ventricle: Normal left ventricular systolic function with a visually estimated EF of 55 - 60%. Left ventricle size is normal. Normal wall thickness. Normal wall motion. Normal diastolic function. Technical qualifiers: Color flow Doppler was performed and pulse wave and/or continuous wave Doppler was performed. Signed by: Giulia Bynum MD on 11/2/2022  3:31 PM, Signed by: Unknown Provider Result on 11/2/2022 12:00 AM      Diagnostic Imaging/Tests:   XR CHEST (2 VW)    Result Date: 11/2/2022  1. Left lower lobe bandlike atelectasis/scarring. 2.  Otherwise, clear lungs. CT ABDOMEN PELVIS W IV CONTRAST Additional Contrast? Oral    Result Date: 11/13/2022  1. New or significantly larger pseudocyst located in the left upper abdominal quadrant beneath the diaphragm is between the stomach and spleen. This measures roughly 5 cm. 2.  Increase in the peripancreatic inflammation primarily accumulating in the lesser sac indicative of pancreatitis. 3.  Stable 1.5 cm cyst in the body of the pancreas. 4.  Postsurgical changes indicative of a cyst gastrostomy at the site of the prior large pseudocyst which has decreased significantly in size.      CT ABDOMEN PELVIS W IV CONTRAST Additional Contrast? Radiologist Recommendation    Result Date: 10/25/2022  Several small, 1-2 cm pseudocyst and a large, 7 x 11 cm pseudocyst which is lateral to the greater curvature the stomach. Small amount of ascites throughout the abdomen. Labs: Results:       BMP, Mg, Phos Recent Labs     11/14/22 0536 11/15/22  0515 11/16/22 0514   * 135 135   K 4.3 4.1 4.1   CL 99* 101 103   CO2 29 29 26   ANIONGAP 4 5 6   BUN 13 7 9   CREATININE 0.90 0.80 0.90   LABGLOM >60 >60 >60   CALCIUM 8.4 8.5 8.7   GLUCOSE 130* 113* 156*      CBC Recent Labs     11/14/22 0536 11/14/22  2050 11/15/22  0515 11/15/22  0855 11/15/22  2122 11/16/22  0514 11/16/22  0846   WBC 10.4  --  6.8  --   --  6.3  --    RBC 4.30  --  4.05*  --   --  4.42  --    HGB 11.6*   < > 11.1*   < > 12.0* 12.3* 12.4*   HCT 37.4*   < > 34.8*   < > 36.7* 37.7* 38.1*   MCV 87.0  --  85.9  --   --  85.3  --    MCH 27.0  --  27.4  --   --  27.8  --    MCHC 31.0*  --  31.9  --   --  32.6  --    RDW 14.1  --  14.2  --   --  14.3  --    *  --  433  --   --  475*  --    MPV 8.4*  --  8.5*  --   --  8.5*  --    NRBC 0.00  --  0.00  --   --  0.00  --    SEGS 66  --  63  --   --  65  --    LYMPHOPCT 21  --  23  --   --  23  --    EOSRELPCT 2  --  2  --   --  2  --    MONOPCT 9  --  10  --   --  8  --    BASOPCT 1  --  1  --   --  1  --    IMMGRAN 1  --  1  --   --  1  --    SEGSABS 6.9  --  4.3  --   --  4.1  --    LYMPHSABS 2.1  --  1.6  --   --  1.5  --    EOSABS 0.2  --  0.2  --   --  0.1  --    MONOSABS 1.0  --  0.7  --   --  0.5  --    BASOSABS 0.1  --  0.1  --   --  0.1  --    ABSIMMGRAN 0.1  --  0.0  --   --  0.0  --     < > = values in this interval not displayed.       LFT Recent Labs     11/14/22  0536 11/15/22  0515 11/16/22  0514   BILITOT 0.2 0.2 0.2   ALKPHOS 70 64 78   AST 10* 13* 13*   ALT 37 20 25   PROT 5.6* 5.7* 6.2*   LABALBU 2.2* 2.2* 2.4*   GLOB 3.4 3.5 3.8      Cardiac  Lab Results   Component Value Date/Time    TROPHS 300.0 11/02/2022 10:53 AM      Coags No results found for: PROTIME, INR, APTT   A1c Lab Results   Component Value Date/Time    LABA1C 5.8 10/26/2022 05:58 AM     10/26/2022 05:58 AM      Lipids Lab Results   Component Value Date/Time    CHOL 88 11/03/2022 05:39 AM    LDLCALC 58.4 11/03/2022 05:39 AM    LABVLDL 16.6 11/03/2022 05:39 AM    HDL 13 11/03/2022 05:39 AM    CHOLHDLRATIO 6.8 11/03/2022 05:39 AM    TRIG 83 11/03/2022 05:39 AM      Thyroid  Lab Results   Component Value Date/Time    TSHELE 2.38 10/26/2022 05:58 AM        Most Recent UA Lab Results   Component Value Date/Time    COLORU YELLOW 11/13/2022 10:19 AM    APPEARANCE CLEAR 11/13/2022 10:19 AM    SPECGRAV 1.010 11/13/2022 10:19 AM    LABPH 7.0 11/13/2022 10:19 AM    PROTEINU Negative 11/13/2022 10:19 AM    GLUCOSEU Negative 11/13/2022 10:19 AM    KETUA Negative 11/13/2022 10:19 AM    BILIRUBINUR Negative 11/13/2022 10:19 AM    BLOODU Negative 11/13/2022 10:19 AM    UROBILINOGEN 0.2 11/13/2022 10:19 AM    NITRU Negative 11/13/2022 10:19 AM    LEUKOCYTESUR Negative 11/13/2022 10:19 AM    WBCUA 0-3 10/25/2022 07:51 AM    RBCUA 0-3 10/25/2022 07:51 AM    EPITHUA 0 10/25/2022 07:51 AM    BACTERIA 1+ 10/25/2022 07:51 AM    LABCAST 0 10/25/2022 07:51 AM    MUCUS 0 10/25/2022 07:51 AM        No results for input(s): CULTURE in the last 720 hours.     All Labs from Last 24 Hrs:  Recent Results (from the past 24 hour(s))   Hemoglobin and Hematocrit    Collection Time: 11/15/22  9:22 PM   Result Value Ref Range    Hemoglobin 12.0 (L) 13.6 - 17.2 g/dL    Hematocrit 36.7 (L) 41.1 - 50.3 %   Comprehensive Metabolic Panel    Collection Time: 11/16/22  5:14 AM   Result Value Ref Range    Sodium 135 133 - 143 mmol/L    Potassium 4.1 3.5 - 5.1 mmol/L    Chloride 103 101 - 110 mmol/L    CO2 26 21 - 32 mmol/L    Anion Gap 6 2 - 11 mmol/L    Glucose 156 (H) 65 - 100 mg/dL    BUN 9 6 - 23 MG/DL    Creatinine 0.90 0.8 - 1.5 MG/DL    Est, Glom Filt Rate >60 >60 ml/min/1.73m2    Calcium 8.7 8.3 - 10.4 MG/DL    Total Bilirubin 0.2 0.2 - 1.1 MG/DL    ALT 25 12 - 65 U/L    AST 13 (L) 15 - 37 U/L    Alk Phosphatase 78 50 - 136 U/L    Total Protein 6.2 (L) 6.3 - 8.2 g/dL    Albumin 2.4 (L) 3.5 - 5.0 g/dL    Globulin 3.8 2.8 - 4.5 g/dL    Albumin/Globulin Ratio 0.6 0.4 - 1.6     CBC with Auto Differential    Collection Time: 11/16/22  5:14 AM   Result Value Ref Range    WBC 6.3 4.3 - 11.1 K/uL    RBC 4.42 4.23 - 5.6 M/uL    Hemoglobin 12.3 (L) 13.6 - 17.2 g/dL    Hematocrit 37.7 (L) 41.1 - 50.3 %    MCV 85.3 82 - 102 FL    MCH 27.8 26.1 - 32.9 PG    MCHC 32.6 31.4 - 35.0 g/dL    RDW 14.3 11.9 - 14.6 %    Platelets 949 (H) 279 - 450 K/uL    MPV 8.5 (L) 9.4 - 12.3 FL    nRBC 0.00 0.0 - 0.2 K/uL    Differential Type AUTOMATED      Seg Neutrophils 65 43 - 78 %    Lymphocytes 23 13 - 44 %    Monocytes 8 4.0 - 12.0 %    Eosinophils % 2 0.5 - 7.8 %    Basophils 1 0.0 - 2.0 %    Immature Granulocytes 1 0.0 - 5.0 %    Segs Absolute 4.1 1.7 - 8.2 K/UL    Absolute Lymph # 1.5 0.5 - 4.6 K/UL    Absolute Mono # 0.5 0.1 - 1.3 K/UL    Absolute Eos # 0.1 0.0 - 0.8 K/UL    Basophils Absolute 0.1 0.0 - 0.2 K/UL    Absolute Immature Granulocyte 0.0 0.0 - 0.5 K/UL   Lipase    Collection Time: 11/16/22  5:14 AM   Result Value Ref Range    Lipase 821 (H) 73 - 393 U/L   Hemoglobin and Hematocrit    Collection Time: 11/16/22  8:46 AM   Result Value Ref Range    Hemoglobin 12.4 (L) 13.6 - 17.2 g/dL    Hematocrit 38.1 (L) 41.1 - 50.3 %       No Known Allergies  Immunization History   Administered Date(s) Administered    COVID-19, MODERNA BLUE border, Primary or Immunocompromised, (age 12y+), IM, 100 mcg/0.5mL 08/19/2021    Influenza Virus Vaccine 11/05/2015, 09/19/2018, 01/18/2020, 01/18/2020, 10/05/2020, 10/05/2020    Influenza, FLUARIX, FLULAVAL, FLUZONE (age 10 mo+) AND AFLURIA, (age 1 y+), PF, 0.5mL 11/05/2015, 01/18/2020, 10/05/2020    Pneumococcal Polysaccharide ([id number]) 02/22/2018    Tdap (Boostrix, Adacel) 10/11/2022       Recent Vital Data:  Patient Vitals for the past 24 hrs:   Temp Pulse Resp BP SpO2   11/16/22 1145 97.2 °F (36.2 °C) 65 16 121/77 100 %   11/16/22 1051 -- -- 16 -- --   11/16/22 1021 -- -- 18 -- --   11/16/22 0852 -- -- -- 112/71 --   11/16/22 0751 97.5 °F (36.4 °C) 73 20 112/71 100 %   11/16/22 0429 97.4 °F (36.3 °C) 72 17 111/70 100 %   11/16/22 0406 -- -- 17 -- --   11/15/22 2251 97.4 °F (36.3 °C) 86 16 116/61 99 %   11/15/22 1933 97.3 °F (36.3 °C) 77 16 113/77 99 %       Oxygen Therapy  SpO2: 100 %  O2 Device: None (Room air)    Estimated body mass index is 24.19 kg/m² as calculated from the following:    Height as of this encounter: 5' 9\" (1.753 m). Weight as of this encounter: 163 lb 12.8 oz (74.3 kg). Intake/Output Summary (Last 24 hours) at 11/16/2022 1831  Last data filed at 11/15/2022 1933  Gross per 24 hour   Intake 240 ml   Output --   Net 240 ml         Physical Exam:    General:    Well nourished. No overt distress  Head:  Normocephalic, atraumatic  Eyes:  Sclerae appear normal.  Pupils equally round. HENT:  Nares appear normal, no drainage. Moist mucous membranes  Neck:  No restricted ROM. Trachea midline  CV:   RRR. No m/r/g. No JVD  Lungs:   CTAB. No wheezing, rhonchi, or rales. Respirations even, unlabored  Abdomen:   Soft, minimal Epigastric tenderness to deep palpation nondistended. Extremities: Warm and dry. No cyanosis or clubbing. No edema. Skin:     No rashes. Normal coloration  Neuro:  CN II-XII grossly intact. Psych:  Normal mood and affect. Signed:  Mali Spring DO    Part of this note may have been written by using a voice dictation software. The note has been proof read but may still contain some grammatical/other typographical errors.

## 2022-11-16 NOTE — PLAN OF CARE
Problem: Discharge Planning  Goal: Discharge to home or other facility with appropriate resources  11/16/2022 1210 by Bri Noguera RN  Outcome: Completed  11/16/2022 0440 by Charanjit Murphy RN  Outcome: Progressing     Problem: Safety - Adult  Goal: Free from fall injury  11/16/2022 1210 by Bri Noguera RN  Outcome: Completed  11/16/2022 0440 by Charanjit Murphy RN  Outcome: Progressing     Problem: ABCDS Injury Assessment  Goal: Absence of physical injury  11/16/2022 1210 by Bri Noguera RN  Outcome: Completed  11/16/2022 0440 by Charanjit Murphy RN  Outcome: Progressing     Problem: Pain  Goal: Verbalizes/displays adequate comfort level or baseline comfort level  11/16/2022 1210 by Bri Noguera RN  Outcome: Completed  11/16/2022 0440 by Charanjit Murphy RN  Outcome: Progressing

## 2022-11-16 NOTE — PROGRESS NOTES
Patient discharged home, PIV removed with catheter intact. No signs or symptoms of infection observed from PIV removal site. AVS summary reviewed with patient, opportunity for questions given. Reviewed that prescription is to be picked up from [facility]; signs/symptoms of Stroke and Heart Attack reviewed with patient and he verbalizes understanding.

## 2022-11-16 NOTE — PROGRESS NOTES
[facility] Progress Note         Admit Date:  11/13/2022    Today's Date:  11/16/2022    CC:  Pancreatic pseudocyst    Subjective:     Patient states he is feeling well and is eager to go home. He denies fevers, nausea or vomiting. Did report mild increased LUQ discomfort with progression of diet to low fat diet. Pain improved after a bowel movement. Pain medications are controlling pain well and patient is craving food - asked for a snack at 5 am today. Lipase did increase following initiation of low fat diet but has improved this AM. No bloody or black stools. Afebrile. Denies recent ETOH use. WBC normal. Continues Brilinta for recent MI.      Medications:   Current Facility-Administered Medications   Medication Dose Route Frequency    HYDROcodone-acetaminophen (NORCO)  MG per tablet 1 tablet  1 tablet Oral Q6H PRN    melatonin tablet 6 mg  6 mg Oral Nightly PRN    atorvastatin (LIPITOR) tablet 40 mg  40 mg Oral Nightly    colchicine (COLCRYS) tablet 0.6 mg  0.6 mg Oral Daily    lisinopril (PRINIVIL;ZESTRIL) tablet 5 mg  5 mg Oral Daily    metoprolol tartrate (LOPRESSOR) tablet 25 mg  25 mg Oral BID    nitroGLYCERIN (NITROSTAT) SL tablet 0.4 mg  0.4 mg SubLINGual Q5 Min PRN    pantoprazole (PROTONIX) tablet 40 mg  40 mg Oral QAM AC    ticagrelor (BRILINTA) tablet 90 mg  90 mg Oral BID    aspirin chewable tablet 81 mg  81 mg Oral Daily    sodium chloride flush 0.9 % injection 5-40 mL  5-40 mL IntraVENous 2 times per day    sodium chloride flush 0.9 % injection 5-40 mL  5-40 mL IntraVENous PRN    0.9 % sodium chloride infusion   IntraVENous PRN    ondansetron (ZOFRAN-ODT) disintegrating tablet 4 mg  4 mg Oral Q8H PRN    Or    ondansetron (ZOFRAN) injection 4 mg  4 mg IntraVENous Q6H PRN    polyethylene glycol (GLYCOLAX) packet 17 g  17 g Oral Daily PRN    acetaminophen (TYLENOL) tablet 650 mg  650 mg Oral Q6H PRN    Or    acetaminophen (TYLENOL) suppository 650 mg  650 mg Rectal Q6H PRN magnesium sulfate 2000 mg in 50 mL IVPB premix  2,000 mg IntraVENous PRN    potassium chloride 10 mEq/100 mL IVPB (Peripheral Line)  10 mEq IntraVENous PRN    potassium chloride (KLOR-CON M) extended release tablet 40 mEq  40 mEq Oral PRN    Or    potassium bicarb-citric acid (EFFER-K) effervescent tablet 40 mEq  40 mEq Oral PRN    Or    potassium chloride 10 mEq/100 mL IVPB (Peripheral Line)  10 mEq IntraVENous PRN    0.9 % sodium chloride infusion   IntraVENous Continuous       Review of Systems:  ROS was obtained, with pertinent positives as listed above. No chest pain or SOB. Diet:  Low fat, Low Chol, High Fiber diet, MARYANNE diet  Objective:   Vitals:  /71   Pulse 73   Temp 97.5 °F (36.4 °C) (Axillary)   Resp 18   Ht 5' 9\" (1.753 m)   Wt 163 lb 12.8 oz (74.3 kg)   SpO2 100%   BMI 24.19 kg/m²   Intake/Output:  No intake/output data recorded. 11/14 1901 - 11/16 0700  In: 240 [P.O.:240]  Out: -   Exam:  General appearance: alert, cooperative, no distress, sitting up in bed  Lungs: clear to auscultation bilaterally anteriorly  Heart: regular rate and rhythm  Abdomen: soft, mildly TTP over epigastrium and LUQ. No guarding or rigidity.  Bowel sounds normal. No masses, no organomegaly  Extremities: extremities normal, atraumatic, no cyanosis or edema  Neuro:  alert and oriented    Data Review (Labs):    Recent Labs     11/14/22  0536 11/14/22  2050 11/15/22  0515 11/15/22  0855 11/15/22  1527 11/15/22  2122 11/16/22  0514 11/16/22  0846   WBC 10.4  --  6.8  --   --   --  6.3  --    HGB 11.6* 11.9* 11.1* 11.0*  --  12.0* 12.3* 12.4*   HCT 37.4* 38.8* 34.8* 34.2*  --  36.7* 37.7* 38.1*   *  --  433  --   --   --  475*  --    MCV 87.0  --  85.9  --   --   --  85.3  --    *  --  135  --   --   --  135  --    K 4.3  --  4.1  --   --   --  4.1  --    CL 99*  --  101  --   --   --  103  --    CO2 29  --  29  --   --   --  26  --    BUN 13  --  7  --   --   --  9  --    CREATININE 0.90  --  0.80  -- --   --  0.90  --    CALCIUM 8.4  --  8.5  --   --   --  8.7  --    GLUCOSE 130*  --  113*  --   --   --  156*  --    ALKPHOS 70  --  64  --   --   --  78  --    AST 10*  --  13*  --   --   --  13*  --    ALT 37  --  20  --   --   --  25  --    BILITOT 0.2  --  0.2  --   --   --  0.2  --    LABALBU 2.2*  --  2.2*  --   --   --  2.4*  --    PROT 5.6*  --  5.7*  --   --   --  6.2*  --    LIPASE  --   --  460*  --  1,119*  --  821*  --      CT OF THE ABDOMEN AND PELVIS WITH CONTRAST. 11/13/22       CLINICAL INDICATION: Upper abdominal pain, prior cyst gastrostomy of a   pseudocyst       PROCEDURE: Serial thin section axial images obtained from the lung bases through   the proximal femurs following the administration of 100 cc of Isovue 370   intravenous contrast.  Radiation dose reduction techniques were used for this   study. Our CT scanners use one or all of the following: Automated exposure   control, adjusted of the mA and/or kV according to patient size, iterative   reconstruction       COMPARISON: CT abdomen and pelvis dated 10/25/2022       FINDINGS:        CT ABDOMEN: The cyst gastrostomy is appreciated in the left upper abdominal   quadrant. The pseudocyst has decreased in size now measuring 7.9 x 3.5 cm. There is a new or significantly larger pseudocyst now evident located between   the stomach and spleen that measures 5.0 x 3.5 cm. This is superior to the cyst   gastrostomy. There is significant inflammatory stranding surrounding the area   of the surgery. There is increase in the fluid and inflammatory stranding in   the lesser sac with small cystic fluid collections there is a stable small cyst   in the body of the pancreas measures 1.3 cm. There is no significant dilation   of the pancreatic duct. The gallbladder is absent. The liver and spleen are   normal.  The kidneys are normal.  The adrenal glands are normal.  The bowel is   unremarkable.        CT PELVIS: The bladder is normal.  The rectum is decompressed. There is trace   free fluid in the cul-de-sac. Sigmoid colon is unremarkable. There is no   inguinal hernia. No aggressive bone lesions identified. Impression   1. New or significantly larger pseudocyst located in the left upper abdominal   quadrant beneath the diaphragm is between the stomach and spleen. This measures   roughly 5 cm. 2.  Increase in the peripancreatic inflammation primarily accumulating in the   lesser sac indicative of pancreatitis. 3.  Stable 1.5 cm cyst in the body of the pancreas. 4.  Postsurgical changes indicative of a cyst gastrostomy at the site of the   prior large pseudocyst which has decreased significantly in size. Assessment:     Principal Problem:    Pancreatic pseudocyst  Active Problems:    History of ST elevation myocardial infarction (STEMI)    CAD (coronary artery disease)    Primary hypertension    Chronic pancreatitis ([address]  Resolved Problems:    * No resolved hospital problems. *    Patient is a 44 y.o. male with Etoh pancreatitis who is seen in consultation for abdominal pain and fullness. No n/v. Just had cyst gastrostomy with axios stent 11/9/22. Also recent STEMI on Brilinta. Now back with CT showing new pseudocyst in LUQ ~5cm. Stable 1.5cm cyst in body of pancreas. Patient tolerated progression to low fat diet well. Reported minimally increased LUQ pain with eating but pain is well controlled with medications. Remains afebrile with WBC WNL. Hgb stable at 12.4. Patient is eager for discharge. Plan:     - Patient tolerating low fat diet with mild LUQ discomfort. Lipase increased with diet progression but has improved since yesterday. Continue low fat diet as tolerated. - Removal of axios stent scheduled for 11/30/22 with Dr. Kenisha Ko. Discussed with Dr. Juany Richards (cardiology) yesterday regarding having to hold dual anti-platelet therapy for procedure.  From a cardiac standpoint, patient is high risk for Brilinta hold due to recent MI. Discussed the possibility of re-admission with Integrillin. Will discuss with hospitalist regarding re-admission.   - Patient has cardiology appointment tomorrow with Dr. Lara Headings and plans to discuss this further. We will continue discussion with cardiology to create the safest plan for removal of axios stent.   - GI will sign off. Okay to discharge per our perspective. Please call with any questions or concerns. Lin Schwartz PA-C  [facility]    Patient is seen and examined in collaboration with Dr. Domi Patel. Assessment and plan as per Dr. Domi Patel.

## 2022-11-16 NOTE — PROGRESS NOTES
Physician Progress Note      PATIENT:               Yazmin Garnett  CSN #:                  [id number]  :                       1985  ADMIT DATE:       2022 11:48 AM  DISCH DATE:        11/10/2022 11:48 AM  RESPONDING  PROVIDER #:        Patti Travis MD          QUERY TEXT:    Pt admitted with chest pain. Pt noted to have STEMI. If possible, please   document in progress notes and discharge summary if you are evaluating and/or   treating any of the following: The medical record reflects the following:  Risk Factors: HTN, smoker, male  Clinical Indicators: Patient with STEMI taken to cath lab, found with acutely   occluded large OM1, now status post stenting. Treatment: Heart cath, EKG, Trop, Nitro  Options provided:  -- Chest pain and STEMI due to stenosis of coronary artery  -- Chest pain due to STEMI  -- Other - I will add my own diagnosis  -- Disagree - Not applicable / Not valid  -- Disagree - Clinically unable to determine / Unknown  -- Refer to Clinical Documentation Reviewer    PROVIDER RESPONSE TEXT:    This patient has a STEMI due to stenosis of coronary artery. Query created by:  Adela Hardy on 2022 10:42 AM      Electronically signed by:  Patti Travis MD 2022 11:32 AM

## 2022-11-17 ENCOUNTER — OFFICE VISIT (OUTPATIENT)
Dept: CARDIOLOGY CLINIC | Age: 37
End: 2022-11-17

## 2022-11-17 VITALS
DIASTOLIC BLOOD PRESSURE: 70 MMHG | WEIGHT: 166.4 LBS | HEIGHT: 69 IN | BODY MASS INDEX: 24.65 KG/M2 | SYSTOLIC BLOOD PRESSURE: 104 MMHG | HEART RATE: 72 BPM

## 2022-11-17 DIAGNOSIS — I10 ESSENTIAL HYPERTENSION: ICD-10-CM

## 2022-11-17 DIAGNOSIS — I21.29 ST ELEVATION MYOCARDIAL INFARCTION (STEMI) INVOLVING OTHER CORONARY ARTERY (HCC): Primary | ICD-10-CM

## 2022-11-17 DIAGNOSIS — Z72.0 TOBACCO ABUSE: ICD-10-CM

## 2022-11-17 ASSESSMENT — PATIENT HEALTH QUESTIONNAIRE - PHQ9
SUM OF ALL RESPONSES TO PHQ QUESTIONS 1-9: 0
1. LITTLE INTEREST OR PLEASURE IN DOING THINGS: 0
SUM OF ALL RESPONSES TO PHQ QUESTIONS 1-9: 0
2. FEELING DOWN, DEPRESSED OR HOPELESS: 0
SUM OF ALL RESPONSES TO PHQ9 QUESTIONS 1 & 2: 0
SUM OF ALL RESPONSES TO PHQ QUESTIONS 1-9: 0
SUM OF ALL RESPONSES TO PHQ QUESTIONS 1-9: 0

## 2022-11-17 ASSESSMENT — ENCOUNTER SYMPTOMS
ABDOMINAL PAIN: 0
ORTHOPNEA: 0
COUGH: 0
BLURRED VISION: 0
DOUBLE VISION: 0
NAUSEA: 0
HEMOPTYSIS: 0
SHORTNESS OF BREATH: 0
BACK PAIN: 0
VOMITING: 0
BLOATING: 0

## 2022-11-17 NOTE — PROGRESS NOTES
[facility] CARDIOLOGY  [address] [facility], [address]  PHONE: [phone]    22    NAME:  Monik Aldrich  :   MRN: [id number]         SUBJECTIVE:   Monik Aldrich is a 44 y.o. male seen for a visit regarding the following:     Chief Complaint   Patient presents with    Follow-Up from Hospital     STEMI on 22. Dr. Jeronimo Ho pt. HPI:  (sees Dr Jeronimo Ho)    History of coronary disease with recent STEMI (PCI to a large OM from 2022; other arteries with no significant coronary disease). Echo from 2022 with preserved EF. Other history of alcohol abuse, longstanding pancreatitis with pancreatic pseudocyst (quit drinking ~2022). During recent presentation, appears patient presented for scheduled EUS on 2022 but subsequently complained of chest discomfort. Underwent drainage of pseudocyst on 2022; appears plans for removal Axios stent on 2022. Appears readmitted 2 days post ACS admission due to recurrent abdominal pain; recent cystogastrostomy with stent from 2022. Currently compliant with dual antiplatelet therapy. Coronary disease-controlled, pancreatic pseudocyst-not controlled    Past Medical History, Past Surgical History, Family history, Social History, and Medications were all reviewed with the patient today and updated as necessary.      No Known Allergies  Patient Active Problem List   Diagnosis    Primary hypertension    Chronic pancreat[facility] ([facility]    Alcohol abuse    Attention deficit hyperactivity disorder (ADHD), combined type    Tobacco abuse [Z72.0 (ICD-10-CM)]    STEMI (ST elevation myocardial infarction) [facility])    Pancreatic pseudocyst    History of ST elevation myocardial infarction (STEMI)    CAD (coronary artery disease)     Past Medical History:   Diagnosis Date    Abdominal pain 10/25/2022    Abnormal CT of the abdomen     Acute alcoholic pancreatitis     Acute kidney injury (nontraumatic) ([facility] 10/31/2022    pt denies    Acute on chronic pancreatitis ([facility] 07/30/2022    Alcohol use disorder, severe, dependence ([facility] 08/21/2019    CAD (coronary artery disease)     Carpal tunnel syndrome, left     Cigarette smoker     GERD (gastroesophageal reflux disease)     controlled with med    History of pancreatitis     x 4 or 5 times-- last time admitted to hospital 10/10/26/22 x 7 days    Hypertension     controlled with med     Past Surgical History:   Procedure Laterality Date    CARDIAC PROCEDURE N/A 11/2/2022    LEFT HEART CATH / CORONARY ANGIOGRAPHY performed by Urbano Lares MD at [id number] [facility] N/A 11/2/2022    Percutaneous coronary intervention performed by Urbano Lares MD at [facility] CATH LAB    CHOLECYSTECTOMY  2012    ORTHOPEDIC SURGERY Left     wrist surg--nerve surg    ORTHOPEDIC SURGERY Left     foot surg as child    UPPER GASTROINTESTINAL ENDOSCOPY N/A 11/9/2022    EGD/ENDOSCOPIC ULTRASOUND/AXIOS STENT ROOM 2226 **Rep will be present performed by Reina Sandhu MD at [facility] ENDOSCOPY     Family History   Problem Relation Age of Onset    No Known Problems Mother     Heart Attack Father         MI at 58     Social History     Tobacco Use    Smoking status: Every Day     Packs/day: 1.00     Years: 20.00     Pack years: 20.00     Types: Cigarettes    Smokeless tobacco: Never   Substance Use Topics    Alcohol use: Yes     Comment: none since 10/4/22-- had drank 3 glasses of wine daily     Current Outpatient Medications   Medication Sig Dispense Refill    HYDROcodone-acetaminophen (NORCO)  MG per tablet Take 1 tablet by mouth every 6 hours as needed for Pain for up to 5 days.  20 tablet 0    aspirin 81 MG chewable tablet Take 1 tablet by mouth daily 30 tablet 3    atorvastatin (LIPITOR) 40 MG tablet Take 1 tablet by mouth nightly 30 tablet 3    colchicine (COLCRYS) 0.6 MG tablet Take 1 tablet by mouth daily 30 tablet 0    lisinopril (PRINIVIL;ZESTRIL) 5 MG tablet Take 1 tablet by mouth daily 30 tablet 3    metoprolol tartrate (LOPRESSOR) 25 MG tablet Take 1 tablet by mouth 2 times daily 60 tablet 3    nitroGLYCERIN (NITROSTAT) 0.4 MG SL tablet Place 1 tablet under the tongue every 5 minutes as needed for Chest pain up to max of 3 total doses. If no relief after 1 dose, call 911. 25 tablet 3    ticagrelor (BRILINTA) 90 MG TABS tablet Take 1 tablet by mouth 2 times daily 60 tablet 11    omeprazole (PRILOSEC) 20 MG delayed release capsule Take 20 mg by mouth daily      naloxone (NARCAN) 0.4 MG/ML injection Infuse 1 mL intravenously as needed (shortness of breath) 2 each 0     No current facility-administered medications for this visit. Review of Systems   Constitutional: Negative for chills, decreased appetite, fever, malaise/fatigue and weight gain. HENT:  Negative for nosebleeds. Eyes:  Negative for blurred vision and double vision. Cardiovascular:  Negative for chest pain, claudication, dyspnea on exertion, leg swelling, orthopnea, palpitations, paroxysmal nocturnal dyspnea and syncope. Respiratory:  Negative for cough, hemoptysis and shortness of breath. Endocrine: Negative for cold intolerance and heat intolerance. Hematologic/Lymphatic: Negative for bleeding problem. Skin:  Negative for rash. Musculoskeletal:  Negative for back pain, joint pain, muscle weakness and myalgias. Gastrointestinal:  Negative for bloating, abdominal pain, nausea and vomiting. Genitourinary:  Negative for dysuria. Neurological:  Negative for dizziness, light-headedness and weakness. Psychiatric/Behavioral:  Negative for altered mental status. PHYSICAL EXAM:    /70   Pulse 72   Ht 5' 9\" (1.753 m)   Wt 166 lb 6.4 oz (75.5 kg) Comment: with shoes  BMI 24.57 kg/m²      Physical Exam  Constitutional:       Appearance: Normal appearance. HENT:      Head: Normocephalic and atraumatic.       Mouth/Throat:      Mouth: Mucous membranes are moist.   Eyes: Pupils: Pupils are equal, round, and reactive to light. Cardiovascular:      Rate and Rhythm: Normal rate and regular rhythm. Pulses: Normal pulses. Pulmonary:      Effort: Pulmonary effort is normal.      Breath sounds: Normal breath sounds. Abdominal:      General: Bowel sounds are normal. There is no distension. Palpations: Abdomen is soft. Tenderness: There is no abdominal tenderness. Musculoskeletal:         General: No swelling. Cervical back: Normal range of motion. Skin:     General: Skin is warm and dry. Neurological:      General: No focal deficit present. Mental Status: He is alert and oriented to person, place, and time. Medical problems and test results were reviewed with the patient today.      Recent Results (from the past 672 hour(s))   CBC with Auto Differential    Collection Time: 10/25/22  7:50 AM   Result Value Ref Range    WBC 9.7 4.3 - 11.1 K/uL    RBC 5.34 4.23 - 5.60 M/uL    Hemoglobin 15.5 13.6 - 17.2 g/dL    Hematocrit 44.5 41.1 - 50.3 %    MCV 83.3 82.0 - 102.0 FL    MCH 29.0 26.1 - 32.9 PG    MCHC 34.8 31.4 - 35.0 g/dL    RDW 13.4 11.9 - 14.6 %    Platelets 733 (H) 422 - 450 K/uL    MPV 9.0 (L) 9.4 - 12.3 FL    nRBC 0.00 0.0 - 0.2 K/uL    Differential Type AUTOMATED      Seg Neutrophils 76 43 - 78 %    Lymphocytes 14 13 - 44 %    Monocytes 9 4.0 - 12.0 %    Eosinophils % 1 0.5 - 7.8 %    Basophils 0 0.0 - 2.0 %    Immature Granulocytes 0 0.0 - 5.0 %    Segs Absolute 7.3 1.7 - 8.2 K/UL    Absolute Lymph # 1.3 0.5 - 4.6 K/UL    Absolute Mono # 0.9 0.1 - 1.3 K/UL    Absolute Eos # 0.1 0.0 - 0.8 K/UL    Basophils Absolute 0.0 0.0 - 0.2 K/UL    Absolute Immature Granulocyte 0.0 0.0 - 0.5 K/UL   Comprehensive Metabolic Panel    Collection Time: 10/25/22  7:50 AM   Result Value Ref Range    Sodium 133 133 - 143 mmol/L    Potassium 4.3 3.5 - 5.1 mmol/L    Chloride 95 (L) 98 - 107 mmol/L    CO2 22 21 - 32 mmol/L    Anion Gap 16 (H) 2 - 11 mmol/L    Glucose 92 65 - 100 mg/dL    BUN 9 7.0 - 18.0 MG/DL    Creatinine 0.82 0.8 - 1.5 MG/DL    Est, Glom Filt Rate >60 >60 ml/min/1.73m2    Calcium 9.2 8.3 - 10.4 MG/DL    Total Bilirubin 0.5 0.2 - 1.1 MG/DL    ALT 11 (L) 13.0 - 61.0 U/L    AST 18 15 - 37 U/L    Alk Phosphatase 56 45.0 - 117.0 U/L    Total Protein 7.1 6.4 - 8.2 g/dL    Albumin 3.8 3.5 - 5.0 g/dL    Globulin 3.3 2.8 - 4.5 g/dL    Albumin/Globulin Ratio 1.2 0.4 - 1.6     Lipase    Collection Time: 10/25/22  7:50 AM   Result Value Ref Range    Lipase 1,018 (H) 13 - 60 U/L   Magnesium    Collection Time: 10/25/22  7:50 AM   Result Value Ref Range    Magnesium 1.8 1.2 - 2.6 mg/dL   Phosphorus    Collection Time: 10/25/22  7:50 AM   Result Value Ref Range    Phosphorus 2.8 2.5 - 4.5 MG/DL   Ethanol    Collection Time: 10/25/22  7:50 AM   Result Value Ref Range    Ethanol Lvl <10 (H) 0 MG/DL   Urinalysis    Collection Time: 10/25/22  7:51 AM   Result Value Ref Range    Color, UA CHEKO      Appearance CLEAR      Specific Gravity, UA 1.020 1.001 - 1.023      pH, Urine 7.0 5.0 - 9.0      Protein,  (A) NEG mg/dL    Glucose, UA Negative mg/dL    Ketones, Urine >160 (A) NEG mg/dL    Bilirubin Urine MODERATE (A) NEG      Blood, Urine TRACE (A) NEG      Urobilinogen, Urine 1.0 0.2 - 1.0 EU/dL    Nitrite, Urine Negative NEG      Leukocyte Esterase, Urine Negative NEG     Urine Drug Screen    Collection Time: 10/25/22  7:51 AM   Result Value Ref Range    PCP, Urine Negative NEG      Benzodiazepines, Urine Negative NEG      Cocaine, Urine Negative NEG      Amphetamine, Urine Negative NEG      Methadone, Urine Negative NEG      THC, TH-Cannabinol, Urine Negative NEG      Opiates, Urine Negative NEG      Barbiturates, Urine Negative NEG     Urinalysis, Micro    Collection Time: 10/25/22  7:51 AM   Result Value Ref Range    WBC, UA 0-3 0 /hpf    RBC, UA 0-3 0 /hpf    Epithelial Cells UA 0 0 /hpf    BACTERIA, URINE 1+ (H) 0 /hpf    Casts 0 0 /lpf    Crystals 0 0 /LPF    Mucus, UA 0 0 /lpf    Other observations RESULTS VERIFIED MANUALLY     Triglyceride    Collection Time: 10/26/22  5:58 AM   Result Value Ref Range    Triglycerides 140 35 - 150 MG/DL   CBC    Collection Time: 10/26/22  5:58 AM   Result Value Ref Range    WBC 7.6 4.3 - 11.1 K/uL    RBC 4.83 4.23 - 5.6 M/uL    Hemoglobin 13.6 13.6 - 17.2 g/dL    Hematocrit 40.6 (L) 41.1 - 50.3 %    MCV 84.1 82.0 - 102.0 FL    MCH 28.2 26.1 - 32.9 PG    MCHC 33.5 31.4 - 35.0 g/dL    RDW 13.4 11.9 - 14.6 %    Platelets 493 825 - 823 K/uL    MPV 9.0 (L) 9.4 - 12.3 FL    nRBC 0.00 0.0 - 0.2 K/uL   Comprehensive Metabolic Panel w/ Reflex to MG    Collection Time: 10/26/22  5:58 AM   Result Value Ref Range    Sodium 131 (L) 133 - 143 mmol/L    Potassium 4.5 3.5 - 5.1 mmol/L    Chloride 100 (L) 101 - 110 mmol/L    CO2 24 21 - 32 mmol/L    Anion Gap 7 2 - 11 mmol/L    Glucose 84 65 - 100 mg/dL    BUN 10 6 - 23 MG/DL    Creatinine 0.75 (L) 0.8 - 1.5 MG/DL    Est, Glom Filt Rate >60 >60 ml/min/1.73m2    Calcium 9.0 8.3 - 10.4 MG/DL    Total Bilirubin 0.6 0.2 - 1.1 MG/DL    ALT 17 12 - 65 U/L    AST 13 (L) 15 - 37 U/L    Alk Phosphatase 49 (L) 50 - 136 U/L    Total Protein 6.1 (L) 6.3 - 8.2 g/dL    Albumin 2.7 (L) 3.5 - 5.0 g/dL    Globulin 3.4 2.8 - 4.5 g/dL    Albumin/Globulin Ratio 0.8 0.4 - 1.6     Hemoglobin A1C    Collection Time: 10/26/22  5:58 AM   Result Value Ref Range    Hemoglobin A1C 5.8 (H) 4.8 - 5.6 %    eAG 120 mg/dL   TSH with Reflex    Collection Time: 10/26/22  5:58 AM   Result Value Ref Range    TSH w Free Thyroid if Abnormal 2.38 0.358 - 3.740 UIU/ML   CBC    Collection Time: 10/27/22  6:05 AM   Result Value Ref Range    WBC 13.4 (H) 4.3 - 11.1 K/uL    RBC 4.96 4.23 - 5.6 M/uL    Hemoglobin 13.9 13.6 - 17.2 g/dL    Hematocrit 41.3 41.1 - 50.3 %    MCV 83.3 82.0 - 102.0 FL    MCH 28.0 26.1 - 32.9 PG    MCHC 33.7 31.4 - 35.0 g/dL    RDW 13.3 11.9 - 14.6 %    Platelets 806 580 - 529 K/uL    MPV 9.3 (L) 9.4 - 12.3 FL    nRBC 0.00 0.0 - 0.2 K/uL   Comprehensive Metabolic Panel w/ Reflex to MG    Collection Time: 10/27/22  6:05 AM   Result Value Ref Range    Sodium 131 (L) 133 - 143 mmol/L    Potassium 4.1 3.5 - 5.1 mmol/L    Chloride 100 (L) 101 - 110 mmol/L    CO2 24 21 - 32 mmol/L    Anion Gap 7 2 - 11 mmol/L    Glucose 102 (H) 65 - 100 mg/dL    BUN 9 6 - 23 MG/DL    Creatinine 0.72 (L) 0.8 - 1.5 MG/DL    Est, Glom Filt Rate >60 >60 ml/min/1.73m2    Calcium 8.7 8.3 - 10.4 MG/DL    Total Bilirubin 0.6 0.2 - 1.1 MG/DL    ALT 20 12 - 65 U/L    AST 15 15 - 37 U/L    Alk Phosphatase 49 (L) 50 - 136 U/L    Total Protein 6.7 6.3 - 8.2 g/dL    Albumin 2.9 (L) 3.5 - 5.0 g/dL    Globulin 3.8 2.8 - 4.5 g/dL    Albumin/Globulin Ratio 0.8 0.4 - 1.6     Lipase    Collection Time: 10/27/22  6:05 AM   Result Value Ref Range    Lipase 936 (H) 73 - 393 U/L   Comprehensive Metabolic Panel    Collection Time: 10/27/22  6:31 PM   Result Value Ref Range    Sodium 131 (L) 133 - 143 mmol/L    Potassium 3.9 3.5 - 5.1 mmol/L    Chloride 98 (L) 101 - 110 mmol/L    CO2 26 21 - 32 mmol/L    Anion Gap 7 2 - 11 mmol/L    Glucose 106 (H) 65 - 100 mg/dL    BUN 11 6 - 23 MG/DL    Creatinine 0.72 (L) 0.8 - 1.5 MG/DL    Est, Glom Filt Rate >60 >60 ml/min/1.73m2    Calcium 8.9 8.3 - 10.4 MG/DL    Total Bilirubin 0.6 0.2 - 1.1 MG/DL    ALT 19 12 - 65 U/L    AST 12 (L) 15 - 37 U/L    Alk Phosphatase 45 (L) 50 - 136 U/L    Total Protein 6.6 6.3 - 8.2 g/dL    Albumin 2.8 (L) 3.5 - 5.0 g/dL    Globulin 3.8 2.8 - 4.5 g/dL    Albumin/Globulin Ratio 0.7 0.4 - 1.6     Lipase    Collection Time: 10/27/22  6:31 PM   Result Value Ref Range    Lipase 863 (H) 73 - 393 U/L   CBC with Auto Differential    Collection Time: 10/27/22  6:31 PM   Result Value Ref Range    WBC 12.3 (H) 4.3 - 11.1 K/uL    RBC 4.75 4.23 - 5.6 M/uL    Hemoglobin 13.6 13.6 - 17.2 g/dL    Hematocrit 39.2 (L) 41.1 - 50.3 %    MCV 82.5 82.0 - 102.0 FL    MCH 28.6 26.1 - 32.9 PG    MCHC 34.7 31.4 - 35.0 g/dL    RDW 13.1 11.9 - 14.6 %    Platelets 552 854 - 262 K/uL    MPV 9.1 (L) 9.4 - 12.3 FL    nRBC 0.00 0.0 - 0.2 K/uL    Differential Type AUTOMATED      Seg Neutrophils 81 (H) 43 - 78 %    Lymphocytes 7 (L) 13 - 44 %    Monocytes 11 4.0 - 12.0 %    Eosinophils % 0 (L) 0.5 - 7.8 %    Basophils 0 0.0 - 2.0 %    Immature Granulocytes 0 0.0 - 5.0 %    Segs Absolute 9.9 (H) 1.7 - 8.2 K/UL    Absolute Lymph # 0.9 0.5 - 4.6 K/UL    Absolute Mono # 1.4 (H) 0.1 - 1.3 K/UL    Absolute Eos # 0.0 0.0 - 0.8 K/UL    Basophils Absolute 0.0 0.0 - 0.2 K/UL    Absolute Immature Granulocyte 0.0 0.0 - 0.5 K/UL   CBC    Collection Time: 10/28/22  5:32 AM   Result Value Ref Range    WBC 12.6 (H) 4.3 - 11.1 K/uL    RBC 4.75 4.23 - 5.6 M/uL    Hemoglobin 13.7 13.6 - 17.2 g/dL    Hematocrit 39.8 (L) 41.1 - 50.3 %    MCV 83.8 82.0 - 102.0 FL    MCH 28.8 26.1 - 32.9 PG    MCHC 34.4 31.4 - 35.0 g/dL    RDW 13.2 11.9 - 14.6 %    Platelets 795 086 - 295 K/uL    MPV 9.1 (L) 9.4 - 12.3 FL    nRBC 0.00 0.0 - 0.2 K/uL   Comprehensive Metabolic Panel w/ Reflex to MG    Collection Time: 10/28/22  5:32 AM   Result Value Ref Range    Sodium 131 (L) 133 - 143 mmol/L    Potassium 4.8 3.5 - 5.1 mmol/L    Chloride 99 (L) 101 - 110 mmol/L    CO2 25 21 - 32 mmol/L    Anion Gap 7 2 - 11 mmol/L    Glucose 127 (H) 65 - 100 mg/dL    BUN 23 6 - 23 MG/DL    Creatinine 1.60 (H) 0.8 - 1.5 MG/DL    Est, Glom Filt Rate 56 (L) >60 ml/min/1.73m2    Calcium 8.6 8.3 - 10.4 MG/DL    Total Bilirubin 0.8 0.2 - 1.1 MG/DL    ALT 15 12 - 65 U/L    AST 13 (L) 15 - 37 U/L    Alk Phosphatase 37 (L) 50 - 136 U/L    Total Protein 6.0 (L) 6.3 - 8.2 g/dL    Albumin 2.4 (L) 3.5 - 5.0 g/dL    Globulin 3.6 2.8 - 4.5 g/dL    Albumin/Globulin Ratio 0.7 0.4 - 1.6     Lipase    Collection Time: 10/28/22  5:32 AM   Result Value Ref Range    Lipase 2,196 (H) 73 - 393 U/L   Basic Metabolic Panel w/ Reflex to MG    Collection Time: 10/29/22  8:26 AM   Result Value Ref Range    Sodium 128 (L) 133 - 143 mmol/L Potassium 4.3 3.5 - 5.1 mmol/L    Chloride 96 (L) 101 - 110 mmol/L    CO2 28 21 - 32 mmol/L    Anion Gap 4 2 - 11 mmol/L    Glucose 85 65 - 100 mg/dL    BUN 16 6 - 23 MG/DL    Creatinine 0.90 0.8 - 1.5 MG/DL    Est, Glom Filt Rate >60 >60 ml/min/1.73m2    Calcium 8.8 8.3 - 10.4 MG/DL   CBC with Auto Differential    Collection Time: 10/29/22  8:26 AM   Result Value Ref Range    WBC 10.5 4.3 - 11.1 K/uL    RBC 3.75 (L) 4.23 - 5.6 M/uL    Hemoglobin 10.6 (L) 13.6 - 17.2 g/dL    Hematocrit 31.7 (L) 41.1 - 50.3 %    MCV 84.5 82.0 - 102.0 FL    MCH 28.3 26.1 - 32.9 PG    MCHC 33.4 31.4 - 35.0 g/dL    RDW 13.2 11.9 - 14.6 %    Platelets 966 178 - 240 K/uL    MPV 9.2 (L) 9.4 - 12.3 FL    nRBC 0.00 0.0 - 0.2 K/uL    Differential Type AUTOMATED      Seg Neutrophils 78 43 - 78 %    Lymphocytes 9 (L) 13 - 44 %    Monocytes 12 4.0 - 12.0 %    Eosinophils % 0 (L) 0.5 - 7.8 %    Basophils 0 0.0 - 2.0 %    Immature Granulocytes 0 0.0 - 5.0 %    Segs Absolute 8.2 1.7 - 8.2 K/UL    Absolute Lymph # 1.0 0.5 - 4.6 K/UL    Absolute Mono # 1.3 0.1 - 1.3 K/UL    Absolute Eos # 0.0 0.0 - 0.8 K/UL    Basophils Absolute 0.0 0.0 - 0.2 K/UL    Absolute Immature Granulocyte 0.0 0.0 - 0.5 K/UL   Phosphorus    Collection Time: 10/30/22  5:32 AM   Result Value Ref Range    Phosphorus 3.1 2.5 - 4.5 MG/DL   Albumin    Collection Time: 10/30/22  5:32 AM   Result Value Ref Range    Albumin 2.3 (L) 3.5 - 5.0 g/dL   Basic Metabolic Panel w/ Reflex to MG    Collection Time: 10/30/22  5:32 AM   Result Value Ref Range    Sodium 129 (L) 133 - 143 mmol/L    Potassium 4.2 3.5 - 5.1 mmol/L    Chloride 95 (L) 101 - 110 mmol/L    CO2 27 21 - 32 mmol/L    Anion Gap 7 2 - 11 mmol/L    Glucose 90 65 - 100 mg/dL    BUN 9 6 - 23 MG/DL    Creatinine 0.86 0.8 - 1.5 MG/DL    Est, Glom Filt Rate >60 >60 ml/min/1.73m2    Calcium 8.9 8.3 - 10.4 MG/DL   CBC    Collection Time: 10/30/22  5:32 AM   Result Value Ref Range    WBC 12.1 (H) 4.3 - 11.1 K/uL    RBC 4.13 (L) 4.23 - 5.6 M/uL    Hemoglobin 11.7 (L) 13.6 - 17.2 g/dL    Hematocrit 34.7 (L) 41.1 - 50.3 %    MCV 84.0 82.0 - 102.0 FL    MCH 28.3 26.1 - 32.9 PG    MCHC 33.7 31.4 - 35.0 g/dL    RDW 13.3 11.9 - 14.6 %    Platelets 602 585 - 357 K/uL    MPV 9.5 9.4 - 12.3 FL    nRBC 0.00 0.0 - 0.2 K/uL   Phosphorus    Collection Time: 10/31/22  5:35 AM   Result Value Ref Range    Phosphorus 3.0 2.5 - 4.5 MG/DL   Albumin    Collection Time: 10/31/22  5:35 AM   Result Value Ref Range    Albumin 2.1 (L) 3.5 - 5.0 g/dL   Basic Metabolic Panel w/ Reflex to MG    Collection Time: 10/31/22  5:35 AM   Result Value Ref Range    Sodium 129 (L) 133 - 143 mmol/L    Potassium 3.8 3.5 - 5.1 mmol/L    Chloride 96 (L) 101 - 110 mmol/L    CO2 28 21 - 32 mmol/L    Anion Gap 5 2 - 11 mmol/L    Glucose 129 (H) 65 - 100 mg/dL    BUN 9 6 - 23 MG/DL    Creatinine 0.76 (L) 0.8 - 1.5 MG/DL    Est, Glom Filt Rate >60 >60 ml/min/1.73m2    Calcium 8.7 8.3 - 10.4 MG/DL   CBC    Collection Time: 10/31/22  5:35 AM   Result Value Ref Range    WBC 8.4 4.3 - 11.1 K/uL    RBC 3.74 (L) 4.23 - 5.6 M/uL    Hemoglobin 10.6 (L) 13.6 - 17.2 g/dL    Hematocrit 31.3 (L) 41.1 - 50.3 %    MCV 83.7 82.0 - 102.0 FL    MCH 28.3 26.1 - 32.9 PG    MCHC 33.9 31.4 - 35.0 g/dL    RDW 13.2 11.9 - 14.6 %    Platelets 336 703 - 924 K/uL    MPV 9.6 9.4 - 12.3 FL    nRBC 0.00 0.0 - 0.2 K/uL   Lipase    Collection Time: 10/31/22  5:35 AM   Result Value Ref Range    Lipase 293 73 - 393 U/L   EKG 12 Lead    Collection Time: 11/02/22 10:43 AM   Result Value Ref Range    Ventricular Rate 96 BPM    Atrial Rate 96 BPM    P-R Interval 132 ms    QRS Duration 102 ms    Q-T Interval 326 ms    QTc Calculation (Bazett) 411 ms    P Axis 48 degrees    R Axis 32 degrees    T Axis 47 degrees    Diagnosis Normal sinus rhythm    CBC    Collection Time: 11/02/22 10:53 AM   Result Value Ref Range    WBC 10.2 4.3 - 11.1 K/uL    RBC 4.38 4.23 - 5.6 M/uL    Hemoglobin 12.5 (L) 13.6 - 17.2 g/dL    Hematocrit 37.4 (L) 41.1 - 50.3 %    MCV 85.4 82 - 102 FL    MCH 28.5 26.1 - 32.9 PG    MCHC 33.4 31.4 - 35.0 g/dL    RDW 13.5 11.9 - 14.6 %    Platelets 354 (H) 671 - 450 K/uL    MPV 8.5 (L) 9.4 - 12.3 FL    nRBC 0.00 0.0 - 0.2 K/uL   Comprehensive Metabolic Panel    Collection Time: 11/02/22 10:53 AM   Result Value Ref Range    Sodium 130 (L) 133 - 143 mmol/L    Potassium 4.1 3.5 - 5.1 mmol/L    Chloride 99 (L) 101 - 110 mmol/L    CO2 24 21 - 32 mmol/L    Anion Gap 7 2 - 11 mmol/L    Glucose 167 (H) 65 - 100 mg/dL    BUN 10 6 - 23 MG/DL    Creatinine 0.90 0.8 - 1.5 MG/DL    Est, Glom Filt Rate >60 >60 ml/min/1.73m2    Calcium 8.6 8.3 - 10.4 MG/DL    Total Bilirubin 0.2 0.2 - 1.1 MG/DL    ALT 23 12 - 65 U/L    AST 8 (L) 15 - 37 U/L    Alk Phosphatase 58 50 - 136 U/L    Total Protein 7.1 6.3 - 8.2 g/dL    Albumin 2.5 (L) 3.5 - 5.0 g/dL    Globulin 4.6 (H) 2.8 - 4.5 g/dL    Albumin/Globulin Ratio 0.5 0.4 - 1.6     Troponin    Collection Time: 11/02/22 10:53 AM   Result Value Ref Range    Troponin, High Sensitivity 300.0 (HH) 0 - 14 pg/mL   Cardiac procedure    Collection Time: 11/02/22 12:47 PM   Result Value Ref Range    Body Surface Area 1.99 m2   Transthoracic echocardiogram (TTE) complete with contrast, bubble, strain, and 3D PRN    Collection Time: 11/02/22  3:03 PM   Result Value Ref Range    LA Minor Axis 5.9 cm    LA Major Axis 6.2 cm    LA Area 2C 23.4 cm2    LA Area 4C 23.2 cm2    LA Volume BP 75 (A) 18 - 58 mL    LA Diameter 4.0 cm    AV Mean Velocity 1.4 m/s    AV Mean Gradient 9 mmHg    AV VTI 38.7 cm    AV Peak Velocity 2.0 m/s    AV Peak Gradient 16 mmHg    AV Area by VTI 2.5 cm2    AV Area by Peak Velocity 2.6 cm2    Aortic Root 3.7 cm    Ascending Aorta 3.9 cm    EF 3D 57 %    LVOT SV 98.0 ml    LV EDV 3D 208 mL    LV ESV 3D 90 mL    LV Mass 3D 182.0 g    IVSd 1.3 (A) 0.6 - 1.0 cm    LVIDd 4.7 4.2 - 5.9 cm    LVIDs 3.0 cm    LVOT Diameter 2.0 cm    LVOT Mean Gradient 6 mmHg    LVOT VTI 31.2 cm    LVOT Peak Velocity 1.7 m/s    LVOT Peak Gradient 12 mmHg    LVPWd 1.3 (A) 0.6 - 1.0 cm    LV E' Lateral Velocity 11 cm/s    LV E' Septal Velocity 8 cm/s    LVOT Area 3.1 cm2    IVC Proxmal 1.9 cm    MV E Wave Deceleration Time 269.0 ms    MV A Velocity 1.21 m/s    MV E Velocity 0.98 m/s    IL Max Velocity 1.1 m/s    Pulmonary Artery EDP 5 mmHg    PV .0 ms    PV Max Velocity 1.3 m/s    PV Peak Gradient 6 mmHg    RV Basal Dimension 3.7 cm    TAPSE 1.9 1.7 cm    TR Max Velocity 2.31 m/s    TR Peak Gradient 21 mmHg    Body Surface Area 1.99 m2    Fractional Shortening 2D 36 28 - 44 %    LV ESV Index 3D 45 mL/m2    LV EDV Index 3D 105 mL/m2    LVIDd Index 2.37 cm/m2    LVIDs Index 1.52 cm/m2    LV RWT Ratio 0.55     LV Mass 2D 237.9 (A) 88 - 224 g    LV Mass 2D Index 120.1 (A) 49 - 115 g/m2    LV Mass Index 3D 91.9 g/m2    MV E/A 0.81     E/E' Ratio (Averaged) 10.58     E/E' Lateral 8.91     E/E' Septal 12.25     LA Volume Index BP 38 (A) 16 - 34 ml/m2    LVOT Stroke Volume Index 49.5 mL/m2    LA Size Index 2.02 cm/m2    LA/AO Root Ratio 1.08     Ao Root Index 1.87 cm/m2    Ascending Aorta Index 1.97 cm/m2    AV Velocity Ratio 0.85     LVOT:AV VTI Index 0.81     CRISTOBAL/BSA VTI 1.3 cm2/m2    CRISTOBAL/BSA Peak Velocity 1.3 cm2/m2    Est. RA Pressure 3 mmHg    RVSP 24 mmHg    Left Ventricular Ejection Fraction 58     LVEF MODALITY ECHO    EKG 12 Lead    Collection Time: 11/02/22  4:47 PM   Result Value Ref Range    Ventricular Rate 79 BPM    Atrial Rate 79 BPM    P-R Interval 152 ms    QRS Duration 100 ms    Q-T Interval 356 ms    QTc Calculation (Bazett) 408 ms    P Axis 17 degrees    R Axis -34 degrees    T Axis 35 degrees    Diagnosis Normal sinus rhythm    CBC    Collection Time: 11/03/22  5:39 AM   Result Value Ref Range    WBC 11.7 (H) 4.3 - 11.1 K/uL    RBC 4.50 4.23 - 5.6 M/uL    Hemoglobin 12.6 (L) 13.6 - 17.2 g/dL    Hematocrit 39.0 (L) 41.1 - 50.3 %    MCV 86.7 82 - 102 FL    MCH 28.0 26.1 - 32.9 PG    MCHC 32.3 31.4 - 35.0 g/dL    RDW 13.4 11.9 - 14.6 %    Platelets 014 (H) 478 - 450 K/uL    MPV 8.7 (L) 9.4 - 12.3 FL    nRBC 0.00 0.0 - 0.2 K/uL   Lipid Panel    Collection Time: 11/03/22  5:39 AM   Result Value Ref Range    Cholesterol, Total 88 <200 MG/DL    Triglycerides 83 35 - 150 MG/DL    HDL 13 (L) 40 - 60 MG/DL    LDL Calculated 58.4 <100 MG/DL    VLDL Cholesterol Calculated 16.6 6.0 - 23.0 MG/DL    Chol/HDL Ratio 6.8     Comprehensive Metabolic Panel w/ Reflex to MG    Collection Time: 11/03/22  5:39 AM   Result Value Ref Range    Sodium 133 133 - 143 mmol/L    Potassium 4.2 3.5 - 5.1 mmol/L    Chloride 102 101 - 110 mmol/L    CO2 25 21 - 32 mmol/L    Anion Gap 6 2 - 11 mmol/L    Glucose 96 65 - 100 mg/dL    BUN 8 6 - 23 MG/DL    Creatinine 0.80 0.8 - 1.5 MG/DL    Est, Glom Filt Rate >60 >60 ml/min/1.73m2    Calcium 9.2 8.3 - 10.4 MG/DL    Total Bilirubin 0.2 0.2 - 1.1 MG/DL    ALT 23 12 - 65 U/L    AST 22 15 - 37 U/L    Alk Phosphatase 43 (L) 50 - 136 U/L    Total Protein 6.8 6.3 - 8.2 g/dL    Albumin 2.4 (L) 3.5 - 5.0 g/dL    Globulin 4.4 2.8 - 4.5 g/dL    Albumin/Globulin Ratio 0.5 0.4 - 1.6     EKG 12 Lead    Collection Time: 11/03/22 12:29 PM   Result Value Ref Range    Ventricular Rate 87 BPM    Atrial Rate 87 BPM    P-R Interval 128 ms    QRS Duration 98 ms    Q-T Interval 352 ms    QTc Calculation (Bazett) 423 ms    P Axis 38 degrees    R Axis 39 degrees    T Axis 68 degrees    Diagnosis Normal sinus rhythm    Lipase    Collection Time: 11/03/22  2:42 PM   Result Value Ref Range    Lipase 919 (H) 73 - 393 U/L   Comprehensive Metabolic Panel w/ Reflex to MG    Collection Time: 11/04/22  5:16 AM   Result Value Ref Range    Sodium 129 (L) 133 - 143 mmol/L    Potassium 5.0 3.5 - 5.1 mmol/L    Chloride 97 (L) 101 - 110 mmol/L    CO2 26 21 - 32 mmol/L    Anion Gap 6 2 - 11 mmol/L    Glucose 87 65 - 100 mg/dL    BUN 10 6 - 23 MG/DL    Creatinine 1.00 0.8 - 1.5 MG/DL    Est, Glom Filt Rate >60 >60 ml/min/1.73m2    Calcium 9.0 8.3 - 10.4 MG/DL    Total Bilirubin 0.4 0.2 - 1.1 MG/DL    ALT 24 12 - 65 U/L    AST 22 15 - 37 U/L    Alk Phosphatase 48 (L) 50 - 136 U/L    Total Protein 6.8 6.3 - 8.2 g/dL    Albumin 2.2 (L) 3.5 - 5.0 g/dL    Globulin 4.6 (H) 2.8 - 4.5 g/dL    Albumin/Globulin Ratio 0.5 0.4 - 1.6     Comprehensive Metabolic Panel w/ Reflex to MG    Collection Time: 11/05/22  6:59 AM   Result Value Ref Range    Sodium 126 (L) 133 - 143 mmol/L    Potassium 4.6 3.5 - 5.1 mmol/L    Chloride 96 (L) 101 - 110 mmol/L    CO2 23 21 - 32 mmol/L    Anion Gap 7 2 - 11 mmol/L    Glucose 80 65 - 100 mg/dL    BUN 14 6 - 23 MG/DL    Creatinine 1.00 0.8 - 1.5 MG/DL    Est, Glom Filt Rate >60 >60 ml/min/1.73m2    Calcium 8.8 8.3 - 10.4 MG/DL    Total Bilirubin 0.5 0.2 - 1.1 MG/DL    ALT 31 12 - 65 U/L    AST 26 15 - 37 U/L    Alk Phosphatase 45 (L) 50 - 136 U/L    Total Protein 6.7 6.3 - 8.2 g/dL    Albumin 2.4 (L) 3.5 - 5.0 g/dL    Globulin 4.3 2.8 - 4.5 g/dL    Albumin/Globulin Ratio 0.6 0.4 - 1.6     Platelet Count    Collection Time: 11/06/22  1:38 PM   Result Value Ref Range    Platelets 250 (H) 834 - 450 K/uL   CBC with Diff    Collection Time: 11/13/22 10:19 AM   Result Value Ref Range    WBC 12.3 (H) 4.3 - 11.1 K/uL    RBC 5.11 4.23 - 5.60 M/uL    Hemoglobin 14.2 13.6 - 17.2 g/dL    Hematocrit 42.2 41.1 - 50.3 %    MCV 82.6 82.0 - 102.0 FL    MCH 27.8 26.1 - 32.9 PG    MCHC 33.6 31.4 - 35.0 g/dL    RDW 13.8 11.9 - 14.6 %    Platelets 241 (H) 863 - 450 K/uL    MPV 7.8 (L) 9.4 - 12.3 FL    nRBC 0.00 0.0 - 0.2 K/uL    Differential Type AUTOMATED      Seg Neutrophils 78 43 - 78 %    Lymphocytes 15 13 - 44 %    Monocytes 6 4.0 - 12.0 %    Eosinophils % 1 0.5 - 7.8 %    Basophils 1 0.0 - 2.0 %    Immature Granulocytes 1 0.0 - 5.0 %    Segs Absolute 9.6 (H) 1.7 - 8.2 K/UL    Absolute Lymph # 1.8 0.5 - 4.6 K/UL    Absolute Mono # 0.7 0.1 - 1.3 K/UL    Absolute Eos # 0.1 0.0 - 0.8 K/UL    Basophils Absolute 0.1 0.0 - 0.2 K/UL    Absolute Immature Granulocyte 0.1 0.0 - 0.5 K/UL   CMP    Collection Time: 11/13/22 10:19 AM   Result Value Ref Range    Sodium 135 133 - 143 mmol/L    Potassium 4.4 3.5 - 5.1 mmol/L    Chloride 99 98 - 107 mmol/L    CO2 25 21 - 32 mmol/L    Anion Gap 11 2 - 11 mmol/L    Glucose 133 (H) 65 - 100 mg/dL    BUN 12 7.0 - 18.0 MG/DL    Creatinine 0.92 0.8 - 1.5 MG/DL    Est, Glom Filt Rate >60 >60 ml/min/1.73m2    Calcium 9.2 8.3 - 10.4 MG/DL    Total Bilirubin 0.3 0.2 - 1.1 MG/DL    ALT 20 13.0 - 61.0 U/L    AST 16 15 - 37 U/L    Alk Phosphatase 81 45.0 - 117.0 U/L    Total Protein 6.6 6.4 - 8.2 g/dL    Albumin 3.2 (L) 3.5 - 5.0 g/dL    Globulin 3.4 2.8 - 4.5 g/dL    Albumin/Globulin Ratio 0.9 0.4 - 1.6     Lipase    Collection Time: 11/13/22 10:19 AM   Result Value Ref Range    Lipase 172 (H) 13 - 60 U/L   Urinalysis w rflx microscopic    Collection Time: 11/13/22 10:19 AM   Result Value Ref Range    Color, UA YELLOW      Appearance CLEAR      Specific Gravity, UA 1.010 1.001 - 1.023      pH, Urine 7.0 5.0 - 9.0      Protein, UA Negative NEG mg/dL    Glucose, UA Negative mg/dL    Ketones, Urine Negative NEG mg/dL    Bilirubin Urine Negative NEG      Blood, Urine Negative NEG      Urobilinogen, Urine 0.2 0.2 - 1.0 EU/dL    Nitrite, Urine Negative NEG      Leukocyte Esterase, Urine Negative NEG     Troponin T    Collection Time: 11/13/22 10:19 AM   Result Value Ref Range    Troponin T 44.0 (H) 0 - 22 ng/L   Troponin T    Collection Time: 11/13/22  1:51 PM   Result Value Ref Range    Troponin T 42.3 (H) 0 - 22 ng/L   Comprehensive Metabolic Panel w/ Reflex to MG    Collection Time: 11/14/22  5:36 AM   Result Value Ref Range    Sodium 132 (L) 133 - 143 mmol/L    Potassium 4.3 3.5 - 5.1 mmol/L    Chloride 99 (L) 101 - 110 mmol/L    CO2 29 21 - 32 mmol/L    Anion Gap 4 2 - 11 mmol/L    Glucose 130 (H) 65 - 100 mg/dL    BUN 13 6 - 23 MG/DL    Creatinine 0.90 0.8 - 1.5 MG/DL    Est, Glom Filt Rate >60 >60 ml/min/1.73m2    Calcium 8.4 8.3 - 10.4 MG/DL Total Bilirubin 0.2 0.2 - 1.1 MG/DL    ALT 37 12 - 65 U/L    AST 10 (L) 15 - 37 U/L    Alk Phosphatase 70 50 - 136 U/L    Total Protein 5.6 (L) 6.3 - 8.2 g/dL    Albumin 2.2 (L) 3.5 - 5.0 g/dL    Globulin 3.4 2.8 - 4.5 g/dL    Albumin/Globulin Ratio 0.6 0.4 - 1.6     CBC with Auto Differential    Collection Time: 11/14/22  5:36 AM   Result Value Ref Range    WBC 10.4 4.3 - 11.1 K/uL    RBC 4.30 4.23 - 5.6 M/uL    Hemoglobin 11.6 (L) 13.6 - 17.2 g/dL    Hematocrit 37.4 (L) 41.1 - 50.3 %    MCV 87.0 82 - 102 FL    MCH 27.0 26.1 - 32.9 PG    MCHC 31.0 (L) 31.4 - 35.0 g/dL    RDW 14.1 11.9 - 14.6 %    Platelets 080 (H) 477 - 450 K/uL    MPV 8.4 (L) 9.4 - 12.3 FL    nRBC 0.00 0.0 - 0.2 K/uL    Differential Type AUTOMATED      Seg Neutrophils 66 43 - 78 %    Lymphocytes 21 13 - 44 %    Monocytes 9 4.0 - 12.0 %    Eosinophils % 2 0.5 - 7.8 %    Basophils 1 0.0 - 2.0 %    Immature Granulocytes 1 0.0 - 5.0 %    Segs Absolute 6.9 1.7 - 8.2 K/UL    Absolute Lymph # 2.1 0.5 - 4.6 K/UL    Absolute Mono # 1.0 0.1 - 1.3 K/UL    Absolute Eos # 0.2 0.0 - 0.8 K/UL    Basophils Absolute 0.1 0.0 - 0.2 K/UL    Absolute Immature Granulocyte 0.1 0.0 - 0.5 K/UL   Hemoglobin and Hematocrit    Collection Time: 11/14/22  8:50 PM   Result Value Ref Range    Hemoglobin 11.9 (L) 13.6 - 17.2 g/dL    Hematocrit 38.8 (L) 41.1 - 50.3 %   Comprehensive Metabolic Panel    Collection Time: 11/15/22  5:15 AM   Result Value Ref Range    Sodium 135 133 - 143 mmol/L    Potassium 4.1 3.5 - 5.1 mmol/L    Chloride 101 101 - 110 mmol/L    CO2 29 21 - 32 mmol/L    Anion Gap 5 2 - 11 mmol/L    Glucose 113 (H) 65 - 100 mg/dL    BUN 7 6 - 23 MG/DL    Creatinine 0.80 0.8 - 1.5 MG/DL    Est, Glom Filt Rate >60 >60 ml/min/1.73m2    Calcium 8.5 8.3 - 10.4 MG/DL    Total Bilirubin 0.2 0.2 - 1.1 MG/DL    ALT 20 12 - 65 U/L    AST 13 (L) 15 - 37 U/L    Alk Phosphatase 64 50 - 136 U/L    Total Protein 5.7 (L) 6.3 - 8.2 g/dL    Albumin 2.2 (L) 3.5 - 5.0 g/dL    Globulin 3.5 2.8 - 4.5 g/dL    Albumin/Globulin Ratio 0.6 0.4 - 1.6     CBC with Auto Differential    Collection Time: 11/15/22  5:15 AM   Result Value Ref Range    WBC 6.8 4.3 - 11.1 K/uL    RBC 4.05 (L) 4.23 - 5.6 M/uL    Hemoglobin 11.1 (L) 13.6 - 17.2 g/dL    Hematocrit 34.8 (L) 41.1 - 50.3 %    MCV 85.9 82 - 102 FL    MCH 27.4 26.1 - 32.9 PG    MCHC 31.9 31.4 - 35.0 g/dL    RDW 14.2 11.9 - 14.6 %    Platelets 435 253 - 044 K/uL    MPV 8.5 (L) 9.4 - 12.3 FL    nRBC 0.00 0.0 - 0.2 K/uL    Differential Type AUTOMATED      Seg Neutrophils 63 43 - 78 %    Lymphocytes 23 13 - 44 %    Monocytes 10 4.0 - 12.0 %    Eosinophils % 2 0.5 - 7.8 %    Basophils 1 0.0 - 2.0 %    Immature Granulocytes 1 0.0 - 5.0 %    Segs Absolute 4.3 1.7 - 8.2 K/UL    Absolute Lymph # 1.6 0.5 - 4.6 K/UL    Absolute Mono # 0.7 0.1 - 1.3 K/UL    Absolute Eos # 0.2 0.0 - 0.8 K/UL    Basophils Absolute 0.1 0.0 - 0.2 K/UL    Absolute Immature Granulocyte 0.0 0.0 - 0.5 K/UL   Lipase    Collection Time: 11/15/22  5:15 AM   Result Value Ref Range    Lipase 460 (H) 73 - 393 U/L   Hemoglobin and Hematocrit    Collection Time: 11/15/22  8:55 AM   Result Value Ref Range    Hemoglobin 11.0 (L) 13.6 - 17.2 g/dL    Hematocrit 34.2 (L) 41.1 - 50.3 %   Lipase    Collection Time: 11/15/22  3:27 PM   Result Value Ref Range    Lipase 1,119 (H) 73 - 393 U/L   Hemoglobin and Hematocrit    Collection Time: 11/15/22  9:22 PM   Result Value Ref Range    Hemoglobin 12.0 (L) 13.6 - 17.2 g/dL    Hematocrit 36.7 (L) 41.1 - 50.3 %   Comprehensive Metabolic Panel    Collection Time: 11/16/22  5:14 AM   Result Value Ref Range    Sodium 135 133 - 143 mmol/L    Potassium 4.1 3.5 - 5.1 mmol/L    Chloride 103 101 - 110 mmol/L    CO2 26 21 - 32 mmol/L    Anion Gap 6 2 - 11 mmol/L    Glucose 156 (H) 65 - 100 mg/dL    BUN 9 6 - 23 MG/DL    Creatinine 0.90 0.8 - 1.5 MG/DL    Est, Glom Filt Rate >60 >60 ml/min/1.73m2    Calcium 8.7 8.3 - 10.4 MG/DL    Total Bilirubin 0.2 0.2 - 1.1 MG/DL ALT 25 12 - 65 U/L    AST 13 (L) 15 - 37 U/L    Alk Phosphatase 78 50 - 136 U/L    Total Protein 6.2 (L) 6.3 - 8.2 g/dL    Albumin 2.4 (L) 3.5 - 5.0 g/dL    Globulin 3.8 2.8 - 4.5 g/dL    Albumin/Globulin Ratio 0.6 0.4 - 1.6     CBC with Auto Differential    Collection Time: 11/16/22  5:14 AM   Result Value Ref Range    WBC 6.3 4.3 - 11.1 K/uL    RBC 4.42 4.23 - 5.6 M/uL    Hemoglobin 12.3 (L) 13.6 - 17.2 g/dL    Hematocrit 37.7 (L) 41.1 - 50.3 %    MCV 85.3 82 - 102 FL    MCH 27.8 26.1 - 32.9 PG    MCHC 32.6 31.4 - 35.0 g/dL    RDW 14.3 11.9 - 14.6 %    Platelets 321 (H) 842 - 450 K/uL    MPV 8.5 (L) 9.4 - 12.3 FL    nRBC 0.00 0.0 - 0.2 K/uL    Differential Type AUTOMATED      Seg Neutrophils 65 43 - 78 %    Lymphocytes 23 13 - 44 %    Monocytes 8 4.0 - 12.0 %    Eosinophils % 2 0.5 - 7.8 %    Basophils 1 0.0 - 2.0 %    Immature Granulocytes 1 0.0 - 5.0 %    Segs Absolute 4.1 1.7 - 8.2 K/UL    Absolute Lymph # 1.5 0.5 - 4.6 K/UL    Absolute Mono # 0.5 0.1 - 1.3 K/UL    Absolute Eos # 0.1 0.0 - 0.8 K/UL    Basophils Absolute 0.1 0.0 - 0.2 K/UL    Absolute Immature Granulocyte 0.0 0.0 - 0.5 K/UL   Lipase    Collection Time: 11/16/22  5:14 AM   Result Value Ref Range    Lipase 821 (H) 73 - 393 U/L   Hemoglobin and Hematocrit    Collection Time: 11/16/22  8:46 AM   Result Value Ref Range    Hemoglobin 12.4 (L) 13.6 - 17.2 g/dL    Hematocrit 38.1 (L) 41.1 - 50.3 %     Lab Results   Component Value Date/Time    CHOL 88 11/03/2022 05:39 AM    HDL 13 11/03/2022 05:39 AM       No results found for any visits on 11/17/22. ASSESSMENT and PLAN    Jeffy Gamino was seen today for follow-up from hospital.    Diagnoses and all orders for this visit:    ST elevation myocardial infarction (STEMI) involving other coronary artery ([facility] [address]    Essential hypertension    Tobacco abuse      Overall Impression    Coronary disease  -Recent STEMI status post PCI to large 1.   Discussed uninterrupted dual antiplatelet therapy ideally for 1 year.  -Case complicated with recent pancreatic pseudocyst.  Extensively discussed guidelines. See below.  -Continue dual antiplatelet therapy/statin therapy. Pancreatic pseudocyst/recurrent pancreatitis  -Recent stent placement per GI with plans for removal from 11/30/2022.  -Discussed increased risk for holding dual antiplatelet therapy and ideally perform procedure on dual antiplatelet therapy. -Guideline data regarding bridging \"there is no  convincing clinical evidence demonstrating the efficacy  of bridging with either parenteral antiplatelet or anticoagulant therapy\"    Tobacco abuse  -Stressed continued cessation. Reports recently quit. Alcohol abuse history  -States none in a few months. Stressed continued cessation. Return in about 2 months (around 1/17/2023).      Yani Fontenot MD  11/17/2022  2:00 PM

## 2022-11-20 ENCOUNTER — APPOINTMENT (OUTPATIENT)
Dept: GENERAL RADIOLOGY | Age: 37
DRG: 439 | End: 2022-11-20

## 2022-11-20 ENCOUNTER — HOSPITAL ENCOUNTER (INPATIENT)
Age: 37
LOS: 2 days | Discharge: HOME | DRG: 439 | End: 2022-11-22
Attending: EMERGENCY MEDICINE | Admitting: FAMILY MEDICINE

## 2022-11-20 ENCOUNTER — APPOINTMENT (OUTPATIENT)
Dept: CT IMAGING | Age: 37
DRG: 439 | End: 2022-11-20

## 2022-11-20 DIAGNOSIS — K86.0 ALCOHOL-INDUCED CHRONIC PANCREATITIS (HCC): ICD-10-CM

## 2022-11-20 DIAGNOSIS — K85.90 ACUTE PANCREATITIS, UNSPECIFIED COMPLICATION STATUS, UNSPECIFIED PANCREATITIS TYPE: Primary | ICD-10-CM

## 2022-11-20 DIAGNOSIS — R10.9 ABDOMINAL PAIN, UNSPECIFIED ABDOMINAL LOCATION: ICD-10-CM

## 2022-11-20 PROBLEM — F10.21 ALCOHOL USE DISORDER, SEVERE, IN EARLY REMISSION, DEPENDENCE (HCC): Chronic | Status: ACTIVE | Noted: 2019-08-21

## 2022-11-20 PROBLEM — F17.210 DEPENDENCE ON NICOTINE FROM CIGARETTES: Chronic | Status: ACTIVE | Noted: 2022-11-20

## 2022-11-20 PROBLEM — I25.10 CORONARY ARTERY DISEASE INVOLVING NATIVE CORONARY ARTERY OF NATIVE HEART: Chronic | Status: ACTIVE | Noted: 2022-11-13

## 2022-11-20 PROBLEM — K86.1 ACUTE ON CHRONIC PANCREATITIS (HCC): Status: ACTIVE | Noted: 2022-11-20

## 2022-11-20 LAB
ALBUMIN SERPL-MCNC: 3.1 G/DL (ref 3.5–5)
ALBUMIN/GLOB SERPL: 0.8 {RATIO} (ref 0.4–1.6)
ALP SERPL-CCNC: 90 U/L (ref 50–136)
ALT SERPL-CCNC: 43 U/L (ref 12–65)
ANION GAP SERPL CALC-SCNC: 5 MMOL/L (ref 2–11)
AST SERPL-CCNC: 28 U/L (ref 15–37)
BASOPHILS # BLD: 0.1 K/UL (ref 0–0.2)
BASOPHILS NFR BLD: 1 % (ref 0–2)
BILIRUB SERPL-MCNC: 0.2 MG/DL (ref 0.2–1.1)
BUN SERPL-MCNC: 11 MG/DL (ref 6–23)
CALCIUM SERPL-MCNC: 9.1 MG/DL (ref 8.3–10.4)
CHLORIDE SERPL-SCNC: 107 MMOL/L (ref 101–110)
CO2 SERPL-SCNC: 27 MMOL/L (ref 21–32)
CREAT SERPL-MCNC: 0.8 MG/DL (ref 0.8–1.5)
DIFFERENTIAL METHOD BLD: ABNORMAL
EOSINOPHIL # BLD: 0.2 K/UL (ref 0–0.8)
EOSINOPHIL NFR BLD: 2 % (ref 0.5–7.8)
ERYTHROCYTE [DISTWIDTH] IN BLOOD BY AUTOMATED COUNT: 15 % (ref 11.9–14.6)
GLOBULIN SER CALC-MCNC: 3.8 G/DL (ref 2.8–4.5)
GLUCOSE SERPL-MCNC: 100 MG/DL (ref 65–100)
HCT VFR BLD AUTO: 42.3 % (ref 41.1–50.3)
HGB BLD-MCNC: 13.6 G/DL (ref 13.6–17.2)
IMM GRANULOCYTES # BLD AUTO: 0 K/UL (ref 0–0.5)
IMM GRANULOCYTES NFR BLD AUTO: 0 % (ref 0–5)
LIPASE SERPL-CCNC: 1048 U/L (ref 73–393)
LYMPHOCYTES # BLD: 2.2 K/UL (ref 0.5–4.6)
LYMPHOCYTES NFR BLD: 19 % (ref 13–44)
MCH RBC QN AUTO: 27.5 PG (ref 26.1–32.9)
MCHC RBC AUTO-ENTMCNC: 32.2 G/DL (ref 31.4–35)
MCV RBC AUTO: 85.6 FL (ref 82–102)
MONOCYTES # BLD: 0.9 K/UL (ref 0.1–1.3)
MONOCYTES NFR BLD: 8 % (ref 4–12)
NEUTS SEG # BLD: 7.9 K/UL (ref 1.7–8.2)
NEUTS SEG NFR BLD: 70 % (ref 43–78)
NRBC # BLD: 0 K/UL (ref 0–0.2)
PLATELET # BLD AUTO: 413 K/UL (ref 150–450)
PMV BLD AUTO: 9 FL (ref 9.4–12.3)
POTASSIUM SERPL-SCNC: 4.1 MMOL/L (ref 3.5–5.1)
PROT SERPL-MCNC: 6.9 G/DL (ref 6.3–8.2)
RBC # BLD AUTO: 4.94 M/UL (ref 4.23–5.6)
SODIUM SERPL-SCNC: 139 MMOL/L (ref 133–143)
WBC # BLD AUTO: 11.2 K/UL (ref 4.3–11.1)

## 2022-11-20 PROCEDURE — 6360000002 HC RX W HCPCS: Performed by: FAMILY MEDICINE

## 2022-11-20 PROCEDURE — 1100000000 HC RM PRIVATE

## 2022-11-20 PROCEDURE — 6360000004 HC RX CONTRAST MEDICATION: Performed by: PHYSICIAN ASSISTANT

## 2022-11-20 PROCEDURE — 85025 COMPLETE CBC W/AUTO DIFF WBC: CPT

## 2022-11-20 PROCEDURE — 6360000002 HC RX W HCPCS: Performed by: PHYSICIAN ASSISTANT

## 2022-11-20 PROCEDURE — 6370000000 HC RX 637 (ALT 250 FOR IP): Performed by: FAMILY MEDICINE

## 2022-11-20 PROCEDURE — 2580000003 HC RX 258: Performed by: PHYSICIAN ASSISTANT

## 2022-11-20 PROCEDURE — 83690 ASSAY OF LIPASE: CPT

## 2022-11-20 PROCEDURE — 80053 COMPREHEN METABOLIC PANEL: CPT

## 2022-11-20 PROCEDURE — 71046 X-RAY EXAM CHEST 2 VIEWS: CPT

## 2022-11-20 PROCEDURE — 74177 CT ABD & PELVIS W/CONTRAST: CPT

## 2022-11-20 PROCEDURE — 2580000003 HC RX 258: Performed by: FAMILY MEDICINE

## 2022-11-20 PROCEDURE — 99285 EMERGENCY DEPT VISIT HI MDM: CPT

## 2022-11-20 RX ORDER — HYDROMORPHONE HYDROCHLORIDE 1 MG/ML
0.5 INJECTION, SOLUTION INTRAMUSCULAR; INTRAVENOUS; SUBCUTANEOUS
Status: DISCONTINUED | OUTPATIENT
Start: 2022-11-20 | End: 2022-11-20

## 2022-11-20 RX ORDER — SODIUM CHLORIDE 9 MG/ML
INJECTION, SOLUTION INTRAVENOUS PRN
Status: DISCONTINUED | OUTPATIENT
Start: 2022-11-20 | End: 2022-11-22 | Stop reason: HOSPADM

## 2022-11-20 RX ORDER — ONDANSETRON 8 MG/1
4 TABLET, ORALLY DISINTEGRATING ORAL EVERY 8 HOURS PRN
Status: DISCONTINUED | OUTPATIENT
Start: 2022-11-20 | End: 2022-11-22 | Stop reason: HOSPADM

## 2022-11-20 RX ORDER — ASPIRIN 81 MG/1
81 TABLET ORAL DAILY
Status: DISCONTINUED | OUTPATIENT
Start: 2022-11-21 | End: 2022-11-22 | Stop reason: HOSPADM

## 2022-11-20 RX ORDER — NICOTINE 21 MG/24HR
1 PATCH, TRANSDERMAL 24 HOURS TRANSDERMAL DAILY
Qty: 42 PATCH | Refills: 0 | Status: CANCELLED | OUTPATIENT
Start: 2022-11-20 | End: 2023-01-01

## 2022-11-20 RX ORDER — SODIUM CHLORIDE, SODIUM LACTATE, POTASSIUM CHLORIDE, CALCIUM CHLORIDE 600; 310; 30; 20 MG/100ML; MG/100ML; MG/100ML; MG/100ML
INJECTION, SOLUTION INTRAVENOUS CONTINUOUS
Status: DISCONTINUED | OUTPATIENT
Start: 2022-11-21 | End: 2022-11-22

## 2022-11-20 RX ORDER — SODIUM CHLORIDE 0.9 % (FLUSH) 0.9 %
5-40 SYRINGE (ML) INJECTION PRN
Status: DISCONTINUED | OUTPATIENT
Start: 2022-11-20 | End: 2022-11-22 | Stop reason: HOSPADM

## 2022-11-20 RX ORDER — MAGNESIUM HYDROXIDE/ALUMINUM HYDROXICE/SIMETHICONE 120; 1200; 1200 MG/30ML; MG/30ML; MG/30ML
30 SUSPENSION ORAL EVERY 6 HOURS PRN
Status: DISCONTINUED | OUTPATIENT
Start: 2022-11-20 | End: 2022-11-22 | Stop reason: HOSPADM

## 2022-11-20 RX ORDER — OXYCODONE HYDROCHLORIDE 5 MG/1
5 TABLET ORAL EVERY 4 HOURS PRN
Status: DISCONTINUED | OUTPATIENT
Start: 2022-11-20 | End: 2022-11-21

## 2022-11-20 RX ORDER — BUPROPION HYDROCHLORIDE 150 MG/1
TABLET ORAL
Qty: 60 TABLET | Refills: 3 | Status: CANCELLED | OUTPATIENT
Start: 2022-11-20

## 2022-11-20 RX ORDER — LANOLIN ALCOHOL/MO/W.PET/CERES
100 CREAM (GRAM) TOPICAL DAILY
Status: DISCONTINUED | OUTPATIENT
Start: 2022-11-21 | End: 2022-11-22 | Stop reason: HOSPADM

## 2022-11-20 RX ORDER — POTASSIUM CHLORIDE 7.45 MG/ML
10 INJECTION INTRAVENOUS PRN
Status: DISCONTINUED | OUTPATIENT
Start: 2022-11-20 | End: 2022-11-22 | Stop reason: HOSPADM

## 2022-11-20 RX ORDER — POTASSIUM CHLORIDE 20 MEQ/1
40 TABLET, EXTENDED RELEASE ORAL PRN
Status: DISCONTINUED | OUTPATIENT
Start: 2022-11-20 | End: 2022-11-22 | Stop reason: HOSPADM

## 2022-11-20 RX ORDER — ONDANSETRON 2 MG/ML
4 INJECTION INTRAMUSCULAR; INTRAVENOUS EVERY 6 HOURS PRN
Status: DISCONTINUED | OUTPATIENT
Start: 2022-11-20 | End: 2022-11-22 | Stop reason: HOSPADM

## 2022-11-20 RX ORDER — ATORVASTATIN CALCIUM 40 MG/1
40 TABLET, FILM COATED ORAL NIGHTLY
Status: DISCONTINUED | OUTPATIENT
Start: 2022-11-20 | End: 2022-11-22 | Stop reason: HOSPADM

## 2022-11-20 RX ORDER — PANTOPRAZOLE SODIUM 40 MG/1
40 TABLET, DELAYED RELEASE ORAL
Status: DISCONTINUED | OUTPATIENT
Start: 2022-11-21 | End: 2022-11-22 | Stop reason: HOSPADM

## 2022-11-20 RX ORDER — 0.9 % SODIUM CHLORIDE 0.9 %
1000 INTRAVENOUS SOLUTION INTRAVENOUS ONCE
Status: COMPLETED | OUTPATIENT
Start: 2022-11-20 | End: 2022-11-20

## 2022-11-20 RX ORDER — ENOXAPARIN SODIUM 100 MG/ML
40 INJECTION SUBCUTANEOUS DAILY
Status: DISCONTINUED | OUTPATIENT
Start: 2022-11-21 | End: 2022-11-22 | Stop reason: HOSPADM

## 2022-11-20 RX ORDER — SODIUM CHLORIDE, SODIUM LACTATE, POTASSIUM CHLORIDE, CALCIUM CHLORIDE 600; 310; 30; 20 MG/100ML; MG/100ML; MG/100ML; MG/100ML
INJECTION, SOLUTION INTRAVENOUS CONTINUOUS
Status: ACTIVE | OUTPATIENT
Start: 2022-11-20 | End: 2022-11-21

## 2022-11-20 RX ORDER — HYDROMORPHONE HYDROCHLORIDE 1 MG/ML
0.25 INJECTION, SOLUTION INTRAMUSCULAR; INTRAVENOUS; SUBCUTANEOUS
Status: DISCONTINUED | OUTPATIENT
Start: 2022-11-20 | End: 2022-11-20

## 2022-11-20 RX ORDER — NITROGLYCERIN 0.4 MG/1
0.4 TABLET SUBLINGUAL EVERY 5 MIN PRN
Status: DISCONTINUED | OUTPATIENT
Start: 2022-11-20 | End: 2022-11-22 | Stop reason: HOSPADM

## 2022-11-20 RX ORDER — HYDROMORPHONE HYDROCHLORIDE 1 MG/ML
1 INJECTION, SOLUTION INTRAMUSCULAR; INTRAVENOUS; SUBCUTANEOUS
Status: COMPLETED | OUTPATIENT
Start: 2022-11-20 | End: 2022-11-20

## 2022-11-20 RX ORDER — ONDANSETRON 2 MG/ML
4 INJECTION INTRAMUSCULAR; INTRAVENOUS ONCE
Status: COMPLETED | OUTPATIENT
Start: 2022-11-20 | End: 2022-11-21

## 2022-11-20 RX ORDER — SODIUM CHLORIDE 0.9 % (FLUSH) 0.9 %
5-40 SYRINGE (ML) INJECTION EVERY 12 HOURS SCHEDULED
Status: DISCONTINUED | OUTPATIENT
Start: 2022-11-20 | End: 2022-11-22 | Stop reason: HOSPADM

## 2022-11-20 RX ORDER — LISINOPRIL 5 MG/1
5 TABLET ORAL DAILY
Status: DISCONTINUED | OUTPATIENT
Start: 2022-11-21 | End: 2022-11-22 | Stop reason: HOSPADM

## 2022-11-20 RX ORDER — NICOTINE 21 MG/24HR
1 PATCH, TRANSDERMAL 24 HOURS TRANSDERMAL EVERY 24 HOURS
Status: DISCONTINUED | OUTPATIENT
Start: 2022-11-20 | End: 2022-11-22 | Stop reason: HOSPADM

## 2022-11-20 RX ORDER — MAGNESIUM SULFATE IN WATER 40 MG/ML
2000 INJECTION, SOLUTION INTRAVENOUS PRN
Status: DISCONTINUED | OUTPATIENT
Start: 2022-11-20 | End: 2022-11-22 | Stop reason: HOSPADM

## 2022-11-20 RX ORDER — ACETAMINOPHEN 325 MG/1
650 TABLET ORAL EVERY 4 HOURS PRN
Status: DISCONTINUED | OUTPATIENT
Start: 2022-11-20 | End: 2022-11-22 | Stop reason: HOSPADM

## 2022-11-20 RX ADMIN — SODIUM CHLORIDE 1000 ML: 900 INJECTION, SOLUTION INTRAVENOUS at 18:35

## 2022-11-20 RX ADMIN — SODIUM CHLORIDE 1000 ML: 900 INJECTION, SOLUTION INTRAVENOUS at 21:58

## 2022-11-20 RX ADMIN — PIPERACILLIN AND TAZOBACTAM 4500 MG: 4; .5 INJECTION, POWDER, FOR SOLUTION INTRAVENOUS at 22:00

## 2022-11-20 RX ADMIN — HYDROMORPHONE HYDROCHLORIDE 1 MG: 1 INJECTION, SOLUTION INTRAMUSCULAR; INTRAVENOUS; SUBCUTANEOUS at 21:35

## 2022-11-20 RX ADMIN — HYDROMORPHONE HYDROCHLORIDE 0.5 MG: 1 INJECTION, SOLUTION INTRAMUSCULAR; INTRAVENOUS; SUBCUTANEOUS at 18:25

## 2022-11-20 RX ADMIN — SODIUM CHLORIDE, PRESERVATIVE FREE 10 ML: 5 INJECTION INTRAVENOUS at 23:46

## 2022-11-20 RX ADMIN — SODIUM CHLORIDE, POTASSIUM CHLORIDE, SODIUM LACTATE AND CALCIUM CHLORIDE: 600; 310; 30; 20 INJECTION, SOLUTION INTRAVENOUS at 23:55

## 2022-11-20 RX ADMIN — TICAGRELOR 90 MG: 90 TABLET ORAL at 23:47

## 2022-11-20 RX ADMIN — ATORVASTATIN CALCIUM 40 MG: 40 TABLET, FILM COATED ORAL at 23:47

## 2022-11-20 RX ADMIN — METOPROLOL TARTRATE 25 MG: 25 TABLET, FILM COATED ORAL at 23:47

## 2022-11-20 RX ADMIN — IOPAMIDOL 100 ML: 755 INJECTION, SOLUTION INTRAVENOUS at 19:00

## 2022-11-20 RX ADMIN — HYDROMORPHONE HYDROCHLORIDE 0.5 MG: 1 INJECTION, SOLUTION INTRAMUSCULAR; INTRAVENOUS; SUBCUTANEOUS at 22:34

## 2022-11-20 RX ADMIN — OXYCODONE 5 MG: 5 TABLET ORAL at 23:47

## 2022-11-20 ASSESSMENT — PAIN DESCRIPTION - LOCATION
LOCATION: ABDOMEN
LOCATION: ABDOMEN

## 2022-11-20 ASSESSMENT — ENCOUNTER SYMPTOMS
RESPIRATORY NEGATIVE: 1
ABDOMINAL PAIN: 1

## 2022-11-20 ASSESSMENT — PAIN DESCRIPTION - DESCRIPTORS
DESCRIPTORS: ACHING;SHARP
DESCRIPTORS: ACHING;SHARP;STABBING

## 2022-11-20 ASSESSMENT — PAIN - FUNCTIONAL ASSESSMENT
PAIN_FUNCTIONAL_ASSESSMENT: PREVENTS OR INTERFERES SOME ACTIVE ACTIVITIES AND ADLS
PAIN_FUNCTIONAL_ASSESSMENT: 0-10

## 2022-11-20 ASSESSMENT — PAIN SCALES - GENERAL
PAINLEVEL_OUTOF10: 10
PAINLEVEL_OUTOF10: 8
PAINLEVEL_OUTOF10: 9
PAINLEVEL_OUTOF10: 8
PAINLEVEL_OUTOF10: 9

## 2022-11-20 ASSESSMENT — PAIN DESCRIPTION - PAIN TYPE: TYPE: ACUTE PAIN

## 2022-11-20 ASSESSMENT — PAIN DESCRIPTION - FREQUENCY: FREQUENCY: CONTINUOUS

## 2022-11-20 NOTE — ED PROVIDER NOTES
Emergency Department Provider Note                   PCP: Kvng Ramirez MD               Age: 44 y.o. Sex: male       ICD-10-CM    1. Acute pancreatitis, unspecified complication status, unspecified pancreatitis type  K85.90       2. Abdominal pain, unspecified abdominal location  R10.9           DISPOSITION Admitted 11/20/2022 09:15:10 PM        MDM  Number of Diagnoses or Management Options  Abdominal pain, unspecified abdominal location  Acute pancreatitis, unspecified complication status, unspecified pancreatitis type  Diagnosis management comments: Patient is a [id number] male with complicated recent past medical history. He has history of chronic pancreatitis and pancreatic pseudocyst.  He has undergone a recent procedure by GI earlier this month. Also had a recent STEMI. Comes in with upper abdominal pain that began this morning. Feels like his pancreas. Lipase is elevated over thousand. CT shows new area of gas, but otherwise his pancreatitis appears to be improving. I did speak to Dr. Michelle Jaime of GI he says he does not believe this is necessarily infectious but if there is any concern to go ahead and treat with antibiotics. He recommend admitting to hospitalist and they will see the patient in the morning. Hospitalist consulted for admission. Amount and/or Complexity of Data Reviewed  Discuss the patient with other providers: yes (Dr. Gely Hernandez, Dr. Michelle Jaime)    Risk of Complications, Morbidity, and/or Mortality  Presenting problems: moderate  Diagnostic procedures: moderate  Management options: moderate    Patient Progress  Patient progress: stable             Orders Placed This Encounter   Procedures    CT ABDOMEN PELVIS W IV CONTRAST Additional Contrast? None    XR CHEST (2 VW)    CBC with Auto Differential    CMP    Lipase    Triglyceride    CBC with Auto Differential    Magnesium    Comprehensive Metabolic Panel    ADULT DIET; Clear Liquid;  Low Fat (less than or equal to 50 gm/day)    Vital signs per unit routine    Place intermittent pneumatic compression device    Notify physician    Up as tolerated    Daily weights    Intake and output    Full code    Consult to Gastroenterology    Inpatient consult to Dietitian    OT eval and treat    PT evaluation and treat    Initiate Oxygen Therapy Protocol    ADMIT TO INPATIENT        Medications   0.9 % sodium chloride bolus (has no administration in time range)   ondansetron (ZOFRAN) injection 4 mg (has no administration in time range)   piperacillin-tazobactam (ZOSYN) 4,500 mg in sodium chloride 0.9 % 100 mL IVPB (mini-bag) (has no administration in time range)   HYDROmorphone HCl PF (DILAUDID) injection 1 mg (has no administration in time range)   lactated ringers infusion (has no administration in time range)     Followed by   lactated ringers infusion (has no administration in time range)   sodium chloride flush 0.9 % injection 5-40 mL (has no administration in time range)   sodium chloride flush 0.9 % injection 5-40 mL (has no administration in time range)   0.9 % sodium chloride infusion (has no administration in time range)   ondansetron (ZOFRAN-ODT) disintegrating tablet 4 mg (has no administration in time range)     Or   ondansetron (ZOFRAN) injection 4 mg (has no administration in time range)   enoxaparin (LOVENOX) injection 40 mg (has no administration in time range)   pantoprazole (PROTONIX) tablet 40 mg (has no administration in time range)   nicotine (NICODERM CQ) 21 MG/24HR 1 patch (has no administration in time range)   potassium chloride (KLOR-CON M) extended release tablet 40 mEq (has no administration in time range)     Or   potassium bicarb-citric acid (EFFER-K) effervescent tablet 40 mEq (has no administration in time range)     Or   potassium chloride 10 mEq/100 mL IVPB (Peripheral Line) (has no administration in time range)   magnesium sulfate 2000 mg in 50 mL IVPB premix (has no administration in time range)   HYDROmorphone HCl PF (DILAUDID) injection 0.25 mg (has no administration in time range)     Or   HYDROmorphone HCl PF (DILAUDID) injection 0.5 mg (has no administration in time range)   oxyCODONE (ROXICODONE) immediate release tablet 5 mg (has no administration in time range)   acetaminophen (TYLENOL) tablet 650 mg (has no administration in time range)   aluminum & magnesium hydroxide-simethicone (MAALOX) 200-200-20 MG/5ML suspension 30 mL (has no administration in time range)   thiamine tablet 100 mg (has no administration in time range)   iopamidol (ISOVUE-370) 76 % injection 100 mL (100 mLs IntraVENous Given 11/20/22 1900)       New Prescriptions    No medications on file        Chantelle Gonzalez is a 44 y.o. male who presents to the Emergency Department with chief complaint of  No chief complaint on file. 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 He also has some type of gastric stent in place at this time. He had a recent STEMI and had a stent placed. Currently on Brilinta. Is supposed to undergo another surgery by GI, but cardiology is concerned about taking him off of Brilinta for the surgery. Regardless he comes in for upper abdominal pain that he says feels like his pancreatitis. He was able to eat this morning and pain began after that. No vomiting or diarrhea. Abdomen feels distended. Denies chest pain. Review of Systems   Constitutional: Negative. HENT: Negative. Respiratory: Negative. Cardiovascular: Negative. Gastrointestinal:  Positive for abdominal pain. Genitourinary: Negative. All other systems reviewed and are negative.     Past Medical History:   Diagnosis Date    Abdominal pain 10/25/2022    Abnormal CT of the abdomen 30/93/7012    Acute alcoholic pancreatitis 99/59/7191    Acute kidney injury (nontraumatic) ([facility] 10/31/2022    pt denies Acute on chronic pancreatitis [facility] 07/30/2022    Alcohol use disorder, severe, dependence ([facility] 08/21/2019    CAD (coronary artery disease)     Carpal tunnel syndrome, left     Cigarette smoker     Dependence on nicotine from cigarettes 11/20/2022    GERD (gastroesophageal reflux disease)     controlled with med    History of pancreatitis     x 4 or 5 times-- last time admitted to hospital 10/10/26/22 x 7 days    Hypertension     controlled with med        Past Surgical History:   Procedure Laterality Date    CARDIAC PROCEDURE N/A 11/2/2022    LEFT HEART CATH / CORONARY ANGIOGRAPHY performed by Loreto Hawkins MD at [address] [facility] N/A 11/2/2022    Percutaneous coronary intervention performed by Loreto Hawkins MD at [facility] CATH LAB    CHOLECYSTECTOMY  2012    ORTHOPEDIC SURGERY Left     wrist surg--nerve surg    ORTHOPEDIC SURGERY Left     foot surg as child    UPPER GASTROINTESTINAL ENDOSCOPY N/A 11/9/2022    EGD/ENDOSCOPIC ULTRASOUND/AXIOS STENT ROOM 2226 **Rep will be present performed by Argelia Trujillo MD at [facility] ENDOSCOPY        Family History   Problem Relation Age of Onset    No Known Problems Mother     Heart Attack Father         MI at 58        Social History     Socioeconomic History    Marital status:      Spouse name: None    Number of children: None    Years of education: None    Highest education level: None   Tobacco Use    Smoking status: Every Day     Packs/day: 1.00     Years: 20.00     Pack years: 20.00     Types: Cigarettes    Smokeless tobacco: Never   Vaping Use    Vaping Use: Never used   Substance and Sexual Activity    Alcohol use: Yes     Comment: none since 10/4/22-- had drank 3 glasses of wine daily    Drug use: No         Patient has no known allergies.      Previous Medications    ASPIRIN 81 MG CHEWABLE TABLET    Take 1 tablet by mouth daily    ATORVASTATIN (LIPITOR) 40 MG TABLET    Take 1 tablet by mouth nightly    COLCHICINE (COLCRYS) 0.6 MG TABLET    Take 1 tablet by mouth daily    HYDROCODONE-ACETAMINOPHEN (NORCO)  MG PER TABLET    Take 1 tablet by mouth every 6 hours as needed for Pain for up to 5 days. LISINOPRIL (PRINIVIL;ZESTRIL) 5 MG TABLET    Take 1 tablet by mouth daily    METOPROLOL TARTRATE (LOPRESSOR) 25 MG TABLET    Take 1 tablet by mouth 2 times daily    NALOXONE (NARCAN) 0.4 MG/ML INJECTION    Infuse 1 mL intravenously as needed (shortness of breath)    NITROGLYCERIN (NITROSTAT) 0.4 MG SL TABLET    Place 1 tablet under the tongue every 5 minutes as needed for Chest pain up to max of 3 total doses. If no relief after 1 dose, call 911. OMEPRAZOLE (PRILOSEC) 20 MG DELAYED RELEASE CAPSULE    Take 20 mg by mouth daily    TICAGRELOR (BRILINTA) 90 MG TABS TABLET    Take 1 tablet by mouth 2 times daily        Vitals signs and nursing note reviewed. Patient Vitals for the past 4 hrs:   Temp Pulse Resp BP SpO2   11/20/22 1945 -- 83 18 (!) 122/93 100 %   11/20/22 1739 98.6 °F (37 °C) 94 18 115/77 98 %          Physical Exam  Vitals and nursing note reviewed. Constitutional:       General: He is not in acute distress. Appearance: Normal appearance. He is normal weight. He is not ill-appearing or toxic-appearing. HENT:      Head: Normocephalic. Eyes:      Extraocular Movements: Extraocular movements intact. Cardiovascular:      Rate and Rhythm: Normal rate and regular rhythm. Pulses: Normal pulses. Heart sounds: Normal heart sounds. Pulmonary:      Effort: Pulmonary effort is normal.      Breath sounds: Normal breath sounds. Abdominal:      Palpations: Abdomen is soft. Tenderness: There is abdominal tenderness. Comments: Abdomen tender diffusely. Musculoskeletal:         General: No swelling or tenderness. Normal range of motion. Cervical back: Normal range of motion and neck supple. Skin:     General: Skin is warm and dry. Findings: No rash.    Neurological:      General: No focal deficit present. Mental Status: He is alert and oriented to person, place, and time. Mental status is at baseline. Psychiatric:         Mood and Affect: Mood normal.         Behavior: Behavior normal.         Thought Content: Thought content normal.        Procedures    Results for orders placed or performed during the hospital encounter of 11/20/22   CT ABDOMEN PELVIS W IV CONTRAST Additional Contrast? None    Narrative    CT OF THE ABDOMEN AND PELVIS    INDICATION: Abdominal pain, pancreatitis, pseudocyst    Multiple axial images were obtained through the abdomen and pelvis. Oral  contrast was used for bowel opacification. 100mL of Isovue 370 intravenous  contrast was used for better evaluation of solid organs and vascular structures. Radiation dose reduction techniques were used for this study. All CT scans  performed at this facility use one or all of the following: Automated exposure  control, adjustment of the mA and/or kVp according to patient's size, iterative  reconstruction. COMPARISON: 11/13/2022    FINDINGS:  - LUNG BASES: No infiltrates or masses. - LIVER: Scattered subcentimeter low-density hepatic lesions are unchanged. - GALLBLADDER/BILE DUCTS: Cholecystectomy. - PANCREAS: The fluid collection along the greater curvature of the stomach near  the splenic hilum now measures 2.7 x 2.4 cm compared to 5.0 x 3.5 cm. The low  density region at the level of the pancreatic body measures 1.3 cm in maximal  dimension, unchanged. There is a focus of gas associated with a 1.6 cm region of  decreased attenuation within the lesser sac is newly visualized. There are  postsurgical changes of the stomach with a cyst gastrostomy tube. The previously  noted gas and fluid collection at this site has nearly completely resolved.   There are overall improved inflammatory changes within the right upper quadrant  although a mild/moderate residual remains.    - SPLEEN: Normal. There is marked narrowing of the splenic vein as it crosses  midline. Several collateral vessels are present within the left abdomen.    - ADRENALS: Normal.  - KIDNEYS/URETERS: No hydronephrosis or significant mass. - BLADDER: Normal.  - REPRODUCTIVE ORGANS: No pelvic masses. - BOWEL: Normal caliber. No inflammatory changes.  - LYMPH NODES: No significant retroperitoneal, mesenteric, or pelvic adenopathy.  - BONES: No fracture or significant bone lesion.  - OTHER: Small amounts of ascites. Impression    1. Mild improvement in the inflammatory changes associated with the pancreas  compatible with pancreatitis. The dominant pseudocysts have diminished. There is  a newly visualized focus of gas and fluid at the lesser sac. The gas is likely  tracking from the site of intervention involving the stomach. 2. Small amounts of ascites. XR CHEST (2 VW)    Narrative    Two view chest    History: Pain    Comparison: 11/02/2022    Findings: The heart is normal in size and configuration. Left basilar  subsegmental atelectasis has resolved. There are no pleural effusions. The  pulmonary vascularity is within normal limits. The visualized osseous structures  are unremarkable.       Impression    No active disease in the chest.         CBC with Auto Differential   Result Value Ref Range    WBC 11.2 (H) 4.3 - 11.1 K/uL    RBC 4.94 4.23 - 5.6 M/uL    Hemoglobin 13.6 13.6 - 17.2 g/dL    Hematocrit 42.3 41.1 - 50.3 %    MCV 85.6 82 - 102 FL    MCH 27.5 26.1 - 32.9 PG    MCHC 32.2 31.4 - 35.0 g/dL    RDW 15.0 (H) 11.9 - 14.6 %    Platelets 733 216 - 663 K/uL    MPV 9.0 (L) 9.4 - 12.3 FL    nRBC 0.00 0.0 - 0.2 K/uL    Differential Type AUTOMATED      Seg Neutrophils 70 43 - 78 %    Lymphocytes 19 13 - 44 %    Monocytes 8 4.0 - 12.0 %    Eosinophils % 2 0.5 - 7.8 %    Basophils 1 0.0 - 2.0 %    Immature Granulocytes 0 0.0 - 5.0 %    Segs Absolute 7.9 1.7 - 8.2 K/UL    Absolute Lymph # 2.2 0.5 - 4.6 K/UL    Absolute Mono # 0.9 0.1 - 1.3 K/UL

## 2022-11-20 NOTE — ED TRIAGE NOTES
Pt c/o abd pain \"fluid filling up\" r/t pancreatic cyst x2-3days   Pt discharged Thursday s/p work up for pancreatic cyst, has surgery scheduled for 11/30. Pt has stomach stent placed a few weeks ago.    Pt taking Brilinta s/p MI   A&Ox4

## 2022-11-21 ENCOUNTER — PREP FOR PROCEDURE (OUTPATIENT)
Dept: ADMINISTRATIVE | Age: 37
End: 2022-11-21

## 2022-11-21 LAB
ALBUMIN SERPL-MCNC: 2.5 G/DL (ref 3.5–5)
ALBUMIN/GLOB SERPL: 0.8 {RATIO} (ref 0.4–1.6)
ALP SERPL-CCNC: 77 U/L (ref 50–136)
ALT SERPL-CCNC: 35 U/L (ref 12–65)
ANION GAP SERPL CALC-SCNC: 5 MMOL/L (ref 2–11)
AST SERPL-CCNC: 22 U/L (ref 15–37)
BASOPHILS # BLD: 0.1 K/UL (ref 0–0.2)
BASOPHILS NFR BLD: 1 % (ref 0–2)
BILIRUB SERPL-MCNC: 0.1 MG/DL (ref 0.2–1.1)
BUN SERPL-MCNC: 10 MG/DL (ref 6–23)
CALCIUM SERPL-MCNC: 8.4 MG/DL (ref 8.3–10.4)
CHLORIDE SERPL-SCNC: 109 MMOL/L (ref 101–110)
CO2 SERPL-SCNC: 24 MMOL/L (ref 21–32)
CREAT SERPL-MCNC: 0.8 MG/DL (ref 0.8–1.5)
DIFFERENTIAL METHOD BLD: ABNORMAL
EOSINOPHIL # BLD: 0.1 K/UL (ref 0–0.8)
EOSINOPHIL NFR BLD: 2 % (ref 0.5–7.8)
ERYTHROCYTE [DISTWIDTH] IN BLOOD BY AUTOMATED COUNT: 15 % (ref 11.9–14.6)
GLOBULIN SER CALC-MCNC: 3.1 G/DL (ref 2.8–4.5)
GLUCOSE SERPL-MCNC: 107 MG/DL (ref 65–100)
HCT VFR BLD AUTO: 34.9 % (ref 41.1–50.3)
HGB BLD-MCNC: 11 G/DL (ref 13.6–17.2)
IMM GRANULOCYTES # BLD AUTO: 0 K/UL (ref 0–0.5)
IMM GRANULOCYTES NFR BLD AUTO: 0 % (ref 0–5)
LYMPHOCYTES # BLD: 1.7 K/UL (ref 0.5–4.6)
LYMPHOCYTES NFR BLD: 24 % (ref 13–44)
MAGNESIUM SERPL-MCNC: 1.8 MG/DL (ref 1.8–2.4)
MCH RBC QN AUTO: 27.1 PG (ref 26.1–32.9)
MCHC RBC AUTO-ENTMCNC: 31.5 G/DL (ref 31.4–35)
MCV RBC AUTO: 86 FL (ref 82–102)
MONOCYTES # BLD: 0.7 K/UL (ref 0.1–1.3)
MONOCYTES NFR BLD: 10 % (ref 4–12)
NEUTS SEG # BLD: 4.5 K/UL (ref 1.7–8.2)
NEUTS SEG NFR BLD: 63 % (ref 43–78)
NRBC # BLD: 0 K/UL (ref 0–0.2)
PLATELET # BLD AUTO: 317 K/UL (ref 150–450)
PMV BLD AUTO: 9.7 FL (ref 9.4–12.3)
POTASSIUM SERPL-SCNC: 4.1 MMOL/L (ref 3.5–5.1)
PROT SERPL-MCNC: 5.6 G/DL (ref 6.3–8.2)
RBC # BLD AUTO: 4.06 M/UL (ref 4.23–5.6)
SODIUM SERPL-SCNC: 138 MMOL/L (ref 133–143)
TRIGL SERPL-MCNC: 93 MG/DL (ref 35–150)
WBC # BLD AUTO: 7.1 K/UL (ref 4.3–11.1)

## 2022-11-21 PROCEDURE — 6370000000 HC RX 637 (ALT 250 FOR IP): Performed by: FAMILY MEDICINE

## 2022-11-21 PROCEDURE — 84478 ASSAY OF TRIGLYCERIDES: CPT

## 2022-11-21 PROCEDURE — 2580000003 HC RX 258: Performed by: FAMILY MEDICINE

## 2022-11-21 PROCEDURE — 85025 COMPLETE CBC W/AUTO DIFF WBC: CPT

## 2022-11-21 PROCEDURE — 6360000002 HC RX W HCPCS: Performed by: FAMILY MEDICINE

## 2022-11-21 PROCEDURE — 97530 THERAPEUTIC ACTIVITIES: CPT

## 2022-11-21 PROCEDURE — 36415 COLL VENOUS BLD VENIPUNCTURE: CPT

## 2022-11-21 PROCEDURE — 80053 COMPREHEN METABOLIC PANEL: CPT

## 2022-11-21 PROCEDURE — 6370000000 HC RX 637 (ALT 250 FOR IP): Performed by: NURSE PRACTITIONER

## 2022-11-21 PROCEDURE — 6360000002 HC RX W HCPCS: Performed by: PHYSICIAN ASSISTANT

## 2022-11-21 PROCEDURE — 83735 ASSAY OF MAGNESIUM: CPT

## 2022-11-21 PROCEDURE — 97161 PT EVAL LOW COMPLEX 20 MIN: CPT

## 2022-11-21 PROCEDURE — 6360000002 HC RX W HCPCS: Performed by: NURSE PRACTITIONER

## 2022-11-21 PROCEDURE — 97165 OT EVAL LOW COMPLEX 30 MIN: CPT

## 2022-11-21 PROCEDURE — 1100000000 HC RM PRIVATE

## 2022-11-21 RX ORDER — OXYCODONE HYDROCHLORIDE 5 MG/1
10 TABLET ORAL EVERY 6 HOURS PRN
Status: DISCONTINUED | OUTPATIENT
Start: 2022-11-21 | End: 2022-11-22 | Stop reason: HOSPADM

## 2022-11-21 RX ADMIN — HYDROMORPHONE HYDROCHLORIDE 1 MG: 1 INJECTION, SOLUTION INTRAMUSCULAR; INTRAVENOUS; SUBCUTANEOUS at 22:24

## 2022-11-21 RX ADMIN — OXYCODONE 10 MG: 5 TABLET ORAL at 23:31

## 2022-11-21 RX ADMIN — ONDANSETRON 4 MG: 2 INJECTION INTRAMUSCULAR; INTRAVENOUS at 09:14

## 2022-11-21 RX ADMIN — ALUMINUM HYDROXIDE, MAGNESIUM HYDROXIDE, AND SIMETHICONE 30 ML: 200; 200; 20 SUSPENSION ORAL at 13:35

## 2022-11-21 RX ADMIN — TICAGRELOR 90 MG: 90 TABLET ORAL at 08:08

## 2022-11-21 RX ADMIN — HYDROMORPHONE HYDROCHLORIDE 1 MG: 1 INJECTION, SOLUTION INTRAMUSCULAR; INTRAVENOUS; SUBCUTANEOUS at 16:29

## 2022-11-21 RX ADMIN — HYDROMORPHONE HYDROCHLORIDE 0.5 MG: 1 INJECTION, SOLUTION INTRAMUSCULAR; INTRAVENOUS; SUBCUTANEOUS at 03:35

## 2022-11-21 RX ADMIN — HYDROMORPHONE HYDROCHLORIDE 1 MG: 1 INJECTION, SOLUTION INTRAMUSCULAR; INTRAVENOUS; SUBCUTANEOUS at 19:26

## 2022-11-21 RX ADMIN — SODIUM CHLORIDE, SODIUM LACTATE, POTASSIUM CHLORIDE, AND CALCIUM CHLORIDE: 600; 310; 30; 20 INJECTION, SOLUTION INTRAVENOUS at 10:23

## 2022-11-21 RX ADMIN — OXYCODONE 5 MG: 5 TABLET ORAL at 12:01

## 2022-11-21 RX ADMIN — ENOXAPARIN SODIUM 40 MG: 100 INJECTION SUBCUTANEOUS at 07:56

## 2022-11-21 RX ADMIN — HYDROMORPHONE HYDROCHLORIDE 1 MG: 1 INJECTION, SOLUTION INTRAMUSCULAR; INTRAVENOUS; SUBCUTANEOUS at 13:30

## 2022-11-21 RX ADMIN — HYDROMORPHONE HYDROCHLORIDE 0.5 MG: 1 INJECTION, SOLUTION INTRAMUSCULAR; INTRAVENOUS; SUBCUTANEOUS at 10:50

## 2022-11-21 RX ADMIN — ATORVASTATIN CALCIUM 40 MG: 40 TABLET, FILM COATED ORAL at 20:51

## 2022-11-21 RX ADMIN — SODIUM CHLORIDE, POTASSIUM CHLORIDE, SODIUM LACTATE AND CALCIUM CHLORIDE: 600; 310; 30; 20 INJECTION, SOLUTION INTRAVENOUS at 03:38

## 2022-11-21 RX ADMIN — OXYCODONE 5 MG: 5 TABLET ORAL at 05:37

## 2022-11-21 RX ADMIN — HYDROMORPHONE HYDROCHLORIDE 0.5 MG: 1 INJECTION, SOLUTION INTRAMUSCULAR; INTRAVENOUS; SUBCUTANEOUS at 07:55

## 2022-11-21 RX ADMIN — OXYCODONE 10 MG: 5 TABLET ORAL at 17:35

## 2022-11-21 RX ADMIN — METOPROLOL TARTRATE 25 MG: 25 TABLET, FILM COATED ORAL at 20:51

## 2022-11-21 RX ADMIN — Medication 100 MG: at 07:55

## 2022-11-21 RX ADMIN — METOPROLOL TARTRATE 25 MG: 25 TABLET, FILM COATED ORAL at 07:55

## 2022-11-21 RX ADMIN — ASPIRIN 81 MG: 81 TABLET ORAL at 07:55

## 2022-11-21 RX ADMIN — SODIUM CHLORIDE, PRESERVATIVE FREE 10 ML: 5 INJECTION INTRAVENOUS at 20:52

## 2022-11-21 RX ADMIN — LISINOPRIL 5 MG: 5 TABLET ORAL at 07:56

## 2022-11-21 RX ADMIN — SODIUM CHLORIDE, PRESERVATIVE FREE 5 ML: 5 INJECTION INTRAVENOUS at 08:10

## 2022-11-21 RX ADMIN — PANTOPRAZOLE SODIUM 40 MG: 40 TABLET, DELAYED RELEASE ORAL at 05:37

## 2022-11-21 RX ADMIN — HYDROMORPHONE HYDROCHLORIDE 1 MG: 1 INJECTION, SOLUTION INTRAMUSCULAR; INTRAVENOUS; SUBCUTANEOUS at 01:17

## 2022-11-21 RX ADMIN — TICAGRELOR 90 MG: 90 TABLET ORAL at 20:53

## 2022-11-21 ASSESSMENT — PAIN DESCRIPTION - LOCATION
LOCATION: ABDOMEN

## 2022-11-21 ASSESSMENT — PAIN - FUNCTIONAL ASSESSMENT
PAIN_FUNCTIONAL_ASSESSMENT: ACTIVITIES ARE NOT PREVENTED

## 2022-11-21 ASSESSMENT — PAIN SCALES - GENERAL
PAINLEVEL_OUTOF10: 9
PAINLEVEL_OUTOF10: 9
PAINLEVEL_OUTOF10: 8
PAINLEVEL_OUTOF10: 6
PAINLEVEL_OUTOF10: 7
PAINLEVEL_OUTOF10: 8
PAINLEVEL_OUTOF10: 10
PAINLEVEL_OUTOF10: 7
PAINLEVEL_OUTOF10: 8
PAINLEVEL_OUTOF10: 1
PAINLEVEL_OUTOF10: 6
PAINLEVEL_OUTOF10: 5
PAINLEVEL_OUTOF10: 5
PAINLEVEL_OUTOF10: 8
PAINLEVEL_OUTOF10: 1
PAINLEVEL_OUTOF10: 10
PAINLEVEL_OUTOF10: 8
PAINLEVEL_OUTOF10: 3

## 2022-11-21 ASSESSMENT — PAIN DESCRIPTION - DESCRIPTORS
DESCRIPTORS: ACHING;SHARP;STABBING
DESCRIPTORS: ACHING
DESCRIPTORS: ACHING;SHARP;DISCOMFORT
DESCRIPTORS: ACHING;SHARP
DESCRIPTORS: ACHING;DISCOMFORT;SHARP
DESCRIPTORS: ACHING;DISCOMFORT
DESCRIPTORS: ACHING
DESCRIPTORS: ACHING;SHARP

## 2022-11-21 ASSESSMENT — PAIN DESCRIPTION - ORIENTATION
ORIENTATION: UPPER
ORIENTATION: LOWER
ORIENTATION: UPPER
ORIENTATION: LOWER
ORIENTATION: UPPER

## 2022-11-21 ASSESSMENT — PAIN DESCRIPTION - FREQUENCY
FREQUENCY: CONTINUOUS

## 2022-11-21 ASSESSMENT — PAIN DESCRIPTION - ONSET
ONSET: ON-GOING

## 2022-11-21 ASSESSMENT — PAIN DESCRIPTION - PAIN TYPE
TYPE: ACUTE PAIN

## 2022-11-21 ASSESSMENT — PAIN DESCRIPTION - DIRECTION: RADIATING_TOWARDS: CON

## 2022-11-21 NOTE — PROGRESS NOTES
ACUTE PHYSICAL THERAPY GOALS:   (Developed with and agreed upon by patient and/or caregiver.)  dc    PHYSICAL THERAPY Initial Assessment, Discharge, and AM  (Link to Caseload Tracking: PT Visit Days : 1  Acknowledge Orders  Time In/Out  PT Charge Capture  Rehab Caseload Tracker    Emir Hernandez is a 44 y.o. male   PRIMARY DIAGNOSIS: Acute on chronic pancreatitis (HCC)  Abdominal pain, unspecified abdominal location [R10.9]  Acute on chronic pancreatitis ([address] [K85.90, K86.1]  Acute pancreatitis, unspecified complication status, unspecified pancreatitis type [K85.90]       Reason for Referral: Generalized Muscle Weakness (M62.81)  Difficulty in walking, Not elsewhere classified (R26.2)  Inpatient: Payor: /     ASSESSMENT:     REHAB RECOMMENDATIONS:   Recommendation to date pending progress:  Setting:  No further skilled therapy after discharge from hospital    Equipment:    None     ASSESSMENT:  Mr. Vipin Reyna was supine at arrival and agreed to treat. Pt stating he doesn't need any PT do to IND around room . He got to EOB stood and walked pushing IV pole SUP. Returned to room and back to supine. Pt expressed he has been conducting IND around room. Pt is generally IND and will be dc from PT service at this time due to IND. Anticipate home discharge with no needs when medically cleared.       [address] [facility] [address] [address] AM-PAC 6 Clicks Basic Mobility Inpatient Short Form  AM-PAC Mobility Inpatient   How much difficulty turning over in bed?: None  How much difficulty sitting down on / standing up from a chair with arms?: None  How much difficulty moving from lying on back to sitting on side of bed?: None  How much help from another person moving to and from a bed to a chair?: None  How much help from another person needed to walk in hospital room?: None  How much help from another person for climbing 3-5 steps with a railing?: None  AM-PAC Inpatient Mobility Raw Score : 24  AM-PAC Inpatient T-Scale Score : 61.14  Mobility Inpatient CMS 0-100% Score: 0  Mobility Inpatient CMS G-Code Modifier : CH    SUBJECTIVE:   Mr. 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      OBJECTIVE:     PAIN: Benjamin Salazar / O2: [employer] [place] / Hossein Tompkins / Annabella Butt:   Pre Treatment: a lot         Post Treatment: a lot Vitals        Oxygen      IV    RESTRICTIONS/PRECAUTIONS:                    GROSS EVALUATION: Intact Impaired (Comments):   AROM [x]     PROM []    Strength [x]     Balance [x]     Posture [] N/A   Sensation [x]     Coordination [x]      Tone []     Edema []    Activity Tolerance [x]      []      COGNITION/  PERCEPTION: Intact Impaired (Comments):   Orientation [x]     Vision [x]     Hearing [x]     Cognition  [x]       MOBILITY: I Mod I S SBA CGA Min Mod Max Total  NT x2 Comments:   Bed Mobility    Rolling [x] [] [] [] [] [] [] [] [] [] []    Supine to Sit [x] [] [] [] [] [] [] [] [] [] []    Scooting [x] [] [] [] [] [] [] [] [] [] []    Sit to Supine [] [] [] [] [] [] [] [] [] [] []    Transfers    Sit to Stand [x] [] [] [] [] [] [] [] [] [] []    Bed to Chair [x] [] [] [] [] [] [] [] [] [] []    Stand to Sit [x] [] [] [] [] [] [] [] [] [] []     [] [] [] [] [] [] [] [] [] [] []    I=Independent, Mod I=Modified Independent, S=Supervision, SBA=Standby Assistance, CGA=Contact Guard Assistance,   Min=Minimal Assistance, Mod=Moderate Assistance, Max=Maximal Assistance, Total=Total Assistance, NT=Not Tested    GAIT: I Mod I S SBA CGA Min Mod Max Total  NT x2 Comments:   Level of Assistance [x] [] [x] [] [] [] [] [] [] [] []    Distance 400  feet    DME None    Gait Quality N/A    Weightbearing Status      Stairs I=Independent, Mod I=Modified Independent, S=Supervision, SBA=Standby Assistance, CGA=Contact Guard Assistance,   Min=Minimal Assistance, Mod=Moderate Assistance, Max=Maximal Assistance, Total=Total Assistance, NT=Not Tested    PLAN:   FREQUENCY AND DURATION:   for duration of hospital stay or until stated goals are met, whichever comes first.    THERAPY PROGNOSIS: Excellent    PROBLEM LIST:   (Skilled intervention is medically necessary to address:)  NA INTERVENTIONS PLANNED:   (Benefits and precautions of physical therapy have been discussed with the patient.)  Education       TREATMENT:   EVALUATION: LOW COMPLEXITY: (Untimed Charge)    TREATMENT:   Therapeutic Activity (8 Minutes): Therapeutic activity included Rolling, Supine to Sit, Sit to Supine, Scooting, Transfer Training, Ambulation on level ground, Sitting balance , and Standing balance to improve functional Activity tolerance, Balance, Coordination, Mobility, and Strength.     TREATMENT GRID:  N/A    AFTER TREATMENT PRECAUTIONS: Bed, Bed/Chair Locked, Call light within reach, and Needs within reach    INTERDISCIPLINARY COLLABORATION:  RN/ PCT and PT/ PTA    EDUCATION:      TIME IN/OUT:  Time In: 0955  Time Out: 26945 Kirt Tovar  Minutes: 350 Carol Tovar PT

## 2022-11-21 NOTE — PLAN OF CARE
Problem: Discharge Planning  Goal: Discharge to home or other facility with appropriate resources  Outcome: Progressing  Flowsheets (Taken 11/20/2022 2326)  Discharge to home or other facility with appropriate resources:   Identify barriers to discharge with patient and caregiver   Identify discharge learning needs (meds, wound care, etc)     Problem: Pain  Goal: Verbalizes/displays adequate comfort level or baseline comfort level  Outcome: Progressing  Flowsheets (Taken 11/20/2022 2334)  Verbalizes/displays adequate comfort level or baseline comfort level:   Encourage patient to monitor pain and request assistance   Assess pain using appropriate pain scale   Administer analgesics based on type and severity of pain and evaluate response   Implement non-pharmacological measures as appropriate and evaluate response     Problem: Safety - Adult  Goal: Free from fall injury  Outcome: Progressing     Problem: ABCDS Injury Assessment  Goal: Absence of physical injury  Outcome: Progressing

## 2022-11-21 NOTE — PROGRESS NOTES
Hospitalist Progress Note   Admit Date:  2022  6:13 PM   Name:  Kike Rucker   Age:  44 y.o. Sex:  male  :  1985   MRN:  [id number]   Room:  [facility]/    Presenting Complaint: No chief complaint on file. Reason(s) for Admission: Abdominal pain, unspecified abdominal location [R10.9]  Acute on chronic pancreatitis ([facility] [K85.90, K86.1]  Acute pancreatitis, unspecified complication status, unspecified pancreatitis type [K85.90]     Hospital Course:   Kike Rucker is a 44 y.o. male with medical history of AUD in early remission, nicotine cigarette dependence, etoh-induced chronic pancreatitis, CAD w/ STEMI s/p PCI w/ TULIO 22, pseudocyst s/p gastrostomy w/ stent 22 who presented with c/o worsening epigastric abdominal pain radiating into the back, similar to prior episodes of pancreatitis. Feels like fluid is filling back up, worsening x 2-3 days. Lost 20 lbs in the last month d/t pancreatic issues and inability to tolerate food d/t severe pain. Most Recently discharged on 22 after being admitted on 22 with another new largepseudocyst measuring ~5 cm with acute pancreatitis. CT abdomen with mild improvement in inflammation, small amount of ascites and decrease in size of dominant pseudocyst     Subjective & 24hr Events (22):    Wants better pain control with increase in dilaudid       Assessment & Plan:     Principal Problem:    Acute on chronic pancreatitis/Pancreatic pseudocyst  Plan:   22  -Continue IVF and CLD/low fat  -Supportive care with prn analgesics  -Axios stent scheduled for removal 22 w/ Dr. Derek Carey hold with Integrilin bridge 22 for procedure    Active Problems:    Alcohol use disorder, severe, in early remission, dependence ([facility]  Plan:   -Patient reports being ETOH free x 2 month  -Continue thiamine  -Monitor for withdrawal symptoms         CAD s/p TULIO /History of ST elevation myocardial infarction (STEMI)  Plan:   DAPT, statin, BB, arb, stable no angina. [facility] as above         Dependence on nicotine from cigarettes  Plan:   -Nicotine patch   -Patient encourage to stop smoking   - endorses 1 PPD x 20 years       Anticipated discharge needs:      Dispo pending     Diet:  ADULT DIET; Clear Liquid; Low Fat (less than or equal to 50 gm/day)  ADULT ORAL NUTRITION SUPPLEMENT; Breakfast, Lunch, Dinner; Clear Liquid Oral Supplement  DVT PPx: Lovenox SQ   Code status: Full Code    Hospital Problems:  Principal Problem:    Acute on chronic pancreatitis ([facility]  Active Problems:    Alcohol use disorder, severe, in early remission, dependence ([facility]    Pancreatic pseudocyst    History of ST elevation myocardial infarction (STEMI)    Coronary artery disease involving native coronary artery of native heart    Dependence on nicotine from cigarettes  Resolved Problems:    * No resolved hospital problems. *      Objective:   Patient Vitals for the past 24 hrs:   Temp Pulse Resp BP SpO2   11/21/22 1144 98.2 °F (36.8 °C) 76 14 (!) 144/99 99 %   11/21/22 0728 97.7 °F (36.5 °C) 83 16 136/88 97 %   11/21/22 0607 -- -- 18 -- --   11/21/22 0537 -- -- 18 -- --   11/21/22 0405 -- -- 18 -- --   11/21/22 0330 98.1 °F (36.7 °C) 80 18 133/86 98 %   11/21/22 0147 -- -- 18 -- --   11/21/22 0117 -- -- 18 -- --   11/21/22 0116 -- 80 -- 125/67 --   11/21/22 0017 -- -- 18 -- --   11/20/22 2334 97.5 °F (36.4 °C) 80 18 116/73 98 %   11/20/22 2313 -- 75 17 103/71 98 %   11/20/22 2243 -- 75 18 118/61 98 %   11/20/22 2203 -- 74 17 103/66 97 %   11/20/22 1945 -- 83 18 (!) 122/93 100 %   11/20/22 1739 98.6 °F (37 °C) 94 18 115/77 98 %       Oxygen Therapy  SpO2: 99 %  O2 Device: None (Room air)    Estimated body mass index is 25.23 kg/m² as calculated from the following:    Height as of this encounter: 5' 9\" (1.753 m). Weight as of this encounter: 170 lb 13.7 oz (77.5 kg).   No intake or output data in the 24 hours ending 11/21/22 1221      Physical Exam:     Blood pressure (!) 144/99, pulse 76, temperature 98.2 °F (36.8 °C), temperature source Oral, resp. rate 14, height 5' 9\" (1.753 m), weight 170 lb 13.7 oz (77.5 kg), SpO2 99 %. General:    Well nourished. Head:  Normocephalic, atraumatic  Eyes:  Sclerae appear normal.  Pupils equally round. ENT:  Nares appear normal, no drainage. Moist oral mucosa  Neck:  No restricted ROM. Trachea midline   CV:   RRR. No m/r/g. No jugular venous distension. Lungs:   CTAB. No wheezing, rhonchi, or rales. Symmetric expansion. Abdomen: Bowel sounds present. Soft, nontender, nondistended. Extremities: No cyanosis or clubbing. No edema  Skin:     No rashes and normal coloration. Warm and dry. Neuro:  CN II-XII grossly intact. Sensation intact. A&Ox3  Psych:  Normal mood and affect.       I have personally reviewed labs and tests showing:  Recent Labs:  Recent Results (from the past 48 hour(s))   CBC with Auto Differential    Collection Time: 11/20/22  6:30 PM   Result Value Ref Range    WBC 11.2 (H) 4.3 - 11.1 K/uL    RBC 4.94 4.23 - 5.6 M/uL    Hemoglobin 13.6 13.6 - 17.2 g/dL    Hematocrit 42.3 41.1 - 50.3 %    MCV 85.6 82 - 102 FL    MCH 27.5 26.1 - 32.9 PG    MCHC 32.2 31.4 - 35.0 g/dL    RDW 15.0 (H) 11.9 - 14.6 %    Platelets 037 379 - 730 K/uL    MPV 9.0 (L) 9.4 - 12.3 FL    nRBC 0.00 0.0 - 0.2 K/uL    Differential Type AUTOMATED      Seg Neutrophils 70 43 - 78 %    Lymphocytes 19 13 - 44 %    Monocytes 8 4.0 - 12.0 %    Eosinophils % 2 0.5 - 7.8 %    Basophils 1 0.0 - 2.0 %    Immature Granulocytes 0 0.0 - 5.0 %    Segs Absolute 7.9 1.7 - 8.2 K/UL    Absolute Lymph # 2.2 0.5 - 4.6 K/UL    Absolute Mono # 0.9 0.1 - 1.3 K/UL    Absolute Eos # 0.2 0.0 - 0.8 K/UL    Basophils Absolute 0.1 0.0 - 0.2 K/UL    Absolute Immature Granulocyte 0.0 0.0 - 0.5 K/UL   CMP    Collection Time: 11/20/22  6:30 PM   Result Value Ref Range    Sodium 139 133 - 143 mmol/L    Potassium 4.1 3.5 - 5.1 mmol/L    Chloride 107 101 - 110 mmol/L    CO2 27 21 - 32 mmol/L    Anion Gap 5 2 - 11 mmol/L    Glucose 100 65 - 100 mg/dL    BUN 11 6 - 23 MG/DL    Creatinine 0.80 0.8 - 1.5 MG/DL    Est, Glom Filt Rate >60 >60 ml/min/1.73m2    Calcium 9.1 8.3 - 10.4 MG/DL    Total Bilirubin 0.2 0.2 - 1.1 MG/DL    ALT 43 12 - 65 U/L    AST 28 15 - 37 U/L    Alk Phosphatase 90 50 - 136 U/L    Total Protein 6.9 6.3 - 8.2 g/dL    Albumin 3.1 (L) 3.5 - 5.0 g/dL    Globulin 3.8 2.8 - 4.5 g/dL    Albumin/Globulin Ratio 0.8 0.4 - 1.6     Lipase    Collection Time: 11/20/22  6:30 PM   Result Value Ref Range    Lipase 1,048 (H) 73 - 393 U/L   Triglyceride    Collection Time: 11/21/22  5:58 AM   Result Value Ref Range    Triglycerides 93 35 - 150 MG/DL   CBC with Auto Differential    Collection Time: 11/21/22  5:58 AM   Result Value Ref Range    WBC 7.1 4.3 - 11.1 K/uL    RBC 4.06 (L) 4.23 - 5.6 M/uL    Hemoglobin 11.0 (L) 13.6 - 17.2 g/dL    Hematocrit 34.9 (L) 41.1 - 50.3 %    MCV 86.0 82 - 102 FL    MCH 27.1 26.1 - 32.9 PG    MCHC 31.5 31.4 - 35.0 g/dL    RDW 15.0 (H) 11.9 - 14.6 %    Platelets 862 610 - 921 K/uL    MPV 9.7 9.4 - 12.3 FL    nRBC 0.00 0.0 - 0.2 K/uL    Differential Type AUTOMATED      Seg Neutrophils 63 43 - 78 %    Lymphocytes 24 13 - 44 %    Monocytes 10 4.0 - 12.0 %    Eosinophils % 2 0.5 - 7.8 %    Basophils 1 0.0 - 2.0 %    Immature Granulocytes 0 0.0 - 5.0 %    Segs Absolute 4.5 1.7 - 8.2 K/UL    Absolute Lymph # 1.7 0.5 - 4.6 K/UL    Absolute Mono # 0.7 0.1 - 1.3 K/UL    Absolute Eos # 0.1 0.0 - 0.8 K/UL    Basophils Absolute 0.1 0.0 - 0.2 K/UL    Absolute Immature Granulocyte 0.0 0.0 - 0.5 K/UL   Magnesium    Collection Time: 11/21/22  5:58 AM   Result Value Ref Range    Magnesium 1.8 1.8 - 2.4 mg/dL   Comprehensive Metabolic Panel    Collection Time: 11/21/22  5:58 AM   Result Value Ref Range    Sodium 138 133 - 143 mmol/L    Potassium 4.1 3.5 - 5.1 mmol/L    Chloride 109 101 - 110 mmol/L    CO2 24 21 - 32 mmol/L    Anion Gap 5 2 - 11 mmol/L Glucose 107 (H) 65 - 100 mg/dL    BUN 10 6 - 23 MG/DL    Creatinine 0.80 0.8 - 1.5 MG/DL    Est, Glom Filt Rate >60 >60 ml/min/1.73m2    Calcium 8.4 8.3 - 10.4 MG/DL    Total Bilirubin 0.1 (L) 0.2 - 1.1 MG/DL    ALT 35 12 - 65 U/L    AST 22 15 - 37 U/L    Alk Phosphatase 77 50 - 136 U/L    Total Protein 5.6 (L) 6.3 - 8.2 g/dL    Albumin 2.5 (L) 3.5 - 5.0 g/dL    Globulin 3.1 2.8 - 4.5 g/dL    Albumin/Globulin Ratio 0.8 0.4 - 1.6         I have personally reviewed imaging studies showing: Other Studies:  CT ABDOMEN PELVIS W IV CONTRAST Additional Contrast? None   Final Result   1. Mild improvement in the inflammatory changes associated with the pancreas   compatible with pancreatitis. The dominant pseudocysts have diminished. There is   a newly visualized focus of gas and fluid at the lesser sac. The gas is likely   tracking from the site of intervention involving the stomach. 2. Small amounts of ascites.                XR CHEST (2 VW)   Final Result   No active disease in the chest.                   Current Meds:  Current Facility-Administered Medications   Medication Dose Route Frequency    HYDROmorphone (DILAUDID) injection 1 mg  1 mg IntraVENous Q3H PRN    oxyCODONE (ROXICODONE) immediate release tablet 10 mg  10 mg Oral Q6H PRN    lactated ringers infusion   IntraVENous Continuous    sodium chloride flush 0.9 % injection 5-40 mL  5-40 mL IntraVENous 2 times per day    sodium chloride flush 0.9 % injection 5-40 mL  5-40 mL IntraVENous PRN    0.9 % sodium chloride infusion   IntraVENous PRN    ondansetron (ZOFRAN-ODT) disintegrating tablet 4 mg  4 mg Oral Q8H PRN    Or    ondansetron (ZOFRAN) injection 4 mg  4 mg IntraVENous Q6H PRN    enoxaparin (LOVENOX) injection 40 mg  40 mg SubCUTAneous Daily    pantoprazole (PROTONIX) tablet 40 mg  40 mg Oral QAM AC    nicotine (NICODERM CQ) 21 MG/24HR 1 patch  1 patch TransDERmal Q24H    potassium chloride (KLOR-CON M) extended release tablet 40 mEq  40 mEq Oral PRN Or    potassium bicarb-citric acid (EFFER-K) effervescent tablet 40 mEq  40 mEq Oral PRN    Or    potassium chloride 10 mEq/100 mL IVPB (Peripheral Line)  10 mEq IntraVENous PRN    magnesium sulfate 2000 mg in 50 mL IVPB premix  2,000 mg IntraVENous PRN    acetaminophen (TYLENOL) tablet 650 mg  650 mg Oral Q4H PRN    aluminum & magnesium hydroxide-simethicone (MAALOX) 200-200-20 MG/5ML suspension 30 mL  30 mL Oral Q6H PRN    thiamine tablet 100 mg  100 mg Oral Daily    metoprolol tartrate (LOPRESSOR) tablet 25 mg  25 mg Oral BID    aspirin EC tablet 81 mg  81 mg Oral Daily    atorvastatin (LIPITOR) tablet 40 mg  40 mg Oral Nightly    nitroGLYCERIN (NITROSTAT) SL tablet 0.4 mg  0.4 mg SubLINGual Q5 Min PRN    lisinopril (PRINIVIL;ZESTRIL) tablet 5 mg  5 mg Oral Daily    ticagrelor (BRILINTA) tablet 90 mg  90 mg Oral BID       Signed:  MELISSA Lewis - CNP    Part of this note may have been written by using a voice dictation software. The note has been proof read but may still contain some grammatical/other typographical errors.

## 2022-11-21 NOTE — PROGRESS NOTES
Resting in bed. IVF infusing. Tolerating clears. Treated for pain per MAR, but ineffective. On-call MD aware. Denies any n/v overnight. Hourly rounds completed, all needs met this shift. Bed in L/L with call light in reach. Report to be given to dayshift nurse.

## 2022-11-21 NOTE — H&P
Hospitalist History and Physical   Admit Date:  2022  6:13 PM   Name:  Kelly Barreto   Age:  44 y.o. Sex:  male  :  1985   MRN:  [id number]   Room:  [facility]/    Presenting Complaint: No chief complaint on file. Reason(s) for Admission: Acute on chronic pancreatitis ([facility] [address] [K85.90, K86.1]     History of Present Illness:   Kelly Barreto is a 44 y.o. male with medical history of AUD in early remission, nicotine cigarette dependence, etoh-induced chronic pancreatitis, CAD w/ STEMI s/p PCI w/ TULIO 22, pseudocyst s/p gastrostomy w/ stent 22 who presented with c/o worsening epigastric abdominal pain radiating into the back, similar to prior episodes of pancreatitis. Feels like fluid is filling back up, worsening x 2-3 days. Lost 20 lbs in the last month d/t pancreatic issues and inability to tolerate food d/t severe pain. Most Recently discharged on 22 after being admitted on 22 with another new largepseudocyst measuring ~5 cm with acute pancreatitis. VSS   Labs: WBC 11.2K lipase 1000s LFTS wnl   CT 1. Mild improvement in the inflammatory changes associated with the pancreas   compatible with pancreatitis. The dominant pseudocysts have diminished. There is   a newly visualized focus of gas and fluid at the lesser sac. The gas is likely   tracking from the site of intervention involving the stomach. 2. Small amounts of ascites. Has not drank in almost 2 months     Trying to quit smoking - 1 PPD x 20 years - has tried OTC nicotine pouches which do help. Has never been prescribed treatment for smoking. Was told he will die if he doesn't stop by his cardiologist.     Review of Systems:  10 systems reviewed and negative except as noted in HPI. Assessment & Plan:     Acute on chronic pancreatitis // large pseudocyst   Hydration. CLD   Zosyn in ED  Hold off on further abx for now.    GI consult   Needs stent out by c/b recent STEMI and need for DAPT   Stent removal scheduled OP 11/30     CAD s/p TULIO - DAPT, statin, BB, arb, stable no angina     AUD, severe, depenence, in early remission - motivated to stay sober. Cont. Thiamine given poor PO intake. Nicotine dependence, cigarettes - start NRT. Pended orders in discharge for nicotine patches and wellbutrin RX on discharge. Discussed side effect of lowering seizure threshold which is why usually in patients with AUD it is not used due to risk of etoh withdrawal seizures. Patient shows full understanding. Smoking Cessation Counseling     The patient was counseled on the dangers and complications of tobacco use, and was advised to quit. Educated on possible strategies, including removal of smoking materials from environment, pharmacotherapy, and/or cessation programs. Time spent: 11 minutes  CPT:   88190: 3-10 minutes  21203: >10 minutes       Anticipated discharge needs:         Diet: ADULT DIET; Clear Liquid; Low Fat (less than or equal to 50 gm/day)  VTE ppx: lovenox  Code status: Full Code    Hospital Problems:  Principal Problem:    Acute on chronic pancreatitis ([facility] [address]  Active Problems:    Alcohol use disorder, severe, in early remission, dependence ([facility] [address]    Pancreatic pseudocyst    History of ST elevation myocardial infarction (STEMI)    Coronary artery disease involving native coronary artery of native heart    Dependence on nicotine from cigarettes  Resolved Problems:    * No resolved hospital problems.  *       Past History:     Past Medical History:   Diagnosis Date    Abdominal pain 10/25/2022    Abnormal CT of the abdomen 66/75/7043    Acute alcoholic pancreatitis 23/78/1485    Acute kidney injury (nontraumatic) ([address] 10/31/2022    pt denies    Acute on chronic pancreatitis ([facility] [address] 07/30/2022    Alcohol use disorder, severe, dependence ([facility] [address] 08/21/2019    CAD (coronary artery disease)     Carpal tunnel syndrome, left     Cigarette smoker     Coronary artery disease involving native coronary artery of native heart 11/13/2022    Dependence on nicotine from cigarettes 11/20/2022    GERD (gastroesophageal reflux disease)     controlled with med    History of pancreatitis     x 4 or 5 times-- last time admitted to hospital 10/10/26/22 x 7 days    Hypertension     controlled with med       Past Surgical History:   Procedure Laterality Date    CARDIAC PROCEDURE N/A 11/2/2022    LEFT HEART CATH / CORONARY ANGIOGRAPHY performed by Jaz Murray MD at [address] [place] [address] CATH LAB    CARDIAC PROCEDURE N/A 11/2/2022    Percutaneous coronary intervention performed by Jaz Murray MD at [address] [place] [address] CATH LAB    CHOLECYSTECTOMY  2012    ORTHOPEDIC SURGERY Left     wrist surg--nerve surg    ORTHOPEDIC SURGERY Left     foot surg as child    UPPER GASTROINTESTINAL ENDOSCOPY N/A 11/9/2022    EGD/ENDOSCOPIC ULTRASOUND/AXIOS STENT ROOM 2226 **Rep will be present performed by Cyndi Bledsoe MD at [facility] ENDOSCOPY        Social History     Tobacco Use    Smoking status: Every Day     Packs/day: 1.00     Years: 20.00     Pack years: 20.00     Types: Cigarettes    Smokeless tobacco: Never   Substance Use Topics    Alcohol use: Not Currently     Comment: none since 10/4/22-- had drank 3 glasses of wine daily      Social History     Substance and Sexual Activity   Drug Use No       Family History   Problem Relation Age of Onset    No Known Problems Mother     Heart Attack Father         MI at 58        Immunization History   Administered Date(s) Administered    COVID-19, MODERNA BLUE border, Primary or Immunocompromised, (age 12y+), IM, 100 mcg/0.5mL 08/19/2021    Influenza Virus Vaccine 11/05/2015, 09/19/2018, 01/18/2020, 01/18/2020, 10/05/2020, 10/05/2020    Influenza, FLUARIX, FLULAVAL, FLUZONE (age 10 mo+) AND AFLURIA, (age 1 y+), PF, 0.5mL 11/05/2015, 01/18/2020, 10/05/2020    Pneumococcal Polysaccharide ([id number]) 02/22/2018    Tdap (Boostrix, Adacel) 10/11/2022     No Known Allergies  Prior to Admit Medications:  Current Outpatient Medications Medication Instructions    aspirin 81 mg, Oral, DAILY    atorvastatin (LIPITOR) 40 mg, Oral, NIGHTLY    colchicine (COLCRYS) 0.6 mg, Oral, DAILY    HYDROcodone-acetaminophen (NORCO)  MG per tablet 1 tablet, Oral, EVERY 6 HOURS PRN    lisinopril (PRINIVIL;ZESTRIL) 5 mg, Oral, DAILY    metoprolol tartrate (LOPRESSOR) 25 mg, Oral, 2 TIMES DAILY    naloxone (NARCAN) 0.4 mg, IntraVENous, PRN    nitroGLYCERIN (NITROSTAT) 0.4 mg, SubLINGual, EVERY 5 MIN PRN, up to max of 3 total doses. If no relief after 1 dose, call 911. omeprazole (PRILOSEC) 20 mg, Oral, DAILY    ticagrelor (BRILINTA) 90 mg, Oral, 2 TIMES DAILY         Objective:   Patient Vitals for the past 24 hrs:   Temp Pulse Resp BP SpO2   11/20/22 1945 -- 83 18 (!) 122/93 100 %   11/20/22 1739 98.6 °F (37 °C) 94 18 115/77 98 %       Oxygen Therapy  SpO2: 100 %  O2 Device: None (Room air)    Estimated body mass index is 24.51 kg/m² as calculated from the following:    Height as of this encounter: 5' 9\" (1.753 m). Weight as of this encounter: 166 lb (75.3 kg). No intake or output data in the 24 hours ending 11/20/22 2238      Physical Exam:    Blood pressure (!) 122/93, pulse 83, temperature 98.6 °F (37 °C), temperature source Oral, resp. rate 18, height 5' 9\" (1.753 m), weight 166 lb (75.3 kg), SpO2 100 %. General:    Ill appearing, NAD, non toxic   Head:  Normocephalic, atraumatic  Eyes:  Sclerae appear normal.  Pupils equally round. ENT:  Nares appear normal, no drainage. Moist oral mucosa  Neck:  No restricted ROM. Trachea midline   CV:   RRR. No m/r/g. No jugular venous distension. Lungs:   CTAB. No wheezing, rhonchi, or rales. Symmetric expansion. Abdomen: Bowel sounds present. Soft, epigastric tender, nondistended. Extremities: No cyanosis or clubbing. No edema  Skin:     No rashes and normal coloration. Warm and dry. Neuro:  CN II-XII grossly intact. Sensation intact. A&Ox3  Psych:  Depressed mood and affect.       I have personally reviewed labs and tests showing:  Recent Labs:  Recent Results (from the past 24 hour(s))   CBC with Auto Differential    Collection Time: 11/20/22  6:30 PM   Result Value Ref Range    WBC 11.2 (H) 4.3 - 11.1 K/uL    RBC 4.94 4.23 - 5.6 M/uL    Hemoglobin 13.6 13.6 - 17.2 g/dL    Hematocrit 42.3 41.1 - 50.3 %    MCV 85.6 82 - 102 FL    MCH 27.5 26.1 - 32.9 PG    MCHC 32.2 31.4 - 35.0 g/dL    RDW 15.0 (H) 11.9 - 14.6 %    Platelets 948 261 - 222 K/uL    MPV 9.0 (L) 9.4 - 12.3 FL    nRBC 0.00 0.0 - 0.2 K/uL    Differential Type AUTOMATED      Seg Neutrophils 70 43 - 78 %    Lymphocytes 19 13 - 44 %    Monocytes 8 4.0 - 12.0 %    Eosinophils % 2 0.5 - 7.8 %    Basophils 1 0.0 - 2.0 %    Immature Granulocytes 0 0.0 - 5.0 %    Segs Absolute 7.9 1.7 - 8.2 K/UL    Absolute Lymph # 2.2 0.5 - 4.6 K/UL    Absolute Mono # 0.9 0.1 - 1.3 K/UL    Absolute Eos # 0.2 0.0 - 0.8 K/UL    Basophils Absolute 0.1 0.0 - 0.2 K/UL    Absolute Immature Granulocyte 0.0 0.0 - 0.5 K/UL   CMP    Collection Time: 11/20/22  6:30 PM   Result Value Ref Range    Sodium 139 133 - 143 mmol/L    Potassium 4.1 3.5 - 5.1 mmol/L    Chloride 107 101 - 110 mmol/L    CO2 27 21 - 32 mmol/L    Anion Gap 5 2 - 11 mmol/L    Glucose 100 65 - 100 mg/dL    BUN 11 6 - 23 MG/DL    Creatinine 0.80 0.8 - 1.5 MG/DL    Est, Glom Filt Rate >60 >60 ml/min/1.73m2    Calcium 9.1 8.3 - 10.4 MG/DL    Total Bilirubin 0.2 0.2 - 1.1 MG/DL    ALT 43 12 - 65 U/L    AST 28 15 - 37 U/L    Alk Phosphatase 90 50 - 136 U/L    Total Protein 6.9 6.3 - 8.2 g/dL    Albumin 3.1 (L) 3.5 - 5.0 g/dL    Globulin 3.8 2.8 - 4.5 g/dL    Albumin/Globulin Ratio 0.8 0.4 - 1.6     Lipase    Collection Time: 11/20/22  6:30 PM   Result Value Ref Range    Lipase 1,048 (H) 73 - 393 U/L       I have personally reviewed imaging studies showing:  XR CHEST (2 VW)    Result Date: 11/20/2022  Two view chest History: Pain Comparison: 11/02/2022 Findings: The heart is normal in size and configuration. Left basilar subsegmental atelectasis has resolved. There are no pleural effusions. The pulmonary vascularity is within normal limits. The visualized osseous structures are unremarkable. No active disease in the chest.     CT ABDOMEN PELVIS W IV CONTRAST Additional Contrast? None    Result Date: 11/20/2022  CT OF THE ABDOMEN AND PELVIS INDICATION: Abdominal pain, pancreatitis, pseudocyst Multiple axial images were obtained through the abdomen and pelvis. Oral contrast was used for bowel opacification. 100mL of Isovue 370 intravenous contrast was used for better evaluation of solid organs and vascular structures. Radiation dose reduction techniques were used for this study. All CT scans performed at this facility use one or all of the following: Automated exposure control, adjustment of the mA and/or kVp according to patient's size, iterative reconstruction. COMPARISON: 11/13/2022 FINDINGS: - LUNG BASES: No infiltrates or masses. - LIVER: Scattered subcentimeter low-density hepatic lesions are unchanged. - GALLBLADDER/BILE DUCTS: Cholecystectomy. - PANCREAS: The fluid collection along the greater curvature of the stomach near the splenic hilum now measures 2.7 x 2.4 cm compared to 5.0 x 3.5 cm. The low density region at the level of the pancreatic body measures 1.3 cm in maximal dimension, unchanged. There is a focus of gas associated with a 1.6 cm region of decreased attenuation within the lesser sac is newly visualized. There are postsurgical changes of the stomach with a cyst gastrostomy tube. The previously noted gas and fluid collection at this site has nearly completely resolved. There are overall improved inflammatory changes within the right upper quadrant although a mild/moderate residual remains. - SPLEEN: Normal. There is marked narrowing of the splenic vein as it crosses midline.  Several collateral vessels are present within the left abdomen. - ADRENALS: Normal. - KIDNEYS/URETERS: No hydronephrosis or significant mass. - BLADDER: Normal. - REPRODUCTIVE ORGANS: No pelvic masses. - BOWEL: Normal caliber. No inflammatory changes. - LYMPH NODES: No significant retroperitoneal, mesenteric, or pelvic adenopathy. - BONES: No fracture or significant bone lesion. - OTHER: Small amounts of ascites. 1. Mild improvement in the inflammatory changes associated with the pancreas compatible with pancreatitis. The dominant pseudocysts have diminished. There is a newly visualized focus of gas and fluid at the lesser sac. The gas is likely tracking from the site of intervention involving the stomach. 2. Small amounts of ascites. Echocardiogram:  11/02/22    TRANSTHORACIC ECHOCARDIOGRAM (TTE) COMPLETE (CONTRAST/BUBBLE/3D PRN) 11/02/2022  3:31 PM, 11/02/2022 12:00 AM (Final)    Interpretation Summary    Left Ventricle: Normal left ventricular systolic function with a visually estimated EF of 55 - 60%. Left ventricle size is normal. Normal wall thickness. Normal wall motion. Normal diastolic function. Technical qualifiers: Color flow Doppler was performed and pulse wave and/or continuous wave Doppler was performed.     Signed by: Paola Connelly MD on 11/2/2022  3:31 PM, Signed by: Unknown Provider Result on 11/2/2022 12:00 AM        Orders Placed This Encounter   Medications    0.9 % sodium chloride bolus    DISCONTD: HYDROmorphone HCl PF (DILAUDID) injection 0.5 mg    ondansetron (ZOFRAN) injection 4 mg    iopamidol (ISOVUE-370) 76 % injection 100 mL    piperacillin-tazobactam (ZOSYN) 4,500 mg in sodium chloride 0.9 % 100 mL IVPB (mini-bag)     Order Specific Question:   Antimicrobial Indications     Answer:   Intra-Abdominal Infection    HYDROmorphone HCl PF (DILAUDID) injection 1 mg    FOLLOWED BY Linked Order Group     lactated ringers infusion     lactated ringers infusion    sodium chloride flush 0.9 % injection 5-40 mL    sodium chloride flush 0.9 % injection 5-40 mL    0.9 % sodium chloride infusion    OR Linked Order Group     ondansetron (ZOFRAN-ODT) disintegrating tablet 4 mg     ondansetron (ZOFRAN) injection 4 mg    enoxaparin (LOVENOX) injection 40 mg     Order Specific Question:   Indication of Use     Answer:   Prophylaxis-DVT/PE    pantoprazole (PROTONIX) tablet 40 mg    nicotine (NICODERM CQ) 21 MG/24HR 1 patch    OR Linked Order Group     potassium chloride (KLOR-CON M) extended release tablet 40 mEq     potassium bicarb-citric acid (EFFER-K) effervescent tablet 40 mEq     potassium chloride 10 mEq/100 mL IVPB (Peripheral Line)    magnesium sulfate 2000 mg in 50 mL IVPB premix    OR Linked Order Group     HYDROmorphone HCl PF (DILAUDID) injection 0.25 mg     HYDROmorphone HCl PF (DILAUDID) injection 0.5 mg    oxyCODONE (ROXICODONE) immediate release tablet 5 mg    acetaminophen (TYLENOL) tablet 650 mg    aluminum & magnesium hydroxide-simethicone (MAALOX) 200-200-20 MG/5ML suspension 30 mL    thiamine tablet 100 mg    metoprolol tartrate (LOPRESSOR) tablet 25 mg    aspirin EC tablet 81 mg    atorvastatin (LIPITOR) tablet 40 mg    nitroGLYCERIN (NITROSTAT) SL tablet 0.4 mg    lisinopril (PRINIVIL;ZESTRIL) tablet 5 mg    ticagrelor (BRILINTA) tablet 90 mg         Signed:  Jahaira Marks DO, DO    Part of this note may have been written by using a voice dictation software. The note has been proof read but may still contain some grammatical/other typographical errors.

## 2022-11-21 NOTE — CONSULTS
[facility] Consult Note       Consulting GI Physician: Dr. Cliff Rizvi    Referring Provider:  Dr. Scarlet Ford Date:  11/21/2022    Admit Date:  11/20/2022    Chief Complaint:  Acute on chrnoic pancreatitis, pseudocyst with stent    Subjective:     History of Present Illness:  Patient is a 44 y.o. male with PMH significant for ETOH pancreatitis, CAD s/p TULIO on DAPT, seen in GI consultation at the request of Dr. Marilee Mackenzie  for acute on chronic pancreatitis, hx pseudocyst with stent. He reports increased epigastric to LUQ pain since yesterday morning after eating but despite no change in diet. Now says that pain continues and is not completely controlled on Dilaudid as ordered by hospitalist.  Otherwise pt says that he had been feeling well since recent hospital discharge. He denies N/V, fevers. Reports normal non bloody Bms. Says no ETOH for a few months. [facility] during most recent hospitalization. Then he had been followed for abdominal pain and fullness with history of cyst gastrostomy with axios stent 11/9/22 and hx significant for recent STEMI on Brilinta. CT showed new pseudocyst in his LUQ ~5cm. Stable 1.5cm cyst in body of pancreas. He reportedly tolerated progress to low fat diet well with report of minimally increased LUQ pain with eating but pain was reportedly well controlled with medications. He reportedly remained afebrile with WBC WNL. Hgb stable at 12.4. He was discharged home 11/16/22 with plan for removal of axios stent scheduled for 11/30/22 with Dr. Александр Laureano.   After discussion with Cardiology it was planned for pt to be re-admitted 11/25/22 for Brilinta hold and Integrilin bridge prior to that procedure     He was re-admitted 11/20/22 after he presented with upper abdominal pain, elevated Lipase >1,000, CT with newly visualized focus of gas and fluid at the lesser sac- per radiology felt likely tracking from the site of intervention involving the stomach. Otherwise, CT with mild improvement in the inflammatory changes associated with the pancreas c/w pancreatitis and the dominant pseudocysts have diminished (see below). ED provider reportedly spoke with Dr. Karla Delaney with low suspicion for infectious process but noted ok to treat with antibiotics if any concern. He received Zosyn in the ED. No current antibiotics. PMH:  Past Medical History:   Diagnosis Date    Abdominal pain 10/25/2022    Abnormal CT of the abdomen 64/00/6178    Acute alcoholic pancreatitis 22/70/8239    Acute kidney injury (nontraumatic) ([facility] [address] 10/31/2022    pt denies    Acute on chronic pancreatitis ([facility] [address] 07/30/2022    Alcohol use disorder, severe, dependence ([facility] [address] 08/21/2019    CAD (coronary artery disease)     Carpal tunnel syndrome, left     Cigarette smoker     Coronary artery disease involving native coronary artery of native heart 11/13/2022    Dependence on nicotine from cigarettes 11/20/2022    GERD (gastroesophageal reflux disease)     controlled with med    History of pancreatitis     x 4 or 5 times-- last time admitted to hospital 10/10/26/22 x 7 days    Hypertension     controlled with med       PSH:  Past Surgical History:   Procedure Laterality Date    CARDIAC PROCEDURE N/A 11/2/2022    LEFT HEART CATH / CORONARY ANGIOGRAPHY performed by Leonel Blandon MD at [id number] [facility] N/A 11/2/2022    Percutaneous coronary intervention performed by Leonel Blandon MD at [facility] CATH LAB    CHOLECYSTECTOMY  2012    ORTHOPEDIC SURGERY Left     wrist surg--nerve surg    ORTHOPEDIC SURGERY Left     foot surg as child    UPPER GASTROINTESTINAL ENDOSCOPY N/A 11/9/2022    EGD/ENDOSCOPIC ULTRASOUND/AXIOS STENT ROOM 2226 **Rep will be present performed by Victoria Valera MD at [facility] ENDOSCOPY       Allergies:  No Known Allergies    Home Medications:  Prior to Admission medications    Medication Sig Start Date End Date Taking? Authorizing Provider   HYDROcodone-acetaminophen (NORCO)  MG per tablet Take 1 tablet by mouth every 6 hours as needed for Pain for up to 5 days. 11/16/22 11/21/22  Mali Spring DO   aspirin 81 MG chewable tablet Take 1 tablet by mouth daily 11/11/22   ANJALI Munguia   atorvastatin (LIPITOR) 40 MG tablet Take 1 tablet by mouth nightly 11/10/22   ANJALI Munguia   colchicine (COLCRYS) 0.6 MG tablet Take 1 tablet by mouth daily 11/11/22   ANJALI Munguia   lisinopril (PRINIVIL;ZESTRIL) 5 MG tablet Take 1 tablet by mouth daily 11/11/22   ANJALI Munguia   metoprolol tartrate (LOPRESSOR) 25 MG tablet Take 1 tablet by mouth 2 times daily 11/10/22   ANJALI Munguia   nitroGLYCERIN (NITROSTAT) 0.4 MG SL tablet Place 1 tablet under the tongue every 5 minutes as needed for Chest pain up to max of 3 total doses. If no relief after 1 dose, call 911. 11/10/22   ANJALI Munguia   ticagrelor (BRILINTA) 90 MG TABS tablet Take 1 tablet by mouth 2 times daily 11/10/22   ANJALI Munguia   omeprazole (PRILOSEC) 20 MG delayed release capsule Take 20 mg by mouth daily    Historical Provider, MD   naloxone [facility]) 0.4 MG/ML injection Infuse 1 mL intravenously as needed (shortness of breath) 10/31/22   Reji Mayo MD   thiamine 100 MG tablet Take 1 tablet by mouth in the morning.  8/3/22 8/2/22  Андрей Cooper MD       [facility] Medications:  Current Facility-Administered Medications   Medication Dose Route Frequency    lactated ringers infusion   IntraVENous Continuous    sodium chloride flush 0.9 % injection 5-40 mL  5-40 mL IntraVENous 2 times per day    sodium chloride flush 0.9 % injection 5-40 mL  5-40 mL IntraVENous PRN    0.9 % sodium chloride infusion   IntraVENous PRN    ondansetron (ZOFRAN-ODT) disintegrating tablet 4 mg  4 mg Oral Q8H PRN    Or    ondansetron (ZOFRAN) injection 4 mg  4 mg IntraVENous Q6H PRN    enoxaparin (LOVENOX) injection 40 mg  40 mg SubCUTAneous Daily pantoprazole (PROTONIX) tablet 40 mg  40 mg Oral QAM AC    nicotine (NICODERM CQ) 21 MG/24HR 1 patch  1 patch TransDERmal Q24H    potassium chloride (KLOR-CON M) extended release tablet 40 mEq  40 mEq Oral PRN    Or    potassium bicarb-citric acid (EFFER-K) effervescent tablet 40 mEq  40 mEq Oral PRN    Or    potassium chloride 10 mEq/100 mL IVPB (Peripheral Line)  10 mEq IntraVENous PRN    magnesium sulfate 2000 mg in 50 mL IVPB premix  2,000 mg IntraVENous PRN    oxyCODONE (ROXICODONE) immediate release tablet 5 mg  5 mg Oral Q4H PRN    acetaminophen (TYLENOL) tablet 650 mg  650 mg Oral Q4H PRN    aluminum & magnesium hydroxide-simethicone (MAALOX) 200-200-20 MG/5ML suspension 30 mL  30 mL Oral Q6H PRN    thiamine tablet 100 mg  100 mg Oral Daily    metoprolol tartrate (LOPRESSOR) tablet 25 mg  25 mg Oral BID    aspirin EC tablet 81 mg  81 mg Oral Daily    atorvastatin (LIPITOR) tablet 40 mg  40 mg Oral Nightly    nitroGLYCERIN (NITROSTAT) SL tablet 0.4 mg  0.4 mg SubLINGual Q5 Min PRN    lisinopril (PRINIVIL;ZESTRIL) tablet 5 mg  5 mg Oral Daily    ticagrelor (BRILINTA) tablet 90 mg  90 mg Oral BID    HYDROmorphone (DILAUDID) injection 0.25 mg  0.25 mg IntraVENous Q3H PRN    Or    HYDROmorphone (DILAUDID) injection 0.5 mg  0.5 mg IntraVENous Q3H PRN       Social History:  Social History     Tobacco Use    Smoking status: Every Day     Packs/day: 1.00     Years: 20.00     Pack years: 20.00     Types: Cigarettes    Smokeless tobacco: Never   Substance Use Topics    Alcohol use: Not Currently     Comment: none since 10/4/22-- had drank 3 glasses of wine daily       Pt denies any history of drug use, blood transfusions, or tattoos. Family History:  Family History   Problem Relation Age of Onset    No Known Problems Mother     Heart Attack Father         MI at 58       Review of Systems:  A detailed 10 system ROS is obtained, with pertinent positives as listed above. All others are negative.     Diet:  clear liquid, low fat. Objective:     Physical Exam:  Vitals:  /88   Pulse 83   Temp 97.7 °F (36.5 °C) (Oral)   Resp 16   Ht 5' 9\" (1.753 m)   Wt 170 lb 13.7 oz (77.5 kg)   SpO2 97%   BMI 25.23 kg/m²   Gen:  Pt is alert, cooperative, no acute distress  Skin:  Extremities and face reveal no rashes. HEENT: Sclerae anicteric. Cardiovascular: Regular rate and rhythm. No murmurs, gallops, or rubs. Respiratory:  Comfortable breathing with no accessory muscle use. Clear breath sounds anteriorly with no wheezes, rales, or rhonchi. GI:  Abdomen nondistended, soft, +Epigastric and LUQ ttp. Normal active bowel sounds. Rectal:  Deferred  Musculoskeletal:  No pitting edema of the lower legs. Neurological:  Gross memory appears intact. Patient is alert and oriented. Psychiatric:  Mood appears appropriate with judgement intact. Laboratory:    Recent Labs     11/20/22  1830 11/21/22  0558   WBC 11.2* 7.1   HGB 13.6 11.0*   HCT 42.3 34.9*    317   MCV 85.6 86.0    138   K 4.1 4.1    109   CO2 27 24   BUN 11 10   MG  --  1.8   AST 28 22   ALT 43 35      CT a/p w IV contrast 11/13/22   CT ABDOMEN: The cyst gastrostomy is appreciated in the left upper abdominal   quadrant. The pseudocyst has decreased in size now measuring 7.9 x 3.5 cm. There is a new or significantly larger pseudocyst now evident located between   the stomach and spleen that measures 5.0 x 3.5 cm. This is superior to the cyst   gastrostomy. There is significant inflammatory stranding surrounding the area   of the surgery. There is increase in the fluid and inflammatory stranding in   the lesser sac with small cystic fluid collections there is a stable small cyst   in the body of the pancreas measures 1.3 cm. There is no significant dilation   of the pancreatic duct. The gallbladder is absent. The liver and spleen are   normal.  The kidneys are normal.  The adrenal glands are normal.  The bowel is   unremarkable. CT PELVIS: The bladder is normal.  The rectum is decompressed. There is trace   free fluid in the cul-de-sac. Sigmoid colon is unremarkable. There is no   inguinal hernia. No aggressive bone lesions identified. Impression   1. New or significantly larger pseudocyst located in the left upper abdominal   quadrant beneath the diaphragm is between the stomach and spleen. This measures   roughly 5 cm. 2.  Increase in the peripancreatic inflammation primarily accumulating in the   lesser sac indicative of pancreatitis. 3.  Stable 1.5 cm cyst in the body of the pancreas. 4.  Postsurgical changes indicative of a cyst gastrostomy at the site of the   prior large pseudocyst which has decreased significantly in size. CT a/p w IV contrast 11/20/22   FINDINGS:   - LUNG BASES: No infiltrates or masses. - LIVER: Scattered subcentimeter low-density hepatic lesions are unchanged. - GALLBLADDER/BILE DUCTS: Cholecystectomy. - PANCREAS: The fluid collection along the greater curvature of the stomach near   the splenic hilum now measures 2.7 x 2.4 cm compared to 5.0 x 3.5 cm. The low   density region at the level of the pancreatic body measures 1.3 cm in maximal   dimension, unchanged. There is a focus of gas associated with a 1.6 cm region of   decreased attenuation within the lesser sac is newly visualized. There are   postsurgical changes of the stomach with a cyst gastrostomy tube. The previously   noted gas and fluid collection at this site has nearly completely resolved. There are overall improved inflammatory changes within the right upper quadrant   although a mild/moderate residual remains.       - SPLEEN: Normal. There is marked narrowing of the splenic vein as it crosses   midline. Several collateral vessels are present within the left abdomen.       - ADRENALS: Normal.   - KIDNEYS/URETERS: No hydronephrosis or significant mass.    - BLADDER: Normal.   - REPRODUCTIVE ORGANS: No pelvic masses. - BOWEL: Normal caliber. No inflammatory changes.   - LYMPH NODES: No significant retroperitoneal, mesenteric, or pelvic adenopathy.   - BONES: No fracture or significant bone lesion.   - OTHER: Small amounts of ascites. Impression   1. Mild improvement in the inflammatory changes associated with the pancreas   compatible with pancreatitis. The dominant pseudocysts have diminished. There is   a newly visualized focus of gas and fluid at the lesser sac. The gas is likely   tracking from the site of intervention involving the stomach. 2. Small amounts of ascites. Assessment:     Principal Problem:    Acute on chronic pancreatitis (HCC)  Active Problems:    Alcohol use disorder, severe, in early remission, dependence ([facility] [address]    Pancreatic pseudocyst    History of ST elevation myocardial infarction (STEMI)    Coronary artery disease involving native coronary artery of native heart    Dependence on nicotine from cigarettes  Resolved Problems:    * No resolved hospital problems. *    44 y.o. male with PMH significant for ETOH pancreatitis, CAD s/p TULIO on DAPT, seen in GI consultation 11/21 at the request of Dr. Mars Glenmont for acute on chronic pancreatitis, hx pseudocyst with stent. Pt with hx cyst gastrostomy with axios stent 11/9/22 and hx significant for recent STEMI on Brilinta. Had recent admission for similar concerns. Then CT 11/13 with new pseudocyst in his LUQ ~5cm. Stable 1.5cm cyst in body of pancreas. He was discharged home 11/16/22 with plan for removal of axios stent scheduled for 11/30/22 with Dr. Maty Trevizo. After discussion with Cardiology it was planned for pt to be re-admitted 11/25/22 for Brilinta hold and Integrilin bridge prior to that procedure. Now re-admitted 11/20 sooner than planned for increased abdominal pain. Labs with elevated Lipase >1,000.   CT with newly visualized focus of gas and fluid at the lesser sac- per radiology felt likely tracking from the site of intervention involving the stomach. Otherwise, CT with mild improvement in the inflammatory changes associated with the pancreas c/w pancreatitis and the dominant pseudocysts have diminished (see above). Received IV Zosyn in ED. WBC is WNL and pt afebrile. Plan:     - Clear liquid, low fat diet. - IVF, supportive care with analgesics prn- per primary team.    - Removal of axios stent scheduled for 11/30/22 with Dr. Luke Gomez. Cardiology directed Brilinta hold and Integrilin bridge 11/25/22 in preparation for above procedure. Radha Fowler PA-C  [facility]      Patient is seen and examined in collaboration with Dr. Dwight Brownlee. Assessment and plan as per Dr. Dwight Brownlee.

## 2022-11-21 NOTE — PROGRESS NOTES
Hourly rounds performed this shift. Pain not well managed today. Patient denies needs at this time. Bed in low position. The call light and personal items are in reach. Will continue to monitor and report to oncoming nurse.

## 2022-11-21 NOTE — PROGRESS NOTES
TRANSFER - IN REPORT:    Verbal report received from ELI Mensah on Ivy Redding  being received from ED for routine progression of patient care      Report consisted of patient's Situation, Background, Assessment and   Recommendations(SBAR). Information from the following report(s) Nurse Handoff Report, Index, ED Encounter Summary, ED SBAR, and MAR was reviewed with the receiving nurse. Opportunity for questions and clarification was provided. Assessment completed upon patient's arrival to unit and care assumed.

## 2022-11-21 NOTE — CONSULTS
Comprehensive Nutrition Assessment    Type and Reason for Visit: Initial, Consult  Poor Intake/Appetite 5 or More Days (Hospitalists)    Nutrition Recommendations/Plan:   Meals and Snacks:  Diet: Continue current diet and advance as medically appropriate  Nutrition Supplement Therapy:  Medical food supplement therapy:  Initiate Ensure Clear three times per day (this provides 240 kcal and 8 grams protein per bottle)       Malnutrition Assessment:  Malnutrition Status: At risk for malnutrition (Comment) (potential ~15% weight loss over 1 month if weight accurate,)    No significant signs of malnutrition  Nutrition Assessment:  Nutrition History: Patient reports that over the last month his intake has been up and down depending on abdominal pain. He reports he had stent placed for pseudocyst but since he continues to have issues. He says he will be fine for several days and eat great maybe gain some weight then have problems and be unable to eat. He had been a previous alcohol user but been sober for about 2 months. Patient reports usual weight of 185-190 lbs. But said he has been in the 165-170 lbs since procedure. 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 Wt hx per EMR. Based on admission weight 10/31 of 184lb, potential for ~11% weight loss in <1 month if current bed scale weight accurate. Do You Have Any Cultural, Sikhism, or Ethnic Food Preferences?: No   Nutrition Background:       PMH: smoker, prior ETOH use, STEMI, CAD, pseudocyst s/p gastrostomy with stent on 11/9. Patient presented with acute pancreatitis. Plan for stent removal.   Nutrition Interval:  Patient reports that he had recent discharge last week Wed, he was home and eating fine with out issue until Sunday when he started having pain when eating, he returned to hospital. He denies any N/V     Current Nutrition Therapies:  ADULT DIET;  Clear Liquid; Low Fat (less than or equal to 50 gm/day)    Current Intake:   Average Meal Intake: % (on clear liquids) Average Supplements Intake: None Ordered      Anthropometric Measures:  Height: 5' 9\" (175.3 cm)  Current Body Wt: 170 lb 13.7 oz (77.5 kg) (11/21), Weight source: Bed Scale  BMI: 25.2, Overweight (BMI 25.0-29. 9)     Ideal Body Weight (Kg) (Calculated): 73 kg (160 lbs), 106.8 %  Usual Body Wt: 184 lb (83.5 kg) (last admission 10/31 and limited EMR data from MD visits), Percent weight change: -7.1       Edema:Peripheral Vascular  Peripheral Vascular (WDL): Within Defined Limits   BMI Category Overweight (BMI 25.0-29. 9)  Estimated Daily Nutrient Needs:  Energy (kcal/day): 1265-5441 (25-30 kcal/kg) (Kcal/kg Weight used: 77.5 kg Current  Protein (g/day):  (1.2-1.3 g/kg) Weight Used: (Current) 77.5 kg  Fluid (ml/day):   (1 ml/kcal)    Nutrition Diagnosis:   Inadequate oral intake related to altered GI structure, altered GI function as evidenced by NPO or clear liquid status due to medical condition  Nutrition Interventions:   Food and/or Nutrient Delivery: Start Oral Nutrition Supplement, Continue Current Diet     Coordination of Nutrition Care: Continue to monitor while inpatient, Interdisciplinary Rounds       Goals:       Active Goal: Meet at least 75% of estimated needs, by next RD assessment       Nutrition Monitoring and Evaluation:      Food/Nutrient Intake Outcomes: Food and Nutrient Intake, Diet Advancement/Tolerance, Supplement Intake  Physical Signs/Symptoms Outcomes: GI Status, Meal Time Behavior, Weight    Discharge Planning:         Electronically signed by Kei Vieira MS, RD, LD on 11/21/2022 at 10:50 AM.

## 2022-11-21 NOTE — PROGRESS NOTES
ACUTE OCCUPATIONAL THERAPY GOALS:   (Developed with and agreed upon by patient and/or caregiver.)  No acute goals at this time. OCCUPATIONAL THERAPY Initial Assessment, Discharge, and AM          Acknowledge Orders  Time  OT Charge Capture  Rehab Caseload Tracker      Kathi Honeycutt is a 44 y.o. male   PRIMARY DIAGNOSIS: Acute on chronic pancreatitis ([facility] [address]  Abdominal pain, unspecified abdominal location [R10.9]  Acute on chronic pancreatitis ([address] [K85.90, K86.1]  Acute pancreatitis, unspecified complication status, unspecified pancreatitis type [K85.90]       Reason for Referral: Generalized Muscle Weakness (M62.81)  Inpatient: Payor: /     ASSESSMENT:     REHAB RECOMMENDATIONS:   Recommendation to date pending progress:  Setting:  No further skilled therapy after discharge from hospital    Equipment:    None     ASSESSMENT:  Mr. Maki Larson presents to the hospital with abdominal pain and acute on chronic pancreatitis. Pt with recent admission and reports he had a heart attack recently. Pt is normally independent at baseline and living with family. Pt reports 9/10 pain currently in stomach. Pt demonstrated ability to don shoes independently and completed functional transfers/mobility in the room with independence. Pt has no concerns currently regarding ability to complete his own ADL. Pt is functioning at or close to baseline and has no further OT needs at this time.       [address] [facility] [address] [address] [facility] 6 Clicks Daily Activity Inpatient Short Form:    AM-PAC Daily Activity Inpatient   How much help for putting on and taking off regular lower body clothing?: None  How much help for Bathing?: None  How much help for Toileting?: None  How much help for putting on and taking off regular upper body clothing?: None  How much help for taking care of personal grooming?: None  How much help for eating meals?: None  [facility] Inpatient Daily Activity Raw Score: 24  AM-PAC Inpatient ADL T-Scale Score : 57.54  ADL Inpatient CMS 0-100% Score: 0  ADL Inpatient CMS G-Code Modifier : CH           SUBJECTIVE:     Mr. Catherine Hicks states, \"I'm not working right now, I just had a heart attack\"     Social/Functional Lives With: Spouse, Son, Daughter  Type of Home: House  Home Layout: One level  Receives Help From: Family  Homemaking Assistance: Independent  Ambulation Assistance: Independent  Transfer Assistance: Independent  Active : Yes  Mode of Transportation: Car  Occupation:  (not working currently)    OBJECTIVE:     Shamir Ape / Verradhaan Appl / AIRWAY: IV    RESTRICTIONS/PRECAUTIONS:       PAIN: Fausto Min / O2:   Pre Treatment:   Pain Assessment: 0-10  Pain Level: 9  Pain Location: Abdomen  Non-Pharmaceutical Pain Intervention(s): Nurse notified (comment)      Post Treatment: same       Vitals          Oxygen            GROSS EVALUATION: INTACT IMPAIRED   (See Comments)   UE AROM [x] []   UE PROM [x] []   Strength [x]       Posture / Balance [x]     Sensation [x]     Coordination [x]       Tone [x]       Edema [x]    Activity Tolerance [x]       Hand Dominance R [x] L []      COGNITION/  PERCEPTION: INTACT IMPAIRED   (See Comments)   Orientation [x]     Vision [x]     Hearing [x]     Cognition  [x]     Perception [x]       MOBILITY: I Mod I S SBA CGA Min Mod Max Total  NT x2 Comments:   Bed Mobility    Rolling [x] [] [] [] [] [] [] [] [] [] []    Supine to Sit [x] [] [] [] [] [] [] [] [] [] []    Scooting [x] [] [] [] [] [] [] [] [] [] []    Sit to Supine [x] [] [] [] [] [] [] [] [] [] []    Transfers    Sit to Stand [x] [] [] [] [] [] [] [] [] [] []    Bed to Chair [] [] [] [] [] [] [] [] [] [x] []    Stand to Sit [x] [] [] [] [] [] [] [] [] [] []    Tub/Shower [] [] [] [] [] [] [] [] [] [x] []     Toilet [] [] [] [] [] [] [] [] [] [x] []      [] [] [] [] [] [] [] [] [] [] []    I=Independent, Mod I=Modified Independent, S=Supervision/Setup, SBA=Standby Assistance, CGA=Contact Guard Assistance, Min=Minimal Assistance, Mod=Moderate Assistance, Max=Maximal Assistance, Total=Total Assistance, NT=Not Tested    ACTIVITIES OF DAILY LIVING: I Mod I S SBA CGA Min Mod Max Total NT Comments   BASIC ADLs:              Upper Body Bathing  [] [] [] [] [] [] [] [] [] [x]    Lower Body Bathing [] [] [] [] [] [] [] [] [] [x]    Toileting [] [] [] [] [] [] [] [] [] [x]    Upper Body Dressing [] [] [] [] [] [] [] [] [] [x]    Lower Body Dressing [x] [] [] [] [] [] [] [] [] []    Feeding [] [] [] [] [] [] [] [] [] [x]    Grooming [] [] [] [] [] [] [] [] [] [x]    Personal Device Care [] [] [] [] [] [] [] [] [] [x]    Functional Mobility [x] [] [] [] [] [] [] [] [] []    I=Independent, Mod I=Modified Independent, S=Supervision/Setup, SBA=Standby Assistance, CGA=Contact Guard Assistance, Min=Minimal Assistance, Mod=Moderate Assistance, Max=Maximal Assistance, Total=Total Assistance, NT=Not Tested    PLAN:   FREQUENCY/DURATION   OT Plan of Care:  (Discharge) for duration of hospital stay or until stated goals are met, whichever comes first.    PROBLEM LIST:   (Skilled intervention is medically necessary to address:)  N/A   INTERVENTIONS PLANNED:  (Benefits and precautions of occupational therapy have been discussed with the patient.)  N/A         TREATMENT:     EVALUATION: LOW COMPLEXITY: (Untimed Charge)    TREATMENT:   EVAL only    TREATMENT GRID:  N/A    AFTER TREATMENT PRECAUTIONS: Bed, Bed/Chair Locked, Call light within reach, Needs within reach, and RN notified    INTERDISCIPLINARY COLLABORATION:  RN/ PCT and OT/ MCCORD\    EDUCATION:  Education Given To: Patient  Education Provided: Role of Therapy;Plan of Care  Education Method: Verbal  Barriers to Learning: None  Education Outcome: Verbalized understanding    TOTAL TREATMENT DURATION AND TIME:  Time In: 1001  Time Out: [address]  Minutes: Daphne 132, OT

## 2022-11-22 VITALS
OXYGEN SATURATION: 96 % | DIASTOLIC BLOOD PRESSURE: 82 MMHG | RESPIRATION RATE: 18 BRPM | WEIGHT: 165.79 LBS | BODY MASS INDEX: 24.56 KG/M2 | TEMPERATURE: 98.1 F | HEIGHT: 69 IN | SYSTOLIC BLOOD PRESSURE: 115 MMHG | HEART RATE: 74 BPM

## 2022-11-22 LAB
ALBUMIN SERPL-MCNC: 2.7 G/DL (ref 3.5–5)
ALBUMIN/GLOB SERPL: 0.8 {RATIO} (ref 0.4–1.6)
ALP SERPL-CCNC: 73 U/L (ref 50–136)
ALT SERPL-CCNC: 40 U/L (ref 12–65)
ANION GAP SERPL CALC-SCNC: 2 MMOL/L (ref 2–11)
AST SERPL-CCNC: 29 U/L (ref 15–37)
BASOPHILS # BLD: 0 K/UL (ref 0–0.2)
BASOPHILS NFR BLD: 1 % (ref 0–2)
BILIRUB SERPL-MCNC: 0.4 MG/DL (ref 0.2–1.1)
BUN SERPL-MCNC: 7 MG/DL (ref 6–23)
CALCIUM SERPL-MCNC: 8.7 MG/DL (ref 8.3–10.4)
CHLORIDE SERPL-SCNC: 104 MMOL/L (ref 101–110)
CO2 SERPL-SCNC: 30 MMOL/L (ref 21–32)
CREAT SERPL-MCNC: 0.7 MG/DL (ref 0.8–1.5)
DIFFERENTIAL METHOD BLD: ABNORMAL
EOSINOPHIL # BLD: 0.1 K/UL (ref 0–0.8)
EOSINOPHIL NFR BLD: 2 % (ref 0.5–7.8)
ERYTHROCYTE [DISTWIDTH] IN BLOOD BY AUTOMATED COUNT: 14.9 % (ref 11.9–14.6)
GLOBULIN SER CALC-MCNC: 3.3 G/DL (ref 2.8–4.5)
GLUCOSE SERPL-MCNC: 91 MG/DL (ref 65–100)
HCT VFR BLD AUTO: 35.2 % (ref 41.1–50.3)
HGB BLD-MCNC: 11 G/DL (ref 13.6–17.2)
IMM GRANULOCYTES # BLD AUTO: 0 K/UL (ref 0–0.5)
IMM GRANULOCYTES NFR BLD AUTO: 0 % (ref 0–5)
LYMPHOCYTES # BLD: 1.6 K/UL (ref 0.5–4.6)
LYMPHOCYTES NFR BLD: 32 % (ref 13–44)
MAGNESIUM SERPL-MCNC: 2 MG/DL (ref 1.8–2.4)
MCH RBC QN AUTO: 26.9 PG (ref 26.1–32.9)
MCHC RBC AUTO-ENTMCNC: 31.3 G/DL (ref 31.4–35)
MCV RBC AUTO: 86.1 FL (ref 82–102)
MONOCYTES # BLD: 0.5 K/UL (ref 0.1–1.3)
MONOCYTES NFR BLD: 9 % (ref 4–12)
NEUTS SEG # BLD: 2.9 K/UL (ref 1.7–8.2)
NEUTS SEG NFR BLD: 56 % (ref 43–78)
NRBC # BLD: 0 K/UL (ref 0–0.2)
PLATELET # BLD AUTO: 311 K/UL (ref 150–450)
PMV BLD AUTO: 9.8 FL (ref 9.4–12.3)
POTASSIUM SERPL-SCNC: 4 MMOL/L (ref 3.5–5.1)
PROT SERPL-MCNC: 6 G/DL (ref 6.3–8.2)
RBC # BLD AUTO: 4.09 M/UL (ref 4.23–5.6)
SODIUM SERPL-SCNC: 136 MMOL/L (ref 133–143)
WBC # BLD AUTO: 5.2 K/UL (ref 4.3–11.1)

## 2022-11-22 PROCEDURE — 6360000002 HC RX W HCPCS: Performed by: NURSE PRACTITIONER

## 2022-11-22 PROCEDURE — 6370000000 HC RX 637 (ALT 250 FOR IP): Performed by: NURSE PRACTITIONER

## 2022-11-22 PROCEDURE — 83735 ASSAY OF MAGNESIUM: CPT

## 2022-11-22 PROCEDURE — 36415 COLL VENOUS BLD VENIPUNCTURE: CPT

## 2022-11-22 PROCEDURE — 6370000000 HC RX 637 (ALT 250 FOR IP): Performed by: FAMILY MEDICINE

## 2022-11-22 PROCEDURE — 2580000003 HC RX 258: Performed by: FAMILY MEDICINE

## 2022-11-22 PROCEDURE — 85025 COMPLETE CBC W/AUTO DIFF WBC: CPT

## 2022-11-22 PROCEDURE — 6360000002 HC RX W HCPCS: Performed by: FAMILY MEDICINE

## 2022-11-22 PROCEDURE — 80053 COMPREHEN METABOLIC PANEL: CPT

## 2022-11-22 RX ORDER — HYDROCODONE BITARTRATE AND ACETAMINOPHEN 10; 325 MG/1; MG/1
1 TABLET ORAL EVERY 6 HOURS PRN
Qty: 20 TABLET | Refills: 0 | Status: ON HOLD | OUTPATIENT
Start: 2022-11-22 | End: 2022-12-01 | Stop reason: HOSPADM

## 2022-11-22 RX ADMIN — HYDROMORPHONE HYDROCHLORIDE 1 MG: 1 INJECTION, SOLUTION INTRAMUSCULAR; INTRAVENOUS; SUBCUTANEOUS at 07:34

## 2022-11-22 RX ADMIN — ASPIRIN 81 MG: 81 TABLET ORAL at 08:35

## 2022-11-22 RX ADMIN — HYDROMORPHONE HYDROCHLORIDE 1 MG: 1 INJECTION, SOLUTION INTRAMUSCULAR; INTRAVENOUS; SUBCUTANEOUS at 01:25

## 2022-11-22 RX ADMIN — HYDROMORPHONE HYDROCHLORIDE 1 MG: 1 INJECTION, SOLUTION INTRAMUSCULAR; INTRAVENOUS; SUBCUTANEOUS at 04:29

## 2022-11-22 RX ADMIN — ENOXAPARIN SODIUM 40 MG: 100 INJECTION SUBCUTANEOUS at 08:35

## 2022-11-22 RX ADMIN — PANTOPRAZOLE SODIUM 40 MG: 40 TABLET, DELAYED RELEASE ORAL at 05:39

## 2022-11-22 RX ADMIN — SODIUM CHLORIDE, SODIUM LACTATE, POTASSIUM CHLORIDE, AND CALCIUM CHLORIDE: 600; 310; 30; 20 INJECTION, SOLUTION INTRAVENOUS at 06:59

## 2022-11-22 RX ADMIN — LISINOPRIL 5 MG: 5 TABLET ORAL at 08:35

## 2022-11-22 RX ADMIN — ALUMINUM HYDROXIDE, MAGNESIUM HYDROXIDE, AND SIMETHICONE 30 ML: 200; 200; 20 SUSPENSION ORAL at 01:33

## 2022-11-22 RX ADMIN — METOPROLOL TARTRATE 25 MG: 25 TABLET, FILM COATED ORAL at 08:35

## 2022-11-22 RX ADMIN — Medication 100 MG: at 08:35

## 2022-11-22 RX ADMIN — SODIUM CHLORIDE, PRESERVATIVE FREE 10 ML: 5 INJECTION INTRAVENOUS at 08:39

## 2022-11-22 RX ADMIN — TICAGRELOR 90 MG: 90 TABLET ORAL at 08:38

## 2022-11-22 RX ADMIN — OXYCODONE 10 MG: 5 TABLET ORAL at 05:42

## 2022-11-22 ASSESSMENT — PAIN DESCRIPTION - LOCATION
LOCATION: ABDOMEN

## 2022-11-22 ASSESSMENT — PAIN SCALES - WONG BAKER
WONGBAKER_NUMERICALRESPONSE: 0
WONGBAKER_NUMERICALRESPONSE: 0

## 2022-11-22 ASSESSMENT — PAIN DESCRIPTION - ORIENTATION
ORIENTATION: UPPER
ORIENTATION: LEFT;ANTERIOR
ORIENTATION: UPPER
ORIENTATION: UPPER

## 2022-11-22 ASSESSMENT — PAIN DESCRIPTION - DESCRIPTORS
DESCRIPTORS: ACHING;SHARP;DISCOMFORT
DESCRIPTORS: DISCOMFORT;SPASM
DESCRIPTORS: ACHING;SHARP
DESCRIPTORS: ACHING;DISCOMFORT

## 2022-11-22 ASSESSMENT — PAIN SCALES - GENERAL
PAINLEVEL_OUTOF10: 8
PAINLEVEL_OUTOF10: 6
PAINLEVEL_OUTOF10: 1
PAINLEVEL_OUTOF10: 1
PAINLEVEL_OUTOF10: 0
PAINLEVEL_OUTOF10: 8
PAINLEVEL_OUTOF10: 8
PAINLEVEL_OUTOF10: 1

## 2022-11-22 ASSESSMENT — PAIN - FUNCTIONAL ASSESSMENT
PAIN_FUNCTIONAL_ASSESSMENT: ACTIVITIES ARE NOT PREVENTED

## 2022-11-22 NOTE — PROGRESS NOTES
Pt resting in bed at present time without complaints. PRN pain meds given per MAR. Hourly rounds completed, all needs met this shift. Bed locked and low, call light within reach. Will give report to oncoming day shift nurse.

## 2022-11-22 NOTE — PROGRESS NOTES
Patient discharged to home, patient walked to [facility] where he is being picked up by his wife. Prescription for pain medication; hard copy given to patient. Left arm PIV removed with catheter intact, insertion site without redness, edema and cool to the touch. No signs/symptoms of infection from PIV site. AVS summary reviewed with patient, opportunity for questions and answers provided.

## 2022-11-22 NOTE — DISCHARGE SUMMARY
Hospitalist Discharge Summary   Admit Date:  2022  6:13 PM   DC Note date: 2022  Name:  Kike Rucker   Age:  44 y.o. Sex:  male  :  1985   MRN:  [id number]   Room:  [facility]  PCP:  Guss Schirmer, MD    Presenting Complaint: No chief complaint on file. Initial Admission Diagnosis: Abdominal pain, unspecified abdominal location [R10.9]  Acute on chronic pancreatitis ([facility] [address] [K85.90, K86.1]  Acute pancreatitis, unspecified complication status, unspecified pancreatitis type [K85.90]     Problem List for this Hospitalization (present on admission):    Principal Problem:    Acute on chronic pancreatitis ([facility] [address]  Active Problems:    Alcohol use disorder, severe, in early remission, dependence ([facility] [address]    Pancreatic pseudocyst    History of ST elevation myocardial infarction (STEMI)    Coronary artery disease involving native coronary artery of native heart    Dependence on nicotine from cigarettes  Resolved Problems:    * No resolved hospital problems. *      Hospital Course:  44 y.o. male with medical history of AUD in early remission, nicotine cigarette dependence, etoh-induced chronic pancreatitis, CAD w/ STEMI s/p PCI w/ TULIO 22, pseudocyst s/p gastrostomy w/ stent 22 who presented with c/o worsening epigastric abdominal pain radiating into the back, similar to prior episodes of pancreatitis. Feels like fluid is filling back up, worsening x 2-3 days. Lost 20 lbs in the last month d/t pancreatic issues and inability to tolerate food d/t severe pain. Most Recently discharged on 22 after being admitted on 22 with another new largepseudocyst measuring ~5 cm with acute pancreatitis. CT abdomen with mild improvement in inflammation, small amount of ascites and decrease in size of dominant pseudocyst. He was admitted and started on LR and pain medications. He slowly improved. He tolerated at diet. He remained stable and was discharged home.  He already has follow up with GI on 11/25/22. Disposition: Home  Diet: ADULT DIET; Clear Liquid; Low Fat (less than or equal to 50 gm/day)  ADULT ORAL NUTRITION SUPPLEMENT; Breakfast, Lunch, Dinner; Clear Liquid Oral Supplement  Code Status: Full Code    Follow Ups:   Follow-up Information     DIMITRIS Hurst MD Follow up in 1 week(s). Specialty: Internal Medicine  Contact information:  [address] [address]  Dinesh Hernández MD .    Specialty: Internal Medicine  Contact information:  [address]  [phone]                       Time spent in patient discharge and coordination 35 minutes. Follow up labs/diagnostics (ultimately defer to outpatient provider): Follow up with GI for Integrilin bridge on 11/25    Plan was discussed with patient, nursing, and case management. All questions answered. Patient was stable at time of discharge. Instructions given to call a physician or return if any concerns. Current Discharge Medication List        CONTINUE these medications which have CHANGED    Details   HYDROcodone-acetaminophen (NORCO)  MG per tablet Take 1 tablet by mouth every 6 hours as needed for Pain for up to 5 days.   Qty: 20 tablet, Refills: 0    Comments: Reduce doses taken as pain becomes manageable  Associated Diagnoses: Alcohol-induced chronic pancreatitis (HCC)           CONTINUE these medications which have NOT CHANGED    Details   aspirin 81 MG chewable tablet Take 1 tablet by mouth daily  Qty: 30 tablet, Refills: 3      atorvastatin (LIPITOR) 40 MG tablet Take 1 tablet by mouth nightly  Qty: 30 tablet, Refills: 3      colchicine (COLCRYS) 0.6 MG tablet Take 1 tablet by mouth daily  Qty: 30 tablet, Refills: 0      lisinopril (PRINIVIL;ZESTRIL) 5 MG tablet Take 1 tablet by mouth daily  Qty: 30 tablet, Refills: 3      metoprolol tartrate (LOPRESSOR) 25 MG tablet Take 1 tablet by mouth 2 times daily  Qty: 60 tablet, Refills: 3      nitroGLYCERIN (NITROSTAT) 0.4 MG SL tablet Place 1 tablet under the tongue every 5 minutes as needed for Chest pain up to max of 3 total doses. If no relief after 1 dose, call 911. Qty: 25 tablet, Refills: 3      ticagrelor (BRILINTA) 90 MG TABS tablet Take 1 tablet by mouth 2 times daily  Qty: 60 tablet, Refills: 11      omeprazole (PRILOSEC) 20 MG delayed release capsule Take 20 mg by mouth daily      naloxone (NARCAN) 0.4 MG/ML injection Infuse 1 mL intravenously as needed (shortness of breath)  Qty: 2 each, Refills: 0           STOP taking these medications       thiamine 100 MG tablet Comments:   Reason for Stopping:               Procedures done this admission:  * No surgery found *    Consults this admission:  IP CONSULT TO GI  IP CONSULT TO DIETITIAN    Echocardiogram results:  11/02/22    TRANSTHORACIC ECHOCARDIOGRAM (TTE) COMPLETE (CONTRAST/BUBBLE/3D PRN) 11/02/2022  3:31 PM, 11/02/2022 12:00 AM (Final)    Interpretation Summary    Left Ventricle: Normal left ventricular systolic function with a visually estimated EF of 55 - 60%. Left ventricle size is normal. Normal wall thickness. Normal wall motion. Normal diastolic function. Technical qualifiers: Color flow Doppler was performed and pulse wave and/or continuous wave Doppler was performed. Signed by: Jaz Murray MD on 11/2/2022  3:31 PM, Signed by: Unknown Provider Result on 11/2/2022 12:00 AM      Diagnostic Imaging/Tests:   XR CHEST (2 VW)    Result Date: 11/20/2022  No active disease in the chest.     XR CHEST (2 VW)    Result Date: 11/2/2022  1. Left lower lobe bandlike atelectasis/scarring. 2.  Otherwise, clear lungs. CT ABDOMEN PELVIS W IV CONTRAST Additional Contrast? None    Result Date: 11/20/2022  1. Mild improvement in the inflammatory changes associated with the pancreas compatible with pancreatitis. The dominant pseudocysts have diminished.  There is a newly visualized focus of gas and fluid at the lesser sac. The gas is likely tracking from the site of intervention involving the stomach. 2. Small amounts of ascites. CT ABDOMEN PELVIS W IV CONTRAST Additional Contrast? Oral    Result Date: 11/13/2022  1. New or significantly larger pseudocyst located in the left upper abdominal quadrant beneath the diaphragm is between the stomach and spleen. This measures roughly 5 cm. 2.  Increase in the peripancreatic inflammation primarily accumulating in the lesser sac indicative of pancreatitis. 3.  Stable 1.5 cm cyst in the body of the pancreas. 4.  Postsurgical changes indicative of a cyst gastrostomy at the site of the prior large pseudocyst which has decreased significantly in size. CT ABDOMEN PELVIS W IV CONTRAST Additional Contrast? Radiologist Recommendation    Result Date: 10/25/2022  Several small, 1-2 cm pseudocyst and a large, 7 x 11 cm pseudocyst which is lateral to the greater curvature the stomach. Small amount of ascites throughout the abdomen.        Labs: Results:       BMP, Mg, Phos Recent Labs     11/20/22  1830 11/21/22  0558 11/22/22  0506    138 136   K 4.1 4.1 4.0    109 104   CO2 27 24 30   ANIONGAP 5 5 2   BUN 11 10 7   CREATININE 0.80 0.80 0.70*   LABGLOM >60 >60 >60   CALCIUM 9.1 8.4 8.7   GLUCOSE 100 107* 91   MG  --  1.8 2.0      CBC Recent Labs     11/20/22  1830 11/21/22  0558 11/22/22  0506   WBC 11.2* 7.1 5.2   RBC 4.94 4.06* 4.09*   HGB 13.6 11.0* 11.0*   HCT 42.3 34.9* 35.2*   MCV 85.6 86.0 86.1   MCH 27.5 27.1 26.9   MCHC 32.2 31.5 31.3*   RDW 15.0* 15.0* 14.9*    317 311   MPV 9.0* 9.7 9.8   NRBC 0.00 0.00 0.00   SEGS 70 63 56   LYMPHOPCT 19 24 32   EOSRELPCT 2 2 2   MONOPCT 8 10 9   BASOPCT 1 1 1   IMMGRAN 0 0 0   SEGSABS 7.9 4.5 2.9   LYMPHSABS 2.2 1.7 1.6   EOSABS 0.2 0.1 0.1   MONOSABS 0.9 0.7 0.5   BASOSABS 0.1 0.1 0.0   ABSIMMGRAN 0.0 0.0 0.0      LFT Recent Labs     11/20/22  1830 11/21/22  0558 11/22/22  0506   BILITOT 0.2 0.1* 0.4 ALKPHOS 90 77 73   AST 28 22 29   ALT 43 35 40   PROT 6.9 5.6* 6.0*   LABALBU 3.1* 2.5* 2.7*   GLOB 3.8 3.1 3.3      Cardiac  Lab Results   Component Value Date/Time    TROPHS 300.0 11/02/2022 10:53 AM      Coags No results found for: PROTIME, INR, APTT   A1c Lab Results   Component Value Date/Time    LABA1C 5.8 10/26/2022 05:58 AM     10/26/2022 05:58 AM      Lipids Lab Results   Component Value Date/Time    CHOL 88 11/03/2022 05:39 AM    LDLCALC 58.4 11/03/2022 05:39 AM    LABVLDL 16.6 11/03/2022 05:39 AM    HDL 13 11/03/2022 05:39 AM    CHOLHDLRATIO 6.8 11/03/2022 05:39 AM    TRIG 93 11/21/2022 05:58 AM      Thyroid  Lab Results   Component Value Date/Time    TSHELE 2.38 10/26/2022 05:58 AM        Most Recent UA Lab Results   Component Value Date/Time    COLORU YELLOW 11/13/2022 10:19 AM    APPEARANCE CLEAR 11/13/2022 10:19 AM    SPECGRAV 1.010 11/13/2022 10:19 AM    LABPH 7.0 11/13/2022 10:19 AM    PROTEINU Negative 11/13/2022 10:19 AM    GLUCOSEU Negative 11/13/2022 10:19 AM    KETUA Negative 11/13/2022 10:19 AM    BILIRUBINUR Negative 11/13/2022 10:19 AM    BLOODU Negative 11/13/2022 10:19 AM    UROBILINOGEN 0.2 11/13/2022 10:19 AM    NITRU Negative 11/13/2022 10:19 AM    LEUKOCYTESUR Negative 11/13/2022 10:19 AM    WBCUA 0-3 10/25/2022 07:51 AM    RBCUA 0-3 10/25/2022 07:51 AM    EPITHUA 0 10/25/2022 07:51 AM    BACTERIA 1+ 10/25/2022 07:51 AM    LABCAST 0 10/25/2022 07:51 AM    MUCUS 0 10/25/2022 07:51 AM        No results for input(s): CULTURE in the last 720 hours.     All Labs from Last 24 Hrs:  Recent Results (from the past 24 hour(s))   CBC with Auto Differential    Collection Time: 11/22/22  5:06 AM   Result Value Ref Range    WBC 5.2 4.3 - 11.1 K/uL    RBC 4.09 (L) 4.23 - 5.6 M/uL    Hemoglobin 11.0 (L) 13.6 - 17.2 g/dL    Hematocrit 35.2 (L) 41.1 - 50.3 %    MCV 86.1 82 - 102 FL    MCH 26.9 26.1 - 32.9 PG    MCHC 31.3 (L) 31.4 - 35.0 g/dL    RDW 14.9 (H) 11.9 - 14.6 %    Platelets 728 950 - 450 K/uL    MPV 9.8 9.4 - 12.3 FL    nRBC 0.00 0.0 - 0.2 K/uL    Differential Type AUTOMATED      Seg Neutrophils 56 43 - 78 %    Lymphocytes 32 13 - 44 %    Monocytes 9 4.0 - 12.0 %    Eosinophils % 2 0.5 - 7.8 %    Basophils 1 0.0 - 2.0 %    Immature Granulocytes 0 0.0 - 5.0 %    Segs Absolute 2.9 1.7 - 8.2 K/UL    Absolute Lymph # 1.6 0.5 - 4.6 K/UL    Absolute Mono # 0.5 0.1 - 1.3 K/UL    Absolute Eos # 0.1 0.0 - 0.8 K/UL    Basophils Absolute 0.0 0.0 - 0.2 K/UL    Absolute Immature Granulocyte 0.0 0.0 - 0.5 K/UL   Magnesium    Collection Time: 11/22/22  5:06 AM   Result Value Ref Range    Magnesium 2.0 1.8 - 2.4 mg/dL   Comprehensive Metabolic Panel    Collection Time: 11/22/22  5:06 AM   Result Value Ref Range    Sodium 136 133 - 143 mmol/L    Potassium 4.0 3.5 - 5.1 mmol/L    Chloride 104 101 - 110 mmol/L    CO2 30 21 - 32 mmol/L    Anion Gap 2 2 - 11 mmol/L    Glucose 91 65 - 100 mg/dL    BUN 7 6 - 23 MG/DL    Creatinine 0.70 (L) 0.8 - 1.5 MG/DL    Est, Glom Filt Rate >60 >60 ml/min/1.73m2    Calcium 8.7 8.3 - 10.4 MG/DL    Total Bilirubin 0.4 0.2 - 1.1 MG/DL    ALT 40 12 - 65 U/L    AST 29 15 - 37 U/L    Alk Phosphatase 73 50 - 136 U/L    Total Protein 6.0 (L) 6.3 - 8.2 g/dL    Albumin 2.7 (L) 3.5 - 5.0 g/dL    Globulin 3.3 2.8 - 4.5 g/dL    Albumin/Globulin Ratio 0.8 0.4 - 1.6         No Known Allergies  Immunization History   Administered Date(s) Administered    COVID-19, MODERNA BLUE border, Primary or Immunocompromised, (age 12y+), IM, 100 mcg/0.5mL 08/19/2021    Influenza Virus Vaccine 11/05/2015, 09/19/2018, 01/18/2020, 01/18/2020, 10/05/2020, 10/05/2020    Influenza, FLUARIX, FLULAVAL, FLUZONE (age 10 mo+) AND AFLURIA, (age 1 y+), PF, 0.5mL 11/05/2015, 01/18/2020, 10/05/2020    Pneumococcal Polysaccharide ([id number]) 02/22/2018    Tdap (Boostrix, Adacel) 10/11/2022       Recent Vital Data:  Patient Vitals for the past 24 hrs:   Temp Pulse Resp BP SpO2   11/22/22 0811 98.1 °F (36.7 °C) 74 18 115/82 96 %   11/22/22 0804 -- -- 18 -- --   11/22/22 0734 -- -- 18 -- --   11/22/22 0542 -- -- 18 -- --   11/22/22 0522 97.9 °F (36.6 °C) 68 16 113/84 98 %   11/22/22 0429 -- -- 18 -- --   11/22/22 0125 -- -- 18 -- --   11/22/22 0040 97.9 °F (36.6 °C) 76 16 107/69 97 %   11/21/22 2224 -- -- 18 -- --   11/21/22 1941 97.9 °F (36.6 °C) 75 17 121/87 100 %   11/21/22 1926 -- -- 18 -- --   11/21/22 1600 97.7 °F (36.5 °C) 72 14 (!) 134/95 98 %   11/21/22 1144 98.2 °F (36.8 °C) 76 14 (!) 144/99 99 %       Oxygen Therapy  SpO2: 96 %  O2 Device: None (Room air)    Estimated body mass index is 24.48 kg/m² as calculated from the following:    Height as of this encounter: 5' 9\" (1.753 m). Weight as of this encounter: 165 lb 12.6 oz (75.2 kg). Intake/Output Summary (Last 24 hours) at 11/22/2022 0949  Last data filed at 11/22/2022 7464  Gross per 24 hour   Intake 240 ml   Output --   Net 240 ml         Physical Exam:  General:    Well nourished. No overt distress  Head:  Normocephalic, atraumatic  Eyes:  Sclerae appear normal.  Pupils equally round. HENT:  Nares appear normal, no drainage. Moist mucous membranes  Neck:  No restricted ROM. Trachea midline  CV:   RRR. No m/r/g. No JVD  Lungs:   CTAB. No wheezing, rhonchi, or rales. Respirations even, unlabored  Abdomen:   Soft, nontender, nondistended. Extremities: Warm and dry. No cyanosis or clubbing. No edema. Skin:     No rashes. Normal coloration  Neuro:  CN II-XII grossly intact. Psych:  Normal mood and affect.     Signed:  Leonardo Sanders DO

## 2022-11-22 NOTE — PLAN OF CARE
Problem: Discharge Planning  Goal: Discharge to home or other facility with appropriate resources  Outcome: Completed  Flowsheets (Taken 11/22/2022 0701)  Discharge to home or other facility with appropriate resources: Identify barriers to discharge with patient and caregiver     Problem: Pain  Goal: Verbalizes/displays adequate comfort level or baseline comfort level  Outcome: Completed     Problem: Safety - Adult  Goal: Free from fall injury  Outcome: Completed     Problem: ABCDS Injury Assessment  Goal: Absence of physical injury  Outcome: Completed

## 2022-11-22 NOTE — CARE COORDINATION
CM messaged George De La Cruz with [employer] to assist patient with Medicaid application. 11:30 - Patient medically stable for discharge. Patient already discharged. CM emailed Eliseo Rico back and notified her that patient has left already and to call patient if she is able. No other needs were identified. ASSESSMENT NOTE    Attending Physician: No att. providers found  Admit Problem: Abdominal pain, unspecified abdominal location [R10.9]  Acute on chronic pancreatitis ([facility] [K85.90, K86.1]  Acute pancreatitis, unspecified complication status, unspecified pancreatitis type [K85.90]  Date/Time of Admission: 11/20/2022  6:13 PM  Problem List:  Patient Active Problem List   Diagnosis    Primary hypertension    Chronic pancreatitis ([facility]    Alcohol abuse    Alcohol use disorder, severe, in early remission, dependence ([address]    Attention deficit hyperactivity disorder (ADHD), combined type    Tobacco abuse [Z72.0 (ICD-10-CM)]    STEMI (ST elevation myocardial infarction) ([facility]    Pancreatic pseudocyst    History of ST elevation myocardial infarction (STEMI)    Coronary artery disease involving native coronary artery of native heart    Acute on chronic pancreatitis ([address]    Dependence on nicotine from cigarettes       Service Assessment  Patient Orientation Alert and Oriented   Cognition Alert   History Provided By Patient   Primary Caregiver     Accompanied By/Relationship     Support Systems Spouse/Significant Other   Patient's Healthcare Decision Maker is:     PCP Verified by CM Yes   Last Visit to PCP     Prior Functional Level Independent in ADLs/IADLs   Current Functional Level Independent in ADLs/IADLs   Can patient return to prior living arrangement Yes   Ability to make needs known: Good   Family able to assist with home care needs:     Would you like for me to discuss the discharge plan with any other family members/significant others, and if so, who?      Financial Resources     Community Resources     DONNA/AZUL Referral       Social/Functional History  Lives With Spouse, Son, Daughter   Type of [address] One level   Home Access     Entrance Stairs - Number of Steps     Entrance Stairs - Rails     Bathroom Shower/Tub     [address] From [facility] Work     Driving     Shopping          Other (Comment)     [employer] Paying/Finance [facility] Management     Other (Comment)     Ambulation Assistance Independent   Transfer Assistance Independent   Active  Yes   Patient's  Info     Mode of Transportation Car   Education     Occupation  (not working currently)   Type of Occupation       Discharge Planning   Type of [place] Prior To Admission None   Potential Assistance Needed N/A   DME     DME     DME Ordered? Potential Assistance Purchasing Medications     Meds-to-Beds: Does the patient want to have any new prescriptions delivered to bedside prior to discharge? Type of Home Care Services None   Patient expects to be discharged to: House   Follow Up Appointment: Best Day/Time     One/Two Story Residence:     # of Interior Steps     Height of Each Step (in)     [employer] Available     History of Falls? Services At/After Discharge  Transition of Care Consult (CM Consult):      Internal Home Health     Internal Hospice     Reason Outside Agency [address]     Partner SNF     Reason Why Partner SNF Not Chosen     Internal Comfort Care     Reason Outside [address] Discharge     [address] Provided? Mode of Transport at [facility] Time of Discharge     Confirm Follow Up Transport       Condition of Participation: Discharge Planning  The plan for Transition of Care is related to the following treatment goals: The Patient and/or Patient Representative was provided with a Choice of Provider? Name of the Patient Representative who was provided with the Choice of Provider and agrees with the Discharge Plan? The Patient and/or Patient Representative Agree with the Discharge Plan? Freedom of Choice list was provided with basic dialogue that supports the individualized plan of care/goals, treatment preferences, and shares the quality data associated with the providers?        Documentation for Discharge Appeal  Discharge Appealed by     Date notified by [facility] of appeal request:     Time notified by [employer] of appeal request:     Detailed Notice of Discharge given to:     Date Notice of Discharge given:     Time Notice of Discharge given:     Date records sent to [facility]IO     Time records sent to Quinton Weiss     Date Notified of Outcome     Time Notified of Outcome     Outcome of appeal           [facility] 11/22/22 11:40 AM

## 2022-11-23 ENCOUNTER — CARE COORDINATION (OUTPATIENT)
Dept: CARE COORDINATION | Facility: CLINIC | Age: 37
End: 2022-11-23

## 2022-11-23 RX ORDER — SODIUM CHLORIDE 0.9 % (FLUSH) 0.9 %
5-40 SYRINGE (ML) INJECTION PRN
Status: CANCELLED | OUTPATIENT
Start: 2022-11-23

## 2022-11-23 RX ORDER — SODIUM CHLORIDE 9 MG/ML
INJECTION, SOLUTION INTRAVENOUS PRN
Status: CANCELLED | OUTPATIENT
Start: 2022-11-23

## 2022-11-23 RX ORDER — SODIUM CHLORIDE 0.9 % (FLUSH) 0.9 %
5-40 SYRINGE (ML) INJECTION EVERY 12 HOURS SCHEDULED
Status: CANCELLED | OUTPATIENT
Start: 2022-11-23

## 2022-11-23 NOTE — CARE COORDINATION
Care Transitions Outreach Attempt    Call within 2 business days of discharge: Yes   Attempted to reach patient for transitions of care follow up. Unable to reach patient. Patient: Elijah Morning Patient : [id number] MRN: [id number]    Last Discharge  Street       Date Complaint Diagnosis Description Type Department Provider    [address] Acute pancreatitis, unspecified complication status, unspecified pancreatitis type . .. ED to Hosp-Admission (Discharged) (ADMITTED) S[facility] Domenico Ruiz DO; Yisel Vaz. .. Was this an external facility discharge?  No Discharge Facility: sfd    Noted following upcoming appointments from discharge chart review:   [facility] follow up appointment(s):   Future Appointments   Date Time Provider Angeli Whitney   2022  8:00 AM Gila Brown MD [facility] AMB   2023  4:00 PM Kolton Bardales MD [facility] AMB     Non-[facility] follow up appointment(s): [facility], .22

## 2022-11-23 NOTE — CARE COORDINATION
Care Transitions Outreach Attempt    Call within 2 business days of discharge: Yes   Attempted to reach patient for transitions of care follow up. Unable to reach patient. Patient: [facility] Patient :  MRN: [id number]    Last Discharge  Street       Date Complaint Diagnosis Description Type Department Provider    22 [address] Acute pancreatitis, unspecified complication status, unspecified pancreatitis type . .. ED to Hosp-Admission (Discharged) (ADMITTED) S[facility], ; [address] .. Was this an external facility discharge?  No Discharge Facility: sfd    Noted following upcoming appointments from discharge chart review:   [facility] follow up appointment(s):   Future Appointments   Date Time Provider Angeli Whitney   2022  8:00 AM Daniel Montenegro MD [facility] AMB   2023  4:00 PM Socrates Li MD [facility] AMB     Non-[facility] follow up appointment(s): [facility] 11.25.22

## 2022-11-25 ENCOUNTER — HOSPITAL ENCOUNTER (INPATIENT)
Age: 37
LOS: 6 days | Discharge: HOME | DRG: 438 | End: 2022-12-01
Attending: FAMILY MEDICINE | Admitting: HOSPITALIST

## 2022-11-25 ENCOUNTER — CARE COORDINATION (OUTPATIENT)
Dept: CARE COORDINATION | Facility: CLINIC | Age: 37
End: 2022-11-25

## 2022-11-25 DIAGNOSIS — K86.1 ACUTE ON CHRONIC PANCREATITIS (HCC): Primary | ICD-10-CM

## 2022-11-25 DIAGNOSIS — K85.90 ACUTE ON CHRONIC PANCREATITIS (HCC): Primary | ICD-10-CM

## 2022-11-25 PROCEDURE — 6370000000 HC RX 637 (ALT 250 FOR IP): Performed by: STUDENT IN AN ORGANIZED HEALTH CARE EDUCATION/TRAINING PROGRAM

## 2022-11-25 PROCEDURE — 6360000002 HC RX W HCPCS: Performed by: NURSE PRACTITIONER

## 2022-11-25 PROCEDURE — 1100000000 HC RM PRIVATE

## 2022-11-25 PROCEDURE — 99223 1ST HOSP IP/OBS HIGH 75: CPT | Performed by: INTERNAL MEDICINE

## 2022-11-25 PROCEDURE — 6370000000 HC RX 637 (ALT 250 FOR IP): Performed by: NURSE PRACTITIONER

## 2022-11-25 RX ORDER — ONDANSETRON 8 MG/1
4 TABLET, ORALLY DISINTEGRATING ORAL EVERY 8 HOURS PRN
Status: DISCONTINUED | OUTPATIENT
Start: 2022-11-25 | End: 2022-11-25

## 2022-11-25 RX ORDER — POLYETHYLENE GLYCOL 3350 17 G/17G
17 POWDER, FOR SOLUTION ORAL DAILY PRN
Status: DISCONTINUED | OUTPATIENT
Start: 2022-11-25 | End: 2022-12-01 | Stop reason: HOSPADM

## 2022-11-25 RX ORDER — COLCHICINE 0.6 MG/1
0.6 TABLET ORAL DAILY
Status: DISCONTINUED | OUTPATIENT
Start: 2022-11-25 | End: 2022-11-25

## 2022-11-25 RX ORDER — ACETAMINOPHEN 650 MG/1
650 SUPPOSITORY RECTAL EVERY 6 HOURS PRN
Status: DISCONTINUED | OUTPATIENT
Start: 2022-11-25 | End: 2022-11-25

## 2022-11-25 RX ORDER — HYDROCODONE BITARTRATE AND ACETAMINOPHEN 10; 325 MG/1; MG/1
1 TABLET ORAL EVERY 6 HOURS PRN
Status: DISCONTINUED | OUTPATIENT
Start: 2022-11-25 | End: 2022-11-25

## 2022-11-25 RX ORDER — ACETAMINOPHEN 325 MG/1
650 TABLET ORAL EVERY 4 HOURS PRN
Status: DISCONTINUED | OUTPATIENT
Start: 2022-11-25 | End: 2022-12-01 | Stop reason: HOSPADM

## 2022-11-25 RX ORDER — LANOLIN ALCOHOL/MO/W.PET/CERES
1.5 CREAM (GRAM) TOPICAL NIGHTLY PRN
Status: DISCONTINUED | OUTPATIENT
Start: 2022-11-25 | End: 2022-12-01 | Stop reason: HOSPADM

## 2022-11-25 RX ORDER — NITROGLYCERIN 0.4 MG/1
0.4 TABLET SUBLINGUAL EVERY 5 MIN PRN
Status: DISCONTINUED | OUTPATIENT
Start: 2022-11-25 | End: 2022-11-25

## 2022-11-25 RX ORDER — NICOTINE 21 MG/24HR
1 PATCH, TRANSDERMAL 24 HOURS TRANSDERMAL EVERY 24 HOURS
Status: DISCONTINUED | OUTPATIENT
Start: 2022-11-25 | End: 2022-12-01 | Stop reason: HOSPADM

## 2022-11-25 RX ORDER — BUPROPION HYDROCHLORIDE 150 MG/1
150 TABLET, EXTENDED RELEASE ORAL 2 TIMES DAILY
Status: COMPLETED | OUTPATIENT
Start: 2022-11-28 | End: 2022-11-30

## 2022-11-25 RX ORDER — ENOXAPARIN SODIUM 100 MG/ML
40 INJECTION SUBCUTANEOUS DAILY
Status: DISCONTINUED | OUTPATIENT
Start: 2022-11-25 | End: 2022-11-25

## 2022-11-25 RX ORDER — POLYETHYLENE GLYCOL 3350 17 G/17G
17 POWDER, FOR SOLUTION ORAL DAILY PRN
Status: DISCONTINUED | OUTPATIENT
Start: 2022-11-25 | End: 2022-11-25

## 2022-11-25 RX ORDER — SODIUM CHLORIDE 0.9 % (FLUSH) 0.9 %
5-40 SYRINGE (ML) INJECTION PRN
Status: DISCONTINUED | OUTPATIENT
Start: 2022-11-25 | End: 2022-11-25

## 2022-11-25 RX ORDER — ATORVASTATIN CALCIUM 40 MG/1
40 TABLET, FILM COATED ORAL NIGHTLY
Status: DISCONTINUED | OUTPATIENT
Start: 2022-11-25 | End: 2022-12-01 | Stop reason: HOSPADM

## 2022-11-25 RX ORDER — MAGNESIUM HYDROXIDE/ALUMINUM HYDROXICE/SIMETHICONE 120; 1200; 1200 MG/30ML; MG/30ML; MG/30ML
30 SUSPENSION ORAL EVERY 6 HOURS PRN
Status: DISCONTINUED | OUTPATIENT
Start: 2022-11-25 | End: 2022-12-01 | Stop reason: HOSPADM

## 2022-11-25 RX ORDER — EPTIFIBATIDE 0.75 MG/ML
2 INJECTION, SOLUTION INTRAVENOUS CONTINUOUS
Status: DISCONTINUED | OUTPATIENT
Start: 2022-11-25 | End: 2022-11-25

## 2022-11-25 RX ORDER — LISINOPRIL 5 MG/1
5 TABLET ORAL DAILY
Status: DISCONTINUED | OUTPATIENT
Start: 2022-11-25 | End: 2022-11-25

## 2022-11-25 RX ORDER — ACETAMINOPHEN 325 MG/1
650 TABLET ORAL EVERY 6 HOURS PRN
Status: DISCONTINUED | OUTPATIENT
Start: 2022-11-25 | End: 2022-11-25

## 2022-11-25 RX ORDER — PANTOPRAZOLE SODIUM 40 MG/1
40 TABLET, DELAYED RELEASE ORAL
Status: DISCONTINUED | OUTPATIENT
Start: 2022-11-26 | End: 2022-11-25

## 2022-11-25 RX ORDER — ASPIRIN 81 MG/1
81 TABLET, CHEWABLE ORAL DAILY
Status: DISCONTINUED | OUTPATIENT
Start: 2022-11-25 | End: 2022-12-01 | Stop reason: HOSPADM

## 2022-11-25 RX ORDER — LISINOPRIL 5 MG/1
5 TABLET ORAL DAILY
Status: DISCONTINUED | OUTPATIENT
Start: 2022-11-26 | End: 2022-11-27

## 2022-11-25 RX ORDER — ASPIRIN 81 MG/1
81 TABLET, CHEWABLE ORAL DAILY
Status: DISCONTINUED | OUTPATIENT
Start: 2022-11-25 | End: 2022-11-25

## 2022-11-25 RX ORDER — SODIUM CHLORIDE 0.9 % (FLUSH) 0.9 %
5-40 SYRINGE (ML) INJECTION EVERY 12 HOURS SCHEDULED
Status: DISCONTINUED | OUTPATIENT
Start: 2022-11-25 | End: 2022-11-25

## 2022-11-25 RX ORDER — BUPROPION HYDROCHLORIDE 150 MG/1
150 TABLET, EXTENDED RELEASE ORAL DAILY
Status: COMPLETED | OUTPATIENT
Start: 2022-11-25 | End: 2022-11-27

## 2022-11-25 RX ORDER — ASPIRIN 81 MG/1
81 TABLET, CHEWABLE ORAL DAILY
Status: DISCONTINUED | OUTPATIENT
Start: 2022-11-26 | End: 2022-11-25

## 2022-11-25 RX ORDER — SODIUM CHLORIDE 9 MG/ML
INJECTION, SOLUTION INTRAVENOUS PRN
Status: DISCONTINUED | OUTPATIENT
Start: 2022-11-25 | End: 2022-11-25

## 2022-11-25 RX ORDER — ONDANSETRON 2 MG/ML
4 INJECTION INTRAMUSCULAR; INTRAVENOUS EVERY 6 HOURS PRN
Status: DISCONTINUED | OUTPATIENT
Start: 2022-11-25 | End: 2022-11-25

## 2022-11-25 RX ORDER — ATORVASTATIN CALCIUM 40 MG/1
40 TABLET, FILM COATED ORAL NIGHTLY
Status: DISCONTINUED | OUTPATIENT
Start: 2022-11-25 | End: 2022-11-25

## 2022-11-25 RX ORDER — SENNA PLUS 8.6 MG/1
2 TABLET ORAL NIGHTLY PRN
Status: DISCONTINUED | OUTPATIENT
Start: 2022-11-25 | End: 2022-12-01 | Stop reason: HOSPADM

## 2022-11-25 RX ORDER — EPTIFIBATIDE 0.75 MG/ML
2 INJECTION, SOLUTION INTRAVENOUS CONTINUOUS
Status: DISCONTINUED | OUTPATIENT
Start: 2022-11-25 | End: 2022-12-01

## 2022-11-25 RX ORDER — ONDANSETRON 2 MG/ML
4 INJECTION INTRAMUSCULAR; INTRAVENOUS EVERY 6 HOURS PRN
Status: DISCONTINUED | OUTPATIENT
Start: 2022-11-25 | End: 2022-12-01 | Stop reason: HOSPADM

## 2022-11-25 RX ADMIN — ATORVASTATIN CALCIUM 40 MG: 40 TABLET, FILM COATED ORAL at 21:41

## 2022-11-25 RX ADMIN — ASPIRIN 81 MG 81 MG: 81 TABLET ORAL at 21:41

## 2022-11-25 RX ADMIN — BUPROPION HYDROCHLORIDE 150 MG: 150 TABLET, EXTENDED RELEASE ORAL at 21:41

## 2022-11-25 RX ADMIN — METOPROLOL TARTRATE 25 MG: 25 TABLET, FILM COATED ORAL at 21:42

## 2022-11-25 RX ADMIN — EPTIFIBATIDE 2 MCG/KG/MIN: 0.75 INJECTION INTRAVENOUS at 21:43

## 2022-11-25 ASSESSMENT — PAIN SCALES - GENERAL: PAINLEVEL_OUTOF10: 0

## 2022-11-25 ASSESSMENT — PAIN SCALES - WONG BAKER: WONGBAKER_NUMERICALRESPONSE: 0

## 2022-11-25 NOTE — CARE COORDINATION
Opened chart for 3rd KENYETTA attempt, noted patient is currently IP. No further outreach is indicated at this time. Patient will be reassigned pending discharge disposition.

## 2022-11-25 NOTE — H&P
Hospitalist History and Physical   Admit Date:  2022  4:56 PM   Name:  Kelly Barrteo   Age:  44 y.o. Sex:  male  :  1985   MRN:  [id number]   Room:  2/    Presenting Complaint: No chief complaint on file. Reason(s) for Admission: Pancreatic pseudocyst [K86.3]     History of Present Illness: This is a 69-year-old male with alcohol use disorder in remission, alcohol induced chronic pancreatitis complicated by pseudocyst status post gastrostomy with stent placed 2022 and a recent STEMI status post PCI with TULIO 2022 on aspirin and Brilinta who was discharged from this hospital  after conservative treatment for his known pancreatic pseudocyst, now electively admitted per GI recommendations for an Integrilin bridge so that the patient can be taken off of his DAPT for a planned removal of his pancreatic pseudocyst stent on . Gastroenterology is following in the periphery, but the main reason for the patient's admission is for this Integrilin bridge. Cardiology has been consulted and will provide final recommendations regarding the bridge and timing of the patient's stent removal.  Patient has no particular complaints for me today. He does have a notable medical history mentioned above. VSS upon direct admission. Labs pending mornign collection. Cards consulted and requested guidance on ef    Extensive chart review and summary of medical records was performed today, see summary and problem list above. Review of Systems:  ROS: per subj above, all other systems negative  Assessment & Plan:     Pancreatic pseudocyst status post stenting: GI requests an Integrilin bridge because they are not comfortable removing the pancreatic stent while patient is on DAPT. Cardiology consulted. Appreciate their recommendations. STEMI  on DAPT: Medications per cardiology recommendations. I will continue atorvastatin lisinopril metoprolol.   Tobacco use disorder: Patient requesting a nicotine patch while inpatient, would also like to start on Wellbutrin which is something that he plans to continue as an outpatient. Per drug monograph, initiating 150 daily x 3 days followed by 10 BID (SR 12 hours release).   Alcohol use disorder: Sober per patient since May, no CIWA no concern for acute withdrawal.  Hypertension: Home lisinopril and metoprolol    DVT ppx: Low risk for VTE  Dispo: Home  PT/OT: Not yet consulted    Code status: Full Code    Hospital Problems             Last Modified POA    * (Principal) Pancreatic pseudocyst 11/25/2022 Yes    Alcohol use disorder, severe, in early remission, dependence ([address] (Chronic) 11/25/2022 Yes    Tobacco abuse [Z72.0 (ICD-10-CM)] 11/25/2022 Yes    History of ST elevation myocardial infarction (STEMI) 11/25/2022 Yes    Coronary artery disease involving native coronary artery of native heart (Chronic) 11/25/2022 Yes    Primary hypertension 11/25/2022 Yes       Past History:  Past Medical History:   Diagnosis Date    Abdominal pain 10/25/2022    Abnormal CT of the abdomen 80/77/0735    Acute alcoholic pancreatitis 67/60/6660    Acute kidney injury (nontraumatic) ([address] 10/31/2022    pt denies    Acute on chronic pancreatitis ([address] 07/30/2022    Alcohol use disorder, severe, dependence ([address] 08/21/2019    CAD (coronary artery disease)     Carpal tunnel syndrome, left     Cigarette smoker     Coronary artery disease involving native coronary artery of native heart 11/13/2022    Dependence on nicotine from cigarettes 11/20/2022    GERD (gastroesophageal reflux disease)     controlled with med    History of pancreatitis     x 4 or 5 times-- last time admitted to hospital 10/10/26/22 x 7 days    Hypertension     controlled with med     Past Surgical History:   Procedure Laterality Date    CARDIAC PROCEDURE N/A 11/2/2022    LEFT HEART CATH / CORONARY ANGIOGRAPHY performed by Leonel Blandon MD at [address] [place] [address] N/A 11/2/2022    Percutaneous coronary intervention performed by Stu Barkley MD at [facility] CARDIAC CATH LAB    CHOLECYSTECTOMY  2012    ORTHOPEDIC SURGERY Left     wrist surg--nerve surg    ORTHOPEDIC SURGERY Left     foot surg as child    UPPER GASTROINTESTINAL ENDOSCOPY N/A 11/9/2022    EGD/ENDOSCOPIC ULTRASOUND/AXIOS STENT ROOM 2226 **Rep will be present performed by Benjie Ledezma MD at [facility] ENDOSCOPY      No Known Allergies   Social History     Tobacco Use    Smoking status: Every Day     Packs/day: 1.00     Years: 20.00     Pack years: 20.00     Types: Cigarettes    Smokeless tobacco: Never   Substance Use Topics    Alcohol use: Not Currently     Comment: none since 10/4/22-- had drank 3 glasses of wine daily      Family History   Problem Relation Age of Onset    No Known Problems Mother     Heart Attack Father         MI at 58      Family history reviewed and negative except as noted above. Immunization History   Administered Date(s) Administered    COVID-19, MODERNA BLUE border, Primary or Immunocompromised, (age 12y+), IM, 100 mcg/0.5mL 08/19/2021    Influenza Virus Vaccine 11/05/2015, 09/19/2018, 01/18/2020, 01/18/2020, 10/05/2020, 10/05/2020    Influenza, FLUARIX, FLULAVAL, FLUZONE (age 10 mo+) AND AFLURIA, (age 1 y+), PF, 0.5mL 11/05/2015, 01/18/2020, 10/05/2020    Pneumococcal Polysaccharide (Buoydntna20) 02/22/2018    Tdap (Boostrix, Adacel) 10/11/2022     Prior to Admit Medications:  Current Outpatient Medications   Medication Instructions    aspirin 81 mg, Oral, DAILY    atorvastatin (LIPITOR) 40 mg, Oral, NIGHTLY    colchicine (COLCRYS) 0.6 mg, Oral, DAILY    HYDROcodone-acetaminophen (NORCO)  MG per tablet 1 tablet, Oral, EVERY 6 HOURS PRN    lisinopril (PRINIVIL;ZESTRIL) 5 mg, Oral, DAILY    metoprolol tartrate (LOPRESSOR) 25 mg, Oral, 2 TIMES DAILY    naloxone (NARCAN) 0.4 mg, IntraVENous, PRN    nitroGLYCERIN (NITROSTAT) 0.4 mg, SubLINGual, EVERY 5 MIN PRN, up to max of 3 total doses.  If no relief after 1 dose, call 911. omeprazole (PRILOSEC) 20 mg, Oral, DAILY    ticagrelor (BRILINTA) 90 mg, Oral, 2 TIMES DAILY         Objective:   Patient Vitals for the past 24 hrs:   Temp Pulse Resp BP SpO2   11/25/22 1710 97.8 °F (36.6 °C) 84 17 126/81 97 %       Estimated body mass index is 25.1 kg/m² as calculated from the following:    Height as of this encounter: 5' 9\" (1.753 m). Weight as of this encounter: 170 lb (77.1 kg). No intake or output data in the 24 hours ending 11/25/22 2029      Physical Exam  Constitutional:       Appearance: Normal appearance. HENT:      Head: Normocephalic. Mouth/Throat:      Pharynx: Oropharynx is clear. Eyes:      Extraocular Movements: Extraocular movements intact. Pupils: Pupils are equal, round, and reactive to light. Cardiovascular:      Rate and Rhythm: Normal rate. Heart sounds: No murmur heard. Pulmonary:      Effort: Pulmonary effort is normal.      Breath sounds: Normal breath sounds. Abdominal:      General: Abdomen is flat. Palpations: Abdomen is soft. Musculoskeletal:         General: No tenderness. Normal range of motion. Cervical back: Normal range of motion. No tenderness. Skin:     General: Skin is warm and dry. Neurological:      General: No focal deficit present. Mental Status: He is oriented to person, place, and time. Psychiatric:         Mood and Affect: Mood normal.         Behavior: Behavior normal.       I have reviewed ordered lab tests and data below:    Last 24hr Labs:  No results found for this or any previous visit (from the past 24 hour(s)). Other Studies:  No results found. 11/02/22    TRANSTHORACIC ECHOCARDIOGRAM (TTE) COMPLETE (CONTRAST/BUBBLE/3D PRN) 11/02/2022  3:31 PM, 11/02/2022 12:00 AM (Final)    Interpretation Summary    Left Ventricle: Normal left ventricular systolic function with a visually estimated EF of 55 - 60%. Left ventricle size is normal. Normal wall thickness. Normal wall motion. Normal diastolic function. Technical qualifiers: Color flow Doppler was performed and pulse wave and/or continuous wave Doppler was performed. Signed by: Jonas Johnosn MD on 11/2/2022  3:31 PM, Signed by: Unknown Provider Result on 11/2/2022 12:00 AM          Signed:  Eduard Whitaker MD    Part of this note may have been written by using a voice dictation software. The note has been proof read but may still contain some grammatical/other typographical errors.

## 2022-11-25 NOTE — PROGRESS NOTES
Patient has been admitted for DAPT bridge to allow safe removal of Axios stent after treatment of pancreatic pseudocyst.    I can confirm that EUS for evaluation and removal of stent is scheduled with Dr. Taryn Rodriguez for 11/30/2022 at 1500. Brilinta will be held 5 days, then Integrilin will need to be held before the procedure. I am not familiar with the T1/2 of Integrilin, so I hope that Cardiology will be able to recommend the appropriate amount of preprocedural hold. We anticipate that the procedure will be uneventful and the patient discharged afterward. No active GI issue to manage at the moment. We are aware of the patient and available should any acute GI issues arise.

## 2022-11-26 LAB
AMPHET UR QL SCN: NEGATIVE
ANION GAP SERPL CALC-SCNC: 4 MMOL/L (ref 2–11)
BASOPHILS # BLD: 0.1 K/UL (ref 0–0.2)
BASOPHILS NFR BLD: 1 % (ref 0–2)
BENZODIAZ UR QL: NEGATIVE
BUN SERPL-MCNC: 13 MG/DL (ref 6–23)
CALCIUM SERPL-MCNC: 7.2 MG/DL (ref 8.3–10.4)
CANNABINOIDS UR QL SCN: NEGATIVE
CHLORIDE SERPL-SCNC: 109 MMOL/L (ref 101–110)
CO2 SERPL-SCNC: 23 MMOL/L (ref 21–32)
COCAINE UR QL SCN: NEGATIVE
CREAT SERPL-MCNC: 0.8 MG/DL (ref 0.8–1.5)
DIFFERENTIAL METHOD BLD: ABNORMAL
EOSINOPHIL # BLD: 0.3 K/UL (ref 0–0.8)
EOSINOPHIL NFR BLD: 5 % (ref 0.5–7.8)
ERYTHROCYTE [DISTWIDTH] IN BLOOD BY AUTOMATED COUNT: 15.6 % (ref 11.9–14.6)
GLUCOSE SERPL-MCNC: 102 MG/DL (ref 65–100)
HCT VFR BLD AUTO: 41.1 % (ref 41.1–50.3)
HGB BLD-MCNC: 13.1 G/DL (ref 13.6–17.2)
IMM GRANULOCYTES # BLD AUTO: 0 K/UL (ref 0–0.5)
IMM GRANULOCYTES NFR BLD AUTO: 0 % (ref 0–5)
LYMPHOCYTES # BLD: 2.3 K/UL (ref 0.5–4.6)
LYMPHOCYTES NFR BLD: 34 % (ref 13–44)
MCH RBC QN AUTO: 28.5 PG (ref 26.1–32.9)
MCHC RBC AUTO-ENTMCNC: 31.9 G/DL (ref 31.4–35)
MCV RBC AUTO: 89.3 FL (ref 82–102)
MONOCYTES # BLD: 0.6 K/UL (ref 0.1–1.3)
MONOCYTES NFR BLD: 9 % (ref 4–12)
NEUTS SEG # BLD: 3.3 K/UL (ref 1.7–8.2)
NEUTS SEG NFR BLD: 51 % (ref 43–78)
NRBC # BLD: 0 K/UL (ref 0–0.2)
OPIATES UR QL: POSITIVE
PLATELET # BLD AUTO: 382 K/UL (ref 150–450)
PMV BLD AUTO: 9.9 FL (ref 9.4–12.3)
POTASSIUM SERPL-SCNC: 4.5 MMOL/L (ref 3.5–5.1)
RBC # BLD AUTO: 4.6 M/UL (ref 4.23–5.6)
SODIUM SERPL-SCNC: 136 MMOL/L (ref 133–143)
WBC # BLD AUTO: 6.6 K/UL (ref 4.3–11.1)

## 2022-11-26 PROCEDURE — 6370000000 HC RX 637 (ALT 250 FOR IP): Performed by: STUDENT IN AN ORGANIZED HEALTH CARE EDUCATION/TRAINING PROGRAM

## 2022-11-26 PROCEDURE — 99232 SBSQ HOSP IP/OBS MODERATE 35: CPT | Performed by: INTERNAL MEDICINE

## 2022-11-26 PROCEDURE — 80048 BASIC METABOLIC PNL TOTAL CA: CPT

## 2022-11-26 PROCEDURE — 6360000002 HC RX W HCPCS: Performed by: NURSE PRACTITIONER

## 2022-11-26 PROCEDURE — 1100000000 HC RM PRIVATE

## 2022-11-26 PROCEDURE — 6370000000 HC RX 637 (ALT 250 FOR IP): Performed by: PHYSICIAN ASSISTANT

## 2022-11-26 PROCEDURE — 85025 COMPLETE CBC W/AUTO DIFF WBC: CPT

## 2022-11-26 PROCEDURE — 6370000000 HC RX 637 (ALT 250 FOR IP): Performed by: NURSE PRACTITIONER

## 2022-11-26 PROCEDURE — 36415 COLL VENOUS BLD VENIPUNCTURE: CPT

## 2022-11-26 PROCEDURE — 80307 DRUG TEST PRSMV CHEM ANLYZR: CPT

## 2022-11-26 RX ORDER — HYDROCODONE BITARTRATE AND ACETAMINOPHEN 10; 325 MG/1; MG/1
1 TABLET ORAL EVERY 6 HOURS PRN
Status: DISCONTINUED | OUTPATIENT
Start: 2022-11-26 | End: 2022-11-27

## 2022-11-26 RX ADMIN — LISINOPRIL 5 MG: 5 TABLET ORAL at 08:45

## 2022-11-26 RX ADMIN — ATORVASTATIN CALCIUM 40 MG: 40 TABLET, FILM COATED ORAL at 20:21

## 2022-11-26 RX ADMIN — METOPROLOL TARTRATE 25 MG: 25 TABLET, FILM COATED ORAL at 08:44

## 2022-11-26 RX ADMIN — BUPROPION HYDROCHLORIDE 150 MG: 150 TABLET, EXTENDED RELEASE ORAL at 08:45

## 2022-11-26 RX ADMIN — EPTIFIBATIDE 2 MCG/KG/MIN: 0.75 INJECTION INTRAVENOUS at 20:31

## 2022-11-26 RX ADMIN — HYDROCODONE BITARTRATE AND ACETAMINOPHEN 1 TABLET: 10; 325 TABLET ORAL at 18:39

## 2022-11-26 RX ADMIN — ASPIRIN 81 MG 81 MG: 81 TABLET ORAL at 08:44

## 2022-11-26 RX ADMIN — EPTIFIBATIDE 2 MCG/KG/MIN: 0.75 INJECTION INTRAVENOUS at 05:00

## 2022-11-26 RX ADMIN — EPTIFIBATIDE 2 MCG/KG/MIN: 0.75 INJECTION INTRAVENOUS at 12:47

## 2022-11-26 RX ADMIN — ACETAMINOPHEN 650 MG: 325 TABLET ORAL at 08:44

## 2022-11-26 RX ADMIN — METOPROLOL TARTRATE 25 MG: 25 TABLET, FILM COATED ORAL at 20:21

## 2022-11-26 RX ADMIN — HYDROCODONE BITARTRATE AND ACETAMINOPHEN 1 TABLET: 10; 325 TABLET ORAL at 12:38

## 2022-11-26 ASSESSMENT — PAIN DESCRIPTION - ORIENTATION
ORIENTATION: ANTERIOR
ORIENTATION: UPPER
ORIENTATION: UPPER;MID

## 2022-11-26 ASSESSMENT — PAIN SCALES - GENERAL
PAINLEVEL_OUTOF10: 2
PAINLEVEL_OUTOF10: 5
PAINLEVEL_OUTOF10: 6
PAINLEVEL_OUTOF10: 6

## 2022-11-26 ASSESSMENT — PAIN DESCRIPTION - LOCATION
LOCATION: ABDOMEN

## 2022-11-26 ASSESSMENT — PAIN DESCRIPTION - DESCRIPTORS
DESCRIPTORS: SHARP
DESCRIPTORS: CRAMPING
DESCRIPTORS: ACHING

## 2022-11-26 NOTE — PROGRESS NOTES
Completed rounds. Pt had uneventful shift. Intergrlin 0.75mg infusion. Medicated x2 this shift, no other c/o voiced. Bedside report will be given to oncoming nurse.

## 2022-11-26 NOTE — PROGRESS NOTES
Comprehensive Nutrition Assessment    Type and Reason for Visit: Initial, Positive Nutrition Screen  Malnutrition Screening Tool: Malnutrition Screen  Have you recently lost weight without trying?: 14 to 23 pounds (2 points)  Have you been eating poorly because of a decreased appetite?: Yes (1 point)  Malnutrition Screening Tool Score: 3    Nutrition Recommendations/Plan:   Meals and Snacks:  Diet: Continue current order  Nutrition Supplement Therapy:  Medical food supplement therapy:  Initiate Ensure High Protein three times per day (this provides 160 kcal and 16 grams protein per bottle)     Malnutrition Assessment:  Malnutrition Status: At risk for malnutrition (Comment) (8% wt loss since Oct, po improving per pt)  Pt reports po has improved, no candace muscle or fat wasting appreciated  Nutrition Assessment:  Nutrition History: Per 10/31 RD assessment: Pt reports increased in take of fast foods and fats 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 Pt reports not eating for 8 days at RD assessment. Pt reports hx of fluctuating oral intake since that admission, more recently improving. Do You Have Any Cultural, Taoist, or Ethnic Food Preferences?: No   Nutrition Background:       PMH remarkable for alcohol use disorder in remission, alcohol induced chronic pancreatitis complicated by pseudocyst status post gastrostomy with stent placed 11/9/2022 and a recent STEMI status post PCI with TULIO 11/2/2022 on aspirin and Brilinta who was discharged from this hospital 11/22 after conservative treatment for his known pancreatic pseudocyst.  Admitted for plan stent removal 11/30. Nutrition Interval:  Pt sitting up in bed. Standing scale weight obtained. Pt indicates his intake has improve, intermittently has used oral supplements. Current Nutrition Therapies:  ADULT DIET;  Regular    Current Intake:   Average Meal Intake: % Average Supplements Intake: None Ordered      Anthropometric Measures:  Height: 5' 9\" (175.3 cm)  Current Body Wt: 168 lb (76.2 kg) (11/26), Weight source: Standing Scale  BMI: 24.8, Normal Weight (BMI 18.5-24. 9)  Admission Body Weight: 170 lb (77.1 kg) (stated)  Ideal Body Weight (Kg) (Calculated): 73 kg (160 lbs), 105 %  Usual Body Wt: 184 lb (83.5 kg) (10/25 bed scale wt, limited EMR wt hx available), Percent weight change: -8.7       Edema:Peripheral Vascular  Peripheral Vascular (WDL): Within Defined Limits   BMI Category Normal Weight (BMI 18.5-24. 9)  Estimated Daily Nutrient Needs:  Energy (kcal/day): 7249-6354 (25-30 kcal/kg) (Kcal/kg Weight used: 76.2 kg (11/26) Current  Protein (g/day): 76-91 (1-1.2 g/kg) Weight Used: (Current) 76.2 kg  Fluid (ml/day):   (1 ml/kcal)    Nutrition Diagnosis:   Unintended weight loss related to altered GI function as evidenced by  (~8% wt loss in setting of pancreatic pseudocyst)  Nutrition Interventions:   Food and/or Nutrient Delivery: Continue Current Diet, Start Oral Nutrition Supplement     Coordination of Nutrition Care: Continue to monitor while inpatient       Goals: Active Goal: Meet at least 75% of estimated needs, by next RD assessment       Nutrition Monitoring and Evaluation:      Food/Nutrient Intake Outcomes: Food and Nutrient Intake, Supplement Intake  Physical Signs/Symptoms Outcomes: Biochemical Data, GI Status, Meal Time Behavior, Weight    Discharge Planning:     Too soon to determine    AYDEN ORR RD

## 2022-11-26 NOTE — CONSULTS
[facility]   Initial Cardiac Evaluation                  Primary Cardiologist: Dr. Tj Carolina    Primary Care Physician: Dr. Garcia Ohm Physician: Dr. Eric Oleary    Evaluating Physician: Dr. Kingsley Martinez:     Patient is a 44 y.o. male with PMHx of CAD and STEMI, HTN, chronic pancreatitis, ETOH and Tobacco abuse who was admitted for planned admission and Integrilin bridging for EUS procedure on 11/30/22. Cardiology is consulted to manage the Integrilin and bridge. History of coronary disease with recent STEMI (PCI to a large OM from 11/2/2022; other arteries with no significant coronary disease). Echo from 11/2/2022 with preserved EF. Other history of alcohol abuse, longstanding pancreatitis with pancreatic pseudocyst (quit drinking ~5/2022). During recent presentation, appears patient presented for scheduled EUS on 11/2/2022 but subsequently complained of chest discomfort. Underwent drainage of pseudocyst on 11/9/2022; appears plans for removal Axios stent on 11/30/2022. Admitted again 11/13/22-11/16/22 and 11/20/22-11/22/22 due to recurrent abdominal pain and required pain control and IV hydration.      Past Medical History:   Diagnosis Date    Abdominal pain 10/25/2022    Abnormal CT of the abdomen 44/35/0073    Acute alcoholic pancreatitis 22/41/7435    Acute kidney injury (nontraumatic) ([address] 10/31/2022    pt denies    Acute on chronic pancreatitis ([address] 07/30/2022    Alcohol use disorder, severe, dependence ([address] 08/21/2019    CAD (coronary artery disease)     Carpal tunnel syndrome, left     Cigarette smoker     Coronary artery disease involving native coronary artery of native heart 11/13/2022    Dependence on nicotine from cigarettes 11/20/2022    GERD (gastroesophageal reflux disease)     controlled with med    History of pancreatitis     x 4 or 5 times-- last time admitted to hospital 10/10/26/22 x 7 days    Hypertension     controlled with med      Past Surgical History:   Procedure Laterality Date    CARDIAC PROCEDURE N/A 11/2/2022    LEFT HEART CATH / CORONARY ANGIOGRAPHY performed by Jeffrey Lopez MD at [facility] CATH LAB    CARDIAC PROCEDURE N/A 11/2/2022    Percutaneous coronary intervention performed by Jeffrey Lopez MD at [address] [place] [address] CATH LAB    CHOLECYSTECTOMY  2012    ORTHOPEDIC SURGERY Left     wrist surg--nerve surg    ORTHOPEDIC SURGERY Left     foot surg as child    UPPER GASTROINTESTINAL ENDOSCOPY N/A 11/9/2022    EGD/ENDOSCOPIC ULTRASOUND/AXIOS STENT ROOM 2226 **Rep will be present performed by Carloz Bray MD at [facility] ENDOSCOPY      No Known Allergies  Social History     Tobacco Use    Smoking status: Every Day     Packs/day: 1.00     Years: 20.00     Pack years: 20.00     Types: Cigarettes    Smokeless tobacco: Never   Substance Use Topics    Alcohol use: Not Currently     Comment: none since 10/4/22-- had drank 3 glasses of wine daily      FH:   Family History   Problem Relation Age of Onset    No Known Problems Mother     Heart Attack Father         MI at 58        Review of Systems  General: no recent weight change, no weakness, no fatigue, no fever or chills, no diaphoresis, no change in appetite or ADLs  Skin: no rashes, wounds, sores or other skin changes  HEENT: no headache, dizziness, lightheadedness, vision changes, hearing changes, sinus pressure/pain/congestion, bleeding gums or sore throat  Neck: no swollen glands, goiter, pain or stiffness  Respiratory: no cough, no congestion, no sputum, no hemoptysis, no dyspnea, no wheezing  Cardiovascular: + as per HPI, no palpitations, syncope, orthopnea, PND  Gastrointestinal: no increased reflux, no constipation, diarrhea, liver problems, GI bleeding, no abdominal pain or distension, no N/V  Urinary: no frequency, urgency, hematuria, burning/pain with urination, flank pain or difficulty urinating  Peripheral Vascular: no claudication, leg cramps, prior DVTs, no swelling of BLE, no color change, or swelling with redness or tenderness  Musculoskeletal: no new muscle or joint pain/stiffness, joint swelling, erythema of joints, or back pain  Psychiatric: no increased depression, anxiety or excessive stress  Neurological: no sensory or motor loss, seizures, syncope, tremors, numbness, tingling, no changes in mood, attention, or speech, no changes in orientation, memory, insight, or judgment. Hematologic: no anemia, no easy bruising or bleeding  Endocrine: no thyroid problems, no heat or cold intolerance, excessive sweating, polyuria, polydipsia, no diabetes. Objective:       /81   Pulse 84   Temp 97.8 °F (36.6 °C) (Oral)   Resp 17   Ht 5' 9\" (1.753 m)   Wt 170 lb (77.1 kg)   SpO2 97%   BMI 25.10 kg/m²     No intake/output data recorded. No intake/output data recorded. Physical Exam:  General: well developed, well nourished, NAD, resting comfortably  HEENT: PERRLA, no abnormalities noted, sclera clear, EOMs intact  Neck: supple, no JVD, trachea midline, no carotid bruits  Heart: S1S2 with RRR without murmurs, rubs or gallops  Lungs: clear to auscultation bilaterally, normal effort on RA, no wheezing or rales  Abd: soft, nontender, nondistended, +bowel sounds  Ext: warm, no edema, calves supple/nontender, pulses 2+ bilaterally  Skin: warm and dry, intact to view  Psychiatric: appropriate mood and affect, cooperative  Neurologic: A&O X 3, moves all 4 equally, CNs intact      ECHO: 11/2/22    Left Ventricle: Normal left ventricular systolic function with a visually estimated EF of 55 - 60%. Left ventricle size is normal. Normal wall thickness. Normal wall motion. Normal diastolic function. Technical qualifiers: Color flow Doppler was performed and pulse wave and/or continuous wave Doppler was performed. Data Review:    No results found for this or any previous visit (from the past 24 hour(s)).       Assessment/Plan:   Principal Problem:    Pancreatic pseudocyst    Active Problems:    Alcohol use disorder, severe, in early remission, dependence -- complete cessation needed going forward      Tobacco abuse -- complete cessation needed going forward       Coronary artery disease involving native coronary artery of native heart --       Primary hypertension -- cont home meds         History of ST elevation myocardial infarction (STEMI) -- s/p PCI with TULIO to an acutely occluded OM1 on 11/2/22. He was kept IP post-procedure on IV Integrilin for EUS and stent placement for his large pancreatic pseudocyst. Stent was placed by GI on 11/9/22. Appears his Integrilin was stopped just prior to the procedure (unable to access full MAR). He was resumed on PO Brilinta post-procedure and was discharged home 11/10/22. It was known at the time of GI stent placement that Pt would required repeat EUS and stent removal/replacement on 11/30/22. It was discussed by Dr. Franklyn Pratt as well as by Dr. Sánchez Mcclellan at the follow-up appt that DAPT should remain UNINTERRUPTED. Recommendation by Dr. Sánchez Mcclellan:  Pancreatic pseudocyst/recurrent pancreatitis  -Recent stent placement per GI with plans for removal from 11/30/2022.  -Discussed increased risk for holding dual antiplatelet therapy and ideally perform procedure on dual antiplatelet therapy. -Guideline data regarding bridging \"there is no  convincing clinical evidence demonstrating the efficacy  of bridging with either parenteral antiplatelet or anticoagulant therapy\"    However -- since the initial stent placement, Pt has required 2 repeat admissions (11/13/22-11/16/22 & 1120/22-11/22/22) for IV hydration and pain control due to uncontrolled and recurrent pancreatitis. GI has evaluated each time and is recommending that they proceed with the scheduled EUS on 11/30/22. In preparation, they have requested a 5 day hold of the Brilinta with an Integrilin bridge.      IDEALLY we recommend the Pt should be continued on DAPT (ASA + Brilinta) yenifer-procedurally, if procedure cannot be safely performed with Pt on DAPT (as determined by the GI provider) -- we provided the following recommendations:    Pt should be continued on uninterrupted ASA therapy with 81mg daily yenifer-operatively. Brilinta will be held and IV Integrilin started tonight for the planned 5 day Brilinta \"washout period. \" The Integrilin must remain UNINTERRUPTED. Pt needs to have a stable IV access with a back-up IV access at all times. Brilinta can be stopped at MAXIMUM 4 hours prior to the planned procedure. Procedure needs to be carefully and promptly timed within this window. Post-op -- resume Brilinta IMMEDIATELY. MELISSA Olivarez - CNP-C  11/25/2022  7:34 PM     ATTENDING ADDENDUM:    In this split/shared evaluation I performed/reviewed the patients's H&P, available images, labs, non-invasive studies and discussed the case in detail with the ACP;  performed a medically appropriate history and exam, counseled and educated the patient and/or family members, ordered and reviewed medications, tests or procedures, documented information in EMR, and independently interpreted images and coordinated care. Personal Time:     45 minutes, which equates to greater than 50% of time involved in patient's consultation and care management. I agree with above note by physician extender. Key findings are:  No current or recent angina, syncope, CHF, or palpitations. Recent ST elevation infarction with PCI to a large first obtuse marginal branch with an [place] drug-eluting stent on November 2. Compliant with dual antiplatelet therapy, last Brilinta this morning, last aspirin yesterday. He has a pancreatic pseudocyst which has a drain into his stomach, but this is needing to be removed in 5 days with gastroenterology and he presents for elective admission after cessation of Brilinta this morning for initiation of an Integrilin window/bridge.   He denies any bleeding on dual antiplatelet therapy and no symptoms of angina or anginal equivalent. Vital signs are controlled and exam is normal.  His stent is 21days old. No Known Allergies    Past Surgical History:   Procedure Laterality Date    CARDIAC PROCEDURE N/A 11/2/2022    LEFT HEART CATH / CORONARY ANGIOGRAPHY performed by Christopher Wright MD at [address] [place] [address] CATH LAB    CARDIAC PROCEDURE N/A 11/2/2022    Percutaneous coronary intervention performed by Christopher Wright MD at [address] [place] [address] CATH LAB    CHOLECYSTECTOMY  2012    ORTHOPEDIC SURGERY Left     wrist surg--nerve surg    ORTHOPEDIC SURGERY Left     foot surg as child    UPPER GASTROINTESTINAL ENDOSCOPY N/A 11/9/2022    EGD/ENDOSCOPIC ULTRASOUND/AXIOS STENT ROOM 2226 **Rep will be present performed by Gatito Calderón MD at [facility] ENDOSCOPY     Social History     Tobacco Use    Smoking status: Every Day     Packs/day: 1.00     Years: 20.00     Pack years: 20.00     Types: Cigarettes    Smokeless tobacco: Never   Substance Use Topics    Alcohol use: Not Currently     Comment: none since 10/4/22-- had drank 3 glasses of wine daily       REVIEW OF SYSTEMS:    General: no recent weight loss/gain,no weakness/fatigue, denies fever or chills   Skin: no rashes, lumps, or other skin changes   HEENT: no headache, dizziness, lightheadedness, vision changes, hearing changes   Neck: no swollen glands, goiter, pain or stiffness   Respiratory: no cough, sputum, hemoptysis, no shortness of breath, no dyspnea on exertion, wheezing   Cardiovascular: no chest pain or discomfort, palpitations, no orthopnea, paroxysmal nocturnal dyspnea or peripheral edema   Gastrointestinal: + Heartburn, + change of appetite, + nausea, + mild chronic abdominal pain  Urinary: no frequency, urgency , hematuria, burning/pain  Peripheral Vascular: no claudication, leg cramps, prior DVTs  Musculoskeletal: no muscle or joint pain/stiffness  Psychiatric: no depression, mental disorders, or excessive stress   Neurological: no history of CVA, vertigo.    Hematologic: no anemia, easy bruising or bleeding   Endocrine: no diabetes, thyroid problems, heat or cold intolerance, excessive sweating, polyuria, polydipsia        PHYSICAL EXAM:    Vitals:    11/25/22 1645 11/25/22 1710   BP:  126/81   Pulse:  84   Resp:  17   Temp:  97.8 °F (36.6 °C)   TempSrc:  Oral   SpO2:  97%   Weight: 170 lb (77.1 kg)    Height: 5' 9\" (1.753 m)        General: Well Developed, Well Nourished, No Acute Distress  HEENT: pupils equal and round, no abnormalities noted  Neck: supple, no JVD, no carotid bruits  Heart: S1S2 with RRR without murmurs or gallops  Lungs: Clear throughout auscultation bilaterally  Abd: soft,  nondistended, mildly tender left epigastric region  Ext: No edema distally  Skin: warm and dry  Psychiatric: Normal mood and affect  Neurologic: Alert and oriented X 3, cranial nerves II thru XII grossly intact and symmetric      ASSESSMENT AND PLAN:  Active Hospital Problems    *Pancreatic pseudocyst-recurrent issue, now with a drain into the stomach, needing removal in 5 days with Dr. Shavon Ty. See below. Coronary artery disease involving native coronary artery of native heart-stable since STEMI/PCI. No angina or CHF. Compliant with meds, last Brilinta this morning. History of ST elevation myocardial infarction (STEMI)-high lateral infarction with OM stenting 22 days ago. See below. Tobacco abuse [Z72.0 (ICD-10-CM)]-cessation counseling in the outpatient setting      Alcohol use disorder, severe, in early remission, dependence (HCC)-no alcohol in 2 months. He is congratulated and encouraged to continue      Primary hypertension-stable, continue meds and titrate as needed    Preoperative evaluation-the patient just had a [place] drug-eluting stent to a large first obtuse marginal branch in the setting of an acute occlusion/STEMI 22 days ago. He has been compliant with dual antiplatelet therapies but now needs cessation of Brilinta prior to stent removal in 5 days.   Unfortunately he has an increased risk for bleeding if he remains on dual antiplatelet therapy during the procedure. However, he also has an increased risk for stent thrombosis and recurrent STEMI with any cessation/interruption in dual antiplatelet therapy so soon after stenting (22 days ago). There is no data available to suggest that an Integrilin bridge so soon after stenting is safe or effective, but the patient did have an Integrilin bridge shortly after the STEMI to allow successful stent placement at that time with no acute coronary syndrome during interruption of dual antiplatelets. At this point he should continue 81 mg aspirin daily, hold Brilinta, and we will start Integrilin continuous drip for the next 5 days. He needs to stop Integrilin 4 hours prior to the procedure with Dr. Pattie Ly on the 30th, then immediately resume dual antiplatelet therapy as soon as possible/safe from a GI bleeding standpoint (defer to gastroenterology). The patient understands that he has a risk for bleeding and the risk for stent thrombosis and there is no easy solution to the situation.        Amarilis Hollins MD  [facility]  Pager [phone]

## 2022-11-26 NOTE — PROGRESS NOTES
Hospitalist Progress Note   Admit Date:  2022  4:56 PM   Name:  Cookie Box   Age:  44 y.o. Sex:  male  :  1985   MRN:  [id number]   Room:  602/    Presenting Complaint: No chief complaint on file. Reason(s) for Admission: Pancreatic pseudocyst [K86.3]     Hospital Course: This is a 69-year-old male with alcohol use disorder in remission, alcohol induced chronic pancreatitis complicated by pseudocyst status post gastrostomy with stent placed 2022 and a recent STEMI status post PCI with TULIO 2022 on aspirin and Brilinta who was discharged from this hospital  after conservative treatment for his known pancreatic pseudocyst, now electively admitted per GI recommendations for an Integrilin bridge so that the patient can be taken off of his DAPT for a planned removal of his pancreatic pseudocyst stent on . Cardiology consulted and agrees with Integrilin bridging. Subjective & 24hr Events (22): \"My pain is ok; I really only need norco if it hurts. \"  Pleasant 37y. o. male sitting up in bed    Admits to periumbilical pain; denies N/V, CP, dyspnea, cough, wheezing. Assessment & Plan:     Principal Problem:    Pancreatic pseudocyst  - s/p stent placement 2022  - plan for stent removal 2022 per GI  - cont prn norco for pain  - per cardiology, Integrilin gtt can be held 4hrs prior to procedure; appreciate assistance    Active Problems:    CAD / s/p remote STEMI with PCI  - cont Integrilin gtt  - cont BB / ACE-I / ASA      Hx of ETOH abuse  - pt reports last ETOH beverage was 2022      Cigarette smoker  - smoking cessation counseled  - cont nicotine patch      HTN  - cont oral agents  - acceptable control      Anticipated discharge needs:    - pt will be hospitalized at least until 2022    Diet:  ADULT DIET;  Regular  DVT PPx: Ambulate / SCD while in bed  Code status: Full Code    Hospital Problems:  Principal Problem:    Pancreatic pseudocyst  Active Problems:    Alcohol use disorder, severe, in early remission, dependence ([facility]    Tobacco abuse [Z72.0 (ICD-10-CM)]    History of ST elevation myocardial infarction (STEMI)    Coronary artery disease involving native coronary artery of native heart    Primary hypertension  Resolved Problems:    * No resolved hospital problems. *      Objective:   Patient Vitals for the past 24 hrs:   Temp Pulse Resp BP SpO2   11/26/22 1102 97.7 °F (36.5 °C) 69 18 (!) 140/95 98 %   11/26/22 0724 97.7 °F (36.5 °C) 73 16 (!) 134/94 97 %   11/26/22 0300 98.3 °F (36.8 °C) 67 14 127/81 97 %   11/25/22 2312 98.2 °F (36.8 °C) 76 16 125/81 100 %   11/25/22 1909 98.3 °F (36.8 °C) 80 16 121/83 98 %   11/25/22 1710 97.8 °F (36.6 °C) 84 17 126/81 97 %       Oxygen Therapy  SpO2: 98 %  O2 Device: None (Room air)    Estimated body mass index is 25.1 kg/m² as calculated from the following:    Height as of this encounter: 5' 9\" (1.753 m). Weight as of this encounter: 170 lb (77.1 kg). Intake/Output Summary (Last 24 hours) at 11/26/2022 1129  Last data filed at 11/25/2022 2312  Gross per 24 hour   Intake 355 ml   Output --   Net 355 ml         Physical Exam:     Blood pressure (!) 140/95, pulse 69, temperature 97.7 °F (36.5 °C), temperature source Oral, resp. rate 18, height 5' 9\" (1.753 m), weight 170 lb (77.1 kg), SpO2 98 %. General:    Well nourished. Head:  Normocephalic, atraumatic  Eyes:  Sclerae appear normal.  Pupils equally round. ENT:  Nares appear normal, no drainage. Moist oral mucosa  Neck:  No restricted ROM. Trachea midline   CV:   RRR. No m/r/g. No jugular venous distension. Lungs:   CTAB. No wheezing, rhonchi, or rales. Symmetric expansion. Abdomen:   Mild tenderness elicited on palpation umbilical area; no guarding; +BSx4  Extremities: No cyanosis or clubbing. No edema  Skin:     No rashes and normal coloration. Warm and dry. Neuro:  CN II-XII grossly intact. Sensation intact. A&Ox3  Psych:  Normal mood and affect. I have personally reviewed labs and tests showing:  Recent Labs:  Recent Results (from the past 48 hour(s))   Basic Metabolic Panel w/ Reflex to MG    Collection Time: 11/26/22  4:44 AM   Result Value Ref Range    Sodium 136 133 - 143 mmol/L    Potassium 4.5 3.5 - 5.1 mmol/L    Chloride 109 101 - 110 mmol/L    CO2 23 21 - 32 mmol/L    Anion Gap 4 2 - 11 mmol/L    Glucose 102 (H) 65 - 100 mg/dL    BUN 13 6 - 23 MG/DL    Creatinine 0.80 0.8 - 1.5 MG/DL    Est, Glom Filt Rate >60 >60 ml/min/1.73m2    Calcium 7.2 (L) 8.3 - 10.4 MG/DL   CBC with Auto Differential    Collection Time: 11/26/22  4:44 AM   Result Value Ref Range    WBC 6.6 4.3 - 11.1 K/uL    RBC 4.60 4.23 - 5.6 M/uL    Hemoglobin 13.1 (L) 13.6 - 17.2 g/dL    Hematocrit 41.1 41.1 - 50.3 %    MCV 89.3 82 - 102 FL    MCH 28.5 26.1 - 32.9 PG    MCHC 31.9 31.4 - 35.0 g/dL    RDW 15.6 (H) 11.9 - 14.6 %    Platelets 052 337 - 222 K/uL    MPV 9.9 9.4 - 12.3 FL    nRBC 0.00 0.0 - 0.2 K/uL    Differential Type AUTOMATED      Seg Neutrophils 51 43 - 78 %    Lymphocytes 34 13 - 44 %    Monocytes 9 4.0 - 12.0 %    Eosinophils % 5 0.5 - 7.8 %    Basophils 1 0.0 - 2.0 %    Immature Granulocytes 0 0.0 - 5.0 %    Segs Absolute 3.3 1.7 - 8.2 K/UL    Absolute Lymph # 2.3 0.5 - 4.6 K/UL    Absolute Mono # 0.6 0.1 - 1.3 K/UL    Absolute Eos # 0.3 0.0 - 0.8 K/UL    Basophils Absolute 0.1 0.0 - 0.2 K/UL    Absolute Immature Granulocyte 0.0 0.0 - 0.5 K/UL       I have personally reviewed imaging studies showing:   Other Studies:  No orders to display       Current Meds:  Current Facility-Administered Medications   Medication Dose Route Frequency    acetaminophen (TYLENOL) tablet 650 mg  650 mg Oral Q4H PRN    ondansetron (ZOFRAN) injection 4 mg  4 mg IntraVENous Q6H PRN    polyethylene glycol (GLYCOLAX) packet 17 g  17 g Oral Daily PRN    senna (SENOKOT) tablet 17.2 mg  2 tablet Oral Nightly PRN    melatonin tablet 1.5 mg  1.5 mg Oral Nightly PRN    aluminum & magnesium hydroxide-simethicone (MAALOX) 200-200-20 MG/5ML suspension 30 mL  30 mL Oral Q6H PRN    lisinopril (PRINIVIL;ZESTRIL) tablet 5 mg  5 mg Oral Daily    metoprolol tartrate (LOPRESSOR) tablet 25 mg  25 mg Oral BID    atorvastatin (LIPITOR) tablet 40 mg  40 mg Oral Nightly    eptifibatide (INTEGRILIN) 0.75 mg/mL infusion  2 mcg/kg/min IntraVENous Continuous    aspirin chewable tablet 81 mg  81 mg Oral Daily    nicotine (NICODERM CQ) 14 MG/24HR 1 patch  1 patch TransDERmal Q24H    buPROPion [facility]) extended release tablet 150 mg  150 mg Oral Daily    Followed by    Shon Mohr ON 11/28/2022] buPROPion [facility]) extended release tablet 150 mg  150 mg Oral BID       Signed:  Roxy Bales    Part of this note may have been written by using a voice dictation software. The note has been proof read but may still contain some grammatical/other typographical errors.

## 2022-11-26 NOTE — PROGRESS NOTES
[facility] CARDIOLOGY PROGRESS NOTE    11/26/2022 9:22 AM    Admit Date: 11/25/2022        Subjective:   Stable overnight without angina, CHF, or palpitations. Vitals stable and controlled. No other complaints overnight. Tolerating meds well. Objective:      Vitals:    11/25/22 1909 11/25/22 2312 11/26/22 0300 11/26/22 0724   BP: 121/83 125/81 127/81 (!) 134/94   Pulse: 80 76 67 73   Resp: 16 16 14 16   Temp: 98.3 °F (36.8 °C) 98.2 °F (36.8 °C) 98.3 °F (36.8 °C) 97.7 °F (36.5 °C)   TempSrc: Oral Axillary Oral Oral   SpO2: 98% 100% 97% 97%   Weight:       Height:           Physical Exam:  Neck- supple, no JVD  CV- regular rate and rhythm no MRG  Lung- clear bilaterally  Abd- soft, nontender, nondistended  Ext- no edema  Skin- warm and dry    Data Review:   Recent Labs     11/26/22  0444      K 4.5   BUN 13   WBC 6.6   HGB 13.1*   HCT 41.1          Assessment and Plan: Active Hospital Problems    *Pancreatic pseudocyst-recurrent issue, now with a drain into the stomach, needing removal in 4 days with Dr. Tricia Palafox. See below. Coronary artery disease involving native coronary artery of native heart-stable since STEMI/PCI. No angina or CHF. Compliant with meds, last Brilinta this morning. History of ST elevation myocardial infarction (STEMI)-high lateral infarction with OM stenting 23 days ago. See below. Tobacco abuse [Z72.0 (ICD-10-CM)]-cessation counseling in the outpatient setting       Alcohol use disorder, severe, in early remission, dependence (HCC)-no alcohol in 2 months. He is congratulated and encouraged to continue       Primary hypertension-stable, continue meds and titrate as needed     Preoperative evaluation-the patient just had a [place]rell drug-eluting stent to a large first obtuse marginal branch in the setting of an acute occlusion/STEMI 23 days ago.   He has been compliant with dual antiplatelet therapies but now needs cessation of Brilinta prior to stent removal in 4 days. Unfortunately he has an increased risk for bleeding if he remains on dual antiplatelet therapy during the procedure. However, he also has an increased risk for stent thrombosis and recurrent STEMI with any cessation/interruption in dual antiplatelet therapy so soon after stenting (23 days ago). There is no data available to suggest that an Integrilin bridge so soon after stenting is safe or effective, but the patient did have an Integrilin bridge shortly after the STEMI to allow successful stent placement at that time with no acute coronary syndrome during interruption of dual antiplatelets. At this point he should continue 81 mg aspirin daily, hold Brilinta, and we will start Integrilin continuous drip for the next 5 days. He needs to stop Integrilin 4 hours prior to the procedure with Dr. Suhail Lainez on the 30th, then immediately resume dual antiplatelet therapy as soon as possible/safe from a GI bleeding standpoint (defer to gastroenterology). The patient understands that he has a risk for bleeding and the risk for stent thrombosis and there is no easy solution to the situation.         Daina Lozano MD

## 2022-11-27 LAB
ERYTHROCYTE [DISTWIDTH] IN BLOOD BY AUTOMATED COUNT: 15.6 % (ref 11.9–14.6)
HCT VFR BLD AUTO: 44.5 % (ref 41.1–50.3)
HGB BLD-MCNC: 14.1 G/DL (ref 13.6–17.2)
MCH RBC QN AUTO: 27.5 PG (ref 26.1–32.9)
MCHC RBC AUTO-ENTMCNC: 31.7 G/DL (ref 31.4–35)
MCV RBC AUTO: 86.7 FL (ref 82–102)
NRBC # BLD: 0 K/UL (ref 0–0.2)
PLATELET # BLD AUTO: 344 K/UL (ref 150–450)
PMV BLD AUTO: 9.3 FL (ref 9.4–12.3)
RBC # BLD AUTO: 5.13 M/UL (ref 4.23–5.6)
WBC # BLD AUTO: 6.7 K/UL (ref 4.3–11.1)

## 2022-11-27 PROCEDURE — 6370000000 HC RX 637 (ALT 250 FOR IP): Performed by: NURSE PRACTITIONER

## 2022-11-27 PROCEDURE — 6370000000 HC RX 637 (ALT 250 FOR IP): Performed by: STUDENT IN AN ORGANIZED HEALTH CARE EDUCATION/TRAINING PROGRAM

## 2022-11-27 PROCEDURE — 6360000002 HC RX W HCPCS: Performed by: NURSE PRACTITIONER

## 2022-11-27 PROCEDURE — 85027 COMPLETE CBC AUTOMATED: CPT

## 2022-11-27 PROCEDURE — 6370000000 HC RX 637 (ALT 250 FOR IP): Performed by: PHYSICIAN ASSISTANT

## 2022-11-27 PROCEDURE — 36415 COLL VENOUS BLD VENIPUNCTURE: CPT

## 2022-11-27 PROCEDURE — 1100000003 HC PRIVATE W/ TELEMETRY

## 2022-11-27 PROCEDURE — 99232 SBSQ HOSP IP/OBS MODERATE 35: CPT | Performed by: INTERNAL MEDICINE

## 2022-11-27 RX ORDER — HYDROCODONE BITARTRATE AND ACETAMINOPHEN 10; 325 MG/1; MG/1
1 TABLET ORAL EVERY 4 HOURS PRN
Status: DISCONTINUED | OUTPATIENT
Start: 2022-11-27 | End: 2022-11-29

## 2022-11-27 RX ORDER — LISINOPRIL 5 MG/1
10 TABLET ORAL DAILY
Status: DISCONTINUED | OUTPATIENT
Start: 2022-11-28 | End: 2022-12-01 | Stop reason: HOSPADM

## 2022-11-27 RX ADMIN — HYDROCODONE BITARTRATE AND ACETAMINOPHEN 1 TABLET: 10; 325 TABLET ORAL at 06:46

## 2022-11-27 RX ADMIN — ALUMINUM HYDROXIDE, MAGNESIUM HYDROXIDE, AND SIMETHICONE 30 ML: 200; 200; 20 SUSPENSION ORAL at 11:21

## 2022-11-27 RX ADMIN — HYDROCODONE BITARTRATE AND ACETAMINOPHEN 1 TABLET: 10; 325 TABLET ORAL at 23:46

## 2022-11-27 RX ADMIN — LISINOPRIL 5 MG: 5 TABLET ORAL at 08:17

## 2022-11-27 RX ADMIN — METOPROLOL TARTRATE 25 MG: 25 TABLET, FILM COATED ORAL at 08:17

## 2022-11-27 RX ADMIN — EPTIFIBATIDE 2 MCG/KG/MIN: 0.75 INJECTION INTRAVENOUS at 06:46

## 2022-11-27 RX ADMIN — EPTIFIBATIDE 2 MCG/KG/MIN: 0.75 INJECTION INTRAVENOUS at 19:49

## 2022-11-27 RX ADMIN — METOPROLOL TARTRATE 25 MG: 25 TABLET, FILM COATED ORAL at 19:49

## 2022-11-27 RX ADMIN — ATORVASTATIN CALCIUM 40 MG: 40 TABLET, FILM COATED ORAL at 19:49

## 2022-11-27 RX ADMIN — HYDROCODONE BITARTRATE AND ACETAMINOPHEN 1 TABLET: 10; 325 TABLET ORAL at 11:22

## 2022-11-27 RX ADMIN — HYDROCODONE BITARTRATE AND ACETAMINOPHEN 1 TABLET: 10; 325 TABLET ORAL at 00:21

## 2022-11-27 RX ADMIN — HYDROCODONE BITARTRATE AND ACETAMINOPHEN 1 TABLET: 10; 325 TABLET ORAL at 15:23

## 2022-11-27 RX ADMIN — EPTIFIBATIDE 2 MCG/KG/MIN: 0.75 INJECTION INTRAVENOUS at 13:40

## 2022-11-27 RX ADMIN — ASPIRIN 81 MG 81 MG: 81 TABLET ORAL at 08:17

## 2022-11-27 RX ADMIN — HYDROCODONE BITARTRATE AND ACETAMINOPHEN 1 TABLET: 10; 325 TABLET ORAL at 19:43

## 2022-11-27 RX ADMIN — BUPROPION HYDROCHLORIDE 150 MG: 150 TABLET, EXTENDED RELEASE ORAL at 08:17

## 2022-11-27 ASSESSMENT — PAIN DESCRIPTION - LOCATION
LOCATION: ABDOMEN

## 2022-11-27 ASSESSMENT — PAIN SCALES - GENERAL
PAINLEVEL_OUTOF10: 6
PAINLEVEL_OUTOF10: 6
PAINLEVEL_OUTOF10: 0
PAINLEVEL_OUTOF10: 6
PAINLEVEL_OUTOF10: 0
PAINLEVEL_OUTOF10: 6
PAINLEVEL_OUTOF10: 6
PAINLEVEL_OUTOF10: 4
PAINLEVEL_OUTOF10: 7
PAINLEVEL_OUTOF10: 6

## 2022-11-27 ASSESSMENT — PAIN DESCRIPTION - ONSET: ONSET: ON-GOING

## 2022-11-27 ASSESSMENT — PAIN DESCRIPTION - FREQUENCY: FREQUENCY: CONTINUOUS

## 2022-11-27 ASSESSMENT — PAIN DESCRIPTION - DESCRIPTORS
DESCRIPTORS: ACHING
DESCRIPTORS: CRAMPING

## 2022-11-27 ASSESSMENT — PAIN DESCRIPTION - ORIENTATION: ORIENTATION: ANTERIOR

## 2022-11-27 ASSESSMENT — PAIN - FUNCTIONAL ASSESSMENT: PAIN_FUNCTIONAL_ASSESSMENT: ACTIVITIES ARE NOT PREVENTED

## 2022-11-27 ASSESSMENT — PAIN DESCRIPTION - PAIN TYPE: TYPE: ACUTE PAIN

## 2022-11-27 NOTE — PROGRESS NOTES
Call received from pharmacy stating, Integrlin  gtt per policy should not be given on Med/Surg floors. Informed Darian Arredondo RN charge nurse, who states, she spoke with Dr Maria Elena Dodd and was told it was okay to give on Med/Surg since it was a bridge for selvin. Darian Arredondo states, she will contact house supervisor and notify this nurse for further instructions.

## 2022-11-27 NOTE — PROGRESS NOTES
Hospitalist Progress Note   Admit Date:  2022  4:56 PM   Name:  Fady Lazcano   Age:  44 y.o. Sex:  male  :  1985   MRN:  [id number]   Room:  602/    Presenting Complaint: No chief complaint on file. 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 Cardiology consulted and agrees with Integrilin bridging. Subjective & 24hr Events (22): \"Can we change my pain medicines frequency so I can get it earlier? \"  Pleasant 37y. o. male sitting up in bed, wife and children at bedside    Admits to 0/80 periumbilical pain; denies N/V, CP, dyspnea, cough, wheezing.       Assessment & Plan:     Principal Problem:    Pancreatic pseudocyst  - s/p stent placement 2022  - plan for stent removal 2022 per GI  - cont prn norco for pain; increase frequency from q6 to q4hrs prn  - per cardiology, Integrilin gtt can be held 4hrs prior to procedure; appreciate ongoing assistance    Active Problems:    CAD / s/p remote STEMI with PCI  - cont Integrilin gtt; no gross s/sx GIB; f/u AM CBC  - cont BB / ACE-I / ASA  - denies CP      Hx of ETOH abuse  - pt reports last ETOH beverage was 2022  - cont cessation efforts      Cigarette smoker  - smoking cessation counseled  - cont nicotine patch      HTN  - cont oral agents  - acceptable control      Anticipated discharge needs:    - pt will be hospitalized at least until 11/30/2022; home post procedure    Diet:  ADULT DIET; Regular  DVT PPx: Ambulate / SCD while in bed  Code status: Full Code    Hospital Problems:  Principal Problem:    Pancreatic pseudocyst  Active Problems:    Alcohol use disorder, severe, in early remission, dependence ([facility] [address]    Tobacco abuse [Z72.0 (ICD-10-CM)]    History of ST elevation myocardial infarction (STEMI)    Coronary artery disease involving native coronary artery of native heart    Primary hypertension  Resolved Problems:    * No resolved hospital problems. *      Objective:   Patient Vitals for the past 24 hrs:   Temp Pulse Resp BP SpO2   11/27/22 0706 97.5 °F (36.4 °C) 73 17 130/86 99 %   11/27/22 0646 -- -- 16 -- --   11/27/22 0424 97.8 °F (36.6 °C) 85 17 (!) 130/90 98 %   11/27/22 0003 97.6 °F (36.4 °C) 79 17 (!) 112/94 100 %   11/26/22 1952 98.1 °F (36.7 °C) 78 17 (!) 125/91 100 %   11/26/22 1701 97.9 °F (36.6 °C) 90 18 115/80 99 %         Oxygen Therapy  SpO2: 99 %  O2 Device: None (Room air)    Estimated body mass index is 24.81 kg/m² as calculated from the following:    Height as of this encounter: 5' 9\" (1.753 m). Weight as of this encounter: 168 lb (76.2 kg). No intake or output data in the 24 hours ending 11/27/22 1133        Physical Exam:     Blood pressure 130/86, pulse 73, temperature 97.5 °F (36.4 °C), temperature source Oral, resp. rate 17, height 5' 9\" (1.753 m), weight 168 lb (76.2 kg), SpO2 99 %. General:    Well nourished. Head:  Normocephalic, atraumatic  Eyes:  Sclerae appear normal.  Pupils equally round. ENT:  Nares appear normal, no drainage. Moist oral mucosa  Neck:  No restricted ROM. Trachea midline   CV:   RRR. No m/r/g. No jugular venous distension. Lungs:   CTAB. No wheezing, rhonchi, or rales. Symmetric expansion. Abdomen:   Mild tenderness elicited on palpation umbilical area; no guarding; +BSx4  Extremities: No cyanosis or clubbing. No edema  Skin:     No rashes and normal coloration.    Warm and dry.    Neuro:  CN II-XII grossly intact. Sensation intact. A&Ox3  Psych:  Normal mood and affect. I have personally reviewed labs and tests showing:  Recent Labs:  Recent Results (from the past 48 hour(s))   Basic Metabolic Panel w/ Reflex to MG    Collection Time: 11/26/22  4:44 AM   Result Value Ref Range    Sodium 136 133 - 143 mmol/L    Potassium 4.5 3.5 - 5.1 mmol/L    Chloride 109 101 - 110 mmol/L    CO2 23 21 - 32 mmol/L    Anion Gap 4 2 - 11 mmol/L    Glucose 102 (H) 65 - 100 mg/dL    BUN 13 6 - 23 MG/DL    Creatinine 0.80 0.8 - 1.5 MG/DL    Est, Glom Filt Rate >60 >60 ml/min/1.73m2    Calcium 7.2 (L) 8.3 - 10.4 MG/DL   CBC with Auto Differential    Collection Time: 11/26/22  4:44 AM   Result Value Ref Range    WBC 6.6 4.3 - 11.1 K/uL    RBC 4.60 4.23 - 5.6 M/uL    Hemoglobin 13.1 (L) 13.6 - 17.2 g/dL    Hematocrit 41.1 41.1 - 50.3 %    MCV 89.3 82 - 102 FL    MCH 28.5 26.1 - 32.9 PG    MCHC 31.9 31.4 - 35.0 g/dL    RDW 15.6 (H) 11.9 - 14.6 %    Platelets 978 850 - 664 K/uL    MPV 9.9 9.4 - 12.3 FL    nRBC 0.00 0.0 - 0.2 K/uL    Differential Type AUTOMATED      Seg Neutrophils 51 43 - 78 %    Lymphocytes 34 13 - 44 %    Monocytes 9 4.0 - 12.0 %    Eosinophils % 5 0.5 - 7.8 %    Basophils 1 0.0 - 2.0 %    Immature Granulocytes 0 0.0 - 5.0 %    Segs Absolute 3.3 1.7 - 8.2 K/UL    Absolute Lymph # 2.3 0.5 - 4.6 K/UL    Absolute Mono # 0.6 0.1 - 1.3 K/UL    Absolute Eos # 0.3 0.0 - 0.8 K/UL    Basophils Absolute 0.1 0.0 - 0.2 K/UL    Absolute Immature Granulocyte 0.0 0.0 - 0.5 K/UL   Drug Screen Psych, Urine    Collection Time: 11/26/22 11:57 AM   Result Value Ref Range    Benzodiazepines, Urine Negative NEG      Opiates, Urine Positive (A) NEG      Amphetamine, Urine Negative NEG      Cocaine, Urine Negative NEG      THC, TH-Cannabinol, Urine Negative NEG         I have personally reviewed imaging studies showing:   Other Studies:  No orders to display       Current Meds:  Current Facility-Administered Medications   Medication Dose Route Frequency    [START ON 11/28/2022] lisinopril (PRINIVIL;ZESTRIL) tablet 10 mg  10 mg Oral Daily    HYDROcodone-acetaminophen (NORCO)  MG per tablet 1 tablet  1 tablet Oral Q4H PRN    acetaminophen (TYLENOL) tablet 650 mg  650 mg Oral Q4H PRN    ondansetron (ZOFRAN) injection 4 mg  4 mg IntraVENous Q6H PRN    polyethylene glycol (GLYCOLAX) packet 17 g  17 g Oral Daily PRN    senna (SENOKOT) tablet 17.2 mg  2 tablet Oral Nightly PRN    melatonin tablet 1.5 mg  1.5 mg Oral Nightly PRN    aluminum & magnesium hydroxide-simethicone (MAALOX) 200-200-20 MG/5ML suspension 30 mL  30 mL Oral Q6H PRN    metoprolol tartrate (LOPRESSOR) tablet 25 mg  25 mg Oral BID    atorvastatin (LIPITOR) tablet 40 mg  40 mg Oral Nightly    eptifibatide (INTEGRILIN) 0.75 mg/mL infusion  2 mcg/kg/min IntraVENous Continuous    aspirin chewable tablet 81 mg  81 mg Oral Daily    nicotine (NICODERM CQ) 14 MG/24HR 1 patch  1 patch TransDERmal Q24H    [START ON 11/28/2022] buPROPion [facility] FOR [facility]) extended release tablet 150 mg  150 mg Oral BID       Signed:  Roxy Bales    Part of this note may have been written by using a voice dictation software. The note has been proof read but may still contain some grammatical/other typographical errors.

## 2022-11-27 NOTE — PROGRESS NOTES
Report called to 3rd floor nurse and pt transported via [facility] with [place] gtt in progress. New bottle given to accepting nurse.

## 2022-11-27 NOTE — PROGRESS NOTES
[facility] CARDIOLOGY PROGRESS NOTE    11/27/2022 8:16 AM    Admit Date: 11/25/2022        Subjective:   Stable overnight without angina, CHF, or palpitations. Vitals stable and controlled. No other complaints overnight. Tolerating meds well. Objective:      Vitals:    11/27/22 0003 11/27/22 0424 11/27/22 0646 11/27/22 0706   BP: (!) 112/94 (!) 130/90  130/86   Pulse: 79 85  73   Resp: 17 17 16 17   Temp: 97.6 °F (36.4 °C) 97.8 °F (36.6 °C)  97.5 °F (36.4 °C)   TempSrc: Oral Oral  Oral   SpO2: 100% 98%  99%   Weight:       Height:           Physical Exam:  Neck- supple, no JVD  CV- regular rate and rhythm no MRG  Lung- clear bilaterally  Abd- soft, nontender, nondistended  Ext- no edema  Skin- warm and dry    Data Review:   Recent Labs     11/26/22  0444      K 4.5   BUN 13   WBC 6.6   HGB 13.1*   HCT 41.1          Assessment and Plan: Active Hospital Problems    *Pancreatic pseudocyst-recurrent issue, now with a drain into the stomach, needing removal 11/30/2022 with Dr. Nik Garza. See below. Coronary artery disease involving native coronary artery of native heart-stable since STEMI/PCI. No angina or CHF. Compliant with meds, last Brilinta this morning. History of ST elevation myocardial infarction (STEMI)-high lateral infarction with OM stenting 11/2/2022. Days ago. See below. Tobacco abuse [Z72.0 (ICD-10-CM)]-cessation counseling in the outpatient setting       Alcohol use disorder, severe, in early remission, dependence (HCC)-no alcohol in 2 months. He is congratulated and encouraged to continue       Primary hypertension-increase lisinopril to 10 mg daily today. Recheck BMP in the morning. Preoperative evaluation-the patient just had a Lead drug-eluting stent to a large first obtuse marginal branch in the setting of an acute occlusion/STEMI 11/2/2022.   He has been compliant with dual antiplatelet therapies but now needs cessation of Brilinta prior to stent removal on November 30. Unfortunately he has an increased risk for bleeding if he remains on dual antiplatelet therapy during the procedure. However, he also has an increased risk for stent thrombosis and recurrent STEMI with any cessation/interruption in dual antiplatelet therapy so soon after stenting in the setting of acute vessel occlusion/plaque rupture. There is no data available to suggest that an Integrilin bridge so soon after stenting is safe or effective, but the patient did have an Integrilin bridge shortly after the STEMI to allow successful stent placement at that time with no acute coronary syndrome during interruption of dual antiplatelets. At this point he should continue 81 mg aspirin daily, hold Brilinta, and we will continue Integrilin continuous drip until the day of the procedure. Rashi Echols He needs to stop Integrilin 4 hours prior to the procedure with Dr. Mar Martinez on the 30th, then immediately resume dual antiplatelet therapy as soon as possible/safe from a GI bleeding standpoint (defer to gastroenterology). The patient understands that he has a risk for bleeding and the risk for stent thrombosis and there is no easy solution to the situation.         Monica Dukes MD

## 2022-11-27 NOTE — PROGRESS NOTES
TRANSFER - IN REPORT:    Verbal report received from [facility] on Vanessa Back  being received from 6th floor for routine progression of patient care      Report consisted of patient's Situation, Background, Assessment and   Recommendations(SBAR). Information from the following report(s) Nurse Handoff Report, Index, Adult Overview, Intake/Output, and MAR was reviewed with the receiving nurse. Opportunity for questions and clarification was provided. Assessment completed upon patient's arrival to unit and care assumed. Pt sacrum and heels are clean dry and intact with no redness or breakdown. Pt has tattoos on his R arm. No skin issues. Skin verified with Clifton Parker RN.

## 2022-11-27 NOTE — PROGRESS NOTES
Notified by RN hospital policy dictates patient needs to be on telemetry floor while running Integrilin gtt. Transfer orders placed.     Lewis Allen

## 2022-11-28 LAB
ANION GAP SERPL CALC-SCNC: 3 MMOL/L (ref 2–11)
BUN SERPL-MCNC: 9 MG/DL (ref 6–23)
CALCIUM SERPL-MCNC: 9.1 MG/DL (ref 8.3–10.4)
CHLORIDE SERPL-SCNC: 108 MMOL/L (ref 101–110)
CO2 SERPL-SCNC: 26 MMOL/L (ref 21–32)
CREAT SERPL-MCNC: 0.9 MG/DL (ref 0.8–1.5)
ERYTHROCYTE [DISTWIDTH] IN BLOOD BY AUTOMATED COUNT: 15.8 % (ref 11.9–14.6)
GLUCOSE SERPL-MCNC: 111 MG/DL (ref 65–100)
HCT VFR BLD AUTO: 43.4 % (ref 41.1–50.3)
HGB BLD-MCNC: 13.6 G/DL (ref 13.6–17.2)
MCH RBC QN AUTO: 27.6 PG (ref 26.1–32.9)
MCHC RBC AUTO-ENTMCNC: 31.3 G/DL (ref 31.4–35)
MCV RBC AUTO: 88 FL (ref 82–102)
NRBC # BLD: 0 K/UL (ref 0–0.2)
PLATELET # BLD AUTO: 275 K/UL (ref 150–450)
PMV BLD AUTO: 9.3 FL (ref 9.4–12.3)
POTASSIUM SERPL-SCNC: 4.3 MMOL/L (ref 3.5–5.1)
RBC # BLD AUTO: 4.93 M/UL (ref 4.23–5.6)
SODIUM SERPL-SCNC: 137 MMOL/L (ref 133–143)
WBC # BLD AUTO: 5.6 K/UL (ref 4.3–11.1)

## 2022-11-28 PROCEDURE — 6370000000 HC RX 637 (ALT 250 FOR IP): Performed by: INTERNAL MEDICINE

## 2022-11-28 PROCEDURE — 99232 SBSQ HOSP IP/OBS MODERATE 35: CPT | Performed by: INTERNAL MEDICINE

## 2022-11-28 PROCEDURE — 6370000000 HC RX 637 (ALT 250 FOR IP): Performed by: STUDENT IN AN ORGANIZED HEALTH CARE EDUCATION/TRAINING PROGRAM

## 2022-11-28 PROCEDURE — 6370000000 HC RX 637 (ALT 250 FOR IP): Performed by: HOSPITALIST

## 2022-11-28 PROCEDURE — 6370000000 HC RX 637 (ALT 250 FOR IP): Performed by: PHYSICIAN ASSISTANT

## 2022-11-28 PROCEDURE — 36415 COLL VENOUS BLD VENIPUNCTURE: CPT

## 2022-11-28 PROCEDURE — 80048 BASIC METABOLIC PNL TOTAL CA: CPT

## 2022-11-28 PROCEDURE — 6360000002 HC RX W HCPCS: Performed by: NURSE PRACTITIONER

## 2022-11-28 PROCEDURE — 85027 COMPLETE CBC AUTOMATED: CPT

## 2022-11-28 PROCEDURE — 6370000000 HC RX 637 (ALT 250 FOR IP): Performed by: NURSE PRACTITIONER

## 2022-11-28 PROCEDURE — 1100000003 HC PRIVATE W/ TELEMETRY

## 2022-11-28 RX ORDER — TEMAZEPAM 15 MG/1
15 CAPSULE ORAL NIGHTLY PRN
Status: DISCONTINUED | OUTPATIENT
Start: 2022-11-28 | End: 2022-12-01 | Stop reason: HOSPADM

## 2022-11-28 RX ADMIN — ALUMINUM HYDROXIDE, MAGNESIUM HYDROXIDE, AND SIMETHICONE 30 ML: 200; 200; 20 SUSPENSION ORAL at 22:49

## 2022-11-28 RX ADMIN — HYDROCODONE BITARTRATE AND ACETAMINOPHEN 1 TABLET: 10; 325 TABLET ORAL at 09:21

## 2022-11-28 RX ADMIN — EPTIFIBATIDE 2 MCG/KG/MIN: 0.75 INJECTION INTRAVENOUS at 17:04

## 2022-11-28 RX ADMIN — EPTIFIBATIDE 2 MCG/KG/MIN: 0.75 INJECTION INTRAVENOUS at 07:15

## 2022-11-28 RX ADMIN — METOPROLOL TARTRATE 25 MG: 25 TABLET, FILM COATED ORAL at 08:49

## 2022-11-28 RX ADMIN — LISINOPRIL 10 MG: 5 TABLET ORAL at 08:49

## 2022-11-28 RX ADMIN — HYDROCODONE BITARTRATE AND ACETAMINOPHEN 1 TABLET: 10; 325 TABLET ORAL at 05:04

## 2022-11-28 RX ADMIN — ATORVASTATIN CALCIUM 40 MG: 40 TABLET, FILM COATED ORAL at 21:54

## 2022-11-28 RX ADMIN — BUPROPION HYDROCHLORIDE 150 MG: 150 TABLET, EXTENDED RELEASE ORAL at 21:54

## 2022-11-28 RX ADMIN — ASPIRIN 81 MG 81 MG: 81 TABLET ORAL at 08:48

## 2022-11-28 RX ADMIN — BUPROPION HYDROCHLORIDE 150 MG: 150 TABLET, EXTENDED RELEASE ORAL at 08:49

## 2022-11-28 RX ADMIN — TEMAZEPAM 15 MG: 15 CAPSULE ORAL at 22:48

## 2022-11-28 RX ADMIN — METOPROLOL TARTRATE 25 MG: 25 TABLET, FILM COATED ORAL at 21:54

## 2022-11-28 RX ADMIN — HYDROCODONE BITARTRATE AND ACETAMINOPHEN 1 TABLET: 10; 325 TABLET ORAL at 22:48

## 2022-11-28 RX ADMIN — HYDROCODONE BITARTRATE AND ACETAMINOPHEN 1 TABLET: 10; 325 TABLET ORAL at 18:48

## 2022-11-28 RX ADMIN — ALUMINUM HYDROXIDE, MAGNESIUM HYDROXIDE, AND SIMETHICONE 30 ML: 200; 200; 20 SUSPENSION ORAL at 08:54

## 2022-11-28 RX ADMIN — HYDROCODONE BITARTRATE AND ACETAMINOPHEN 1 TABLET: 10; 325 TABLET ORAL at 13:44

## 2022-11-28 ASSESSMENT — PAIN SCALES - WONG BAKER: WONGBAKER_NUMERICALRESPONSE: 0

## 2022-11-28 ASSESSMENT — PAIN SCALES - GENERAL
PAINLEVEL_OUTOF10: 5
PAINLEVEL_OUTOF10: 6
PAINLEVEL_OUTOF10: 0
PAINLEVEL_OUTOF10: 0
PAINLEVEL_OUTOF10: 6
PAINLEVEL_OUTOF10: 0
PAINLEVEL_OUTOF10: 7
PAINLEVEL_OUTOF10: 0
PAINLEVEL_OUTOF10: 0
PAINLEVEL_OUTOF10: 8
PAINLEVEL_OUTOF10: 6

## 2022-11-28 ASSESSMENT — PAIN DESCRIPTION - LOCATION
LOCATION: ABDOMEN

## 2022-11-28 ASSESSMENT — PAIN DESCRIPTION - DESCRIPTORS
DESCRIPTORS: SHARP
DESCRIPTORS: PRESSURE
DESCRIPTORS: SHARP
DESCRIPTORS: OTHER (COMMENT);SHARP

## 2022-11-28 ASSESSMENT — PAIN DESCRIPTION - ORIENTATION
ORIENTATION: MID
ORIENTATION: LEFT
ORIENTATION: MID
ORIENTATION: LEFT

## 2022-11-28 NOTE — PROGRESS NOTES
[facility] CARDIOLOGY PROGRESS NOTE           11/28/2022 7:16 AM    Admit Date: 11/25/2022      Subjective:   Patient denies any active chest pain or dyspnea. Currently on Integrilin drip. Plans for stent removal on November 30. Hemodynamics stable. ROS:  Cardiovascular:  As noted above    Objective:      Vitals:    11/27/22 1949 11/27/22 2101 11/27/22 2353 11/28/22 0459   BP: 123/81 113/76 114/78 (!) 126/91   Pulse: 71 77 66 68   Resp:  18 18 18   Temp:  97.5 °F (36.4 °C) 97.7 °F (36.5 °C) 97.7 °F (36.5 °C)   TempSrc:  Oral Oral Oral   SpO2:  99% 97% 98%   Weight:    166 lb 4.8 oz (75.4 kg)   Height:           Physical Exam:  General-No Acute Distress  Neck- supple, no JVD  CV- regular rate and rhythm no MRG  Lung- clear bilaterally  Abd- soft, nontender, nondistended  Ext- no edema bilaterally. Skin- warm and dry      Data Review:   Recent Labs     11/28/22  0347 11/27/22  1226 11/26/22  0444   WBC 5.6 6.7 6.6   HGB 13.6 14.1 13.1*   HCT 43.4 44.5 41.1   MCV 88.0 86.7 89.3    344 382       Recent Labs     11/28/22  0347 11/26/22  0444 11/22/22  0506      < > 136   K 4.3   < > 4.0      < > 104   CO2 26   < > 30   BUN 9   < > 7   CREATININE 0.90   < > 0.70*   GLUCOSE 111*   < > 91   CALCIUM 9.1   < > 8.7   PROT  --   --  6.0*   LABALBU  --   --  2.7*   BILITOT  --   --  0.4   ALKPHOS  --   --  73   AST  --   --  29   ALT  --   --  40   LABGLOM >60   < > >60   GLOB  --   --  3.3    < > = values in this interval not displayed. No results for input(s): CKTOTAL, CKMB, CKMBINDEX, DDIMER, TROPONINI in the last 720 hours. Assessment/Plan:     Active Hospital Problems    *Pancreatic pseudocyst  Defer management to primary team.        Coronary artery disease involving native coronary artery of native heart  Recent MI with stenting of an obtuse marginal on November 2. Difficult situation given need for GI procedure. Continue aspirin 81 mg daily and Integrilin drip. Plan to hold Integrilin 4 hours prior to procedure and resume Brilinta postoperatively as soon as feasible. History of ST elevation myocardial infarction (STEMI)  See above. Tobacco abuse [Z72.0 (ICD-10-CM)]  Smoking cessation. Alcohol use disorder, severe, in early remission, dependence ([facility] [address]  Continued abstinence encouraged. Primary hypertension  BP controlled.   Continue Lopressor and lisinopril                Edith Cotter MD

## 2022-11-28 NOTE — PROGRESS NOTES
Hospitalist Progress Note   Admit Date:  2022  4:56 PM   Name:  Dequan Yusuf   Age:  44 y.o. Sex:  male  :  1985   MRN:  [id number]   Room:  [facility]    Presenting Complaint: No chief complaint on file. Reason(s) for Admission: Pancreatic pseudocyst [K86.3]     Hospital Course: This is a 69-year-old male with alcohol use disorder in remission, alcohol induced chronic pancreatitis complicated by pseudocyst status post gastrostomy with stent placed 2022 and a recent STEMI status post PCI with TULIO 2022 on aspirin and Brilinta who was discharged from this hospital  after conservative treatment for his known pancreatic pseudocyst, now electively admitted per GI recommendations for an Integrilin bridge so that the patient can be taken off of his DAPT for a planned removal of his pancreatic pseudocyst stent on . Cardiology consulted and agrees with Integrilin bridging. Subjective & 24hr Events (22): \"My pain is better today. It still aches when I eat but it's tolerable. \"  Pleasant 37y. o. male laying in bed in NAD    Admits to mild LUQ tenderness; denies N/V, CP, dyspnea, cough, wheezing. Assessment & Plan:     Principal Problem:    Pancreatic pseudocyst  - s/p stent placement 2022  - plan for stent removal 2022 per GI  - cont prn norco for pain  - cont Integrilin gtt while brilinta being held    Active Problems:    CAD / s/p remote STEMI with PCI  - cont Integrilin gtt; no gross s/sx GIB  - h/h stable  - cont BB / ACE-I / ASA  - denies CP      Hx of ETOH abuse  - pt reports last ETOH beverage was 2022  - cont cessation efforts      Cigarette smoker  - smoking cessation counseled  - cont nicotine patch      HTN  - cont oral agents  - acceptable control      Anticipated discharge needs:    - pt will be hospitalized at least until 2022; home post procedure    Diet:  ADULT DIET;  Regular  DVT PPx: Ambulate / SCD while in bed  Code status: Full Code    Hospital Problems:  Principal Problem:    Pancreatic pseudocyst  Active Problems:    Alcohol use disorder, severe, in early remission, dependence ([facility] [address]    Tobacco abuse [Z72.0 (ICD-10-CM)]    History of ST elevation myocardial infarction (STEMI)    Coronary artery disease involving native coronary artery of native heart    Primary hypertension  Resolved Problems:    * No resolved hospital problems. *      Objective:   Patient Vitals for the past 24 hrs:   Temp Pulse Resp BP SpO2   11/28/22 0459 97.7 °F (36.5 °C) 68 18 (!) 126/91 98 %   11/27/22 2353 97.7 °F (36.5 °C) 66 18 114/78 97 %   11/27/22 2101 97.5 °F (36.4 °C) 77 18 113/76 99 %   11/27/22 1949 -- 71 -- 123/81 --   11/27/22 1651 97.5 °F (36.4 °C) 86 18 121/81 100 %   11/27/22 1241 97.5 °F (36.4 °C) 77 18 133/89 98 %   11/27/22 1150 97.8 °F (36.6 °C) 79 18 126/85 98 %         Oxygen Therapy  SpO2: 98 %  O2 Device: None (Room air)    Estimated body mass index is 24.56 kg/m² as calculated from the following:    Height as of this encounter: 5' 9\" (1.753 m). Weight as of this encounter: 166 lb 4.8 oz (75.4 kg). Intake/Output Summary (Last 24 hours) at 11/28/2022 0813  Last data filed at 11/28/2022 0504  Gross per 24 hour   Intake 940 ml   Output --   Net 940 ml           Physical Exam:     Blood pressure (!) 126/91, pulse 68, temperature 97.7 °F (36.5 °C), temperature source Oral, resp. rate 18, height 5' 9\" (1.753 m), weight 166 lb 4.8 oz (75.4 kg), SpO2 98 %. General:    Well nourished. Head:  Normocephalic, atraumatic  Eyes:  Sclerae appear normal.  Pupils equally round. ENT:  Nares appear normal, no drainage. Moist oral mucosa  Neck:  No restricted ROM. Trachea midline   CV:   RRR. No m/r/g. No jugular venous distension. Lungs:   CTAB. No wheezing, rhonchi, or rales. Symmetric expansion. Abdomen:   Mild tenderness elicited on palpation umbilical area; no guarding; +BSx4  Extremities: No cyanosis or clubbing.   No edema  Skin: No rashes and normal coloration. Warm and dry. Neuro:  CN II-XII grossly intact. Sensation intact. A&Ox3  Psych:  Normal mood and affect. I have personally reviewed labs and tests showing:  Recent Labs:  Recent Results (from the past 48 hour(s))   Drug Screen Psych, Urine    Collection Time: 11/26/22 11:57 AM   Result Value Ref Range    Benzodiazepines, Urine Negative NEG      Opiates, Urine Positive (A) NEG      Amphetamine, Urine Negative NEG      Cocaine, Urine Negative NEG      THC, TH-Cannabinol, Urine Negative NEG     CBC    Collection Time: 11/27/22 12:26 PM   Result Value Ref Range    WBC 6.7 4.3 - 11.1 K/uL    RBC 5.13 4.23 - 5.6 M/uL    Hemoglobin 14.1 13.6 - 17.2 g/dL    Hematocrit 44.5 41.1 - 50.3 %    MCV 86.7 82 - 102 FL    MCH 27.5 26.1 - 32.9 PG    MCHC 31.7 31.4 - 35.0 g/dL    RDW 15.6 (H) 11.9 - 14.6 %    Platelets 238 964 - 232 K/uL    MPV 9.3 (L) 9.4 - 12.3 FL    nRBC 0.00 0.0 - 0.2 K/uL   CBC    Collection Time: 11/28/22  3:47 AM   Result Value Ref Range    WBC 5.6 4.3 - 11.1 K/uL    RBC 4.93 4.23 - 5.6 M/uL    Hemoglobin 13.6 13.6 - 17.2 g/dL    Hematocrit 43.4 41.1 - 50.3 %    MCV 88.0 82 - 102 FL    MCH 27.6 26.1 - 32.9 PG    MCHC 31.3 (L) 31.4 - 35.0 g/dL    RDW 15.8 (H) 11.9 - 14.6 %    Platelets 638 119 - 565 K/uL    MPV 9.3 (L) 9.4 - 12.3 FL    nRBC 0.00 0.0 - 0.2 K/uL   Basic Metabolic Panel    Collection Time: 11/28/22  3:47 AM   Result Value Ref Range    Sodium 137 133 - 143 mmol/L    Potassium 4.3 3.5 - 5.1 mmol/L    Chloride 108 101 - 110 mmol/L    CO2 26 21 - 32 mmol/L    Anion Gap 3 2 - 11 mmol/L    Glucose 111 (H) 65 - 100 mg/dL    BUN 9 6 - 23 MG/DL    Creatinine 0.90 0.8 - 1.5 MG/DL    Est, Glom Filt Rate >60 >60 ml/min/1.73m2    Calcium 9.1 8.3 - 10.4 MG/DL       I have personally reviewed imaging studies showing:   Other Studies:  No orders to display       Current Meds:  Current Facility-Administered Medications   Medication Dose Route Frequency    lisinopril (PRINIVIL;ZESTRIL) tablet 10 mg  10 mg Oral Daily    HYDROcodone-acetaminophen (NORCO)  MG per tablet 1 tablet  1 tablet Oral Q4H PRN    acetaminophen (TYLENOL) tablet 650 mg  650 mg Oral Q4H PRN    ondansetron (ZOFRAN) injection 4 mg  4 mg IntraVENous Q6H PRN    polyethylene glycol (GLYCOLAX) packet 17 g  17 g Oral Daily PRN    senna (SENOKOT) tablet 17.2 mg  2 tablet Oral Nightly PRN    melatonin tablet 1.5 mg  1.5 mg Oral Nightly PRN    aluminum & magnesium hydroxide-simethicone (MAALOX) 200-200-20 MG/5ML suspension 30 mL  30 mL Oral Q6H PRN    metoprolol tartrate (LOPRESSOR) tablet 25 mg  25 mg Oral BID    atorvastatin (LIPITOR) tablet 40 mg  40 mg Oral Nightly    eptifibatide (INTEGRILIN) 0.75 mg/mL infusion  2 mcg/kg/min IntraVENous Continuous    aspirin chewable tablet 81 mg  81 mg Oral Daily    nicotine (NICODERM CQ) 14 MG/24HR 1 patch  1 patch TransDERmal Q24H    buPROPion [facility]) extended release tablet 150 mg  150 mg Oral BID       Signed:  Roxy Bales    Part of this note may have been written by using a voice dictation software. The note has been proof read but may still contain some grammatical/other typographical errors.

## 2022-11-28 NOTE — CARE COORDINATION
Pt admitted for pancreatic pseudocyst s/p gastrostomy with stent placed on 11/9/2022 and a recent STEMI s/p PCI with TULIO on 11/2/2022. Per GI, plan for stent removal on 11/30/22. Has a PMHx of alcohol use disorder in remission. Pt lives with his spouse and son. Indep with his ADLs. Denies DME need. Works [employer]. On RA. Denies home care services. PCP confirmed. Pt is self pay. Will continue to follow for potential d/c needs. 11/28/22 1122   Service Assessment   Patient Orientation Alert and Oriented   Cognition Alert   History Provided By Patient   Primary Caregiver Self   Support Systems Spouse/Significant Other;Children;Family Members;Friends/Neighbors   PCP Verified by CM Yes   Prior Functional Level Independent in ADLs/IADLs   Current Functional Level Independent in ADLs/IADLs   Can patient return to prior living arrangement Yes   Ability to make needs known: Good   Family able to assist with home care needs: Yes   Would you like for me to discuss the discharge plan with any other family members/significant others, and if so, who? Yes   Social/Functional History   Lives With Spouse; Son   Type of [address] level   ADL Assistance Independent   Ambulation Assistance Independent   Active  Yes   Mode of Transportation Car   Occupation Full time employment   Discharge Planning   Type of Residence House   Living Arrangements Spouse/Significant Other;Family Members   Current Services Prior To Admission None   Potential Assistance Needed N/A   DME Ordered? No   Potential Assistance Purchasing Medications Yes  (Pt is self pay)   Type of Home Care Services None   Patient expects to be discharged to: [facility] Discharge   Transition of Care Consult (CM Consult) Discharge [address] Discharge None   [employer] Information Provided?  No   Mode of Transport at Discharge Other (see comment)  (Family)   Confirm Follow Up Transport Family

## 2022-11-29 ENCOUNTER — ANESTHESIA EVENT (OUTPATIENT)
Dept: ENDOSCOPY | Age: 37
DRG: 438 | End: 2022-11-29

## 2022-11-29 LAB
ERYTHROCYTE [DISTWIDTH] IN BLOOD BY AUTOMATED COUNT: 15.9 % (ref 11.9–14.6)
HCT VFR BLD AUTO: 46.9 % (ref 41.1–50.3)
HGB BLD-MCNC: 15.1 G/DL (ref 13.6–17.2)
MCH RBC QN AUTO: 27.8 PG (ref 26.1–32.9)
MCHC RBC AUTO-ENTMCNC: 32.2 G/DL (ref 31.4–35)
MCV RBC AUTO: 86.4 FL (ref 82–102)
NRBC # BLD: 0 K/UL (ref 0–0.2)
PLATELET # BLD AUTO: 326 K/UL (ref 150–450)
PMV BLD AUTO: 9.7 FL (ref 9.4–12.3)
RBC # BLD AUTO: 5.43 M/UL (ref 4.23–5.6)
WBC # BLD AUTO: 8.9 K/UL (ref 4.3–11.1)

## 2022-11-29 PROCEDURE — 99232 SBSQ HOSP IP/OBS MODERATE 35: CPT | Performed by: INTERNAL MEDICINE

## 2022-11-29 PROCEDURE — 6370000000 HC RX 637 (ALT 250 FOR IP): Performed by: PHYSICIAN ASSISTANT

## 2022-11-29 PROCEDURE — 6370000000 HC RX 637 (ALT 250 FOR IP): Performed by: INTERNAL MEDICINE

## 2022-11-29 PROCEDURE — 36415 COLL VENOUS BLD VENIPUNCTURE: CPT

## 2022-11-29 PROCEDURE — 6360000002 HC RX W HCPCS: Performed by: HOSPITALIST

## 2022-11-29 PROCEDURE — 85027 COMPLETE CBC AUTOMATED: CPT

## 2022-11-29 PROCEDURE — 6360000002 HC RX W HCPCS: Performed by: NURSE PRACTITIONER

## 2022-11-29 PROCEDURE — 6370000000 HC RX 637 (ALT 250 FOR IP): Performed by: HOSPITALIST

## 2022-11-29 PROCEDURE — 6370000000 HC RX 637 (ALT 250 FOR IP): Performed by: NURSE PRACTITIONER

## 2022-11-29 PROCEDURE — 6370000000 HC RX 637 (ALT 250 FOR IP): Performed by: STUDENT IN AN ORGANIZED HEALTH CARE EDUCATION/TRAINING PROGRAM

## 2022-11-29 PROCEDURE — 1100000003 HC PRIVATE W/ TELEMETRY

## 2022-11-29 PROCEDURE — 6360000002 HC RX W HCPCS: Performed by: PHYSICIAN ASSISTANT

## 2022-11-29 RX ORDER — HYDROMORPHONE HYDROCHLORIDE 1 MG/ML
0.5 INJECTION, SOLUTION INTRAMUSCULAR; INTRAVENOUS; SUBCUTANEOUS ONCE
Status: COMPLETED | OUTPATIENT
Start: 2022-11-29 | End: 2022-11-29

## 2022-11-29 RX ORDER — HYDROMORPHONE HYDROCHLORIDE 1 MG/ML
0.5 INJECTION, SOLUTION INTRAMUSCULAR; INTRAVENOUS; SUBCUTANEOUS EVERY 4 HOURS PRN
Status: DISCONTINUED | OUTPATIENT
Start: 2022-11-29 | End: 2022-11-29

## 2022-11-29 RX ORDER — HYDROMORPHONE HYDROCHLORIDE 1 MG/ML
1 INJECTION, SOLUTION INTRAMUSCULAR; INTRAVENOUS; SUBCUTANEOUS EVERY 4 HOURS PRN
Status: DISCONTINUED | OUTPATIENT
Start: 2022-11-29 | End: 2022-12-01 | Stop reason: HOSPADM

## 2022-11-29 RX ORDER — OXYCODONE HYDROCHLORIDE 5 MG/1
5 TABLET ORAL EVERY 6 HOURS PRN
Status: DISCONTINUED | OUTPATIENT
Start: 2022-11-29 | End: 2022-12-01 | Stop reason: HOSPADM

## 2022-11-29 RX ORDER — MORPHINE SULFATE 4 MG/ML
4 INJECTION, SOLUTION INTRAMUSCULAR; INTRAVENOUS EVERY 4 HOURS PRN
Status: DISCONTINUED | OUTPATIENT
Start: 2022-11-29 | End: 2022-11-29

## 2022-11-29 RX ADMIN — HYDROMORPHONE HYDROCHLORIDE 1 MG: 1 INJECTION, SOLUTION INTRAMUSCULAR; INTRAVENOUS; SUBCUTANEOUS at 23:53

## 2022-11-29 RX ADMIN — BUPROPION HYDROCHLORIDE 150 MG: 150 TABLET, EXTENDED RELEASE ORAL at 08:46

## 2022-11-29 RX ADMIN — TEMAZEPAM 15 MG: 15 CAPSULE ORAL at 23:53

## 2022-11-29 RX ADMIN — HYDROMORPHONE HYDROCHLORIDE 0.5 MG: 1 INJECTION, SOLUTION INTRAMUSCULAR; INTRAVENOUS; SUBCUTANEOUS at 15:52

## 2022-11-29 RX ADMIN — HYDROMORPHONE HYDROCHLORIDE 0.5 MG: 1 INJECTION, SOLUTION INTRAMUSCULAR; INTRAVENOUS; SUBCUTANEOUS at 14:56

## 2022-11-29 RX ADMIN — METOPROLOL TARTRATE 25 MG: 25 TABLET, FILM COATED ORAL at 08:46

## 2022-11-29 RX ADMIN — MORPHINE SULFATE 4 MG: 4 INJECTION INTRAVENOUS at 02:17

## 2022-11-29 RX ADMIN — MORPHINE SULFATE 4 MG: 4 INJECTION INTRAVENOUS at 10:32

## 2022-11-29 RX ADMIN — ASPIRIN 81 MG 81 MG: 81 TABLET ORAL at 08:46

## 2022-11-29 RX ADMIN — BUPROPION HYDROCHLORIDE 150 MG: 150 TABLET, EXTENDED RELEASE ORAL at 21:00

## 2022-11-29 RX ADMIN — OXYCODONE 5 MG: 5 TABLET ORAL at 17:33

## 2022-11-29 RX ADMIN — EPTIFIBATIDE 2 MCG/KG/MIN: 0.75 INJECTION INTRAVENOUS at 01:06

## 2022-11-29 RX ADMIN — HYDROMORPHONE HYDROCHLORIDE 1 MG: 1 INJECTION, SOLUTION INTRAMUSCULAR; INTRAVENOUS; SUBCUTANEOUS at 19:37

## 2022-11-29 RX ADMIN — MORPHINE SULFATE 4 MG: 4 INJECTION INTRAVENOUS at 06:17

## 2022-11-29 RX ADMIN — OXYCODONE 5 MG: 5 TABLET ORAL at 13:03

## 2022-11-29 RX ADMIN — ATORVASTATIN CALCIUM 40 MG: 40 TABLET, FILM COATED ORAL at 21:00

## 2022-11-29 RX ADMIN — LISINOPRIL 10 MG: 5 TABLET ORAL at 08:46

## 2022-11-29 RX ADMIN — METOPROLOL TARTRATE 25 MG: 25 TABLET, FILM COATED ORAL at 21:00

## 2022-11-29 RX ADMIN — EPTIFIBATIDE 2 MCG/KG/MIN: 0.75 INJECTION INTRAVENOUS at 08:40

## 2022-11-29 RX ADMIN — EPTIFIBATIDE 2 MCG/KG/MIN: 0.75 INJECTION INTRAVENOUS at 19:12

## 2022-11-29 RX ADMIN — HYDROCODONE BITARTRATE AND ACETAMINOPHEN 1 TABLET: 10; 325 TABLET ORAL at 08:45

## 2022-11-29 RX ADMIN — HYDROCODONE BITARTRATE AND ACETAMINOPHEN 1 TABLET: 10; 325 TABLET ORAL at 04:03

## 2022-11-29 ASSESSMENT — PAIN DESCRIPTION - DESCRIPTORS
DESCRIPTORS: PRESSURE;SHARP
DESCRIPTORS: SHARP
DESCRIPTORS: PRESSURE;SHARP
DESCRIPTORS: SHARP
DESCRIPTORS: SHARP;PRESSURE
DESCRIPTORS: PRESSURE;SHARP
DESCRIPTORS: SHARP
DESCRIPTORS: SHARP
DESCRIPTORS: PRESSURE;SHARP
DESCRIPTORS: PRESSURE;SHARP
DESCRIPTORS: SHARP
DESCRIPTORS: PRESSURE;SHARP

## 2022-11-29 ASSESSMENT — PAIN SCALES - GENERAL
PAINLEVEL_OUTOF10: 7
PAINLEVEL_OUTOF10: 5
PAINLEVEL_OUTOF10: 8
PAINLEVEL_OUTOF10: 8
PAINLEVEL_OUTOF10: 7
PAINLEVEL_OUTOF10: 9
PAINLEVEL_OUTOF10: 7
PAINLEVEL_OUTOF10: 4
PAINLEVEL_OUTOF10: 6
PAINLEVEL_OUTOF10: 10
PAINLEVEL_OUTOF10: 9
PAINLEVEL_OUTOF10: 6
PAINLEVEL_OUTOF10: 7
PAINLEVEL_OUTOF10: 4
PAINLEVEL_OUTOF10: 4
PAINLEVEL_OUTOF10: 7
PAINLEVEL_OUTOF10: 6
PAINLEVEL_OUTOF10: 9
PAINLEVEL_OUTOF10: 7

## 2022-11-29 ASSESSMENT — PAIN DESCRIPTION - ORIENTATION
ORIENTATION: RIGHT;LEFT
ORIENTATION: MID
ORIENTATION: MID
ORIENTATION: LEFT
ORIENTATION: RIGHT;LEFT
ORIENTATION: LEFT
ORIENTATION: LEFT
ORIENTATION: LEFT;RIGHT
ORIENTATION: LEFT
ORIENTATION: LEFT
ORIENTATION: RIGHT;LEFT

## 2022-11-29 ASSESSMENT — PAIN DESCRIPTION - LOCATION
LOCATION: ABDOMEN
LOCATION: ABDOMEN;BACK
LOCATION: ABDOMEN
LOCATION: ABDOMEN;BACK
LOCATION: ABDOMEN
LOCATION: ABDOMEN;BACK
LOCATION: ABDOMEN
LOCATION: ABDOMEN;BACK
LOCATION: ABDOMEN
LOCATION: ABDOMEN;BACK

## 2022-11-29 ASSESSMENT — PAIN - FUNCTIONAL ASSESSMENT: PAIN_FUNCTIONAL_ASSESSMENT: ACTIVITIES ARE NOT PREVENTED

## 2022-11-29 NOTE — PROGRESS NOTES
Pt complaining of 9/10 pain despite getting Tati. Dilaudid is not due at this time. [address] [facility] [address] [place] and no orders received and states that \"he just has to ride it out till he can get his next dose\". Will pass along to night shift RN.

## 2022-11-29 NOTE — PROGRESS NOTES
Spiritual Care Visit, initial visit. Visited with patient at bedside. Prayed for patient's healing and health. Visit by Jagruti Hamm, Staff .  Marquez., Garima.B., B.A.

## 2022-11-29 NOTE — PROGRESS NOTES
Pt complaining morphine and norco are not working for pain. MD ordered angie and dilaudid 0.5 mg. Pt compalined 30 minutes after giving dilaudid that 0.5 mg was not enough. Perfect Served ANJALI Allen and modified dilaudid to 1 mg and 1x order for 0.5 mg dilaudid. Will continue to monitor.

## 2022-11-29 NOTE — PROGRESS NOTES
Bedside shift change report given to Lanette Lilly (oncoming nurse) by self (offgoing nurse). Report included the following information Nurse Handoff Report.

## 2022-11-29 NOTE — PROGRESS NOTES
[facility] CARDIOLOGY PROGRESS NOTE           11/29/2022 6:59 AM    Admit Date: 11/25/2022      Subjective:   Patient denies any active chest pain or dyspnea. Remains on Integrilin drip. Plans for stent removal on November 30. Notes abdominal pain worse over last 12 hours consistent with pancreatic pain. ROS:  Cardiovascular:  As noted above    Objective:      Vitals:    11/28/22 2154 11/29/22 0027 11/29/22 0403 11/29/22 0459   BP: 114/83 109/72  100/84   Pulse: 76 68  72   Resp:  18 18 18   Temp:  98 °F (36.7 °C)     TempSrc:       SpO2:  97%  100%   Weight:    167 lb 9.6 oz (76 kg)   Height:           Physical Exam:  General-No Acute Distress  Neck- supple, no JVD  CV- regular rate and rhythm no MRG  Lung- clear bilaterally  Abd- soft, nontender, nondistended  Ext- no edema bilaterally. Skin- warm and dry      Data Review:   Recent Labs     11/29/22 0444 11/28/22 0347 11/27/22  1226   WBC 8.9 5.6 6.7   HGB 15.1 13.6 14.1   HCT 46.9 43.4 44.5   MCV 86.4 88.0 86.7    275 344         Recent Labs     11/28/22  0347 11/26/22  0444 11/22/22  0506      < > 136   K 4.3   < > 4.0      < > 104   CO2 26   < > 30   BUN 9   < > 7   CREATININE 0.90   < > 0.70*   GLUCOSE 111*   < > 91   CALCIUM 9.1   < > 8.7   PROT  --   --  6.0*   LABALBU  --   --  2.7*   BILITOT  --   --  0.4   ALKPHOS  --   --  73   AST  --   --  29   ALT  --   --  40   LABGLOM >60   < > >60   GLOB  --   --  3.3    < > = values in this interval not displayed. No results for input(s): CKTOTAL, CKMB, CKMBINDEX, DDIMER, TROPONINI in the last 720 hours. Assessment/Plan:     Active Hospital Problems    *Pancreatic pseudocyst  Defer management to primary team.        Coronary artery disease involving native coronary artery of native heart  Recent MI with stenting of an obtuse marginal on November 2. Difficult situation given need for GI procedure. Continue aspirin 81 mg daily and Integrilin drip.   Plan to hold Integrilin 4 hours prior to procedure and resume Brilinta postoperatively as soon as feasible. Daily CBC      History of ST elevation myocardial infarction (STEMI)  See above. Tobacco abuse [Z72.0 (ICD-10-CM)]  Smoking cessation. Alcohol use disorder, severe, in early remission, dependence ([facility] [address]  Continued abstinence encouraged. Primary hypertension  BP controlled.   Continue Lopressor and lisinopril                Saba Hastings MD

## 2022-11-29 NOTE — PROGRESS NOTES
I have placed NPO, Integrilin hold at 1100 tomorrow for procedure at 1500, and consent orders for the EGD with EUS and stent removal scheduled for 11/30/2022. For any issues, please contact the [facility] team on call.

## 2022-11-29 NOTE — PROGRESS NOTES
Hospitalist Progress Note   Admit Date:  2022  4:56 PM   Name:  Monik Aldrich   Age:  44 y.o. Sex:  male  :  1985   MRN:  [id number]   Room:  [facility]/    Presenting Complaint: No chief complaint on file. Reason(s) for Admission: Pancreatic pseudocyst [K86.3]     Hospital Course: This is a 65-year-old male with alcohol use disorder in remission, alcohol induced chronic pancreatitis complicated by pseudocyst status post gastrostomy with stent placed 2022 and a recent STEMI status post PCI with TULIO 2022 on aspirin and Brilinta who was discharged from this hospital  after conservative treatment for his known pancreatic pseudocyst, now electively admitted per GI recommendations for an Integrilin bridge so that the patient can be taken off of his DAPT for a planned removal of his pancreatic pseudocyst stent on . Cardiology consulted and agrees with Integrilin bridging. Subjective & 24hr Events (22): \"My pain got really bad last night and it feels like my pancrease is acting up; I've stopped eating this morning. \"  Pleasant 37y. o. male sitting up in bed c/o worsening abd pains    Admits to RUQ LUQ tenderness; denies N/V, CP, dyspnea, cough, wheezing.       Assessment & Plan:     Principal Problem:    Pancreatic pseudocyst  - s/p stent placement 2022  - plan for stent removal 2022 per GI  - cont prn dilaudid IV / oxycodone oral  - cont Integrilin gtt while brilinta being held    Active Problems:    CAD / s/p remote STEMI with PCI  - cont Integrilin gtt; no gross s/sx GIB  - h/h stable  - cont BB / ACE-I / ASA  - denies CP      Hx of ETOH abuse  - pt reports last ETOH beverage was 2022  - cont cessation efforts      Cigarette smoker  - smoking cessation counseled  - cont nicotine patch      HTN  - cont oral agents  - acceptable control      Anticipated discharge needs:    - pt will be hospitalized at least until 2022; home post procedure    Diet: Diet NPO  ADULT DIET; Clear Liquid  DVT PPx: Ambulate / SCD while in bed  Code status: Full Code    Hospital Problems:  Principal Problem:    Pancreatic pseudocyst  Active Problems:    Alcohol use disorder, severe, in early remission, dependence ([facility] [address]    Tobacco abuse [Z72.0 (ICD-10-CM)]    History of ST elevation myocardial infarction (STEMI)    Coronary artery disease involving native coronary artery of native heart    Primary hypertension  Resolved Problems:    * No resolved hospital problems. *      Objective:   Patient Vitals for the past 24 hrs:   Temp Pulse Resp BP SpO2   11/29/22 1032 -- -- 18 -- --   11/29/22 0846 -- 74 -- 116/73 --   11/29/22 0845 -- -- 18 -- --   11/29/22 0808 97.4 °F (36.3 °C) 74 16 116/73 99 %   11/29/22 0459 -- 72 18 100/84 100 %   11/29/22 0403 -- -- 18 -- --   11/29/22 0027 98 °F (36.7 °C) 68 18 109/72 97 %   11/28/22 2154 -- 76 -- 114/83 --   11/28/22 2041 98.1 °F (36.7 °C) 82 18 106/79 100 %   11/28/22 1745 97 °F (36.1 °C) 81 18 115/80 99 %   11/28/22 1344 98.2 °F (36.8 °C) 66 18 106/68 98 %         Oxygen Therapy  SpO2: 99 %  O2 Device: None (Room air)    Estimated body mass index is 24.75 kg/m² as calculated from the following:    Height as of this encounter: 5' 9\" (1.753 m). Weight as of this encounter: 167 lb 9.6 oz (76 kg). Intake/Output Summary (Last 24 hours) at 11/29/2022 1113  Last data filed at 11/29/2022 1032  Gross per 24 hour   Intake 440 ml   Output --   Net 440 ml           Physical Exam:     Blood pressure 116/73, pulse 74, temperature 97.4 °F (36.3 °C), temperature source Axillary, resp. rate 18, height 5' 9\" (1.753 m), weight 167 lb 9.6 oz (76 kg), SpO2 99 %. General:    Well nourished. Head:  Normocephalic, atraumatic  Eyes:  Sclerae appear normal.  Pupils equally round. ENT:  Nares appear normal, no drainage. Moist oral mucosa  Neck:  No restricted ROM. Trachea midline   CV:   RRR. No m/r/g. No jugular venous distension. Lungs:   CTAB.   No wheezing, rhonchi, or rales. Symmetric expansion. Abdomen:   Increased tenderness elicited on palpation LUQ / RUQ; no guarding; +BSx4  Extremities: No cyanosis or clubbing. No edema  Skin:     No rashes and normal coloration. Warm and dry. Neuro:  CN II-XII grossly intact. Sensation intact. A&Ox3  Psych:  Normal mood and affect.       I have personally reviewed labs and tests showing:  Recent Labs:  Recent Results (from the past 48 hour(s))   CBC    Collection Time: 11/27/22 12:26 PM   Result Value Ref Range    WBC 6.7 4.3 - 11.1 K/uL    RBC 5.13 4.23 - 5.6 M/uL    Hemoglobin 14.1 13.6 - 17.2 g/dL    Hematocrit 44.5 41.1 - 50.3 %    MCV 86.7 82 - 102 FL    MCH 27.5 26.1 - 32.9 PG    MCHC 31.7 31.4 - 35.0 g/dL    RDW 15.6 (H) 11.9 - 14.6 %    Platelets 434 859 - 697 K/uL    MPV 9.3 (L) 9.4 - 12.3 FL    nRBC 0.00 0.0 - 0.2 K/uL   CBC    Collection Time: 11/28/22  3:47 AM   Result Value Ref Range    WBC 5.6 4.3 - 11.1 K/uL    RBC 4.93 4.23 - 5.6 M/uL    Hemoglobin 13.6 13.6 - 17.2 g/dL    Hematocrit 43.4 41.1 - 50.3 %    MCV 88.0 82 - 102 FL    MCH 27.6 26.1 - 32.9 PG    MCHC 31.3 (L) 31.4 - 35.0 g/dL    RDW 15.8 (H) 11.9 - 14.6 %    Platelets 139 451 - 143 K/uL    MPV 9.3 (L) 9.4 - 12.3 FL    nRBC 0.00 0.0 - 0.2 K/uL   Basic Metabolic Panel    Collection Time: 11/28/22  3:47 AM   Result Value Ref Range    Sodium 137 133 - 143 mmol/L    Potassium 4.3 3.5 - 5.1 mmol/L    Chloride 108 101 - 110 mmol/L    CO2 26 21 - 32 mmol/L    Anion Gap 3 2 - 11 mmol/L    Glucose 111 (H) 65 - 100 mg/dL    BUN 9 6 - 23 MG/DL    Creatinine 0.90 0.8 - 1.5 MG/DL    Est, Glom Filt Rate >60 >60 ml/min/1.73m2    Calcium 9.1 8.3 - 10.4 MG/DL   CBC    Collection Time: 11/29/22  4:44 AM   Result Value Ref Range    WBC 8.9 4.3 - 11.1 K/uL    RBC 5.43 4.23 - 5.6 M/uL    Hemoglobin 15.1 13.6 - 17.2 g/dL    Hematocrit 46.9 41.1 - 50.3 %    MCV 86.4 82 - 102 FL    MCH 27.8 26.1 - 32.9 PG    MCHC 32.2 31.4 - 35.0 g/dL    RDW 15.9 (H) 11.9 - 14.6 %    Platelets 304 823 - 648 K/uL    MPV 9.7 9.4 - 12.3 FL    nRBC 0.00 0.0 - 0.2 K/uL       I have personally reviewed imaging studies showing: Other Studies:  No orders to display       Current Meds:  Current Facility-Administered Medications   Medication Dose Route Frequency    HYDROmorphone HCl PF (DILAUDID) injection 0.5 mg  0.5 mg IntraVENous Q4H PRN    oxyCODONE (ROXICODONE) immediate release tablet 5 mg  5 mg Oral Q6H PRN    temazepam (RESTORIL) capsule 15 mg  15 mg Oral Nightly PRN    lisinopril (PRINIVIL;ZESTRIL) tablet 10 mg  10 mg Oral Daily    acetaminophen (TYLENOL) tablet 650 mg  650 mg Oral Q4H PRN    ondansetron (ZOFRAN) injection 4 mg  4 mg IntraVENous Q6H PRN    polyethylene glycol (GLYCOLAX) packet 17 g  17 g Oral Daily PRN    senna (SENOKOT) tablet 17.2 mg  2 tablet Oral Nightly PRN    melatonin tablet 1.5 mg  1.5 mg Oral Nightly PRN    aluminum & magnesium hydroxide-simethicone (MAALOX) 200-200-20 MG/5ML suspension 30 mL  30 mL Oral Q6H PRN    metoprolol tartrate (LOPRESSOR) tablet 25 mg  25 mg Oral BID    atorvastatin (LIPITOR) tablet 40 mg  40 mg Oral Nightly    eptifibatide (INTEGRILIN) 0.75 mg/mL infusion  2 mcg/kg/min IntraVENous Continuous    aspirin chewable tablet 81 mg  81 mg Oral Daily    nicotine (NICODERM CQ) 14 MG/24HR 1 patch  1 patch TransDERmal Q24H    buPROPion [facility]) extended release tablet 150 mg  150 mg Oral BID       Signed:  Roxy Bales    Part of this note may have been written by using a voice dictation software. The note has been proof read but may still contain some grammatical/other typographical errors.

## 2022-11-30 ENCOUNTER — ANESTHESIA (OUTPATIENT)
Dept: ENDOSCOPY | Age: 37
DRG: 438 | End: 2022-11-30

## 2022-11-30 LAB
ERYTHROCYTE [DISTWIDTH] IN BLOOD BY AUTOMATED COUNT: 15.9 % (ref 11.9–14.6)
HCT VFR BLD AUTO: 47.3 % (ref 41.1–50.3)
HGB BLD-MCNC: 15.2 G/DL (ref 13.6–17.2)
MCH RBC QN AUTO: 27.2 PG (ref 26.1–32.9)
MCHC RBC AUTO-ENTMCNC: 32.1 G/DL (ref 31.4–35)
MCV RBC AUTO: 84.8 FL (ref 82–102)
NRBC # BLD: 0 K/UL (ref 0–0.2)
PLATELET # BLD AUTO: 282 K/UL (ref 150–450)
PMV BLD AUTO: 9.9 FL (ref 9.4–12.3)
RBC # BLD AUTO: 5.58 M/UL (ref 4.23–5.6)
WBC # BLD AUTO: 7.7 K/UL (ref 4.3–11.1)

## 2022-11-30 PROCEDURE — 85027 COMPLETE CBC AUTOMATED: CPT

## 2022-11-30 PROCEDURE — 6370000000 HC RX 637 (ALT 250 FOR IP): Performed by: STUDENT IN AN ORGANIZED HEALTH CARE EDUCATION/TRAINING PROGRAM

## 2022-11-30 PROCEDURE — 0DP68YZ REMOVAL OF OTHER DEVICE FROM STOMACH, VIA NATURAL OR ARTIFICIAL OPENING ENDOSCOPIC: ICD-10-PCS | Performed by: INTERNAL MEDICINE

## 2022-11-30 PROCEDURE — 1100000003 HC PRIVATE W/ TELEMETRY

## 2022-11-30 PROCEDURE — 2720000010 HC SURG SUPPLY STERILE: Performed by: INTERNAL MEDICINE

## 2022-11-30 PROCEDURE — C1753 CATH, INTRAVAS ULTRASOUND: HCPCS | Performed by: INTERNAL MEDICINE

## 2022-11-30 PROCEDURE — 2580000003 HC RX 258

## 2022-11-30 PROCEDURE — 6370000000 HC RX 637 (ALT 250 FOR IP): Performed by: INTERNAL MEDICINE

## 2022-11-30 PROCEDURE — 2500000003 HC RX 250 WO HCPCS: Performed by: NURSE ANESTHETIST, CERTIFIED REGISTERED

## 2022-11-30 PROCEDURE — 2709999900 HC NON-CHARGEABLE SUPPLY: Performed by: INTERNAL MEDICINE

## 2022-11-30 PROCEDURE — 6360000002 HC RX W HCPCS: Performed by: NURSE ANESTHETIST, CERTIFIED REGISTERED

## 2022-11-30 PROCEDURE — 6370000000 HC RX 637 (ALT 250 FOR IP): Performed by: PHYSICIAN ASSISTANT

## 2022-11-30 PROCEDURE — 36415 COLL VENOUS BLD VENIPUNCTURE: CPT

## 2022-11-30 PROCEDURE — 7100000011 HC PHASE II RECOVERY - ADDTL 15 MIN: Performed by: INTERNAL MEDICINE

## 2022-11-30 PROCEDURE — 3609018500 HC EGD US SCOPE W/ADJACENT STRUCTURES: Performed by: INTERNAL MEDICINE

## 2022-11-30 PROCEDURE — 2580000003 HC RX 258: Performed by: NURSE ANESTHETIST, CERTIFIED REGISTERED

## 2022-11-30 PROCEDURE — 6360000002 HC RX W HCPCS: Performed by: NURSE PRACTITIONER

## 2022-11-30 PROCEDURE — 6360000002 HC RX W HCPCS: Performed by: PHYSICIAN ASSISTANT

## 2022-11-30 PROCEDURE — 3609017100 HC EGD: Performed by: INTERNAL MEDICINE

## 2022-11-30 PROCEDURE — 6370000000 HC RX 637 (ALT 250 FOR IP): Performed by: NURSE PRACTITIONER

## 2022-11-30 PROCEDURE — 3700000000 HC ANESTHESIA ATTENDED CARE: Performed by: INTERNAL MEDICINE

## 2022-11-30 PROCEDURE — 7100000010 HC PHASE II RECOVERY - FIRST 15 MIN: Performed by: INTERNAL MEDICINE

## 2022-11-30 PROCEDURE — 3700000001 HC ADD 15 MINUTES (ANESTHESIA): Performed by: INTERNAL MEDICINE

## 2022-11-30 RX ORDER — SODIUM CHLORIDE, SODIUM LACTATE, POTASSIUM CHLORIDE, CALCIUM CHLORIDE 600; 310; 30; 20 MG/100ML; MG/100ML; MG/100ML; MG/100ML
INJECTION, SOLUTION INTRAVENOUS CONTINUOUS PRN
Status: DISCONTINUED | OUTPATIENT
Start: 2022-11-30 | End: 2022-11-30 | Stop reason: SDUPTHER

## 2022-11-30 RX ORDER — LIDOCAINE HYDROCHLORIDE 20 MG/ML
INJECTION, SOLUTION EPIDURAL; INFILTRATION; INTRACAUDAL; PERINEURAL PRN
Status: DISCONTINUED | OUTPATIENT
Start: 2022-11-30 | End: 2022-11-30 | Stop reason: SDUPTHER

## 2022-11-30 RX ORDER — PROPOFOL 10 MG/ML
INJECTION, EMULSION INTRAVENOUS CONTINUOUS PRN
Status: DISCONTINUED | OUTPATIENT
Start: 2022-11-30 | End: 2022-11-30 | Stop reason: SDUPTHER

## 2022-11-30 RX ORDER — PROPOFOL 10 MG/ML
INJECTION, EMULSION INTRAVENOUS PRN
Status: DISCONTINUED | OUTPATIENT
Start: 2022-11-30 | End: 2022-11-30 | Stop reason: SDUPTHER

## 2022-11-30 RX ADMIN — METOPROLOL TARTRATE 25 MG: 25 TABLET, FILM COATED ORAL at 08:17

## 2022-11-30 RX ADMIN — EPTIFIBATIDE 2 MCG/KG/MIN: 0.75 INJECTION INTRAVENOUS at 20:25

## 2022-11-30 RX ADMIN — HYDROMORPHONE HYDROCHLORIDE 1 MG: 1 INJECTION, SOLUTION INTRAMUSCULAR; INTRAVENOUS; SUBCUTANEOUS at 08:16

## 2022-11-30 RX ADMIN — OXYCODONE 5 MG: 5 TABLET ORAL at 18:44

## 2022-11-30 RX ADMIN — BUPROPION HYDROCHLORIDE 150 MG: 150 TABLET, EXTENDED RELEASE ORAL at 20:22

## 2022-11-30 RX ADMIN — OXYCODONE 5 MG: 5 TABLET ORAL at 10:12

## 2022-11-30 RX ADMIN — HYDROMORPHONE HYDROCHLORIDE 1 MG: 1 INJECTION, SOLUTION INTRAMUSCULAR; INTRAVENOUS; SUBCUTANEOUS at 21:18

## 2022-11-30 RX ADMIN — PHENYLEPHRINE HYDROCHLORIDE 100 MCG: 10 INJECTION INTRAVENOUS at 15:24

## 2022-11-30 RX ADMIN — METOPROLOL TARTRATE 25 MG: 25 TABLET, FILM COATED ORAL at 20:22

## 2022-11-30 RX ADMIN — PROPOFOL 50 MG: 10 INJECTION, EMULSION INTRAVENOUS at 15:00

## 2022-11-30 RX ADMIN — ASPIRIN 81 MG 81 MG: 81 TABLET ORAL at 08:21

## 2022-11-30 RX ADMIN — LIDOCAINE HYDROCHLORIDE 60 MG: 20 INJECTION, SOLUTION EPIDURAL; INFILTRATION; INTRACAUDAL; PERINEURAL at 15:00

## 2022-11-30 RX ADMIN — LISINOPRIL 10 MG: 5 TABLET ORAL at 08:17

## 2022-11-30 RX ADMIN — PHENYLEPHRINE HYDROCHLORIDE 150 MCG: 10 INJECTION INTRAVENOUS at 15:18

## 2022-11-30 RX ADMIN — BUPROPION HYDROCHLORIDE 150 MG: 150 TABLET, EXTENDED RELEASE ORAL at 08:17

## 2022-11-30 RX ADMIN — HYDROMORPHONE HYDROCHLORIDE 1 MG: 1 INJECTION, SOLUTION INTRAMUSCULAR; INTRAVENOUS; SUBCUTANEOUS at 04:04

## 2022-11-30 RX ADMIN — EPTIFIBATIDE 2 MCG/KG/MIN: 0.75 INJECTION INTRAVENOUS at 05:31

## 2022-11-30 RX ADMIN — SODIUM CHLORIDE, SODIUM LACTATE, POTASSIUM CHLORIDE, AND CALCIUM CHLORIDE: 600; 310; 30; 20 INJECTION, SOLUTION INTRAVENOUS at 14:47

## 2022-11-30 RX ADMIN — OXYCODONE 5 MG: 5 TABLET ORAL at 01:57

## 2022-11-30 RX ADMIN — ATORVASTATIN CALCIUM 40 MG: 40 TABLET, FILM COATED ORAL at 20:22

## 2022-11-30 RX ADMIN — HYDROMORPHONE HYDROCHLORIDE 1 MG: 1 INJECTION, SOLUTION INTRAMUSCULAR; INTRAVENOUS; SUBCUTANEOUS at 12:21

## 2022-11-30 RX ADMIN — PROPOFOL 300 MCG/KG/MIN: 10 INJECTION, EMULSION INTRAVENOUS at 15:00

## 2022-11-30 RX ADMIN — HYDROMORPHONE HYDROCHLORIDE 1 MG: 1 INJECTION, SOLUTION INTRAMUSCULAR; INTRAVENOUS; SUBCUTANEOUS at 17:04

## 2022-11-30 RX ADMIN — PHENYLEPHRINE HYDROCHLORIDE 100 MCG: 10 INJECTION INTRAVENOUS at 15:22

## 2022-11-30 ASSESSMENT — PAIN DESCRIPTION - LOCATION
LOCATION: ABDOMEN;BACK
LOCATION: ABDOMEN
LOCATION: ABDOMEN
LOCATION: ABDOMEN;BACK
LOCATION: ABDOMEN
LOCATION: ABDOMEN
LOCATION: ABDOMEN;BACK
LOCATION: ABDOMEN;BACK
LOCATION: ABDOMEN
LOCATION: ABDOMEN;BACK
LOCATION: ABDOMEN;BACK
LOCATION: ABDOMEN
LOCATION: ABDOMEN;BACK

## 2022-11-30 ASSESSMENT — PAIN SCALES - GENERAL
PAINLEVEL_OUTOF10: 6
PAINLEVEL_OUTOF10: 6
PAINLEVEL_OUTOF10: 5
PAINLEVEL_OUTOF10: 6
PAINLEVEL_OUTOF10: 8
PAINLEVEL_OUTOF10: 8
PAINLEVEL_OUTOF10: 5
PAINLEVEL_OUTOF10: 10
PAINLEVEL_OUTOF10: 4
PAINLEVEL_OUTOF10: 7
PAINLEVEL_OUTOF10: 8
PAINLEVEL_OUTOF10: 9
PAINLEVEL_OUTOF10: 5
PAINLEVEL_OUTOF10: 10
PAINLEVEL_OUTOF10: 6
PAINLEVEL_OUTOF10: 5

## 2022-11-30 ASSESSMENT — PAIN DESCRIPTION - DESCRIPTORS
DESCRIPTORS: SHARP;PRESSURE
DESCRIPTORS: SHARP
DESCRIPTORS: ACHING;SHARP
DESCRIPTORS: PRESSURE;SHARP
DESCRIPTORS: SHARP
DESCRIPTORS: SHARP;PRESSURE
DESCRIPTORS: PRESSURE;SHARP
DESCRIPTORS: ACHING;SHARP
DESCRIPTORS: ACHING
DESCRIPTORS: PRESSURE;SHARP
DESCRIPTORS: SHARP;PRESSURE

## 2022-11-30 ASSESSMENT — PAIN DESCRIPTION - ORIENTATION
ORIENTATION: RIGHT;LEFT
ORIENTATION: RIGHT;LEFT
ORIENTATION: LEFT
ORIENTATION: LEFT;RIGHT
ORIENTATION: RIGHT;LEFT
ORIENTATION: MID;LEFT
ORIENTATION: MID;LEFT
ORIENTATION: RIGHT;LEFT
ORIENTATION: MID;LEFT
ORIENTATION: MID
ORIENTATION: RIGHT;LEFT
ORIENTATION: MID
ORIENTATION: RIGHT;LEFT

## 2022-11-30 ASSESSMENT — PAIN DESCRIPTION - PAIN TYPE
TYPE: ACUTE PAIN
TYPE: ACUTE PAIN

## 2022-11-30 ASSESSMENT — PAIN - FUNCTIONAL ASSESSMENT
PAIN_FUNCTIONAL_ASSESSMENT: ACTIVITIES ARE NOT PREVENTED
PAIN_FUNCTIONAL_ASSESSMENT: 0-10
PAIN_FUNCTIONAL_ASSESSMENT: ACTIVITIES ARE NOT PREVENTED

## 2022-11-30 ASSESSMENT — PAIN DESCRIPTION - FREQUENCY: FREQUENCY: INTERMITTENT

## 2022-11-30 ASSESSMENT — PAIN DESCRIPTION - ONSET: ONSET: GRADUAL

## 2022-11-30 ASSESSMENT — LIFESTYLE VARIABLES: SMOKING_STATUS: 1

## 2022-11-30 NOTE — ANESTHESIA POSTPROCEDURE EVALUATION
Department of Anesthesiology  Postprocedure Note    Patient: Clyde Sandoval  MRN: [id number]  YOB: 1985  Date of evaluation: 11/30/2022      Procedure Summary     Date: 11/30/22 Room / Location: [facility] ENDO 05 / [facility] ENDOSCOPY    Anesthesia Start: 2519 Anesthesia Stop: 6504    Procedure: EGD/ENDOSCOPIC ULTRASOUND stent removal  (Upper GI Region) Diagnosis:       Cyst of pancreas      (Cyst of pancreas [K86.2])    Surgeons: Shelby Davidson MD Responsible Provider: Deepak Leonard MD    Anesthesia Type: TIVA ASA Status: 4          Anesthesia Type: TIVA    Mack Phase I: Mack Score: 10    Mack Phase II: Mack Score: 10      Anesthesia Post Evaluation    Patient location during evaluation: PACU  Patient participation: complete - patient participated  Level of consciousness: awake and alert  Airway patency: patent  Nausea: well controlled. Complications: no  Cardiovascular status: acceptable.   Respiratory status: acceptable  Hydration status: stable

## 2022-11-30 NOTE — PROGRESS NOTES
Comprehensive Nutrition Assessment    Type and Reason for Visit: Reassess  Malnutrition Screening Tool: Malnutrition Screen  Have you recently lost weight without trying?: 14 to 23 pounds (2 points)  Have you been eating poorly because of a decreased appetite?: Yes (1 point)  Malnutrition Screening Tool Score: 3    Nutrition Recommendations/Plan:   Meals and Snacks:  Diet: Continue current order NPO for EUS. Progression per GI. Restart high protein low calorie oral supplement with meals once on fulls or greater. 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 Pt reports not eating for 8 days at RD assessment. Pt reports hx of fluctuating oral intake since that admission, more recently improving. Do You Have Any Cultural, Mu-ism, or Ethnic Food Preferences?: No   Nutrition Background:       PMH remarkable for alcohol use disorder in remission, alcohol induced chronic pancreatitis complicated by pseudocyst status post gastrostomy with stent placed 11/9/2022 and a recent STEMI status post PCI with TULIO 11/2/2022 on aspirin and Brilinta who was discharged from this hospital 11/22 after conservative treatment for his known pancreatic pseudocyst.  EUS pending 11/30. Nutrition Interval:  Regular diet until 11/29 noon meal, currently NPO for EUS. Recorded oral intake %. Pt reports his pancrease started to flare up again leading to clears yesterday, poor acceptance of clears at baseline.       Current Nutrition Therapies:  Diet NPO    Current Intake:   Average Meal Intake: NPO Average Supplements Intake: None Ordered      Anthropometric Measures:  Height: 5' 9\" (175.3 cm)  Current Body Wt: 166 lb 3.6 oz (75.4 kg) (11/30), Weight source: Standing Scale  BMI: 24.5, Normal Weight (BMI 18.5-24. 9)  Admission Body Weight: 170 lb (77.1 kg) (stated)  Ideal Body Weight (Kg) (Calculated): 73 kg (160 lbs), 105 %  Usual Body Wt: 184 lb (83.5 kg) (10/25 bed scale wt, limited EMR wt hx available), Percent weight change: -8.7       BMI Category Normal Weight (BMI 18.5-24. 9)  Estimated Daily Nutrient Needs:  Energy (kcal/day): 4142-2871 (25-30 kcal/kg) (Kcal/kg Weight used: 76.2 kg (11/26) Current  Protein (g/day): 76-91 (1-1.2 g/kg) Weight Used: (Current) 76.2 kg  Fluid (ml/day):   (1 ml/kcal)    Nutrition Diagnosis:   Unintended weight loss related to altered GI function as evidenced by  (~8% wt loss in setting of pancreatic pseudocyst)  Nutrition Interventions:   Food and/or Nutrient Delivery: Continue Current Diet     Coordination of Nutrition Care: Continue to monitor while inpatient       Goals:   Previous Goal Met: Progress towards Goal(s) Declining  Active Goal: Meet at least 75% of estimated needs, by next RD assessment       Nutrition Monitoring and Evaluation:      Food/Nutrient Intake Outcomes: Food and Nutrient Intake  Physical Signs/Symptoms Outcomes: Biochemical Data, GI Status, Fluid Status or Edema, Weight    Discharge Planning:     Too soon to determine    AYDEN ORR RD

## 2022-11-30 NOTE — PROGRESS NOTES
TRANSFER - IN REPORT:    Verbal report received from [facility] on [place]  being received from 315 for ordered procedure      Report consisted of patient's Situation, Background, Assessment and   Recommendations(SBAR). Information from the following report(s) Nurse Handoff Report was reviewed with the receiving nurse. Opportunity for questions and clarification was provided. Assessment completed upon patient's arrival to unit and care assumed.

## 2022-11-30 NOTE — PROGRESS NOTES
Hospitalist Progress Note   Admit Date:  2022  4:56 PM   Name:  Michelle Morrison   Age:  44 y.o. Sex:  male  :  1985   MRN:  [id number]   Room:  [facility]    Presenting Complaint: No chief complaint on file. Reason(s) for Admission: Pancreatic pseudocyst [K86.3]     Hospital Course: This is a 76-year-old male with alcohol use disorder in remission, alcohol induced chronic pancreatitis complicated by pseudocyst status post gastrostomy with stent placed 2022 and a recent STEMI status post PCI with TULIO 2022 on aspirin and Brilinta who was discharged from this hospital  after conservative treatment for his known pancreatic pseudocyst, now electively admitted per GI recommendations for an Integrilin bridge so that the patient can be taken off of his DAPT for a planned removal of his pancreatic pseudocyst stent on . Cardiology consulted and agrees with Integrilin bridging. Subjective & 24hr Events (22): Pt seen at bedside, resting in bed. On 3 ltr NC. Continues to be on Integrilin infusion  No chest pain, nausea/vomiting, fever, diarrhea. ROS:  10 point ROS is negative except what mentioned above.       Assessment & Plan:     Principal Problem:    Pancreatic pseudocyst  - s/p stent placement 2022  - plan for stent removal 2022 per GI  - cont prn dilaudid IV / oxycodone oral  - cont Integrilin gtt while Danica Bi being held  -  Pt NPO since midnight  EUS scheduled for today    Active Problems:    CAD / s/p remote STEMI with PCI  - cont Integrilin gtt; no gross s/sx GIB  - h/h stable  - cont BB / ACE-I / ASA  - Denies CP      Hx of ETOH abuse  - Pt reports last ETOH beverage was 2022  - cont cessation efforts      Cigarette smoker  - smoking cessation counseled  - cont nicotine patch      HTN  - cont oral agents  - acceptable control      Anticipated discharge needs:    Pending clinical course    Diet:  Diet NPO  DVT PPx: Ambulate / SCD while in bed  Code status: Full Code    Hospital Problems:  Principal Problem:    Pancreatic pseudocyst  Active Problems:    Alcohol use disorder, severe, in early remission, dependence ([facility] [address]    Tobacco abuse [Z72.0 (ICD-10-CM)]    History of ST elevation myocardial infarction (STEMI)    Coronary artery disease involving native coronary artery of native heart    Primary hypertension  Resolved Problems:    * No resolved hospital problems. *      Objective:   Patient Vitals for the past 24 hrs:   Temp Pulse Resp BP SpO2   11/30/22 0816 97.5 °F (36.4 °C) 90 20 125/82 99 %   11/30/22 0748 -- 79 20 113/82 97 %   11/30/22 0430 97.5 °F (36.4 °C) -- -- -- --   11/30/22 0414 -- 95 20 107/88 100 %   11/30/22 0157 -- 80 20 108/68 --   11/30/22 0000 97.2 °F (36.2 °C) 81 20 97/64 96 %   11/29/22 2353 -- -- -- 117/83 --   11/29/22 2100 -- 85 -- 138/79 --   11/29/22 1937 98.2 °F (36.8 °C) 77 16 121/86 100 %   11/29/22 1845 97.6 °F (36.4 °C) 98 -- 124/79 98 %   11/29/22 1733 -- -- 18 -- --   11/29/22 1532 -- 74 18 108/62 99 %   11/29/22 1303 -- -- 18 -- --   11/29/22 1134 97.7 °F (36.5 °C) 75 16 98/75 100 %   11/29/22 1032 -- -- 18 -- --   11/29/22 0846 -- 74 -- 116/73 --   11/29/22 0845 -- -- 18 -- --         Oxygen Therapy  SpO2: 99 %  O2 Device: None (Room air)    Estimated body mass index is 24.56 kg/m² as calculated from the following:    Height as of 11/29/22: 5' 9\" (1.753 m). Weight as of this encounter: 166 lb 4.8 oz (75.4 kg). Intake/Output Summary (Last 24 hours) at 11/30/2022 0836  Last data filed at 11/29/2022 1845  Gross per 24 hour   Intake 780 ml   Output --   Net 780 ml           Physical Exam:     Blood pressure 125/82, pulse 90, temperature 97.5 °F (36.4 °C), temperature source Oral, resp. rate 20, height 5' 9\" (1.753 m), weight 166 lb 4.8 oz (75.4 kg), SpO2 99 %. General:    Alert/awake, NAD    Head:  Normocephalic, atraumatic  Eyes:  Sclerae appear normal.  Pupils equally round.   ENT:  Nares appear normal, no drainage. Moist oral mucosa  Neck:  No restricted ROM. Trachea midline   CV:   RRR. No m/r/g. No jugular venous distension. Lungs:   CTAB. No wheezing, rhonchi, or rales. Symmetric expansion. Abdomen:   Increased tenderness elicited on palpation LUQ / RUQ; no guarding; +BSx4  Extremities: No cyanosis or clubbing. No edema  Skin:     No rashes and normal coloration. Warm and dry. Neuro:  CN II-XII grossly intact. Sensation intact. A&Ox3  Psych:  Normal mood and affect. I have personally reviewed labs and tests showing:  Recent Labs:  Recent Results (from the past 48 hour(s))   CBC    Collection Time: 11/29/22  4:44 AM   Result Value Ref Range    WBC 8.9 4.3 - 11.1 K/uL    RBC 5.43 4.23 - 5.6 M/uL    Hemoglobin 15.1 13.6 - 17.2 g/dL    Hematocrit 46.9 41.1 - 50.3 %    MCV 86.4 82 - 102 FL    MCH 27.8 26.1 - 32.9 PG    MCHC 32.2 31.4 - 35.0 g/dL    RDW 15.9 (H) 11.9 - 14.6 %    Platelets 686 949 - 841 K/uL    MPV 9.7 9.4 - 12.3 FL    nRBC 0.00 0.0 - 0.2 K/uL   CBC    Collection Time: 11/30/22  4:31 AM   Result Value Ref Range    WBC 7.7 4.3 - 11.1 K/uL    RBC 5.58 4.23 - 5.6 M/uL    Hemoglobin 15.2 13.6 - 17.2 g/dL    Hematocrit 47.3 41.1 - 50.3 %    MCV 84.8 82 - 102 FL    MCH 27.2 26.1 - 32.9 PG    MCHC 32.1 31.4 - 35.0 g/dL    RDW 15.9 (H) 11.9 - 14.6 %    Platelets 694 093 - 642 K/uL    MPV 9.9 9.4 - 12.3 FL    nRBC 0.00 0.0 - 0.2 K/uL       I have personally reviewed imaging studies showing:   Other Studies:  No orders to display       Current Meds:  Current Facility-Administered Medications   Medication Dose Route Frequency    oxyCODONE (ROXICODONE) immediate release tablet 5 mg  5 mg Oral Q6H PRN    HYDROmorphone HCl PF (DILAUDID) injection 1 mg  1 mg IntraVENous Q4H PRN    temazepam (RESTORIL) capsule 15 mg  15 mg Oral Nightly PRN    lisinopril (PRINIVIL;ZESTRIL) tablet 10 mg  10 mg Oral Daily    acetaminophen (TYLENOL) tablet 650 mg  650 mg Oral Q4H PRN    ondansetron (ZOFRAN) injection 4 mg  4 mg IntraVENous Q6H PRN    polyethylene glycol (GLYCOLAX) packet 17 g  17 g Oral Daily PRN    senna (SENOKOT) tablet 17.2 mg  2 tablet Oral Nightly PRN    melatonin tablet 1.5 mg  1.5 mg Oral Nightly PRN    aluminum & magnesium hydroxide-simethicone (MAALOX) 200-200-20 MG/5ML suspension 30 mL  30 mL Oral Q6H PRN    metoprolol tartrate (LOPRESSOR) tablet 25 mg  25 mg Oral BID    atorvastatin (LIPITOR) tablet 40 mg  40 mg Oral Nightly    eptifibatide (INTEGRILIN) 0.75 mg/mL infusion  2 mcg/kg/min IntraVENous Continuous    aspirin chewable tablet 81 mg  81 mg Oral Daily    nicotine (NICODERM CQ) 14 MG/24HR 1 patch  1 patch TransDERmal Q24H    buPROPion (WELLBUTRIN SR) extended release tablet 150 mg  150 mg Oral BID       Signed:  Sowmya Petty MD    Part of this note may have been written by using a voice dictation software. The note has been proof read but may still contain some grammatical/other typographical errors.

## 2022-11-30 NOTE — OP NOTE
Procedure:  Endoscopic ultrasound with Doppler, Esophagogastroduodenoscopy, foreign body retrieval    Date of Procedure:  11/30/2022    Patient:  Cookie Box     [birth year]    Indication:  History of pseudocyst, status post AXIOS lumen-apposing stent gastrostomy    Sedation: MAC    Pre-Procedure Physical Exam:    Mental status:  alert and oriented  Airway:  normal oropharyngeal airway and neck mobility  CV:  regular rate and rhythm  Respiratory:  clear to auscultation    Procedure:  A History and Physical has been performed, and patient medication allergies have been reviewed. Risks of perforation, hemorrhage, adverse drug reaction, and aspiration were discussed. Informed consent was obtained for the procedure, including sedation. The patient was placed in the left lateral decubitus position. The heart rate, oxygen saturations, blood pressure, and response to care were monitored throughout the procedure. The linear echoendoscope was passed through the mouth and advanced under direct vision to the distal duodenum. As the scope was slowly withdrawn, detailed endoscopic images were obtained from the surrounding organs. The gastroscope was then passed through the mouth and advanced under direct vision to the second portion of the duodenum. A careful inspection was made as the gastroscope was withdrawn, including a retroflexed view of the proximal stomach. The patient tolerated the procedure well. Findings:    EUS FINDINGS:  PANCREAS:   The pancreas is well-visualized from head to tail. There are extensive pancreatic parenchymal abnormalities including course lobularity, punctate calcifications in the head of the pancreas with associated acoustic shadowing, blurring of peripancreatic fat planes and peripancreatic edema. In the tail of the pancreas there is a 25 x 17 mm oval-shaped thick-walled simple cyst.  Adjacent to the body of the pancreas an AXIOS stent is seen at site of cyst gastrostomy.  The previously seen large pseudocyst has resolved. The main pancreatic duct has an irregular contour, echogenic sidewalls and is dilated, measuring up to 3.6 mm in the head, 2.9 mm in the body and 2.1 mm in the tail. Pancreas divisum is not seen. ENDOSCOPIC FINDINGS:  OROPHARYNX: Cords and pyriform recesses appear normal.    ESOPHAGUS: The esophagus is normal. The proximal, mid, and distal portions are normal. The Z-Line is intact. STOMACH:    On retroflexion, the flap-valve is classified as Hill Grade II, with a tissue fold at the lesser curvature but with periodic opening and closing around the endoscope. The AXIOS lumen-apposing stent in seen in the gastric body and remains patent. After EUS confirmation of pseudocyst resolution, the stent was removed using a rat-tooth forceps. DUODENUM: The bulb and second portions are normal.    Specimen:  No    Estimated Blood Loss:  Minimal    Implant:    None            Impression:     1. Acute on chronic pancreatitis. 2. Resolution of previously seen large pseudocyst.  The axial stent was removed. 3. Small developing pseudocyst adjacent to the tail of the pancreas. Plan:  1. Resume diet. If patient does not tolerate advancement of his diet without exacerbation of symptoms, he may require enteral feeding. 2. Resume Integrilin protocol as a bridge to Brilinta. 3. Avoid alcohol and tobacco.  4. Return to office in 2 months.     Signed:  Isaias Wilkinson MD  11/30/2022  3:25 PM

## 2022-11-30 NOTE — H&P
History and Physical for Endoscopic Ultrasound             Date: 11/30/2022     History of Present Illness:  Patient presents to undergo endoscopic ultrasound.     Past Medical History:   Diagnosis Date    Abdominal pain 10/25/2022    Abnormal CT of the abdomen 66/50/8070    Acute alcoholic pancreatitis 49/38/5722    Acute kidney injury (nontraumatic) ([facility] [address] 10/31/2022    pt denies    Acute on chronic pancreatitis ([facility] [address] 07/30/2022    Alcohol use disorder, severe, dependence ([facility] [address] 08/21/2019    CAD (coronary artery disease)     Carpal tunnel syndrome, left     Cigarette smoker     Coronary artery disease involving native coronary artery of native heart 11/13/2022    Dependence on nicotine from cigarettes 11/20/2022    GERD (gastroesophageal reflux disease)     controlled with med    History of pancreatitis     x 4 or 5 times-- last time admitted to hospital 10/10/26/22 x 7 days    Hypertension     controlled with med     Past Surgical History:   Procedure Laterality Date    CARDIAC PROCEDURE N/A 11/2/2022    LEFT HEART CATH / CORONARY ANGIOGRAPHY performed by Viki Ocampo MD at [address] [facility] N/A 11/2/2022    Percutaneous coronary intervention performed by Viki Ocampo MD at [address] [facility] CATH LAB    CHOLECYSTECTOMY  2012    ORTHOPEDIC SURGERY Left     wrist surg--nerve surg    ORTHOPEDIC SURGERY Left     foot surg as child    UPPER GASTROINTESTINAL ENDOSCOPY N/A 11/9/2022    EGD/ENDOSCOPIC ULTRASOUND/AXIOS STENT ROOM 2226 **Rep will be present performed by Brandon Watson MD at [facility] ENDOSCOPY      Family History   Problem Relation Age of Onset    No Known Problems Mother     Heart Attack Father         MI at 58     Social History     Tobacco Use    Smoking status: Every Day     Packs/day: 1.00     Years: 20.00     Pack years: 20.00     Types: Cigarettes    Smokeless tobacco: Never   Substance Use Topics    Alcohol use: Not Currently     Comment: none since 10/4/22-- had drank 3 glasses of wine daily        No Known Allergies  Current Facility-Administered Medications   Medication Dose Route Frequency    oxyCODONE (ROXICODONE) immediate release tablet 5 mg  5 mg Oral Q6H PRN    HYDROmorphone HCl PF (DILAUDID) injection 1 mg  1 mg IntraVENous Q4H PRN    temazepam (RESTORIL) capsule 15 mg  15 mg Oral Nightly PRN    lisinopril (PRINIVIL;ZESTRIL) tablet 10 mg  10 mg Oral Daily    acetaminophen (TYLENOL) tablet 650 mg  650 mg Oral Q4H PRN    ondansetron (ZOFRAN) injection 4 mg  4 mg IntraVENous Q6H PRN    polyethylene glycol (GLYCOLAX) packet 17 g  17 g Oral Daily PRN    senna (SENOKOT) tablet 17.2 mg  2 tablet Oral Nightly PRN    melatonin tablet 1.5 mg  1.5 mg Oral Nightly PRN    aluminum & magnesium hydroxide-simethicone (MAALOX) 200-200-20 MG/5ML suspension 30 mL  30 mL Oral Q6H PRN    metoprolol tartrate (LOPRESSOR) tablet 25 mg  25 mg Oral BID    atorvastatin (LIPITOR) tablet 40 mg  40 mg Oral Nightly    eptifibatide (INTEGRILIN) 0.75 mg/mL infusion  2 mcg/kg/min IntraVENous Continuous    aspirin chewable tablet 81 mg  81 mg Oral Daily    nicotine (NICODERM CQ) 14 MG/24HR 1 patch  1 patch TransDERmal Q24H    buPROPion (WELLBUTRIN SR) extended release tablet 150 mg  150 mg Oral BID        Review of Systems:  A detailed 10 organ review of systems is obtained with pertinent positives as listed in the History of Present Illness. All others are negative. Objective:     Physical Exam:  /82   Pulse 90   Temp 97.5 °F (36.4 °C) (Temporal)   Resp 20   Ht 5' 9\" (1.753 m)   Wt 166 lb 4.8 oz (75.4 kg)   SpO2 97%   BMI 24.56 kg/m²    General:  Alert and oriented. Heart: Regular rate and rhythm  Lungs:  Clear to auscultation bilaterally  Abdomen: Soft, nontender, nondistended    Impression/Plan:     Proceed with EUS as planned.   I have discussed with the patient the technique, benefits, alternatives, and risks of the procedure, including medication reaction, immediate or delayed bleeding, or perforation of the gastrointestinal tract.     Signed By: Neela Layne MD     November 30, 2022

## 2022-11-30 NOTE — PROGRESS NOTES
TRANSFER - OUT REPORT:    Verbal report given to [facility] on Celine Caban  being transferred to GI Lab for ordered procedure       Report consisted of patient's Situation, Background, Assessment and   Recommendations(SBAR). Information from the following report(s) Index, Intake/Output, MAR, Recent Results, Med Rec Status, and Cardiac Rhythm NSR  was reviewed with the receiving nurse. [place] Assessment: No data recorded  Lines:   Peripheral IV 11/29/22 Right Forearm (Active)   Site Assessment Clean, dry & intact 11/30/22 0404   Line Status Flushed;Capped 11/30/22 0404   Line Care Connections checked and tightened 11/30/22 0404   Phlebitis Assessment No symptoms 11/30/22 0404   Infiltration Assessment 0 11/30/22 0404   Alcohol Cap Used Yes 11/30/22 0404   Dressing Status Clean, dry & intact 11/30/22 0404   Dressing Type Transparent 11/30/22 0404        Opportunity for questions and clarification was provided.

## 2022-11-30 NOTE — PROGRESS NOTES
TRANSFER - IN REPORT:    Verbal report received from [place] on [place]  being received from GI Lab for routine progression of patient care      Report consisted of patient's Situation, Background, Assessment and   Recommendations(SBAR). Information from the following report(s) Index, Surgery Report, MAR, and Med Rec Status was reviewed with the receiving nurse. Opportunity for questions and clarification was provided. Assessment completed upon patient's arrival to unit and care assumed.

## 2022-11-30 NOTE — PLAN OF CARE
Problem: Discharge Planning  Goal: Discharge to home or other facility with appropriate resources  Outcome: Progressing  Flowsheets (Taken 11/30/2022 0816)  Discharge to home or other facility with appropriate resources:   Identify barriers to discharge with patient and caregiver   Arrange for needed discharge resources and transportation as appropriate   Identify discharge learning needs (meds, wound care, etc)

## 2022-11-30 NOTE — ANESTHESIA PRE PROCEDURE
Department of Anesthesiology  Preprocedure Note       Name:  Live Crespo   Age:  44 y.o.  :  1985                                          MRN:  [id number]         Date:  2022      Surgeon: Cheryl Wayne):  Dave Snowden MD    Procedure: Procedure(s):  EGD/ENDOSCOPIC ULTRASOUND/27     Medications prior to admission:   Prior to Admission medications    Medication Sig Start Date End Date Taking? Authorizing Provider   aspirin 81 MG chewable tablet Take 1 tablet by mouth daily 22   ANJALI Perez   atorvastatin (LIPITOR) 40 MG tablet Take 1 tablet by mouth nightly 11/10/22   ANJALI Perez   colchicine (COLCRYS) 0.6 MG tablet Take 1 tablet by mouth daily 22   ANJALI Perez   lisinopril (PRINIVIL;ZESTRIL) 5 MG tablet Take 1 tablet by mouth daily 22   ANJALI Perez   metoprolol tartrate (LOPRESSOR) 25 MG tablet Take 1 tablet by mouth 2 times daily 11/10/22   ANJALI Perez   nitroGLYCERIN (NITROSTAT) 0.4 MG SL tablet Place 1 tablet under the tongue every 5 minutes as needed for Chest pain up to max of 3 total doses. If no relief after 1 dose, call 911. Patient not taking: Reported on 2022 11/10/22   ANJALI Perez   ticagrelor (BRILINTA) 90 MG TABS tablet Take 1 tablet by mouth 2 times daily  Patient not taking: Reported on 2022 11/10/22   ANJALI Perez   omeprazole (PRILOSEC) 20 MG delayed release capsule Take 20 mg by mouth daily    Historical Provider, MD   naloxone [facility]) 0.4 MG/ML injection Infuse 1 mL intravenously as needed (shortness of breath)  Patient not taking: Reported on 2022 10/31/22   Marv Conteh MD   thiamine 100 MG tablet Take 1 tablet by mouth in the morning. 8/3/22 8/2/22  Audi Chavarria MD       Current medications:    No current facility-administered medications for this visit. No current outpatient medications on file.      Facility-Administered Medications Ordered in Other Visits Medication Dose Route Frequency Provider Last Rate Last Admin    oxyCODONE (ROXICODONE) immediate release tablet 5 mg  5 mg Oral Q6H PRN May Y Vonnie [place]   5 mg at 11/30/22 1012    HYDROmorphone HCl PF (DILAUDID) injection 1 mg  1 mg IntraVENous Q4H PRN May Y Vonnie [place]   1 mg at 11/30/22 1221    temazepam (RESTORIL) capsule 15 mg  15 mg Oral Nightly PRN Murray Adorno MD   15 mg at 11/29/22 2353    lisinopril (PRINIVIL;ZESTRIL) tablet 10 mg  10 mg Oral Daily Lex Lefort, MD   10 mg at 11/30/22 0817    acetaminophen (TYLENOL) tablet 650 mg  650 mg Oral Q4H PRN Mehrdad Hernandez MD   650 mg at 11/26/22 0844    ondansetron (ZOFRAN) injection 4 mg  4 mg IntraVENous Q6H PRN Mehrdad Hernandez MD        polyethylene glycol [facility]) packet 17 g  17 g Oral Daily PRN Mehrdad Hernandez MD        [facility]) tablet 17.2 mg  2 tablet Oral Nightly PRN Mehrdad Hernandez MD        melatonin tablet 1.5 mg  1.5 mg Oral Nightly PRN Mehrdad Hernandez MD        aluminum & magnesium hydroxide-simethicone (MAALOX) 200-200-20 MG/5ML suspension 30 mL  30 mL Oral Q6H PRN Mehrdad Hernandez MD   30 mL at 11/28/22 2249    metoprolol tartrate (LOPRESSOR) tablet 25 mg  25 mg Oral BID Mehrdad Hernandez MD   25 mg at 11/30/22 0817    atorvastatin (LIPITOR) tablet 40 mg  40 mg Oral Nightly Mehrdad Hernandez MD   40 mg at 11/29/22 2100    eptifibatide (INTEGRILIN) 0.75 mg/mL infusion  2 mcg/kg/min IntraVENous Continuous MELISSA Vieyra CNP   Stopped at 11/30/22 1030    aspirin chewable tablet 81 mg  81 mg Oral Daily MELISSA Vieyra CNP   81 mg at 11/30/22 5682    nicotine (NICODERM CQ) 14 MG/24HR 1 patch  1 patch TransDERmal Q24H Mehrdad Hernandez MD   1 patch at 11/29/22 2100    buPROPion [facility]) extended release tablet 150 mg  150 mg Oral BID Mehrdad Hernandez MD   150 mg at 11/30/22 1498       Allergies:  No Known Allergies    Problem List:    Patient Active Problem List   Diagnosis Code    Primary hypertension I10    [facility] K86.1    Alcohol abuse F10.10    Alcohol use disorder, severe, in early remission, dependence ([facility] F10.21    Attention deficit hyperactivity disorder (ADHD), combined type F90.2    Tobacco abuse [Z72.0 (ICD-10-CM)] Z72.0    STEMI (ST elevation myocardial infarction) ([facility] [address] I21.3    Pancreatic pseudocyst K86.3    History of ST elevation myocardial infarction (STEMI) I25.2    Coronary artery disease involving native coronary artery of native heart I25.10    Acute on chronic pancreatitis (HCC) K85.90, K86.1    Dependence on nicotine from cigarettes F17.210       Past Medical History:        Diagnosis Date    Abdominal pain 10/25/2022    Abnormal CT of the abdomen 07/30/2022    Acute alcoholic pancreatitis 22/88/0325    Acute kidney injury (nontraumatic) ([facility] [address] 10/31/2022    pt denies    Acute on chronic pancreatitis ([facility] [address] 07/30/2022    Alcohol use disorder, severe, dependence ([facility] [address] 08/21/2019    CAD (coronary artery disease)     Carpal tunnel syndrome, left     Cigarette smoker     Coronary artery disease involving native coronary artery of native heart 11/13/2022    Dependence on nicotine from cigarettes 11/20/2022    GERD (gastroesophageal reflux disease)     controlled with med    History of pancreatitis     x 4 or 5 times-- last time admitted to hospital 10/10/26/22 x 7 days    Hypertension     controlled with med       Past Surgical History:        Procedure Laterality Date    CARDIAC PROCEDURE N/A 11/2/2022    LEFT HEART CATH / CORONARY ANGIOGRAPHY performed by Azam Jansen MD at [facility] CATH LAB    CARDIAC PROCEDURE N/A 11/2/2022    Percutaneous coronary intervention performed by Azam Jansen MD at [facility] CATH LAB    CHOLECYSTECTOMY  2012    ORTHOPEDIC SURGERY Left     wrist surg--nerve surg    ORTHOPEDIC SURGERY Left     foot surg as child    UPPER GASTROINTESTINAL ENDOSCOPY N/A 11/9/2022    EGD/ENDOSCOPIC ULTRASOUND/AXIOS STENT ROOM 2226 **Rep will be present performed by Yajaira Boo MD at [facility] ENDOSCOPY       Social History:    Social History     Tobacco Use    Smoking status: Every Day     Packs/day: 1.00     Years: 20.00     Pack years: 20.00     Types: Cigarettes    Smokeless tobacco: Never   Substance Use Topics    Alcohol use: Not Currently     Comment: none since 10/4/22-- had drank 3 glasses of wine daily                                Ready to quit: Not Answered  Counseling given: Not Answered      Vital Signs (Current): There were no vitals filed for this visit.                                            BP Readings from Last 3 Encounters:   11/30/22 (!) 101/52   11/22/22 115/82   11/17/22 104/70       NPO Status:                                                                                 BMI:   Wt Readings from Last 3 Encounters:   11/30/22 166 lb (75.3 kg)   11/22/22 165 lb 12.6 oz (75.2 kg)   11/17/22 166 lb 6.4 oz (75.5 kg)     There is no height or weight on file to calculate BMI.    CBC:   Lab Results   Component Value Date/Time    WBC 7.7 11/30/2022 04:31 AM    RBC 5.58 11/30/2022 04:31 AM    HGB 15.2 11/30/2022 04:31 AM    HCT 47.3 11/30/2022 04:31 AM    MCV 84.8 11/30/2022 04:31 AM    MCV 95.0 02/08/2021 03:53 PM    RDW 15.9 11/30/2022 04:31 AM     11/30/2022 04:31 AM       CMP:   Lab Results   Component Value Date/Time     11/28/2022 03:47 AM    K 4.3 11/28/2022 03:47 AM     11/28/2022 03:47 AM    CO2 26 11/28/2022 03:47 AM    BUN 9 11/28/2022 03:47 AM    CREATININE 0.90 11/28/2022 03:47 AM    GFRAA >60 08/01/2022 05:41 AM    AGRATIO 1.1 10/03/2021 03:38 PM    LABGLOM >60 11/28/2022 03:47 AM    GLUCOSE 111 11/28/2022 03:47 AM    PROT 6.0 11/22/2022 05:06 AM    CALCIUM 9.1 11/28/2022 03:47 AM    BILITOT 0.4 11/22/2022 05:06 AM    ALKPHOS 73 11/22/2022 05:06 AM    ALKPHOS 52 10/03/2021 03:38 PM    AST 29 11/22/2022 05:06 AM    ALT 40 11/22/2022 05:06 AM       POC Tests: No results for input(s): POCGLU, POCNA, POCK, POCCL, POCBUN, Kina Bernadette in the last 72 hours. Coags: No results found for: PROTIME, INR, APTT    HCG (If Applicable): No results found for: PREGTESTUR, PREGSERUM, HCG, HCGQUANT     ABGs: No results found for: PHART, PO2ART, FFK4HEJ, DJC0NBX, BEART, B8VLTLJT     Type & Screen (If Applicable):  No results found for: LABABO, LABRH    Drug/Infectious Status (If Applicable):  No results found for: HIV, HEPCAB    COVID-19 Screening (If Applicable): No results found for: COVID19        Anesthesia Evaluation  Patient summary reviewed and Nursing notes reviewed no history of anesthetic complications:   Airway: Mallampati: II  TM distance: >3 FB   Neck ROM: full  Mouth opening: > = 3 FB   Dental: normal exam         Pulmonary:normal exam  breath sounds clear to auscultation  (+) current smoker (~1 ppd, stopped about 2 weeks ago)                           Cardiovascular:    (+) hypertension:, past MI: < 1 month, CAD:, CABG/stent (Stent x1 placed 11/2/2022):,         Rhythm: regular  Rate: normal                 ROS comment: Admitted for DAPT bridge. Off Plavix for 5 days. Integrilin drip off since 55    Strong family h/o CAD (both father and grandfather had MI's at young ages)     Neuro/Psych:   (+) psychiatric history (ADHD):             ROS comment: H/o EtOH abuse, none in 7 months GI/Hepatic/Renal:   (+) GERD:, liver disease (Ascites and large pancreatic pseudocyst):,           Endo/Other:                     Abdominal:             Vascular: Other Findings:             Anesthesia Plan      TIVA     ASA 4       Induction: intravenous. Anesthetic plan and risks discussed with patient.                         Ijeoma Kuo MD   11/30/2022

## 2022-12-01 VITALS
TEMPERATURE: 97.2 F | BODY MASS INDEX: 24.87 KG/M2 | SYSTOLIC BLOOD PRESSURE: 116 MMHG | DIASTOLIC BLOOD PRESSURE: 72 MMHG | RESPIRATION RATE: 18 BRPM | HEIGHT: 69 IN | OXYGEN SATURATION: 98 % | WEIGHT: 167.9 LBS | HEART RATE: 78 BPM

## 2022-12-01 PROCEDURE — 6370000000 HC RX 637 (ALT 250 FOR IP): Performed by: PHYSICIAN ASSISTANT

## 2022-12-01 PROCEDURE — 6360000002 HC RX W HCPCS: Performed by: NURSE PRACTITIONER

## 2022-12-01 PROCEDURE — 6360000002 HC RX W HCPCS: Performed by: PHYSICIAN ASSISTANT

## 2022-12-01 PROCEDURE — 6370000000 HC RX 637 (ALT 250 FOR IP): Performed by: INTERNAL MEDICINE

## 2022-12-01 PROCEDURE — 6370000000 HC RX 637 (ALT 250 FOR IP): Performed by: HOSPITALIST

## 2022-12-01 PROCEDURE — 6370000000 HC RX 637 (ALT 250 FOR IP): Performed by: NURSE PRACTITIONER

## 2022-12-01 PROCEDURE — 6370000000 HC RX 637 (ALT 250 FOR IP): Performed by: STUDENT IN AN ORGANIZED HEALTH CARE EDUCATION/TRAINING PROGRAM

## 2022-12-01 RX ORDER — BUPROPION HYDROCHLORIDE 100 MG/1
100 TABLET, EXTENDED RELEASE ORAL 2 TIMES DAILY
Qty: 60 TABLET | Refills: 3 | Status: SHIPPED | OUTPATIENT
Start: 2022-12-01

## 2022-12-01 RX ORDER — PANTOPRAZOLE SODIUM 40 MG/1
40 TABLET, DELAYED RELEASE ORAL
Qty: 30 TABLET | Refills: 3 | Status: SHIPPED | OUTPATIENT
Start: 2022-12-01

## 2022-12-01 RX ORDER — BUPROPION HYDROCHLORIDE 100 MG/1
100 TABLET, EXTENDED RELEASE ORAL 2 TIMES DAILY
Qty: 60 TABLET | Refills: 3 | Status: SHIPPED | OUTPATIENT
Start: 2022-12-01 | End: 2022-12-01 | Stop reason: SDUPTHER

## 2022-12-01 RX ORDER — PANTOPRAZOLE SODIUM 40 MG/1
40 TABLET, DELAYED RELEASE ORAL
Qty: 30 TABLET | Refills: 3 | Status: SHIPPED | OUTPATIENT
Start: 2022-12-01 | End: 2022-12-01 | Stop reason: SDUPTHER

## 2022-12-01 RX ORDER — HYDROCODONE BITARTRATE AND ACETAMINOPHEN 10; 325 MG/1; MG/1
1 TABLET ORAL EVERY 8 HOURS PRN
Qty: 15 TABLET | Refills: 0 | Status: SHIPPED | OUTPATIENT
Start: 2022-12-01 | End: 2022-12-01 | Stop reason: SDUPTHER

## 2022-12-01 RX ORDER — HYDROCODONE BITARTRATE AND ACETAMINOPHEN 10; 325 MG/1; MG/1
1 TABLET ORAL EVERY 8 HOURS PRN
Qty: 15 TABLET | Refills: 0 | Status: SHIPPED | OUTPATIENT
Start: 2022-12-01 | End: 2022-12-06

## 2022-12-01 RX ORDER — PANTOPRAZOLE SODIUM 40 MG/1
40 TABLET, DELAYED RELEASE ORAL
Status: DISCONTINUED | OUTPATIENT
Start: 2022-12-01 | End: 2022-12-01 | Stop reason: HOSPADM

## 2022-12-01 RX ORDER — NITROGLYCERIN 0.4 MG/1
0.4 TABLET SUBLINGUAL EVERY 5 MIN PRN
Qty: 25 TABLET | Refills: 3 | Status: SHIPPED | OUTPATIENT
Start: 2022-12-01

## 2022-12-01 RX ADMIN — HYDROMORPHONE HYDROCHLORIDE 1 MG: 1 INJECTION, SOLUTION INTRAMUSCULAR; INTRAVENOUS; SUBCUTANEOUS at 01:37

## 2022-12-01 RX ADMIN — HYDROMORPHONE HYDROCHLORIDE 1 MG: 1 INJECTION, SOLUTION INTRAMUSCULAR; INTRAVENOUS; SUBCUTANEOUS at 10:52

## 2022-12-01 RX ADMIN — OXYCODONE 5 MG: 5 TABLET ORAL at 12:20

## 2022-12-01 RX ADMIN — EPTIFIBATIDE 2 MCG/KG/MIN: 0.75 INJECTION INTRAVENOUS at 05:43

## 2022-12-01 RX ADMIN — ASPIRIN 81 MG 81 MG: 81 TABLET ORAL at 07:48

## 2022-12-01 RX ADMIN — LISINOPRIL 10 MG: 5 TABLET ORAL at 07:47

## 2022-12-01 RX ADMIN — PANTOPRAZOLE SODIUM 40 MG: 40 TABLET, DELAYED RELEASE ORAL at 08:20

## 2022-12-01 RX ADMIN — METOPROLOL TARTRATE 25 MG: 25 TABLET, FILM COATED ORAL at 07:48

## 2022-12-01 RX ADMIN — OXYCODONE 5 MG: 5 TABLET ORAL at 00:22

## 2022-12-01 RX ADMIN — TICAGRELOR 90 MG: 90 TABLET ORAL at 08:20

## 2022-12-01 RX ADMIN — HYDROMORPHONE HYDROCHLORIDE 1 MG: 1 INJECTION, SOLUTION INTRAMUSCULAR; INTRAVENOUS; SUBCUTANEOUS at 06:12

## 2022-12-01 RX ADMIN — TEMAZEPAM 15 MG: 15 CAPSULE ORAL at 01:37

## 2022-12-01 ASSESSMENT — PAIN SCALES - GENERAL
PAINLEVEL_OUTOF10: 7
PAINLEVEL_OUTOF10: 0
PAINLEVEL_OUTOF10: 8
PAINLEVEL_OUTOF10: 3
PAINLEVEL_OUTOF10: 0
PAINLEVEL_OUTOF10: 8
PAINLEVEL_OUTOF10: 5
PAINLEVEL_OUTOF10: 7
PAINLEVEL_OUTOF10: 6
PAINLEVEL_OUTOF10: 5

## 2022-12-01 ASSESSMENT — PAIN DESCRIPTION - DESCRIPTORS
DESCRIPTORS: PRESSURE;SHARP
DESCRIPTORS: PRESSURE;SHARP
DESCRIPTORS: SHARP;PRESSURE
DESCRIPTORS: PRESSURE;SHARP
DESCRIPTORS: SHARP;PRESSURE
DESCRIPTORS: PRESSURE;SHARP

## 2022-12-01 ASSESSMENT — PAIN DESCRIPTION - PAIN TYPE
TYPE: ACUTE PAIN
TYPE: ACUTE PAIN

## 2022-12-01 ASSESSMENT — PAIN DESCRIPTION - LOCATION
LOCATION: ABDOMEN;BACK
LOCATION: ABDOMEN;BACK
LOCATION: ABDOMEN
LOCATION: ABDOMEN
LOCATION: ABDOMEN;BACK
LOCATION: ABDOMEN

## 2022-12-01 ASSESSMENT — PAIN DESCRIPTION - ORIENTATION
ORIENTATION: RIGHT;LEFT
ORIENTATION: MID;LEFT
ORIENTATION: RIGHT;LEFT
ORIENTATION: MID;LEFT
ORIENTATION: RIGHT;LEFT

## 2022-12-01 ASSESSMENT — PAIN DESCRIPTION - ONSET
ONSET: GRADUAL
ONSET: ON-GOING

## 2022-12-01 ASSESSMENT — PAIN DESCRIPTION - FREQUENCY
FREQUENCY: INTERMITTENT
FREQUENCY: INTERMITTENT

## 2022-12-01 NOTE — PLAN OF CARE
Problem: Discharge Planning  Goal: Discharge to home or other facility with appropriate resources  Outcome: Progressing  Flowsheets (Taken 12/1/2022 0565)  Discharge to home or other facility with appropriate resources:   Identify barriers to discharge with patient and caregiver   Arrange for needed discharge resources and transportation as appropriate   Identify discharge learning needs (meds, wound care, etc)

## 2022-12-01 NOTE — PROGRESS NOTES
Spiritual Care Visit, Follow Up visit. Discharge today! Visited with patient at bedside. Prayed for patient's healing and health. Visit by Chaparro Reyes, Staff .  Lilliana, Garima.B., B.A.

## 2022-12-01 NOTE — PROGRESS NOTES
[facility] CARDIOLOGY PROGRESS NOTE           12/1/2022 9:09 AM    Admit Date: 11/25/2022      Subjective:     No overnight events. Improved abdominal pain. Had stent removal 11/30/2022    ROS:  Cardiovascular:  As noted above    Objective:      Vitals:    12/01/22 1049 12/01/22 1052 12/01/22 1220 12/01/22 1226   BP: 116/72      Pulse:    78   Resp:  20 18 18   Temp:    97.2 °F (36.2 °C)   TempSrc:    Oral   SpO2:    98%   Weight:       Height:           Physical Exam:  General-No Acute Distress  Neck- supple, no JVD  CV- regular rate and rhythm no MRG  Lung- clear bilaterally  Abd- soft, nontender, nondistended  Ext- no edema bilaterally. Skin- warm and dry    Data Review:   Recent Labs     11/29/22  0444 11/30/22  0431   WBC 8.9 7.7   HGB 15.1 15.2   HCT 46.9 47.3    282       Assessment/Plan:     Principal Problem:    Pancreatic pseudocyst  -Per GI. Stent removal 11/30/2022    Active Problems:    Alcohol use disorder, severe, in early remission, dependence ([facility]    Tobacco abuse [Z72.0 (ICD-10-CM)]  -Stressed continued cessation      History of ST elevation myocardial infarction (STEMI)    Coronary artery disease involving native coronary artery of native heart  Recent STEMI status post PCI to large OM1. Discussed uninterrupted dual antiplatelet therapy ideally for 1 year.  -Was on Integrilin which has been stopped and Brilinta restarted. Continue aspirin/statin therapy.       Primary hypertension  -controlled         Aurea Bazzi MD  12/1/2022 9:09 AM

## 2022-12-01 NOTE — CARE COORDINATION
Discharge order is in. Pt is discharging home today in stable condition. CM vouched the following medications ($65.07): Wellbutrin SR 100mg #60-$23.68  Nitrostat 0.4mg SL #25-$9.90  Protonix 40mg #30-$31.49    CM also provided pt with a Pt Assistance Application for his existing Brilinta prescription; has two weeks left. Encouraged him to f/u with his PCP. No other discharge needs were identified. Tx goals met. 12/01/22 [address] Discharge   Transition of Care Consult (CM Consult) Discharge [address] Discharge None    Resource Information Provided? No   Mode of Transport at Discharge Other (see comment)  (Family)   Confirm Follow Up Transport Self   Condition of Participation: Discharge Planning   The Patient and/or Patient Representative was provided with a Choice of Provider? Patient   The Patient and/Or Patient Representative agree with the Discharge Plan? Yes   Freedom of Choice list was provided with basic dialogue that supports the patient's individualized plan of care/goals, treatment preferences, and shares the quality data associated with the providers?   Yes

## 2022-12-01 NOTE — DISCHARGE SUMMARY
Hospitalist Discharge Summary   Admit Date:  2022  4:56 PM   DC Note date: 2022  Name:  Paula Badillo   Age:  44 y.o. Sex:  male  :  1985   MRN:  [id number]   Room:  [facility]  PCP:  Kvng Ramirez MD    Presenting Complaint: No chief complaint on file. Initial Admission Diagnosis: Pancreatic pseudocyst [K86.3]     Problem List for this Hospitalization (present on admission):    Principal Problem:    Pancreatic pseudocyst  Active Problems:    Alcohol use disorder, severe, in early remission, dependence ([facility] [address]    Tobacco abuse [Z72.0 (ICD-10-CM)]    History of ST elevation myocardial infarction (STEMI)    Coronary artery disease involving native coronary artery of native heart    Primary hypertension  Resolved Problems:    * No resolved hospital problems. [facility] Course: This is a 68-year-old male with alcohol use disorder in remission, alcohol induced chronic pancreatitis complicated by pseudocyst status post gastrostomy with stent placed 2022 and a recent STEMI status post PCI with TULIO 2022 on aspirin and Brilinta who was discharged from this hospital  after conservative treatment for his known pancreatic pseudocyst, now electively admitted per GI recommendations for an Integrilin bridge so that the patient can be taken off of his DAPT for a planned removal of his pancreatic pseudocyst stent on . Cardiology consulted and agrees with Integrilin bridging. Assessment & Plan:      Principal Problem:    Pancreatic pseudocyst  s/p stent placement 2022  Stent removed on 2022 via EUS per GI  As needed Gleason on discharge  Integrilin GTT stopped on , switched to Brilinta 90 mg p.o. twice daily.   Outpatient follow-up with GI scheduled     Active Problems:    CAD / s/p remote STEMI with PCI  Continue aspirin and Brilinta on discharge.  - h/h stable  - cont BB / ACE-I / ASA  - Denies CP       Hx of ETOH abuse  - Pt reports last ETOH beverage was 5/2022  - cont cessation efforts       Cigarette smoker  Wellbutrin twice daily on discharge. HTN  BP is optimally controlled with lisinopril and Lopressor. Disposition: Patient is medically cleared and hemodynamically stable for discharge to home today. Rest of the hospital course was uneventful, for further details please refer to daily progress notes. Diet: ADULT DIET; Regular  Code Status: Full Code    Follow Ups: With GI and PCP next 1 to 3 weeks as scheduled. Time spent in patient discharge and coordination 36 minutes. Plan was discussed with patient. All questions answered. Patient was stable at time of discharge. Instructions given to call a physician or return if any concerns. Current Discharge Medication List        START taking these medications    Details   pantoprazole (PROTONIX) 40 MG tablet Take 1 tablet by mouth every morning (before breakfast)  Qty: 30 tablet, Refills: 3      buPROPion (WELLBUTRIN SR) 100 MG extended release tablet Take 1 tablet by mouth 2 times daily  Qty: 60 tablet, Refills: 3           CONTINUE these medications which have CHANGED    Details   nitroGLYCERIN (NITROSTAT) 0.4 MG SL tablet Place 1 tablet under the tongue every 5 minutes as needed for Chest pain up to max of 3 total doses. If no relief after 1 dose, call 911. Qty: 25 tablet, Refills: 3      HYDROcodone-acetaminophen (NORCO)  MG per tablet Take 1 tablet by mouth every 8 hours as needed for Pain for up to 5 days.  Intended supply: 30 days  Qty: 15 tablet, Refills: 0    Comments: Reduce doses taken as pain becomes manageable  Associated Diagnoses: Acute on chronic pancreatitis ([facility] [address]           CONTINUE these medications which have NOT CHANGED    Details   aspirin 81 MG chewable tablet Take 1 tablet by mouth daily  Qty: 30 tablet, Refills: 3      atorvastatin (LIPITOR) 40 MG tablet Take 1 tablet by mouth nightly  Qty: 30 tablet, Refills: 3      colchicine (COLCRYS) 0.6 MG tablet Take 1 tablet by mouth daily  Qty: 30 tablet, Refills: 0      lisinopril (PRINIVIL;ZESTRIL) 5 MG tablet Take 1 tablet by mouth daily  Qty: 30 tablet, Refills: 3      metoprolol tartrate (LOPRESSOR) 25 MG tablet Take 1 tablet by mouth 2 times daily  Qty: 60 tablet, Refills: 3      ticagrelor (BRILINTA) 90 MG TABS tablet Take 1 tablet by mouth 2 times daily  Qty: 60 tablet, Refills: 11           STOP taking these medications       omeprazole (PRILOSEC) 20 MG delayed release capsule Comments:   Reason for Stopping:         naloxone (NARCAN) 0.4 MG/ML injection Comments:   Reason for Stopping:         thiamine 100 MG tablet Comments:   Reason for Stopping:               Procedures done this admission:  Procedure(s):  EGD/ENDOSCOPIC ULTRASOUND stent removal     Consults this admission:  IP CONSULT TO CARDIOLOGY    Echocardiogram results:  11/02/22    TRANSTHORACIC ECHOCARDIOGRAM (TTE) COMPLETE (CONTRAST/BUBBLE/3D PRN) 11/02/2022  3:31 PM, 11/02/2022 12:00 AM (Final)    Interpretation Summary    Left Ventricle: Normal left ventricular systolic function with a visually estimated EF of 55 - 60%. Left ventricle size is normal. Normal wall thickness. Normal wall motion. Normal diastolic function. Technical qualifiers: Color flow Doppler was performed and pulse wave and/or continuous wave Doppler was performed. Signed by: Eder Cody MD on 11/2/2022  3:31 PM, Signed by: Unknown Provider Result on 11/2/2022 12:00 AM      Diagnostic Imaging/Tests:   XR CHEST (2 VW)    Result Date: 11/20/2022  No active disease in the chest.     XR CHEST (2 VW)    Result Date: 11/2/2022  1. Left lower lobe bandlike atelectasis/scarring. 2.  Otherwise, clear lungs. CT ABDOMEN PELVIS W IV CONTRAST Additional Contrast? None    Result Date: 11/20/2022  1. Mild improvement in the inflammatory changes associated with the pancreas compatible with pancreatitis. The dominant pseudocysts have diminished.  There is a newly visualized focus of gas and fluid at the lesser sac. The gas is likely tracking from the site of intervention involving the stomach. 2. Small amounts of ascites. CT ABDOMEN PELVIS W IV CONTRAST Additional Contrast? Oral    Result Date: 11/13/2022  1. New or significantly larger pseudocyst located in the left upper abdominal quadrant beneath the diaphragm is between the stomach and spleen. This measures roughly 5 cm. 2.  Increase in the peripancreatic inflammation primarily accumulating in the lesser sac indicative of pancreatitis. 3.  Stable 1.5 cm cyst in the body of the pancreas. 4.  Postsurgical changes indicative of a cyst gastrostomy at the site of the prior large pseudocyst which has decreased significantly in size. Labs: Results:       BMP, Mg, Phos No results for input(s): NA, K, CL, CO2, ANIONGAP, BUN, CREATININE, LABGLOM, GFRAA, CALCIUM, GLUCOSE, MG, PHOS in the last 72 hours. CBC Recent Labs     11/29/22  0444 11/30/22  0431   WBC 8.9 7.7   RBC 5.43 5.58   HGB 15.1 15.2   HCT 46.9 47.3   MCV 86.4 84.8   MCH 27.8 27.2   MCHC 32.2 32.1   RDW 15.9* 15.9*    282   MPV 9.7 9.9   NRBC 0.00 0.00      LFT No results for input(s): BILITOT, BILIDIR, ALKPHOS, AST, ALT, PROT, LABALBU, GLOB in the last 72 hours.    Cardiac  Lab Results   Component Value Date/Time    TROPHS 300.0 11/02/2022 10:53 AM      Coags No results found for: PROTIME, INR, APTT   A1c Lab Results   Component Value Date/Time    LABA1C 5.8 10/26/2022 05:58 AM     10/26/2022 05:58 AM      Lipids Lab Results   Component Value Date/Time    CHOL 88 11/03/2022 05:39 AM    LDLCALC 58.4 11/03/2022 05:39 AM    LABVLDL 16.6 11/03/2022 05:39 AM    HDL 13 11/03/2022 05:39 AM    CHOLHDLRATIO 6.8 11/03/2022 05:39 AM    TRIG 93 11/21/2022 05:58 AM      Thyroid  Lab Results   Component Value Date/Time    TSHELE 2.38 10/26/2022 05:58 AM        Most Recent UA Lab Results   Component Value Date/Time    COLORU YELLOW 11/13/2022 10:19 AM    APPEARANCE CLEAR 11/13/2022 10:19 AM    SPECGRAV 1.010 11/13/2022 10:19 AM    LABPH 7.0 11/13/2022 10:19 AM    PROTEINU Negative 11/13/2022 10:19 AM    GLUCOSEU Negative 11/13/2022 10:19 AM    KETUA Negative 11/13/2022 10:19 AM    BILIRUBINUR Negative 11/13/2022 10:19 AM    BLOODU Negative 11/13/2022 10:19 AM    UROBILINOGEN 0.2 11/13/2022 10:19 AM    NITRU Negative 11/13/2022 10:19 AM    LEUKOCYTESUR Negative 11/13/2022 10:19 AM    WBCUA 0-3 10/25/2022 07:51 AM    RBCUA 0-3 10/25/2022 07:51 AM    EPITHUA 0 10/25/2022 07:51 AM    BACTERIA 1+ 10/25/2022 07:51 AM    LABCAST 0 10/25/2022 07:51 AM    MUCUS 0 10/25/2022 07:51 AM        No results for input(s): CULTURE in the last 720 hours. All Labs from Last 24 Hrs:  No results found for this or any previous visit (from the past 24 hour(s)).     No Known Allergies  Immunization History   Administered Date(s) Administered    COVID-19, MODERNA BLUE border, Primary or Immunocompromised, (age 12y+), IM, 100 mcg/0.5mL 08/19/2021    Influenza Virus Vaccine 11/05/2015, 09/19/2018, 01/18/2020, 01/18/2020, 10/05/2020, 10/05/2020    Influenza, FLUARIX, FLULAVAL, FLUZONE (age 10 mo+) AND AFLURIA, (age 1 y+), PF, 0.5mL 11/05/2015, 01/18/2020, 10/05/2020    Pneumococcal Polysaccharide ([id number]) 02/22/2018    Tdap (Boostrix, Adacel) 10/11/2022       Recent Vital Data:  Patient Vitals for the past 24 hrs:   Temp Pulse Resp BP SpO2   12/01/22 0747 -- 80 -- 96/67 --   12/01/22 0612 -- -- -- 103/61 --   12/01/22 0435 -- 74 18 (!) 93/56 97 %   12/01/22 0022 -- -- -- 115/63 --   12/01/22 0020 97.9 °F (36.6 °C) 78 18 (!) 94/59 97 %   11/30/22 2022 98.1 °F (36.7 °C) 93 18 108/72 97 %   11/30/22 1844 -- -- 18 -- --   11/30/22 1655 -- 85 18 108/71 100 %   11/30/22 1616 -- -- -- (!) 99/58 --   11/30/22 1609 -- 81 18 (!) 97/55 97 %   11/30/22 1606 -- 74 -- (!) 95/53 96 %   11/30/22 1603 -- 77 -- (!) 88/53 96 %   11/30/22 1559 -- 75 22 (!) 88/54 96 %   11/30/22 1557 -- 77 18 (!) 91/53 95 %   11/30/22 1554 -- 77 21 (!) 87/54 96 %   11/30/22 1548 -- 81 -- (!) 86/51 95 %   11/30/22 1543 -- 78 -- (!) 84/47 95 %   11/30/22 1540 -- 84 -- (!) 83/40 96 %   11/30/22 1530 -- 84 16 (!) 86/45 97 %   11/30/22 1420 98 °F (36.7 °C) 80 16 (!) 101/52 96 %   11/30/22 1221 -- -- 18 -- --   11/30/22 0816 97.5 °F (36.4 °C) 90 20 125/82 99 %       Oxygen Therapy  SpO2: 97 %  Pulse via Oximetry: 80 beats per minute  Pulse Oximeter Device Mode: Continuous  O2 Device: None (Room air)  O2 Flow Rate (L/min): 3 L/min    Estimated body mass index is 24.79 kg/m² as calculated from the following:    Height as of this encounter: 5' 9\" (1.753 m). Weight as of this encounter: 167 lb 14.4 oz (76.2 kg). Intake/Output Summary (Last 24 hours) at 12/1/2022 0757  Last data filed at 11/30/2022 1836  Gross per 24 hour   Intake 1291.3 ml   Output 0 ml   Net 1291.3 ml         Physical Exam:    General:    Well nourished. No overt distress  Head:  Normocephalic, atraumatic  Eyes:  Sclerae appear normal.  Pupils equally round. HENT:  Nares appear normal, no drainage. Moist mucous membranes  Neck:  No restricted ROM. Trachea midline  CV:   RRR. No m/r/g. No JVD  Lungs:   CTAB. No wheezing, rhonchi, or rales. Respirations even, unlabored  Abdomen:   Soft, nontender, nondistended. Extremities: Warm and dry. No cyanosis or clubbing. No edema. Skin:     No rashes. Normal coloration  Neuro:  CN II-XII grossly intact. Psych:  Normal mood and affect. Signed:  Ronald Pierce MD    Part of this note may have been written by using a voice dictation software. The note has been proof read but may still contain some grammatical/other typographical errors.

## 2022-12-13 ENCOUNTER — OFFICE VISIT (OUTPATIENT)
Dept: INTERNAL MEDICINE CLINIC | Facility: CLINIC | Age: 37
End: 2022-12-13

## 2022-12-13 ENCOUNTER — TELEPHONE (OUTPATIENT)
Dept: INTERNAL MEDICINE CLINIC | Facility: CLINIC | Age: 37
End: 2022-12-13

## 2022-12-13 VITALS
SYSTOLIC BLOOD PRESSURE: 120 MMHG | HEART RATE: 87 BPM | DIASTOLIC BLOOD PRESSURE: 88 MMHG | HEIGHT: 69 IN | BODY MASS INDEX: 26.22 KG/M2 | WEIGHT: 177 LBS | OXYGEN SATURATION: 99 %

## 2022-12-13 DIAGNOSIS — K85.90 ACUTE ON CHRONIC PANCREATITIS (HCC): ICD-10-CM

## 2022-12-13 DIAGNOSIS — I10 PRIMARY HYPERTENSION: Primary | ICD-10-CM

## 2022-12-13 DIAGNOSIS — K86.1 ACUTE ON CHRONIC PANCREATITIS (HCC): ICD-10-CM

## 2022-12-13 DIAGNOSIS — F17.211 CIGARETTE NICOTINE DEPENDENCE IN REMISSION: Chronic | ICD-10-CM

## 2022-12-13 DIAGNOSIS — K86.0 ALCOHOL-INDUCED CHRONIC PANCREATITIS (HCC): ICD-10-CM

## 2022-12-13 DIAGNOSIS — K86.3 PANCREATIC PSEUDOCYST: ICD-10-CM

## 2022-12-13 PROCEDURE — 99214 OFFICE O/P EST MOD 30 MIN: CPT | Performed by: INTERNAL MEDICINE

## 2022-12-13 PROCEDURE — 3078F DIAST BP <80 MM HG: CPT | Performed by: INTERNAL MEDICINE

## 2022-12-13 PROCEDURE — 3074F SYST BP LT 130 MM HG: CPT | Performed by: INTERNAL MEDICINE

## 2022-12-13 RX ORDER — HYDROCODONE BITARTRATE AND ACETAMINOPHEN 5; 325 MG/1; MG/1
1 TABLET ORAL EVERY 6 HOURS PRN
Qty: 30 TABLET | Refills: 0 | Status: SHIPPED | OUTPATIENT
Start: 2022-12-13 | End: 2022-12-27

## 2022-12-13 ASSESSMENT — ENCOUNTER SYMPTOMS
ABDOMINAL PAIN: 1
DIARRHEA: 0
PHOTOPHOBIA: 0
SHORTNESS OF BREATH: 0
WHEEZING: 0
CHEST TIGHTNESS: 0
ABDOMINAL DISTENTION: 1

## 2022-12-13 NOTE — PROGRESS NOTES
Chief Complaint   Patient presents with    1 Month Follow-Up     HTN          Kathi Honeycutt is a 44 y.o. male who presents today for 1 Month Follow-Up (HTN/)     He is here for follow-up after multiple events over the last few months, including pancreatitis with presence of ascites and pseudocyst.  And a STEMI status post cardiac cath By  Dr. Leslie Cisneros, stent placed 11/2/22, attempted to treat cyst conservatively but was electively admitted for pancreatic stent placement on 11/9/2022 and retrieved it  on 11/30/22    Has been tolerating the medications as prescribed, concerned about taking the colchicine, otherwise tolerating medications. He continues to experience epigastric pain every 3 to 4 days and usually last 36 hours, he has not been able to find possible triggers, has been using the hydrocodone with Tylenol as needed, and is currently needing a refill. He has a follow-up with cardiology next month, and is aware that Brilinta needs to be taking for at least a year,    He needs to have a CT scan repeated, he is following with GI within the next few weeks    Has been able to decrease tobacco, using Wellbutrin without side effects. And has been sober for few months  Wt Readings from Last 3 Encounters:   12/13/22 177 lb (80.3 kg)   12/01/22 167 lb 14.4 oz (76.2 kg)   11/22/22 165 lb 12.6 oz (75.2 kg)     Vitals:    12/13/22 0804   BP: 120/88   Site: Left Upper Arm   Position: Sitting   Pulse: 87   SpO2: 99%   Weight: 177 lb (80.3 kg)   Height: 5' 9\" (1.753 m)        Assessment and plan:  1. Primary hypertension  2. Acute on chronic pancreatitis (HCC)  -     HYDROcodone-acetaminophen (NORCO) 5-325 MG per tablet; Take 1 tablet by mouth every 6 hours as needed for Pain for up to 14 days. Intended supply: 3 days. Take lowest dose possible to manage pain, Disp-30 tablet, R-0Normal  3. Alcohol-induced chronic pancreatitis ([facility] [address]  4. Pancreatic pseudocyst  5.  Cigarette nicotine dependence in remission    Well-controlled blood pressure  We will continue monitoring with cardiology, and GI,  Repeat CT scan by GI  Will continue managing pain as needed, we discussed long-term consequences, if chronic pain management, will needed, control will be signed. And referral for pain clinic. We discussed about risk of dependency, as he has a history of alcohol dependency, and cigarettes, he is at high risk. We will continue to monitor  GI will be contacted for colchicine refill if needed,    Return for pain, HTN. Review of system:    Review of Systems   Constitutional:  Negative for activity change, fatigue and unexpected weight change. Eyes:  Negative for photophobia and visual disturbance. Respiratory:  Negative for chest tightness, shortness of breath and wheezing. Cardiovascular:  Negative for chest pain, palpitations and leg swelling. Gastrointestinal:  Positive for abdominal distention and abdominal pain. Negative for diarrhea. Genitourinary:  Negative for difficulty urinating and frequency. Musculoskeletal:  Negative for myalgias. Neurological:  Negative for dizziness and headaches. Immunization history:    Immunization History   Administered Date(s) Administered    COVID-19, MODERNA BLUE border, Primary or Immunocompromised, (age 12y+), IM, 100 mcg/0.5mL 08/19/2021    Influenza Virus Vaccine 11/05/2015, 09/19/2018, 01/18/2020, 01/18/2020, 10/05/2020, 10/05/2020    Influenza, FLUARIX, FLULAVAL, FLUZONE (age 10 mo+) AND AFLURIA, (age 1 y+), PF, 0.5mL 11/05/2015, 01/18/2020, 10/05/2020    Pneumococcal Polysaccharide ([id number]) 02/22/2018    Tdap (Boostrix, Adacel) 10/11/2022       Current medications:      Current Outpatient Medications:     HYDROcodone-acetaminophen (NORCO) 5-325 MG per tablet, Take 1 tablet by mouth every 6 hours as needed for Pain for up to 14 days. Intended supply: 3 days.  Take lowest dose possible to manage pain, Disp: 30 tablet, Rfl: 0    pantoprazole (PROTONIX) 40 MG tablet, Take 1 tablet by mouth every morning (before breakfast), Disp: 30 tablet, Rfl: 3    nitroGLYCERIN (NITROSTAT) 0.4 MG SL tablet, Place 1 tablet under the tongue every 5 minutes as needed for Chest pain up to max of 3 total doses.  If no relief after 1 dose, call 911., Disp: 25 tablet, Rfl: 3    buPROPion (WELLBUTRIN SR) 100 MG extended release tablet, Take 1 tablet by mouth 2 times daily, Disp: 60 tablet, Rfl: 3    aspirin 81 MG chewable tablet, Take 1 tablet by mouth daily, Disp: 30 tablet, Rfl: 3    atorvastatin (LIPITOR) 40 MG tablet, Take 1 tablet by mouth nightly, Disp: 30 tablet, Rfl: 3    colchicine (COLCRYS) 0.6 MG tablet, Take 1 tablet by mouth daily, Disp: 30 tablet, Rfl: 0    lisinopril (PRINIVIL;ZESTRIL) 5 MG tablet, Take 1 tablet by mouth daily, Disp: 30 tablet, Rfl: 3    metoprolol tartrate (LOPRESSOR) 25 MG tablet, Take 1 tablet by mouth 2 times daily, Disp: 60 tablet, Rfl: 3    ticagrelor (BRILINTA) 90 MG TABS tablet, Take 1 tablet by mouth 2 times daily, Disp: 60 tablet, Rfl: 11      Family history:    Family History   Problem Relation Age of Onset    No Known Problems Mother     Heart Attack Father         MI at 58        Past medical history:    Past Medical History:   Diagnosis Date    Abdominal pain 10/25/2022    Abnormal CT of the abdomen 00/59/3280    Acute alcoholic pancreatitis 04/06/1856    Acute kidney injury (nontraumatic) ([address] 10/31/2022    pt denies    Acute on chronic pancreatitis ([address] 07/30/2022    Alcohol use disorder, severe, dependence ([address] 08/21/2019    CAD (coronary artery disease)     Carpal tunnel syndrome, left     Cigarette smoker     Coronary artery disease involving native coronary artery of native heart 11/13/2022    Dependence on nicotine from cigarettes 11/20/2022    GERD (gastroesophageal reflux disease)     controlled with med    History of pancreatitis     x 4 or 5 times-- last time admitted to hospital 10/10/26/22 x 7 days    Hypertension controlled with med        Past Surgical History:   Procedure Laterality Date    CARDIAC PROCEDURE N/A 11/2/2022    LEFT HEART CATH / CORONARY ANGIOGRAPHY performed by Elaina Gutierrez MD at [address] [place] [address] CATH LAB    CARDIAC PROCEDURE N/A 11/2/2022    Percutaneous coronary intervention performed by Elaina Gutierrez MD at [address] [place] [address] CATH LAB    CHOLECYSTECTOMY  2012    ORTHOPEDIC SURGERY Left     wrist surg--nerve surg    ORTHOPEDIC SURGERY Left     foot surg as child    UPPER GASTROINTESTINAL ENDOSCOPY N/A 11/9/2022    EGD/ENDOSCOPIC ULTRASOUND/AXIOS STENT ROOM 2226 **Rep will be present performed by Gina Becker MD at [id number] [place] [address] 11/30/2022    EGD/ENDOSCOPIC ULTRASOUND stent removal  performed by Gina Becker MD at [address] N/A 11/30/2022    EGD ESOPHAGOGASTRODUODENOSCOPY performed by Gina Becker MD at [facility] ENDOSCOPY        Physical exam:    /88 (Site: Left Upper Arm, Position: Sitting)   Pulse 87   Ht 5' 9\" (1.753 m)   Wt 177 lb (80.3 kg)   SpO2 99%   BMI 26.14 kg/m²     Physical Exam  Vitals reviewed. Constitutional:       Appearance: Normal appearance. HENT:      Head: Normocephalic and atraumatic. Cardiovascular:      Rate and Rhythm: Normal rate and regular rhythm. Pulmonary:      Effort: Pulmonary effort is normal.      Breath sounds: Normal breath sounds. Abdominal:      General: There is distension. Tenderness: There is abdominal tenderness. There is no guarding or rebound. Neurological:      General: No focal deficit present. Mental Status: He is alert and oriented to person, place, and time.    Psychiatric:         Mood and Affect: Mood normal.        Recent labs:    Lab Results   Component Value Date    CHOL 88 11/03/2022     Lab Results   Component Value Date    TRIG 93 11/21/2022    TRIG 83 11/03/2022    TRIG 140 10/26/2022     Lab Results   Component Value Date HDL 13 (L) 11/03/2022     Lab Results   Component Value Date    LDLCALC 58.4 11/03/2022     Lab Results   Component Value Date    LABVLDL 16.6 11/03/2022     Lab Results   Component Value Date    CHOLHDLRATIO 6.8 11/03/2022     Lab Results   Component Value Date     11/28/2022    K 4.3 11/28/2022     11/28/2022    CO2 26 11/28/2022    BUN 9 11/28/2022    CREATININE 0.90 11/28/2022    GLUCOSE 111 (H) 11/28/2022    CALCIUM 9.1 11/28/2022    PROT 6.0 (L) 11/22/2022    LABALBU 2.7 (L) 11/22/2022    BILITOT 0.4 11/22/2022    ALKPHOS 73 11/22/2022    AST 29 11/22/2022    ALT 40 11/22/2022    LABGLOM >60 11/28/2022    GFRAA >60 08/01/2022    AGRATIO 1.1 (L) 10/03/2021    GLOB 3.3 11/22/2022     Lab Results   Component Value Date    WBC 7.7 11/30/2022    HGB 15.2 11/30/2022    HCT 47.3 11/30/2022    MCV 84.8 11/30/2022     11/30/2022             This document was generated with the aid of voice recognition software. . Please be aware that there may be inadvertent transcription errors not identified and corrected by the [employer]

## 2022-12-13 NOTE — TELEPHONE ENCOUNTER
Called and lmm with information regarding Colchicine. Received: Today  MD Zackary Trinidad LPN Debbie   Can you let him know about the colchicine   See below           Previous Messages     ----- Message -----   From: ANJALI Hylton   Sent: 12/13/2022   3:57 PM EST   To: Ermias Donovan MD     He was started on it by Dr Harvey Chappell for STEMI. He usually puts them on it for one month after an STEMI. He was given 30 days worth with no refills.      ----- Message -----   From: Ermias Donovan MD   Sent: 12/13/2022   8:26 AM EST   To: ANJALI Hylton     Good morning   I was wondering  why he is taking colchicine and for how long needs to take it   Edis White MD

## 2022-12-19 DIAGNOSIS — K86.3 PANCREATIC PSEUDOCYST: ICD-10-CM

## 2022-12-19 DIAGNOSIS — K86.1 ACUTE ON CHRONIC PANCREATITIS (HCC): Primary | ICD-10-CM

## 2022-12-19 DIAGNOSIS — K85.90 ACUTE ON CHRONIC PANCREATITIS (HCC): Primary | ICD-10-CM

## 2022-12-23 ENCOUNTER — HOSPITAL ENCOUNTER (OUTPATIENT)
Dept: CT IMAGING | Age: 37
Discharge: HOME | End: 2022-12-23

## 2022-12-23 ENCOUNTER — TELEPHONE (OUTPATIENT)
Dept: CARDIOLOGY CLINIC | Age: 37
End: 2022-12-23

## 2022-12-23 DIAGNOSIS — K85.90 ACUTE ON CHRONIC PANCREATITIS (HCC): ICD-10-CM

## 2022-12-23 DIAGNOSIS — K86.1 ACUTE ON CHRONIC PANCREATITIS (HCC): ICD-10-CM

## 2022-12-23 DIAGNOSIS — K86.3 PANCREATIC PSEUDOCYST: ICD-10-CM

## 2022-12-23 PROCEDURE — 2580000003 HC RX 258: Performed by: INTERNAL MEDICINE

## 2022-12-23 PROCEDURE — 74177 CT ABD & PELVIS W/CONTRAST: CPT

## 2022-12-23 PROCEDURE — 6360000004 HC RX CONTRAST MEDICATION: Performed by: INTERNAL MEDICINE

## 2022-12-23 RX ORDER — 0.9 % SODIUM CHLORIDE 0.9 %
100 INTRAVENOUS SOLUTION INTRAVENOUS ONCE
Status: COMPLETED | OUTPATIENT
Start: 2022-12-23 | End: 2022-12-23

## 2022-12-23 RX ORDER — SODIUM CHLORIDE 0.9 % (FLUSH) 0.9 %
10 SYRINGE (ML) INJECTION
Status: COMPLETED | OUTPATIENT
Start: 2022-12-23 | End: 2022-12-23

## 2022-12-23 RX ADMIN — SODIUM CHLORIDE 100 ML: 9 INJECTION, SOLUTION INTRAVENOUS at 10:37

## 2022-12-23 RX ADMIN — DIATRIZOATE MEGLUMINE AND DIATRIZOATE SODIUM 15 ML: 660; 100 LIQUID ORAL; RECTAL at 10:38

## 2022-12-23 RX ADMIN — IOPAMIDOL 100 ML: 755 INJECTION, SOLUTION INTRAVENOUS at 10:37

## 2022-12-23 RX ADMIN — SODIUM CHLORIDE, PRESERVATIVE FREE 10 ML: 5 INJECTION INTRAVENOUS at 10:37

## 2022-12-24 ENCOUNTER — HOSPITAL ENCOUNTER (EMERGENCY)
Age: 37
Discharge: HOME | End: 2022-12-24
Attending: STUDENT IN AN ORGANIZED HEALTH CARE EDUCATION/TRAINING PROGRAM

## 2022-12-24 VITALS
BODY MASS INDEX: 25.92 KG/M2 | SYSTOLIC BLOOD PRESSURE: 128 MMHG | RESPIRATION RATE: 18 BRPM | DIASTOLIC BLOOD PRESSURE: 75 MMHG | WEIGHT: 175 LBS | HEIGHT: 69 IN | TEMPERATURE: 98.5 F | OXYGEN SATURATION: 99 % | HEART RATE: 100 BPM

## 2022-12-24 DIAGNOSIS — K85.90 ACUTE PANCREATITIS, UNSPECIFIED COMPLICATION STATUS, UNSPECIFIED PANCREATITIS TYPE: Primary | ICD-10-CM

## 2022-12-24 DIAGNOSIS — R10.13 EPIGASTRIC PAIN: ICD-10-CM

## 2022-12-24 LAB
ALBUMIN SERPL-MCNC: 4 G/DL (ref 3.5–5)
ALBUMIN/GLOB SERPL: 1.1 {RATIO} (ref 0.4–1.6)
ALP SERPL-CCNC: 68 U/L (ref 45–117)
ALT SERPL-CCNC: 51 U/L (ref 13–61)
ANION GAP SERPL CALC-SCNC: 16 MMOL/L (ref 2–11)
APPEARANCE UR: CLEAR
AST SERPL-CCNC: 24 U/L (ref 15–37)
BACTERIA URNS QL MICRO: NORMAL /HPF
BASOPHILS # BLD: 0.1 K/UL (ref 0–0.2)
BASOPHILS NFR BLD: 1 % (ref 0–2)
BILIRUB SERPL-MCNC: 0.3 MG/DL (ref 0.2–1.1)
BILIRUB UR QL: ABNORMAL
BUN SERPL-MCNC: 11 MG/DL (ref 7–18)
CALCIUM SERPL-MCNC: 9.3 MG/DL (ref 8.3–10.4)
CASTS URNS QL MICRO: 0 /LPF
CHLORIDE SERPL-SCNC: 97 MMOL/L (ref 98–107)
CO2 SERPL-SCNC: 24 MMOL/L (ref 21–32)
COLOR UR: YELLOW
CREAT SERPL-MCNC: 0.82 MG/DL (ref 0.8–1.5)
CRYSTALS URNS QL MICRO: 0 /LPF
DIFFERENTIAL METHOD BLD: ABNORMAL
EOSINOPHIL # BLD: 0.2 K/UL (ref 0–0.8)
EOSINOPHIL NFR BLD: 2 % (ref 0.5–7.8)
EPI CELLS #/AREA URNS HPF: 0 /HPF
ERYTHROCYTE [DISTWIDTH] IN BLOOD BY AUTOMATED COUNT: 17.5 % (ref 11.9–14.6)
GLOBULIN SER CALC-MCNC: 3.8 G/DL (ref 2.8–4.5)
GLUCOSE SERPL-MCNC: 90 MG/DL (ref 65–100)
GLUCOSE UR STRIP.AUTO-MCNC: NEGATIVE MG/DL
HCT VFR BLD AUTO: 41.2 % (ref 41.1–50.3)
HGB BLD-MCNC: 13.7 G/DL (ref 13.6–17.2)
HGB UR QL STRIP: ABNORMAL
IMM GRANULOCYTES # BLD AUTO: 0.1 K/UL (ref 0–0.5)
IMM GRANULOCYTES NFR BLD AUTO: 1 % (ref 0–5)
KETONES UR QL STRIP.AUTO: ABNORMAL MG/DL
LEUKOCYTE ESTERASE UR QL STRIP.AUTO: NEGATIVE
LIPASE SERPL-CCNC: 755 U/L (ref 13–60)
LYMPHOCYTES # BLD: 2.2 K/UL (ref 0.5–4.6)
LYMPHOCYTES NFR BLD: 17 % (ref 13–44)
MCH RBC QN AUTO: 26.7 PG (ref 26.1–32.9)
MCHC RBC AUTO-ENTMCNC: 33.3 G/DL (ref 31.4–35)
MCV RBC AUTO: 80.3 FL (ref 82–102)
MONOCYTES # BLD: 1 K/UL (ref 0.1–1.3)
MONOCYTES NFR BLD: 8 % (ref 4–12)
MUCOUS THREADS URNS QL MICRO: 0 /LPF
NEUTS SEG # BLD: 9.2 K/UL (ref 1.7–8.2)
NEUTS SEG NFR BLD: 72 % (ref 43–78)
NITRITE UR QL STRIP.AUTO: NEGATIVE
NRBC # BLD: 0 K/UL (ref 0–0.2)
OTHER OBSERVATIONS: NORMAL
PH UR STRIP: 7 [PH] (ref 5–9)
PLATELET # BLD AUTO: 629 K/UL (ref 150–450)
PMV BLD AUTO: 9 FL (ref 9.4–12.3)
POTASSIUM SERPL-SCNC: 4.6 MMOL/L (ref 3.5–5.1)
PROT SERPL-MCNC: 7.8 G/DL (ref 6.4–8.2)
PROT UR STRIP-MCNC: 100 MG/DL
RBC # BLD AUTO: 5.13 M/UL (ref 4.23–5.6)
RBC #/AREA URNS HPF: 0 /HPF
SODIUM SERPL-SCNC: 137 MMOL/L (ref 133–143)
SP GR UR REFRACTOMETRY: 1.02 (ref 1–1.02)
UROBILINOGEN UR QL STRIP.AUTO: 1 EU/DL (ref 0.2–1)
WBC # BLD AUTO: 12.8 K/UL (ref 4.3–11.1)
WBC URNS QL MICRO: 0 /HPF

## 2022-12-24 PROCEDURE — 85025 COMPLETE CBC W/AUTO DIFF WBC: CPT

## 2022-12-24 PROCEDURE — 96361 HYDRATE IV INFUSION ADD-ON: CPT

## 2022-12-24 PROCEDURE — 2580000003 HC RX 258: Performed by: STUDENT IN AN ORGANIZED HEALTH CARE EDUCATION/TRAINING PROGRAM

## 2022-12-24 PROCEDURE — 80053 COMPREHEN METABOLIC PANEL: CPT

## 2022-12-24 PROCEDURE — 83690 ASSAY OF LIPASE: CPT

## 2022-12-24 PROCEDURE — 99284 EMERGENCY DEPT VISIT MOD MDM: CPT

## 2022-12-24 PROCEDURE — 96375 TX/PRO/DX INJ NEW DRUG ADDON: CPT

## 2022-12-24 PROCEDURE — 96374 THER/PROPH/DIAG INJ IV PUSH: CPT

## 2022-12-24 PROCEDURE — 6360000002 HC RX W HCPCS: Performed by: STUDENT IN AN ORGANIZED HEALTH CARE EDUCATION/TRAINING PROGRAM

## 2022-12-24 PROCEDURE — 81001 URINALYSIS AUTO W/SCOPE: CPT

## 2022-12-24 RX ORDER — 0.9 % SODIUM CHLORIDE 0.9 %
1000 INTRAVENOUS SOLUTION INTRAVENOUS
Status: COMPLETED | OUTPATIENT
Start: 2022-12-24 | End: 2022-12-24

## 2022-12-24 RX ORDER — NALOXONE HYDROCHLORIDE 4 MG/.1ML
1 SPRAY NASAL PRN
Qty: 1 EACH | Refills: 0 | Status: ON HOLD | OUTPATIENT
Start: 2022-12-24

## 2022-12-24 RX ORDER — HYDROMORPHONE HYDROCHLORIDE 1 MG/ML
1 INJECTION, SOLUTION INTRAMUSCULAR; INTRAVENOUS; SUBCUTANEOUS
Status: COMPLETED | OUTPATIENT
Start: 2022-12-24 | End: 2022-12-24

## 2022-12-24 RX ORDER — HYDROCODONE BITARTRATE AND ACETAMINOPHEN 10; 325 MG/1; MG/1
1 TABLET ORAL EVERY 8 HOURS PRN
Qty: 6 TABLET | Refills: 0 | Status: SHIPPED | OUTPATIENT
Start: 2022-12-24 | End: 2022-12-26

## 2022-12-24 RX ORDER — ONDANSETRON 4 MG/1
4 TABLET, FILM COATED ORAL 3 TIMES DAILY PRN
Qty: 9 TABLET | Refills: 0 | Status: ON HOLD | OUTPATIENT
Start: 2022-12-24

## 2022-12-24 RX ORDER — ONDANSETRON 2 MG/ML
4 INJECTION INTRAMUSCULAR; INTRAVENOUS
Status: COMPLETED | OUTPATIENT
Start: 2022-12-24 | End: 2022-12-24

## 2022-12-24 RX ADMIN — HYDROMORPHONE HYDROCHLORIDE 1 MG: 1 INJECTION, SOLUTION INTRAMUSCULAR; INTRAVENOUS; SUBCUTANEOUS at 17:09

## 2022-12-24 RX ADMIN — SODIUM CHLORIDE 1000 ML: 9 INJECTION, SOLUTION INTRAVENOUS at 17:09

## 2022-12-24 RX ADMIN — ONDANSETRON 4 MG: 2 INJECTION INTRAMUSCULAR; INTRAVENOUS at 17:09

## 2022-12-24 ASSESSMENT — PAIN - FUNCTIONAL ASSESSMENT: PAIN_FUNCTIONAL_ASSESSMENT: 0-10

## 2022-12-24 ASSESSMENT — PAIN DESCRIPTION - LOCATION: LOCATION: ABDOMEN

## 2022-12-24 ASSESSMENT — ENCOUNTER SYMPTOMS
NAUSEA: 0
SINUS PAIN: 0
DIARRHEA: 0
SHORTNESS OF BREATH: 0
PHOTOPHOBIA: 0
VOMITING: 0
BACK PAIN: 0
ABDOMINAL PAIN: 1

## 2022-12-24 ASSESSMENT — PAIN DESCRIPTION - DESCRIPTORS: DESCRIPTORS: CRAMPING

## 2022-12-24 ASSESSMENT — PAIN SCALES - GENERAL
PAINLEVEL_OUTOF10: 8
PAINLEVEL_OUTOF10: 8

## 2022-12-24 ASSESSMENT — PAIN DESCRIPTION - ORIENTATION: ORIENTATION: LEFT;UPPER

## 2022-12-24 NOTE — DISCHARGE INSTRUCTIONS
Take Norco as prescribed, do not take your home prescription of Norco fives with the Norco tens I have prescribed. Do not drive or operate heavy machinery while taking this medication since it can be sedating. Continue to orally hydrate with clear liquids. Follow-up with your family physician and gastroenterology within 1 week. Return to the ER for worsening or worrisome symptoms.

## 2022-12-24 NOTE — ED PROVIDER NOTES
Emergency Department Provider Note                   PCP: Paige Frey MD               Age: 44 y.o. Sex: male       ICD-10-CM    1. Acute pancreatitis, unspecified complication status, unspecified pancreatitis type  K85.90 HYDROcodone-acetaminophen (NORCO)  MG per tablet      2. Epigastric pain  R10.13 HYDROcodone-acetaminophen (NORCO)  MG per tablet          DISPOSITION Decision To Discharge 12/24/2022 05:52:39 PM        MDM  Number of Diagnoses or Management Options  Acute pancreatitis, unspecified complication status, unspecified pancreatitis type  Epigastric pain  Diagnosis management comments: 28-year-old male presents to the emergency department with flareup of his pancreatitis per patient. Complains of left-sided abdominal pain that has worsened over the past few days. Denies vomiting. No relief of symptoms despite his compliance with his pain regimen. Will obtain basic labs. Will give IV fluids, Dilaudid and Zofran. Laboratory White count 12.8, stable H&H, normal electrolytes and kidney function, normal LFTs, lipase 755, Urinalysis without any emergent findings. Patient does not appear to be septic, is nontoxic. Pain improved after interventions here in the emergency department. Will discharge home with short course of Norco 10 mg prescription. He will take this instead of his home [address] which he reports do not control the pain. I advised patient that he would need to see his primary care physician or his gastroenterologist or pain management for continued treatment of his acute on chronic pancreatitis. Sedation precautions given. Also given Zofran as well as Narcan. Patient given strict return precautions. He will follow-up with gastroenterology and primary care physician otherwise. He voiced understanding and agreement.        Amount and/or Complexity of Data Reviewed  Clinical lab tests: ordered and reviewed  Decide to obtain previous medical records or to obtain history from someone other than the patient: yes  Review and summarize past medical records: yes    Risk of Complications, Morbidity, and/or Mortality  Presenting problems: moderate  Diagnostic procedures: moderate  Management options: moderate                                Orders Placed This Encounter   Procedures    CBC with Diff    CMP    Lipase    Urinalysis w rflx microscopic    Urinalysis, Micro    Diet NPO    Saline lock IV        Medications   0.9 % sodium chloride bolus (1,000 mLs IntraVENous New Bag 12/24/22 1709)   HYDROmorphone HCl PF (DILAUDID) injection 1 mg (1 mg IntraVENous Given 12/24/22 1709)   ondansetron (ZOFRAN) injection 4 mg (4 mg IntraVENous Given 12/24/22 1709)       New Prescriptions    HYDROCODONE-ACETAMINOPHEN (NORCO)  MG PER TABLET    Take 1 tablet by mouth every 8 hours as needed for Pain for up to 6 doses. Intended supply: 30 days Max Daily Amount: 3 tablets    NALOXONE (NARCAN) 4 MG/0.1ML LIQD NASAL SPRAY    1 spray by Nasal route as needed for Opioid Reversal    ONDANSETRON (ZOFRAN) 4 MG TABLET    Take 1 tablet by mouth 3 times daily as needed for Nausea or Vomiting        Reynold Rodríguez is a 44 y.o. male who presents to the Emergency Department with chief complaint of    Chief Complaint   Patient presents with    Abdominal Pain      59-year-old male presents to the emergency department reporting a flareup of his pancreatitis. Well-known history of pancreatitis with recent pancreatic stent placement secondary to the presence of pseudocyst.  Patient had outpatient CT scan yesterday which showed resolution of improvement of all cyst except 1. Patient reports feeling a flareup of his pancreatitis over the last few days. Denies vomiting or diarrhea. Reports decreased p.o. intake. States his Norco 5 mg tablets are not resolving his symptoms. Has a follow-up appoint with his gastroenterologist in 1 week. Denies fever or chills.         Review of Systems   Constitutional:  Negative for chills and fever. HENT:  Negative for congestion and sinus pain. Eyes:  Negative for photophobia. Respiratory:  Negative for shortness of breath. Cardiovascular:  Negative for chest pain. Gastrointestinal:  Positive for abdominal pain. Negative for diarrhea, nausea and vomiting. Genitourinary:  Negative for difficulty urinating. Musculoskeletal:  Negative for back pain. Skin:  Negative for rash. Neurological:  Negative for syncope and headaches. Psychiatric/Behavioral:  Negative for confusion. All other systems reviewed and are negative.     Past Medical History:   Diagnosis Date    Abdominal pain 10/25/2022    Abnormal CT of the abdomen 07/30/2022    Acute alcoholic pancreatitis 1984    Acute kidney injury (nontraumatic) ([facility] [address] 10/31/2022    pt denies    Acute on chronic pancreatitis ([facility] [address] 07/30/2022    Alcohol use disorder, severe, dependence ([facility] [address] 08/21/2019    CAD (coronary artery disease)     Carpal tunnel syndrome, left     Cigarette smoker     Coronary artery disease involving native coronary artery of native heart 11/13/2022    Dependence on nicotine from cigarettes 11/20/2022    GERD (gastroesophageal reflux disease)     controlled with med    History of pancreatitis     x 4 or 5 times-- last time admitted to hospital 10/10/26/22 x 7 days    Hypertension     controlled with med        Past Surgical History:   Procedure Laterality Date    CARDIAC PROCEDURE N/A 11/2/2022    LEFT HEART CATH / CORONARY ANGIOGRAPHY performed by Patti Oliveros MD at [facility] CATH LAB   [facility] N/A 11/2/2022    Percutaneous coronary intervention performed by Patti Oliveros MD at [facility] CATH LAB    CHOLECYSTECTOMY  2012    ORTHOPEDIC SURGERY Left     wrist surg--nerve surg    ORTHOPEDIC SURGERY Left     foot surg as child    UPPER GASTROINTESTINAL ENDOSCOPY N/A 11/9/2022    EGD/ENDOSCOPIC ULTRASOUND/AXIOS STENT ROOM 9732 **Rep will be present performed by Arik Castorena MD at [facility] ENDOSCOPY    UPPER GASTROINTESTINAL ENDOSCOPY N/A 11/30/2022    EGD/ENDOSCOPIC ULTRASOUND stent removal  performed by Arik Castorena MD at [address] N/A 11/30/2022    EGD ESOPHAGOGASTRODUODENOSCOPY performed by Arik Castorena MD at [facility] ENDOSCOPY        Family History   Problem Relation Age of Onset    No Known Problems Mother     Heart Attack Father         MI at 58        Social History     Socioeconomic History    Marital status:      Spouse name: None    Number of children: None    Years of education: None    Highest education level: None   Tobacco Use    Smoking status: Every Day     Packs/day: 1.00     Years: 20.00     Pack years: 20.00     Types: Cigarettes    Smokeless tobacco: Never   Vaping Use    Vaping Use: Never used   Substance and Sexual Activity    Alcohol use: Not Currently     Comment: none since 10/4/22-- had drank 3 glasses of wine daily    Drug use: No         Patient has no known allergies. Previous Medications    ASPIRIN 81 MG CHEWABLE TABLET    Take 1 tablet by mouth daily    ATORVASTATIN (LIPITOR) 40 MG TABLET    Take 1 tablet by mouth nightly    BUPROPION (WELLBUTRIN SR) 100 MG EXTENDED RELEASE TABLET    Take 1 tablet by mouth 2 times daily    COLCHICINE (COLCRYS) 0.6 MG TABLET    Take 1 tablet by mouth daily    HYDROCODONE-ACETAMINOPHEN (NORCO) 5-325 MG PER TABLET    Take 1 tablet by mouth every 6 hours as needed for Pain for up to 14 days. Intended supply: 3 days. Take lowest dose possible to manage pain    LISINOPRIL (PRINIVIL;ZESTRIL) 5 MG TABLET    Take 1 tablet by mouth daily    METOPROLOL TARTRATE (LOPRESSOR) 25 MG TABLET    Take 1 tablet by mouth 2 times daily    NITROGLYCERIN (NITROSTAT) 0.4 MG SL TABLET    Place 1 tablet under the tongue every 5 minutes as needed for Chest pain up to max of 3 total doses. If no relief after 1 dose, call 911. PANTOPRAZOLE (PROTONIX) 40 MG TABLET    Take 1 tablet by mouth every morning (before breakfast)    TICAGRELOR (BRILINTA) 90 MG TABS TABLET    Take 1 tablet by mouth 2 times daily        Vitals signs and nursing note reviewed. Patient Vitals for the past 4 hrs:   Temp Pulse Resp BP SpO2   12/24/22 1700 98.5 °F (36.9 °C) -- -- -- --   12/24/22 1628 -- 100 18 (!) 150/94 100 %          Physical Exam  Vitals and nursing note reviewed. Constitutional:       General: He is not in acute distress. Appearance: Normal appearance. HENT:      Head: Normocephalic. Nose: Nose normal.      Mouth/Throat:      Mouth: Mucous membranes are moist.   Eyes:      Extraocular Movements: Extraocular movements intact. Cardiovascular:      Rate and Rhythm: Normal rate and regular rhythm. Pulses: Normal pulses. Heart sounds: Normal heart sounds. Pulmonary:      Effort: Pulmonary effort is normal. No respiratory distress. Breath sounds: Normal breath sounds. Abdominal:      General: Abdomen is flat. Palpations: Abdomen is soft. Tenderness: There is abdominal tenderness in the epigastric area and left upper quadrant. There is no guarding or rebound. Musculoskeletal:         General: No swelling. Normal range of motion. Cervical back: Normal range of motion. No rigidity. Skin:     General: Skin is warm. Capillary Refill: Capillary refill takes less than 2 seconds. Findings: No rash. Neurological:      General: No focal deficit present. Mental Status: He is alert and oriented to person, place, and time.    Psychiatric:         Mood and Affect: Mood normal.        Procedures    Results for orders placed or performed during the hospital encounter of 12/24/22   CBC with Diff   Result Value Ref Range    WBC 12.8 (H) 4.3 - 11.1 K/uL    RBC 5.13 4.23 - 5.60 M/uL    Hemoglobin 13.7 13.6 - 17.2 g/dL    Hematocrit 41.2 41.1 - 50.3 %    MCV 80.3 (L) 82.0 - 102.0 FL    MCH 26.7 26.1 - 32.9 PG    MCHC 33.3 31.4 - 35.0 g/dL    RDW 17.5 (H) 11.9 - 14.6 %    Platelets 339 (H) 379 - 450 K/uL    MPV 9.0 (L) 9.4 - 12.3 FL    nRBC 0.00 0.0 - 0.2 K/uL    Differential Type AUTOMATED      Seg Neutrophils 72 43 - 78 %    Lymphocytes 17 13 - 44 %    Monocytes 8 4.0 - 12.0 %    Eosinophils % 2 0.5 - 7.8 %    Basophils 1 0.0 - 2.0 %    Immature Granulocytes 1 0.0 - 5.0 %    Segs Absolute 9.2 (H) 1.7 - 8.2 K/UL    Absolute Lymph # 2.2 0.5 - 4.6 K/UL    Absolute Mono # 1.0 0.1 - 1.3 K/UL    Absolute Eos # 0.2 0.0 - 0.8 K/UL    Basophils Absolute 0.1 0.0 - 0.2 K/UL    Absolute Immature Granulocyte 0.1 0.0 - 0.5 K/UL   CMP   Result Value Ref Range    Sodium 137 133 - 143 mmol/L    Potassium 4.6 3.5 - 5.1 mmol/L    Chloride 97 (L) 98 - 107 mmol/L    CO2 24 21 - 32 mmol/L    Anion Gap 16 (H) 2 - 11 mmol/L    Glucose 90 65 - 100 mg/dL    BUN 11 7.0 - 18.0 MG/DL    Creatinine 0.82 0.8 - 1.5 MG/DL    Est, Glom Filt Rate >60 >60 ml/min/1.73m2    Calcium 9.3 8.3 - 10.4 MG/DL    Total Bilirubin 0.3 0.2 - 1.1 MG/DL    ALT 51 13.0 - 61.0 U/L    AST 24 15 - 37 U/L    Alk Phosphatase 68 45.0 - 117.0 U/L    Total Protein 7.8 6.4 - 8.2 g/dL    Albumin 4.0 3.5 - 5.0 g/dL    Globulin 3.8 2.8 - 4.5 g/dL    Albumin/Globulin Ratio 1.1 0.4 - 1.6     Lipase   Result Value Ref Range    Lipase 755 (H) 13 - 60 U/L   Urinalysis w rflx microscopic   Result Value Ref Range    Color, UA YELLOW      Appearance CLEAR      Specific Gravity, UA 1.025 (H) 1.001 - 1.023      pH, Urine 7.0 5.0 - 9.0      Protein,  (A) NEG mg/dL    Glucose, UA Negative mg/dL    Ketones, Urine TRACE (A) NEG mg/dL    Bilirubin Urine SMALL (A) NEG      Blood, Urine Trace Intact (A) NEG      Urobilinogen, Urine 1.0 0.2 - 1.0 EU/dL    Nitrite, Urine Negative NEG      Leukocyte Esterase, Urine Negative NEG     Urinalysis, Micro   Result Value Ref Range    WBC, UA 0 0 /hpf    RBC, UA 0 0 /hpf    Epithelial Cells UA 0 0 /hpf    BACTERIA, URINE TRACE 0 /hpf    Casts 0 0 /lpf    Crystals 0 0 /LPF    Mucus, UA 0 0 /lpf    Other observations RESULTS VERIFIED MANUALLY          No orders to display                       Voice dictation software was used during the making of this note. This software is not perfect and grammatical and other typographical errors may be present. This note has not been completely proofread for errors.         Jeffery Bullock,   12/24/22 0500

## 2022-12-24 NOTE — ED TRIAGE NOTES
Pt states he has had LUQ pain that wraps around to back since yesterday, hx of pancreatitis, denies nvd. Had ct done here yesterday. Denies drinking alcohol. Supposed to follow up with GI next week.

## 2022-12-24 NOTE — ED NOTES
I have reviewed discharge instructions with the patient. The patient verbalized understanding. Patient left ED via Discharge Method: ambulatory to Home with self. Opportunity for questions and clarification provided. Patient given 3 scripts. To continue your aftercare when you leave the hospital, you may receive an automated call from our care team to check in on how you are doing. This is a free service and part of our promise to provide the best care and service to meet your aftercare needs.  If you have questions, or wish to unsubscribe from this service please call [phone]. Thank you for Choosing our [facility] Emergency Department.         Cassy Ni RN  12/24/22 2457

## 2023-01-01 ENCOUNTER — HOSPITAL ENCOUNTER (EMERGENCY)
Age: 38
Discharge: ANOTHER HOSPITAL | End: 2023-01-01
Attending: EMERGENCY MEDICINE

## 2023-01-01 ENCOUNTER — HOSPITAL ENCOUNTER (INPATIENT)
Age: 38
LOS: 4 days | Discharge: HOME | DRG: 439 | End: 2023-01-05
Attending: FAMILY MEDICINE | Admitting: FAMILY MEDICINE

## 2023-01-01 ENCOUNTER — APPOINTMENT (OUTPATIENT)
Dept: CT IMAGING | Age: 38
End: 2023-01-01

## 2023-01-01 VITALS
BODY MASS INDEX: 26.58 KG/M2 | HEART RATE: 104 BPM | OXYGEN SATURATION: 99 % | RESPIRATION RATE: 16 BRPM | TEMPERATURE: 97.9 F | DIASTOLIC BLOOD PRESSURE: 92 MMHG | SYSTOLIC BLOOD PRESSURE: 112 MMHG | WEIGHT: 180 LBS

## 2023-01-01 DIAGNOSIS — K86.0 ALCOHOL-INDUCED CHRONIC PANCREATITIS (HCC): Primary | ICD-10-CM

## 2023-01-01 DIAGNOSIS — K85.90 ACUTE ON CHRONIC PANCREATITIS (HCC): ICD-10-CM

## 2023-01-01 DIAGNOSIS — K85.90 ACUTE ON CHRONIC PANCREATITIS (HCC): Primary | ICD-10-CM

## 2023-01-01 DIAGNOSIS — K86.1 ACUTE ON CHRONIC PANCREATITIS (HCC): Primary | ICD-10-CM

## 2023-01-01 DIAGNOSIS — K86.1 ACUTE ON CHRONIC PANCREATITIS (HCC): ICD-10-CM

## 2023-01-01 DIAGNOSIS — K86.3 PANCREATIC PSEUDOCYST: ICD-10-CM

## 2023-01-01 PROBLEM — K21.9 GERD (GASTROESOPHAGEAL REFLUX DISEASE): Status: ACTIVE | Noted: 2023-01-01

## 2023-01-01 PROBLEM — F10.10 ALCOHOL ABUSE: Status: RESOLVED | Noted: 2022-07-31 | Resolved: 2023-01-01

## 2023-01-01 PROBLEM — I21.3 STEMI (ST ELEVATION MYOCARDIAL INFARCTION) (HCC): Status: RESOLVED | Noted: 2022-11-02 | Resolved: 2023-01-01

## 2023-01-01 LAB
ALBUMIN SERPL-MCNC: 3.9 G/DL (ref 3.5–5)
ALBUMIN/GLOB SERPL: 1 {RATIO} (ref 0.4–1.6)
ALP SERPL-CCNC: 66 U/L (ref 45–117)
ALT SERPL-CCNC: 27 U/L (ref 13–61)
ANION GAP SERPL CALC-SCNC: 17 MMOL/L (ref 2–11)
AST SERPL-CCNC: 36 U/L (ref 15–37)
BASOPHILS # BLD: 0.1 K/UL (ref 0–0.2)
BASOPHILS NFR BLD: 1 % (ref 0–2)
BILIRUB SERPL-MCNC: 0.4 MG/DL (ref 0.2–1.1)
BUN SERPL-MCNC: 14 MG/DL (ref 7–18)
CALCIUM SERPL-MCNC: 9.8 MG/DL (ref 8.3–10.4)
CHLORIDE SERPL-SCNC: 102 MMOL/L (ref 98–107)
CO2 SERPL-SCNC: 22 MMOL/L (ref 21–32)
CREAT SERPL-MCNC: 0.84 MG/DL (ref 0.8–1.5)
DIFFERENTIAL METHOD BLD: ABNORMAL
EOSINOPHIL # BLD: 0.1 K/UL (ref 0–0.8)
EOSINOPHIL NFR BLD: 1 % (ref 0.5–7.8)
ERYTHROCYTE [DISTWIDTH] IN BLOOD BY AUTOMATED COUNT: 18.9 % (ref 11.9–14.6)
GLOBULIN SER CALC-MCNC: 4.1 G/DL (ref 2.8–4.5)
GLUCOSE SERPL-MCNC: 92 MG/DL (ref 65–100)
HCT VFR BLD AUTO: 45.5 % (ref 41.1–50.3)
HGB BLD-MCNC: 14.5 G/DL (ref 13.6–17.2)
IMM GRANULOCYTES # BLD AUTO: 0.1 K/UL (ref 0–0.5)
IMM GRANULOCYTES NFR BLD AUTO: 1 % (ref 0–5)
LACTATE SERPL-SCNC: 1.4 MMOL/L (ref 0.4–2)
LIPASE SERPL-CCNC: 505 U/L (ref 13–60)
LYMPHOCYTES # BLD: 1.4 K/UL (ref 0.5–4.6)
LYMPHOCYTES NFR BLD: 9 % (ref 13–44)
MAGNESIUM SERPL-MCNC: 2.1 MG/DL (ref 1.2–2.6)
MCH RBC QN AUTO: 25.6 PG (ref 26.1–32.9)
MCHC RBC AUTO-ENTMCNC: 31.9 G/DL (ref 31.4–35)
MCV RBC AUTO: 80.4 FL (ref 82–102)
MONOCYTES # BLD: 0.8 K/UL (ref 0.1–1.3)
MONOCYTES NFR BLD: 5 % (ref 4–12)
NEUTS SEG # BLD: 13.7 K/UL (ref 1.7–8.2)
NEUTS SEG NFR BLD: 85 % (ref 43–78)
NRBC # BLD: 0 K/UL (ref 0–0.2)
PLATELET # BLD AUTO: 808 K/UL (ref 150–450)
PMV BLD AUTO: 8.5 FL (ref 9.4–12.3)
POTASSIUM SERPL-SCNC: 4.4 MMOL/L (ref 3.5–5.1)
POTASSIUM SERPL-SCNC: 5.9 MMOL/L (ref 3.5–5.1)
PROT SERPL-MCNC: 8 G/DL (ref 6.4–8.2)
RBC # BLD AUTO: 5.66 M/UL (ref 4.23–5.6)
SODIUM SERPL-SCNC: 141 MMOL/L (ref 133–143)
WBC # BLD AUTO: 16.2 K/UL (ref 4.3–11.1)

## 2023-01-01 PROCEDURE — 96375 TX/PRO/DX INJ NEW DRUG ADDON: CPT

## 2023-01-01 PROCEDURE — 74177 CT ABD & PELVIS W/CONTRAST: CPT

## 2023-01-01 PROCEDURE — 2580000003 HC RX 258: Performed by: EMERGENCY MEDICINE

## 2023-01-01 PROCEDURE — 83690 ASSAY OF LIPASE: CPT

## 2023-01-01 PROCEDURE — 83605 ASSAY OF LACTIC ACID: CPT

## 2023-01-01 PROCEDURE — 87040 BLOOD CULTURE FOR BACTERIA: CPT

## 2023-01-01 PROCEDURE — 85025 COMPLETE CBC W/AUTO DIFF WBC: CPT

## 2023-01-01 PROCEDURE — 6360000004 HC RX CONTRAST MEDICATION: Performed by: EMERGENCY MEDICINE

## 2023-01-01 PROCEDURE — C9113 INJ PANTOPRAZOLE SODIUM, VIA: HCPCS | Performed by: FAMILY MEDICINE

## 2023-01-01 PROCEDURE — A4216 STERILE WATER/SALINE, 10 ML: HCPCS | Performed by: FAMILY MEDICINE

## 2023-01-01 PROCEDURE — 83735 ASSAY OF MAGNESIUM: CPT

## 2023-01-01 PROCEDURE — 6370000000 HC RX 637 (ALT 250 FOR IP): Performed by: FAMILY MEDICINE

## 2023-01-01 PROCEDURE — 99285 EMERGENCY DEPT VISIT HI MDM: CPT

## 2023-01-01 PROCEDURE — 36415 COLL VENOUS BLD VENIPUNCTURE: CPT

## 2023-01-01 PROCEDURE — 2580000003 HC RX 258: Performed by: FAMILY MEDICINE

## 2023-01-01 PROCEDURE — 96374 THER/PROPH/DIAG INJ IV PUSH: CPT

## 2023-01-01 PROCEDURE — 6360000002 HC RX W HCPCS: Performed by: EMERGENCY MEDICINE

## 2023-01-01 PROCEDURE — 80053 COMPREHEN METABOLIC PANEL: CPT

## 2023-01-01 PROCEDURE — 84132 ASSAY OF SERUM POTASSIUM: CPT

## 2023-01-01 PROCEDURE — 96376 TX/PRO/DX INJ SAME DRUG ADON: CPT

## 2023-01-01 PROCEDURE — 6360000002 HC RX W HCPCS: Performed by: FAMILY MEDICINE

## 2023-01-01 PROCEDURE — 1100000000 HC RM PRIVATE

## 2023-01-01 RX ORDER — SODIUM CHLORIDE 0.9 % (FLUSH) 0.9 %
5-40 SYRINGE (ML) INJECTION 2 TIMES DAILY
Status: DISCONTINUED | OUTPATIENT
Start: 2023-01-01 | End: 2023-01-01 | Stop reason: HOSPADM

## 2023-01-01 RX ORDER — ATORVASTATIN CALCIUM 40 MG/1
40 TABLET, FILM COATED ORAL NIGHTLY
Status: DISCONTINUED | OUTPATIENT
Start: 2023-01-01 | End: 2023-01-05 | Stop reason: HOSPADM

## 2023-01-01 RX ORDER — ASPIRIN 81 MG/1
81 TABLET, CHEWABLE ORAL DAILY
Status: DISCONTINUED | OUTPATIENT
Start: 2023-01-02 | End: 2023-01-05 | Stop reason: HOSPADM

## 2023-01-01 RX ORDER — HYDROMORPHONE HYDROCHLORIDE 1 MG/ML
1 INJECTION, SOLUTION INTRAMUSCULAR; INTRAVENOUS; SUBCUTANEOUS
Status: COMPLETED | OUTPATIENT
Start: 2023-01-01 | End: 2023-01-01

## 2023-01-01 RX ORDER — POTASSIUM CHLORIDE 7.45 MG/ML
10 INJECTION INTRAVENOUS PRN
Status: DISCONTINUED | OUTPATIENT
Start: 2023-01-01 | End: 2023-01-05 | Stop reason: HOSPADM

## 2023-01-01 RX ORDER — ENOXAPARIN SODIUM 100 MG/ML
40 INJECTION SUBCUTANEOUS DAILY
Status: DISCONTINUED | OUTPATIENT
Start: 2023-01-02 | End: 2023-01-05 | Stop reason: HOSPADM

## 2023-01-01 RX ORDER — POTASSIUM CHLORIDE 20 MEQ/1
40 TABLET, EXTENDED RELEASE ORAL PRN
Status: DISCONTINUED | OUTPATIENT
Start: 2023-01-01 | End: 2023-01-05 | Stop reason: HOSPADM

## 2023-01-01 RX ORDER — ONDANSETRON 2 MG/ML
4 INJECTION INTRAMUSCULAR; INTRAVENOUS
Status: COMPLETED | OUTPATIENT
Start: 2023-01-01 | End: 2023-01-01

## 2023-01-01 RX ORDER — ONDANSETRON 2 MG/ML
4 INJECTION INTRAMUSCULAR; INTRAVENOUS EVERY 6 HOURS PRN
Status: DISCONTINUED | OUTPATIENT
Start: 2023-01-01 | End: 2023-01-05 | Stop reason: HOSPADM

## 2023-01-01 RX ORDER — 0.9 % SODIUM CHLORIDE 0.9 %
1000 INTRAVENOUS SOLUTION INTRAVENOUS
Status: COMPLETED | OUTPATIENT
Start: 2023-01-01 | End: 2023-01-01

## 2023-01-01 RX ORDER — SODIUM CHLORIDE 0.9 % (FLUSH) 0.9 %
5-40 SYRINGE (ML) INJECTION PRN
Status: DISCONTINUED | OUTPATIENT
Start: 2023-01-01 | End: 2023-01-05 | Stop reason: HOSPADM

## 2023-01-01 RX ORDER — SODIUM CHLORIDE 9 MG/ML
INJECTION, SOLUTION INTRAVENOUS PRN
Status: DISCONTINUED | OUTPATIENT
Start: 2023-01-01 | End: 2023-01-05 | Stop reason: HOSPADM

## 2023-01-01 RX ORDER — OXYCODONE HYDROCHLORIDE 5 MG/1
5 TABLET ORAL EVERY 4 HOURS PRN
Status: DISCONTINUED | OUTPATIENT
Start: 2023-01-01 | End: 2023-01-01

## 2023-01-01 RX ORDER — MAGNESIUM HYDROXIDE/ALUMINUM HYDROXICE/SIMETHICONE 120; 1200; 1200 MG/30ML; MG/30ML; MG/30ML
30 SUSPENSION ORAL EVERY 6 HOURS PRN
Status: DISCONTINUED | OUTPATIENT
Start: 2023-01-01 | End: 2023-01-05 | Stop reason: HOSPADM

## 2023-01-01 RX ORDER — SODIUM CHLORIDE, SODIUM LACTATE, POTASSIUM CHLORIDE, CALCIUM CHLORIDE 600; 310; 30; 20 MG/100ML; MG/100ML; MG/100ML; MG/100ML
INJECTION, SOLUTION INTRAVENOUS CONTINUOUS
Status: ACTIVE | OUTPATIENT
Start: 2023-01-01 | End: 2023-01-02

## 2023-01-01 RX ORDER — OXYCODONE HYDROCHLORIDE 5 MG/1
10 TABLET ORAL EVERY 4 HOURS PRN
Status: DISCONTINUED | OUTPATIENT
Start: 2023-01-01 | End: 2023-01-05 | Stop reason: HOSPADM

## 2023-01-01 RX ORDER — 0.9 % SODIUM CHLORIDE 0.9 %
100 INTRAVENOUS SOLUTION INTRAVENOUS ONCE
Status: COMPLETED | OUTPATIENT
Start: 2023-01-01 | End: 2023-01-01

## 2023-01-01 RX ORDER — HYDROMORPHONE HYDROCHLORIDE 1 MG/ML
0.5 INJECTION, SOLUTION INTRAMUSCULAR; INTRAVENOUS; SUBCUTANEOUS
Status: COMPLETED | OUTPATIENT
Start: 2023-01-01 | End: 2023-01-01

## 2023-01-01 RX ORDER — BUPROPION HYDROCHLORIDE 100 MG/1
100 TABLET, EXTENDED RELEASE ORAL 2 TIMES DAILY
Status: DISCONTINUED | OUTPATIENT
Start: 2023-01-01 | End: 2023-01-05 | Stop reason: HOSPADM

## 2023-01-01 RX ORDER — SODIUM CHLORIDE, SODIUM LACTATE, POTASSIUM CHLORIDE, CALCIUM CHLORIDE 600; 310; 30; 20 MG/100ML; MG/100ML; MG/100ML; MG/100ML
INJECTION, SOLUTION INTRAVENOUS CONTINUOUS
Status: DISCONTINUED | OUTPATIENT
Start: 2023-01-02 | End: 2023-01-05 | Stop reason: HOSPADM

## 2023-01-01 RX ORDER — LISINOPRIL 5 MG/1
5 TABLET ORAL DAILY
Status: DISCONTINUED | OUTPATIENT
Start: 2023-01-02 | End: 2023-01-05 | Stop reason: HOSPADM

## 2023-01-01 RX ORDER — ONDANSETRON 4 MG/1
4 TABLET, ORALLY DISINTEGRATING ORAL EVERY 8 HOURS PRN
Status: DISCONTINUED | OUTPATIENT
Start: 2023-01-01 | End: 2023-01-05 | Stop reason: HOSPADM

## 2023-01-01 RX ORDER — SODIUM CHLORIDE 0.9 % (FLUSH) 0.9 %
5-40 SYRINGE (ML) INJECTION EVERY 12 HOURS SCHEDULED
Status: DISCONTINUED | OUTPATIENT
Start: 2023-01-01 | End: 2023-01-05 | Stop reason: HOSPADM

## 2023-01-01 RX ORDER — MAGNESIUM SULFATE IN WATER 40 MG/ML
2000 INJECTION, SOLUTION INTRAVENOUS PRN
Status: DISCONTINUED | OUTPATIENT
Start: 2023-01-01 | End: 2023-01-05 | Stop reason: HOSPADM

## 2023-01-01 RX ADMIN — HYDROMORPHONE HYDROCHLORIDE 1 MG: 1 INJECTION, SOLUTION INTRAMUSCULAR; INTRAVENOUS; SUBCUTANEOUS at 22:26

## 2023-01-01 RX ADMIN — SODIUM CHLORIDE 40 MG: 9 INJECTION, SOLUTION INTRAMUSCULAR; INTRAVENOUS; SUBCUTANEOUS at 18:50

## 2023-01-01 RX ADMIN — PIPERACILLIN AND TAZOBACTAM 4500 MG: 4; .5 INJECTION, POWDER, LYOPHILIZED, FOR SOLUTION INTRAVENOUS at 18:53

## 2023-01-01 RX ADMIN — HYDROMORPHONE HYDROCHLORIDE 1 MG: 1 INJECTION, SOLUTION INTRAMUSCULAR; INTRAVENOUS; SUBCUTANEOUS at 16:35

## 2023-01-01 RX ADMIN — ONDANSETRON 4 MG: 2 INJECTION INTRAMUSCULAR; INTRAVENOUS at 11:48

## 2023-01-01 RX ADMIN — ATORVASTATIN CALCIUM 40 MG: 40 TABLET, FILM COATED ORAL at 21:26

## 2023-01-01 RX ADMIN — SODIUM CHLORIDE, POTASSIUM CHLORIDE, SODIUM LACTATE AND CALCIUM CHLORIDE: 600; 310; 30; 20 INJECTION, SOLUTION INTRAVENOUS at 18:15

## 2023-01-01 RX ADMIN — SODIUM CHLORIDE, PRESERVATIVE FREE 5 ML: 5 INJECTION INTRAVENOUS at 21:27

## 2023-01-01 RX ADMIN — TICAGRELOR 90 MG: 90 TABLET ORAL at 21:26

## 2023-01-01 RX ADMIN — HYDROMORPHONE HYDROCHLORIDE 0.5 MG: 1 INJECTION, SOLUTION INTRAMUSCULAR; INTRAVENOUS; SUBCUTANEOUS at 13:59

## 2023-01-01 RX ADMIN — SODIUM CHLORIDE, PRESERVATIVE FREE 10 ML: 5 INJECTION INTRAVENOUS at 13:22

## 2023-01-01 RX ADMIN — OXYCODONE HYDROCHLORIDE 5 MG: 5 TABLET ORAL at 21:27

## 2023-01-01 RX ADMIN — SODIUM CHLORIDE 1000 ML: 9 INJECTION, SOLUTION INTRAVENOUS at 12:01

## 2023-01-01 RX ADMIN — SODIUM CHLORIDE 100 ML: 9 INJECTION, SOLUTION INTRAVENOUS at 13:22

## 2023-01-01 RX ADMIN — BUPROPION HYDROCHLORIDE 100 MG: 100 TABLET, FILM COATED, EXTENDED RELEASE ORAL at 21:27

## 2023-01-01 RX ADMIN — IOHEXOL 100 ML: 350 INJECTION, SOLUTION INTRAVENOUS at 13:22

## 2023-01-01 RX ADMIN — HYDROMORPHONE HYDROCHLORIDE 1 MG: 1 INJECTION, SOLUTION INTRAMUSCULAR; INTRAVENOUS; SUBCUTANEOUS at 11:49

## 2023-01-01 RX ADMIN — ONDANSETRON 4 MG: 2 INJECTION INTRAMUSCULAR; INTRAVENOUS at 19:00

## 2023-01-01 RX ADMIN — METOPROLOL TARTRATE 25 MG: 25 TABLET, FILM COATED ORAL at 21:26

## 2023-01-01 RX ADMIN — SODIUM CHLORIDE, POTASSIUM CHLORIDE, SODIUM LACTATE AND CALCIUM CHLORIDE: 600; 310; 30; 20 INJECTION, SOLUTION INTRAVENOUS at 22:20

## 2023-01-01 RX ADMIN — HYDROMORPHONE HYDROCHLORIDE 1 MG: 1 INJECTION, SOLUTION INTRAMUSCULAR; INTRAVENOUS; SUBCUTANEOUS at 19:00

## 2023-01-01 ASSESSMENT — PAIN SCALES - GENERAL
PAINLEVEL_OUTOF10: 7
PAINLEVEL_OUTOF10: 9
PAINLEVEL_OUTOF10: 6
PAINLEVEL_OUTOF10: 3
PAINLEVEL_OUTOF10: 8
PAINLEVEL_OUTOF10: 6
PAINLEVEL_OUTOF10: 9
PAINLEVEL_OUTOF10: 9
PAINLEVEL_OUTOF10: 6
PAINLEVEL_OUTOF10: 6
PAINLEVEL_OUTOF10: 10
PAINLEVEL_OUTOF10: 8

## 2023-01-01 ASSESSMENT — PAIN DESCRIPTION - LOCATION
LOCATION: ABDOMEN

## 2023-01-01 ASSESSMENT — PAIN DESCRIPTION - ORIENTATION
ORIENTATION: MID

## 2023-01-01 ASSESSMENT — ENCOUNTER SYMPTOMS
NAUSEA: 1
BACK PAIN: 0
CHOKING: 0
SHORTNESS OF BREATH: 0
EYE REDNESS: 0
ABDOMINAL PAIN: 1
VOMITING: 0
COLOR CHANGE: 0
VOICE CHANGE: 0
CHEST TIGHTNESS: 0
PHOTOPHOBIA: 0

## 2023-01-01 ASSESSMENT — PAIN - FUNCTIONAL ASSESSMENT
PAIN_FUNCTIONAL_ASSESSMENT: PREVENTS OR INTERFERES SOME ACTIVE ACTIVITIES AND ADLS
PAIN_FUNCTIONAL_ASSESSMENT: 0-10
PAIN_FUNCTIONAL_ASSESSMENT: ACTIVITIES ARE NOT PREVENTED
PAIN_FUNCTIONAL_ASSESSMENT: PREVENTS OR INTERFERES SOME ACTIVE ACTIVITIES AND ADLS
PAIN_FUNCTIONAL_ASSESSMENT: 0-10

## 2023-01-01 ASSESSMENT — PAIN DESCRIPTION - DESCRIPTORS
DESCRIPTORS: ACHING
DESCRIPTORS: ACHING
DESCRIPTORS: ACHING;SHARP
DESCRIPTORS: ACHING

## 2023-01-01 NOTE — PLAN OF CARE
Problem: Discharge Planning  Goal: Discharge to home or other facility with appropriate resources  Recent Flowsheet Documentation  Taken 1/1/2023 1740 by Kathie Seymour RN  Discharge to home or other facility with appropriate resources: Identify barriers to discharge with patient and caregiver

## 2023-01-01 NOTE — PROGRESS NOTES
TRANSFER - IN REPORT:    Verbal report received from Gatito Avalos on Zeeshan Jennings  being received from [facility] ED for routine progression of patient care      Report consisted of patient's Situation, Background, Assessment and   Recommendations(SBAR). Information from the following report(s) Nurse Handoff Report and ED Encounter Summary was reviewed with the receiving nurse. Opportunity for questions and clarification was provided. Assessment completed upon patient's arrival to unit and care assumed.

## 2023-01-01 NOTE — H&P
Hospitalist History and Physical   Admit Date:  2023  5:36 PM   Name:  Florence Lopez   Age:  44 y.o. Sex:  male  :  1985   MRN:  [id number]   Room:  [facility]    Presenting Complaint: No chief complaint on file. Reason(s) for Admission: Acute on chronic pancreatitis ([facility] [K85.90, K86.1]     History of Present Illness:   Florence Lopez is a 44 y.o. male with medical history of  alcohol use disorder in remission, alcohol induced chronic pancreatitis complicated by pseudocyst status post gastrostomy with stent placed 2022 then removed on 22 via EUS and STEMI status post PCI with TULIO 2022 on aspirin and Brilinta  who presented from [facility] ED as a direct admit with cc adominal pain associated with n/v that started last night. Feels similar to prior episodes of pancreatitis. Has been trying to manage at home but recently the symptoms have been so severe. Unable to sleep. Nausea/vomiting. No chest pain. Has not had to take any nitro recently. Seen  in ED with acute pancreatitis and improved with IV fluids and pain meds and discharged home with short course of norco.     /92 RR 21  non hypoxic, afebrile   WBC 16.2 CBC hemoconcentrated. Plt 808 K 5.9 AG 17 lipase 505     CT a/p IV contrast  1. Worsening inflammatory changes about the pancreas suggesting worsening   pancreatitis. This includes evolving mesenteric edema and fluid collections   particularly in the lesser sac of the abdomen. Stable necrotic appearing changes   are seen in the pancreatic tail. Likely reactive regional mesenteric lymph nodes   are seen. 2. Increasing mesenteric varices. The splenic and portal veins remain patent   although the distal splenic vein appears attenuated likely from evolving   inflammatory changes. Assessment & Plan:       Acute on chronic pancreatitis // pancreatic pseudocyt   - LR IVF. Check Lactic acid and Bcx2 given elevated AG. Zosyn. Pain control. CLD. Consult GI     Hyperkalemia - repeat now     CAD s/p TULIO - DAPT. Statin. BB. ACEi. Stable no angina     Cigarette nicotine dependence - improving on nicotine replacement outpatient and bupropion. AUD - in remission. GERD - IV PPI for now       Anticipated discharge needs:         Diet: ADULT DIET; Clear Liquid  VTE ppx: lovenox   Code status: Full Code    Hospital Problems:  Principal Problem:    Acute on chronic pancreatitis ([address]  Active Problems:    Alcohol use disorder, severe, in early remission, dependence ([address]    Pancreatic pseudocyst    History of ST elevation myocardial infarction (STEMI)    Coronary artery disease involving native coronary artery of native heart    Dependence on nicotine from cigarettes    GERD (gastroesophageal reflux disease)    Primary hypertension  Resolved Problems:    * No resolved hospital problems.  *       Past History:     Past Medical History:   Diagnosis Date    Abdominal pain 10/25/2022    Abnormal CT of the abdomen 10/98/7766    Acute alcoholic pancreatitis 01/33/6463    Acute kidney injury (nontraumatic) ([address] 10/31/2022    pt denies    Acute on chronic pancreatitis ([address] 07/30/2022    Alcohol use disorder, severe, dependence ([address] 08/21/2019    CAD (coronary artery disease)     Carpal tunnel syndrome, left     Cigarette smoker     Coronary artery disease involving native coronary artery of native heart 11/13/2022    Dependence on nicotine from cigarettes 11/20/2022    GERD (gastroesophageal reflux disease)     controlled with med    History of pancreatitis     x 4 or 5 times-- last time admitted to hospital 10/10/26/22 x 7 days    Hypertension     controlled with med    STEMI (ST elevation myocardial infarction) ([address] 11/2/2022       Past Surgical History:   Procedure Laterality Date    CARDIAC PROCEDURE N/A 11/2/2022    LEFT HEART CATH / CORONARY ANGIOGRAPHY performed by Michael Lopez MD at [address] [place] [address] N/A 11/2/2022    Percutaneous coronary intervention performed by Camilo Liriano MD at [facility] CATH LAB    CHOLECYSTECTOMY  2012    ORTHOPEDIC SURGERY Left     wrist surg--nerve surg    ORTHOPEDIC SURGERY Left     foot surg as child    UPPER GASTROINTESTINAL ENDOSCOPY N/A 11/9/2022    EGD/ENDOSCOPIC ULTRASOUND/AXIOS STENT ROOM 2226 **Rep will be present performed by Florinda Vivas MD at [facility] ENDOSCOPY    UPPER GASTROINTESTINAL ENDOSCOPY N/A 11/30/2022    EGD/ENDOSCOPIC ULTRASOUND stent removal  performed by Florinda Vivas MD at [facility] ENDOSCOPY    UPPER GASTROINTESTINAL ENDOSCOPY N/A 11/30/2022    EGD ESOPHAGOGASTRODUODENOSCOPY performed by Florinda Vivas MD at [facility] ENDOSCOPY        Social History     Tobacco Use    Smoking status: Every Day     Packs/day: 1.00     Years: 20.00     Pack years: 20.00     Types: Cigarettes    Smokeless tobacco: Never   Substance Use Topics    Alcohol use: Not Currently     Comment: none since 10/4/22-- had drank 3 glasses of wine daily      Social History     Substance and Sexual Activity   Drug Use No       Family History   Problem Relation Age of Onset    No Known Problems Mother     Heart Attack Father         MI at 58        Immunization History   Administered Date(s) Administered    COVID-19, MODERNA BLUE border, Primary or Immunocompromised, (age 12y+), IM, 100 mcg/0.5mL 08/19/2021    Influenza Virus Vaccine 11/05/2015, 09/19/2018, 01/18/2020, 01/18/2020, 10/05/2020, 10/05/2020    Influenza, FLUARIX, FLULAVAL, Leopold Chapel (age 10 mo+) AND AFLURIA, (age 1 y+), PF, 0.5mL 11/05/2015, 01/18/2020, 10/05/2020    Pneumococcal Polysaccharide ([id number]) 02/22/2018    Tdap (Boostrix, Adacel) 10/11/2022     No Known Allergies  Prior to Admit Medications:  Current Outpatient Medications   Medication Instructions    aspirin 81 mg, Oral, DAILY    atorvastatin (LIPITOR) 40 mg, Oral, NIGHTLY    buPROPion (WELLBUTRIN SR) 100 mg, Oral, 2 TIMES DAILY    colchicine (COLCRYS) 0.6 mg, Oral, DAILY    lisinopril (PRINIVIL;ZESTRIL) 5 mg, Oral, DAILY    metoprolol tartrate (LOPRESSOR) 25 mg, Oral, 2 TIMES DAILY    naloxone (NARCAN) 4 MG/0.1ML LIQD nasal spray 1 spray, Nasal, PRN    nitroGLYCERIN (NITROSTAT) 0.4 mg, SubLINGual, EVERY 5 MIN PRN, up to max of 3 total doses. If no relief after 1 dose, call 911. ondansetron (ZOFRAN) 4 mg, Oral, 3 TIMES DAILY PRN    pantoprazole (PROTONIX) 40 mg, Oral, DAILY BEFORE BREAKFAST    ticagrelor (BRILINTA) 90 mg, Oral, 2 TIMES DAILY         Objective:   Patient Vitals for the past 24 hrs:   Temp Pulse Resp BP SpO2   01/01/23 2157 -- -- 18 -- --   01/01/23 2126 -- -- 18 -- --   01/01/23 2041 -- 90 -- -- --   01/01/23 1959 99 °F (37.2 °C) 98 18 (!) 141/94 98 %   01/01/23 1930 -- -- 20 -- --   01/01/23 1900 -- -- 22 -- --   01/01/23 1749 97.5 °F (36.4 °C) 100 16 132/89 99 %   01/01/23 1740 -- 95 -- -- --       Oxygen Therapy  SpO2: 98 %  O2 Device: None (Room air)    Estimated body mass index is 26.58 kg/m² as calculated from the following:    Height as of this encounter: 5' 9\" (1.753 m). Weight as of this encounter: 180 lb (81.6 kg). No intake or output data in the 24 hours ending 01/01/23 2206      Physical Exam:    Blood pressure (!) 141/94, pulse 90, temperature 99 °F (37.2 °C), temperature source Oral, resp. rate 18, height 5' 9\" (1.753 m), weight 180 lb (81.6 kg), SpO2 98 %. Physical Exam  Vitals and nursing note reviewed. Constitutional:       Appearance: He is ill-appearing and diaphoretic. HENT:      Head: Normocephalic and atraumatic. Nose: Nose normal.      Mouth/Throat:      Mouth: Mucous membranes are moist.   Eyes:      Extraocular Movements: Extraocular movements intact. Conjunctiva/sclera: Conjunctivae normal.      Pupils: Pupils are equal, round, and reactive to light. Cardiovascular:      Rate and Rhythm: Regular rhythm. Tachycardia present. Heart sounds: No murmur heard.   Pulmonary:      Effort: Pulmonary effort is normal.      Breath sounds: Normal breath sounds. No wheezing, rhonchi or rales. Abdominal:      General: Abdomen is flat. Bowel sounds are normal. There is no distension. Palpations: Abdomen is soft. Tenderness: There is abdominal tenderness. Musculoskeletal:      Cervical back: Neck supple. Right lower leg: No edema. Left lower leg: No edema. Skin:     General: Skin is warm. Capillary Refill: Capillary refill takes less than 2 seconds. Coloration: Skin is pale. Neurological:      General: No focal deficit present. Mental Status: He is alert and oriented to person, place, and time. Mental status is at baseline.    Psychiatric:         Mood and Affect: Mood normal.         Behavior: Behavior normal.          I have personally reviewed labs and tests showing:  Recent Labs:  Recent Results (from the past 24 hour(s))   CBC with Auto Differential    Collection Time: 01/01/23 11:09 AM   Result Value Ref Range    WBC 16.2 (H) 4.3 - 11.1 K/uL    RBC 5.66 (H) 4.23 - 5.60 M/uL    Hemoglobin 14.5 13.6 - 17.2 g/dL    Hematocrit 45.5 41.1 - 50.3 %    MCV 80.4 (L) 82.0 - 102.0 FL    MCH 25.6 (L) 26.1 - 32.9 PG    MCHC 31.9 31.4 - 35.0 g/dL    RDW 18.9 (H) 11.9 - 14.6 %    Platelets 686 (H) 308 - 450 K/uL    MPV 8.5 (L) 9.4 - 12.3 FL    nRBC 0.00 0.0 - 0.2 K/uL    Differential Type AUTOMATED      Seg Neutrophils 85 (H) 43 - 78 %    Lymphocytes 9 (L) 13 - 44 %    Monocytes 5 4.0 - 12.0 %    Eosinophils % 1 0.5 - 7.8 %    Basophils 1 0.0 - 2.0 %    Immature Granulocytes 1 0.0 - 5.0 %    Segs Absolute 13.7 (H) 1.7 - 8.2 K/UL    Absolute Lymph # 1.4 0.5 - 4.6 K/UL    Absolute Mono # 0.8 0.1 - 1.3 K/UL    Absolute Eos # 0.1 0.0 - 0.8 K/UL    Basophils Absolute 0.1 0.0 - 0.2 K/UL    Absolute Immature Granulocyte 0.1 0.0 - 0.5 K/UL   CMP    Collection Time: 01/01/23 11:09 AM   Result Value Ref Range    Sodium 141 133 - 143 mmol/L    Potassium 5.9 (H) 3.5 - 5.1 mmol/L    Chloride 102 98 - 107 mmol/L    CO2 22 21 - 32 mmol/L Anion Gap 17 (H) 2 - 11 mmol/L    Glucose 92 65 - 100 mg/dL    BUN 14 7.0 - 18.0 MG/DL    Creatinine 0.84 0.8 - 1.5 MG/DL    Est, Glom Filt Rate >60 >60 ml/min/1.73m2    Calcium 9.8 8.3 - 10.4 MG/DL    Total Bilirubin 0.4 0.2 - 1.1 MG/DL    ALT 27 13.0 - 61.0 U/L    AST 36 15 - 37 U/L    Alk Phosphatase 66 45.0 - 117.0 U/L    Total Protein 8.0 6.4 - 8.2 g/dL    Albumin 3.9 3.5 - 5.0 g/dL    Globulin 4.1 2.8 - 4.5 g/dL    Albumin/Globulin Ratio 1.0 0.4 - 1.6     Lipase    Collection Time: 01/01/23 11:09 AM   Result Value Ref Range    Lipase 505 (H) 13 - 60 U/L   Magnesium    Collection Time: 01/01/23 11:09 AM   Result Value Ref Range    Magnesium 2.1 1.2 - 2.6 mg/dL   Lactic Acid    Collection Time: 01/01/23  6:55 PM   Result Value Ref Range    Lactic Acid, Plasma 1.4 0.4 - 2.0 MMOL/L   Potassium    Collection Time: 01/01/23  6:55 PM   Result Value Ref Range    Potassium 4.4 3.5 - 5.1 mmol/L       I have personally reviewed imaging studies showing:  CT ABDOMEN PELVIS W IV CONTRAST Additional Contrast? None    Result Date: 1/1/2023  CT ABDOMEN AND PELVIS WITH INTRAVENOUS CONTRAST DATED 1/1/2023 1:47 PM. History: Worsening upper abdominal pain. Pancreatitis. History of pancreatic pseudocyst. Comparison: CT abdomen and pelvis with contrast 12/23/2020 Technique:   Multiple contiguous helical CT images reconstructed at 5 mm intervals were obtained from above the diaphragms through the ischial tuberosities following oral and 100 cc Isovue-370 without acute complication. All CT scans performed at this facility use one or all of the following: Automated exposure control, adjustment of the mA and/or kVp according to patient's size, iterative reconstruction. Findings: CT ABDOMEN:  Limited evaluation of the lung bases and base of the mediastinum demonstrates mild dependent atelectasis in the left lung base. The Liver demonstrates a likely benign calcified granuloma seen in the right lobe on image 22.  An additional 5 mm low-attenuation right lobe hepatic lesion is seen on image 22 which is too small to characterize although grossly does not enhance statistically most likely representing a tiny hepatic cyst.  The spleen is homogeneous in attenuation. No contour deforming or enhancing mass lesions are seen of the adrenal glands. The gallbladder has been removed. The kidneys enhance symmetrically and no evidence of hydronephrosis is seen. Worsening inflammatory changes are seen of the pancreas. In addition worsening local changes, there is evolving soft tissue edema and fluid now tracking along the right anterior pararenal fascial plane into the right paracolic gutter. The prior small collection anterior to the pancreatic body is slightly increased in size now measuring 2 cm x 1.3 cm. Multiple evolving collections are suggested within the lesser sac of the abdomen the larger of which is seen on axial image 32 measuring 6 cm x 2.9 cm in size. The appearance of these suggest acute inflammatory collections from acute pancreatitis. Additional scattered although grossly nonloculated fluid is seen. Changes are once again seen in the pancreatic tail suggesting necrotic changes at this level. Increasing varices are seen in the gastrosplenic ligament. The splenic vein currently does remain patent although does appear attenuated in its more distal course. Multiple likely reactive regional mesenteric lymph nodes are seen. The visualized loops of small bowel and colon are normal in caliber. No free air is seen. The abdominal aorta is unremarkable in appearance. CT PELVIS: No abnormal pelvic fluid collections or inflammatory changes are present. No pelvic adenopathy is seen. The urinary bladder is unremarkable. 1.  Worsening inflammatory changes about the pancreas suggesting worsening pancreatitis. This includes evolving mesenteric edema and fluid collections particularly in the lesser sac of the abdomen.  Stable necrotic appearing changes are seen in the pancreatic tail. Likely reactive regional mesenteric lymph nodes are seen. 2. Increasing mesenteric varices. The splenic and portal veins remain patent although the distal splenic vein appears attenuated likely from evolving inflammatory changes. This report was made using voice transcription. Despite my best efforts to avoid any, transcription errors may persist. If there is any question about the accuracy of the report or need for clarification, then please call [phone], or text me through perfectserv for clarification or correction. Echocardiogram:  11/02/22    TRANSTHORACIC ECHOCARDIOGRAM (TTE) COMPLETE (CONTRAST/BUBBLE/3D PRN) 11/02/2022  3:31 PM, 11/02/2022 12:00 AM (Final)    Interpretation Summary    Left Ventricle: Normal left ventricular systolic function with a visually estimated EF of 55 - 60%. Left ventricle size is normal. Normal wall thickness. Normal wall motion. Normal diastolic function. Technical qualifiers: Color flow Doppler was performed and pulse wave and/or continuous wave Doppler was performed.     Signed by: Karlo Hummel MD on 11/2/2022  3:31 PM, Signed by: Unknown Provider Result on 11/2/2022 12:00 AM        Orders Placed This Encounter   Medications    aspirin chewable tablet 81 mg    atorvastatin (LIPITOR) tablet 40 mg    buPROPion [facility] SR) extended release tablet 100 mg    lisinopril (PRINIVIL;ZESTRIL) tablet 5 mg    metoprolol tartrate (LOPRESSOR) tablet 25 mg    ticagrelor (BRILINTA) tablet 90 mg    piperacillin-tazobactam (ZOSYN) 3,375 mg in sodium chloride 0.9 % 50 mL IVPB (mini-bag)     Order Specific Question:   Antimicrobial Indications     Answer:   Intra-Abdominal Infection    DISCONTD: HYDROmorphone (DILAUDID) injection 1 mg    pantoprazole (PROTONIX) 40 mg in sodium chloride (PF) 0.9 % 10 mL injection    FOLLOWED BY Linked Order Group     lactated ringers infusion     lactated ringers infusion    sodium chloride flush 0.9 % injection 5-40 mL    sodium chloride flush 0.9 % injection 5-40 mL    0.9 % sodium chloride infusion    OR Linked Order Group     ondansetron (ZOFRAN-ODT) disintegrating tablet 4 mg     ondansetron (ZOFRAN) injection 4 mg    enoxaparin (LOVENOX) injection 40 mg     Order Specific Question:   Indication of Use     Answer:   Prophylaxis-DVT/PE    DISCONTD: oxyCODONE (ROXICODONE) immediate release tablet 5 mg    aluminum & magnesium hydroxide-simethicone (MAALOX) 200-200-20 MG/5ML suspension 30 mL    hyoscyamine (LEVSIN/SL) sublingual tablet 125 mcg    OR Linked Order Group     potassium chloride (KLOR-CON M) extended release tablet 40 mEq     potassium bicarb-citric acid (EFFER-K) effervescent tablet 40 mEq     potassium chloride 10 mEq/100 mL IVPB (Peripheral Line)    magnesium sulfate 2000 mg in 50 mL IVPB premix    piperacillin-tazobactam (ZOSYN) 4,500 mg in sodium chloride 0.9 % 100 mL IVPB (mini-bag)     Order Specific Question:   Antimicrobial Indications     Answer:   Intra-Abdominal Infection    HYDROmorphone (DILAUDID) injection 1 mg    HYDROmorphone (DILAUDID) injection 0.5 mg    oxyCODONE (ROXICODONE) immediate release tablet 10 mg         Signed:  Viridiana Soto DO, DO    Part of this note may have been written by using a voice dictation software. The note has been proof read but may still contain some grammatical/other typographical errors.

## 2023-01-01 NOTE — ED TRIAGE NOTES
Pt reports that he had a drain taken out of his pancrease a few weeks ago, pt reports abd pain and n/v starting last night, pt reports feels like pancreatitis.

## 2023-01-01 NOTE — ED NOTES
[facility] transferred patient to [address] [place] [address] via ambulance.      Melba Gonzalez RN  01/01/23 1640

## 2023-01-01 NOTE — PROGRESS NOTES
Pt arrived via stretcher from [facility] ED via [facility]. Admission and assessment in progress. Will give report to oncoming night nurse. 1833: Awaiting Med verification from Pharmacy.

## 2023-01-01 NOTE — ED NOTES
TRANSFER - OUT REPORT:    Verbal report given to [facility], RN  on Zachery Morales  being transferred to [facility] for routine progression of patient care       Report consisted of patient's Situation, Background, Assessment and   Recommendations(SBAR). Information from the following report(s) Nurse Handoff Report was reviewed with the receiving nurse. Lines:   Peripheral IV 01/01/23 Left Arm (Active)   Site Assessment Clean, dry & intact 01/01/23 1159   Line Status Blood return noted 01/01/23 1159   Phlebitis Assessment No symptoms 01/01/23 1159   Infiltration Assessment 0 01/01/23 1159   Dressing Status Clean, dry & intact 01/01/23 1159   Dressing Intervention New 01/01/23 1159        Opportunity for questions and clarification was provided.       Patient transported with:  [facility]      Praful Bell RN  01/01/23 5635

## 2023-01-01 NOTE — PLAN OF CARE
Problem: Discharge Planning  Goal: Discharge to home or other facility with appropriate resources  Outcome: Progressing  Flowsheets (Taken 1/1/2023 5173)  Discharge to home or other facility with appropriate resources: Identify barriers to discharge with patient and caregiver     Problem: Pain  Goal: Verbalizes/displays adequate comfort level or baseline comfort level  Outcome: Progressing

## 2023-01-02 LAB
ALBUMIN SERPL-MCNC: 2.5 G/DL (ref 3.5–5)
ALBUMIN/GLOB SERPL: 0.6 {RATIO} (ref 0.4–1.6)
ALP SERPL-CCNC: 49 U/L (ref 50–136)
ALT SERPL-CCNC: 28 U/L (ref 12–65)
ANION GAP SERPL CALC-SCNC: 4 MMOL/L (ref 2–11)
AST SERPL-CCNC: 16 U/L (ref 15–37)
BASOPHILS # BLD: 0.1 K/UL (ref 0–0.2)
BASOPHILS NFR BLD: 1 % (ref 0–2)
BILIRUB SERPL-MCNC: 0.6 MG/DL (ref 0.2–1.1)
BUN SERPL-MCNC: 10 MG/DL (ref 6–23)
CALCIUM SERPL-MCNC: 9 MG/DL (ref 8.3–10.4)
CHLORIDE SERPL-SCNC: 104 MMOL/L (ref 101–110)
CO2 SERPL-SCNC: 26 MMOL/L (ref 21–32)
CREAT SERPL-MCNC: 1 MG/DL (ref 0.8–1.5)
DIFFERENTIAL METHOD BLD: ABNORMAL
EOSINOPHIL # BLD: 0.1 K/UL (ref 0–0.8)
EOSINOPHIL NFR BLD: 1 % (ref 0.5–7.8)
ERYTHROCYTE [DISTWIDTH] IN BLOOD BY AUTOMATED COUNT: 17.5 % (ref 11.9–14.6)
GLOBULIN SER CALC-MCNC: 4 G/DL (ref 2.8–4.5)
GLUCOSE SERPL-MCNC: 91 MG/DL (ref 65–100)
HCT VFR BLD AUTO: 35.3 % (ref 41.1–50.3)
HGB BLD-MCNC: 11.3 G/DL (ref 13.6–17.2)
IMM GRANULOCYTES # BLD AUTO: 0.1 K/UL (ref 0–0.5)
IMM GRANULOCYTES NFR BLD AUTO: 1 % (ref 0–5)
LYMPHOCYTES # BLD: 1.1 K/UL (ref 0.5–4.6)
LYMPHOCYTES NFR BLD: 11 % (ref 13–44)
MAGNESIUM SERPL-MCNC: 2.1 MG/DL (ref 1.8–2.4)
MCH RBC QN AUTO: 25.6 PG (ref 26.1–32.9)
MCHC RBC AUTO-ENTMCNC: 32 G/DL (ref 31.4–35)
MCV RBC AUTO: 80 FL (ref 82–102)
MONOCYTES # BLD: 0.9 K/UL (ref 0.1–1.3)
MONOCYTES NFR BLD: 9 % (ref 4–12)
NEUTS SEG # BLD: 7.9 K/UL (ref 1.7–8.2)
NEUTS SEG NFR BLD: 77 % (ref 43–78)
NRBC # BLD: 0 K/UL (ref 0–0.2)
PLATELET # BLD AUTO: 531 K/UL (ref 150–450)
PMV BLD AUTO: 8.8 FL (ref 9.4–12.3)
POTASSIUM SERPL-SCNC: 4.1 MMOL/L (ref 3.5–5.1)
PROT SERPL-MCNC: 6.5 G/DL (ref 6.3–8.2)
RBC # BLD AUTO: 4.41 M/UL (ref 4.23–5.6)
SODIUM SERPL-SCNC: 134 MMOL/L (ref 133–143)
WBC # BLD AUTO: 10.1 K/UL (ref 4.3–11.1)

## 2023-01-02 PROCEDURE — A4216 STERILE WATER/SALINE, 10 ML: HCPCS | Performed by: FAMILY MEDICINE

## 2023-01-02 PROCEDURE — 80053 COMPREHEN METABOLIC PANEL: CPT

## 2023-01-02 PROCEDURE — 2580000003 HC RX 258: Performed by: FAMILY MEDICINE

## 2023-01-02 PROCEDURE — 36415 COLL VENOUS BLD VENIPUNCTURE: CPT

## 2023-01-02 PROCEDURE — 83735 ASSAY OF MAGNESIUM: CPT

## 2023-01-02 PROCEDURE — 6360000002 HC RX W HCPCS: Performed by: FAMILY MEDICINE

## 2023-01-02 PROCEDURE — 6370000000 HC RX 637 (ALT 250 FOR IP): Performed by: FAMILY MEDICINE

## 2023-01-02 PROCEDURE — 1100000000 HC RM PRIVATE

## 2023-01-02 PROCEDURE — 85025 COMPLETE CBC W/AUTO DIFF WBC: CPT

## 2023-01-02 PROCEDURE — C9113 INJ PANTOPRAZOLE SODIUM, VIA: HCPCS | Performed by: FAMILY MEDICINE

## 2023-01-02 RX ADMIN — TICAGRELOR 90 MG: 90 TABLET ORAL at 20:26

## 2023-01-02 RX ADMIN — PIPERACILLIN AND TAZOBACTAM 3375 MG: 3; .375 INJECTION, POWDER, LYOPHILIZED, FOR SOLUTION INTRAVENOUS at 09:30

## 2023-01-02 RX ADMIN — HYDROMORPHONE HYDROCHLORIDE 1 MG: 1 INJECTION, SOLUTION INTRAMUSCULAR; INTRAVENOUS; SUBCUTANEOUS at 07:56

## 2023-01-02 RX ADMIN — HYDROMORPHONE HYDROCHLORIDE 1 MG: 1 INJECTION, SOLUTION INTRAMUSCULAR; INTRAVENOUS; SUBCUTANEOUS at 14:11

## 2023-01-02 RX ADMIN — SODIUM CHLORIDE, POTASSIUM CHLORIDE, SODIUM LACTATE AND CALCIUM CHLORIDE: 600; 310; 30; 20 INJECTION, SOLUTION INTRAVENOUS at 06:53

## 2023-01-02 RX ADMIN — HYDROMORPHONE HYDROCHLORIDE 1 MG: 1 INJECTION, SOLUTION INTRAMUSCULAR; INTRAVENOUS; SUBCUTANEOUS at 01:37

## 2023-01-02 RX ADMIN — BUPROPION HYDROCHLORIDE 100 MG: 100 TABLET, FILM COATED, EXTENDED RELEASE ORAL at 09:27

## 2023-01-02 RX ADMIN — OXYCODONE 10 MG: 5 TABLET ORAL at 06:04

## 2023-01-02 RX ADMIN — OXYCODONE 10 MG: 5 TABLET ORAL at 15:39

## 2023-01-02 RX ADMIN — PIPERACILLIN AND TAZOBACTAM 3375 MG: 3; .375 INJECTION, POWDER, LYOPHILIZED, FOR SOLUTION INTRAVENOUS at 00:35

## 2023-01-02 RX ADMIN — SODIUM CHLORIDE, POTASSIUM CHLORIDE, SODIUM LACTATE AND CALCIUM CHLORIDE: 600; 310; 30; 20 INJECTION, SOLUTION INTRAVENOUS at 02:27

## 2023-01-02 RX ADMIN — HYDROMORPHONE HYDROCHLORIDE 1 MG: 1 INJECTION, SOLUTION INTRAMUSCULAR; INTRAVENOUS; SUBCUTANEOUS at 21:09

## 2023-01-02 RX ADMIN — HYDROMORPHONE HYDROCHLORIDE 1 MG: 1 INJECTION, SOLUTION INTRAMUSCULAR; INTRAVENOUS; SUBCUTANEOUS at 04:31

## 2023-01-02 RX ADMIN — PIPERACILLIN AND TAZOBACTAM 3375 MG: 3; .375 INJECTION, POWDER, LYOPHILIZED, FOR SOLUTION INTRAVENOUS at 17:51

## 2023-01-02 RX ADMIN — BUPROPION HYDROCHLORIDE 100 MG: 100 TABLET, FILM COATED, EXTENDED RELEASE ORAL at 20:26

## 2023-01-02 RX ADMIN — TICAGRELOR 90 MG: 90 TABLET ORAL at 09:28

## 2023-01-02 RX ADMIN — SODIUM CHLORIDE 40 MG: 9 INJECTION, SOLUTION INTRAMUSCULAR; INTRAVENOUS; SUBCUTANEOUS at 09:29

## 2023-01-02 RX ADMIN — ENOXAPARIN SODIUM 40 MG: 100 INJECTION SUBCUTANEOUS at 09:30

## 2023-01-02 RX ADMIN — HYDROMORPHONE HYDROCHLORIDE 1 MG: 1 INJECTION, SOLUTION INTRAMUSCULAR; INTRAVENOUS; SUBCUTANEOUS at 17:51

## 2023-01-02 RX ADMIN — LISINOPRIL 5 MG: 5 TABLET ORAL at 09:28

## 2023-01-02 RX ADMIN — SODIUM CHLORIDE, PRESERVATIVE FREE 5 ML: 5 INJECTION INTRAVENOUS at 20:26

## 2023-01-02 RX ADMIN — OXYCODONE 10 MG: 5 TABLET ORAL at 10:18

## 2023-01-02 RX ADMIN — SODIUM CHLORIDE, POTASSIUM CHLORIDE, SODIUM LACTATE AND CALCIUM CHLORIDE: 600; 310; 30; 20 INJECTION, SOLUTION INTRAVENOUS at 21:09

## 2023-01-02 RX ADMIN — METOPROLOL TARTRATE 25 MG: 25 TABLET, FILM COATED ORAL at 20:26

## 2023-01-02 RX ADMIN — ATORVASTATIN CALCIUM 40 MG: 40 TABLET, FILM COATED ORAL at 20:26

## 2023-01-02 RX ADMIN — SODIUM CHLORIDE, POTASSIUM CHLORIDE, SODIUM LACTATE AND CALCIUM CHLORIDE: 600; 310; 30; 20 INJECTION, SOLUTION INTRAVENOUS at 14:11

## 2023-01-02 RX ADMIN — METOPROLOL TARTRATE 25 MG: 25 TABLET, FILM COATED ORAL at 09:27

## 2023-01-02 RX ADMIN — HYOSCYAMINE SULFATE 125 MCG: 0.12 TABLET ORAL; SUBLINGUAL at 22:38

## 2023-01-02 RX ADMIN — SODIUM CHLORIDE, PRESERVATIVE FREE 5 ML: 5 INJECTION INTRAVENOUS at 09:36

## 2023-01-02 RX ADMIN — SODIUM CHLORIDE, POTASSIUM CHLORIDE, SODIUM LACTATE AND CALCIUM CHLORIDE: 600; 310; 30; 20 INJECTION, SOLUTION INTRAVENOUS at 06:05

## 2023-01-02 RX ADMIN — HYDROMORPHONE HYDROCHLORIDE 1 MG: 1 INJECTION, SOLUTION INTRAMUSCULAR; INTRAVENOUS; SUBCUTANEOUS at 11:19

## 2023-01-02 RX ADMIN — ONDANSETRON 4 MG: 2 INJECTION INTRAMUSCULAR; INTRAVENOUS at 01:37

## 2023-01-02 RX ADMIN — OXYCODONE 10 MG: 5 TABLET ORAL at 22:37

## 2023-01-02 RX ADMIN — HYOSCYAMINE SULFATE 125 MCG: 0.12 TABLET ORAL; SUBLINGUAL at 01:37

## 2023-01-02 RX ADMIN — ASPIRIN 81 MG: 81 TABLET, CHEWABLE ORAL at 09:27

## 2023-01-02 ASSESSMENT — PAIN SCALES - WONG BAKER
WONGBAKER_NUMERICALRESPONSE: 0

## 2023-01-02 ASSESSMENT — PAIN DESCRIPTION - DESCRIPTORS
DESCRIPTORS: CRAMPING
DESCRIPTORS: ACHING;CRAMPING
DESCRIPTORS: CRAMPING
DESCRIPTORS: CRAMPING
DESCRIPTORS: ACHING;SHARP
DESCRIPTORS: CRAMPING
DESCRIPTORS: CRAMPING
DESCRIPTORS: ACHING;SHARP
DESCRIPTORS: ACHING
DESCRIPTORS: ACHING;SHARP
DESCRIPTORS: ACHING

## 2023-01-02 ASSESSMENT — PAIN - FUNCTIONAL ASSESSMENT
PAIN_FUNCTIONAL_ASSESSMENT: ACTIVITIES ARE NOT PREVENTED
PAIN_FUNCTIONAL_ASSESSMENT: PREVENTS OR INTERFERES SOME ACTIVE ACTIVITIES AND ADLS

## 2023-01-02 ASSESSMENT — PAIN SCALES - GENERAL
PAINLEVEL_OUTOF10: 9
PAINLEVEL_OUTOF10: 6
PAINLEVEL_OUTOF10: 9
PAINLEVEL_OUTOF10: 8
PAINLEVEL_OUTOF10: 4
PAINLEVEL_OUTOF10: 6
PAINLEVEL_OUTOF10: 9
PAINLEVEL_OUTOF10: 7
PAINLEVEL_OUTOF10: 10
PAINLEVEL_OUTOF10: 8
PAINLEVEL_OUTOF10: 9
PAINLEVEL_OUTOF10: 6
PAINLEVEL_OUTOF10: 8
PAINLEVEL_OUTOF10: 8

## 2023-01-02 ASSESSMENT — PAIN DESCRIPTION - LOCATION
LOCATION: ABDOMEN

## 2023-01-02 ASSESSMENT — PAIN DESCRIPTION - ORIENTATION
ORIENTATION: MID
ORIENTATION: INNER
ORIENTATION: MID
ORIENTATION: INNER
ORIENTATION: MID
ORIENTATION: INNER
ORIENTATION: MID
ORIENTATION: INNER
ORIENTATION: INNER

## 2023-01-02 NOTE — PROGRESS NOTES
Resting in bed. IVF infusing. Treated for pain and nausea per MAR; pain improved and more tolerable per pt but still above stated pain goals. Hourly rounds completed, all needs met this shift. Bed in L/L with call light in reach. Report to be given to dayshift nurse.

## 2023-01-02 NOTE — CONSULTS
Patient seen and examined. Agree with SUDHIR's note as below. Patient with acute on chronic pancreatitis  Supportive care with IVF, pain control  Going forward - may need consideration of referral to tertiary center for management of his chronic pancreatitis vs possible [place] procedure given his chronically dilated PD    Ayana House MD  [facility]          [facility] Consult Note       Primary GI Physician:  Dr. Sara Branch    Referring Provider:  Dr. Dayna Howard Date:  1/2/2023    Admit Date:  1/1/2023    Chief Complaint:  Acute on chronic pancreatitis with pseudocyst    Subjective:     History of Present Illness:  Patient is a 44 y.o. male with PMH including but not limited to alcohol use disorder in remission, alcohol induced chronic pancreatitis complicated by pseudocyst status post gastrostomy with stent placed 9 Nov 2022 then removed on 30 No 2022 via EUS, CAD s/p STEMI and PCI with TULIO 2 Nov 2022 - on ASA and Brilinta, GERD, TO, HTN, who is seen in consultation at the request of Dr. Estee Stockton for acute on chronic pancreatitis with pseudocyst.  We have been following with him over several recent admissions for acute on chronic pancreatitis complicated by pseudocyst s/p EUS with cyst gastrostomy with AXIOS stent placed 9 Nov 2022 and then removed on 30 No 2022 after drainage of cyst.  He has not yet been seen in our office in follow up. He was admitted to [facility] 1 Jan 2023 after presenting from [facility] ER with increased epi abd pain, nausea, vomiting with food stuff emesis since 31 Dec 2022 and had recent episode of acute pancreatitis on 24 Dec 2022. He describes he has been having symptoms of increased epi pain about every 3-4 days since last seen in Nov 2022, not as severe as episode on Christmas Eve and now. He was seen in the ER on Christmas Eve and given pain medication.   He felt a little better until New Year's Chantale when he began having severe epi fullness and pressure pain, constant, with associated nausea and vomiting. He denies any bloody or black emesis. He has also had increased gas and gurgling over upper abd. He has had recent constipation with the increase in symptoms, with last BM about 2 days ago. He has more regular BMs between episodes. He denies any bloody or black stools. He states his last ETOH use was about 7 months ago. He denies any medication changes since Colchicine was stopped 2 weeks ago. Labs in the ER noted normal LFTs and lipase 505, WBC 16.2, plts 808, potassium 5.9. CT scan abd/pelvis noted worsening inflammatory changes about the pancreas suggesting worsening pancreatitis. This includes evolving mesenteric edema and fluid collections particularly in the lesser sac of the abdomen. Stable necrotic appearing changes are seen in the pancreatic tail. Likely reactive regional mesenteric lymph nodes are seen. Increasing mesenteric varices. The splenic and portal veins remain patent although the distal splenic vein appears attenuated likely from evolving inflammatory changes.       PMH:  Past Medical History:   Diagnosis Date    Abdominal pain 10/25/2022    Abnormal CT of the abdomen 55/22/2918    Acute alcoholic pancreatitis 40/75/4079    Acute kidney injury (nontraumatic) ([address] 10/31/2022    pt denies    Acute on chronic pancreatitis ([address] 07/30/2022    Alcohol use disorder, severe, dependence ([address] 08/21/2019    CAD (coronary artery disease)     Carpal tunnel syndrome, left     Cigarette smoker     Coronary artery disease involving native coronary artery of native heart 11/13/2022    Dependence on nicotine from cigarettes 11/20/2022    GERD (gastroesophageal reflux disease)     controlled with med    History of pancreatitis     x 4 or 5 times-- last time admitted to hospital 10/10/26/22 x 7 days    Hypertension     controlled with med    STEMI (ST elevation myocardial infarction) ([address] 11/2/2022     EUS 9 Nov 2022 with Dr. Josh Arredondo   Findings:   Monika Lucio FINDINGS:  Limited views of the mucosa with the echoendoscope reveals extrinsic compression of the gastric fundus but no other gross abnormalities of the stomach. PANCREAS:  The pancreas is well-visualized from head to tail. Patient has chronic pancreatitis. A cystic collection measuring at least 12 cm in maximal diameter with mature walls is seen adjacent to the fundus of the pancreas. No internal debris is seen. The 15 mm AXIOS stent catheter was advanced into the cyst cavity with 100 W cutting current. The internal and external flanges were successfully deployed. Stent position was confirmed endoscopically and endosonographically. A large amount of cyst fluid flowed freely into the stomach. Implant:   AXIOS stent  Impression:     Pancreatic pseudocyst. Cyst gastrostomy was performed as outlined above. Plan:  1. Resume diet as tolerated. 2. Repeat EUS/EGD and possible stent retrieval in 3 weeks. EUS 30 Nov 2022 with Dr. Freddy Chandra  Findings:   EUS FINDINGS:  PANCREAS:   The pancreas is well-visualized from head to tail. There are extensive pancreatic parenchymal abnormalities including course lobularity, punctate calcifications in the head of the pancreas with associated acoustic shadowing, blurring of peripancreatic fat planes and peripancreatic edema. In the tail of the pancreas there is a 25 x 17 mm oval-shaped thick-walled simple cyst.  Adjacent to the body of the pancreas an AXIOS stent is seen at site of cyst gastrostomy. The previously seen large pseudocyst has resolved. The main pancreatic duct has an irregular contour, echogenic sidewalls and is dilated, measuring up to 3.6 mm in the head, 2.9 mm in the body and 2.1 mm in the tail. Pancreas divisum is not seen. ENDOSCOPIC FINDINGS:  OROPHARYNX: Cords and pyriform recesses appear normal.    ESOPHAGUS: The esophagus is normal. The proximal, mid, and distal portions are normal. The Z-Line is intact.     STOMACH:    On retroflexion, the flap-valve is classified as Hill Grade II, with a tissue fold at the lesser curvature but with periodic opening and closing around the endoscope. The AXIOS lumen-apposing stent in seen in the gastric body and remains patent. After EUS confirmation of pseudocyst resolution, the stent was removed using a rat-tooth forceps. DUODENUM: The bulb and second portions are normal.  Impression:     1. Acute on chronic pancreatitis. 2. Resolution of previously seen large pseudocyst.  The axial stent was removed. 3. Small developing pseudocyst adjacent to the tail of the pancreas. Plan:  1. Resume diet. If patient does not tolerate advancement of his diet without exacerbation of symptoms, he may require enteral feeding. 2. Resume Integrilin protocol as a bridge to Brilinta. 3. Avoid alcohol and tobacco.  4. Return to office in 2 months. PSH:  Past Surgical History:   Procedure Laterality Date    CARDIAC PROCEDURE N/A 11/2/2022    LEFT HEART CATH / CORONARY ANGIOGRAPHY performed by Cassie Bartholomew MD at [address] [place] [address] CATH LAB    CARDIAC PROCEDURE N/A 11/2/2022    Percutaneous coronary intervention performed by Cassie Bartholomew MD at [address] [place] [address] CATH LAB    CHOLECYSTECTOMY  2012    ORTHOPEDIC SURGERY Left     wrist surg--nerve surg    ORTHOPEDIC SURGERY Left     foot surg as child    UPPER GASTROINTESTINAL ENDOSCOPY N/A 11/9/2022    EGD/ENDOSCOPIC ULTRASOUND/AXIOS STENT ROOM 2226 **Rep will be present performed by Mani Allan MD at [facility] 11/30/2022    EGD/ENDOSCOPIC ULTRASOUND stent removal  performed by Mani Allan MD at [address] [facility] N/A 11/30/2022    EGD ESOPHAGOGASTRODUODENOSCOPY performed by Mani Allan MD at [facility] ENDOSCOPY       Allergies:  No Known Allergies    Home Medications:  Prior to Admission medications    Medication Sig Start Date End Date Taking?  Authorizing Provider   ondansetron (ZOFRAN) 4 MG tablet Take 1 tablet by mouth 3 times daily as needed for Nausea or Vomiting 12/24/22   Daniela Avilez DO   naloxone [facility]) 4 MG/0.1ML LIQD nasal spray 1 spray by Nasal route as needed for Opioid Reversal 12/24/22   Daniela Avilez DO   pantoprazole (PROTONIX) 40 MG tablet Take 1 tablet by mouth every morning (before breakfast) 12/1/22   Chata Bates MD   nitroGLYCERIN (NITROSTAT) 0.4 MG SL tablet Place 1 tablet under the tongue every 5 minutes as needed for Chest pain up to max of 3 total doses. If no relief after 1 dose, call 911. 12/1/22   Chata Batse MD   [facility]) 100 MG extended release tablet Take 1 tablet by mouth 2 times daily 12/1/22   Chata Bates MD   aspirin 81 MG chewable tablet Take 1 tablet by mouth daily 11/11/22   Severiano Schwab, PA   atorvastatin (LIPITOR) 40 MG tablet Take 1 tablet by mouth nightly 11/10/22   Severiano Schwab, PA   colchicine (COLCRYS) 0.6 MG tablet Take 1 tablet by mouth daily  Patient not taking: No sig reported 11/11/22   Severiano Schwab, PA   lisinopril (PRINIVIL;ZESTRIL) 5 MG tablet Take 1 tablet by mouth daily 11/11/22   Severiano Schwab, PA   metoprolol tartrate (LOPRESSOR) 25 MG tablet Take 1 tablet by mouth 2 times daily 11/10/22   Severiano Schwab, PA   ticagrelor (BRILINTA) 90 MG TABS tablet Take 1 tablet by mouth 2 times daily 11/10/22   Severiano Schwab, PA   thiamine 100 MG tablet Take 1 tablet by mouth in the morning.  8/3/22 8/2/22  Brennan Foster MD       [facility] Medications:  Current Facility-Administered Medications   Medication Dose Route Frequency    aspirin chewable tablet 81 mg  81 mg Oral Daily    atorvastatin (LIPITOR) tablet 40 mg  40 mg Oral Nightly    buPROPion [facility] SR) extended release tablet 100 mg  100 mg Oral BID    lisinopril (PRINIVIL;ZESTRIL) tablet 5 mg  5 mg Oral Daily    metoprolol tartrate (LOPRESSOR) tablet 25 mg  25 mg Oral BID    ticagrelor (BRILINTA) tablet 90 mg  90 mg Oral BID    piperacillin-tazobactam (ZOSYN) 3,375 mg in sodium chloride 0.9 % 50 mL IVPB (mini-bag)  3,375 mg IntraVENous q8h    pantoprazole (PROTONIX) 40 mg in sodium chloride (PF) 0.9 % 10 mL injection  40 mg IntraVENous Daily    lactated ringers infusion   IntraVENous Continuous    sodium chloride flush 0.9 % injection 5-40 mL  5-40 mL IntraVENous 2 times per day    sodium chloride flush 0.9 % injection 5-40 mL  5-40 mL IntraVENous PRN    0.9 % sodium chloride infusion   IntraVENous PRN    ondansetron (ZOFRAN-ODT) disintegrating tablet 4 mg  4 mg Oral Q8H PRN    Or    ondansetron (ZOFRAN) injection 4 mg  4 mg IntraVENous Q6H PRN    enoxaparin (LOVENOX) injection 40 mg  40 mg SubCUTAneous Daily    aluminum & magnesium hydroxide-simethicone (MAALOX) 200-200-20 MG/5ML suspension 30 mL  30 mL Oral Q6H PRN    hyoscyamine (LEVSIN/SL) sublingual tablet 125 mcg  125 mcg SubLINGual Q4H PRN    potassium chloride (KLOR-CON M) extended release tablet 40 mEq  40 mEq Oral PRN    Or    potassium bicarb-citric acid (EFFER-K) effervescent tablet 40 mEq  40 mEq Oral PRN    Or    potassium chloride 10 mEq/100 mL IVPB (Peripheral Line)  10 mEq IntraVENous PRN    magnesium sulfate 2000 mg in 50 mL IVPB premix  2,000 mg IntraVENous PRN    HYDROmorphone (DILAUDID) injection 1 mg  1 mg IntraVENous Q3H PRN    HYDROmorphone (DILAUDID) injection 0.5 mg  0.5 mg IntraVENous Q6H PRN    oxyCODONE (ROXICODONE) immediate release tablet 10 mg  10 mg Oral Q4H PRN       Social History:  Social History     Tobacco Use    Smoking status: Every Day     Packs/day: 1.00     Years: 20.00     Pack years: 20.00     Types: Cigarettes    Smokeless tobacco: Never   Substance Use Topics    Alcohol use: Not Currently     Comment: none since 10/4/22-- had drank 3 glasses of wine daily       Pt denies any history of drug use. He has tattoos.     Family History:  Family History   Problem Relation Age of Onset    No Known Problems Mother     Heart Attack Father         MI at 58       Review of Systems:  A detailed 10 system ROS is obtained, with pertinent positives as listed above. All others are negative. Diet:  Clear liquids    Objective:     Physical Exam:  Vitals:  BP (!) 142/90   Pulse 88   Temp 97.3 °F (36.3 °C) (Oral)   Resp 18   Ht 5' 9\" (1.753 m)   Wt 180 lb (81.6 kg)   SpO2 100%   BMI 26.58 kg/m²   Gen:  Pt is alert, cooperative, no acute distress, lying in bed  Skin:  Extremities and face reveal no rashes. HEENT: Sclerae anicteric. Extra-occular muscles are intact. No oral ulcers. No abnormal pigmentation of the lips. The neck is supple. Cardiovascular: Regular rate and rhythm. No murmurs, gallops, or rubs. Respiratory:  Comfortable breathing with no accessory muscle use. Clear breath sounds anteriorly with no wheezes, rales, or rhonchi. GI:  Abdomen nondistended, soft, and tender over upper abdomen. Normal active bowel sounds. No enlargement of the liver or spleen. No masses palpable. Rectal:  Deferred  Musculoskeletal:  No pitting edema of the lower legs. Neurological:  Gross memory appears intact. Patient is alert and oriented. Psychiatric:  Mood appears appropriate with judgement intact. Lymphatic:  No cervical or supraclavicular adenopathy. Laboratory:    Recent Labs     01/01/23  1109 01/01/23  1855 01/02/23  0646   WBC 16.2*  --  10.1   HGB 14.5  --  11.3*   HCT 45.5  --  35.3*   *  --  531*   MCV 80.4*  --  80.0*     --  134   K 5.9* 4.4 4.1     --  104   CO2 22  --  26   BUN 14  --  10   CREATININE 0.84  --  1.00   CALCIUM 9.8  --  9.0   MG 2.1  --  2.1   GLUCOSE 92  --  91   ALKPHOS 66  --  49*   AST 36  --  16   ALT 27  --  28   BILITOT 0.4  --  0.6   LABALBU 3.9  --  2.5*   PROT 8.0  --  6.5   LIPASE 505*  --   --      CT ABDOMEN AND PELVIS WITH INTRAVENOUS CONTRAST DATED 1/1/2023 1:47 PM.   History: Worsening upper abdominal pain. Pancreatitis.  History of pancreatic  pseudocyst.    Comparison: CT abdomen and pelvis with contrast 12/23/2020 Technique:   Multiple contiguous helical CT images reconstructed at 5 mm  intervals were obtained from above the diaphragms through the ischial  tuberosities following oral and 100 cc Isovue-370 without acute complication. All CT scans performed at this facility use one or all of the following:  Automated exposure control, adjustment of the mA and/or kVp according to  patient's size, iterative reconstruction. Findings:  CT ABDOMEN:    Limited evaluation of the lung bases and base of the mediastinum demonstrates  mild dependent atelectasis in the left lung base. The Liver demonstrates a likely benign calcified granuloma seen in the right  lobe on image 22. An additional 5 mm low-attenuation right lobe hepatic lesion  is seen on image 22 which is too small to characterize although grossly does not enhance statistically most likely representing a tiny hepatic cyst.  The spleen is homogeneous in attenuation. No contour deforming or enhancing mass lesions are seen of the adrenal glands. The gallbladder has been removed. The kidneys enhance symmetrically and no evidence of hydronephrosis is seen. Worsening inflammatory changes are seen of the pancreas. In addition worsening local changes, there is evolving soft tissue edema and fluid now tracking along the right anterior pararenal fascial plane into the right paracolic gutter. The prior small collection anterior to the pancreatic body is slightly increased in size now measuring 2 cm x 1.3 cm. Multiple evolving collections are suggested within the lesser sac of the abdomen the larger of which is seen on axial image 32 measuring 6 cm x 2.9 cm in size. The appearance of these suggest acute inflammatory collections from acute pancreatitis. Additional scattered although grossly nonloculated fluid is seen. Changes are once again seen in the pancreatic tail suggesting necrotic changes at this level. Increasing varices are seen in the gastrosplenic ligament.  The splenic vein currently does remain patent although does appear attenuated in its more distal course. Multiple likely reactive regional mesenteric lymph nodes are seen. The visualized loops of small bowel and colon are normal in caliber. No free  air is seen. The abdominal aorta is unremarkable in appearance. CT PELVIS:  No abnormal pelvic fluid collections or inflammatory changes are present. No  pelvic adenopathy is seen. The urinary bladder is unremarkable. Impression  1. Worsening inflammatory changes about the pancreas suggesting worsening  pancreatitis. This includes evolving mesenteric edema and fluid collections  particularly in the lesser sac of the abdomen. Stable necrotic appearing changes are seen in the pancreatic tail. Likely reactive regional mesenteric lymph nodes are seen. 2. Increasing mesenteric varices. The splenic and portal veins remain patent  although the distal splenic vein appears attenuated likely from evolving  inflammatory changes. Assessment:     Principal Problem:    Acute on chronic pancreatitis (HCC)  Active Problems:    Alcohol use disorder, severe, in early remission, dependence ([facility]    Pancreatic pseudocyst    History of ST elevation myocardial infarction (STEMI)    Coronary artery disease involving native coronary artery of native heart    Dependence on nicotine from cigarettes    GERD (gastroesophageal reflux disease)    Primary hypertension  Resolved Problems:    * No resolved hospital problems.  *    41 yo male with PMH including but not limited to alcohol use disorder in remission, alcohol induced chronic pancreatitis complicated by pseudocyst status post gastrostomy with stent placed 9 Nov 2022 then removed on 30 No 2022 via EUS, CAD s/p STEMI and PCI with TULIO 2 Nov 2022 - on ASA and Brilinta, GERD, TO, HTN, who is seen in consultation 2 Jan 2023 at the request of Dr. Doron Ventura for acute on chronic pancreatitis with pseudocyst.  We have been following with him over several recent admissions for acute on chronic pancreatitis complicated by pseudocyst s/p EUS with cyst gastrostomy with AXIOS stent placed 9 Nov 2022 and then removed on 30 No 2022 after drainage of cyst.  He returned for worsening abd pain, nausea, and vomiting, with labs in the ER noting normal LFTs and lipase 505, WBC 16.2, plts 808, potassium 5.9. CT scan abd/pelvis noted worsening inflammatory changes about the pancreas suggesting worsening pancreatitis, fluid collections particularly in the lesser sac of the abdomen, stable necrotic appearing changes are seen in the pancreatic tail, increasing mesenteric varices. Symptoms associated with acute exacerbation of chronic pancreatitis. Decrease in hgb likely dilutional - initially dehydration and receiving IVF hydration. Plan:     - Supportive care, aggressive IVF. - Continue to avoid ETOH.  - Can add pancreatic enzymes, though unclear how much benefit he will receive. - Follow. Patient is seen and examined in collaboration with Dr. Lacho Mack.  Assessment and plan as per Dr. Reshma Esparza.  Pop Cotter  [facility]

## 2023-01-02 NOTE — PROGRESS NOTES
Hospitalist Progress Note   Admit Date:  2023  5:36 PM   Name:  Guadlupe Severs   Age:  44 y.o. Sex:  male  :  1985   MRN:  [id number]   Room:  [facility]    Presenting Complaint: abdominal pain  Reason(s) for Admission: Acute on chronic pancreatitis ([facility] [address] [K85.90, K86.1]     Hospital Course & Interval History:   44 y.o. male with medical history of  alcohol use disorder in remission, alcohol induced chronic pancreatitis complicated by pseudocyst status post gastrostomy with stent placed 2022 then removed on 22 via EUS and STEMI status post PCI with TULIO 2022 on aspirin and Brilinta  who presented from [facility] ED on 23 as a direct admit with cc adominal pain associated with n/v that started on 22. Seen  in ED with acute pancreatitis and improved with IV fluids and pain meds and discharged home with short course of norco.      /92 RR 21  non hypoxic, afebrile   WBC 16.2 CBC hemoconcentrated. Plt 808 K 5.9 AG 17 lipase 505      CT a/p IV contrast  1. Worsening inflammatory changes about the pancreas suggesting worsening   pancreatitis. This includes evolving mesenteric edema and fluid collections   particularly in the lesser sac of the abdomen. Stable necrotic appearing changes   are seen in the pancreatic tail. Likely reactive regional mesenteric lymph nodes   are seen. 2. Increasing mesenteric varices. The splenic and portal veins remain patent   although the distal splenic vein appears attenuated likely from evolving   inflammatory changes. Subjective/24hr Events (23): Patient seen at bedside, resting in bed. Is alert and awake, AOx3, on room air. Patient currently complains of abdominal pain which is in epigastric region and across his abdomen 10/10 in intensity. Denies any fever, chills. Does report of feeling nauseous, denies any vomiting since admission.   No chest pain, shortness of breath, palpitations, lightheadedness dizziness or diarrhea. ROS:  10 point review of system is negative except for what mentioned above. Assessment & Plan:     Principal Problem:    Acute on chronic pancreatitis // pancreatic pseudocyt   -   1/2-  Conservative treatment for now, continue IV fluids and optimize pain with as needed narcotics for  Antiemetics as needed. IV Protonix 40 mg daily  Empiric IV Zosyn to prevent any intra-abdominal infection. GI on board, will defer to them for any further surgical intervention. Lipase 505     Hyperkalemia -   1/2-  Potassium 4.1     CAD s/p TULIO - DAPT. Statin. BB. ACEi. Stable no angina      Cigarette nicotine dependence - improving on nicotine replacement outpatient and bupropion. AUD - in remission. GERD - IV PPI for now       Discharge Planning:    Pending consult course    Diet:  ADULT DIET; Clear Liquid  DVT PPx: Lovenox  Code status: Full Code    I spent 36 minutes of time caring for this patient on the unit nearby or at bedside, and more than 50 percent was spent on coordination of care activities, and/or patient/family counseling regarding status and plan of care.      Hospital Problems             Last Modified POA    * (Principal) Acute on chronic pancreatitis ([address] 1/1/2023 Yes    Alcohol use disorder, severe, in early remission, dependence ([address] (Chronic) 1/1/2023 Yes    Pancreatic pseudocyst 1/1/2023 Yes    History of ST elevation myocardial infarction (STEMI) 1/1/2023 Yes    Coronary artery disease involving native coronary artery of native heart (Chronic) 1/1/2023 Yes    Dependence on nicotine from cigarettes (Chronic) 1/1/2023 Yes    GERD (gastroesophageal reflux disease) 1/1/2023 Yes    Primary hypertension 1/1/2023 Yes       Objective:   Patient Vitals for the past 24 hrs:   Temp Pulse Resp BP SpO2   01/02/23 0801 97.3 °F (36.3 °C) 88 18 (!) 142/90 100 %   01/02/23 0756 -- -- 16 -- --   01/02/23 0634 -- -- 18 -- --   01/02/23 0604 -- -- 18 -- --   01/02/23 0501 -- -- 18 -- --   01/02/23 0431 -- -- 18 -- --   01/02/23 0350 98.3 °F (36.8 °C) 68 17 123/71 98 %   01/02/23 0306 -- 72 -- -- --   01/02/23 0207 -- -- 18 -- --   01/02/23 0137 -- -- 20 -- --   01/02/23 0037 -- 80 -- -- --   01/01/23 2306 97.7 °F (36.5 °C) 97 18 139/88 97 %   01/01/23 2256 -- -- 18 -- --   01/01/23 2226 -- -- 20 -- --   01/01/23 2157 -- -- 18 -- --   01/01/23 2126 -- -- 18 -- --   01/01/23 2041 -- 90 -- -- --   01/01/23 1959 99 °F (37.2 °C) 98 18 (!) 141/94 98 %   01/01/23 1930 -- -- 20 -- --   01/01/23 1900 -- -- 22 -- --   01/01/23 1749 97.5 °F (36.4 °C) 100 16 132/89 99 %   01/01/23 1740 -- 95 -- -- --       Estimated body mass index is 26.58 kg/m² as calculated from the following:    Height as of this encounter: 5' 9\" (1.753 m). Weight as of this encounter: 180 lb (81.6 kg). No intake or output data in the 24 hours ending 01/02/23 0810      Physical Exam:     Blood pressure (!) 142/90, pulse 88, temperature 97.3 °F (36.3 °C), temperature source Oral, resp. rate 18, height 5' 9\" (1.753 m), weight 180 lb (81.6 kg), SpO2 100 %. General:    Well nourished. No overt distress. Head:  Normocephalic, atraumatic  Eyes:  Sclerae appear normal. Pupils equally round. ENT:  Nares appear normal, no drainage. Moist oral mucosa  Neck:  No restricted ROM. Trachea midline. CV:   RRR. No m/r/g. No jugular venous distension. Lungs:   CTAB. No wheezing, rhonchi, or rales. Respirations even, unlabored. Abdomen:   Soft, nondistended, tender over epigastric region, slight guarding, no rigidity, bowel sounds are normoactive. Extremities: No cyanosis or clubbing. No edema. Skin:     No rashes and normal coloration. Warm and dry. Neuro:  CN II-XII grossly intact. Sensation intact. A&Ox3  Psych:  Normal mood and affect.       I have reviewed ordered lab tests and independently visualized imaging below:    Recent Labs:  Recent Results (from the past 48 hour(s))   CBC with Auto Differential    Collection Time: 01/01/23 11:09 AM   Result Value Ref Range    WBC 16.2 (H) 4.3 - 11.1 K/uL    RBC 5.66 (H) 4.23 - 5.60 M/uL    Hemoglobin 14.5 13.6 - 17.2 g/dL    Hematocrit 45.5 41.1 - 50.3 %    MCV 80.4 (L) 82.0 - 102.0 FL    MCH 25.6 (L) 26.1 - 32.9 PG    MCHC 31.9 31.4 - 35.0 g/dL    RDW 18.9 (H) 11.9 - 14.6 %    Platelets 353 (H) 663 - 450 K/uL    MPV 8.5 (L) 9.4 - 12.3 FL    nRBC 0.00 0.0 - 0.2 K/uL    Differential Type AUTOMATED      Seg Neutrophils 85 (H) 43 - 78 %    Lymphocytes 9 (L) 13 - 44 %    Monocytes 5 4.0 - 12.0 %    Eosinophils % 1 0.5 - 7.8 %    Basophils 1 0.0 - 2.0 %    Immature Granulocytes 1 0.0 - 5.0 %    Segs Absolute 13.7 (H) 1.7 - 8.2 K/UL    Absolute Lymph # 1.4 0.5 - 4.6 K/UL    Absolute Mono # 0.8 0.1 - 1.3 K/UL    Absolute Eos # 0.1 0.0 - 0.8 K/UL    Basophils Absolute 0.1 0.0 - 0.2 K/UL    Absolute Immature Granulocyte 0.1 0.0 - 0.5 K/UL   CMP    Collection Time: 01/01/23 11:09 AM   Result Value Ref Range    Sodium 141 133 - 143 mmol/L    Potassium 5.9 (H) 3.5 - 5.1 mmol/L    Chloride 102 98 - 107 mmol/L    CO2 22 21 - 32 mmol/L    Anion Gap 17 (H) 2 - 11 mmol/L    Glucose 92 65 - 100 mg/dL    BUN 14 7.0 - 18.0 MG/DL    Creatinine 0.84 0.8 - 1.5 MG/DL    Est, Glom Filt Rate >60 >60 ml/min/1.73m2    Calcium 9.8 8.3 - 10.4 MG/DL    Total Bilirubin 0.4 0.2 - 1.1 MG/DL    ALT 27 13.0 - 61.0 U/L    AST 36 15 - 37 U/L    Alk Phosphatase 66 45.0 - 117.0 U/L    Total Protein 8.0 6.4 - 8.2 g/dL    Albumin 3.9 3.5 - 5.0 g/dL    Globulin 4.1 2.8 - 4.5 g/dL    Albumin/Globulin Ratio 1.0 0.4 - 1.6     Lipase    Collection Time: 01/01/23 11:09 AM   Result Value Ref Range    Lipase 505 (H) 13 - 60 U/L   Magnesium    Collection Time: 01/01/23 11:09 AM   Result Value Ref Range    Magnesium 2.1 1.2 - 2.6 mg/dL   Lactic Acid    Collection Time: 01/01/23  6:55 PM   Result Value Ref Range    Lactic Acid, Plasma 1.4 0.4 - 2.0 MMOL/L   Potassium    Collection Time: 01/01/23  6:55 PM   Result Value Ref Range    Potassium 4.4 3.5 - 5.1 mmol/L   CBC with Auto Differential    Collection Time: 01/02/23  6:46 AM   Result Value Ref Range    WBC 10.1 4.3 - 11.1 K/uL    RBC 4.41 4.23 - 5.6 M/uL    Hemoglobin 11.3 (L) 13.6 - 17.2 g/dL    Hematocrit 35.3 (L) 41.1 - 50.3 %    MCV 80.0 (L) 82 - 102 FL    MCH 25.6 (L) 26.1 - 32.9 PG    MCHC 32.0 31.4 - 35.0 g/dL    RDW 17.5 (H) 11.9 - 14.6 %    Platelets 491 (H) 261 - 450 K/uL    MPV 8.8 (L) 9.4 - 12.3 FL    nRBC 0.00 0.0 - 0.2 K/uL    Differential Type AUTOMATED      Seg Neutrophils 77 43 - 78 %    Lymphocytes 11 (L) 13 - 44 %    Monocytes 9 4.0 - 12.0 %    Eosinophils % 1 0.5 - 7.8 %    Basophils 1 0.0 - 2.0 %    Immature Granulocytes 1 0.0 - 5.0 %    Segs Absolute 7.9 1.7 - 8.2 K/UL    Absolute Lymph # 1.1 0.5 - 4.6 K/UL    Absolute Mono # 0.9 0.1 - 1.3 K/UL    Absolute Eos # 0.1 0.0 - 0.8 K/UL    Basophils Absolute 0.1 0.0 - 0.2 K/UL    Absolute Immature Granulocyte 0.1 0.0 - 0.5 K/UL   Magnesium    Collection Time: 01/02/23  6:46 AM   Result Value Ref Range    Magnesium 2.1 1.8 - 2.4 mg/dL   Comprehensive Metabolic Panel    Collection Time: 01/02/23  6:46 AM   Result Value Ref Range    Sodium 134 133 - 143 mmol/L    Potassium 4.1 3.5 - 5.1 mmol/L    Chloride 104 101 - 110 mmol/L    CO2 26 21 - 32 mmol/L    Anion Gap 4 2 - 11 mmol/L    Glucose 91 65 - 100 mg/dL    BUN 10 6 - 23 MG/DL    Creatinine 1.00 0.8 - 1.5 MG/DL    Est, Glom Filt Rate >60 >60 ml/min/1.73m2    Calcium 9.0 8.3 - 10.4 MG/DL    Total Bilirubin 0.6 0.2 - 1.1 MG/DL    ALT 28 12 - 65 U/L    AST 16 15 - 37 U/L    Alk Phosphatase 49 (L) 50 - 136 U/L    Total Protein 6.5 6.3 - 8.2 g/dL    Albumin 2.5 (L) 3.5 - 5.0 g/dL    Globulin 4.0 2.8 - 4.5 g/dL    Albumin/Globulin Ratio 0.6 0.4 - 1.6           Other Studies:  CT ABDOMEN PELVIS W IV CONTRAST Additional Contrast? None    Result Date: 1/1/2023  CT ABDOMEN AND PELVIS WITH INTRAVENOUS CONTRAST DATED 1/1/2023 1:47 PM. History: Worsening upper abdominal pain. Pancreatitis.  History of pancreatic pseudocyst. Comparison: CT abdomen and pelvis with contrast 12/23/2020 Technique:   Multiple contiguous helical CT images reconstructed at 5 mm intervals were obtained from above the diaphragms through the ischial tuberosities following oral and 100 cc Isovue-370 without acute complication. All CT scans performed at this facility use one or all of the following: Automated exposure control, adjustment of the mA and/or kVp according to patient's size, iterative reconstruction. Findings: CT ABDOMEN:  Limited evaluation of the lung bases and base of the mediastinum demonstrates mild dependent atelectasis in the left lung base. The Liver demonstrates a likely benign calcified granuloma seen in the right lobe on image 22. An additional 5 mm low-attenuation right lobe hepatic lesion is seen on image 22 which is too small to characterize although grossly does not enhance statistically most likely representing a tiny hepatic cyst.  The spleen is homogeneous in attenuation. No contour deforming or enhancing mass lesions are seen of the adrenal glands. The gallbladder has been removed. The kidneys enhance symmetrically and no evidence of hydronephrosis is seen. Worsening inflammatory changes are seen of the pancreas. In addition worsening local changes, there is evolving soft tissue edema and fluid now tracking along the right anterior pararenal fascial plane into the right paracolic gutter. The prior small collection anterior to the pancreatic body is slightly increased in size now measuring 2 cm x 1.3 cm. Multiple evolving collections are suggested within the lesser sac of the abdomen the larger of which is seen on axial image 32 measuring 6 cm x 2.9 cm in size. The appearance of these suggest acute inflammatory collections from acute pancreatitis. Additional scattered although grossly nonloculated fluid is seen. Changes are once again seen in the pancreatic tail suggesting necrotic changes at this level. Increasing varices are seen in the gastrosplenic ligament. The splenic vein currently does remain patent although does appear attenuated in its more distal course. Multiple likely reactive regional mesenteric lymph nodes are seen. The visualized loops of small bowel and colon are normal in caliber. No free air is seen. The abdominal aorta is unremarkable in appearance. CT PELVIS: No abnormal pelvic fluid collections or inflammatory changes are present. No pelvic adenopathy is seen. The urinary bladder is unremarkable. 1.  Worsening inflammatory changes about the pancreas suggesting worsening pancreatitis. This includes evolving mesenteric edema and fluid collections particularly in the lesser sac of the abdomen. Stable necrotic appearing changes are seen in the pancreatic tail. Likely reactive regional mesenteric lymph nodes are seen. 2. Increasing mesenteric varices. The splenic and portal veins remain patent although the distal splenic vein appears attenuated likely from evolving inflammatory changes. This report was made using voice transcription. Despite my best efforts to avoid any, transcription errors may persist. If there is any question about the accuracy of the report or need for clarification, then please call [phone], or text me through [employer] for clarification or correction.        Current Meds:  Current Facility-Administered Medications   Medication Dose Route Frequency    aspirin chewable tablet 81 mg  81 mg Oral Daily    atorvastatin (LIPITOR) tablet 40 mg  40 mg Oral Nightly    buPROPion (WELLBUTRIN SR) extended release tablet 100 mg  100 mg Oral BID    lisinopril (PRINIVIL;ZESTRIL) tablet 5 mg  5 mg Oral Daily    metoprolol tartrate (LOPRESSOR) tablet 25 mg  25 mg Oral BID    ticagrelor (BRILINTA) tablet 90 mg  90 mg Oral BID    piperacillin-tazobactam (ZOSYN) 3,375 mg in sodium chloride 0.9 % 50 mL IVPB (mini-bag)  3,375 mg IntraVENous q8h    pantoprazole (PROTONIX) 40 mg in sodium chloride (PF) 0.9 % 10 mL injection  40 mg IntraVENous Daily    lactated ringers infusion   IntraVENous Continuous    sodium chloride flush 0.9 % injection 5-40 mL  5-40 mL IntraVENous 2 times per day    sodium chloride flush 0.9 % injection 5-40 mL  5-40 mL IntraVENous PRN    0.9 % sodium chloride infusion   IntraVENous PRN    ondansetron (ZOFRAN-ODT) disintegrating tablet 4 mg  4 mg Oral Q8H PRN    Or    ondansetron (ZOFRAN) injection 4 mg  4 mg IntraVENous Q6H PRN    enoxaparin (LOVENOX) injection 40 mg  40 mg SubCUTAneous Daily    aluminum & magnesium hydroxide-simethicone (MAALOX) 200-200-20 MG/5ML suspension 30 mL  30 mL Oral Q6H PRN    hyoscyamine (LEVSIN/SL) sublingual tablet 125 mcg  125 mcg SubLINGual Q4H PRN    potassium chloride (KLOR-CON M) extended release tablet 40 mEq  40 mEq Oral PRN    Or    potassium bicarb-citric acid (EFFER-K) effervescent tablet 40 mEq  40 mEq Oral PRN    Or    potassium chloride 10 mEq/100 mL IVPB (Peripheral Line)  10 mEq IntraVENous PRN    magnesium sulfate 2000 mg in 50 mL IVPB premix  2,000 mg IntraVENous PRN    HYDROmorphone (DILAUDID) injection 1 mg  1 mg IntraVENous Q3H PRN    HYDROmorphone (DILAUDID) injection 0.5 mg  0.5 mg IntraVENous Q6H PRN    oxyCODONE (ROXICODONE) immediate release tablet 10 mg  10 mg Oral Q4H PRN       Signed:  Dylan Muniz MD    Part of this note may have been written by using a voice dictation software. The note has been proof read but may still contain some grammatical/other typographical errors.

## 2023-01-02 NOTE — PLAN OF CARE
Patient receiving pain medicine every 3 hours around the clock, but no relief has occurred. Anything other than liquids aggravates his stomach. Constant pain of 8-9.   Problem: Discharge Planning  Goal: Discharge to home or other facility with appropriate resources  Outcome: Not Progressing     Problem: Pain  Goal: Verbalizes/displays adequate comfort level or baseline comfort level  Outcome: Not Progressing  Flowsheets (Taken 1/1/2023 1920 by John Worthington RN)  Verbalizes/displays adequate comfort level or baseline comfort level:   Encourage patient to monitor pain and request assistance   Assess pain using appropriate pain scale   Administer analgesics based on type and severity of pain and evaluate response   Implement non-pharmacological measures as appropriate and evaluate response

## 2023-01-02 NOTE — CARE COORDINATION
Chart reviewed by . Patient is a 44year old male, admitted with Acute on chronic pancreatitis. CM met with patient to discuss discharge planning. Patient lives with his spouse and children in a one level home with no steps. At baseline, patient is independent with completing ADL's and employed full-time. No DME use. Patient is uninsured. DECO to be consulted. Patient reports he is able to afford prescription medications and has been approved for Brilinta- Medication assistant program. CM will confirm patient is able to afford medications prescribed at discharge. Hx of Alcohol Use noted in H&P. CM offered assistance with supportive resources for ETOH use. Patient denies alcohol use and need for resources. No additional discharge needs noted at this time. CM following.         01/02/23 5322   Service Assessment   Patient Orientation Alert and Oriented   Cognition Alert   History Provided By Patient;Medical Record   Primary Caregiver Self   Support Systems Family Members;Spouse/Significant Other   Patient's Healthcare Decision Maker is: Legal Next of Kin   PCP Verified by CM Yes   Last Visit to PCP Within last 3 months   Prior Functional Level Independent in ADLs/IADLs   Current Functional Level Independent in ADLs/IADLs   Can patient return to prior living arrangement Yes   Ability to make needs known: Good   Family able to assist with home care needs: Yes   Financial Resources Financial Counseling  (Patient uninsured.)   Social/Functional History   Lives With Family   Type of [address] [facility] One level   Home Access Level entry   [address]   (No DME Use.)   ADL Assistance Independent   Ambulation Assistance Independent   Transfer Assistance Independent   Active  Yes   Mode of Transportation Car   Occupation Full time employment   Type of Occupation [employer]   Discharge Planning   Patient expects to be discharged to: [facility] Discharge   Transition of Care Consult (CM Consult) Discharge [address] Discharge None   [employer] Information Provided? No   Mode of Transport at Discharge Other (see comment)  (Family.)   Confirm Follow Up Transport Self   Condition of Participation: Discharge Planning   The Plan for Transition of Care is related to the following treatment goals: Return to baseline.

## 2023-01-03 LAB
ALBUMIN SERPL-MCNC: 2.4 G/DL (ref 3.5–5)
ALBUMIN/GLOB SERPL: 0.6 {RATIO} (ref 0.4–1.6)
ALP SERPL-CCNC: 46 U/L (ref 50–136)
ALT SERPL-CCNC: 27 U/L (ref 12–65)
ANION GAP SERPL CALC-SCNC: 6 MMOL/L (ref 2–11)
AST SERPL-CCNC: 22 U/L (ref 15–37)
BASOPHILS # BLD: 0 K/UL (ref 0–0.2)
BASOPHILS NFR BLD: 1 % (ref 0–2)
BILIRUB SERPL-MCNC: 0.4 MG/DL (ref 0.2–1.1)
BUN SERPL-MCNC: 7 MG/DL (ref 6–23)
CALCIUM SERPL-MCNC: 8.9 MG/DL (ref 8.3–10.4)
CHLORIDE SERPL-SCNC: 104 MMOL/L (ref 101–110)
CO2 SERPL-SCNC: 23 MMOL/L (ref 21–32)
CREAT SERPL-MCNC: 0.8 MG/DL (ref 0.8–1.5)
DIFFERENTIAL METHOD BLD: ABNORMAL
EOSINOPHIL # BLD: 0 K/UL (ref 0–0.8)
EOSINOPHIL NFR BLD: 1 % (ref 0.5–7.8)
ERYTHROCYTE [DISTWIDTH] IN BLOOD BY AUTOMATED COUNT: 17.3 % (ref 11.9–14.6)
GLOBULIN SER CALC-MCNC: 4.1 G/DL (ref 2.8–4.5)
GLUCOSE SERPL-MCNC: 78 MG/DL (ref 65–100)
HCT VFR BLD AUTO: 34.6 % (ref 41.1–50.3)
HGB BLD-MCNC: 11 G/DL (ref 13.6–17.2)
IMM GRANULOCYTES # BLD AUTO: 0 K/UL (ref 0–0.5)
IMM GRANULOCYTES NFR BLD AUTO: 0 % (ref 0–5)
LYMPHOCYTES # BLD: 1.2 K/UL (ref 0.5–4.6)
LYMPHOCYTES NFR BLD: 23 % (ref 13–44)
MAGNESIUM SERPL-MCNC: 2.1 MG/DL (ref 1.8–2.4)
MCH RBC QN AUTO: 25.3 PG (ref 26.1–32.9)
MCHC RBC AUTO-ENTMCNC: 31.8 G/DL (ref 31.4–35)
MCV RBC AUTO: 79.7 FL (ref 82–102)
MONOCYTES # BLD: 0.6 K/UL (ref 0.1–1.3)
MONOCYTES NFR BLD: 12 % (ref 4–12)
NEUTS SEG # BLD: 3.4 K/UL (ref 1.7–8.2)
NEUTS SEG NFR BLD: 63 % (ref 43–78)
NRBC # BLD: 0 K/UL (ref 0–0.2)
PLATELET # BLD AUTO: 442 K/UL (ref 150–450)
PMV BLD AUTO: 8.9 FL (ref 9.4–12.3)
POTASSIUM SERPL-SCNC: 3.8 MMOL/L (ref 3.5–5.1)
PROT SERPL-MCNC: 6.5 G/DL (ref 6.3–8.2)
RBC # BLD AUTO: 4.34 M/UL (ref 4.23–5.6)
SODIUM SERPL-SCNC: 133 MMOL/L (ref 133–143)
WBC # BLD AUTO: 5.3 K/UL (ref 4.3–11.1)

## 2023-01-03 PROCEDURE — C9113 INJ PANTOPRAZOLE SODIUM, VIA: HCPCS | Performed by: FAMILY MEDICINE

## 2023-01-03 PROCEDURE — 6370000000 HC RX 637 (ALT 250 FOR IP): Performed by: FAMILY MEDICINE

## 2023-01-03 PROCEDURE — 80053 COMPREHEN METABOLIC PANEL: CPT

## 2023-01-03 PROCEDURE — 1100000000 HC RM PRIVATE

## 2023-01-03 PROCEDURE — 6360000002 HC RX W HCPCS: Performed by: FAMILY MEDICINE

## 2023-01-03 PROCEDURE — 85025 COMPLETE CBC W/AUTO DIFF WBC: CPT

## 2023-01-03 PROCEDURE — 83735 ASSAY OF MAGNESIUM: CPT

## 2023-01-03 PROCEDURE — 2580000003 HC RX 258: Performed by: FAMILY MEDICINE

## 2023-01-03 PROCEDURE — A4216 STERILE WATER/SALINE, 10 ML: HCPCS | Performed by: FAMILY MEDICINE

## 2023-01-03 PROCEDURE — 36415 COLL VENOUS BLD VENIPUNCTURE: CPT

## 2023-01-03 PROCEDURE — 6370000000 HC RX 637 (ALT 250 FOR IP): Performed by: NURSE PRACTITIONER

## 2023-01-03 RX ORDER — SIMETHICONE 80 MG
80 TABLET,CHEWABLE ORAL EVERY 6 HOURS PRN
Status: DISCONTINUED | OUTPATIENT
Start: 2023-01-03 | End: 2023-01-05 | Stop reason: HOSPADM

## 2023-01-03 RX ORDER — POLYETHYLENE GLYCOL 3350 17 G/17G
17 POWDER, FOR SOLUTION ORAL DAILY
Status: DISCONTINUED | OUTPATIENT
Start: 2023-01-03 | End: 2023-01-05 | Stop reason: HOSPADM

## 2023-01-03 RX ADMIN — HYDROMORPHONE HYDROCHLORIDE 1 MG: 1 INJECTION, SOLUTION INTRAMUSCULAR; INTRAVENOUS; SUBCUTANEOUS at 21:28

## 2023-01-03 RX ADMIN — TICAGRELOR 90 MG: 90 TABLET ORAL at 08:35

## 2023-01-03 RX ADMIN — ASPIRIN 81 MG: 81 TABLET, CHEWABLE ORAL at 08:35

## 2023-01-03 RX ADMIN — SIMETHICONE 80 MG: 80 TABLET, CHEWABLE ORAL at 20:39

## 2023-01-03 RX ADMIN — LISINOPRIL 5 MG: 5 TABLET ORAL at 08:35

## 2023-01-03 RX ADMIN — HYDROMORPHONE HYDROCHLORIDE 1 MG: 1 INJECTION, SOLUTION INTRAMUSCULAR; INTRAVENOUS; SUBCUTANEOUS at 15:12

## 2023-01-03 RX ADMIN — HYDROMORPHONE HYDROCHLORIDE 1 MG: 1 INJECTION, SOLUTION INTRAMUSCULAR; INTRAVENOUS; SUBCUTANEOUS at 18:29

## 2023-01-03 RX ADMIN — PIPERACILLIN AND TAZOBACTAM 3375 MG: 3; .375 INJECTION, POWDER, LYOPHILIZED, FOR SOLUTION INTRAVENOUS at 08:34

## 2023-01-03 RX ADMIN — ATORVASTATIN CALCIUM 40 MG: 40 TABLET, FILM COATED ORAL at 20:39

## 2023-01-03 RX ADMIN — PIPERACILLIN AND TAZOBACTAM 3375 MG: 3; .375 INJECTION, POWDER, LYOPHILIZED, FOR SOLUTION INTRAVENOUS at 00:10

## 2023-01-03 RX ADMIN — BUPROPION HYDROCHLORIDE 100 MG: 100 TABLET, FILM COATED, EXTENDED RELEASE ORAL at 08:35

## 2023-01-03 RX ADMIN — POLYETHYLENE GLYCOL 3350 17 G: 17 POWDER, FOR SOLUTION ORAL at 11:35

## 2023-01-03 RX ADMIN — SODIUM CHLORIDE, PRESERVATIVE FREE 10 ML: 5 INJECTION INTRAVENOUS at 20:38

## 2023-01-03 RX ADMIN — METOPROLOL TARTRATE 25 MG: 25 TABLET, FILM COATED ORAL at 08:35

## 2023-01-03 RX ADMIN — METOPROLOL TARTRATE 25 MG: 25 TABLET, FILM COATED ORAL at 20:39

## 2023-01-03 RX ADMIN — HYDROMORPHONE HYDROCHLORIDE 1 MG: 1 INJECTION, SOLUTION INTRAMUSCULAR; INTRAVENOUS; SUBCUTANEOUS at 03:06

## 2023-01-03 RX ADMIN — OXYCODONE 10 MG: 5 TABLET ORAL at 10:26

## 2023-01-03 RX ADMIN — SODIUM CHLORIDE 40 MG: 9 INJECTION, SOLUTION INTRAMUSCULAR; INTRAVENOUS; SUBCUTANEOUS at 08:57

## 2023-01-03 RX ADMIN — HYDROMORPHONE HYDROCHLORIDE 1 MG: 1 INJECTION, SOLUTION INTRAMUSCULAR; INTRAVENOUS; SUBCUTANEOUS at 09:18

## 2023-01-03 RX ADMIN — SODIUM CHLORIDE, POTASSIUM CHLORIDE, SODIUM LACTATE AND CALCIUM CHLORIDE: 600; 310; 30; 20 INJECTION, SOLUTION INTRAVENOUS at 10:33

## 2023-01-03 RX ADMIN — OXYCODONE 10 MG: 5 TABLET ORAL at 16:46

## 2023-01-03 RX ADMIN — SODIUM CHLORIDE, POTASSIUM CHLORIDE, SODIUM LACTATE AND CALCIUM CHLORIDE: 600; 310; 30; 20 INJECTION, SOLUTION INTRAVENOUS at 03:50

## 2023-01-03 RX ADMIN — ENOXAPARIN SODIUM 40 MG: 100 INJECTION SUBCUTANEOUS at 08:34

## 2023-01-03 RX ADMIN — HYDROMORPHONE HYDROCHLORIDE 1 MG: 1 INJECTION, SOLUTION INTRAMUSCULAR; INTRAVENOUS; SUBCUTANEOUS at 00:08

## 2023-01-03 RX ADMIN — HYDROMORPHONE HYDROCHLORIDE 1 MG: 1 INJECTION, SOLUTION INTRAMUSCULAR; INTRAVENOUS; SUBCUTANEOUS at 12:07

## 2023-01-03 RX ADMIN — PIPERACILLIN AND TAZOBACTAM 3375 MG: 3; .375 INJECTION, POWDER, LYOPHILIZED, FOR SOLUTION INTRAVENOUS at 16:47

## 2023-01-03 RX ADMIN — TICAGRELOR 90 MG: 90 TABLET ORAL at 20:39

## 2023-01-03 RX ADMIN — BUPROPION HYDROCHLORIDE 100 MG: 100 TABLET, FILM COATED, EXTENDED RELEASE ORAL at 20:39

## 2023-01-03 RX ADMIN — SODIUM CHLORIDE, PRESERVATIVE FREE 5 ML: 5 INJECTION INTRAVENOUS at 08:57

## 2023-01-03 RX ADMIN — HYDROMORPHONE HYDROCHLORIDE 1 MG: 1 INJECTION, SOLUTION INTRAMUSCULAR; INTRAVENOUS; SUBCUTANEOUS at 06:05

## 2023-01-03 ASSESSMENT — PAIN DESCRIPTION - ORIENTATION
ORIENTATION: LEFT;ANTERIOR;POSTERIOR
ORIENTATION: ANTERIOR
ORIENTATION: ANTERIOR;LEFT
ORIENTATION: MID

## 2023-01-03 ASSESSMENT — PAIN DESCRIPTION - DESCRIPTORS
DESCRIPTORS: CRAMPING;DISCOMFORT
DESCRIPTORS: ACHING
DESCRIPTORS: CRAMPING;CRUSHING
DESCRIPTORS: CRAMPING;DISCOMFORT;GNAWING
DESCRIPTORS: ACHING
DESCRIPTORS: ACHING;DISCOMFORT
DESCRIPTORS: CRAMPING;DISCOMFORT;ACHING
DESCRIPTORS: ACHING;SHARP
DESCRIPTORS: CRUSHING;ACHING
DESCRIPTORS: CRAMPING;CRUSHING

## 2023-01-03 ASSESSMENT — PAIN SCALES - GENERAL
PAINLEVEL_OUTOF10: 7
PAINLEVEL_OUTOF10: 9
PAINLEVEL_OUTOF10: 1
PAINLEVEL_OUTOF10: 6
PAINLEVEL_OUTOF10: 9
PAINLEVEL_OUTOF10: 7
PAINLEVEL_OUTOF10: 2
PAINLEVEL_OUTOF10: 8
PAINLEVEL_OUTOF10: 8
PAINLEVEL_OUTOF10: 7
PAINLEVEL_OUTOF10: 9
PAINLEVEL_OUTOF10: 6
PAINLEVEL_OUTOF10: 4
PAINLEVEL_OUTOF10: 4
PAINLEVEL_OUTOF10: 5
PAINLEVEL_OUTOF10: 8
PAINLEVEL_OUTOF10: 9
PAINLEVEL_OUTOF10: 7
PAINLEVEL_OUTOF10: 5

## 2023-01-03 ASSESSMENT — PAIN DESCRIPTION - LOCATION
LOCATION: ABDOMEN
LOCATION: BACK;ABDOMEN
LOCATION: ABDOMEN
LOCATION: BACK
LOCATION: ABDOMEN
LOCATION: ABDOMEN

## 2023-01-03 ASSESSMENT — PAIN SCALES - WONG BAKER
WONGBAKER_NUMERICALRESPONSE: 0
WONGBAKER_NUMERICALRESPONSE: 2
WONGBAKER_NUMERICALRESPONSE: 0
WONGBAKER_NUMERICALRESPONSE: 2
WONGBAKER_NUMERICALRESPONSE: 0

## 2023-01-03 ASSESSMENT — PAIN - FUNCTIONAL ASSESSMENT
PAIN_FUNCTIONAL_ASSESSMENT: ACTIVITIES ARE NOT PREVENTED

## 2023-01-03 NOTE — PROGRESS NOTES
Resting in bed. IVF infusing. Denies any n/v this shift. Hourly rounds completed, all needs met this shift. Bed in L/L with call light in reach. Report to be given to dayshift nurse.

## 2023-01-03 NOTE — PROGRESS NOTES
[facility] Progress Note         Admit Date:  1/1/2023    Today's Date:  1/3/2023    CC:  Acute on chronic pancreatitis with pseudocyst    Subjective:     Patient endorses no significant changes, pain is tolerable. He c/o bloating and gas today. Tolerating liquids fair, no appetite. No bowel movement in 2 days.        Medications:   Current Facility-Administered Medications   Medication Dose Route Frequency    aspirin chewable tablet 81 mg  81 mg Oral Daily    atorvastatin (LIPITOR) tablet 40 mg  40 mg Oral Nightly    buPROPion (WELLBUTRIN SR) extended release tablet 100 mg  100 mg Oral BID    lisinopril (PRINIVIL;ZESTRIL) tablet 5 mg  5 mg Oral Daily    metoprolol tartrate (LOPRESSOR) tablet 25 mg  25 mg Oral BID    ticagrelor (BRILINTA) tablet 90 mg  90 mg Oral BID    piperacillin-tazobactam (ZOSYN) 3,375 mg in sodium chloride 0.9 % 50 mL IVPB (mini-bag)  3,375 mg IntraVENous q8h    pantoprazole (PROTONIX) 40 mg in sodium chloride (PF) 0.9 % 10 mL injection  40 mg IntraVENous Daily    lactated ringers infusion   IntraVENous Continuous    sodium chloride flush 0.9 % injection 5-40 mL  5-40 mL IntraVENous 2 times per day    sodium chloride flush 0.9 % injection 5-40 mL  5-40 mL IntraVENous PRN    0.9 % sodium chloride infusion   IntraVENous PRN    ondansetron (ZOFRAN-ODT) disintegrating tablet 4 mg  4 mg Oral Q8H PRN    Or    ondansetron (ZOFRAN) injection 4 mg  4 mg IntraVENous Q6H PRN    enoxaparin (LOVENOX) injection 40 mg  40 mg SubCUTAneous Daily    aluminum & magnesium hydroxide-simethicone (MAALOX) 200-200-20 MG/5ML suspension 30 mL  30 mL Oral Q6H PRN    hyoscyamine (LEVSIN/SL) sublingual tablet 125 mcg  125 mcg SubLINGual Q4H PRN    potassium chloride (KLOR-CON M) extended release tablet 40 mEq  40 mEq Oral PRN    Or    potassium bicarb-citric acid (EFFER-K) effervescent tablet 40 mEq  40 mEq Oral PRN    Or    potassium chloride 10 mEq/100 mL IVPB (Peripheral Line)  10 mEq IntraVENous PRN magnesium sulfate 2000 mg in 50 mL IVPB premix  2,000 mg IntraVENous PRN    HYDROmorphone (DILAUDID) injection 1 mg  1 mg IntraVENous Q3H PRN    HYDROmorphone (DILAUDID) injection 0.5 mg  0.5 mg IntraVENous Q6H PRN    oxyCODONE (ROXICODONE) immediate release tablet 10 mg  10 mg Oral Q4H PRN       Review of Systems:  ROS was obtained, with pertinent positives as listed above. No chest pain or SOB. Diet: clear liquids    Objective:   Vitals:  /76   Pulse 82   Temp 97.5 °F (36.4 °C) (Oral)   Resp 18   Ht 5' 9\" (1.753 m)   Wt 180 lb (81.6 kg)   SpO2 99%   BMI 26.58 kg/m²   Intake/Output:  No intake/output data recorded. No intake/output data recorded. Exam:  General appearance: alert, cooperative, no distress  Lungs: clear to auscultation bilaterally anteriorly  Heart: regular rate and rhythm  Abdomen: semi soft, tympanic.  Bowel sounds normal. No masses,   Extremities: extremities normal, atraumatic, no cyanosis or edema  Neuro:  alert and oriented    Data Review (Labs):    Recent Labs     01/01/23  1109 01/01/23  1855 01/02/23  0646 01/03/23  0541   WBC 16.2*  --  10.1 5.3   HGB 14.5  --  11.3* 11.0*   HCT 45.5  --  35.3* 34.6*   *  --  531* 442   MCV 80.4*  --  80.0* 79.7*     --  134 133   K 5.9* 4.4 4.1 3.8     --  104 104   CO2 22  --  26 23   BUN 14  --  10 7   CREATININE 0.84  --  1.00 0.80   CALCIUM 9.8  --  9.0 8.9   MG 2.1  --  2.1 2.1   GLUCOSE 92  --  91 78   ALKPHOS 66  --  49* 46*   AST 36  --  16 22   ALT 27  --  28 27   BILITOT 0.4  --  0.6 0.4   ALBUMIN 1.0  --  0.6 0.6   PROT 8.0  --  6.5 6.5   LIPASE 505*  --   --   --        Assessment:     Principal Problem:    Acute on chronic pancreatitis (HCC)  Active Problems:    Alcohol use disorder, severe, in early remission, dependence (HCC)    Pancreatic pseudocyst    History of ST elevation myocardial infarction (STEMI)    Coronary artery disease involving native coronary artery of native heart    Dependence on nicotine from cigarettes    GERD (gastroesophageal reflux disease)    Primary hypertension  Resolved Problems:    * No resolved hospital problems. *     41 yo male with PMH including but not limited to alcohol use disorder in remission, alcohol induced chronic pancreatitis complicated by pseudocyst status post gastrostomy with stent placed 9 Nov 2022 then removed on 30 No 2022 via EUS, CAD s/p STEMI and PCI with TULIO 2 Nov 2022 - on ASA and Brilinta, GERD, TO, HTN, who is seen in consultation 2 Jan 2023 at the request of Dr. Azucena Levine for acute on chronic pancreatitis with pseudocyst.  We have been following with him over several recent admissions for acute on chronic pancreatitis complicated by pseudocyst s/p EUS with cyst gastrostomy with AXIOS stent placed 9 Nov 2022 and then removed on 30 No 2022 after drainage of cyst.  He returned for worsening abd pain, nausea, and vomiting, with labs in the ER noting normal LFTs and lipase 505, WBC 16.2, plts 808, potassium 5.9. CT scan abd/pelvis noted worsening inflammatory changes about the pancreas suggesting worsening pancreatitis, fluid collections particularly in the lesser sac of the abdomen, stable necrotic appearing changes are seen in the pancreatic tail, increasing mesenteric varices. Symptoms associated with acute exacerbation of chronic pancreatitis. Decrease in hgb likely dilutional - initially dehydration and receiving IVF hydration. Plan:     - Supportive care, aggressive IVF. - Continue to avoid ETOH.  - Can add pancreatic enzymes, per previous note, unclear how much benefit he will receive. - encouraged moving around for gas, simethicone ordered  - Follow. As mentioned in previous notes, may need consideration of referral to tertiary center for management of his chronic pancreatitis vs possible Puestow procedure given his chronically dilated PD.   Will need to discuss this with primary GI MD in hospital follow up (Dr Sindi Alejandra)    Patient is seen and examined in collaboration with Dr. Kathleen Peng. Assessment and plan as per Dr. Kathleen Peng.

## 2023-01-03 NOTE — PROGRESS NOTES
Hospitalist Progress Note   Admit Date:  2023  5:36 PM   Name:  Gerhardt Sawyer   Age:  44 y.o. Sex:  male  :  1985   MRN:  [id number]   Room:  [facility]/    Presenting Complaint: abdominal pain  Reason(s) for Admission: Acute on chronic pancreatitis ([facility] [address] [K85.90, K86.1]     Hospital Course & Interval History:   44 y.o. male with medical history of  alcohol use disorder in remission, alcohol induced chronic pancreatitis complicated by pseudocyst status post gastrostomy with stent placed 2022 then removed on 22 via EUS and STEMI status post PCI with TULIO 2022 on aspirin and Brilinta  who presented from [facility] ED on 23 as a direct admit with cc adominal pain associated with n/v that started on 22. Seen  in ED with acute pancreatitis and improved with IV fluids and pain meds and discharged home with short course of norco.      /92 RR 21  non hypoxic, afebrile   WBC 16.2 CBC hemoconcentrated. Plt 808 K 5.9 AG 17 lipase 505      CT a/p IV contrast  1. Worsening inflammatory changes about the pancreas suggesting worsening   pancreatitis. This includes evolving mesenteric edema and fluid collections   particularly in the lesser sac of the abdomen. Stable necrotic appearing changes   are seen in the pancreatic tail. Likely reactive regional mesenteric lymph nodes   are seen. 2. Increasing mesenteric varices. The splenic and portal veins remain patent   although the distal splenic vein appears attenuated likely from evolving   inflammatory changes. Subjective/24hr Events (23): Patient seen at bedside, resting in bed  Is alert and awake, AOx3, on room air. Patient continues to report of persistent abdominal pain, slightly better than yesterday. Nausea is well controlled  No vomiting, fever, chills, chest pain, shortness of breath. ROS:  10 point review of system is negative except for what mentioned above.       Assessment & Plan: Principal Problem:    Acute on chronic pancreatitis // pancreatic pseudocyt   -   1/3-  Continue conservative treatment for chronic pancreatitis as recommended by GI. Patient will need a referral to tertiary care center for possible surgical treatment of chronic pancreatitis --> Puestow procedure  Antiemetics as needed. IV Protonix 40 mg daily  Empiric IV Zosyn to prevent any intra-abdominal infection. GI on board, will defer to them for any further surgical intervention. Lipase 505     Hyperkalemia -   1/3-  Potassium 3.8     CAD s/p TULIO - DAPT. Statin. BB. ACEi. Stable no angina      Cigarette nicotine dependence - improving on nicotine replacement outpatient and bupropion. AUD - in remission. GERD - IV PPI for now       Discharge Planning:    Pending consult course    Diet:  ADULT DIET; Clear Liquid  DVT PPx: Lovenox  Code status: Full Code    I spent 36 minutes of time caring for this patient on the unit nearby or at bedside, and more than 50 percent was spent on coordination of care activities, and/or patient/family counseling regarding status and plan of care.      Hospital Problems             Last Modified POA    * (Principal) Acute on chronic pancreatitis ([address] 1/1/2023 Yes    Alcohol use disorder, severe, in early remission, dependence ([address] (Chronic) 1/1/2023 Yes    Pancreatic pseudocyst 1/1/2023 Yes    History of ST elevation myocardial infarction (STEMI) 1/1/2023 Yes    Coronary artery disease involving native coronary artery of native heart (Chronic) 1/1/2023 Yes    Dependence on nicotine from cigarettes (Chronic) 1/1/2023 Yes    GERD (gastroesophageal reflux disease) 1/1/2023 Yes    Primary hypertension 1/1/2023 Yes     Objective:   Patient Vitals for the past 24 hrs:   Temp Pulse Resp BP SpO2   01/03/23 0747 97.7 °F (36.5 °C) 82 18 136/76 99 %   01/03/23 0635 -- -- 18 -- --   01/03/23 0605 -- -- 18 -- --   01/03/23 0438 -- 78 -- 110/70 --   01/03/23 0434 97.7 °F (36.5 °C) 78 17 (!) 116/103 98 %   01/03/23 0336 -- -- 17 -- --   01/03/23 0306 -- -- 20 -- --   01/03/23 0038 -- -- 18 -- --   01/03/23 0019 97.7 °F (36.5 °C) 86 18 101/72 96 %   01/03/23 0008 -- -- 20 -- --   01/02/23 2307 -- -- 18 -- --   01/02/23 2237 -- -- 18 -- --   01/02/23 2139 -- -- 18 -- --   01/02/23 2109 -- -- 18 -- --   01/02/23 2025 97.8 °F (36.6 °C) 91 20 124/76 100 %   01/02/23 1751 -- -- 18 -- --   01/02/23 1539 97.7 °F (36.5 °C) 88 18 133/84 100 %   01/02/23 1441 -- -- 18 -- --   01/02/23 1411 -- -- 16 -- --   01/02/23 1217 97.5 °F (36.4 °C) 90 18 139/82 100 %   01/02/23 1119 -- -- 18 -- --   01/02/23 1048 -- -- 18 -- --         Estimated body mass index is 26.58 kg/m² as calculated from the following:    Height as of this encounter: 5' 9\" (1.753 m). Weight as of this encounter: 180 lb (81.6 kg). No intake or output data in the 24 hours ending 01/03/23 0827      Physical Exam:     Blood pressure 136/76, pulse 82, temperature 97.7 °F (36.5 °C), temperature source Oral, resp. rate 18, height 5' 9\" (1.753 m), weight 180 lb (81.6 kg), SpO2 99 %. General:    Alert, awake, NAD, on room air. Head:  Normocephalic, atraumatic  Eyes:  Sclerae appear normal. Pupils equally round. ENT:  Nares appear normal, no drainage. Moist oral mucosa  Neck:  No restricted ROM. Trachea midline. CV:   RRR. No m/r/g. No jugular venous distension. Lungs:   CTAB. No wheezing, rhonchi, or rales. Respirations even, unlabored. Abdomen:   Soft, nondistended, tender over epigastric region, no guarding, no rigidity, bowel sounds are normoactive. Extremities: No cyanosis or clubbing. No edema. Skin:     No rashes and normal coloration. Warm and dry. Neuro:  CN II-XII grossly intact. Sensation intact. A&Ox3  Psych:  Normal mood and affect.       I have reviewed ordered lab tests and independently visualized imaging below:    Recent Labs:  Recent Results (from the past 48 hour(s))   CBC with Auto Differential    Collection Time: 01/01/23 11:09 AM   Result Value Ref Range    WBC 16.2 (H) 4.3 - 11.1 K/uL    RBC 5.66 (H) 4.23 - 5.60 M/uL    Hemoglobin 14.5 13.6 - 17.2 g/dL    Hematocrit 45.5 41.1 - 50.3 %    MCV 80.4 (L) 82.0 - 102.0 FL    MCH 25.6 (L) 26.1 - 32.9 PG    MCHC 31.9 31.4 - 35.0 g/dL    RDW 18.9 (H) 11.9 - 14.6 %    Platelets 299 (H) 623 - 450 K/uL    MPV 8.5 (L) 9.4 - 12.3 FL    nRBC 0.00 0.0 - 0.2 K/uL    Differential Type AUTOMATED      Seg Neutrophils 85 (H) 43 - 78 %    Lymphocytes 9 (L) 13 - 44 %    Monocytes 5 4.0 - 12.0 %    Eosinophils % 1 0.5 - 7.8 %    Basophils 1 0.0 - 2.0 %    Immature Granulocytes 1 0.0 - 5.0 %    Segs Absolute 13.7 (H) 1.7 - 8.2 K/UL    Absolute Lymph # 1.4 0.5 - 4.6 K/UL    Absolute Mono # 0.8 0.1 - 1.3 K/UL    Absolute Eos # 0.1 0.0 - 0.8 K/UL    Basophils Absolute 0.1 0.0 - 0.2 K/UL    Absolute Immature Granulocyte 0.1 0.0 - 0.5 K/UL   CMP    Collection Time: 01/01/23 11:09 AM   Result Value Ref Range    Sodium 141 133 - 143 mmol/L    Potassium 5.9 (H) 3.5 - 5.1 mmol/L    Chloride 102 98 - 107 mmol/L    CO2 22 21 - 32 mmol/L    Anion Gap 17 (H) 2 - 11 mmol/L    Glucose 92 65 - 100 mg/dL    BUN 14 7.0 - 18.0 MG/DL    Creatinine 0.84 0.8 - 1.5 MG/DL    Est, Glom Filt Rate >60 >60 ml/min/1.73m2    Calcium 9.8 8.3 - 10.4 MG/DL    Total Bilirubin 0.4 0.2 - 1.1 MG/DL    ALT 27 13.0 - 61.0 U/L    AST 36 15 - 37 U/L    Alk Phosphatase 66 45.0 - 117.0 U/L    Total Protein 8.0 6.4 - 8.2 g/dL    Albumin 3.9 3.5 - 5.0 g/dL    Globulin 4.1 2.8 - 4.5 g/dL    Albumin/Globulin Ratio 1.0 0.4 - 1.6     Lipase    Collection Time: 01/01/23 11:09 AM   Result Value Ref Range    Lipase 505 (H) 13 - 60 U/L   Magnesium    Collection Time: 01/01/23 11:09 AM   Result Value Ref Range    Magnesium 2.1 1.2 - 2.6 mg/dL   Culture, Blood 1    Collection Time: 01/01/23  6:55 PM    Specimen: Blood   Result Value Ref Range    Special Requests RIGHT  Antecubital        Culture NO GROWTH AFTER 15 HOURS     Lactic Acid    Collection Time: 01/01/23  6:55 PM   Result Value Ref Range    Lactic Acid, Plasma 1.4 0.4 - 2.0 MMOL/L   Potassium    Collection Time: 01/01/23  6:55 PM   Result Value Ref Range    Potassium 4.4 3.5 - 5.1 mmol/L   Culture, Blood 1    Collection Time: 01/01/23  7:49 PM    Specimen: Blood   Result Value Ref Range    Special Requests RIGHT  HAND        Culture NO GROWTH AFTER 13 HOURS     CBC with Auto Differential    Collection Time: 01/02/23  6:46 AM   Result Value Ref Range    WBC 10.1 4.3 - 11.1 K/uL    RBC 4.41 4.23 - 5.6 M/uL    Hemoglobin 11.3 (L) 13.6 - 17.2 g/dL    Hematocrit 35.3 (L) 41.1 - 50.3 %    MCV 80.0 (L) 82 - 102 FL    MCH 25.6 (L) 26.1 - 32.9 PG    MCHC 32.0 31.4 - 35.0 g/dL    RDW 17.5 (H) 11.9 - 14.6 %    Platelets 940 (H) 679 - 450 K/uL    MPV 8.8 (L) 9.4 - 12.3 FL    nRBC 0.00 0.0 - 0.2 K/uL    Differential Type AUTOMATED      Seg Neutrophils 77 43 - 78 %    Lymphocytes 11 (L) 13 - 44 %    Monocytes 9 4.0 - 12.0 %    Eosinophils % 1 0.5 - 7.8 %    Basophils 1 0.0 - 2.0 %    Immature Granulocytes 1 0.0 - 5.0 %    Segs Absolute 7.9 1.7 - 8.2 K/UL    Absolute Lymph # 1.1 0.5 - 4.6 K/UL    Absolute Mono # 0.9 0.1 - 1.3 K/UL    Absolute Eos # 0.1 0.0 - 0.8 K/UL    Basophils Absolute 0.1 0.0 - 0.2 K/UL    Absolute Immature Granulocyte 0.1 0.0 - 0.5 K/UL   Magnesium    Collection Time: 01/02/23  6:46 AM   Result Value Ref Range    Magnesium 2.1 1.8 - 2.4 mg/dL   Comprehensive Metabolic Panel    Collection Time: 01/02/23  6:46 AM   Result Value Ref Range    Sodium 134 133 - 143 mmol/L    Potassium 4.1 3.5 - 5.1 mmol/L    Chloride 104 101 - 110 mmol/L    CO2 26 21 - 32 mmol/L    Anion Gap 4 2 - 11 mmol/L    Glucose 91 65 - 100 mg/dL    BUN 10 6 - 23 MG/DL    Creatinine 1.00 0.8 - 1.5 MG/DL    Est, Glom Filt Rate >60 >60 ml/min/1.73m2    Calcium 9.0 8.3 - 10.4 MG/DL    Total Bilirubin 0.6 0.2 - 1.1 MG/DL    ALT 28 12 - 65 U/L    AST 16 15 - 37 U/L    Alk Phosphatase 49 (L) 50 - 136 U/L    Total Protein 6.5 6.3 - 8.2 g/dL Albumin 2.5 (L) 3.5 - 5.0 g/dL    Globulin 4.0 2.8 - 4.5 g/dL    Albumin/Globulin Ratio 0.6 0.4 - 1.6     CBC with Auto Differential    Collection Time: 01/03/23  5:41 AM   Result Value Ref Range    WBC 5.3 4.3 - 11.1 K/uL    RBC 4.34 4.23 - 5.6 M/uL    Hemoglobin 11.0 (L) 13.6 - 17.2 g/dL    Hematocrit 34.6 (L) 41.1 - 50.3 %    MCV 79.7 (L) 82 - 102 FL    MCH 25.3 (L) 26.1 - 32.9 PG    MCHC 31.8 31.4 - 35.0 g/dL    RDW 17.3 (H) 11.9 - 14.6 %    Platelets 287 921 - 266 K/uL    MPV 8.9 (L) 9.4 - 12.3 FL    nRBC 0.00 0.0 - 0.2 K/uL    Differential Type AUTOMATED      Seg Neutrophils 63 43 - 78 %    Lymphocytes 23 13 - 44 %    Monocytes 12 4.0 - 12.0 %    Eosinophils % 1 0.5 - 7.8 %    Basophils 1 0.0 - 2.0 %    Immature Granulocytes 0 0.0 - 5.0 %    Segs Absolute 3.4 1.7 - 8.2 K/UL    Absolute Lymph # 1.2 0.5 - 4.6 K/UL    Absolute Mono # 0.6 0.1 - 1.3 K/UL    Absolute Eos # 0.0 0.0 - 0.8 K/UL    Basophils Absolute 0.0 0.0 - 0.2 K/UL    Absolute Immature Granulocyte 0.0 0.0 - 0.5 K/UL   Magnesium    Collection Time: 01/03/23  5:41 AM   Result Value Ref Range    Magnesium 2.1 1.8 - 2.4 mg/dL   Comprehensive Metabolic Panel    Collection Time: 01/03/23  5:41 AM   Result Value Ref Range    Sodium 133 133 - 143 mmol/L    Potassium 3.8 3.5 - 5.1 mmol/L    Chloride 104 101 - 110 mmol/L    CO2 23 21 - 32 mmol/L    Anion Gap 6 2 - 11 mmol/L    Glucose 78 65 - 100 mg/dL    BUN 7 6 - 23 MG/DL    Creatinine 0.80 0.8 - 1.5 MG/DL    Est, Glom Filt Rate >60 >60 ml/min/1.73m2    Calcium 8.9 8.3 - 10.4 MG/DL    Total Bilirubin 0.4 0.2 - 1.1 MG/DL    ALT 27 12 - 65 U/L    AST 22 15 - 37 U/L    Alk Phosphatase 46 (L) 50 - 136 U/L    Total Protein 6.5 6.3 - 8.2 g/dL    Albumin 2.4 (L) 3.5 - 5.0 g/dL    Globulin 4.1 2.8 - 4.5 g/dL    Albumin/Globulin Ratio 0.6 0.4 - 1.6           Other Studies:  CT ABDOMEN PELVIS W IV CONTRAST Additional Contrast? None    Result Date: 1/1/2023  CT ABDOMEN AND PELVIS WITH INTRAVENOUS CONTRAST DATED 1/1/2023 1:47 PM. History: Worsening upper abdominal pain. Pancreatitis. History of pancreatic pseudocyst. Comparison: CT abdomen and pelvis with contrast 12/23/2020 Technique:   Multiple contiguous helical CT images reconstructed at 5 mm intervals were obtained from above the diaphragms through the ischial tuberosities following oral and 100 cc Isovue-[id number] without acute complication. All CT scans performed at this facility use one or all of the following: Automated exposure control, adjustment of the mA and/or kVp according to patient's size, iterative reconstruction. Findings: CT ABDOMEN:  Limited evaluation of the lung bases and base of the mediastinum demonstrates mild dependent atelectasis in the left lung base. The Liver demonstrates a likely benign calcified granuloma seen in the right lobe on image 22. An additional 5 mm low-attenuation right lobe hepatic lesion is seen on image 22 which is too small to characterize although grossly does not enhance statistically most likely representing a tiny hepatic cyst.  The spleen is homogeneous in attenuation. No contour deforming or enhancing mass lesions are seen of the adrenal glands. The gallbladder has been removed. The kidneys enhance symmetrically and no evidence of hydronephrosis is seen. Worsening inflammatory changes are seen of the pancreas. In addition worsening local changes, there is evolving soft tissue edema and fluid now tracking along the right anterior pararenal fascial plane into the right paracolic gutter. The prior small collection anterior to the pancreatic body is slightly increased in size now measuring 2 cm x 1.3 cm. Multiple evolving collections are suggested within the lesser sac of the abdomen the larger of which is seen on axial image 32 measuring 6 cm x 2.9 cm in size. The appearance of these suggest acute inflammatory collections from acute pancreatitis. Additional scattered although grossly nonloculated fluid is seen.  Changes are once again seen in the pancreatic tail suggesting necrotic changes at this level. Increasing varices are seen in the gastrosplenic ligament. The splenic vein currently does remain patent although does appear attenuated in its more distal course. Multiple likely reactive regional mesenteric lymph nodes are seen. The visualized loops of small bowel and colon are normal in caliber. No free air is seen. The abdominal aorta is unremarkable in appearance. CT PELVIS: No abnormal pelvic fluid collections or inflammatory changes are present. No pelvic adenopathy is seen. The urinary bladder is unremarkable. 1.  Worsening inflammatory changes about the pancreas suggesting worsening pancreatitis. This includes evolving mesenteric edema and fluid collections particularly in the lesser sac of the abdomen. Stable necrotic appearing changes are seen in the pancreatic tail. Likely reactive regional mesenteric lymph nodes are seen. 2. Increasing mesenteric varices. The splenic and portal veins remain patent although the distal splenic vein appears attenuated likely from evolving inflammatory changes. This report was made using voice transcription. Despite my best efforts to avoid any, transcription errors may persist. If there is any question about the accuracy of the report or need for clarification, then please call [phone], or text me through [email or web address] for clarification or correction.        Current Meds:  Current Facility-Administered Medications   Medication Dose Route Frequency    aspirin chewable tablet 81 mg  81 mg Oral Daily    atorvastatin (LIPITOR) tablet 40 mg  40 mg Oral Nightly    buPROPion [facility]) extended release tablet 100 mg  100 mg Oral BID    lisinopril (PRINIVIL;ZESTRIL) tablet 5 mg  5 mg Oral Daily    metoprolol tartrate (LOPRESSOR) tablet 25 mg  25 mg Oral BID    ticagrelor (BRILINTA) tablet 90 mg  90 mg Oral BID    piperacillin-tazobactam (ZOSYN) 3,375 mg in sodium chloride 0.9 % 50 mL IVPB (mini-bag)  3,375 mg IntraVENous q8h    pantoprazole (PROTONIX) 40 mg in sodium chloride (PF) 0.9 % 10 mL injection  40 mg IntraVENous Daily    lactated ringers infusion   IntraVENous Continuous    sodium chloride flush 0.9 % injection 5-40 mL  5-40 mL IntraVENous 2 times per day    sodium chloride flush 0.9 % injection 5-40 mL  5-40 mL IntraVENous PRN    0.9 % sodium chloride infusion   IntraVENous PRN    ondansetron (ZOFRAN-ODT) disintegrating tablet 4 mg  4 mg Oral Q8H PRN    Or    ondansetron (ZOFRAN) injection 4 mg  4 mg IntraVENous Q6H PRN    enoxaparin (LOVENOX) injection 40 mg  40 mg SubCUTAneous Daily    aluminum & magnesium hydroxide-simethicone (MAALOX) 200-200-20 MG/5ML suspension 30 mL  30 mL Oral Q6H PRN    hyoscyamine (LEVSIN/SL) sublingual tablet 125 mcg  125 mcg SubLINGual Q4H PRN    potassium chloride (KLOR-CON M) extended release tablet 40 mEq  40 mEq Oral PRN    Or    potassium bicarb-citric acid (EFFER-K) effervescent tablet 40 mEq  40 mEq Oral PRN    Or    potassium chloride 10 mEq/100 mL IVPB (Peripheral Line)  10 mEq IntraVENous PRN    magnesium sulfate 2000 mg in 50 mL IVPB premix  2,000 mg IntraVENous PRN    HYDROmorphone (DILAUDID) injection 1 mg  1 mg IntraVENous Q3H PRN    HYDROmorphone (DILAUDID) injection 0.5 mg  0.5 mg IntraVENous Q6H PRN    oxyCODONE (ROXICODONE) immediate release tablet 10 mg  10 mg Oral Q4H PRN       Signed:  Cindy Rivera MD    Part of this note may have been written by using a voice dictation software. The note has been proof read but may still contain some grammatical/other typographical errors.

## 2023-01-04 LAB
ALBUMIN SERPL-MCNC: 2.2 G/DL (ref 3.5–5)
ALBUMIN/GLOB SERPL: 0.5 {RATIO} (ref 0.4–1.6)
ALP SERPL-CCNC: 46 U/L (ref 50–136)
ALT SERPL-CCNC: 32 U/L (ref 12–65)
ANION GAP SERPL CALC-SCNC: 7 MMOL/L (ref 2–11)
AST SERPL-CCNC: 25 U/L (ref 15–37)
BASOPHILS # BLD: 0 K/UL (ref 0–0.2)
BASOPHILS NFR BLD: 1 % (ref 0–2)
BILIRUB SERPL-MCNC: 0.3 MG/DL (ref 0.2–1.1)
BUN SERPL-MCNC: 6 MG/DL (ref 6–23)
CALCIUM SERPL-MCNC: 9.1 MG/DL (ref 8.3–10.4)
CHLORIDE SERPL-SCNC: 102 MMOL/L (ref 101–110)
CO2 SERPL-SCNC: 24 MMOL/L (ref 21–32)
CREAT SERPL-MCNC: 1 MG/DL (ref 0.8–1.5)
DIFFERENTIAL METHOD BLD: ABNORMAL
EOSINOPHIL # BLD: 0.1 K/UL (ref 0–0.8)
EOSINOPHIL NFR BLD: 1 % (ref 0.5–7.8)
ERYTHROCYTE [DISTWIDTH] IN BLOOD BY AUTOMATED COUNT: 17 % (ref 11.9–14.6)
GLOBULIN SER CALC-MCNC: 4.5 G/DL (ref 2.8–4.5)
GLUCOSE SERPL-MCNC: 78 MG/DL (ref 65–100)
HCT VFR BLD AUTO: 33.7 % (ref 41.1–50.3)
HGB BLD-MCNC: 10.6 G/DL (ref 13.6–17.2)
IMM GRANULOCYTES # BLD AUTO: 0 K/UL (ref 0–0.5)
IMM GRANULOCYTES NFR BLD AUTO: 0 % (ref 0–5)
LIPASE SERPL-CCNC: 2614 U/L (ref 73–393)
LYMPHOCYTES # BLD: 1.2 K/UL (ref 0.5–4.6)
LYMPHOCYTES NFR BLD: 22 % (ref 13–44)
MAGNESIUM SERPL-MCNC: 2.1 MG/DL (ref 1.8–2.4)
MCH RBC QN AUTO: 25.4 PG (ref 26.1–32.9)
MCHC RBC AUTO-ENTMCNC: 31.5 G/DL (ref 31.4–35)
MCV RBC AUTO: 80.8 FL (ref 82–102)
MONOCYTES # BLD: 0.9 K/UL (ref 0.1–1.3)
MONOCYTES NFR BLD: 16 % (ref 4–12)
NEUTS SEG # BLD: 3.2 K/UL (ref 1.7–8.2)
NEUTS SEG NFR BLD: 60 % (ref 43–78)
NRBC # BLD: 0 K/UL (ref 0–0.2)
PLATELET # BLD AUTO: 465 K/UL (ref 150–450)
PMV BLD AUTO: 9.1 FL (ref 9.4–12.3)
POTASSIUM SERPL-SCNC: 4 MMOL/L (ref 3.5–5.1)
PROT SERPL-MCNC: 6.7 G/DL (ref 6.3–8.2)
RBC # BLD AUTO: 4.17 M/UL (ref 4.23–5.6)
SODIUM SERPL-SCNC: 133 MMOL/L (ref 133–143)
WBC # BLD AUTO: 5.3 K/UL (ref 4.3–11.1)

## 2023-01-04 PROCEDURE — 1100000000 HC RM PRIVATE

## 2023-01-04 PROCEDURE — 80053 COMPREHEN METABOLIC PANEL: CPT

## 2023-01-04 PROCEDURE — 6360000002 HC RX W HCPCS: Performed by: FAMILY MEDICINE

## 2023-01-04 PROCEDURE — 85025 COMPLETE CBC W/AUTO DIFF WBC: CPT

## 2023-01-04 PROCEDURE — 83735 ASSAY OF MAGNESIUM: CPT

## 2023-01-04 PROCEDURE — A4216 STERILE WATER/SALINE, 10 ML: HCPCS | Performed by: FAMILY MEDICINE

## 2023-01-04 PROCEDURE — 6370000000 HC RX 637 (ALT 250 FOR IP): Performed by: HOSPITALIST

## 2023-01-04 PROCEDURE — 2580000003 HC RX 258: Performed by: FAMILY MEDICINE

## 2023-01-04 PROCEDURE — C9113 INJ PANTOPRAZOLE SODIUM, VIA: HCPCS | Performed by: FAMILY MEDICINE

## 2023-01-04 PROCEDURE — 83690 ASSAY OF LIPASE: CPT

## 2023-01-04 PROCEDURE — 6370000000 HC RX 637 (ALT 250 FOR IP): Performed by: FAMILY MEDICINE

## 2023-01-04 PROCEDURE — 36415 COLL VENOUS BLD VENIPUNCTURE: CPT

## 2023-01-04 PROCEDURE — 6370000000 HC RX 637 (ALT 250 FOR IP): Performed by: NURSE PRACTITIONER

## 2023-01-04 PROCEDURE — 2580000003 HC RX 258: Performed by: HOSPITALIST

## 2023-01-04 RX ADMIN — ASPIRIN 81 MG: 81 TABLET, CHEWABLE ORAL at 09:07

## 2023-01-04 RX ADMIN — TICAGRELOR 90 MG: 90 TABLET ORAL at 09:04

## 2023-01-04 RX ADMIN — BUPROPION HYDROCHLORIDE 100 MG: 100 TABLET, FILM COATED, EXTENDED RELEASE ORAL at 20:49

## 2023-01-04 RX ADMIN — ONDANSETRON 4 MG: 2 INJECTION INTRAMUSCULAR; INTRAVENOUS at 06:28

## 2023-01-04 RX ADMIN — SODIUM CHLORIDE, POTASSIUM CHLORIDE, SODIUM LACTATE AND CALCIUM CHLORIDE: 600; 310; 30; 20 INJECTION, SOLUTION INTRAVENOUS at 00:27

## 2023-01-04 RX ADMIN — ATORVASTATIN CALCIUM 40 MG: 40 TABLET, FILM COATED ORAL at 20:49

## 2023-01-04 RX ADMIN — HYOSCYAMINE SULFATE 125 MCG: 0.12 TABLET ORAL; SUBLINGUAL at 04:44

## 2023-01-04 RX ADMIN — HYDROMORPHONE HYDROCHLORIDE 1 MG: 1 INJECTION, SOLUTION INTRAMUSCULAR; INTRAVENOUS; SUBCUTANEOUS at 03:24

## 2023-01-04 RX ADMIN — ENOXAPARIN SODIUM 40 MG: 100 INJECTION SUBCUTANEOUS at 09:04

## 2023-01-04 RX ADMIN — TICAGRELOR 90 MG: 90 TABLET ORAL at 20:49

## 2023-01-04 RX ADMIN — HYDROMORPHONE HYDROCHLORIDE 1 MG: 1 INJECTION, SOLUTION INTRAMUSCULAR; INTRAVENOUS; SUBCUTANEOUS at 15:32

## 2023-01-04 RX ADMIN — PANCRELIPASE LIPASE, PANCRELIPASE PROTEASE, PANCRELIPASE AMYLASE 5000 UNITS: 5000; 17000; 24000 CAPSULE, DELAYED RELEASE ORAL at 16:56

## 2023-01-04 RX ADMIN — LISINOPRIL 5 MG: 5 TABLET ORAL at 09:04

## 2023-01-04 RX ADMIN — SODIUM CHLORIDE, POTASSIUM CHLORIDE, SODIUM LACTATE AND CALCIUM CHLORIDE: 600; 310; 30; 20 INJECTION, SOLUTION INTRAVENOUS at 09:04

## 2023-01-04 RX ADMIN — OXYCODONE 10 MG: 5 TABLET ORAL at 10:44

## 2023-01-04 RX ADMIN — HYDROMORPHONE HYDROCHLORIDE 1 MG: 1 INJECTION, SOLUTION INTRAMUSCULAR; INTRAVENOUS; SUBCUTANEOUS at 18:29

## 2023-01-04 RX ADMIN — HYDROMORPHONE HYDROCHLORIDE 1 MG: 1 INJECTION, SOLUTION INTRAMUSCULAR; INTRAVENOUS; SUBCUTANEOUS at 12:41

## 2023-01-04 RX ADMIN — BUPROPION HYDROCHLORIDE 100 MG: 100 TABLET, FILM COATED, EXTENDED RELEASE ORAL at 09:07

## 2023-01-04 RX ADMIN — POLYETHYLENE GLYCOL 3350 17 G: 17 POWDER, FOR SOLUTION ORAL at 09:09

## 2023-01-04 RX ADMIN — ALUMINUM HYDROXIDE, MAGNESIUM HYDROXIDE, DIMETHICONE 30 ML: 200; 200; 20 LIQUID ORAL at 00:27

## 2023-01-04 RX ADMIN — HYDROMORPHONE HYDROCHLORIDE 0.5 MG: 1 INJECTION, SOLUTION INTRAMUSCULAR; INTRAVENOUS; SUBCUTANEOUS at 19:16

## 2023-01-04 RX ADMIN — OXYCODONE 10 MG: 5 TABLET ORAL at 16:38

## 2023-01-04 RX ADMIN — SODIUM CHLORIDE 40 MG: 9 INJECTION, SOLUTION INTRAMUSCULAR; INTRAVENOUS; SUBCUTANEOUS at 09:08

## 2023-01-04 RX ADMIN — METOPROLOL TARTRATE 25 MG: 25 TABLET, FILM COATED ORAL at 20:49

## 2023-01-04 RX ADMIN — OXYCODONE 10 MG: 5 TABLET ORAL at 23:10

## 2023-01-04 RX ADMIN — HYDROMORPHONE HYDROCHLORIDE 1 MG: 1 INJECTION, SOLUTION INTRAMUSCULAR; INTRAVENOUS; SUBCUTANEOUS at 21:29

## 2023-01-04 RX ADMIN — HYDROMORPHONE HYDROCHLORIDE 1 MG: 1 INJECTION, SOLUTION INTRAMUSCULAR; INTRAVENOUS; SUBCUTANEOUS at 00:29

## 2023-01-04 RX ADMIN — HYDROMORPHONE HYDROCHLORIDE 1 MG: 1 INJECTION, SOLUTION INTRAMUSCULAR; INTRAVENOUS; SUBCUTANEOUS at 09:20

## 2023-01-04 RX ADMIN — HYDROMORPHONE HYDROCHLORIDE 1 MG: 1 INJECTION, SOLUTION INTRAMUSCULAR; INTRAVENOUS; SUBCUTANEOUS at 06:28

## 2023-01-04 RX ADMIN — SODIUM CHLORIDE, PRESERVATIVE FREE 10 ML: 5 INJECTION INTRAVENOUS at 09:39

## 2023-01-04 RX ADMIN — METOPROLOL TARTRATE 25 MG: 25 TABLET, FILM COATED ORAL at 09:07

## 2023-01-04 RX ADMIN — PIPERACILLIN AND TAZOBACTAM 3375 MG: 3; .375 INJECTION, POWDER, LYOPHILIZED, FOR SOLUTION INTRAVENOUS at 00:30

## 2023-01-04 RX ADMIN — SODIUM CHLORIDE, PRESERVATIVE FREE 10 ML: 5 INJECTION INTRAVENOUS at 20:50

## 2023-01-04 RX ADMIN — OXYCODONE 10 MG: 5 TABLET ORAL at 04:43

## 2023-01-04 RX ADMIN — PIPERACILLIN AND TAZOBACTAM 3375 MG: 3; .375 INJECTION, POWDER, LYOPHILIZED, FOR SOLUTION INTRAVENOUS at 09:10

## 2023-01-04 ASSESSMENT — PAIN DESCRIPTION - DESCRIPTORS
DESCRIPTORS: CRAMPING
DESCRIPTORS: CRAMPING;NAGGING;DISCOMFORT
DESCRIPTORS: ACHING;DISCOMFORT
DESCRIPTORS: CRAMPING;JABBING
DESCRIPTORS: GNAWING;CRAMPING
DESCRIPTORS: CRAMPING
DESCRIPTORS: ACHING;SHARP
DESCRIPTORS: GNAWING;CRAMPING
DESCRIPTORS: ACHING;DISCOMFORT
DESCRIPTORS: GNAWING;CRUSHING
DESCRIPTORS: ACHING;CRAMPING
DESCRIPTORS: ACHING;GNAWING;JABBING

## 2023-01-04 ASSESSMENT — PAIN DESCRIPTION - ORIENTATION
ORIENTATION: ANTERIOR;LEFT
ORIENTATION: RIGHT;LEFT
ORIENTATION: ANTERIOR;LEFT
ORIENTATION: ANTERIOR
ORIENTATION: LEFT;ANTERIOR;POSTERIOR
ORIENTATION: LEFT;ANTERIOR;POSTERIOR
ORIENTATION: ANTERIOR;LEFT;POSTERIOR
ORIENTATION: MID
ORIENTATION: LEFT

## 2023-01-04 ASSESSMENT — PAIN SCALES - GENERAL
PAINLEVEL_OUTOF10: 3
PAINLEVEL_OUTOF10: 3
PAINLEVEL_OUTOF10: 9
PAINLEVEL_OUTOF10: 7
PAINLEVEL_OUTOF10: 4
PAINLEVEL_OUTOF10: 9
PAINLEVEL_OUTOF10: 8
PAINLEVEL_OUTOF10: 9
PAINLEVEL_OUTOF10: 5
PAINLEVEL_OUTOF10: 8
PAINLEVEL_OUTOF10: 6
PAINLEVEL_OUTOF10: 9
PAINLEVEL_OUTOF10: 6
PAINLEVEL_OUTOF10: 9
PAINLEVEL_OUTOF10: 8
PAINLEVEL_OUTOF10: 8
PAINLEVEL_OUTOF10: 7
PAINLEVEL_OUTOF10: 9
PAINLEVEL_OUTOF10: 3
PAINLEVEL_OUTOF10: 8
PAINLEVEL_OUTOF10: 9
PAINLEVEL_OUTOF10: 9

## 2023-01-04 ASSESSMENT — PAIN DESCRIPTION - LOCATION
LOCATION: ABDOMEN;BACK
LOCATION: ABDOMEN
LOCATION: ABDOMEN;BACK
LOCATION: ABDOMEN
LOCATION: ABDOMEN
LOCATION: ABDOMEN;BACK
LOCATION: ABDOMEN
LOCATION: ABDOMEN;BACK
LOCATION: ABDOMEN;BACK
LOCATION: ABDOMEN
LOCATION: ABDOMEN

## 2023-01-04 ASSESSMENT — PAIN SCALES - WONG BAKER
WONGBAKER_NUMERICALRESPONSE: 0

## 2023-01-04 NOTE — PROGRESS NOTES
Resting in bed. IVF infusing. Hourly rounds completed, all needs met this shift. Bed in L/L with call light in reach. Report to be given to dayshift nurse.

## 2023-01-04 NOTE — PROGRESS NOTES
[facility] Progress Note         Admit Date:  1/1/2023    Today's Date:  1/4/2023    CC:  Acute on chronic pancreatitis with pseudocyst       Subjective:      Hospitalist plans to go home today if he can tolerate full liquid diet. Patient endorses continued pain, but tolerable. Tolerating clear liquids and ready to advance.     Medications:   Current Facility-Administered Medications   Medication Dose Route Frequency    polyethylene glycol (GLYCOLAX) packet 17 g  17 g Oral Daily    simethicone (MYLICON) chewable tablet 80 mg  80 mg Oral Q6H PRN    aspirin chewable tablet 81 mg  81 mg Oral Daily    atorvastatin (LIPITOR) tablet 40 mg  40 mg Oral Nightly    buPROPion (WELLBUTRIN SR) extended release tablet 100 mg  100 mg Oral BID    lisinopril (PRINIVIL;ZESTRIL) tablet 5 mg  5 mg Oral Daily    metoprolol tartrate (LOPRESSOR) tablet 25 mg  25 mg Oral BID    ticagrelor (BRILINTA) tablet 90 mg  90 mg Oral BID    piperacillin-tazobactam (ZOSYN) 3,375 mg in sodium chloride 0.9 % 50 mL IVPB (mini-bag)  3,375 mg IntraVENous q8h    pantoprazole (PROTONIX) 40 mg in sodium chloride (PF) 0.9 % 10 mL injection  40 mg IntraVENous Daily    lactated ringers infusion   IntraVENous Continuous    sodium chloride flush 0.9 % injection 5-40 mL  5-40 mL IntraVENous 2 times per day    sodium chloride flush 0.9 % injection 5-40 mL  5-40 mL IntraVENous PRN    0.9 % sodium chloride infusion   IntraVENous PRN    ondansetron (ZOFRAN-ODT) disintegrating tablet 4 mg  4 mg Oral Q8H PRN    Or    ondansetron (ZOFRAN) injection 4 mg  4 mg IntraVENous Q6H PRN    enoxaparin (LOVENOX) injection 40 mg  40 mg SubCUTAneous Daily    aluminum & magnesium hydroxide-simethicone (MAALOX) 200-200-20 MG/5ML suspension 30 mL  30 mL Oral Q6H PRN    hyoscyamine (LEVSIN/SL) sublingual tablet 125 mcg  125 mcg SubLINGual Q4H PRN    potassium chloride (KLOR-CON M) extended release tablet 40 mEq  40 mEq Oral PRN    Or    potassium bicarb-citric acid (EFFER-K) effervescent tablet 40 mEq  40 mEq Oral PRN    Or    potassium chloride 10 mEq/100 mL IVPB (Peripheral Line)  10 mEq IntraVENous PRN    magnesium sulfate 2000 mg in 50 mL IVPB premix  2,000 mg IntraVENous PRN    HYDROmorphone (DILAUDID) injection 1 mg  1 mg IntraVENous Q3H PRN    HYDROmorphone (DILAUDID) injection 0.5 mg  0.5 mg IntraVENous Q6H PRN    oxyCODONE (ROXICODONE) immediate release tablet 10 mg  10 mg Oral Q4H PRN       Review of Systems:  ROS was obtained, with pertinent positives as listed above. No chest pain or SOB. Diet:  full liquids    Objective:   Vitals:  /71   Pulse 79   Temp 97.3 °F (36.3 °C) (Oral)   Resp 16   Ht 5' 9\" (1.753 m)   Wt 180 lb (81.6 kg)   SpO2 96%   BMI 26.58 kg/m²   Intake/Output:  01/04 0701 - 01/04 1900  In: 120 [P.O.:120]  Out: -   No intake/output data recorded. Exam:  General appearance: alert, cooperative, no distress  Lungs: clear to auscultation bilaterally anteriorly  Heart: regular rate and rhythm  Abdomen: soft, non-tender.  Bowel sounds normal.  Extremities: extremities normal, atraumatic, no cyanosis or edema  Neuro:  alert and oriented    Data Review (Labs):    Recent Labs     01/01/23  1855 01/02/23  0646 01/03/23  0541 01/04/23  0546 01/04/23  0547   WBC  --  10.1 5.3  --  5.3   HGB  --  11.3* 11.0*  --  10.6*   HCT  --  35.3* 34.6*  --  33.7*   PLT  --  531* 442  --  465*   MCV  --  80.0* 79.7*  --  80.8*   NA  --  134 133  --  133   K 4.4 4.1 3.8  --  4.0   CL  --  104 104  --  102   CO2  --  26 23  --  24   BUN  --  10 7  --  6   CREATININE  --  1.00 0.80  --  1.00   CALCIUM  --  9.0 8.9  --  9.1   MG  --  2.1 2.1  --  2.1   GLUCOSE  --  91 78  --  78   ALKPHOS  --  49* 46*  --  46*   AST  --  16 22  --  25   ALT  --  28 27  --  32   BILITOT  --  0.6 0.4  --  0.3   LABALBU  --  2.5* 2.4*  --  2.2*   PROT  --  6.5 6.5  --  6.7   LIPASE  --   --   --  2,614*  --      CT ABDOMEN AND PELVIS WITH INTRAVENOUS CONTRAST DATED 1/1/2023 1:47 PM.       History: Worsening upper abdominal pain. Pancreatitis. History of pancreatic   pseudocyst.        Comparison: CT abdomen and pelvis with contrast 12/23/2020        Technique:   Multiple contiguous helical CT images reconstructed at 5 mm   intervals were obtained from above the diaphragms through the ischial   tuberosities following oral and 100 cc Isovue-[id number] without acute complication. All CT scans performed at this facility use one or all of the following:   Automated exposure control, adjustment of the mA and/or kVp according to   patient's size, iterative reconstruction. Findings:   CT ABDOMEN:     Limited evaluation of the lung bases and base of the mediastinum demonstrates   mild dependent atelectasis in the left lung base. The Liver demonstrates a likely benign calcified granuloma seen in the right   lobe on image 22. An additional 5 mm low-attenuation right lobe hepatic lesion   is seen on image 22 which is too small to characterize although grossly does not   enhance statistically most likely representing a tiny hepatic cyst.  The spleen   is homogeneous in attenuation. No contour deforming or enhancing mass lesions   are seen of the adrenal glands. The gallbladder has been removed. The kidneys   enhance symmetrically and no evidence of hydronephrosis is seen. Worsening inflammatory changes are seen of the pancreas. In addition worsening   local changes, there is evolving soft tissue edema and fluid now tracking along   the right anterior pararenal fascial plane into the right paracolic gutter. The   prior small collection anterior to the pancreatic body is slightly increased in   size now measuring 2 cm x 1.3 cm. Multiple evolving collections are suggested   within the lesser sac of the abdomen the larger of which is seen on axial image   32 measuring 6 cm x 2.9 cm in size. The appearance of these suggest acute   inflammatory collections from acute pancreatitis. Additional scattered although   grossly nonloculated fluid is seen. Changes are once again seen in the   pancreatic tail suggesting necrotic changes at this level. Increasing varices   are seen in the gastrosplenic ligament. The splenic vein currently does remain   patent although does appear attenuated in its more distal course. Multiple   likely reactive regional mesenteric lymph nodes are seen. The visualized loops of small bowel and colon are normal in caliber. No free   air is seen. The abdominal aorta is unremarkable in appearance. CT PELVIS:   No abnormal pelvic fluid collections or inflammatory changes are present. No   pelvic adenopathy is seen. The urinary bladder is unremarkable. Impression   1. Worsening inflammatory changes about the pancreas suggesting worsening   pancreatitis. This includes evolving mesenteric edema and fluid collections   particularly in the lesser sac of the abdomen. Stable necrotic appearing changes   are seen in the pancreatic tail. Likely reactive regional mesenteric lymph nodes   are seen. 2. Increasing mesenteric varices. The splenic and portal veins remain patent   although the distal splenic vein appears attenuated likely from evolving   inflammatory changes. Assessment:     Principal Problem:    Acute on chronic pancreatitis (HCC)  Active Problems:    Alcohol use disorder, severe, in early remission, dependence ([address]    Pancreatic pseudocyst    History of ST elevation myocardial infarction (STEMI)    Coronary artery disease involving native coronary artery of native heart    Dependence on nicotine from cigarettes    GERD (gastroesophageal reflux disease)    Primary hypertension  Resolved Problems:    * No resolved hospital problems.  *    41 yo male with PMH including but not limited to alcohol use disorder in remission, alcohol induced chronic pancreatitis complicated by pseudocyst status post gastrostomy with stent placed 9 Nov 2022 then removed on 30 No 2022 via EUS, CAD s/p STEMI and PCI with TULIO 2 Nov 2022 - on ASA and Brilinta, GERD, TO, HTN, who is seen in consultation 2 Jan 2023 at the request of Dr. Alecia Cruz for acute on chronic pancreatitis with pseudocyst.  We have been following with him over several recent admissions for acute on chronic pancreatitis complicated by pseudocyst s/p EUS with cyst gastrostomy with AXIOS stent placed 9 Nov 2022 and then removed on 30 No 2022 after drainage of cyst.  He returned for worsening abd pain, nausea, and vomiting, with labs in the ER noting normal LFTs and lipase 505, WBC 16.2, plts 808, potassium 5.9. CT scan abd/pelvis noted worsening inflammatory changes about the pancreas suggesting worsening pancreatitis, fluid collections particularly in the lesser sac of the abdomen, stable necrotic appearing changes are seen in the pancreatic tail, increasing mesenteric varices. Symptoms associated with acute exacerbation of chronic pancreatitis. HGB continues to trend down. Lipase up to 2600 (1/4/23) from 505 (1/1/23). Albumin level declining. Plan:     -  Supportive care, aggressive IVF. -  advance diet, orders placed  - Continue to avoid ETOH. As mentioned in previous notes, may need consideration of referral to tertiary center for management of his chronic pancreatitis vs possible [place] procedure given his chronically dilated PD. Will need to discuss this with primary GI MD in hospital follow up (Dr Farhana Arnold)  Office has been called and they will reach out to patient with follow up    Agree, plan to advance diet, we will have him see Dr Luis A Odell at [facility] for consideration of [place] once acute issues resolve    Patient is seen and examined in collaboration with Dr. Dawit Hicks. Assessment and plan as per Dr. Dawit Hicks.

## 2023-01-04 NOTE — PROGRESS NOTES
Hospitalist Progress Note   Admit Date:  2023  5:36 PM   Name:  Henry Palumbo   Age:  44 y.o. Sex:  male  :  1985   MRN:  [id number]   Room:  [facility]    Presenting Complaint: abdominal pain  Reason(s) for Admission: Acute on chronic pancreatitis ([facility] [address] [K85.90, K86.1]     Hospital Course & Interval History:   44 y.o. male with medical history of  alcohol use disorder in remission, alcohol induced chronic pancreatitis complicated by pseudocyst status post gastrostomy with stent placed 2022 then removed on 22 via EUS and STEMI status post PCI with TULIO 2022 on aspirin and Brilinta  who presented from [facility] ED on 23 as a direct admit with cc adominal pain associated with n/v that started on 22. Seen  in ED with acute pancreatitis and improved with IV fluids and pain meds and discharged home with short course of norco.      /92 RR 21  non hypoxic, afebrile   WBC 16.2 CBC hemoconcentrated. Plt 808 K 5.9 AG 17 lipase 505      CT a/p IV contrast  1. Worsening inflammatory changes about the pancreas suggesting worsening   pancreatitis. This includes evolving mesenteric edema and fluid collections   particularly in the lesser sac of the abdomen. Stable necrotic appearing changes   are seen in the pancreatic tail. Likely reactive regional mesenteric lymph nodes   are seen. 2. Increasing mesenteric varices. The splenic and portal veins remain patent   although the distal splenic vein appears attenuated likely from evolving   inflammatory changes. Subjective/24hr Events (23): Patient seen at bedside, resting in bed comfortably. Patient is alert and awake, AOx3, on room air  Patient reports of being concerned about returning back to work. Dealing with pain. He to be discharged on and off pain medication that can keep him functional at work. I discussed with patient requiring pain management as outpatient.     No vomiting, fever, chills, chest pain, shortness of breath. ROS:  10 point review of system is negative except for what mentioned above. Assessment & Plan:     Principal Problem:    Acute on chronic pancreatitis // pancreatic pseudocyt   -   1/4-  Continue conservative treatment for chronic pancreatitis as recommended by GI. Patient will need a referral to tertiary care center for possible surgical treatment of chronic pancreatitis --> [place] procedure  Antiemetics as needed. IV Protonix 40 mg daily  IV Zosyn being stopped on 1/4 no evidence of infection. Check lipase starting 1/5/23  Starting patient on full liquid diet with Zenpep     Hyperkalemia -   1/4- resolved  Potassium 4.0     CAD s/p TULIO - DAPT. Statin. BB. ACEi. Stable no angina      Cigarette nicotine dependence - improving on nicotine replacement outpatient and bupropion. AUD - in remission. GERD - IV PPI for now       Discharge Planning:    Pending consult course    Diet:  ADULT DIET; Full Liquid; Low Fat (less than or equal to 50 gm/day)  DVT PPx: Lovenox  Code status: Full Code    I spent 36 minutes of time caring for this patient on the unit nearby or at bedside, and more than 50 percent was spent on coordination of care activities, and/or patient/family counseling regarding status and plan of care.      Hospital Problems             Last Modified POA    * (Principal) Acute on chronic pancreatitis ([facility] [address] 1/1/2023 Yes    Alcohol use disorder, severe, in early remission, dependence ([address] (Chronic) 1/1/2023 Yes    Pancreatic pseudocyst 1/1/2023 Yes    History of ST elevation myocardial infarction (STEMI) 1/1/2023 Yes    Coronary artery disease involving native coronary artery of native heart (Chronic) 1/1/2023 Yes    Dependence on nicotine from cigarettes (Chronic) 1/1/2023 Yes    GERD (gastroesophageal reflux disease) 1/1/2023 Yes    Primary hypertension 1/1/2023 Yes     Objective:   Patient Vitals for the past 24 hrs:   Temp Pulse Resp BP SpO2 01/04/23 0658 -- -- 18 -- --   01/04/23 0628 -- -- 18 -- --   01/04/23 0513 -- -- 18 -- --   01/04/23 0443 -- -- 20 -- --   01/04/23 0356 98.1 °F (36.7 °C) 80 18 124/74 98 %   01/04/23 0354 -- -- 18 -- --   01/04/23 0324 -- -- 18 -- --   01/04/23 0059 -- -- 19 -- --   01/04/23 0029 -- -- 18 -- --   01/03/23 2246 98.8 °F (37.1 °C) 91 19 118/75 97 %   01/03/23 2158 -- -- 18 -- --   01/03/23 2128 -- -- 18 -- --   01/03/23 1905 98.6 °F (37 °C) 95 19 133/77 97 %   01/03/23 1829 -- -- 18 -- --   01/03/23 1646 -- -- 18 -- --   01/03/23 1545 97.8 °F (36.6 °C) 90 18 125/77 99 %   01/03/23 1542 -- -- 18 -- --   01/03/23 1512 -- -- 18 -- --   01/03/23 1237 -- -- 16 -- --   01/03/23 1207 -- -- 18 -- --   01/03/23 1106 97.9 °F (36.6 °C) 87 18 (!) 143/84 99 %   01/03/23 1056 -- -- 16 -- --   01/03/23 1026 -- -- 18 -- --   01/03/23 0948 -- -- 18 -- --   01/03/23 0924 97.5 °F (36.4 °C) 82 18 136/76 --   01/03/23 0918 -- -- 18 -- --         Estimated body mass index is 26.58 kg/m² as calculated from the following:    Height as of this encounter: 5' 9\" (1.753 m). Weight as of this encounter: 180 lb (81.6 kg). No intake or output data in the 24 hours ending 01/04/23 0756      Physical Exam:     Blood pressure 124/74, pulse 80, temperature 98.1 °F (36.7 °C), temperature source Oral, resp. rate 18, height 5' 9\" (1.753 m), weight 180 lb (81.6 kg), SpO2 98 %. General:    Alert, awake, NAD, on room air. Head:  Normocephalic, atraumatic  Eyes:  Sclerae appear normal. Pupils equally round. ENT:  Nares appear normal, no drainage. Moist oral mucosa  Neck:  No restricted ROM. Trachea midline. CV:   RRR. No m/r/g. No jugular venous distension. Lungs:   CTAB. No wheezing, rhonchi, or rales. Respirations even, unlabored. Abdomen:   Soft, nondistended, tender over epigastric region, no guarding, no rigidity, bowel sounds are normoactive. Extremities: No cyanosis or clubbing. No edema. Skin:     No rashes and normal coloration.  Warm and dry.    Neuro:  CN II-XII grossly intact. Sensation intact.  A&Ox3  Psych:  Normal mood and affect.      I have reviewed ordered lab tests and independently visualized imaging below:    Recent Labs:  Recent Results (from the past 48 hour(s))   CBC with Auto Differential    Collection Time: 01/03/23  5:41 AM   Result Value Ref Range    WBC 5.3 4.3 - 11.1 K/uL    RBC 4.34 4.23 - 5.6 M/uL    Hemoglobin 11.0 (L) 13.6 - 17.2 g/dL    Hematocrit 34.6 (L) 41.1 - 50.3 %    MCV 79.7 (L) 82 - 102 FL    MCH 25.3 (L) 26.1 - 32.9 PG    MCHC 31.8 31.4 - 35.0 g/dL    RDW 17.3 (H) 11.9 - 14.6 %    Platelets 442 150 - 450 K/uL    MPV 8.9 (L) 9.4 - 12.3 FL    nRBC 0.00 0.0 - 0.2 K/uL    Differential Type AUTOMATED      Seg Neutrophils 63 43 - 78 %    Lymphocytes 23 13 - 44 %    Monocytes 12 4.0 - 12.0 %    Eosinophils % 1 0.5 - 7.8 %    Basophils 1 0.0 - 2.0 %    Immature Granulocytes 0 0.0 - 5.0 %    Segs Absolute 3.4 1.7 - 8.2 K/UL    Absolute Lymph # 1.2 0.5 - 4.6 K/UL    Absolute Mono # 0.6 0.1 - 1.3 K/UL    Absolute Eos # 0.0 0.0 - 0.8 K/UL    Basophils Absolute 0.0 0.0 - 0.2 K/UL    Absolute Immature Granulocyte 0.0 0.0 - 0.5 K/UL   Magnesium    Collection Time: 01/03/23  5:41 AM   Result Value Ref Range    Magnesium 2.1 1.8 - 2.4 mg/dL   Comprehensive Metabolic Panel    Collection Time: 01/03/23  5:41 AM   Result Value Ref Range    Sodium 133 133 - 143 mmol/L    Potassium 3.8 3.5 - 5.1 mmol/L    Chloride 104 101 - 110 mmol/L    CO2 23 21 - 32 mmol/L    Anion Gap 6 2 - 11 mmol/L    Glucose 78 65 - 100 mg/dL    BUN 7 6 - 23 MG/DL    Creatinine 0.80 0.8 - 1.5 MG/DL    Est, Glom Filt Rate >60 >60 ml/min/1.73m2    Calcium 8.9 8.3 - 10.4 MG/DL    Total Bilirubin 0.4 0.2 - 1.1 MG/DL    ALT 27 12 - 65 U/L    AST 22 15 - 37 U/L    Alk Phosphatase 46 (L) 50 - 136 U/L    Total Protein 6.5 6.3 - 8.2 g/dL    Albumin 2.4 (L) 3.5 - 5.0 g/dL    Globulin 4.1 2.8 - 4.5 g/dL    Albumin/Globulin Ratio 0.6 0.4 - 1.6     CBC with Auto  Differential    Collection Time: 01/04/23  5:47 AM   Result Value Ref Range    WBC 5.3 4.3 - 11.1 K/uL    RBC 4.17 (L) 4.23 - 5.6 M/uL    Hemoglobin 10.6 (L) 13.6 - 17.2 g/dL    Hematocrit 33.7 (L) 41.1 - 50.3 %    MCV 80.8 (L) 82 - 102 FL    MCH 25.4 (L) 26.1 - 32.9 PG    MCHC 31.5 31.4 - 35.0 g/dL    RDW 17.0 (H) 11.9 - 14.6 %    Platelets 482 (H) 361 - 450 K/uL    MPV 9.1 (L) 9.4 - 12.3 FL    nRBC 0.00 0.0 - 0.2 K/uL    Differential Type AUTOMATED      Seg Neutrophils 60 43 - 78 %    Lymphocytes 22 13 - 44 %    Monocytes 16 (H) 4.0 - 12.0 %    Eosinophils % 1 0.5 - 7.8 %    Basophils 1 0.0 - 2.0 %    Immature Granulocytes 0 0.0 - 5.0 %    Segs Absolute 3.2 1.7 - 8.2 K/UL    Absolute Lymph # 1.2 0.5 - 4.6 K/UL    Absolute Mono # 0.9 0.1 - 1.3 K/UL    Absolute Eos # 0.1 0.0 - 0.8 K/UL    Basophils Absolute 0.0 0.0 - 0.2 K/UL    Absolute Immature Granulocyte 0.0 0.0 - 0.5 K/UL   Magnesium    Collection Time: 01/04/23  5:47 AM   Result Value Ref Range    Magnesium 2.1 1.8 - 2.4 mg/dL   Comprehensive Metabolic Panel    Collection Time: 01/04/23  5:47 AM   Result Value Ref Range    Sodium 133 133 - 143 mmol/L    Potassium 4.0 3.5 - 5.1 mmol/L    Chloride 102 101 - 110 mmol/L    CO2 24 21 - 32 mmol/L    Anion Gap 7 2 - 11 mmol/L    Glucose 78 65 - 100 mg/dL    BUN 6 6 - 23 MG/DL    Creatinine 1.00 0.8 - 1.5 MG/DL    Est, Glom Filt Rate >60 >60 ml/min/1.73m2    Calcium 9.1 8.3 - 10.4 MG/DL    Total Bilirubin 0.3 0.2 - 1.1 MG/DL    ALT 32 12 - 65 U/L    AST 25 15 - 37 U/L    Alk Phosphatase 46 (L) 50 - 136 U/L    Total Protein 6.7 6.3 - 8.2 g/dL    Albumin 2.2 (L) 3.5 - 5.0 g/dL    Globulin 4.5 2.8 - 4.5 g/dL    Albumin/Globulin Ratio 0.5 0.4 - 1.6           Other Studies:  CT ABDOMEN PELVIS W IV CONTRAST Additional Contrast? None    Result Date: 1/1/2023  CT ABDOMEN AND PELVIS WITH INTRAVENOUS CONTRAST DATED 1/1/2023 1:47 PM. History: Worsening upper abdominal pain. Pancreatitis.  History of pancreatic pseudocyst. Comparison: CT abdomen and pelvis with contrast 12/23/2020 Technique:   Multiple contiguous helical CT images reconstructed at 5 mm intervals were obtained from above the diaphragms through the ischial tuberosities following oral and 100 cc Isovue-370 without acute complication. All CT scans performed at this facility use one or all of the following: Automated exposure control, adjustment of the mA and/or kVp according to patient's size, iterative reconstruction. Findings: CT ABDOMEN:  Limited evaluation of the lung bases and base of the mediastinum demonstrates mild dependent atelectasis in the left lung base. The Liver demonstrates a likely benign calcified granuloma seen in the right lobe on image 22. An additional 5 mm low-attenuation right lobe hepatic lesion is seen on image 22 which is too small to characterize although grossly does not enhance statistically most likely representing a tiny hepatic cyst.  The spleen is homogeneous in attenuation. No contour deforming or enhancing mass lesions are seen of the adrenal glands. The gallbladder has been removed. The kidneys enhance symmetrically and no evidence of hydronephrosis is seen. Worsening inflammatory changes are seen of the pancreas. In addition worsening local changes, there is evolving soft tissue edema and fluid now tracking along the right anterior pararenal fascial plane into the right paracolic gutter. The prior small collection anterior to the pancreatic body is slightly increased in size now measuring 2 cm x 1.3 cm. Multiple evolving collections are suggested within the lesser sac of the abdomen the larger of which is seen on axial image 32 measuring 6 cm x 2.9 cm in size. The appearance of these suggest acute inflammatory collections from acute pancreatitis. Additional scattered although grossly nonloculated fluid is seen. Changes are once again seen in the pancreatic tail suggesting necrotic changes at this level.  Increasing varices are seen in the gastrosplenic ligament. The splenic vein currently does remain patent although does appear attenuated in its more distal course. Multiple likely reactive regional mesenteric lymph nodes are seen. The visualized loops of small bowel and colon are normal in caliber. No free air is seen. The abdominal aorta is unremarkable in appearance. CT PELVIS: No abnormal pelvic fluid collections or inflammatory changes are present. No pelvic adenopathy is seen. The urinary bladder is unremarkable. 1.  Worsening inflammatory changes about the pancreas suggesting worsening pancreatitis. This includes evolving mesenteric edema and fluid collections particularly in the lesser sac of the abdomen. Stable necrotic appearing changes are seen in the pancreatic tail. Likely reactive regional mesenteric lymph nodes are seen. 2. Increasing mesenteric varices. The splenic and portal veins remain patent although the distal splenic vein appears attenuated likely from evolving inflammatory changes. This report was made using voice transcription. Despite my best efforts to avoid any, transcription errors may persist. If there is any question about the accuracy of the report or need for clarification, then please call [phone], or text me through perfectserv for clarification or correction.        Current Meds:  Current Facility-Administered Medications   Medication Dose Route Frequency    polyethylene glycol (GLYCOLAX) packet 17 g  17 g Oral Daily    simethicone (MYLICON) chewable tablet 80 mg  80 mg Oral Q6H PRN    aspirin chewable tablet 81 mg  81 mg Oral Daily    atorvastatin (LIPITOR) tablet 40 mg  40 mg Oral Nightly    buPROPion [facility]) extended release tablet 100 mg  100 mg Oral BID    lisinopril (PRINIVIL;ZESTRIL) tablet 5 mg  5 mg Oral Daily    metoprolol tartrate (LOPRESSOR) tablet 25 mg  25 mg Oral BID    ticagrelor (BRILINTA) tablet 90 mg  90 mg Oral BID    piperacillin-tazobactam (ZOSYN) 3,375 mg in sodium chloride 0.9 % 50 mL IVPB (mini-bag)  3,375 mg IntraVENous q8h    pantoprazole (PROTONIX) 40 mg in sodium chloride (PF) 0.9 % 10 mL injection  40 mg IntraVENous Daily    lactated ringers infusion   IntraVENous Continuous    sodium chloride flush 0.9 % injection 5-40 mL  5-40 mL IntraVENous 2 times per day    sodium chloride flush 0.9 % injection 5-40 mL  5-40 mL IntraVENous PRN    0.9 % sodium chloride infusion   IntraVENous PRN    ondansetron (ZOFRAN-ODT) disintegrating tablet 4 mg  4 mg Oral Q8H PRN    Or    ondansetron (ZOFRAN) injection 4 mg  4 mg IntraVENous Q6H PRN    enoxaparin (LOVENOX) injection 40 mg  40 mg SubCUTAneous Daily    aluminum & magnesium hydroxide-simethicone (MAALOX) 200-200-20 MG/5ML suspension 30 mL  30 mL Oral Q6H PRN    hyoscyamine (LEVSIN/SL) sublingual tablet 125 mcg  125 mcg SubLINGual Q4H PRN    potassium chloride (KLOR-CON M) extended release tablet 40 mEq  40 mEq Oral PRN    Or    potassium bicarb-citric acid (EFFER-K) effervescent tablet 40 mEq  40 mEq Oral PRN    Or    potassium chloride 10 mEq/100 mL IVPB (Peripheral Line)  10 mEq IntraVENous PRN    magnesium sulfate 2000 mg in 50 mL IVPB premix  2,000 mg IntraVENous PRN    HYDROmorphone (DILAUDID) injection 1 mg  1 mg IntraVENous Q3H PRN    HYDROmorphone (DILAUDID) injection 0.5 mg  0.5 mg IntraVENous Q6H PRN    oxyCODONE (ROXICODONE) immediate release tablet 10 mg  10 mg Oral Q4H PRN       Signed:  Shirley Che MD    Part of this note may have been written by using a voice dictation software. The note has been proof read but may still contain some grammatical/other typographical errors.

## 2023-01-05 VITALS
HEIGHT: 69 IN | DIASTOLIC BLOOD PRESSURE: 75 MMHG | WEIGHT: 180 LBS | SYSTOLIC BLOOD PRESSURE: 125 MMHG | HEART RATE: 81 BPM | RESPIRATION RATE: 16 BRPM | TEMPERATURE: 97.4 F | BODY MASS INDEX: 26.66 KG/M2 | OXYGEN SATURATION: 99 %

## 2023-01-05 LAB
LIPASE SERPL-CCNC: 2320 U/L (ref 73–393)
LIPASE SERPL-CCNC: 2586 U/L (ref 73–393)

## 2023-01-05 PROCEDURE — 6370000000 HC RX 637 (ALT 250 FOR IP): Performed by: FAMILY MEDICINE

## 2023-01-05 PROCEDURE — 36415 COLL VENOUS BLD VENIPUNCTURE: CPT

## 2023-01-05 PROCEDURE — 2580000003 HC RX 258: Performed by: HOSPITALIST

## 2023-01-05 PROCEDURE — 2580000003 HC RX 258: Performed by: FAMILY MEDICINE

## 2023-01-05 PROCEDURE — 83690 ASSAY OF LIPASE: CPT

## 2023-01-05 PROCEDURE — 6370000000 HC RX 637 (ALT 250 FOR IP): Performed by: HOSPITALIST

## 2023-01-05 PROCEDURE — 6370000000 HC RX 637 (ALT 250 FOR IP): Performed by: NURSE PRACTITIONER

## 2023-01-05 PROCEDURE — C9113 INJ PANTOPRAZOLE SODIUM, VIA: HCPCS | Performed by: FAMILY MEDICINE

## 2023-01-05 PROCEDURE — A4216 STERILE WATER/SALINE, 10 ML: HCPCS | Performed by: FAMILY MEDICINE

## 2023-01-05 PROCEDURE — 6360000002 HC RX W HCPCS: Performed by: FAMILY MEDICINE

## 2023-01-05 RX ORDER — HYDROMORPHONE HYDROCHLORIDE 2 MG/1
2 TABLET ORAL 2 TIMES DAILY PRN
Qty: 10 TABLET | Refills: 0 | Status: ON HOLD | OUTPATIENT
Start: 2023-01-05 | End: 2023-01-10

## 2023-01-05 RX ORDER — PANTOPRAZOLE SODIUM 40 MG/1
40 TABLET, DELAYED RELEASE ORAL 2 TIMES DAILY
Qty: 30 TABLET | Refills: 3 | Status: ON HOLD | OUTPATIENT
Start: 2023-01-05 | End: 2023-02-04

## 2023-01-05 RX ORDER — OXYCODONE HYDROCHLORIDE 10 MG/1
10 TABLET ORAL EVERY 8 HOURS PRN
Qty: 15 TABLET | Refills: 0 | Status: ON HOLD | OUTPATIENT
Start: 2023-01-05 | End: 2023-01-10

## 2023-01-05 RX ORDER — HYDROMORPHONE HYDROCHLORIDE 2 MG/1
2 TABLET ORAL ONCE
Status: COMPLETED | OUTPATIENT
Start: 2023-01-05 | End: 2023-01-05

## 2023-01-05 RX ORDER — SIMETHICONE 80 MG
80 TABLET,CHEWABLE ORAL EVERY 6 HOURS PRN
Qty: 180 TABLET | Refills: 3 | Status: ON HOLD | OUTPATIENT
Start: 2023-01-05

## 2023-01-05 RX ADMIN — ENOXAPARIN SODIUM 40 MG: 100 INJECTION SUBCUTANEOUS at 09:17

## 2023-01-05 RX ADMIN — SODIUM CHLORIDE, PRESERVATIVE FREE 10 ML: 5 INJECTION INTRAVENOUS at 09:19

## 2023-01-05 RX ADMIN — PANCRELIPASE LIPASE, PANCRELIPASE PROTEASE, PANCRELIPASE AMYLASE 5000 UNITS: 5000; 17000; 24000 CAPSULE, DELAYED RELEASE ORAL at 11:55

## 2023-01-05 RX ADMIN — HYDROMORPHONE HYDROCHLORIDE 1 MG: 1 INJECTION, SOLUTION INTRAMUSCULAR; INTRAVENOUS; SUBCUTANEOUS at 00:21

## 2023-01-05 RX ADMIN — LISINOPRIL 5 MG: 5 TABLET ORAL at 09:18

## 2023-01-05 RX ADMIN — ASPIRIN 81 MG: 81 TABLET, CHEWABLE ORAL at 09:18

## 2023-01-05 RX ADMIN — TICAGRELOR 90 MG: 90 TABLET ORAL at 09:18

## 2023-01-05 RX ADMIN — OXYCODONE 10 MG: 5 TABLET ORAL at 04:30

## 2023-01-05 RX ADMIN — HYDROMORPHONE HYDROCHLORIDE 1 MG: 1 INJECTION, SOLUTION INTRAMUSCULAR; INTRAVENOUS; SUBCUTANEOUS at 03:33

## 2023-01-05 RX ADMIN — HYOSCYAMINE SULFATE 125 MCG: 0.12 TABLET ORAL; SUBLINGUAL at 04:31

## 2023-01-05 RX ADMIN — SODIUM CHLORIDE, POTASSIUM CHLORIDE, SODIUM LACTATE AND CALCIUM CHLORIDE: 600; 310; 30; 20 INJECTION, SOLUTION INTRAVENOUS at 03:36

## 2023-01-05 RX ADMIN — BUPROPION HYDROCHLORIDE 100 MG: 100 TABLET, FILM COATED, EXTENDED RELEASE ORAL at 09:18

## 2023-01-05 RX ADMIN — OXYCODONE 10 MG: 5 TABLET ORAL at 10:48

## 2023-01-05 RX ADMIN — PANCRELIPASE LIPASE, PANCRELIPASE PROTEASE, PANCRELIPASE AMYLASE 5000 UNITS: 5000; 17000; 24000 CAPSULE, DELAYED RELEASE ORAL at 09:44

## 2023-01-05 RX ADMIN — METOPROLOL TARTRATE 25 MG: 25 TABLET, FILM COATED ORAL at 09:18

## 2023-01-05 RX ADMIN — HYDROMORPHONE HYDROCHLORIDE 1 MG: 1 INJECTION, SOLUTION INTRAMUSCULAR; INTRAVENOUS; SUBCUTANEOUS at 09:30

## 2023-01-05 RX ADMIN — HYDROMORPHONE HYDROCHLORIDE 1 MG: 1 INJECTION, SOLUTION INTRAMUSCULAR; INTRAVENOUS; SUBCUTANEOUS at 06:20

## 2023-01-05 RX ADMIN — POLYETHYLENE GLYCOL 3350 17 G: 17 POWDER, FOR SOLUTION ORAL at 09:17

## 2023-01-05 RX ADMIN — HYOSCYAMINE SULFATE 125 MCG: 0.12 TABLET ORAL; SUBLINGUAL at 10:48

## 2023-01-05 RX ADMIN — ALUMINUM HYDROXIDE, MAGNESIUM HYDROXIDE, DIMETHICONE 30 ML: 200; 200; 20 LIQUID ORAL at 06:34

## 2023-01-05 RX ADMIN — HYDROMORPHONE HYDROCHLORIDE 2 MG: 2 TABLET ORAL at 12:10

## 2023-01-05 RX ADMIN — SODIUM CHLORIDE 40 MG: 9 INJECTION, SOLUTION INTRAMUSCULAR; INTRAVENOUS; SUBCUTANEOUS at 09:18

## 2023-01-05 RX ADMIN — OXYCODONE 10 MG: 5 TABLET ORAL at 14:46

## 2023-01-05 ASSESSMENT — PAIN SCALES - GENERAL
PAINLEVEL_OUTOF10: 8
PAINLEVEL_OUTOF10: 0
PAINLEVEL_OUTOF10: 9
PAINLEVEL_OUTOF10: 8
PAINLEVEL_OUTOF10: 8
PAINLEVEL_OUTOF10: 0
PAINLEVEL_OUTOF10: 6
PAINLEVEL_OUTOF10: 6
PAINLEVEL_OUTOF10: 0
PAINLEVEL_OUTOF10: 8
PAINLEVEL_OUTOF10: 8
PAINLEVEL_OUTOF10: 0
PAINLEVEL_OUTOF10: 9
PAINLEVEL_OUTOF10: 7
PAINLEVEL_OUTOF10: 0

## 2023-01-05 ASSESSMENT — PAIN DESCRIPTION - DESCRIPTORS
DESCRIPTORS: ACHING
DESCRIPTORS: CRAMPING
DESCRIPTORS: ACHING;GNAWING
DESCRIPTORS: CRAMPING
DESCRIPTORS: CRAMPING
DESCRIPTORS: ACHING
DESCRIPTORS: ACHING
DESCRIPTORS: CRAMPING

## 2023-01-05 ASSESSMENT — PAIN DESCRIPTION - ORIENTATION
ORIENTATION: RIGHT;LEFT;ANTERIOR;POSTERIOR
ORIENTATION: RIGHT;LEFT;POSTERIOR;ANTERIOR

## 2023-01-05 ASSESSMENT — PAIN DESCRIPTION - LOCATION
LOCATION: ABDOMEN;BACK
LOCATION: ABDOMEN
LOCATION: ABDOMEN
LOCATION: ABDOMEN;BACK
LOCATION: ABDOMEN
LOCATION: ABDOMEN

## 2023-01-05 NOTE — CARE COORDINATION
Chart reviewed by . Patient is medically stable for discharge to home today with family. CM to fax (Mariza Bullard) [phone] referral to  [facility] as attending- Dr. Thang Denny requested, after d/c summary is available. CM obtained pricing from [facility]-listed on file, for discharge prescription medications. According to [facility] with [facility], Hydromorphone $4.72, Protonix $22.22, Oxycodone HCI $4.36, hyoscyamine $32.26- (which will not be available in pharmacy until Friday 1/6 after 7:85RH), and Mylicon $2.68. Patient verbalized he is able  to afford prescription medications. No additional discharge needs noted or expressed at this time. Family to transport. Please consult CM if needs or concerns arise.           01/02/23 8256   Service Assessment   Patient Orientation Alert and Oriented   Cognition Alert   History Provided By Patient;Medical Record   Primary Caregiver Self   Support Systems Family Members;Spouse/Significant Other   Patient's Healthcare Decision Maker is: Legal Next of Kin   PCP Verified by CM Yes   Last Visit to PCP Within last 3 months   Prior Functional Level Independent in ADLs/IADLs   Current Functional Level Independent in ADLs/IADLs   Can patient return to prior living arrangement Yes   Ability to make needs known: Good   Family able to assist with home care needs: Yes   Financial Resources Financial Counseling  (Patient uninsured.)   Social/Functional History   Lives With Family   Type of [address] One level   Home Access Level entry   [address]   (No DME Use.)   ADL Assistance Independent   Ambulation Assistance Independent   Transfer Assistance Independent   Active  Yes   Mode of Transportation Car   Occupation Full time employment   Type of Occupation [employer]   Discharge Planning   Patient expects to be discharged to: [facility] Discharge   Transition of [address] (CM Consult) Discharge Planning Services At/After Discharge None   [employer] Information Provided? No   Mode of Transport at Discharge Other (see comment)  (Family.)   Confirm Follow Up Transport Self   Condition of Participation: Discharge Planning   The Plan for Transition of Care is related to the following treatment goals: Return to baseline.

## 2023-01-05 NOTE — PROGRESS NOTES
[facility] Progress Note         Admit Date:  1/1/2023    Today's Date:  1/5/2023    CC:  Acute on chronic pancreatitis with pseudocyst    Subjective:     Patient tolerating clear liquids well, he started full liquids this am and states that it has not changed his pain. Ready to go home.     Medications:   Current Facility-Administered Medications   Medication Dose Route Frequency    HYDROmorphone (DILAUDID) tablet 2 mg  2 mg Oral Once    lipase-protease-amylase (ZENPEP) delayed release capsule 5,000 Units  5,000 Units Oral TID WC    polyethylene glycol (GLYCOLAX) packet 17 g  17 g Oral Daily    simethicone (MYLICON) chewable tablet 80 mg  80 mg Oral Q6H PRN    aspirin chewable tablet 81 mg  81 mg Oral Daily    atorvastatin (LIPITOR) tablet 40 mg  40 mg Oral Nightly    buPROPion (WELLBUTRIN SR) extended release tablet 100 mg  100 mg Oral BID    lisinopril (PRINIVIL;ZESTRIL) tablet 5 mg  5 mg Oral Daily    metoprolol tartrate (LOPRESSOR) tablet 25 mg  25 mg Oral BID    ticagrelor (BRILINTA) tablet 90 mg  90 mg Oral BID    pantoprazole (PROTONIX) 40 mg in sodium chloride (PF) 0.9 % 10 mL injection  40 mg IntraVENous Daily    lactated ringers infusion   IntraVENous Continuous    sodium chloride flush 0.9 % injection 5-40 mL  5-40 mL IntraVENous 2 times per day    sodium chloride flush 0.9 % injection 5-40 mL  5-40 mL IntraVENous PRN    0.9 % sodium chloride infusion   IntraVENous PRN    ondansetron (ZOFRAN-ODT) disintegrating tablet 4 mg  4 mg Oral Q8H PRN    Or    ondansetron (ZOFRAN) injection 4 mg  4 mg IntraVENous Q6H PRN    enoxaparin (LOVENOX) injection 40 mg  40 mg SubCUTAneous Daily    aluminum & magnesium hydroxide-simethicone (MAALOX) 200-200-20 MG/5ML suspension 30 mL  30 mL Oral Q6H PRN    hyoscyamine (LEVSIN/SL) sublingual tablet 125 mcg  125 mcg SubLINGual Q4H PRN    potassium chloride (KLOR-CON M) extended release tablet 40 mEq  40 mEq Oral PRN    Or    potassium bicarb-citric acid (EFFER-K) effervescent tablet 40 mEq  40 mEq Oral PRN    Or    potassium chloride 10 mEq/100 mL IVPB (Peripheral Line)  10 mEq IntraVENous PRN    magnesium sulfate 2000 mg in 50 mL IVPB premix  2,000 mg IntraVENous PRN    HYDROmorphone (DILAUDID) injection 1 mg  1 mg IntraVENous Q3H PRN    HYDROmorphone (DILAUDID) injection 0.5 mg  0.5 mg IntraVENous Q6H PRN    oxyCODONE (ROXICODONE) immediate release tablet 10 mg  10 mg Oral Q4H PRN       Review of Systems:  ROS was obtained, with pertinent positives as listed above. No chest pain or SOB. Diet:  full liquids    Objective:   Vitals:  /75   Pulse 81   Temp 97.4 °F (36.3 °C) (Axillary)   Resp 16   Ht 5' 9\" (1.753 m)   Wt 180 lb (81.6 kg)   SpO2 99%   BMI 26.58 kg/m²   Intake/Output:  No intake/output data recorded. 01/03 1901 - 01/05 0700  In: 320 [P.O.:320]  Out: -   Exam:  General appearance: alert, cooperative, no distress  Lungs: clear to auscultation bilaterally anteriorly  Heart: regular rate and rhythm  Abdomen: soft, non-tender.  Bowel sounds normal. No masses, no organomegaly  Extremities: extremities normal, atraumatic, no cyanosis or edema  Neuro:  alert and oriented    Data Review (Labs):    Recent Labs     01/03/23  0541 01/04/23  0546 01/04/23  0547 01/05/23  0453   WBC 5.3  --  5.3  --    HGB 11.0*  --  10.6*  --    HCT 34.6*  --  33.7*  --      --  465*  --    MCV 79.7*  --  80.8*  --      --  133  --    K 3.8  --  4.0  --      --  102  --    CO2 23  --  24  --    BUN 7  --  6  --    CREATININE 0.80  --  1.00  --    CALCIUM 8.9  --  9.1  --    MG 2.1  --  2.1  --    GLUCOSE 78  --  78  --    ALKPHOS 46*  --  46*  --    AST 22  --  25  --    ALT 27  --  32  --    BILITOT 0.4  --  0.3  --    ALBUMIN 0.6  --  0.5  --    PROT 6.5  --  6.7  --    LIPASE  --  2,614*  --  2,320*       Assessment:     Principal Problem:    Acute on chronic pancreatitis (HCC)  Active Problems:    Alcohol use disorder, severe, in early remission, dependence (HCC)    Pancreatic pseudocyst    History of ST elevation myocardial infarction (STEMI)    Coronary artery disease involving native coronary artery of native heart    Dependence on nicotine from cigarettes    GERD (gastroesophageal reflux disease)    Primary hypertension  Resolved Problems:    * No resolved hospital problems. *  34 yo male with PMH including but not limited to alcohol use disorder in remission, alcohol induced chronic pancreatitis complicated by pseudocyst status post gastrostomy with stent placed 9 Nov 2022 then removed on 30 No 2022 via EUS, CAD s/p STEMI and PCI with TULIO 2 Nov 2022 - on ASA and Brilinta, GERD, TO, HTN, who is seen in consultation 2 Jan 2023 at the request of Dr. Salomon Cortez for acute on chronic pancreatitis with pseudocyst.  We have been following with him over several recent admissions for acute on chronic pancreatitis complicated by pseudocyst s/p EUS with cyst gastrostomy with AXIOS stent placed 9 Nov 2022 and then removed on 30 No 2022 after drainage of cyst.  He returned for worsening abd pain, nausea, and vomiting, with labs in the ER noting normal LFTs and lipase 505, WBC 16.2, plts 808, potassium 5.9. CT scan abd/pelvis noted worsening inflammatory changes about the pancreas suggesting worsening pancreatitis, fluid collections particularly in the lesser sac of the abdomen, stable necrotic appearing changes are seen in the pancreatic tail, increasing mesenteric varices. Symptoms associated with acute exacerbation of chronic pancreatitis. 1/5/23 Labile lipase more likely due to patient increasing intake. Plan:     Supportive care/pain control  Remain on liquid diet until scheduled follow up Tuesday 1/10/23 at 11:15am with Dr Brayden Palomares to discuss further plan of care  Will sign off at this time, please call with any questions/concerns. Thank you for allowing us to participate in this patient's care.       Patient is seen and examined in collaboration with Dr. Kathleen Peng. Assessment and plan as per Dr. Kathleen Peng.

## 2023-01-05 NOTE — PLAN OF CARE
Problem: Pain  Goal: Verbalizes/displays adequate comfort level or baseline comfort level  1/5/2023 0738 by Mary Ron RN  Outcome: Progressing  1/4/2023 2315 by Devorah Kang RN  Outcome: Not Progressing

## 2023-01-05 NOTE — PROGRESS NOTES
Pt resting, denies needs at this time, NAD, frequent PRN pain meds, see MAR. Hourly rounds completed. Bed in L/L position, call light and personal items within reach. Will continue to monitor and give bedside report to oncoming nurse.

## 2023-01-05 NOTE — DISCHARGE INSTRUCTIONS
Pancreatitis: Care Instructions  Overview     The pancreas is an organ behind the stomach. It makes hormones and enzymes to help your body digest food. But if these enzymes attack the pancreas, it can get inflamed. This is called pancreatitis. Most cases are caused by gallstones or by heavy alcohol use. If you take care of yourself at home, it will help you get better. It will also help you avoid more problems with your pancreas. How can you care for yourself at home? Drink clear liquids and eat bland foods until you feel better. [place] foods include rice, dry toast, and crackers. They also include bananas and applesauce. Eat a low-fat diet until your doctor says your pancreas is healed. Do not drink alcohol. Tell your doctor if you need help to quit. Counseling, support groups, and sometimes medicines can help you stay sober. Be safe with medicines. Read and follow all instructions on the label. If the doctor gave you a prescription medicine for pain, take it as prescribed. If you are not taking a prescription pain medicine, ask your doctor if you can take an over-the-counter medicine. If your doctor prescribed antibiotics, take them as directed. Do not stop taking them just because you feel better. You need to take the full course of antibiotics. Get extra rest until you feel better. To prevent future problems with your pancreas  Do not drink alcohol. Tell your doctors and pharmacist that you've had pancreatitis. They can help you avoid medicines that may cause this problem again. Where can you learn more? Go to http://www.sommer.com/ and enter J381 to learn more about \"Pancreatitis: Care Instructions. \"  Current as of: June 6, 2022               Content Version: 13.5  © 0268-1587 [employer]. Care instructions adapted under license by [facility] [address]  If you have questions about a medical condition or this instruction, always ask your healthcare professional. [employer] disclaims any warranty or liability for your use of this information.

## 2023-01-05 NOTE — PROGRESS NOTES
Discharge medications reviewed with the patient and spouse and appropriate educational materials and side effects teaching were provided. IV's taken out of bilateral forearms with tips intact. NAD noted. Spouse here to  patient. No paper RX were given. Patient and spouse reminded of appointment with GI on [address] at 1100 AM. Verbalized understanding.

## 2023-01-05 NOTE — DISCHARGE SUMMARY
Hospitalist Discharge Summary   Admit Date:  2023  5:36 PM   DC Note date: 2023  Name:  Jay Chua   Age:  44 y.o. Sex:  male  :  1985   MRN:  [id number]   Room:  [facility]  PCP:  Chase Preston MD    Presenting Complaint: No chief complaint on file. Initial Admission Diagnosis: Acute on chronic pancreatitis (HCC) [K85.90, K86.1]     Problem List for this Hospitalization (present on admission):    Principal Problem:    Acute on chronic pancreatitis ([facility] [address]  Active Problems:    Alcohol use disorder, severe, in early remission, dependence ([facility] [address]    Pancreatic pseudocyst    History of ST elevation myocardial infarction (STEMI)    Coronary artery disease involving native coronary artery of native heart    Dependence on nicotine from cigarettes    GERD (gastroesophageal reflux disease)    Primary hypertension  Resolved Problems:    * No resolved hospital problems. *      Hospital Course:  44 y.o. male with medical history of  alcohol use disorder in remission, alcohol induced chronic pancreatitis complicated by pseudocyst status post gastrostomy with stent placed 2022 then removed on 22 via EUS and STEMI status post PCI with TULIO 2022 on aspirin and Brilinta  who presented from [facility] ED on 23 as a direct admit with cc adominal pain associated with n/v that started on 22. Seen  in ED with acute pancreatitis and improved with IV fluids and pain meds and discharged home with short course of norco.      /92 RR 21  non hypoxic, afebrile   WBC 16.2 CBC hemoconcentrated. Plt 808 K 5.9 AG 17 lipase 505      CT a/p IV contrast  1. Worsening inflammatory changes about the pancreas suggesting worsening   pancreatitis. This includes evolving mesenteric edema and fluid collections   particularly in the lesser sac of the abdomen. Stable necrotic appearing changes   are seen in the pancreatic tail.  Likely reactive regional mesenteric lymph nodes   are seen.       2. Increasing mesenteric varices. The splenic and portal veins remain patent   although the distal splenic vein appears attenuated likely from evolving   inflammatory changes. Principal Problem:    Acute on chronic pancreatitis // pancreatic pseudocyt   -   Outpatient follow-up with GI on 1/10/2023. Patient will need surgical intervention once chronic pancreatitis has improved a bit and inflammation has resolved to some extent. surgical treatment of chronic pancreatitis --> [place] procedure  Antiemetics as needed. Continue Protonix on discharge. IV Zosyn being stopped on 1/4 no evidence of infection. Lipase elevated without any increase in abdominal pain, patient advised to maintain liquid diet on discharge. [facility] be [facility] on [facility]. Patient was discharged on as needed Dilaudid and oxycodone and is advised to follow-up with pain management clinic. Hyperkalemia -   resolved  Potassium 4.0     CAD s/p TULIO - DAPT. Statin. BB. ACEi. Stable no angina      Cigarette nicotine dependence - improving on nicotine replacement outpatient and bupropion. AUD - in remission. GERD - Protonix      Disposition: Discharge to home today. Diet: ADULT DIET; Clear Liquid; Low Fat (less than or equal to 50 gm/day)  Code Status: Full Code    Follow Ups:  Follow-up with GI on 1/10/2023    Time spent in patient discharge and coordination 36 minutes. Plan was discussed with patient. All questions answered. Patient was stable at time of discharge. Instructions given to call a physician or return if any concerns.     Current Discharge Medication List        START taking these medications    Details   hyoscyamine (LEVSIN/SL) 125 MCG sublingual tablet Place 1 tablet under the tongue every 4 hours as needed for Cramping  Qty: 90 tablet, Refills: 3      simethicone (MYLICON) 80 MG chewable tablet Take 1 tablet by mouth every 6 hours as needed for Flatulence  Qty: 180 tablet, Refills: 3 lipase-protease-amylase (ZENPEP) 5000-40714 units CPEP delayed release capsule Take by mouth 3 times daily (with meals)  Qty: 450 capsule, Refills: 3      HYDROmorphone (DILAUDID) 2 MG tablet Take 1 tablet by mouth 2 times daily as needed for Pain for up to 5 days. Max Daily Amount: 4 mg  Qty: 10 tablet, Refills: 0    Comments: Reduce doses taken as pain becomes manageable  Associated Diagnoses: Acute on chronic pancreatitis (HCC)      oxyCODONE (OXY-IR) 10 MG immediate release tablet Take 1 tablet by mouth every 8 hours as needed for Pain for up to 5 days. Max Daily Amount: 30 mg  Qty: 15 tablet, Refills: 0    Comments: Reduce doses taken as pain becomes manageable  Associated Diagnoses: Acute on chronic pancreatitis ([address]           CONTINUE these medications which have CHANGED    Details   pantoprazole (PROTONIX) 40 MG tablet Take 1 tablet by mouth in the morning and at bedtime  Qty: 30 tablet, Refills: 3           CONTINUE these medications which have NOT CHANGED    Details   ondansetron (ZOFRAN) 4 MG tablet Take 1 tablet by mouth 3 times daily as needed for Nausea or Vomiting  Qty: 9 tablet, Refills: 0      naloxone (NARCAN) 4 MG/0.1ML LIQD nasal spray 1 spray by Nasal route as needed for Opioid Reversal  Qty: 1 each, Refills: 0      nitroGLYCERIN (NITROSTAT) 0.4 MG SL tablet Place 1 tablet under the tongue every 5 minutes as needed for Chest pain up to max of 3 total doses. If no relief after 1 dose, call 911.   Qty: 25 tablet, Refills: 3      buPROPion (WELLBUTRIN SR) 100 MG extended release tablet Take 1 tablet by mouth 2 times daily  Qty: 60 tablet, Refills: 3      aspirin 81 MG chewable tablet Take 1 tablet by mouth daily  Qty: 30 tablet, Refills: 3      atorvastatin (LIPITOR) 40 MG tablet Take 1 tablet by mouth nightly  Qty: 30 tablet, Refills: 3      lisinopril (PRINIVIL;ZESTRIL) 5 MG tablet Take 1 tablet by mouth daily  Qty: 30 tablet, Refills: 3      metoprolol tartrate (LOPRESSOR) 25 MG tablet Take 1 tablet by mouth 2 times daily  Qty: 60 tablet, Refills: 3      ticagrelor (BRILINTA) 90 MG TABS tablet Take 1 tablet by mouth 2 times daily  Qty: 60 tablet, Refills: 11           STOP taking these medications       colchicine (COLCRYS) 0.6 MG tablet Comments:   Reason for Stopping:         thiamine 100 MG tablet Comments:   Reason for Stopping:               Procedures done this admission:  * No surgery found *    Consults this admission:  IP CONSULT TO GI    Echocardiogram results:  11/02/22    TRANSTHORACIC ECHOCARDIOGRAM (TTE) COMPLETE (CONTRAST/BUBBLE/3D PRN) 11/02/2022  3:31 PM, 11/02/2022 12:00 AM (Final)    Interpretation Summary    Left Ventricle: Normal left ventricular systolic function with a visually estimated EF of 55 - 60%. Left ventricle size is normal. Normal wall thickness. Normal wall motion. Normal diastolic function. Technical qualifiers: Color flow Doppler was performed and pulse wave and/or continuous wave Doppler was performed. Signed by: Robert Maldonado MD on 11/2/2022  3:31 PM, Signed by: Unknown Provider Result on 11/2/2022 12:00 AM      Diagnostic Imaging/Tests:   CT ABDOMEN PELVIS W IV CONTRAST Additional Contrast? None    Result Date: 1/1/2023  1. Worsening inflammatory changes about the pancreas suggesting worsening pancreatitis. This includes evolving mesenteric edema and fluid collections particularly in the lesser sac of the abdomen. Stable necrotic appearing changes are seen in the pancreatic tail. Likely reactive regional mesenteric lymph nodes are seen. 2. Increasing mesenteric varices. The splenic and portal veins remain patent although the distal splenic vein appears attenuated likely from evolving inflammatory changes. This report was made using voice transcription.  Despite my best efforts to avoid any, transcription errors may persist. If there is any question about the accuracy of the report or need for clarification, then please call [phone], or text me through perfectserv for clarification or correction. CT ABDOMEN PELVIS W IV CONTRAST Additional Contrast? Radiologist Recommendation    Result Date: 12/23/2022  Improved inflammatory changes within the upper abdomen with a mild residual remaining. . The pseudocysts have also mostly improved although one at the pancreatic mid body is unchanged in size.         Labs: Results:       BMP, Mg, Phos Recent Labs     01/03/23  0541 01/04/23  0547    133   K 3.8 4.0    102   CO2 23 24   ANIONGAP 6 7   BUN 7 6   CREATININE 0.80 1.00   LABGLOM >60 >60   CALCIUM 8.9 9.1   GLUCOSE 78 78   MG 2.1 2.1      CBC Recent Labs     01/03/23  0541 01/04/23  0547   WBC 5.3 5.3   RBC 4.34 4.17*   HGB 11.0* 10.6*   HCT 34.6* 33.7*   MCV 79.7* 80.8*   MCH 25.3* 25.4*   MCHC 31.8 31.5   RDW 17.3* 17.0*    465*   MPV 8.9* 9.1*   NRBC 0.00 0.00   SEGS 63 60   LYMPHOPCT 23 22   EOSRELPCT 1 1   MONOPCT 12 16*   BASOPCT 1 1   IMMGRAN 0 0   SEGSABS 3.4 3.2   LYMPHSABS 1.2 1.2   EOSABS 0.0 0.1   MONOSABS 0.6 0.9   BASOSABS 0.0 0.0   ABSIMMGRAN 0.0 0.0      LFT Recent Labs     01/03/23  0541 01/04/23  0547   BILITOT 0.4 0.3   ALKPHOS 46* 46*   AST 22 25   ALT 27 32   PROT 6.5 6.7   LABALBU 2.4* 2.2*   GLOB 4.1 4.5      Cardiac  Lab Results   Component Value Date/Time    TROPHS 300.0 11/02/2022 10:53 AM      Coags No results found for: PROTIME, INR, APTT   A1c Lab Results   Component Value Date/Time    LABA1C 5.8 10/26/2022 05:58 AM     10/26/2022 05:58 AM      Lipids Lab Results   Component Value Date/Time    CHOL 88 11/03/2022 05:39 AM    LDLCALC 58.4 11/03/2022 05:39 AM    LABVLDL 16.6 11/03/2022 05:39 AM    HDL 13 11/03/2022 05:39 AM    CHOLHDLRATIO 6.8 11/03/2022 05:39 AM    TRIG 93 11/21/2022 05:58 AM      Thyroid  Lab Results   Component Value Date/Time    TSHELE 2.38 10/26/2022 05:58 AM        Most Recent UA Lab Results   Component Value Date/Time    COLORU YELLOW 12/24/2022 05:02 PM    APPEARANCE CLEAR 12/24/2022 05:02 PM SPECGRAV 1.025 12/24/2022 05:02 PM    LABPH 7.0 12/24/2022 05:02 PM    PROTEINU 100 12/24/2022 05:02 PM    GLUCOSEU Negative 12/24/2022 05:02 PM    KETUA TRACE 12/24/2022 05:02 PM    BILIRUBINUR SMALL 12/24/2022 05:02 PM    BLOODU Trace Intact 12/24/2022 05:02 PM    UROBILINOGEN 1.0 12/24/2022 05:02 PM    NITRU Negative 12/24/2022 05:02 PM    LEUKOCYTESUR Negative 12/24/2022 05:02 PM    WBCUA 0 12/24/2022 05:02 PM    RBCUA 0 12/24/2022 05:02 PM    EPITHUA 0 12/24/2022 05:02 PM    BACTERIA TRACE 12/24/2022 05:02 PM    LABCAST 0 12/24/2022 05:02 PM    MUCUS 0 12/24/2022 05:02 PM        Recent Labs     01/01/23  1949 01/01/23  1855   CULTURE NO GROWTH 4 DAYS NO GROWTH 4 DAYS       All Labs from Last 24 Hrs:  Recent Results (from the past 24 hour(s))   Lipase    Collection Time: 01/05/23  4:53 AM   Result Value Ref Range    Lipase 2,320 (H) 73 - 393 U/L   Lipase    Collection Time: 01/05/23 12:58 PM   Result Value Ref Range    Lipase 2,586 (H) 73 - 393 U/L       No Known Allergies  Immunization History   Administered Date(s) Administered    COVID-19, MODERNA BLUE border, Primary or Immunocompromised, (age 12y+), IM, 100 mcg/0.5mL 08/19/2021    Influenza Virus Vaccine 11/05/2015, 09/19/2018, 01/18/2020, 01/18/2020, 10/05/2020, 10/05/2020    Influenza, FLUARIX, FLULAVAL, FLUZONE (age 10 mo+) AND AFLURIA, (age 1 y+), PF, 0.5mL 11/05/2015, 01/18/2020, 10/05/2020    Pneumococcal Polysaccharide ([id number]) 02/22/2018    Tdap (Boostrix, Adacel) 10/11/2022       Recent Vital Data:  Patient Vitals for the past 24 hrs:   Temp Pulse Resp BP SpO2   01/05/23 1210 -- -- 16 -- --   01/05/23 1048 -- -- 16 -- --   01/05/23 0930 -- -- 16 -- --   01/05/23 0714 97.4 °F (36.3 °C) 81 18 125/75 99 %   01/05/23 0620 -- -- 16 -- --   01/05/23 0500 -- -- 18 -- --   01/05/23 0426 97.9 °F (36.6 °C) 71 18 107/68 98 %   01/05/23 0333 -- -- 16 -- --   01/05/23 0051 -- -- 16 -- --   01/05/23 0021 -- -- 16 -- --   01/04/23 2340 98.7 °F (37.1 °C) 82 16 115/73 96 %   01/04/23 2310 -- -- 16 -- --   01/04/23 2159 -- -- 18 -- --   01/04/23 2129 -- -- 16 -- --   01/04/23 2003 98.6 °F (37 °C) 74 18 118/71 96 %   01/04/23 1946 -- -- 16 -- --   01/04/23 1916 -- -- 18 -- --   01/04/23 1829 -- -- 18 -- --   01/04/23 1529 98.3 °F (36.8 °C) 78 16 (!) 100/59 98 %       Oxygen Therapy  SpO2: 99 %  O2 Device: None (Room air)    Estimated body mass index is 26.58 kg/m² as calculated from the following:    Height as of this encounter: 5' 9\" (1.753 m). Weight as of this encounter: 180 lb (81.6 kg). No intake or output data in the 24 hours ending 01/05/23 1407      Physical Exam:    General:    Well nourished. No overt distress  Head:  Normocephalic, atraumatic  Eyes:  Sclerae appear normal.  Pupils equally round. HENT:  Nares appear normal, no drainage. Moist mucous membranes  Neck:  No restricted ROM. Trachea midline  CV:   RRR. No m/r/g. No JVD  Lungs:   CTAB. No wheezing, rhonchi, or rales. Respirations even, unlabored  Abdomen:   Soft, mildly tender on deep palpation only, no guarding or rigidity nondistended. Extremities: Warm and dry. No cyanosis or clubbing. No edema. Skin:     No rashes. Normal coloration  Neuro:  CN II-XII grossly intact. Psych:  Normal mood and affect. Signed:  Edwige Meadows MD    Part of this note may have been written by using a voice dictation software. The note has been proof read but may still contain some grammatical/other typographical errors.

## 2023-01-05 NOTE — PROGRESS NOTES
Pt is resting in bed. Hourly rounds completed and all needs met. Bed is low, locked,call light is in reach and pt is encouraged to call for assistance.

## 2023-01-06 ENCOUNTER — CARE COORDINATION (OUTPATIENT)
Dept: CARE COORDINATION | Facility: CLINIC | Age: 38
End: 2023-01-06

## 2023-01-06 LAB
BACTERIA SPEC CULT: NORMAL
BACTERIA SPEC CULT: NORMAL
SERVICE CMNT-IMP: NORMAL
SERVICE CMNT-IMP: NORMAL

## 2023-01-06 NOTE — CARE COORDINATION
Care Transitions Outreach Attempt    Call within 2 business days of discharge: Yes   Attempted to reach patient for transitions of care follow up. Unable to reach patient. Patient: Carlton Cruz Patient : [id number] MRN: [id number]    Last Discharge  Street       Date Complaint Diagnosis Description Type Department Provider    23  Alcohol-induced chronic pancreatitis ([facility] [address] . .. Admission (Discharged) [id number] Shirley Che MD    23 Abdominal Pain Acute on chronic pancreatitis ([facility] [address] . .. ED (TRANSFER) MONROE Saldivar MD              Was this an external facility discharge?  No Discharge Facility: sfd    Noted following upcoming appointments from discharge chart review:   [facility] follow up appointment(s):   Future Appointments   Date Time Provider Angeli Whitney   2023  8:00 AM Rosario Weston MD SIM [facility] AMB   2023  4:00 PM Leticia Bearden MD UCDG [facility] AMB     Non-[facility] follow up appointment(s):  [facility]

## 2023-01-06 NOTE — CARE COORDINATION
Care Transitions Outreach Attempt    Call within 2 business days of discharge: Yes   Attempted to reach patient for transitions of care follow up. Unable to reach patient. Patient: John Arreaga Patient : [id number] MRN: [id number]    Last Discharge  Street       Date Complaint Diagnosis Description Type Department Provider    23  Alcohol-induced chronic pancreatitis ([facility] [address] . .. Admission (Discharged) SFD6MS Edwige Maedows MD    23 Abdominal Pain Acute on chronic pancreatitis ([facility] [address] . .. ED (TRANSFER) SFSED Glendia Runner, MD              Was this an external facility discharge?  No Discharge Facility: sfd    Noted following upcoming appointments from discharge chart review:   [facility] follow up appointment(s):   Future Appointments   Date Time Provider Angeli Whitney   2023  8:00 AM Yrn Whittington MD SIM [facility] AMB   2023  4:00 PM MD REMINGTON RomeDG GVL AMB     [facility]

## 2023-01-08 ENCOUNTER — HOSPITAL ENCOUNTER (EMERGENCY)
Age: 38
Discharge: LEFT BEFORE DISCHARGE | End: 2023-01-08

## 2023-01-08 ENCOUNTER — APPOINTMENT (OUTPATIENT)
Dept: CT IMAGING | Age: 38
DRG: 439 | End: 2023-01-08
Attending: FAMILY MEDICINE

## 2023-01-08 ENCOUNTER — HOSPITAL ENCOUNTER (EMERGENCY)
Age: 38
Discharge: ANOTHER HOSPITAL | End: 2023-01-08
Attending: EMERGENCY MEDICINE

## 2023-01-08 ENCOUNTER — HOSPITAL ENCOUNTER (INPATIENT)
Age: 38
LOS: 10 days | Discharge: HOME | DRG: 439 | End: 2023-01-18
Attending: FAMILY MEDICINE | Admitting: FAMILY MEDICINE

## 2023-01-08 VITALS
WEIGHT: 180 LBS | TEMPERATURE: 98.7 F | RESPIRATION RATE: 16 BRPM | DIASTOLIC BLOOD PRESSURE: 72 MMHG | HEIGHT: 69 IN | OXYGEN SATURATION: 100 % | SYSTOLIC BLOOD PRESSURE: 122 MMHG | BODY MASS INDEX: 26.66 KG/M2 | HEART RATE: 72 BPM

## 2023-01-08 DIAGNOSIS — R10.13 ABDOMINAL PAIN, EPIGASTRIC: ICD-10-CM

## 2023-01-08 DIAGNOSIS — K85.91 NECROTIZING PANCREATITIS: ICD-10-CM

## 2023-01-08 DIAGNOSIS — K85.90 PANCREATITIS, RECURRENT: Primary | ICD-10-CM

## 2023-01-08 DIAGNOSIS — Z09 HOSPITAL DISCHARGE FOLLOW-UP: Primary | ICD-10-CM

## 2023-01-08 LAB
ALBUMIN SERPL-MCNC: 4.2 G/DL (ref 3.5–5)
ALBUMIN/GLOB SERPL: 0.9 (ref 0.4–1.6)
ALP SERPL-CCNC: 85 U/L (ref 45–117)
ALT SERPL-CCNC: 28 U/L (ref 13–61)
AMORPH CRY URNS QL MICRO: ABNORMAL
ANION GAP SERPL CALC-SCNC: 19 MMOL/L (ref 2–11)
APPEARANCE UR: CLEAR
AST SERPL-CCNC: 29 U/L (ref 15–37)
BACTERIA URNS QL MICRO: 0 /HPF
BASOPHILS # BLD: 0 K/UL (ref 0–0.2)
BASOPHILS NFR BLD: 0 % (ref 0–2)
BILIRUB SERPL-MCNC: 0.7 MG/DL (ref 0.2–1.1)
BILIRUB UR QL: ABNORMAL
BUN SERPL-MCNC: 17 MG/DL (ref 7–18)
CALCIUM SERPL-MCNC: 10.3 MG/DL (ref 8.3–10.4)
CASTS URNS QL MICRO: 0 /LPF
CHLORIDE SERPL-SCNC: 93 MMOL/L (ref 98–107)
CO2 SERPL-SCNC: 23 MMOL/L (ref 21–32)
COLOR UR: ABNORMAL
CREAT SERPL-MCNC: 0.92 MG/DL (ref 0.8–1.5)
CRYSTALS URNS QL MICRO: 0 /LPF
DIFFERENTIAL METHOD BLD: ABNORMAL
EOSINOPHIL # BLD: 0.1 K/UL (ref 0–0.8)
EOSINOPHIL NFR BLD: 1 % (ref 0.5–7.8)
EPI CELLS #/AREA URNS HPF: ABNORMAL /HPF
ERYTHROCYTE [DISTWIDTH] IN BLOOD BY AUTOMATED COUNT: 17 % (ref 11.9–14.6)
GLOBULIN SER CALC-MCNC: 4.5 G/DL (ref 2.8–4.5)
GLUCOSE SERPL-MCNC: 98 MG/DL (ref 65–100)
GLUCOSE UR STRIP.AUTO-MCNC: 100 MG/DL
HCT VFR BLD AUTO: 44.5 % (ref 41.1–50.3)
HGB BLD-MCNC: 14.7 G/DL (ref 13.6–17.2)
HGB UR QL STRIP: ABNORMAL
IMM GRANULOCYTES # BLD AUTO: 0 K/UL (ref 0–0.5)
IMM GRANULOCYTES NFR BLD AUTO: 0 % (ref 0–5)
KETONES UR QL STRIP.AUTO: 40 MG/DL
LEUKOCYTE ESTERASE UR QL STRIP.AUTO: NEGATIVE
LIPASE SERPL-CCNC: 837 U/L (ref 13–60)
LYMPHOCYTES # BLD: 1.4 K/UL (ref 0.5–4.6)
LYMPHOCYTES NFR BLD: 13 % (ref 13–44)
MCH RBC QN AUTO: 25.3 PG (ref 26.1–32.9)
MCHC RBC AUTO-ENTMCNC: 33 G/DL (ref 31.4–35)
MCV RBC AUTO: 76.7 FL (ref 82–102)
MONOCYTES # BLD: 0.7 K/UL (ref 0.1–1.3)
MONOCYTES NFR BLD: 6 % (ref 4–12)
MUCOUS THREADS URNS QL MICRO: ABNORMAL /LPF
NEUTS SEG # BLD: 8.4 K/UL (ref 1.7–8.2)
NEUTS SEG NFR BLD: 79 % (ref 43–78)
NITRITE UR QL STRIP.AUTO: NEGATIVE
NRBC # BLD: 0 K/UL (ref 0–0.2)
OTHER OBSERVATIONS: ABNORMAL
PH UR STRIP: 7 (ref 5–9)
PLATELET # BLD AUTO: 717 K/UL (ref 150–450)
PMV BLD AUTO: 8.5 FL (ref 9.4–12.3)
POTASSIUM SERPL-SCNC: 4.8 MMOL/L (ref 3.5–5.1)
PROT SERPL-MCNC: 8.7 G/DL (ref 6.4–8.2)
PROT UR STRIP-MCNC: 100 MG/DL
RBC # BLD AUTO: 5.8 M/UL (ref 4.23–5.6)
RBC #/AREA URNS HPF: ABNORMAL /HPF
SODIUM SERPL-SCNC: 135 MMOL/L (ref 133–143)
SP GR UR REFRACTOMETRY: 1.02 (ref 1–1.02)
UROBILINOGEN UR QL STRIP.AUTO: >=8 EU/DL (ref 0.2–1)
WBC # BLD AUTO: 10.6 K/UL (ref 4.3–11.1)
WBC URNS QL MICRO: ABNORMAL /HPF

## 2023-01-08 PROCEDURE — 81001 URINALYSIS AUTO W/SCOPE: CPT

## 2023-01-08 PROCEDURE — A4216 STERILE WATER/SALINE, 10 ML: HCPCS | Performed by: EMERGENCY MEDICINE

## 2023-01-08 PROCEDURE — 96375 TX/PRO/DX INJ NEW DRUG ADDON: CPT | Performed by: EMERGENCY MEDICINE

## 2023-01-08 PROCEDURE — 2500000003 HC RX 250 WO HCPCS: Performed by: EMERGENCY MEDICINE

## 2023-01-08 PROCEDURE — 99285 EMERGENCY DEPT VISIT HI MDM: CPT | Performed by: EMERGENCY MEDICINE

## 2023-01-08 PROCEDURE — 85025 COMPLETE CBC W/AUTO DIFF WBC: CPT

## 2023-01-08 PROCEDURE — 6360000004 HC RX CONTRAST MEDICATION: Performed by: FAMILY MEDICINE

## 2023-01-08 PROCEDURE — 2580000003 HC RX 258: Performed by: FAMILY MEDICINE

## 2023-01-08 PROCEDURE — 6360000002 HC RX W HCPCS: Performed by: EMERGENCY MEDICINE

## 2023-01-08 PROCEDURE — 1100000000 HC RM PRIVATE

## 2023-01-08 PROCEDURE — 6370000000 HC RX 637 (ALT 250 FOR IP): Performed by: EMERGENCY MEDICINE

## 2023-01-08 PROCEDURE — 80053 COMPREHEN METABOLIC PANEL: CPT

## 2023-01-08 PROCEDURE — 2580000003 HC RX 258: Performed by: EMERGENCY MEDICINE

## 2023-01-08 PROCEDURE — 74177 CT ABD & PELVIS W/CONTRAST: CPT

## 2023-01-08 PROCEDURE — 96374 THER/PROPH/DIAG INJ IV PUSH: CPT | Performed by: EMERGENCY MEDICINE

## 2023-01-08 PROCEDURE — 83690 ASSAY OF LIPASE: CPT

## 2023-01-08 PROCEDURE — 6360000002 HC RX W HCPCS: Performed by: FAMILY MEDICINE

## 2023-01-08 PROCEDURE — 6370000000 HC RX 637 (ALT 250 FOR IP): Performed by: FAMILY MEDICINE

## 2023-01-08 PROCEDURE — 96361 HYDRATE IV INFUSION ADD-ON: CPT | Performed by: EMERGENCY MEDICINE

## 2023-01-08 PROCEDURE — 96376 TX/PRO/DX INJ SAME DRUG ADON: CPT | Performed by: EMERGENCY MEDICINE

## 2023-01-08 RX ORDER — LIDOCAINE HYDROCHLORIDE 20 MG/ML
15 SOLUTION OROPHARYNGEAL
Status: COMPLETED | OUTPATIENT
Start: 2023-01-08 | End: 2023-01-08

## 2023-01-08 RX ORDER — ATORVASTATIN CALCIUM 40 MG/1
40 TABLET, FILM COATED ORAL NIGHTLY
Status: DISCONTINUED | OUTPATIENT
Start: 2023-01-08 | End: 2023-01-18 | Stop reason: HOSPADM

## 2023-01-08 RX ORDER — ACETAMINOPHEN 325 MG/1
650 TABLET ORAL EVERY 6 HOURS PRN
Status: DISCONTINUED | OUTPATIENT
Start: 2023-01-08 | End: 2023-01-18 | Stop reason: HOSPADM

## 2023-01-08 RX ORDER — 0.9 % SODIUM CHLORIDE 0.9 %
1000 INTRAVENOUS SOLUTION INTRAVENOUS
Status: COMPLETED | OUTPATIENT
Start: 2023-01-08 | End: 2023-01-08

## 2023-01-08 RX ORDER — MAGNESIUM HYDROXIDE/ALUMINUM HYDROXICE/SIMETHICONE 120; 1200; 1200 MG/30ML; MG/30ML; MG/30ML
30 SUSPENSION ORAL
Status: COMPLETED | OUTPATIENT
Start: 2023-01-08 | End: 2023-01-08

## 2023-01-08 RX ORDER — SODIUM CHLORIDE, SODIUM LACTATE, POTASSIUM CHLORIDE, CALCIUM CHLORIDE 600; 310; 30; 20 MG/100ML; MG/100ML; MG/100ML; MG/100ML
INJECTION, SOLUTION INTRAVENOUS CONTINUOUS
Status: DISCONTINUED | OUTPATIENT
Start: 2023-01-08 | End: 2023-01-18 | Stop reason: HOSPADM

## 2023-01-08 RX ORDER — ONDANSETRON 2 MG/ML
4 INJECTION INTRAMUSCULAR; INTRAVENOUS
Status: COMPLETED | OUTPATIENT
Start: 2023-01-08 | End: 2023-01-08

## 2023-01-08 RX ORDER — POLYETHYLENE GLYCOL 3350 17 G/17G
17 POWDER, FOR SOLUTION ORAL DAILY PRN
Status: DISCONTINUED | OUTPATIENT
Start: 2023-01-08 | End: 2023-01-13

## 2023-01-08 RX ORDER — SODIUM CHLORIDE 0.9 % (FLUSH) 0.9 %
5-40 SYRINGE (ML) INJECTION PRN
Status: DISCONTINUED | OUTPATIENT
Start: 2023-01-08 | End: 2023-01-18 | Stop reason: HOSPADM

## 2023-01-08 RX ORDER — ASPIRIN 81 MG/1
81 TABLET, CHEWABLE ORAL DAILY
Status: DISCONTINUED | OUTPATIENT
Start: 2023-01-08 | End: 2023-01-18 | Stop reason: HOSPADM

## 2023-01-08 RX ORDER — OXYCODONE HYDROCHLORIDE 5 MG/1
5 TABLET ORAL EVERY 4 HOURS PRN
Status: DISCONTINUED | OUTPATIENT
Start: 2023-01-08 | End: 2023-01-14 | Stop reason: SDUPTHER

## 2023-01-08 RX ORDER — ONDANSETRON 4 MG/1
4 TABLET, ORALLY DISINTEGRATING ORAL EVERY 8 HOURS PRN
Status: DISCONTINUED | OUTPATIENT
Start: 2023-01-08 | End: 2023-01-18 | Stop reason: HOSPADM

## 2023-01-08 RX ORDER — SODIUM CHLORIDE 9 MG/ML
INJECTION, SOLUTION INTRAVENOUS PRN
Status: DISCONTINUED | OUTPATIENT
Start: 2023-01-08 | End: 2023-01-18 | Stop reason: HOSPADM

## 2023-01-08 RX ORDER — ENOXAPARIN SODIUM 100 MG/ML
40 INJECTION SUBCUTANEOUS DAILY
Status: DISCONTINUED | OUTPATIENT
Start: 2023-01-09 | End: 2023-01-18 | Stop reason: HOSPADM

## 2023-01-08 RX ORDER — HYDROMORPHONE HYDROCHLORIDE 1 MG/ML
1 INJECTION, SOLUTION INTRAMUSCULAR; INTRAVENOUS; SUBCUTANEOUS
Status: COMPLETED | OUTPATIENT
Start: 2023-01-08 | End: 2023-01-08

## 2023-01-08 RX ORDER — SODIUM CHLORIDE 0.9 % (FLUSH) 0.9 %
5-40 SYRINGE (ML) INJECTION EVERY 12 HOURS SCHEDULED
Status: DISCONTINUED | OUTPATIENT
Start: 2023-01-08 | End: 2023-01-18 | Stop reason: HOSPADM

## 2023-01-08 RX ORDER — ACETAMINOPHEN 650 MG/1
650 SUPPOSITORY RECTAL EVERY 6 HOURS PRN
Status: DISCONTINUED | OUTPATIENT
Start: 2023-01-08 | End: 2023-01-18 | Stop reason: HOSPADM

## 2023-01-08 RX ORDER — 0.9 % SODIUM CHLORIDE 0.9 %
100 INTRAVENOUS SOLUTION INTRAVENOUS ONCE
Status: COMPLETED | OUTPATIENT
Start: 2023-01-08 | End: 2023-01-09

## 2023-01-08 RX ORDER — HYDROCODONE BITARTRATE AND ACETAMINOPHEN 5; 325 MG/1; MG/1
1 TABLET ORAL
Status: COMPLETED | OUTPATIENT
Start: 2023-01-08 | End: 2023-01-08

## 2023-01-08 RX ORDER — SIMETHICONE 80 MG
80 TABLET,CHEWABLE ORAL EVERY 6 HOURS PRN
Status: DISCONTINUED | OUTPATIENT
Start: 2023-01-08 | End: 2023-01-18 | Stop reason: HOSPADM

## 2023-01-08 RX ORDER — LISINOPRIL 5 MG/1
5 TABLET ORAL DAILY
Status: DISCONTINUED | OUTPATIENT
Start: 2023-01-09 | End: 2023-01-11

## 2023-01-08 RX ORDER — BUPROPION HYDROCHLORIDE 100 MG/1
100 TABLET, EXTENDED RELEASE ORAL 2 TIMES DAILY
Status: DISCONTINUED | OUTPATIENT
Start: 2023-01-08 | End: 2023-01-18 | Stop reason: HOSPADM

## 2023-01-08 RX ORDER — PANTOPRAZOLE SODIUM 40 MG/1
40 TABLET, DELAYED RELEASE ORAL 2 TIMES DAILY
Status: DISCONTINUED | OUTPATIENT
Start: 2023-01-08 | End: 2023-01-18 | Stop reason: HOSPADM

## 2023-01-08 RX ORDER — ONDANSETRON 2 MG/ML
4 INJECTION INTRAMUSCULAR; INTRAVENOUS EVERY 6 HOURS PRN
Status: DISCONTINUED | OUTPATIENT
Start: 2023-01-08 | End: 2023-01-18 | Stop reason: HOSPADM

## 2023-01-08 RX ADMIN — ONDANSETRON 4 MG: 4 TABLET, ORALLY DISINTEGRATING ORAL at 23:24

## 2023-01-08 RX ADMIN — SODIUM CHLORIDE, PRESERVATIVE FREE 5 ML: 5 INJECTION INTRAVENOUS at 23:24

## 2023-01-08 RX ADMIN — PANTOPRAZOLE SODIUM 40 MG: 40 TABLET, DELAYED RELEASE ORAL at 23:45

## 2023-01-08 RX ADMIN — SODIUM CHLORIDE 100 ML: 9 INJECTION, SOLUTION INTRAVENOUS at 23:53

## 2023-01-08 RX ADMIN — TICAGRELOR 90 MG: 90 TABLET ORAL at 23:46

## 2023-01-08 RX ADMIN — ALUMINUM HYDROXIDE, MAGNESIUM HYDROXIDE, AND SIMETHICONE 30 ML: 200; 200; 20 SUSPENSION ORAL at 17:42

## 2023-01-08 RX ADMIN — ONDANSETRON 4 MG: 2 INJECTION INTRAMUSCULAR; INTRAVENOUS at 17:43

## 2023-01-08 RX ADMIN — HYDROCODONE BITARTRATE AND ACETAMINOPHEN 1 TABLET: 5; 325 TABLET ORAL at 19:04

## 2023-01-08 RX ADMIN — HYDROMORPHONE HYDROCHLORIDE 1 MG: 1 INJECTION, SOLUTION INTRAMUSCULAR; INTRAVENOUS; SUBCUTANEOUS at 17:43

## 2023-01-08 RX ADMIN — SODIUM CHLORIDE 1000 ML: 9 INJECTION, SOLUTION INTRAVENOUS at 17:36

## 2023-01-08 RX ADMIN — HYDROMORPHONE HYDROCHLORIDE 1 MG: 1 INJECTION, SOLUTION INTRAMUSCULAR; INTRAVENOUS; SUBCUTANEOUS at 20:59

## 2023-01-08 RX ADMIN — LIDOCAINE HYDROCHLORIDE 15 ML: 20 SOLUTION ORAL; TOPICAL at 17:42

## 2023-01-08 RX ADMIN — METOPROLOL TARTRATE 25 MG: 25 TABLET, FILM COATED ORAL at 23:45

## 2023-01-08 RX ADMIN — SODIUM CHLORIDE 1000 ML: 900 INJECTION, SOLUTION INTRAVENOUS at 18:34

## 2023-01-08 RX ADMIN — BUPROPION HYDROCHLORIDE 100 MG: 100 TABLET, FILM COATED, EXTENDED RELEASE ORAL at 23:45

## 2023-01-08 RX ADMIN — IOPAMIDOL 100 ML: 755 INJECTION, SOLUTION INTRAVENOUS at 23:58

## 2023-01-08 RX ADMIN — HYDROMORPHONE HYDROCHLORIDE 1 MG: 1 INJECTION, SOLUTION INTRAMUSCULAR; INTRAVENOUS; SUBCUTANEOUS at 23:24

## 2023-01-08 RX ADMIN — FAMOTIDINE 20 MG: 10 INJECTION, SOLUTION INTRAVENOUS at 17:43

## 2023-01-08 RX ADMIN — ASPIRIN 81 MG 81 MG: 81 TABLET ORAL at 23:46

## 2023-01-08 ASSESSMENT — PAIN SCALES - GENERAL
PAINLEVEL_OUTOF10: 8
PAINLEVEL_OUTOF10: 9
PAINLEVEL_OUTOF10: 9
PAINLEVEL_OUTOF10: 6
PAINLEVEL_OUTOF10: 9
PAINLEVEL_OUTOF10: 9
PAINLEVEL_OUTOF10: 8
PAINLEVEL_OUTOF10: 9
PAINLEVEL_OUTOF10: 7
PAINLEVEL_OUTOF10: 8

## 2023-01-08 ASSESSMENT — PAIN - FUNCTIONAL ASSESSMENT: PAIN_FUNCTIONAL_ASSESSMENT: 0-10

## 2023-01-08 ASSESSMENT — ENCOUNTER SYMPTOMS
NAUSEA: 1
ABDOMINAL PAIN: 1
RESPIRATORY NEGATIVE: 1
VOMITING: 1

## 2023-01-08 ASSESSMENT — PAIN DESCRIPTION - ONSET
ONSET: ON-GOING

## 2023-01-08 ASSESSMENT — PAIN DESCRIPTION - LOCATION
LOCATION: ABDOMEN;FLANK
LOCATION: ABDOMEN;FLANK;BACK
LOCATION: ABDOMEN;BACK;FLANK

## 2023-01-08 ASSESSMENT — PAIN DESCRIPTION - DIRECTION
RADIATING_TOWARDS: BACK

## 2023-01-08 ASSESSMENT — PAIN DESCRIPTION - FREQUENCY
FREQUENCY: CONTINUOUS

## 2023-01-08 ASSESSMENT — PAIN DESCRIPTION - PAIN TYPE: TYPE: ACUTE PAIN

## 2023-01-08 ASSESSMENT — PAIN DESCRIPTION - DESCRIPTORS
DESCRIPTORS: ACHING;SHARP
DESCRIPTORS: SHARP;ACHING
DESCRIPTORS: SHARP

## 2023-01-08 NOTE — ED PROVIDER NOTES
Emergency Department Provider Note                   PCP: Niraj Loomis MD               Age: 44 y.o. Sex: male       ICD-10-CM    1. Pancreatitis, recurrent  K85.90       2. Abdominal pain, epigastric  R10.13           DISPOSITION Decision To Transfer 01/08/2023 06:53:46 PM       Medical Decision Making  Peptic ulcer disease, esophagitis, GERD    Pancreatitis, pancreatic pseudocyst,      Amount and/or Complexity of Data Reviewed  Labs: ordered. Decision-making details documented in ED Course. Risk  OTC drugs. Prescription drug management. Complexity of Problem: 1 or more chronic illnesses with severe exacerbation. (5)  The patients assessment required an independent historian: I spoke with a family member. I have conducted an independent ordering and review of Labs. I have reviewed records from an external source: provider visit notes from PCP. I have reviewed records from an external source: provider visit notes from outside specialist.  Considerations: Shared decision making was utilized in the care of this patient. Patient was admitted I have communication with admitting physician. ED Course as of 01/08/23 1856   Harlow Bamberger Jan 08, 2023   1844 Patient states pain is improving but he is still nauseated. He has been able to drink some fluids here in the emergency department. I talked to him about trying different medications at home versus readmission to the hospital and he is uncomfortable with going home at this time. [KH]   1852 [facility] is agreeable with admitting the patient for continued treatment. [KH]      ED Course User Index  [KH] Albino Moeller DO        Orders Placed This Encounter   Procedures    Lipase    Comprehensive Metabolic Panel    CBC with Auto Differential    Urinalysis w rflx microscopic    ADULT DIET;  Full Liquid        Medications   0.9 % sodium chloride bolus (1,000 mLs IntraVENous New Bag 1/8/23 1834)   HYDROcodone-acetaminophen (NORCO) 5-325 MG per tablet 1 tablet (has no administration in time range)   0.9 % sodium chloride bolus (1,000 mLs IntraVENous New Bag 1/8/23 1736)   ondansetron (ZOFRAN) injection 4 mg (4 mg IntraVENous Given 1/8/23 1743)   HYDROmorphone HCl PF (DILAUDID) injection 1 mg (1 mg IntraVENous Given 1/8/23 1743)   aluminum & magnesium hydroxide-simethicone (MAALOX) 200-200-20 MG/5ML suspension 30 mL (30 mLs Oral Given 1/8/23 1742)   lidocaine viscous hcl (XYLOCAINE) 2 % solution 15 mL (15 mLs Mouth/Throat Given 1/8/23 1742)   famotidine (PEPCID) 20 mg in sodium chloride (PF) 0.9 % 10 mL injection (20 mg IntraVENous Given 1/8/23 1743)       New Prescriptions    No medications on file        Jay Chua is a 44 y.o. male who presents to the Emergency Department with chief complaint of    Chief Complaint   Patient presents with    Abdominal Pain      69-year-old male presenting to the emergency department today complaining of pain in the epigastric region and diffusely throughout the abdomen nausea and vomiting. The patient was discharged from the hospital on 1/5/2023 where he was admitted for pancreatitis. Patient said he was feeling better and they were going to try management of his pancreatitis at home but over the last 3 days has not been able to eat or drink anything without throwing up almost immediately. Patient is having trouble keeping his medications down and says his urination has dropped to 1-2 times a day and is very concentrated. He has been getting lightheaded and dizzy but has not had a syncopal event. Review of Systems   Constitutional:  Positive for activity change, appetite change and fatigue. Respiratory: Negative. Cardiovascular: Negative. Gastrointestinal:  Positive for abdominal pain, nausea and vomiting. Genitourinary:  Positive for decreased urine volume. Musculoskeletal: Negative. Skin: Negative.     Neurological:  Positive for light-headedness. All other systems reviewed and are negative.     Past Medical History:   Diagnosis Date    Abdominal pain 10/25/2022    Abnormal CT of the abdomen 31/01/4799    Acute alcoholic pancreatitis 70/55/9907    Acute kidney injury (nontraumatic) ([facility] [address] 10/31/2022    pt denies    Acute on chronic pancreatitis ([facility] [address] 07/30/2022    Alcohol use disorder, severe, dependence ([address] 08/21/2019    CAD (coronary artery disease)     Carpal tunnel syndrome, left     Cigarette smoker     Coronary artery disease involving native coronary artery of native heart 11/13/2022    Dependence on nicotine from cigarettes 11/20/2022    GERD (gastroesophageal reflux disease)     controlled with med    History of pancreatitis     x 4 or 5 times-- last time admitted to hospital 10/10/26/22 x 7 days    Hypertension     controlled with med    STEMI (ST elevation myocardial infarction) ([facility] [address] 11/2/2022        Past Surgical History:   Procedure Laterality Date    CARDIAC PROCEDURE N/A 11/2/2022    LEFT HEART CATH / CORONARY ANGIOGRAPHY performed by Cassie Bartholomew MD at [address] [place] [address] CATH LAB    CARDIAC PROCEDURE N/A 11/2/2022    Percutaneous coronary intervention performed by Cassie Bartholomew MD at [address] [place] [address] CATH LAB    CHOLECYSTECTOMY  2012    ORTHOPEDIC SURGERY Left     wrist surg--nerve surg    ORTHOPEDIC SURGERY Left     foot surg as child    UPPER GASTROINTESTINAL ENDOSCOPY N/A 11/9/2022    EGD/ENDOSCOPIC ULTRASOUND/AXIOS STENT ROOM 2226 **Rep will be present performed by Mani Allan MD at [facility] ENDOSCOPY    UPPER GASTROINTESTINAL ENDOSCOPY N/A 11/30/2022    EGD/ENDOSCOPIC ULTRASOUND stent removal  performed by Mani Allan MD at [place] [address] N/A 11/30/2022    EGD ESOPHAGOGASTRODUODENOSCOPY performed by Mani Allan MD at [facility] ENDOSCOPY        Family History   Problem Relation Age of Onset    No Known Problems Mother     Heart Attack Father         MI at 58        Social History     Socioeconomic History    Marital status:      Spouse name: None    Number of children: None    Years of education: None    Highest education level: None   Tobacco Use    Smoking status: Every Day     Packs/day: 1.00     Years: 20.00     Pack years: 20.00     Types: Cigarettes    Smokeless tobacco: Never   Vaping Use    Vaping Use: Never used   Substance and Sexual Activity    Alcohol use: Not Currently     Comment: none since 10/4/22-- had drank 3 glasses of wine daily    Drug use: No        Allergies: Patient has no known allergies. Previous Medications    ASPIRIN 81 MG CHEWABLE TABLET    Take 1 tablet by mouth daily    ATORVASTATIN (LIPITOR) 40 MG TABLET    Take 1 tablet by mouth nightly    BUPROPION (WELLBUTRIN SR) 100 MG EXTENDED RELEASE TABLET    Take 1 tablet by mouth 2 times daily    HYDROMORPHONE (DILAUDID) 2 MG TABLET    Take 1 tablet by mouth 2 times daily as needed for Pain for up to 5 days. Max Daily Amount: 4 mg    HYOSCYAMINE (LEVSIN/SL) 125 MCG SUBLINGUAL TABLET    Place 1 tablet under the tongue every 4 hours as needed for Cramping    LIPASE-PROTEASE-AMYLASE (ZENPEP) [phone] UNITS CPEP DELAYED RELEASE CAPSULE    Take by mouth 3 times daily (with meals)    LISINOPRIL (PRINIVIL;ZESTRIL) 5 MG TABLET    Take 1 tablet by mouth daily    METOPROLOL TARTRATE (LOPRESSOR) 25 MG TABLET    Take 1 tablet by mouth 2 times daily    NALOXONE (NARCAN) 4 MG/0.1ML LIQD NASAL SPRAY    1 spray by Nasal route as needed for Opioid Reversal    NITROGLYCERIN (NITROSTAT) 0.4 MG SL TABLET    Place 1 tablet under the tongue every 5 minutes as needed for Chest pain up to max of 3 total doses. If no relief after 1 dose, call 911. ONDANSETRON (ZOFRAN) 4 MG TABLET    Take 1 tablet by mouth 3 times daily as needed for Nausea or Vomiting    OXYCODONE (OXY-IR) 10 MG IMMEDIATE RELEASE TABLET    Take 1 tablet by mouth every 8 hours as needed for Pain for up to 5 days.  Max Daily Amount: 30 mg    PANTOPRAZOLE (PROTONIX) 40 MG TABLET    Take 1 tablet by mouth in the morning and at bedtime    SIMETHICONE (MYLICON) 80 MG CHEWABLE TABLET    Take 1 tablet by mouth every 6 hours as needed for Flatulence    TICAGRELOR (BRILINTA) 90 MG TABS TABLET    Take 1 tablet by mouth 2 times daily        Vitals signs and nursing note reviewed. Patient Vitals for the past 4 hrs:   Temp Pulse Resp BP SpO2   01/08/23 1657 99 °F (37.2 °C) 95 18 -- 98 %   01/08/23 1656 -- -- -- 116/60 --          Physical Exam     GENERAL:The patient has Body mass index is 26.58 kg/m². dehydrated. Ill-appearing  VITAL SIGNS: Heart rate, blood pressure, respiratory rate reviewed as recorded in  nurse's notes  EYES: Pupils reactive. Extraocular motion intact. No conjunctival redness or drainage. MOUTH/THROAT: Pharynx clear; airway patent. NECK: Supple, no meningeal signs. Trachea midline. No masses or thyromegaly. LUNGS: Breath sounds clear and equal bilaterally no accessory muscle use. CHEST: No deformity  CARDIOVASCULAR: Regular rate and rhythm  ABDOMEN: Diffuse tenderness to palpation throughout the abdomen with increased pain in the epigastric region. Actively vomiting on arrival to the emergency department. EXTREMITIES: No clubbing or cyanosis. No joint swelling. Normal muscle tone. No  restricted range of motion appreciated. NEUROLOGIC: Sensation is grossly intact. Cranial nerve exam reveals face is  symmetrical, tongue is midline speech is clear. SKIN: No rash or petechiae. Decreased skin turgor palpated. PSYCHIATRIC: Alert and oriented. Appropriate behavior and judgment.       Procedures    Results for orders placed or performed during the hospital encounter of 01/08/23   Lipase   Result Value Ref Range    Lipase 837 (H) 13 - 60 U/L   Comprehensive Metabolic Panel   Result Value Ref Range    Sodium 135 133 - 143 mmol/L    Potassium 4.8 3.5 - 5.1 mmol/L    Chloride 93 (L) 98 - 107 mmol/L    CO2 23 21 - 32 mmol/L    Anion Gap 19 (H) 2 - 11 mmol/L    Glucose 98 65 - 100 mg/dL    BUN 17 7.0 - 18.0 MG/DL    Creatinine 0.92 0.8 - 1.5 MG/DL    Est, Glom Filt Rate >60 >60 ml/min/1.73m2    Calcium 10.3 8.3 - 10.4 MG/DL    Total Bilirubin 0.7 0.2 - 1.1 MG/DL    ALT 28 13.0 - 61.0 U/L    AST 29 15 - 37 U/L    Alk Phosphatase 85 45.0 - 117.0 U/L    Total Protein 8.7 (H) 6.4 - 8.2 g/dL    Albumin 4.2 3.5 - 5.0 g/dL    Globulin 4.5 2.8 - 4.5 g/dL    Albumin/Globulin Ratio 0.9 0.4 - 1.6     CBC with Auto Differential   Result Value Ref Range    WBC 10.6 4.3 - 11.1 K/uL    RBC 5.80 (H) 4.23 - 5.60 M/uL    Hemoglobin 14.7 13.6 - 17.2 g/dL    Hematocrit 44.5 41.1 - 50.3 %    MCV 76.7 (L) 82.0 - 102.0 FL    MCH 25.3 (L) 26.1 - 32.9 PG    MCHC 33.0 31.4 - 35.0 g/dL    RDW 17.0 (H) 11.9 - 14.6 %    Platelets 351 (H) 092 - 450 K/uL    MPV 8.5 (L) 9.4 - 12.3 FL    nRBC 0.00 0.0 - 0.2 K/uL    Differential Type AUTOMATED      Seg Neutrophils 79 (H) 43 - 78 %    Lymphocytes 13 13 - 44 %    Monocytes 6 4.0 - 12.0 %    Eosinophils % 1 0.5 - 7.8 %    Basophils 0 0.0 - 2.0 %    Immature Granulocytes 0 0.0 - 5.0 %    Segs Absolute 8.4 (H) 1.7 - 8.2 K/UL    Absolute Lymph # 1.4 0.5 - 4.6 K/UL    Absolute Mono # 0.7 0.1 - 1.3 K/UL    Absolute Eos # 0.1 0.0 - 0.8 K/UL    Basophils Absolute 0.0 0.0 - 0.2 K/UL    Absolute Immature Granulocyte 0.0 0.0 - 0.5 K/UL        No orders to display                         Voice dictation software was used during the making of this note. This software is not perfect and grammatical and other typographical errors may be present. This note has not been completely proofread for errors.        Lisa Chavez DO  01/08/23 [id number]

## 2023-01-09 ENCOUNTER — CARE COORDINATION (OUTPATIENT)
Dept: CARE COORDINATION | Facility: CLINIC | Age: 38
End: 2023-01-09

## 2023-01-09 LAB
ANION GAP SERPL CALC-SCNC: 7 MMOL/L (ref 2–11)
BASOPHILS # BLD: 0 K/UL (ref 0–0.2)
BASOPHILS NFR BLD: 1 % (ref 0–2)
BUN SERPL-MCNC: 12 MG/DL (ref 6–23)
CALCIUM SERPL-MCNC: 8.9 MG/DL (ref 8.3–10.4)
CHLORIDE SERPL-SCNC: 102 MMOL/L (ref 101–110)
CO2 SERPL-SCNC: 23 MMOL/L (ref 21–32)
CREAT SERPL-MCNC: 0.9 MG/DL (ref 0.8–1.5)
DIFFERENTIAL METHOD BLD: ABNORMAL
EOSINOPHIL # BLD: 0.1 K/UL (ref 0–0.8)
EOSINOPHIL NFR BLD: 1 % (ref 0.5–7.8)
ERYTHROCYTE [DISTWIDTH] IN BLOOD BY AUTOMATED COUNT: 17 % (ref 11.9–14.6)
GLUCOSE SERPL-MCNC: 88 MG/DL (ref 65–100)
HCT VFR BLD AUTO: 34 % (ref 41.1–50.3)
HGB BLD-MCNC: 11 G/DL (ref 13.6–17.2)
IMM GRANULOCYTES # BLD AUTO: 0 K/UL (ref 0–0.5)
IMM GRANULOCYTES NFR BLD AUTO: 0 % (ref 0–5)
LYMPHOCYTES # BLD: 1.4 K/UL (ref 0.5–4.6)
LYMPHOCYTES NFR BLD: 18 % (ref 13–44)
MCH RBC QN AUTO: 25.3 PG (ref 26.1–32.9)
MCHC RBC AUTO-ENTMCNC: 32.4 G/DL (ref 31.4–35)
MCV RBC AUTO: 78.3 FL (ref 82–102)
MONOCYTES # BLD: 0.8 K/UL (ref 0.1–1.3)
MONOCYTES NFR BLD: 10 % (ref 4–12)
NEUTS SEG # BLD: 5.5 K/UL (ref 1.7–8.2)
NEUTS SEG NFR BLD: 70 % (ref 43–78)
NRBC # BLD: 0 K/UL (ref 0–0.2)
PLATELET # BLD AUTO: 431 K/UL (ref 150–450)
PMV BLD AUTO: 9 FL (ref 9.4–12.3)
POTASSIUM SERPL-SCNC: 4.4 MMOL/L (ref 3.5–5.1)
RBC # BLD AUTO: 4.34 M/UL (ref 4.23–5.6)
SODIUM SERPL-SCNC: 132 MMOL/L (ref 133–143)
WBC # BLD AUTO: 7.9 K/UL (ref 4.3–11.1)

## 2023-01-09 PROCEDURE — 36415 COLL VENOUS BLD VENIPUNCTURE: CPT

## 2023-01-09 PROCEDURE — 6370000000 HC RX 637 (ALT 250 FOR IP): Performed by: HOSPITALIST

## 2023-01-09 PROCEDURE — 80048 BASIC METABOLIC PNL TOTAL CA: CPT

## 2023-01-09 PROCEDURE — 85025 COMPLETE CBC W/AUTO DIFF WBC: CPT

## 2023-01-09 PROCEDURE — 1100000000 HC RM PRIVATE

## 2023-01-09 PROCEDURE — 6370000000 HC RX 637 (ALT 250 FOR IP): Performed by: FAMILY MEDICINE

## 2023-01-09 PROCEDURE — 6360000002 HC RX W HCPCS: Performed by: FAMILY MEDICINE

## 2023-01-09 PROCEDURE — 2580000003 HC RX 258: Performed by: FAMILY MEDICINE

## 2023-01-09 PROCEDURE — 6360000002 HC RX W HCPCS: Performed by: HOSPITALIST

## 2023-01-09 RX ORDER — MAGNESIUM HYDROXIDE/ALUMINUM HYDROXICE/SIMETHICONE 120; 1200; 1200 MG/30ML; MG/30ML; MG/30ML
30 SUSPENSION ORAL EVERY 6 HOURS PRN
Status: DISCONTINUED | OUTPATIENT
Start: 2023-01-09 | End: 2023-01-18 | Stop reason: HOSPADM

## 2023-01-09 RX ADMIN — SODIUM CHLORIDE, SODIUM LACTATE, POTASSIUM CHLORIDE, AND CALCIUM CHLORIDE: 600; 310; 30; 20 INJECTION, SOLUTION INTRAVENOUS at 19:22

## 2023-01-09 RX ADMIN — PANTOPRAZOLE SODIUM 40 MG: 40 TABLET, DELAYED RELEASE ORAL at 08:44

## 2023-01-09 RX ADMIN — METOPROLOL TARTRATE 25 MG: 25 TABLET, FILM COATED ORAL at 21:49

## 2023-01-09 RX ADMIN — ENOXAPARIN SODIUM 40 MG: 100 INJECTION SUBCUTANEOUS at 08:43

## 2023-01-09 RX ADMIN — HYDROMORPHONE HYDROCHLORIDE 1 MG: 1 INJECTION, SOLUTION INTRAMUSCULAR; INTRAVENOUS; SUBCUTANEOUS at 03:27

## 2023-01-09 RX ADMIN — OXYCODONE HYDROCHLORIDE 5 MG: 5 TABLET ORAL at 10:49

## 2023-01-09 RX ADMIN — ATORVASTATIN CALCIUM 40 MG: 40 TABLET, FILM COATED ORAL at 21:49

## 2023-01-09 RX ADMIN — PANCRELIPASE LIPASE, PANCRELIPASE PROTEASE, PANCRELIPASE AMYLASE 5000 UNITS: 5000; 17000; 24000 CAPSULE, DELAYED RELEASE ORAL at 11:36

## 2023-01-09 RX ADMIN — TICAGRELOR 90 MG: 90 TABLET ORAL at 21:49

## 2023-01-09 RX ADMIN — HYDROMORPHONE HYDROCHLORIDE 1 MG: 1 INJECTION, SOLUTION INTRAMUSCULAR; INTRAVENOUS; SUBCUTANEOUS at 07:29

## 2023-01-09 RX ADMIN — OXYCODONE HYDROCHLORIDE 5 MG: 5 TABLET ORAL at 17:26

## 2023-01-09 RX ADMIN — HYDROMORPHONE HYDROCHLORIDE 1 MG: 1 INJECTION, SOLUTION INTRAMUSCULAR; INTRAVENOUS; SUBCUTANEOUS at 11:36

## 2023-01-09 RX ADMIN — SODIUM CHLORIDE, PRESERVATIVE FREE 5 ML: 5 INJECTION INTRAVENOUS at 21:51

## 2023-01-09 RX ADMIN — HYDROMORPHONE HYDROCHLORIDE 1 MG: 1 INJECTION, SOLUTION INTRAMUSCULAR; INTRAVENOUS; SUBCUTANEOUS at 15:29

## 2023-01-09 RX ADMIN — PANCRELIPASE LIPASE, PANCRELIPASE PROTEASE, PANCRELIPASE AMYLASE 5000 UNITS: 5000; 17000; 24000 CAPSULE, DELAYED RELEASE ORAL at 17:26

## 2023-01-09 RX ADMIN — METOPROLOL TARTRATE 25 MG: 25 TABLET, FILM COATED ORAL at 08:44

## 2023-01-09 RX ADMIN — HYDROMORPHONE HYDROCHLORIDE 1 MG: 1 INJECTION, SOLUTION INTRAMUSCULAR; INTRAVENOUS; SUBCUTANEOUS at 19:35

## 2023-01-09 RX ADMIN — ACETAMINOPHEN 650 MG: 325 TABLET ORAL at 06:13

## 2023-01-09 RX ADMIN — SODIUM CHLORIDE, SODIUM LACTATE, POTASSIUM CHLORIDE, AND CALCIUM CHLORIDE: 600; 310; 30; 20 INJECTION, SOLUTION INTRAVENOUS at 14:00

## 2023-01-09 RX ADMIN — SODIUM CHLORIDE, SODIUM LACTATE, POTASSIUM CHLORIDE, AND CALCIUM CHLORIDE 200 ML/HR: 600; 310; 30; 20 INJECTION, SOLUTION INTRAVENOUS at 00:11

## 2023-01-09 RX ADMIN — TICAGRELOR 90 MG: 90 TABLET ORAL at 08:44

## 2023-01-09 RX ADMIN — LISINOPRIL 5 MG: 5 TABLET ORAL at 08:44

## 2023-01-09 RX ADMIN — BUPROPION HYDROCHLORIDE 100 MG: 100 TABLET, FILM COATED, EXTENDED RELEASE ORAL at 10:36

## 2023-01-09 RX ADMIN — ASPIRIN 81 MG 81 MG: 81 TABLET ORAL at 08:44

## 2023-01-09 RX ADMIN — HYDROMORPHONE HYDROCHLORIDE 1 MG: 1 INJECTION, SOLUTION INTRAMUSCULAR; INTRAVENOUS; SUBCUTANEOUS at 22:31

## 2023-01-09 RX ADMIN — PANCRELIPASE LIPASE, PANCRELIPASE PROTEASE, PANCRELIPASE AMYLASE 5000 UNITS: 5000; 17000; 24000 CAPSULE, DELAYED RELEASE ORAL at 08:43

## 2023-01-09 RX ADMIN — ALUMINUM HYDROXIDE, MAGNESIUM HYDROXIDE, DIMETHICONE 30 ML: 200; 200; 20 LIQUID ORAL at 19:34

## 2023-01-09 RX ADMIN — PANTOPRAZOLE SODIUM 40 MG: 40 TABLET, DELAYED RELEASE ORAL at 21:49

## 2023-01-09 RX ADMIN — OXYCODONE HYDROCHLORIDE 5 MG: 5 TABLET ORAL at 06:13

## 2023-01-09 RX ADMIN — BUPROPION HYDROCHLORIDE 100 MG: 100 TABLET, FILM COATED, EXTENDED RELEASE ORAL at 21:49

## 2023-01-09 ASSESSMENT — PAIN DESCRIPTION - ONSET
ONSET: ON-GOING
ONSET: ON-GOING
ONSET: AWAKENED FROM SLEEP
ONSET: ON-GOING

## 2023-01-09 ASSESSMENT — PAIN DESCRIPTION - ORIENTATION
ORIENTATION: ANTERIOR;UPPER
ORIENTATION: ANTERIOR
ORIENTATION: UPPER
ORIENTATION: LEFT;UPPER
ORIENTATION: UPPER
ORIENTATION: LOWER;MID;RIGHT
ORIENTATION: LOWER;RIGHT
ORIENTATION: ANTERIOR

## 2023-01-09 ASSESSMENT — PAIN SCALES - GENERAL
PAINLEVEL_OUTOF10: 8
PAINLEVEL_OUTOF10: 5
PAINLEVEL_OUTOF10: 9
PAINLEVEL_OUTOF10: 9
PAINLEVEL_OUTOF10: 3
PAINLEVEL_OUTOF10: 9
PAINLEVEL_OUTOF10: 3
PAINLEVEL_OUTOF10: 8
PAINLEVEL_OUTOF10: 8
PAINLEVEL_OUTOF10: 4
PAINLEVEL_OUTOF10: 5
PAINLEVEL_OUTOF10: 9
PAINLEVEL_OUTOF10: 8

## 2023-01-09 ASSESSMENT — PAIN DESCRIPTION - DESCRIPTORS
DESCRIPTORS: BURNING
DESCRIPTORS: BURNING
DESCRIPTORS: ACHING
DESCRIPTORS: ACHING
DESCRIPTORS: BURNING
DESCRIPTORS: BURNING
DESCRIPTORS: ACHING;SHARP
DESCRIPTORS: ACHING;SHARP

## 2023-01-09 ASSESSMENT — PAIN DESCRIPTION - PAIN TYPE
TYPE: ACUTE PAIN

## 2023-01-09 ASSESSMENT — PAIN DESCRIPTION - LOCATION
LOCATION: ABDOMEN
LOCATION: ABDOMEN
LOCATION: ABDOMEN;BACK
LOCATION: ABDOMEN;GENERALIZED
LOCATION: ABDOMEN;GENERALIZED
LOCATION: ABDOMEN
LOCATION: ABDOMEN;BACK;FLANK
LOCATION: ABDOMEN;FLANK;BACK
LOCATION: ABDOMEN
LOCATION: ABDOMEN

## 2023-01-09 ASSESSMENT — PAIN DESCRIPTION - FREQUENCY
FREQUENCY: CONTINUOUS
FREQUENCY: INTERMITTENT
FREQUENCY: CONTINUOUS

## 2023-01-09 NOTE — PROGRESS NOTES
Hospitalist Progress Note   Admit Date:  2023 10:11 PM   Name:  Mark Anthony Snell   Age:  44 y.o. Sex:  male  :  1985   MRN:  [id number]   Room:  [facility]/    Presenting Complaint: No chief complaint on file. Reason(s) for Admission: Recurrent pancreatitis [K85.90]     Hospital Course:   Mark Anthony Snell is a 44 y.o. male with medical history of ETOH pancreatitis complicated with pseudocyst s/p stent placement  removed  and STEMI s/p PCI with TULIO who presented to [facility] ER with a complaint of recurrent epigastric pain with associated N/V. Has had multiple admission for pancreatitis and recently discharged after treatment for pancreatitis on 23     \"1. Necrotizing pancreatitis, with associated acute necrotic collection measuring   up to 2.5 x 4.3 x 3.2 cm. The necrotic collection appears more defined when   compared to 2023 study. 2. Mass effect and narrowing of the splenic vein near the confluence with the   SMV. 3. Circumferential wall thickening of the distal stomach, adjacent to the   pancreatic abnormality. Findings likely reactive gastritis. 4. Large stool volume in the colon. No evidence of colitis, diverticulitis or   bowel obstruction. \"     Subjective & 24hr Events (23):   Endorses abdominal tenderness. Denies N/V.  Wants pain medication more often than every 4 hours      Assessment & Plan:     Principal Problem:    Recurrent pancreatitis  Plan:   -GI consulted  with recs for supportive care  -Prn analgesia  -PPI  -Laxatives for large stool burden  -Patient had appointment w/ GI to discuss treatment options tomorrow with likely referral to treatment center for management(Possible [place] procedure)    Active Problems:      History of ST elevation myocardial infarction (STEMI)  Plan:   -ASA/Brillinta/Statin       Primary hypertension  Plan:   -Stable BP  -Lisinopril/BB    Depression  -Wellbutrin      Anticipated discharge needs:      1-2 days    Diet:  Diet NPO Exceptions are: Sips of Water with Meds  DVT PPx: Lovenox SQ   Code status: Full Code    Hospital Problems:  Principal Problem:    Recurrent pancreatitis  Active Problems:    History of ST elevation myocardial infarction (STEMI)    Primary hypertension  Resolved Problems:    * No resolved hospital problems. *      Objective:   Patient Vitals for the past 24 hrs:   Temp Pulse Resp BP SpO2   01/09/23 0734 97.6 °F (36.4 °C) 65 18 122/77 98 %   01/09/23 0643 -- -- 17 -- --   01/09/23 0613 -- -- 20 -- --   01/09/23 0357 -- -- 17 -- --   01/09/23 0327 97.8 °F (36.6 °C) 68 22 116/72 100 %   01/08/23 2354 -- -- 18 -- --   01/08/23 2345 98 °F (36.7 °C) 76 18 124/69 98 %   01/08/23 2324 -- -- 20 -- --   01/08/23 2214 97.9 °F (36.6 °C) 70 20 113/69 100 %       Oxygen Therapy  SpO2: 98 %  Pulse Oximetry Type: Intermittent  Pulse Oximeter Device Mode: Intermittent  Pulse Oximeter Device Location: Left, Finger  O2 Device: None (Room air)  Skin Assessment: Clean, dry, & intact  Oxygen Therapy: None (Room air)    Estimated body mass index is 25.16 kg/m² as calculated from the following:    Height as of this encounter: 5' 9\" (1.753 m). Weight as of this encounter: 170 lb 6.4 oz (77.3 kg). Intake/Output Summary (Last 24 hours) at 1/9/2023 1413  Last data filed at 1/9/2023 0400  Gross per 24 hour   Intake 817.93 ml   Output --   Net 817.93 ml         Physical Exam:     Blood pressure 122/77, pulse 65, temperature 97.6 °F (36.4 °C), temperature source Oral, resp. rate 18, height 5' 9\" (1.753 m), weight 170 lb 6.4 oz (77.3 kg), SpO2 98 %. General:    Well nourished. Head:  Normocephalic, atraumatic  Eyes:  Sclerae appear normal.  Pupils equally round. ENT:  Nares appear normal.  Moist oral mucosa  Neck:  No restricted ROM. Trachea midline   CV:   RRR. No m/r/g. No jugular venous distension. Lungs:   CTAB. No wheezing, rhonchi, or rales. Symmetric expansion.   Abdomen:   Soft, generalized abdominal tenderness, nondistended. Extremities: No cyanosis or clubbing. No edema  Skin:     No rashes and normal coloration. Warm and dry. Neuro:  CN II-XII grossly intact. Sensation intact. Psych:  Normal mood and affect.       I have personally reviewed labs and tests showing:  Recent Labs:  Recent Results (from the past 48 hour(s))   Lipase    Collection Time: 01/08/23  5:34 PM   Result Value Ref Range    Lipase 837 (H) 13 - 60 U/L   Comprehensive Metabolic Panel    Collection Time: 01/08/23  5:34 PM   Result Value Ref Range    Sodium 135 133 - 143 mmol/L    Potassium 4.8 3.5 - 5.1 mmol/L    Chloride 93 (L) 98 - 107 mmol/L    CO2 23 21 - 32 mmol/L    Anion Gap 19 (H) 2 - 11 mmol/L    Glucose 98 65 - 100 mg/dL    BUN 17 7.0 - 18.0 MG/DL    Creatinine 0.92 0.8 - 1.5 MG/DL    Est, Glom Filt Rate >60 >60 ml/min/1.73m2    Calcium 10.3 8.3 - 10.4 MG/DL    Total Bilirubin 0.7 0.2 - 1.1 MG/DL    ALT 28 13.0 - 61.0 U/L    AST 29 15 - 37 U/L    Alk Phosphatase 85 45.0 - 117.0 U/L    Total Protein 8.7 (H) 6.4 - 8.2 g/dL    Albumin 4.2 3.5 - 5.0 g/dL    Globulin 4.5 2.8 - 4.5 g/dL    Albumin/Globulin Ratio 0.9 0.4 - 1.6     CBC with Auto Differential    Collection Time: 01/08/23  5:34 PM   Result Value Ref Range    WBC 10.6 4.3 - 11.1 K/uL    RBC 5.80 (H) 4.23 - 5.60 M/uL    Hemoglobin 14.7 13.6 - 17.2 g/dL    Hematocrit 44.5 41.1 - 50.3 %    MCV 76.7 (L) 82.0 - 102.0 FL    MCH 25.3 (L) 26.1 - 32.9 PG    MCHC 33.0 31.4 - 35.0 g/dL    RDW 17.0 (H) 11.9 - 14.6 %    Platelets 910 (H) 161 - 450 K/uL    MPV 8.5 (L) 9.4 - 12.3 FL    nRBC 0.00 0.0 - 0.2 K/uL    Differential Type AUTOMATED      Seg Neutrophils 79 (H) 43 - 78 %    Lymphocytes 13 13 - 44 %    Monocytes 6 4.0 - 12.0 %    Eosinophils % 1 0.5 - 7.8 %    Basophils 0 0.0 - 2.0 %    Immature Granulocytes 0 0.0 - 5.0 %    Segs Absolute 8.4 (H) 1.7 - 8.2 K/UL    Absolute Lymph # 1.4 0.5 - 4.6 K/UL    Absolute Mono # 0.7 0.1 - 1.3 K/UL    Absolute Eos # 0.1 0.0 - 0.8 K/UL Basophils Absolute 0.0 0.0 - 0.2 K/UL    Absolute Immature Granulocyte 0.0 0.0 - 0.5 K/UL   Urinalysis w rflx microscopic    Collection Time: 01/08/23  8:13 PM   Result Value Ref Range    Color, UA CHEKO      Appearance CLEAR      Specific Gravity, UA 1.025 (H) 1.001 - 1.023      pH, Urine 7.0 5.0 - 9.0      Protein,  (A) NEG mg/dL    Glucose,  mg/dL    Ketones, Urine 40 (A) NEG mg/dL    Bilirubin Urine SMALL (A) NEG      Blood, Urine Trace Intact (A) NEG      Urobilinogen, Urine >=8.0 0.2 - 1.0 EU/dL    Nitrite, Urine Negative NEG      Leukocyte Esterase, Urine Negative NEG     Urinalysis, Micro    Collection Time: 01/08/23  8:13 PM   Result Value Ref Range    WBC, UA 0-3 0 /hpf    RBC, UA 3-5 0 /hpf    Epithelial Cells UA 0-3 0 /hpf    BACTERIA, URINE 0 0 /hpf    Casts 0 0 /lpf    Crystals 0 0 /LPF    Amorphous Crystal 1+ (H) 0    Mucus, UA TRACE 0 /lpf    Other observations RESULTS VERIFIED MANUALLY     Basic Metabolic Panel w/ Reflex to MG    Collection Time: 01/09/23  8:50 AM   Result Value Ref Range    Sodium 132 (L) 133 - 143 mmol/L    Potassium 4.4 3.5 - 5.1 mmol/L    Chloride 102 101 - 110 mmol/L    CO2 23 21 - 32 mmol/L    Anion Gap 7 2 - 11 mmol/L    Glucose 88 65 - 100 mg/dL    BUN 12 6 - 23 MG/DL    Creatinine 0.90 0.8 - 1.5 MG/DL    Est, Glom Filt Rate >60 >60 ml/min/1.73m2    Calcium 8.9 8.3 - 10.4 MG/DL   CBC with Auto Differential    Collection Time: 01/09/23  8:50 AM   Result Value Ref Range    WBC 7.9 4.3 - 11.1 K/uL    RBC 4.34 4.23 - 5.6 M/uL    Hemoglobin 11.0 (L) 13.6 - 17.2 g/dL    Hematocrit 34.0 (L) 41.1 - 50.3 %    MCV 78.3 (L) 82 - 102 FL    MCH 25.3 (L) 26.1 - 32.9 PG    MCHC 32.4 31.4 - 35.0 g/dL    RDW 17.0 (H) 11.9 - 14.6 %    Platelets 120 413 - 976 K/uL    MPV 9.0 (L) 9.4 - 12.3 FL    nRBC 0.00 0.0 - 0.2 K/uL    Differential Type AUTOMATED      Seg Neutrophils 70 43 - 78 %    Lymphocytes 18 13 - 44 %    Monocytes 10 4.0 - 12.0 %    Eosinophils % 1 0.5 - 7.8 % Basophils 1 0.0 - 2.0 %    Immature Granulocytes 0 0.0 - 5.0 %    Segs Absolute 5.5 1.7 - 8.2 K/UL    Absolute Lymph # 1.4 0.5 - 4.6 K/UL    Absolute Mono # 0.8 0.1 - 1.3 K/UL    Absolute Eos # 0.1 0.0 - 0.8 K/UL    Basophils Absolute 0.0 0.0 - 0.2 K/UL    Absolute Immature Granulocyte 0.0 0.0 - 0.5 K/UL       I have personally reviewed imaging studies showing: Other Studies:  CT ABDOMEN PELVIS W IV CONTRAST Additional Contrast? None   Final Result      1. Necrotizing pancreatitis, with associated acute necrotic collection measuring   up to 2.5 x 4.3 x 3.2 cm. The necrotic collection appears more defined when   compared to January 1, 2023 study. 2. Mass effect and narrowing of the splenic vein near the confluence with the   SMV. 3. Circumferential wall thickening of the distal stomach, adjacent to the   pancreatic abnormality. Findings likely reactive gastritis. 4. Large stool volume in the colon. No evidence of colitis, diverticulitis or   bowel obstruction.           Current Meds:  Current Facility-Administered Medications   Medication Dose Route Frequency    atorvastatin (LIPITOR) tablet 40 mg  40 mg Oral Nightly    buPROPion (WELLBUTRIN SR) extended release tablet 100 mg  100 mg Oral BID    hyoscyamine (LEVSIN/SL) sublingual tablet 125 mcg  125 mcg SubLINGual Q4H PRN    lisinopril (PRINIVIL;ZESTRIL) tablet 5 mg  5 mg Oral Daily    metoprolol tartrate (LOPRESSOR) tablet 25 mg  25 mg Oral BID    pantoprazole (PROTONIX) tablet 40 mg  40 mg Oral BID    simethicone (MYLICON) chewable tablet 80 mg  80 mg Oral Q6H PRN    ticagrelor (BRILINTA) tablet 90 mg  90 mg Oral BID    lipase-protease-amylase (ZENPEP) delayed release capsule 5,000 Units  5,000 Units Oral TID WC    HYDROmorphone (DILAUDID) injection 1 mg  1 mg IntraVENous Q4H PRN    sodium chloride flush 0.9 % injection 5-40 mL  5-40 mL IntraVENous 2 times per day    sodium chloride flush 0.9 % injection 5-40 mL  5-40 mL IntraVENous PRN    0.9 % sodium chloride infusion   IntraVENous PRN    enoxaparin (LOVENOX) injection 40 mg  40 mg SubCUTAneous Daily    ondansetron (ZOFRAN-ODT) disintegrating tablet 4 mg  4 mg Oral Q8H PRN    Or    ondansetron (ZOFRAN) injection 4 mg  4 mg IntraVENous Q6H PRN    polyethylene glycol (GLYCOLAX) packet 17 g  17 g Oral Daily PRN    acetaminophen (TYLENOL) tablet 650 mg  650 mg Oral Q6H PRN    Or    acetaminophen (TYLENOL) suppository 650 mg  650 mg Rectal Q6H PRN    aspirin chewable tablet 81 mg  81 mg Oral Daily    oxyCODONE (ROXICODONE) immediate release tablet 5 mg  5 mg Oral Q4H PRN    lactated ringers infusion   IntraVENous Continuous       Signed:  MELISSA Garcia - CNP    Part of this note may have been written by using a voice dictation software. The note has been proof read but may still contain some grammatical/other typographical errors.

## 2023-01-09 NOTE — PROGRESS NOTES
Patient arrived via EMS from [facility] ED @ 22:05hrs. Per the EMS crew, there were no changes in patient condition during transport.

## 2023-01-09 NOTE — CONSULTS
[facility] Consult Note       Primary GI Physician: Dr. Franci Jimenez    Referring Provider:  Demian Hernandez DO    Consult Date:  1/9/2023    Admit Date:  1/8/2023    Chief Complaint:  recurrent pancreatitis with known necrosis in pancreatic tail    Subjective:     History of Present Illness:  Patient is a 44 y.o. 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 Demian Hernandez for recurrent pancreatitis with known necrosis in pancreatic tail. Patient presented to [facility] ED for recurrent epigastric pain with nausea and emesis and inability to tolerate PO intake on 1/8/23. . Labs 1/8/23 on admit revealed Cl 93, Lipase 837, Total Protein 8.7, Platelets 911, RBC 1.98. CT Abdomen and Pelvis with IV contrast revealing necrotizing pancreatitis, with associated acute necrotic collection measuring up to 2.5 x 4.3 x 3.2 cm. The necrotic collection appears more defined when compared to 1/1/23 study. Mass effect and narrowing of the splenic vein near the confluence with the SMV. Circumferential wall thickening of the distal stomach, adjacent to the pancreatic abnormality. Findings likely reactive gastritis. Large stool volume in the colon. Patient currently on Protonix and Lorrene David. He was discharged from [facility] on 1/5/23 after being treated for pancreatitis. Patient states the epigastric pain never went away but he wanted to try treating the issue at home. He stayed on clear liquids after discharge and had no appetite. States when he would try to advance diet to regular food, he would immediately vomit. He returned to the ED because he could not keep food down and the pain was not improving.  His last bowel movement was 2 days ago. He continues to pass flatus and belch. He had two episodes of vomiting yesterday without hematemesis. Nausea has improved since arriving to hospital. He states he has not had a drink of EtOH since October 2022 and he quit smoking cigarettes 3 weeks ago on Wellbutrin. Denies melena, hematochezia, fevers. [facility] been following him over several recent admissions for acute on chronic pancreatitis. Per notes from last discharge, patient was to follow up outpatient on 1/10/22 with Dr. Hunter Muro to discuss possible referral to tertiary center for management of chronic pancreatitis vs possible Puestow procedure given chronically dilated pancreatic duct. Procedure History:  EUS 9 Nov 2022 with Dr. Tori Cruz   Findings:   ENDOSCOPIC FINDINGS:  Limited views of the mucosa with the echoendoscope reveals extrinsic compression of the gastric fundus but no other gross abnormalities of the stomach. PANCREAS:  The pancreas is well-visualized from head to tail. Patient has chronic pancreatitis. A cystic collection measuring at least 12 cm in maximal diameter with mature walls is seen adjacent to the fundus of the pancreas. No internal debris is seen. The 15 mm AXIOS stent catheter was advanced into the cyst cavity with 100 W cutting current. The internal and external flanges were successfully deployed. Stent position was confirmed endoscopically and endosonographically. A large amount of cyst fluid flowed freely into the stomach. Implant:   AXIOS stent  Impression:     Pancreatic pseudocyst. Cyst gastrostomy was performed as outlined above. Plan:  1. Resume diet as tolerated. 2. Repeat EUS/EGD and possible stent retrieval in 3 weeks. EUS 30 Nov 2022 with Dr. Tori Cruz  Findings:   EUS FINDINGS:  PANCREAS:   The pancreas is well-visualized from head to tail.   There are extensive pancreatic parenchymal abnormalities including course lobularity, punctate calcifications in the head of the pancreas with associated acoustic shadowing, blurring of peripancreatic fat planes and peripancreatic edema. In the tail of the pancreas there is a 25 x 17 mm oval-shaped thick-walled simple cyst.  Adjacent to the body of the pancreas an AXIOS stent is seen at site of cyst gastrostomy. The previously seen large pseudocyst has resolved. The main pancreatic duct has an irregular contour, echogenic sidewalls and is dilated, measuring up to 3.6 mm in the head, 2.9 mm in the body and 2.1 mm in the tail. Pancreas divisum is not seen. ENDOSCOPIC FINDINGS:  OROPHARYNX: Cords and pyriform recesses appear normal.    ESOPHAGUS: The esophagus is normal. The proximal, mid, and distal portions are normal. The Z-Line is intact. STOMACH:    On retroflexion, the flap-valve is classified as Hill Grade II, with a tissue fold at the lesser curvature but with periodic opening and closing around the endoscope. The AXIOS lumen-apposing stent in seen in the gastric body and remains patent. After EUS confirmation of pseudocyst resolution, the stent was removed using a rat-tooth forceps. DUODENUM: The bulb and second portions are normal.  Impression:     1. Acute on chronic pancreatitis. 2. Resolution of previously seen large pseudocyst.  The axial stent was removed. 3. Small developing pseudocyst adjacent to the tail of the pancreas. Plan:  1. Resume diet. If patient does not tolerate advancement of his diet without exacerbation of symptoms, he may require enteral feeding. 2. Resume Integrilin protocol as a bridge to Brilinta. 3. Avoid alcohol and tobacco.  4. Return to office in 2 months.     PMH:  Past Medical History:   Diagnosis Date    Abdominal pain 10/25/2022    Abnormal CT of the abdomen 70/67/3720    Acute alcoholic pancreatitis 22/41/1084    Acute kidney injury (nontraumatic) ([address] 10/31/2022    pt denies    Acute on chronic pancreatitis ([address] 07/30/2022    Alcohol use disorder, severe, dependence ([address] 08/21/2019    CAD (coronary artery disease)     Carpal tunnel syndrome, left     Cigarette smoker     Coronary artery disease involving native coronary artery of native heart 11/13/2022    Dependence on nicotine from cigarettes 11/20/2022    GERD (gastroesophageal reflux disease)     controlled with med    History of pancreatitis     x 4 or 5 times-- last time admitted to hospital 10/10/26/22 x 7 days    Hypertension     controlled with med    STEMI (ST elevation myocardial infarction) ([facility] 11/2/2022       PSH:  Past Surgical History:   Procedure Laterality Date    CARDIAC PROCEDURE N/A 11/2/2022    LEFT HEART CATH / CORONARY ANGIOGRAPHY performed by Erick Lee MD at [address] [facility] N/A 11/2/2022    Percutaneous coronary intervention performed by Erick Lee MD at [facility] CATH LAB    CHOLECYSTECTOMY  2012    ORTHOPEDIC SURGERY Left     wrist surg--nerve surg    ORTHOPEDIC SURGERY Left     foot surg as child    UPPER GASTROINTESTINAL ENDOSCOPY N/A 11/9/2022    EGD/ENDOSCOPIC ULTRASOUND/AXIOS STENT ROOM 2226 **Rep will be present performed by Bhavana Durand MD at [address] [place] N/A 11/30/2022    EGD/ENDOSCOPIC ULTRASOUND stent removal  performed by Bhavana Durand MD at [address] [place] N/A 11/30/2022    EGD ESOPHAGOGASTRODUODENOSCOPY performed by Bhavana Durand MD at [facility] ENDOSCOPY       Allergies:  No Known Allergies    Home Medications:  Prior to Admission medications    Medication Sig Start Date End Date Taking?  Authorizing Provider   hyoscyamine (LEVSIN/SL) 125 MCG sublingual tablet Place 1 tablet under the tongue every 4 hours as needed for Cramping 1/5/23   Yossi Olmos MD   pantoprazole (PROTONIX) 40 MG tablet Take 1 tablet by mouth in the morning and at bedtime 1/5/23 2/4/23  Yossi Olmos MD   simethicone (MYLICON) 80 MG chewable tablet Take 1 tablet by mouth every 6 hours as needed for Flatulence 1/5/23   Leeann Rabgao MD   lipase-protease-amylase [facility]) 5000-94426 units CPEP delayed release capsule Take by mouth 3 times daily (with meals)  Patient not taking: Reported on 1/8/2023 1/5/23 2/4/23  Leeann Rabago MD   HYDROmorphone (DILAUDID) 2 MG tablet Take 1 tablet by mouth 2 times daily as needed for Pain for up to 5 days. Max Daily Amount: 4 mg  Patient not taking: Reported on 1/8/2023 1/5/23 1/10/23  Leeann Rabago MD   oxyCODONE (OXY-IR) 10 MG immediate release tablet Take 1 tablet by mouth every 8 hours as needed for Pain for up to 5 days. Max Daily Amount: 30 mg  Patient not taking: Reported on 1/8/2023 1/5/23 1/10/23  Leeann Rabago MD   ondansetron [facility]) 4 MG tablet Take 1 tablet by mouth 3 times daily as needed for Nausea or Vomiting  Patient not taking: Reported on 1/8/2023 12/24/22   Particia Donato DO   naloxone [facility]) 4 MG/0.1ML LIQD nasal spray 1 spray by Nasal route as needed for Opioid Reversal  Patient not taking: Reported on 1/8/2023 12/24/22   Abiel Lyons DO   nitroGLYCERIN (NITROSTAT) 0.4 MG SL tablet Place 1 tablet under the tongue every 5 minutes as needed for Chest pain up to max of 3 total doses.  If no relief after 1 dose, call 911. 12/1/22   Leeann Rabago MD   buPROPion [facility] SR) 100 MG extended release tablet Take 1 tablet by mouth 2 times daily 12/1/22   Leeann Rabago MD   aspirin 81 MG chewable tablet Take 1 tablet by mouth daily  Patient not taking: Reported on 1/8/2023 11/11/22   ANJALI Hussein   atorvastatin (LIPITOR) 40 MG tablet Take 1 tablet by mouth nightly 11/10/22   ANJALI Hussein   lisinopril (PRINIVIL;ZESTRIL) 5 MG tablet Take 1 tablet by mouth daily 11/11/22   ANJALI Hussein   metoprolol tartrate (LOPRESSOR) 25 MG tablet Take 1 tablet by mouth 2 times daily 11/10/22   ANJALI Hussein   ticagrelor [facility]) 90 MG TABS tablet Take 1 tablet by mouth 2 times daily 11/10/22   Niki Fritz PA   thiamine 100 MG tablet Take 1 tablet by mouth in the morning.  8/3/22 8/2/22  Blessing Kim MD       [facility] Medications:  Current Facility-Administered Medications   Medication Dose Route Frequency    atorvastatin (LIPITOR) tablet 40 mg  40 mg Oral Nightly    bu[facility]ion [facility]) extended release tablet 100 mg  100 mg Oral BID    hyoscyamine (LEVSIN/SL) sublingual tablet 125 mcg  125 mcg SubLINGual Q4H PRN    lisinopril (PRINIVIL;ZESTRIL) tablet 5 mg  5 mg Oral Daily    metoprolol tartrate (LOPRESSOR) tablet 25 mg  25 mg Oral BID    pantoprazole (PROTONIX) tablet 40 mg  40 mg Oral BID    simethicone (MYLICON) chewable tablet 80 mg  80 mg Oral Q6H PRN    ticagrelor (BRILINTA) tablet 90 mg  90 mg Oral BID    lipase-protease-amylase (ZENPEP) delayed release capsule 5,000 Units  5,000 Units Oral TID WC    HYDROmorphone (DILAUDID) injection 1 mg  1 mg IntraVENous Q4H PRN    sodium chloride flush 0.9 % injection 5-40 mL  5-40 mL IntraVENous 2 times per day    sodium chloride flush 0.9 % injection 5-40 mL  5-40 mL IntraVENous PRN    0.9 % sodium chloride infusion   IntraVENous PRN    enoxaparin (LOVENOX) injection 40 mg  40 mg SubCUTAneous Daily    ondansetron (ZOFRAN-ODT) disintegrating tablet 4 mg  4 mg Oral Q8H PRN    Or    ondansetron (ZOFRAN) injection 4 mg  4 mg IntraVENous Q6H PRN    polyethylene glycol (GLYCOLAX) packet 17 g  17 g Oral Daily PRN    acetaminophen (TYLENOL) tablet 650 mg  650 mg Oral Q6H PRN    Or    acetaminophen (TYLENOL) suppository 650 mg  650 mg Rectal Q6H PRN    aspirin chewable tablet 81 mg  81 mg Oral Daily    oxyCODONE (ROXICODONE) immediate release tablet 5 mg  5 mg Oral Q4H PRN    lactated ringers infusion   IntraVENous Continuous       Social History:  Social History     Tobacco Use    Smoking status: Every Day     Packs/day: 1.00     Years: 20.00     Pack years: 20.00     Types: Cigarettes    Smokeless tobacco: Never   Substance Use Topics    Alcohol use: Not Currently Comment: none since 10/4/22-- had drank 3 glasses of wine daily       Pt denies any history of drug use, blood transfusions, or tattoos. Family History:  Family History   Problem Relation Age of Onset    No Known Problems Mother     Heart Attack Father         MI at 58       Review of Systems:  A detailed 10 system ROS is obtained, with pertinent positives as listed above. All others are negative. Diet:  NPO    Objective:     Physical Exam:  Vitals:  /77   Pulse 65   Temp 97.6 °F (36.4 °C) (Oral)   Resp 18   Ht 5' 9\" (1.753 m)   Wt 178 lb (80.7 kg)   SpO2 98%   BMI 26.29 kg/m²   Gen:  Pt is alert, cooperative, no acute distress, lying in bed  Skin:  Extremities and face reveal no rashes. HEENT: Sclerae anicteric. Extra-occular muscles are intact. No oral ulcers. No abnormal pigmentation of the lips. The neck is supple. Cardiovascular: Regular rate and rhythm. No murmurs, gallops, or rubs. Respiratory:  Comfortable breathing with no accessory muscle use. Clear breath sounds anteriorly with no wheezes, rales, or rhonchi. GI:  Abdomen nondistended, soft, and TTP over epigastric region and LUQ. Mild guarding but no rigidity noted. Normal active bowel sounds. No enlargement of the liver or spleen. No masses palpable. Rectal:  Deferred  Musculoskeletal:  No pitting edema of the lower legs. Neurological:  Gross memory appears intact. Patient is alert and oriented. Psychiatric:  Mood appears appropriate with judgement intact. Lymphatic:  No cervical or supraclavicular adenopathy. Laboratory:    Recent Labs     01/08/23  1734   WBC 10.6   HGB 14.7   HCT 44.5   *   MCV 76.7*      K 4.8   CL 93*   CO2 23   BUN 17   CREATININE 0.92   CALCIUM 10.3   GLUCOSE 98   ALKPHOS 85   AST 29   ALT 28   BILITOT 0.7   LABALBU 4.2   PROT 8.7*   LIPASE 837*     CT abdomen and pelvis with contrast , 1/9/23       COMPARISON: January 1, 2023.        INDICATION: Recurrent pancreatitis, worsening. Any evidence of necrosis, cyst.       TECHNIQUE: CT imaging was performed of the abdomen and pelvis following the   uncomplicated administration of intravenous contrast (Isovue 370, 100 mL). Oral   contrast was administered. Radiation dose reduction techniques were used for   this study:  Our CT scanners use one or all of the following: Automated exposure   control, adjustment of the mA and/or kVp according to patient's size, iterative   reconstruction. FINDINGS:       Visualized lung bases are unremarkable. Abdomen findings: There is peripancreatic inflammatory stranding. The pancreatic parenchyma is not   well defined. There is lobulated cystic mass involving the pancreatic body,   measuring up to 2.5 cm AP x 4.3 cm transverse x 3.2 cm craniocaudal. Similar   adjacent 15 mm cystic lesion is noted in the pancreatic body. There is mass   effect on the splenic vein, near the confluence with the SMV. There is circumferential wall thickening of the distal stomach, as into the   pancreatic abnormality. Gallbladder is surgically absent. Liver and the spleen not the upper limits for   size. The adrenal glands, kidneys and abdominal aorta are unremarkable. No   evidence of lymphadenopathy. There is fluid within the esophagus, suggesting gastroesophageal reflux. Small   bowel loops are normal in caliber. No small bowel obstruction. Pelvic findings:       Urinary bladder is normal in contour. The colon is normal in course and caliber   with large stool volume. No evidence of appendicitis. There is no free air or free fluid. Surrounding bones are intact. Impression       1. Necrotizing pancreatitis, with associated acute necrotic collection measuring   up to 2.5 x 4.3 x 3.2 cm. The necrotic collection appears more defined when   compared to January 1, 2023 study.        2. Mass effect and narrowing of the splenic vein near the confluence with the SMV.       3. Circumferential wall thickening of the distal stomach, adjacent to the   pancreatic abnormality. Findings likely reactive gastritis. 4. Large stool volume in the colon. No evidence of colitis, diverticulitis or   bowel obstruction. Assessment:     Principal Problem:    Recurrent pancreatitis  Active Problems:    History of ST elevation myocardial infarction (STEMI)    Primary hypertension  Resolved Problems:    * No resolved hospital problems. *    42 y.o. 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 Juliana Barksdale for recurrent pancreatitis with known necrosis in pancreatic tail after recent discharge on 1/5/23 for the same complaints. Patient was discharged and continued to have upper abdominal pain, nausea, and inability to tolerate PO intake. He denies recent EtOH use and quit smoking tobacco 3 weeks ago. Labs on admit significant for Lipase 837, total protein 8.7, RBC 5.8, MCV 76.7, Platelets 280. CT A/P with 1. Necrotizing pancreatitis, with associated acute necrotic collection measuring up to 2.5 x 4.3 x 3.2 cm. The necrotic collection appears more defined when compared to January 1, 2023 study. 2. Mass effect and narrowing of the splenic vein near the confluence with the SMV. 3. Circumferential wall thickening of the distal stomach, adjacent to the pancreatic abnormality. Findings likely reactive gastritis. 4. Large stool volume in the colon. No evidence of colitis, diverticulitis or bowel obstruction.  Patient was scheduled for OP follow up with Dr. Minerva Easley on 1/10/23 to discuss referral to tertiary treatment center for management of chronic pancreatitis vs possible Puestow procedure given chronically dilated pancreatic duct. Plan:     - Supportive care, anti-emetics, pain control PRN, aggressive IVF. - Continue to avoid ETOH and tobacco cessation.  - Can continue pancreatic enzymes. - Continue PPI. - Will notify office of patient's admission since OP follow up is scheduled for tomorrow. - Bowel regimen to include Miralax PRN for constipation/large stool burden noted on CT.  - Follow. Cary Charles PA-C   [facility]    Patient is seen and examined in collaboration with Dr. Aguila Jacobs. Assessment and plan as per Dr. Aguila Jacobs.

## 2023-01-09 NOTE — CARE COORDINATION
Opened chart for [facility] [place] outreach attempt, noted patient returned to the hospital and is currently admitted for acute care. No further outreach is indicated at this time. Patient will be reassigned pending discharge disposition.

## 2023-01-09 NOTE — PROGRESS NOTES
Comprehensive Nutrition Assessment    Type and Reason for Visit: Initial, Positive Nutrition Screen  Malnutrition Screening Tool: Malnutrition Screen  Have you recently lost weight without trying?: Unsure of amount of weight loss (2 points)  Have you been eating poorly because of a decreased appetite?: Yes (1 point)  Malnutrition Screening Tool Score: 3    Nutrition Recommendations/Plan:   NPO/Clear liquid diet status day number 9 (previous admission, home, and current admission) related to pancreatitis . It is reasonable to wait 7-10 days before initiating nutrition support in a patient with no or low nutrition risk. Malnutrition Status: At risk for malnutrition (Comment) (recurrent etoh pancreatitis, prolonged NPO/CLD status)  If anticipated patient will remain NPO/Clear liquid for greater than 2 more days, consider nutrition support for primary needs (with tube feeding route preferred if medically appropriate). If tube feeding not appropriate, would necessitate a central line for TPN for primary nutrition. Patient currently has single PIV. If nutrition support pursued, order appropriate route and an Inpatient Consult to Dietitian for RD to manage. Malnutrition Assessment:  Malnutrition Status: At risk for malnutrition (Comment) (recurrent etoh pancreatitis, prolonged NPO/CLD status)    No candace wasting  Nutrition Assessment:  Nutrition History: Patient reports at least 3 meals per day prior to recent hospitalizations. He states since recent discharge he has been on a CLD at home. He reports UBW ~185# prior to recurrent hospitalizations over the last few months. He does admit that he has re-gained some weight when RD reviewing wt history. This is consistent with EMR.    Wt history: 7/30/22 185#, 10/11/2022 184#, 11/17/22 166#, 12/1/22 167#, 12/13/2022 177#     Do You Have Any Cultural, Bahai, or Ethnic Food Preferences?: No   Nutrition Background:       Patient with PMH significant for etoh abuse, pancreatitis complicated by pseudocyst s/p gastrotomy with axios stent, CAD, STEMI, GERD, HTN. He has recurrent admissions for pancreatitis, most recently 1/1/23 and discharge on 1/5/23. He presented back with abdominal pain. Nutrition Interval:  Patient seen sitting up in bed. He states he is always NPO/CLD when he is in the hospital. He attributes this to his weight loss. He states that he is starting to feel a little hungry and has not felt hungry at all over the last week due to the pain. He has no questions or needs. Current Nutrition Therapies:  Diet NPO Exceptions are: Sips of Water with Meds    Current Intake:   Average Meal Intake: NPO        Anthropometric Measures:  Height: 5' 9\" (175.3 cm)  Current Body Wt: 170 lb 6.7 oz (77.3 kg) (1/9), Weight source: Bed Scale  BMI: 25.2, Overweight (BMI 25.0-29. 9)  Admission Body Weight: 177 lb 14.6 oz (80.7 kg) (1/8 stated)  Ideal Body Weight (Kg) (Calculated): 73 kg (160 lbs), 106.5 %  Usual Body Wt: 185 lb (83.9 kg) (per pt and review of EMR), Percent weight change: -7.9       Edema:Peripheral Vascular  Peripheral Vascular (WDL): Within Defined Limits   BMI Category Overweight (BMI 25.0-29. 9)  Estimated Daily Nutrient Needs:  Energy (kcal/day): 9517-1139 (Kcal/kg (25-30) Weight used: 77.3 kg Current  Protein (g/day): 77-93 (1-1.2 g/kg) Weight Used: (Current) 77.3 kg  Fluid (ml/day):   (1 ml/kcal)    Nutrition Diagnosis:   Inadequate oral intake related to altered GI function as evidenced by NPO or clear liquid status due to medical condition (CLD status PTA, wt loss)  Nutrition Interventions:   Food and/or Nutrient Delivery: Continue NPO     Coordination of Nutrition Care: Continue to monitor while inpatient       Goals:       Active Goal: Initiate PO diet (within 3 days)       Nutrition Monitoring and Evaluation:      Food/Nutrient Intake Outcomes: Diet Advancement/Tolerance, Food and Nutrient Intake  Physical Signs/Symptoms Outcomes: GI Status, Meal Time Behavior, Weight    Discharge Planning:     Too soon to determine    FRANKY SHELTON, RD

## 2023-01-09 NOTE — H&P (VIEW-ONLY)
[facility] Consult Note       Primary GI Physician: Dr. Khalif Vila    Referring Provider:  Jann Han DO    Consult Date:  1/9/2023    Admit Date:  1/8/2023    Chief Complaint:  recurrent pancreatitis with known necrosis in pancreatic tail    Subjective:     History of Present Illness:  Patient is a 44 y.o. 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 Jann Han for recurrent pancreatitis with known necrosis in pancreatic tail. Patient presented to [facility] ED for recurrent epigastric pain with nausea and emesis and inability to tolerate PO intake on 1/8/23. . Labs 1/8/23 on admit revealed Cl 93, Lipase 837, Total Protein 8.7, Platelets 205, RBC 2.79. CT Abdomen and Pelvis with IV contrast revealing necrotizing pancreatitis, with associated acute necrotic collection measuring up to 2.5 x 4.3 x 3.2 cm. The necrotic collection appears more defined when compared to 1/1/23 study. Mass effect and narrowing of the splenic vein near the confluence with the SMV. Circumferential wall thickening of the distal stomach, adjacent to the pancreatic abnormality. Findings likely reactive gastritis. Large stool volume in the colon. Patient currently on Protonix and Юлия Man. He was discharged from [facility] on 1/5/23 after being treated for pancreatitis. Patient states the epigastric pain never went away but he wanted to try treating the issue at home. He stayed on clear liquids after discharge and had no appetite. States when he would try to advance diet to regular food, he would immediately vomit. He returned to the ED because he could not keep food down and the pain was not improving.  His last bowel movement was 2 days ago. He continues to pass flatus and belch. He had two episodes of vomiting yesterday without hematemesis. Nausea has improved since arriving to hospital. He states he has not had a drink of EtOH since October 2022 and he quit smoking cigarettes 3 weeks ago on Wellbutrin. Denies melena, hematochezia, fevers. [facility] been following him over several recent admissions for acute on chronic pancreatitis. Per notes from last discharge, patient was to follow up outpatient on 1/10/22 with Dr. Bridgette Vega to discuss possible referral to tertiary center for management of chronic pancreatitis vs possible Puestow procedure given chronically dilated pancreatic duct. Procedure History:  EUS 9 Nov 2022 with Dr. Trevin Abdi   Findings:   ENDOSCOPIC FINDINGS:  Limited views of the mucosa with the echoendoscope reveals extrinsic compression of the gastric fundus but no other gross abnormalities of the stomach. PANCREAS:  The pancreas is well-visualized from head to tail. Patient has chronic pancreatitis. A cystic collection measuring at least 12 cm in maximal diameter with mature walls is seen adjacent to the fundus of the pancreas. No internal debris is seen. The 15 mm AXIOS stent catheter was advanced into the cyst cavity with 100 W cutting current. The internal and external flanges were successfully deployed. Stent position was confirmed endoscopically and endosonographically. A large amount of cyst fluid flowed freely into the stomach. Implant:   AXIOS stent  Impression:     Pancreatic pseudocyst. Cyst gastrostomy was performed as outlined above. Plan:  1. Resume diet as tolerated. 2. Repeat EUS/EGD and possible stent retrieval in 3 weeks. EUS 30 Nov 2022 with Dr. Trevin Abdi  Findings:   EUS FINDINGS:  PANCREAS:   The pancreas is well-visualized from head to tail.   There are extensive pancreatic parenchymal abnormalities including course lobularity, punctate calcifications in the head of the pancreas with associated acoustic shadowing, blurring of peripancreatic fat planes and peripancreatic edema. In the tail of the pancreas there is a 25 x 17 mm oval-shaped thick-walled simple cyst.  Adjacent to the body of the pancreas an AXIOS stent is seen at site of cyst gastrostomy. The previously seen large pseudocyst has resolved. The main pancreatic duct has an irregular contour, echogenic sidewalls and is dilated, measuring up to 3.6 mm in the head, 2.9 mm in the body and 2.1 mm in the tail. Pancreas divisum is not seen. ENDOSCOPIC FINDINGS:  OROPHARYNX: Cords and pyriform recesses appear normal.    ESOPHAGUS: The esophagus is normal. The proximal, mid, and distal portions are normal. The Z-Line is intact. STOMACH:    On retroflexion, the flap-valve is classified as Hill Grade II, with a tissue fold at the lesser curvature but with periodic opening and closing around the endoscope. The AXIOS lumen-apposing stent in seen in the gastric body and remains patent. After EUS confirmation of pseudocyst resolution, the stent was removed using a rat-tooth forceps. DUODENUM: The bulb and second portions are normal.  Impression:     1. Acute on chronic pancreatitis. 2. Resolution of previously seen large pseudocyst.  The axial stent was removed. 3. Small developing pseudocyst adjacent to the tail of the pancreas. Plan:  1. Resume diet. If patient does not tolerate advancement of his diet without exacerbation of symptoms, he may require enteral feeding. 2. Resume Integrilin protocol as a bridge to Brilinta. 3. Avoid alcohol and tobacco.  4. Return to office in 2 months.     PMH:  Past Medical History:   Diagnosis Date    Abdominal pain 10/25/2022    Abnormal CT of the abdomen 95/52/4092    Acute alcoholic pancreatitis 68/52/9831    Acute kidney injury (nontraumatic) ([address] 10/31/2022    pt denies    Acute on chronic pancreatitis ([address] 07/30/2022    Alcohol use disorder, severe, dependence ([address] 08/21/2019    CAD (coronary artery disease)     Carpal tunnel syndrome, left     Cigarette smoker     Coronary artery disease involving native coronary artery of native heart 11/13/2022    Dependence on nicotine from cigarettes 11/20/2022    GERD (gastroesophageal reflux disease)     controlled with med    History of pancreatitis     x 4 or 5 times-- last time admitted to hospital 10/10/26/22 x 7 days    Hypertension     controlled with med    STEMI (ST elevation myocardial infarction) ([facility]) 11/2/2022       PSH:  Past Surgical History:   Procedure Laterality Date    CARDIAC PROCEDURE N/A 11/2/2022    LEFT HEART CATH / CORONARY ANGIOGRAPHY performed by Tomy Zelaya MD at [facility] CARDIAC CATH LAB    CARDIAC PROCEDURE N/A 11/2/2022    Percutaneous coronary intervention performed by Tomy Zelaya MD at [facility] CARDIAC CATH LAB    CHOLECYSTECTOMY  2012    ORTHOPEDIC SURGERY Left     wrist surg--nerve surg    ORTHOPEDIC SURGERY Left     foot surg as child    UPPER GASTROINTESTINAL ENDOSCOPY N/A 11/9/2022    EGD/ENDOSCOPIC ULTRASOUND/AXIOS STENT ROOM 2226 **Rep will be present performed by Thomas Kearney MD at [facility] ENDOSCOPY    UPPER GASTROINTESTINAL ENDOSCOPY N/A 11/30/2022    EGD/ENDOSCOPIC ULTRASOUND stent removal  performed by Thomas Kearney MD at [facility] ENDOSCOPY    UPPER GASTROINTESTINAL ENDOSCOPY N/A 11/30/2022    EGD ESOPHAGOGASTRODUODENOSCOPY performed by Thomas Kearney MD at [facility] ENDOSCOPY       Allergies:  No Known Allergies    Home Medications:  Prior to Admission medications    Medication Sig Start Date End Date Taking? Authorizing Provider   hyoscyamine (LEVSIN/SL) 125 MCG sublingual tablet Place 1 tablet under the tongue every 4 hours as needed for Cramping 1/5/23   Kristian Araiza MD   pantoprazole (PROTONIX) 40 MG tablet Take 1 tablet by mouth in the morning and at bedtime 1/5/23 2/4/23  Kristian Araiza MD   simethicone (MYLICON) 80 MG chewable tablet Take 1 tablet by mouth every 6 hours as  needed for Flatulence 1/5/23   Michael Palencia MD   lipase-protease-amylase [facility]) 5000-80869 units CPEP delayed release capsule Take by mouth 3 times daily (with meals)  Patient not taking: Reported on 1/8/2023 1/5/23 2/4/23  Michael Palencia MD   HYDROmorphone (DILAUDID) 2 MG tablet Take 1 tablet by mouth 2 times daily as needed for Pain for up to 5 days. Max Daily Amount: 4 mg  Patient not taking: Reported on 1/8/2023 1/5/23 1/10/23  Michael Palencia MD   oxyCODONE (OXY-IR) 10 MG immediate release tablet Take 1 tablet by mouth every 8 hours as needed for Pain for up to 5 days. Max Daily Amount: 30 mg  Patient not taking: Reported on 1/8/2023 1/5/23 1/10/23  Michael Palencia MD   ondansetron [facility]) 4 MG tablet Take 1 tablet by mouth 3 times daily as needed for Nausea or Vomiting  Patient not taking: Reported on 1/8/2023 12/24/22   Darcy Curry DO   naloxone [facility]) 4 MG/0.1ML LIQD nasal spray 1 spray by Nasal route as needed for Opioid Reversal  Patient not taking: Reported on 1/8/2023 12/24/22   Kris Lyons DO   nitroGLYCERIN (NITROSTAT) 0.4 MG SL tablet Place 1 tablet under the tongue every 5 minutes as needed for Chest pain up to max of 3 total doses.  If no relief after 1 dose, call 911. 12/1/22   Michael Palencia MD   buPROPion [facility] SR) 100 MG extended release tablet Take 1 tablet by mouth 2 times daily 12/1/22   Michael Palencia MD   aspirin 81 MG chewable tablet Take 1 tablet by mouth daily  Patient not taking: Reported on 1/8/2023 11/11/22   ANJALI Byrne   atorvastatin (LIPITOR) 40 MG tablet Take 1 tablet by mouth nightly 11/10/22   ANJALI Byrne   lisinopril (PRINIVIL;ZESTRIL) 5 MG tablet Take 1 tablet by mouth daily 11/11/22   ANJALI Byrne   metoprolol tartrate (LOPRESSOR) 25 MG tablet Take 1 tablet by mouth 2 times daily 11/10/22   ANJALI Byrne   [facility]) 90 MG TABS tablet Take 1 tablet by mouth 2 times daily 11/10/22   Dea Noland PA   thiamine 100 MG tablet Take 1 tablet by mouth in the morning.  8/3/22 8/2/22  Abdirahman Almaraz MD       Hospital Medications:  Current Facility-Administered Medications   Medication Dose Route Frequency    atorvastatin (LIPITOR) tablet 40 mg  40 mg Oral Nightly    buPROPion [facility] SR) extended release tablet 100 mg  100 mg Oral BID    hyoscyamine (LEVSIN/SL) sublingual tablet 125 mcg  125 mcg SubLINGual Q4H PRN    lisinopril (PRINIVIL;ZESTRIL) tablet 5 mg  5 mg Oral Daily    metoprolol tartrate (LOPRESSOR) tablet 25 mg  25 mg Oral BID    pantoprazole (PROTONIX) tablet 40 mg  40 mg Oral BID    simethicone (MYLICON) chewable tablet 80 mg  80 mg Oral Q6H PRN    ticagrelor (BRILINTA) tablet 90 mg  90 mg Oral BID    lipase-protease-amylase (ZENPEP) delayed release capsule 5,000 Units  5,000 Units Oral TID WC    HYDROmorphone (DILAUDID) injection 1 mg  1 mg IntraVENous Q4H PRN    sodium chloride flush 0.9 % injection 5-40 mL  5-40 mL IntraVENous 2 times per day    sodium chloride flush 0.9 % injection 5-40 mL  5-40 mL IntraVENous PRN    0.9 % sodium chloride infusion   IntraVENous PRN    enoxaparin (LOVENOX) injection 40 mg  40 mg SubCUTAneous Daily    ondansetron (ZOFRAN-ODT) disintegrating tablet 4 mg  4 mg Oral Q8H PRN    Or    ondansetron (ZOFRAN) injection 4 mg  4 mg IntraVENous Q6H PRN    polyethylene glycol (GLYCOLAX) packet 17 g  17 g Oral Daily PRN    acetaminophen (TYLENOL) tablet 650 mg  650 mg Oral Q6H PRN    Or    acetaminophen (TYLENOL) suppository 650 mg  650 mg Rectal Q6H PRN    aspirin chewable tablet 81 mg  81 mg Oral Daily    oxyCODONE (ROXICODONE) immediate release tablet 5 mg  5 mg Oral Q4H PRN    lactated ringers infusion   IntraVENous Continuous       Social History:  Social History     Tobacco Use    Smoking status: Every Day     Packs/day: 1.00     Years: 20.00     Pack years: 20.00     Types: Cigarettes    Smokeless tobacco: Never   Substance Use Topics    Alcohol use: Not Currently Comment: none since 10/4/22-- had drank 3 glasses of wine daily       Pt denies any history of drug use, blood transfusions, or tattoos. Family History:  Family History   Problem Relation Age of Onset    No Known Problems Mother     Heart Attack Father         MI at 58       Review of Systems:  A detailed 10 system ROS is obtained, with pertinent positives as listed above. All others are negative. Diet:  NPO    Objective:     Physical Exam:  Vitals:  /77   Pulse 65   Temp 97.6 °F (36.4 °C) (Oral)   Resp 18   Ht 5' 9\" (1.753 m)   Wt 178 lb (80.7 kg)   SpO2 98%   BMI 26.29 kg/m²   Gen:  Pt is alert, cooperative, no acute distress, lying in bed  Skin:  Extremities and face reveal no rashes. HEENT: Sclerae anicteric. Extra-occular muscles are intact. No oral ulcers. No abnormal pigmentation of the lips. The neck is supple. Cardiovascular: Regular rate and rhythm. No murmurs, gallops, or rubs. Respiratory:  Comfortable breathing with no accessory muscle use. Clear breath sounds anteriorly with no wheezes, rales, or rhonchi. GI:  Abdomen nondistended, soft, and TTP over epigastric region and LUQ. Mild guarding but no rigidity noted. Normal active bowel sounds. No enlargement of the liver or spleen. No masses palpable. Rectal:  Deferred  Musculoskeletal:  No pitting edema of the lower legs. Neurological:  Gross memory appears intact. Patient is alert and oriented. Psychiatric:  Mood appears appropriate with judgement intact. Lymphatic:  No cervical or supraclavicular adenopathy. Laboratory:    Recent Labs     01/08/23  1734   WBC 10.6   HGB 14.7   HCT 44.5   *   MCV 76.7*      K 4.8   CL 93*   CO2 23   BUN 17   CREATININE 0.92   CALCIUM 10.3   GLUCOSE 98   ALKPHOS 85   AST 29   ALT 28   BILITOT 0.7   LABALBU 4.2   PROT 8.7*   LIPASE 837*     CT abdomen and pelvis with contrast , 1/9/23       COMPARISON: January 1, 2023.        INDICATION: Recurrent pancreatitis, worsening. Any evidence of necrosis, cyst.       TECHNIQUE: CT imaging was performed of the abdomen and pelvis following the   uncomplicated administration of intravenous contrast (Isovue 370, 100 mL). Oral   contrast was administered. Radiation dose reduction techniques were used for   this study:  Our CT scanners use one or all of the following: Automated exposure   control, adjustment of the mA and/or kVp according to patient's size, iterative   reconstruction. FINDINGS:       Visualized lung bases are unremarkable. Abdomen findings: There is peripancreatic inflammatory stranding. The pancreatic parenchyma is not   well defined. There is lobulated cystic mass involving the pancreatic body,   measuring up to 2.5 cm AP x 4.3 cm transverse x 3.2 cm craniocaudal. Similar   adjacent 15 mm cystic lesion is noted in the pancreatic body. There is mass   effect on the splenic vein, near the confluence with the SMV. There is circumferential wall thickening of the distal stomach, as into the   pancreatic abnormality. Gallbladder is surgically absent. Liver and the spleen not the upper limits for   size. The adrenal glands, kidneys and abdominal aorta are unremarkable. No   evidence of lymphadenopathy. There is fluid within the esophagus, suggesting gastroesophageal reflux. Small   bowel loops are normal in caliber. No small bowel obstruction. Pelvic findings:       Urinary bladder is normal in contour. The colon is normal in course and caliber   with large stool volume. No evidence of appendicitis. There is no free air or free fluid. Surrounding bones are intact. Impression       1. Necrotizing pancreatitis, with associated acute necrotic collection measuring   up to 2.5 x 4.3 x 3.2 cm. The necrotic collection appears more defined when   compared to January 1, 2023 study.        2. Mass effect and narrowing of the splenic vein near the confluence with the SMV.       3. Circumferential wall thickening of the distal stomach, adjacent to the   pancreatic abnormality. Findings likely reactive gastritis. 4. Large stool volume in the colon. No evidence of colitis, diverticulitis or   bowel obstruction. Assessment:     Principal Problem:    Recurrent pancreatitis  Active Problems:    History of ST elevation myocardial infarction (STEMI)    Primary hypertension  Resolved Problems:    * No resolved hospital problems. *    42 y.o. 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 Juliana Barksdale for recurrent pancreatitis with known necrosis in pancreatic tail after recent discharge on 1/5/23 for the same complaints. Patient was discharged and continued to have upper abdominal pain, nausea, and inability to tolerate PO intake. He denies recent EtOH use and quit smoking tobacco 3 weeks ago. Labs on admit significant for Lipase 837, total protein 8.7, RBC 5.8, MCV 76.7, Platelets 962. CT A/P with 1. Necrotizing pancreatitis, with associated acute necrotic collection measuring up to 2.5 x 4.3 x 3.2 cm. The necrotic collection appears more defined when compared to January 1, 2023 study. 2. Mass effect and narrowing of the splenic vein near the confluence with the SMV. 3. Circumferential wall thickening of the distal stomach, adjacent to the pancreatic abnormality. Findings likely reactive gastritis. 4. Large stool volume in the colon. No evidence of colitis, diverticulitis or bowel obstruction.  Patient was scheduled for OP follow up with Dr. Minerva Easley on 1/10/23 to discuss referral to tertiary treatment center for management of chronic pancreatitis vs possible Puestow procedure given chronically dilated pancreatic duct. Plan:     - Supportive care, anti-emetics, pain control PRN, aggressive IVF. - Continue to avoid ETOH and tobacco cessation.  - Can continue pancreatic enzymes. - Continue PPI. - Will notify office of patient's admission since OP follow up is scheduled for tomorrow. - Bowel regimen to include Miralax PRN for constipation/large stool burden noted on CT.  - Follow. Doron Retana PA-C   [facility]    Patient is seen and examined in collaboration with Dr. Vinicio Soto. Assessment and plan as per Dr. Vinicio Soto.

## 2023-01-09 NOTE — ED NOTES
TRANSFER - OUT REPORT:    Verbal report given to [facility] RN on Sanna Fail  being transferred to [address] [place] [address]  for routine progression of patient care       Report consisted of patient's Situation, Background, Assessment and   Recommendations(SBAR). Information from the following report(s) Nurse Handoff Report, Intake/Output and Recent Results was reviewed with the receiving nurse. Lines:   Peripheral IV 01/08/23 Left Antecubital (Active)        Opportunity for questions and clarification was provided.       Patient transported with:  [place], RN  01/08/23 [id number]

## 2023-01-09 NOTE — PROGRESS NOTES
Received verbal report (via phone) from Fei Lee RN at [facility] ED. Patient will be transported by EMS from [facility] ED to [facility] holding, Room 1102. Room is ready.

## 2023-01-09 NOTE — H&P
Hospitalist Admission History and Physical         NAME:            Saba Blanc    Age:                44 y.o.    :               1985    MRN:              [id number]    PCP: Eulalio Singh MD    Consulting MD:    Treatment Team: Attending Provider: Carlos Alebrto Potts DO; Registered Nurse: Cooper Mann RN         No chief complaint on file. HPI:    Patient is a 44 y.o. male who presented to [facility] ED for cc recurrent epigastric pain with nausea and emesis. He was just discharged from our facility on 23 for pancreatitis. Unable to tolerate PO. Hx of ETOH induced pancreatitis complicated by pseudocyst s/p gastrostomy with stent placed 2022 then removed on 22 via EUS and STEMI status post PCI with TULIO 2022 on aspirin and Brilinta. Lipase 837 (was 2,586 on )    CT abdomen pelvis with contrast from 23 showed -   Worsening inflammatory changes about the pancreas suggesting worsening   pancreatitis. This includes evolving mesenteric edema and fluid collections   particularly in the lesser sac of the abdomen. Stable necrotic appearing changes   are seen in the pancreatic tail. Likely reactive regional mesenteric lymph nodes   are seen. 2. Increasing mesenteric varices. The splenic and portal veins remain patent   although the distal splenic vein appears attenuated likely from evolving   inflammatory changes.      Past Medical History:   Diagnosis Date    Abdominal pain 10/25/2022    Abnormal CT of the abdomen     Acute alcoholic pancreatitis     Acute kidney injury (nontraumatic) ([address] 10/31/2022    pt denies    Acute on chronic pancreatitis ([address] 2022    Alcohol use disorder, severe, dependence ([address] 2019    CAD (coronary artery disease)     Carpal tunnel syndrome, left     Cigarette smoker     Coronary artery disease involving native coronary artery of native heart 2022    Dependence on nicotine from cigarettes 11/20/2022    GERD (gastroesophageal reflux disease)     controlled with med    History of pancreatitis     x 4 or 5 times-- last time admitted to hospital 10/10/26/22 x 7 days    Hypertension     controlled with med    STEMI (ST elevation myocardial infarction) ([address] 11/2/2022            Past Surgical History:   Procedure Laterality Date    CARDIAC PROCEDURE N/A 11/2/2022    LEFT HEART CATH / CORONARY ANGIOGRAPHY performed by Clari Rosen MD at [address] [facility] N/A 11/2/2022    Percutaneous coronary intervention performed by Clari Rosen MD at [address] [facility] CATH LAB    CHOLECYSTECTOMY  2012    ORTHOPEDIC SURGERY Left     wrist surg--nerve surg    ORTHOPEDIC SURGERY Left     foot surg as child    UPPER GASTROINTESTINAL ENDOSCOPY N/A 11/9/2022    EGD/ENDOSCOPIC ULTRASOUND/AXIOS STENT ROOM 2226 **Rep will be present performed by Trip Deshpande MD at [facility] ENDOSCOPY    UPPER GASTROINTESTINAL ENDOSCOPY N/A 11/30/2022    EGD/ENDOSCOPIC ULTRASOUND stent removal  performed by Trip Deshpande MD at [address] N/A 11/30/2022    EGD ESOPHAGOGASTRODUODENOSCOPY performed by Trip Deshpande MD at [facility] ENDOSCOPY            Family History   Problem Relation Age of Onset    No Known Problems Mother     Heart Attack Father         MI at 58       Family history reviewed and negative except as noted above.          Social History     Social History Narrative    Not on file            Social History     Tobacco Use    Smoking status: Every Day     Packs/day: 1.00     Years: 20.00     Pack years: 20.00     Types: Cigarettes    Smokeless tobacco: Never   Substance Use Topics    Alcohol use: Not Currently     Comment: none since 10/4/22-- had drank 3 glasses of wine daily            Social History     Substance and Sexual Activity   Drug Use No                 No Known Allergies         Prior to Admission medications    Medication Sig Start Date End Date Taking? Authorizing Provider   hyoscyamine (LEVSIN/SL) 125 MCG sublingual tablet Place 1 tablet under the tongue every 4 hours as needed for Cramping 1/5/23   Gage Burnett MD   pantoprazole (PROTONIX) 40 MG tablet Take 1 tablet by mouth in the morning and at bedtime 1/5/23 2/4/23  Gage Burnett MD   simethicone (MYLICON) 80 MG chewable tablet Take 1 tablet by mouth every 6 hours as needed for Flatulence 1/5/23   Gage Burnett MD   lipase-protease-amylase (ZENPEP) 5000-38319 units CPEP delayed release capsule Take by mouth 3 times daily (with meals)  Patient not taking: Reported on 1/8/2023 1/5/23 2/4/23  Gage Burnett MD   HYDROmorphone (DILAUDID) 2 MG tablet Take 1 tablet by mouth 2 times daily as needed for Pain for up to 5 days. Max Daily Amount: 4 mg  Patient not taking: Reported on 1/8/2023 1/5/23 1/10/23  Gage Burnett MD   oxyCODONE (OXY-IR) 10 MG immediate release tablet Take 1 tablet by mouth every 8 hours as needed for Pain for up to 5 days. Max Daily Amount: 30 mg  Patient not taking: Reported on 1/8/2023 1/5/23 1/10/23  Gage Burnett MD   ondansetron [facility]) 4 MG tablet Take 1 tablet by mouth 3 times daily as needed for Nausea or Vomiting  Patient not taking: Reported on 1/8/2023 12/24/22   Elida Abraham DO   naloxone [facility]) 4 MG/0.1ML LIQD nasal spray 1 spray by Nasal route as needed for Opioid Reversal  Patient not taking: Reported on 1/8/2023 12/24/22   Santo Lyons DO   nitroGLYCERIN (NITROSTAT) 0.4 MG SL tablet Place 1 tablet under the tongue every 5 minutes as needed for Chest pain up to max of 3 total doses.  If no relief after 1 dose, call 911. 12/1/22   Gage Burnett MD   buPROPion [facility]) 100 MG extended release tablet Take 1 tablet by mouth 2 times daily 12/1/22   Gage Burnett MD   aspirin 81 MG chewable tablet Take 1 tablet by mouth daily  Patient not taking: Reported on 1/8/2023 11/11/22   ANJALI Sol   atorvastatin (LIPITOR) 40 MG tablet Take 1 tablet by mouth nightly 11/10/22   [facility]   lisinopril (PRINIVIL;ZESTRIL) 5 MG tablet Take 1 tablet by mouth daily 11/11/22   [facility]   metoprolol tartrate (LOPRESSOR) 25 MG tablet Take 1 tablet by mouth 2 times daily 11/10/22   [facility]   ticagrelor (BRILINTA) 90 MG TABS tablet Take 1 tablet by mouth 2 times daily 11/10/22   [facility]   thiamine 100 MG tablet Take 1 tablet by mouth in the morning. 8/3/22 8/2/22  Collin Suh MD                      Review of Systems         Constitutional: NAD    Eyes:  no change in visual acuity, no photophobia    Ears, nose, mouth, throat, and face: no  Odynphagia, dysphagia, no thrush or exudate, negative for chronic sinus congestion, recurrent headaches    Respiratory: negative for SOB, hemoptysis or cough    Cardiovascular: negative for CP, palpitations, or PND    Gastrointestinal: epigastric pain, nausea with emesis, cannot tolerate PO    Genitourinary: no urgency, frequency, or dysuria, no nocturia    Integument/breast: negative for skin rash or skin lesions    Hematologic/lymphatic: negative for known bleeding disorder    Musculoskeletal:negative for joint pain or joint tenderness    Neurological: negative for lightheadedness, syncope or presyncopal events, no seizure or CVA history    Behavioral/Psych: negative for depression or chronic anxiety,    Endocrine: negative for polydyspia, polyuria or intolerance to heat or cold    Allergic/Immunologic: negative for chronic allergic rhinitis, or known connective tissue disorder              Objective:         Patient Vitals for the past 24 hrs:   Temp Pulse Resp BP SpO2   01/08/23 2324 -- -- 20 -- --   01/08/23 2214 97.9 °F (36.6 °C) 70 20 113/69 100 %            01/08 1901 - 01/09 0700  In: 5 [I.V.:5]  Out: -     No intake/output data recorded.          Data Review:   Recent Results (from the past 24 hour(s))   Lipase    Collection Time: 01/08/23  5:34 PM   Result Value Ref Range    Lipase 837 (H) 13 - 60 U/L   Comprehensive Metabolic Panel    Collection Time: 01/08/23  5:34 PM   Result Value Ref Range    Sodium 135 133 - 143 mmol/L    Potassium 4.8 3.5 - 5.1 mmol/L    Chloride 93 (L) 98 - 107 mmol/L    CO2 23 21 - 32 mmol/L    Anion Gap 19 (H) 2 - 11 mmol/L    Glucose 98 65 - 100 mg/dL    BUN 17 7.0 - 18.0 MG/DL    Creatinine 0.92 0.8 - 1.5 MG/DL    Est, Glom Filt Rate >60 >60 ml/min/1.73m2    Calcium 10.3 8.3 - 10.4 MG/DL    Total Bilirubin 0.7 0.2 - 1.1 MG/DL    ALT 28 13.0 - 61.0 U/L    AST 29 15 - 37 U/L    Alk Phosphatase 85 45.0 - 117.0 U/L    Total Protein 8.7 (H) 6.4 - 8.2 g/dL    Albumin 4.2 3.5 - 5.0 g/dL    Globulin 4.5 2.8 - 4.5 g/dL    Albumin/Globulin Ratio 0.9 0.4 - 1.6     CBC with Auto Differential    Collection Time: 01/08/23  5:34 PM   Result Value Ref Range    WBC 10.6 4.3 - 11.1 K/uL    RBC 5.80 (H) 4.23 - 5.60 M/uL    Hemoglobin 14.7 13.6 - 17.2 g/dL    Hematocrit 44.5 41.1 - 50.3 %    MCV 76.7 (L) 82.0 - 102.0 FL    MCH 25.3 (L) 26.1 - 32.9 PG    MCHC 33.0 31.4 - 35.0 g/dL    RDW 17.0 (H) 11.9 - 14.6 %    Platelets 711 (H) 654 - 450 K/uL    MPV 8.5 (L) 9.4 - 12.3 FL    nRBC 0.00 0.0 - 0.2 K/uL    Differential Type AUTOMATED      Seg Neutrophils 79 (H) 43 - 78 %    Lymphocytes 13 13 - 44 %    Monocytes 6 4.0 - 12.0 %    Eosinophils % 1 0.5 - 7.8 %    Basophils 0 0.0 - 2.0 %    Immature Granulocytes 0 0.0 - 5.0 %    Segs Absolute 8.4 (H) 1.7 - 8.2 K/UL    Absolute Lymph # 1.4 0.5 - 4.6 K/UL    Absolute Mono # 0.7 0.1 - 1.3 K/UL    Absolute Eos # 0.1 0.0 - 0.8 K/UL    Basophils Absolute 0.0 0.0 - 0.2 K/UL    Absolute Immature Granulocyte 0.0 0.0 - 0.5 K/UL   Urinalysis w rflx microscopic    Collection Time: 01/08/23  8:13 PM   Result Value Ref Range    Color, UA CHEOK      Appearance CLEAR      Specific Gravity, UA 1.025 (H) 1.001 - 1.023      pH, Urine 7.0 5.0 - 9.0      Protein,  (A) NEG mg/dL    Glucose,  mg/dL    Ketones, Urine 40 (A) NEG mg/dL Bilirubin Urine SMALL (A) NEG      Blood, Urine Trace Intact (A) NEG      Urobilinogen, Urine >=8.0 0.2 - 1.0 EU/dL    Nitrite, Urine Negative NEG      Leukocyte Esterase, Urine Negative NEG     Urinalysis, Micro    Collection Time: 01/08/23  8:13 PM   Result Value Ref Range    WBC, UA 0-3 0 /hpf    RBC, UA 3-5 0 /hpf    Epithelial Cells UA 0-3 0 /hpf    BACTERIA, URINE 0 0 /hpf    Casts 0 0 /lpf    Crystals 0 0 /LPF    Amorphous Crystal 1+ (H) 0    Mucus, UA TRACE 0 /lpf    Other observations RESULTS VERIFIED MANUALLY              Physical Exam:         General:    Alert, cooperative, no distress, appears stated age. Eyes:    Conjunctivae/corneas clear. PERRL    Ears:    Normal     Nose:    Nares normal.     Mouth/Throat:    Dry tongue     Neck:     no JVD. Back:     deferred    Lungs:     Clear to auscultation bilaterally. Heart:    Regular rate and rhythm, S1, S2 normal    Abdomen:     Soft, +BS, epigastic pain    Extremities:    Extremities normal, atraumatic, no cyanosis or edema. Skin:    Skin color, texture, turgor normal. No rashes or lesions    Neurologic:    CNII-XII intact. Normal strength, sensation and reflexes throughout. Assessment and Plan         Principal Problem:    Recurrent pancreatitis  Active Problems:    History of ST elevation myocardial infarction (STEMI)    Primary hypertension  Resolved Problems:    * No resolved hospital problems. *    Recurrent pancreatitis - Due to findings on CT abdomen on 1/1, will repeat CT since there was concern of fluid collections and with known necrosis of pancreatic tail. Will consult GI for further recs on treatment. Helon Creeks. NPO except meds.  LR 200ml/hr    Hx of STEMI s/p PCI - ASA/Brilinta    Depression - Wellbutrin    HTN with hx of CAD- ACE, BB    DVT prophylaxis- Lovenox    Signed By:    Sriram Hernandez DO    January 8, 2023

## 2023-01-09 NOTE — CARE COORDINATION
Patient admitted with recurrent pancreatitis/pseudocyst. Gi to consult. Patient most recently discharged from [facility] 1/5/23 after 4 D hospitalization for similar diagnosis. Patient was discharged home with family assistance. Referral was sent to [facility] Pain management by previous CM. Patient was able to afford discharge medications on 1/5. CM met with patient for assessment and needs. Patient is alert and oriented X 4 . Patient reports the pain medication is ordered every 4 hrs instead of 3 hours like before. Patient verified spousal support and has children. Patient verified PCP and confirmed he is self pay. 01/09/23 1401   Service Assessment   Patient Orientation Alert and Oriented   Cognition Alert   History Provided By Patient   Primary Caregiver Self   Support Systems Spouse/Significant Other   Patient's Healthcare Decision Maker is: Legal Next of Kin   PCP Verified by CM Yes   Last Visit to PCP Within last 6 months   Prior Functional Level Independent in ADLs/IADLs   Current Functional Level Independent in ADLs/IADLs   Can patient return to prior living arrangement Yes   Ability to make needs known: Good   Family able to assist with home care needs: Yes   Would you like for me to discuss the discharge plan with any other family members/significant others, and if so, who? No   Financial Resources Other (Comment)  (Self pay)   Social/Functional History   Lives With Family; Spouse   Type of [address] One level   Receives Help From Family   ADL Assistance Independent   Ambulation Assistance Independent   Transfer Assistance Independent   Active  Yes   Mode of Transportation Car   Occupation Full time employment   Type of Occupation [employer]   Discharge Planning   Type of [address] Prior To Admission None   Potential Assistance Needed N/A   DME Ordered?  No   Type of Home Care Services None   Patient expects to be discharged to: [facility] Discharge   Transition of Care Consult (CM Consult) Discharge Planning;Medication [address] Discharge None   [facility] Information Provided? No   Mode of Transport at Discharge   (Family)   Confirm Follow Up Transport Family   Condition of Participation: Discharge Planning   The Plan for Transition of Care is related to the following treatment goals: Home with family assistance to return to baseline function. CM will follow patient's status to assist with new needs.

## 2023-01-10 PROCEDURE — 1100000000 HC RM PRIVATE

## 2023-01-10 PROCEDURE — 6370000000 HC RX 637 (ALT 250 FOR IP): Performed by: INTERNAL MEDICINE

## 2023-01-10 PROCEDURE — 6370000000 HC RX 637 (ALT 250 FOR IP): Performed by: FAMILY MEDICINE

## 2023-01-10 PROCEDURE — 6360000002 HC RX W HCPCS: Performed by: HOSPITALIST

## 2023-01-10 PROCEDURE — 6370000000 HC RX 637 (ALT 250 FOR IP): Performed by: HOSPITALIST

## 2023-01-10 PROCEDURE — 6370000000 HC RX 637 (ALT 250 FOR IP): Performed by: PHYSICIAN ASSISTANT

## 2023-01-10 PROCEDURE — 2580000003 HC RX 258: Performed by: FAMILY MEDICINE

## 2023-01-10 RX ORDER — POLYETHYLENE GLYCOL 3350 17 G/17G
17 POWDER, FOR SOLUTION ORAL DAILY
Status: DISCONTINUED | OUTPATIENT
Start: 2023-01-10 | End: 2023-01-13

## 2023-01-10 RX ORDER — DICYCLOMINE HYDROCHLORIDE 10 MG/1
10 CAPSULE ORAL
Status: DISCONTINUED | OUTPATIENT
Start: 2023-01-10 | End: 2023-01-13

## 2023-01-10 RX ORDER — BISACODYL 10 MG
10 SUPPOSITORY, RECTAL RECTAL DAILY
Status: COMPLETED | OUTPATIENT
Start: 2023-01-10 | End: 2023-01-10

## 2023-01-10 RX ADMIN — OXYCODONE HYDROCHLORIDE 5 MG: 5 TABLET ORAL at 20:34

## 2023-01-10 RX ADMIN — ALUMINUM HYDROXIDE, MAGNESIUM HYDROXIDE, DIMETHICONE 30 ML: 200; 200; 20 LIQUID ORAL at 07:40

## 2023-01-10 RX ADMIN — SODIUM CHLORIDE, SODIUM LACTATE, POTASSIUM CHLORIDE, AND CALCIUM CHLORIDE: 600; 310; 30; 20 INJECTION, SOLUTION INTRAVENOUS at 00:09

## 2023-01-10 RX ADMIN — BUPROPION HYDROCHLORIDE 100 MG: 100 TABLET, FILM COATED, EXTENDED RELEASE ORAL at 07:40

## 2023-01-10 RX ADMIN — TICAGRELOR 90 MG: 90 TABLET ORAL at 20:33

## 2023-01-10 RX ADMIN — PANTOPRAZOLE SODIUM 40 MG: 40 TABLET, DELAYED RELEASE ORAL at 07:40

## 2023-01-10 RX ADMIN — DICYCLOMINE HYDROCHLORIDE 10 MG: 10 CAPSULE ORAL at 16:02

## 2023-01-10 RX ADMIN — PANTOPRAZOLE SODIUM 40 MG: 40 TABLET, DELAYED RELEASE ORAL at 20:33

## 2023-01-10 RX ADMIN — ATORVASTATIN CALCIUM 40 MG: 40 TABLET, FILM COATED ORAL at 20:33

## 2023-01-10 RX ADMIN — TICAGRELOR 90 MG: 90 TABLET ORAL at 07:40

## 2023-01-10 RX ADMIN — HYDROMORPHONE HYDROCHLORIDE 1 MG: 1 INJECTION, SOLUTION INTRAMUSCULAR; INTRAVENOUS; SUBCUTANEOUS at 01:28

## 2023-01-10 RX ADMIN — HYDROMORPHONE HYDROCHLORIDE 1 MG: 1 INJECTION, SOLUTION INTRAMUSCULAR; INTRAVENOUS; SUBCUTANEOUS at 04:56

## 2023-01-10 RX ADMIN — HYDROMORPHONE HYDROCHLORIDE 1 MG: 1 INJECTION, SOLUTION INTRAMUSCULAR; INTRAVENOUS; SUBCUTANEOUS at 16:24

## 2023-01-10 RX ADMIN — METOPROLOL TARTRATE 25 MG: 25 TABLET, FILM COATED ORAL at 07:40

## 2023-01-10 RX ADMIN — HYDROMORPHONE HYDROCHLORIDE 1 MG: 1 INJECTION, SOLUTION INTRAMUSCULAR; INTRAVENOUS; SUBCUTANEOUS at 10:24

## 2023-01-10 RX ADMIN — POLYETHYLENE GLYCOL 3350 17 G: 17 POWDER, FOR SOLUTION ORAL at 16:02

## 2023-01-10 RX ADMIN — ASPIRIN 81 MG 81 MG: 81 TABLET ORAL at 07:40

## 2023-01-10 RX ADMIN — BUPROPION HYDROCHLORIDE 100 MG: 100 TABLET, FILM COATED, EXTENDED RELEASE ORAL at 20:33

## 2023-01-10 RX ADMIN — SODIUM CHLORIDE, SODIUM LACTATE, POTASSIUM CHLORIDE, AND CALCIUM CHLORIDE: 600; 310; 30; 20 INJECTION, SOLUTION INTRAVENOUS at 04:58

## 2023-01-10 RX ADMIN — ALUMINUM HYDROXIDE, MAGNESIUM HYDROXIDE, DIMETHICONE 30 ML: 200; 200; 20 LIQUID ORAL at 01:29

## 2023-01-10 RX ADMIN — BISACODYL 10 MG: 10 SUPPOSITORY RECTAL at 08:31

## 2023-01-10 RX ADMIN — HYDROMORPHONE HYDROCHLORIDE 1 MG: 1 INJECTION, SOLUTION INTRAMUSCULAR; INTRAVENOUS; SUBCUTANEOUS at 13:40

## 2023-01-10 RX ADMIN — HYDROMORPHONE HYDROCHLORIDE 1 MG: 1 INJECTION, SOLUTION INTRAMUSCULAR; INTRAVENOUS; SUBCUTANEOUS at 19:20

## 2023-01-10 RX ADMIN — LISINOPRIL 5 MG: 5 TABLET ORAL at 07:40

## 2023-01-10 RX ADMIN — HYDROMORPHONE HYDROCHLORIDE 1 MG: 1 INJECTION, SOLUTION INTRAMUSCULAR; INTRAVENOUS; SUBCUTANEOUS at 07:39

## 2023-01-10 RX ADMIN — HYDROMORPHONE HYDROCHLORIDE 1 MG: 1 INJECTION, SOLUTION INTRAMUSCULAR; INTRAVENOUS; SUBCUTANEOUS at 22:40

## 2023-01-10 RX ADMIN — METOPROLOL TARTRATE 25 MG: 25 TABLET, FILM COATED ORAL at 20:33

## 2023-01-10 RX ADMIN — SODIUM CHLORIDE, SODIUM LACTATE, POTASSIUM CHLORIDE, AND CALCIUM CHLORIDE: 600; 310; 30; 20 INJECTION, SOLUTION INTRAVENOUS at 10:24

## 2023-01-10 RX ADMIN — OXYCODONE HYDROCHLORIDE 5 MG: 5 TABLET ORAL at 16:02

## 2023-01-10 ASSESSMENT — PAIN DESCRIPTION - LOCATION
LOCATION: ABDOMEN

## 2023-01-10 ASSESSMENT — PAIN SCALES - GENERAL
PAINLEVEL_OUTOF10: 6
PAINLEVEL_OUTOF10: 7
PAINLEVEL_OUTOF10: 7
PAINLEVEL_OUTOF10: 8
PAINLEVEL_OUTOF10: 8
PAINLEVEL_OUTOF10: 7
PAINLEVEL_OUTOF10: 2
PAINLEVEL_OUTOF10: 9
PAINLEVEL_OUTOF10: 6
PAINLEVEL_OUTOF10: 9
PAINLEVEL_OUTOF10: 4
PAINLEVEL_OUTOF10: 5
PAINLEVEL_OUTOF10: 9
PAINLEVEL_OUTOF10: 6
PAINLEVEL_OUTOF10: 3
PAINLEVEL_OUTOF10: 5
PAINLEVEL_OUTOF10: 3
PAINLEVEL_OUTOF10: 4
PAINLEVEL_OUTOF10: 7
PAINLEVEL_OUTOF10: 8

## 2023-01-10 ASSESSMENT — PAIN DESCRIPTION - PAIN TYPE
TYPE: ACUTE PAIN
TYPE: ACUTE PAIN

## 2023-01-10 ASSESSMENT — PAIN DESCRIPTION - DESCRIPTORS
DESCRIPTORS: SHARP;STABBING;THROBBING
DESCRIPTORS: ACHING;STABBING;SHOOTING;SHARP
DESCRIPTORS: STABBING;THROBBING;SHARP

## 2023-01-10 ASSESSMENT — PAIN DESCRIPTION - ORIENTATION
ORIENTATION: ANTERIOR;MID;RIGHT
ORIENTATION: ANTERIOR;RIGHT;LOWER

## 2023-01-10 ASSESSMENT — PAIN DESCRIPTION - FREQUENCY
FREQUENCY: CONTINUOUS
FREQUENCY: CONTINUOUS

## 2023-01-10 ASSESSMENT — PAIN DESCRIPTION - ONSET
ONSET: ON-GOING
ONSET: ON-GOING

## 2023-01-10 NOTE — PROGRESS NOTES
Pt on his way back ambulating from the bathroom, asked if he could be put on clear liquids. MD made aware. Diet ordered.

## 2023-01-10 NOTE — PROGRESS NOTES
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 Pain is unchanged. Patient has not had a bowel movement in a couple of days and feels as if he is bloated. He continues to pass gas without issues. No fevers or chills.     Medications:   Current Facility-Administered Medications   Medication Dose Route Frequency    aluminum & magnesium hydroxide-simethicone (MAALOX) 200-200-20 MG/5ML suspension 30 mL  30 mL Oral Q6H PRN    HYDROmorphone (DILAUDID) injection 1 mg  1 mg IntraVENous Q3H PRN    atorvastatin (LIPITOR) tablet 40 mg  40 mg Oral Nightly    buPROPion (WELLBUTRIN SR) extended release tablet 100 mg  100 mg Oral BID    hyoscyamine (LEVSIN/SL) sublingual tablet 125 mcg  125 mcg SubLINGual Q4H PRN    lisinopril (PRINIVIL;ZESTRIL) tablet 5 mg  5 mg Oral Daily    metoprolol tartrate (LOPRESSOR) tablet 25 mg  25 mg Oral BID    pantoprazole (PROTONIX) tablet 40 mg  40 mg Oral BID    simethicone (MYLICON) chewable tablet 80 mg  80 mg Oral Q6H PRN    ticagrelor (BRILINTA) tablet 90 mg  90 mg Oral BID    sodium chloride flush 0.9 % injection 5-40 mL  5-40 mL IntraVENous 2 times per day    sodium chloride flush 0.9 % injection 5-40 mL  5-40 mL IntraVENous PRN    0.9 % sodium chloride infusion   IntraVENous PRN    enoxaparin (LOVENOX) injection 40 mg  40 mg SubCUTAneous Daily    ondansetron (ZOFRAN-ODT) disintegrating tablet 4 mg  4 mg Oral Q8H PRN    Or    ondansetron (ZOFRAN) injection 4 mg  4 mg IntraVENous Q6H PRN    polyethylene glycol (GLYCOLAX) packet 17 g  17 g Oral Daily PRN    acetaminophen (TYLENOL) tablet 650 mg  650 mg Oral Q6H PRN    Or    acetaminophen (TYLENOL) suppository 650 mg  650 mg Rectal Q6H  PRN    aspirin chewable tablet 81 mg  81 mg Oral Daily    oxyCODONE (ROXICODONE) immediate release tablet 5 mg  5 mg Oral Q4H PRN    lactated ringers infusion   IntraVENous Continuous       Review of Systems:  ROS was obtained, with pertinent positives as listed above.  No chest pain or SOB.    Diet:  NPO sips of water with meds    Objective:   Vitals:  /86   Pulse 72   Temp 97.8 °F (36.6 °C) (Oral)   Resp 18   Ht 5' 9\" (1.753 m)   Wt 170 lb 6.4 oz (77.3 kg)   SpO2 98%   BMI 25.16 kg/m²   Intake/Output:  No intake/output data recorded.  01/08 1901 - 01/10 0700  In: 5054.6 [I.V.:5005]  Out: -   Exam:  General appearance: alert, cooperative, no distress, sitting up in bed  Lungs: clear to auscultation bilaterally anteriorly  Heart: regular rate and rhythm  Abdomen: soft,TTP over upper abdomen bilaterally with guarding. No rigidity noted. Bowel sounds normal. No masses, no organomegaly  Extremities: extremities normal, atraumatic, no cyanosis or edema  Neuro:  alert and oriented    Data Review (Labs):    Recent Labs     01/08/23  1734 01/09/23  0850   WBC 10.6 7.9   HGB 14.7 11.0*   HCT 44.5 34.0*   * 431   MCV 76.7* 78.3*    132*   K 4.8 4.4   CL 93* 102   CO2 23 23   BUN 17 12   CREATININE 0.92 0.90   CALCIUM 10.3 8.9   GLUCOSE 98 88   ALKPHOS 85  --    AST 29  --    ALT 28  --    BILITOT 0.7  --    LABALBU 4.2  --    PROT 8.7*  --    LIPASE 837*  --         CT abdomen and pelvis with contrast , 1/9/23       COMPARISON: January 1, 2023.       INDICATION: Recurrent pancreatitis, worsening. Any evidence of necrosis, cyst.       TECHNIQUE: CT imaging was performed of the abdomen and pelvis following the   uncomplicated administration of intravenous contrast (Isovue 370, 100 mL).  Oral   contrast was administered. Radiation dose reduction techniques were used for   this study:  Our CT scanners use one or all of the following: Automated exposure   control, adjustment of the mA and/or kVp  according to patient's size, iterative   reconstruction. FINDINGS:       Visualized lung bases are unremarkable. Abdomen findings: There is peripancreatic inflammatory stranding. The pancreatic parenchyma is not   well defined. There is lobulated cystic mass involving the pancreatic body,   measuring up to 2.5 cm AP x 4.3 cm transverse x 3.2 cm craniocaudal. Similar   adjacent 15 mm cystic lesion is noted in the pancreatic body. There is mass   effect on the splenic vein, near the confluence with the SMV. There is circumferential wall thickening of the distal stomach, as into the   pancreatic abnormality. Gallbladder is surgically absent. Liver and the spleen not the upper limits for   size. The adrenal glands, kidneys and abdominal aorta are unremarkable. No   evidence of lymphadenopathy. There is fluid within the esophagus, suggesting gastroesophageal reflux. Small   bowel loops are normal in caliber. No small bowel obstruction. Pelvic findings:       Urinary bladder is normal in contour. The colon is normal in course and caliber   with large stool volume. No evidence of appendicitis. There is no free air or free fluid. Surrounding bones are intact. Impression       1. Necrotizing pancreatitis, with associated acute necrotic collection measuring   up to 2.5 x 4.3 x 3.2 cm. The necrotic collection appears more defined when   compared to January 1, 2023 study. 2. Mass effect and narrowing of the splenic vein near the confluence with the   SMV. 3. Circumferential wall thickening of the distal stomach, adjacent to the   pancreatic abnormality. Findings likely reactive gastritis. 4. Large stool volume in the colon. No evidence of colitis, diverticulitis or   bowel obstruction.        Assessment:     Principal Problem:    Recurrent pancreatitis  Active Problems:    History of ST elevation myocardial infarction (STEMI)    Primary hypertension  Resolved Problems:    * No resolved hospital problems. *    42 y.o. 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 Patient was discharged and continued to have upper abdominal pain, nausea, and inability to tolerate PO intake. He denies recent EtOH use and quit smoking tobacco 3 weeks ago. Labs on admit significant for Lipase 837, total protein 8.7, RBC 5.8, MCV 76.7, Platelets 019. CT A/P with 1. Necrotizing pancreatitis, with associated acute necrotic collection measuring up to 2.5 x 4.3 x 3.2 cm. The necrotic collection appears more defined when compared to January 1, 2023 study. 2. Mass effect and narrowing of the splenic vein near the confluence with the SMV. 3. Circumferential wall thickening of the distal stomach, adjacent to the pancreatic abnormality. Findings likely reactive gastritis. 4. Large stool volume in the colon. No evidence of colitis, diverticulitis or bowel obstruction. Pancreatic fluid collections are greatly reduced in size. Patient was scheduled for OP follow up with Dr. Yen Alonso on 1/10/23 to discuss referral to tertiary treatment center for management of chronic pancreatitis vs possible [place] procedure given chronically dilated pancreatic duct. 1/10/23: Increased heartburn/nausea due to pancreatic enzymes on empty stomach - improved with Maalox and PPI. Continues to have constipation. Pain remains the same. Hgb decreased to 11 from 14.7 yesterday. Likely dilutional.. No overt GI bleeding.    Plan:     - Supportive care, anti-emetics PRN, pain control PRN, IV fluids  - NPO  - Continue EtOH and tobacco cessation.  - Continue PPI BID  - Discontinue pancreatic enzymes until patient is able to tolerate PO intake. - Will order dulcolax suppository x1 today to help with constipation  - Once tolerating PO intake, Miralax PRN for constipation  - Consider outpatient referral to tertiary treatment center for management of chronic pancreatitis vs possible [place] procedure. Adebayo Campuzano PA-C  [facility]    Patient is seen and examined in collaboration with Dr. Candelario Vincent. Assessment and plan as per Dr. Candelario Vincent.

## 2023-01-10 NOTE — PROGRESS NOTES
Hospitalist Progress Note   Admit Date:  2023 10:11 PM   Name:  Elliott Pro   Age:  44 y.o. Sex:  male  :  1985   MRN:  [id number]   Room:  O1[facility]/    Presenting Complaint: No chief complaint on file. Reason(s) for Admission: Recurrent pancreatitis [K85.90]     Hospital Course:   Elliott Pro is a 44 y.o. male with medical history of ETOH pancreatitis complicated with pseudocyst s/p stent placement  removed  and STEMI s/p PCI with TULIO who presented to [facility] ER with a complaint of recurrent epigastric pain with associated N/V. Has had multiple admission for pancreatitis and recently discharged after treatment for pancreatitis on 23     \"1. Necrotizing pancreatitis, with associated acute necrotic collection measuring   up to 2.5 x 4.3 x 3.2 cm. The necrotic collection appears more defined when   compared to 2023 study. 2. Mass effect and narrowing of the splenic vein near the confluence with the   SMV. 3. Circumferential wall thickening of the distal stomach, adjacent to the   pancreatic abnormality. Findings likely reactive gastritis. 4. Large stool volume in the colon. No evidence of colitis, diverticulitis or   bowel obstruction. \"     Subjective & 24hr Events (01/10/23):   Endorses abdominal tenderness that has improved. Denies N/V. Wants to eat      Assessment & Plan:     Principal Problem:    Recurrent pancreatitis  Plan:   -GI consulted  with recs for supportive care  -Prn analgesia  -PPI  -Laxatives for large stool burden  -Patient had appointment w/ GI to discuss treatment options tomorrow with likely referral to treatment center for management(Possible [place] procedure)  1/10/23  -Diet advanced to CLD advance as able  -Laxatives scheduled  -Cautious use of narcotics.  Can cause constipation   -Likely discharge tomorrow       History of ST elevation myocardial infarction (STEMI)  Plan:   -ASA/Brillinta/Statin       Primary hypertension  Plan:   -Stable BP  -Lisinopril/BB    Depression  -Wellbutrin      Anticipated discharge needs:      1-2 days    Diet:  Diet NPO Exceptions are: Sips of Water with Meds  DVT PPx: Lovenox SQ   Code status: Full Code    Hospital Problems:  Principal Problem:    Recurrent pancreatitis  Active Problems:    History of ST elevation myocardial infarction (STEMI)    Primary hypertension  Resolved Problems:    * No resolved hospital problems. *      Objective:   Patient Vitals for the past 24 hrs:   Temp Pulse Resp BP SpO2   01/10/23 0730 97.8 °F (36.6 °C) 72 18 126/86 --   01/10/23 0454 97.9 °F (36.6 °C) 69 16 125/82 98 %   01/09/23 2231 98.2 °F (36.8 °C) 77 16 (!) 142/70 100 %   01/09/23 1927 97.9 °F (36.6 °C) 79 18 (!) 140/87 100 %   01/09/23 1546 97.6 °F (36.4 °C) 65 16 119/77 100 %       Oxygen Therapy  SpO2: 98 %  Pulse Oximetry Type: Intermittent  Pulse Oximeter Device Mode: Intermittent  Pulse Oximeter Device Location: Left, Finger  O2 Device: None (Room air)  Skin Assessment: Clean, dry, & intact  Oxygen Therapy: None (Room air)    Estimated body mass index is 25.16 kg/m² as calculated from the following:    Height as of this encounter: 5' 9\" (1.753 m). Weight as of this encounter: 170 lb 6.4 oz (77.3 kg). Intake/Output Summary (Last 24 hours) at 1/10/2023 1427  Last data filed at 1/10/2023 0536  Gross per 24 hour   Intake 4236.67 ml   Output --   Net 4236.67 ml         Physical Exam:     Blood pressure 126/86, pulse 72, temperature 97.8 °F (36.6 °C), temperature source Oral, resp. rate 18, height 5' 9\" (1.753 m), weight 170 lb 6.4 oz (77.3 kg), SpO2 98 %. General:    Well nourished. Head:  Normocephalic, atraumatic  Eyes:  Sclerae appear normal.  Pupils equally round. ENT:  Nares appear normal.  Moist oral mucosa  Neck:  No restricted ROM. Trachea midline   CV:   RRR. No m/r/g. No jugular venous distension. Lungs:   CTAB. No wheezing, rhonchi, or rales. Symmetric expansion.   Abdomen: Soft, generalized abdominal tenderness, nondistended. Extremities: No cyanosis or clubbing. No edema  Skin:     No rashes and normal coloration. Warm and dry. Neuro:  CN II-XII grossly intact. Sensation intact. Psych:  Normal mood and affect.       I have personally reviewed labs and tests showing:  Recent Labs:  Recent Results (from the past 48 hour(s))   Lipase    Collection Time: 01/08/23  5:34 PM   Result Value Ref Range    Lipase 837 (H) 13 - 60 U/L   Comprehensive Metabolic Panel    Collection Time: 01/08/23  5:34 PM   Result Value Ref Range    Sodium 135 133 - 143 mmol/L    Potassium 4.8 3.5 - 5.1 mmol/L    Chloride 93 (L) 98 - 107 mmol/L    CO2 23 21 - 32 mmol/L    Anion Gap 19 (H) 2 - 11 mmol/L    Glucose 98 65 - 100 mg/dL    BUN 17 7.0 - 18.0 MG/DL    Creatinine 0.92 0.8 - 1.5 MG/DL    Est, Glom Filt Rate >60 >60 ml/min/1.73m2    Calcium 10.3 8.3 - 10.4 MG/DL    Total Bilirubin 0.7 0.2 - 1.1 MG/DL    ALT 28 13.0 - 61.0 U/L    AST 29 15 - 37 U/L    Alk Phosphatase 85 45.0 - 117.0 U/L    Total Protein 8.7 (H) 6.4 - 8.2 g/dL    Albumin 4.2 3.5 - 5.0 g/dL    Globulin 4.5 2.8 - 4.5 g/dL    Albumin/Globulin Ratio 0.9 0.4 - 1.6     CBC with Auto Differential    Collection Time: 01/08/23  5:34 PM   Result Value Ref Range    WBC 10.6 4.3 - 11.1 K/uL    RBC 5.80 (H) 4.23 - 5.60 M/uL    Hemoglobin 14.7 13.6 - 17.2 g/dL    Hematocrit 44.5 41.1 - 50.3 %    MCV 76.7 (L) 82.0 - 102.0 FL    MCH 25.3 (L) 26.1 - 32.9 PG    MCHC 33.0 31.4 - 35.0 g/dL    RDW 17.0 (H) 11.9 - 14.6 %    Platelets 365 (H) 206 - 450 K/uL    MPV 8.5 (L) 9.4 - 12.3 FL    nRBC 0.00 0.0 - 0.2 K/uL    Differential Type AUTOMATED      Seg Neutrophils 79 (H) 43 - 78 %    Lymphocytes 13 13 - 44 %    Monocytes 6 4.0 - 12.0 %    Eosinophils % 1 0.5 - 7.8 %    Basophils 0 0.0 - 2.0 %    Immature Granulocytes 0 0.0 - 5.0 %    Segs Absolute 8.4 (H) 1.7 - 8.2 K/UL    Absolute Lymph # 1.4 0.5 - 4.6 K/UL    Absolute Mono # 0.7 0.1 - 1.3 K/UL    Absolute Eos # 0.1 0.0 - 0.8 K/UL    Basophils Absolute 0.0 0.0 - 0.2 K/UL    Absolute Immature Granulocyte 0.0 0.0 - 0.5 K/UL   Urinalysis w rflx microscopic    Collection Time: 01/08/23  8:13 PM   Result Value Ref Range    Color, UA CHEKO      Appearance CLEAR      Specific Gravity, UA 1.025 (H) 1.001 - 1.023      pH, Urine 7.0 5.0 - 9.0      Protein,  (A) NEG mg/dL    Glucose,  mg/dL    Ketones, Urine 40 (A) NEG mg/dL    Bilirubin Urine SMALL (A) NEG      Blood, Urine Trace Intact (A) NEG      Urobilinogen, Urine >=8.0 0.2 - 1.0 EU/dL    Nitrite, Urine Negative NEG      Leukocyte Esterase, Urine Negative NEG     Urinalysis, Micro    Collection Time: 01/08/23  8:13 PM   Result Value Ref Range    WBC, UA 0-3 0 /hpf    RBC, UA 3-5 0 /hpf    Epithelial Cells UA 0-3 0 /hpf    BACTERIA, URINE 0 0 /hpf    Casts 0 0 /lpf    Crystals 0 0 /LPF    Amorphous Crystal 1+ (H) 0    Mucus, UA TRACE 0 /lpf    Other observations RESULTS VERIFIED MANUALLY     Basic Metabolic Panel w/ Reflex to MG    Collection Time: 01/09/23  8:50 AM   Result Value Ref Range    Sodium 132 (L) 133 - 143 mmol/L    Potassium 4.4 3.5 - 5.1 mmol/L    Chloride 102 101 - 110 mmol/L    CO2 23 21 - 32 mmol/L    Anion Gap 7 2 - 11 mmol/L    Glucose 88 65 - 100 mg/dL    BUN 12 6 - 23 MG/DL    Creatinine 0.90 0.8 - 1.5 MG/DL    Est, Glom Filt Rate >60 >60 ml/min/1.73m2    Calcium 8.9 8.3 - 10.4 MG/DL   CBC with Auto Differential    Collection Time: 01/09/23  8:50 AM   Result Value Ref Range    WBC 7.9 4.3 - 11.1 K/uL    RBC 4.34 4.23 - 5.6 M/uL    Hemoglobin 11.0 (L) 13.6 - 17.2 g/dL    Hematocrit 34.0 (L) 41.1 - 50.3 %    MCV 78.3 (L) 82 - 102 FL    MCH 25.3 (L) 26.1 - 32.9 PG    MCHC 32.4 31.4 - 35.0 g/dL    RDW 17.0 (H) 11.9 - 14.6 %    Platelets 058 327 - 000 K/uL    MPV 9.0 (L) 9.4 - 12.3 FL    nRBC 0.00 0.0 - 0.2 K/uL    Differential Type AUTOMATED      Seg Neutrophils 70 43 - 78 %    Lymphocytes 18 13 - 44 %    Monocytes 10 4.0 - 12.0 %    Eosinophils % 1 0.5 - 7.8 %    Basophils 1 0.0 - 2.0 %    Immature Granulocytes 0 0.0 - 5.0 %    Segs Absolute 5.5 1.7 - 8.2 K/UL    Absolute Lymph # 1.4 0.5 - 4.6 K/UL    Absolute Mono # 0.8 0.1 - 1.3 K/UL    Absolute Eos # 0.1 0.0 - 0.8 K/UL    Basophils Absolute 0.0 0.0 - 0.2 K/UL    Absolute Immature Granulocyte 0.0 0.0 - 0.5 K/UL       I have personally reviewed imaging studies showing:  Other Studies:  CT ABDOMEN PELVIS W IV CONTRAST Additional Contrast? None   Final Result      1. Necrotizing pancreatitis, with associated acute necrotic collection measuring   up to 2.5 x 4.3 x 3.2 cm. The necrotic collection appears more defined when   compared to January 1, 2023 study.      2. Mass effect and narrowing of the splenic vein near the confluence with the   SMV.      3. Circumferential wall thickening of the distal stomach, adjacent to the   pancreatic abnormality. Findings likely reactive gastritis.      4. Large stool volume in the colon. No evidence of colitis, diverticulitis or   bowel obstruction.          Current Meds:  Current Facility-Administered Medications   Medication Dose Route Frequency    aluminum & magnesium hydroxide-simethicone (MAALOX) 200-200-20 MG/5ML suspension 30 mL  30 mL Oral Q6H PRN    HYDROmorphone (DILAUDID) injection 1 mg  1 mg IntraVENous Q3H PRN    atorvastatin (LIPITOR) tablet 40 mg  40 mg Oral Nightly    buPROPion (WELLBUTRIN SR) extended release tablet 100 mg  100 mg Oral BID    hyoscyamine (LEVSIN/SL) sublingual tablet 125 mcg  125 mcg SubLINGual Q4H PRN    lisinopril (PRINIVIL;ZESTRIL) tablet 5 mg  5 mg Oral Daily    metoprolol tartrate (LOPRESSOR) tablet 25 mg  25 mg Oral BID    pantoprazole (PROTONIX) tablet 40 mg  40 mg Oral BID    simethicone (MYLICON) chewable tablet 80 mg  80 mg Oral Q6H PRN    ticagrelor (BRILINTA) tablet 90 mg  90 mg Oral BID    sodium chloride flush 0.9 % injection 5-40 mL  5-40 mL IntraVENous 2 times per day    sodium chloride flush 0.9 % injection 5-40 mL  5-40  mL IntraVENous PRN    0.9 % sodium chloride infusion   IntraVENous PRN    enoxaparin (LOVENOX) injection 40 mg  40 mg SubCUTAneous Daily    ondansetron (ZOFRAN-ODT) disintegrating tablet 4 mg  4 mg Oral Q8H PRN    Or    ondansetron (ZOFRAN) injection 4 mg  4 mg IntraVENous Q6H PRN    polyethylene glycol (GLYCOLAX) packet 17 g  17 g Oral Daily PRN    acetaminophen (TYLENOL) tablet 650 mg  650 mg Oral Q6H PRN    Or    acetaminophen (TYLENOL) suppository 650 mg  650 mg Rectal Q6H PRN    aspirin chewable tablet 81 mg  81 mg Oral Daily    oxyCODONE (ROXICODONE) immediate release tablet 5 mg  5 mg Oral Q4H PRN    lactated ringers infusion   IntraVENous Continuous       Signed:  MELISSA Arroyo - CNP    Part of this note may have been written by using a voice dictation software. The note has been proof read but may still contain some grammatical/other typographical errors.

## 2023-01-10 NOTE — PROGRESS NOTES
Suppository given to pt with large gloves. Explained to pt the need for administering it. Pt verbalized that he do not want it . Medication left on bedside table.

## 2023-01-11 ENCOUNTER — APPOINTMENT (OUTPATIENT)
Dept: GENERAL RADIOLOGY | Age: 38
DRG: 439 | End: 2023-01-11
Attending: FAMILY MEDICINE

## 2023-01-11 ENCOUNTER — ANESTHESIA (OUTPATIENT)
Dept: ENDOSCOPY | Age: 38
End: 2023-01-11

## 2023-01-11 ENCOUNTER — ANESTHESIA EVENT (OUTPATIENT)
Dept: ENDOSCOPY | Age: 38
End: 2023-01-11

## 2023-01-11 PROBLEM — K85.91 NECROTIZING PANCREATITIS: Status: ACTIVE | Noted: 2023-01-11

## 2023-01-11 LAB
ALBUMIN SERPL-MCNC: 2.6 G/DL (ref 3.5–5)
ALBUMIN/GLOB SERPL: 0.6 (ref 0.4–1.6)
ALP SERPL-CCNC: 78 U/L (ref 50–136)
ALT SERPL-CCNC: 27 U/L (ref 12–65)
ANION GAP SERPL CALC-SCNC: 5 MMOL/L (ref 2–11)
AST SERPL-CCNC: 18 U/L (ref 15–37)
BILIRUB SERPL-MCNC: 0.2 MG/DL (ref 0.2–1.1)
BUN SERPL-MCNC: 8 MG/DL (ref 6–23)
CALCIUM SERPL-MCNC: 8.9 MG/DL (ref 8.3–10.4)
CHLORIDE SERPL-SCNC: 103 MMOL/L (ref 101–110)
CO2 SERPL-SCNC: 25 MMOL/L (ref 21–32)
CREAT SERPL-MCNC: 0.9 MG/DL (ref 0.8–1.5)
ERYTHROCYTE [DISTWIDTH] IN BLOOD BY AUTOMATED COUNT: 16.7 % (ref 11.9–14.6)
GLOBULIN SER CALC-MCNC: 4.3 G/DL (ref 2.8–4.5)
GLUCOSE SERPL-MCNC: 97 MG/DL (ref 65–100)
HCT VFR BLD AUTO: 37.4 % (ref 41.1–50.3)
HGB BLD-MCNC: 12.1 G/DL (ref 13.6–17.2)
LIPASE SERPL-CCNC: 6439 U/L (ref 73–393)
MCH RBC QN AUTO: 25.4 PG (ref 26.1–32.9)
MCHC RBC AUTO-ENTMCNC: 32.4 G/DL (ref 31.4–35)
MCV RBC AUTO: 78.6 FL (ref 82–102)
NRBC # BLD: 0 K/UL (ref 0–0.2)
PLATELET # BLD AUTO: 523 K/UL (ref 150–450)
PMV BLD AUTO: 8.8 FL (ref 9.4–12.3)
POTASSIUM SERPL-SCNC: 4.3 MMOL/L (ref 3.5–5.1)
PROT SERPL-MCNC: 6.9 G/DL (ref 6.3–8.2)
RBC # BLD AUTO: 4.76 M/UL (ref 4.23–5.6)
SODIUM SERPL-SCNC: 133 MMOL/L (ref 133–143)
WBC # BLD AUTO: 6.5 K/UL (ref 4.3–11.1)

## 2023-01-11 PROCEDURE — 88312 SPECIAL STAINS GROUP 1: CPT

## 2023-01-11 PROCEDURE — 2709999900 HC NON-CHARGEABLE SUPPLY: Performed by: INTERNAL MEDICINE

## 2023-01-11 PROCEDURE — 6360000004 HC RX CONTRAST MEDICATION: Performed by: INTERNAL MEDICINE

## 2023-01-11 PROCEDURE — 1100000000 HC RM PRIVATE

## 2023-01-11 PROCEDURE — 36415 COLL VENOUS BLD VENIPUNCTURE: CPT

## 2023-01-11 PROCEDURE — 2580000003 HC RX 258: Performed by: NURSE PRACTITIONER

## 2023-01-11 PROCEDURE — 88305 TISSUE EXAM BY PATHOLOGIST: CPT

## 2023-01-11 PROCEDURE — 6360000002 HC RX W HCPCS: Performed by: FAMILY MEDICINE

## 2023-01-11 PROCEDURE — 6370000000 HC RX 637 (ALT 250 FOR IP): Performed by: INTERNAL MEDICINE

## 2023-01-11 PROCEDURE — 6370000000 HC RX 637 (ALT 250 FOR IP): Performed by: FAMILY MEDICINE

## 2023-01-11 PROCEDURE — 85027 COMPLETE CBC AUTOMATED: CPT

## 2023-01-11 PROCEDURE — 44500 INTRO GASTROINTESTINAL TUBE: CPT

## 2023-01-11 PROCEDURE — 3700000001 HC ADD 15 MINUTES (ANESTHESIA): Performed by: INTERNAL MEDICINE

## 2023-01-11 PROCEDURE — 80053 COMPREHEN METABOLIC PANEL: CPT

## 2023-01-11 PROCEDURE — 6360000002 HC RX W HCPCS: Performed by: HOSPITALIST

## 2023-01-11 PROCEDURE — 2580000003 HC RX 258: Performed by: REGISTERED NURSE

## 2023-01-11 PROCEDURE — 0DB68ZX EXCISION OF STOMACH, VIA NATURAL OR ARTIFICIAL OPENING ENDOSCOPIC, DIAGNOSTIC: ICD-10-PCS | Performed by: INTERNAL MEDICINE

## 2023-01-11 PROCEDURE — 6370000000 HC RX 637 (ALT 250 FOR IP): Performed by: HOSPITALIST

## 2023-01-11 PROCEDURE — 0DHA3UZ INSERTION OF FEEDING DEVICE INTO JEJUNUM, PERCUTANEOUS APPROACH: ICD-10-PCS | Performed by: RADIOLOGY

## 2023-01-11 PROCEDURE — 6360000002 HC RX W HCPCS: Performed by: REGISTERED NURSE

## 2023-01-11 PROCEDURE — 3609018500 HC EGD US SCOPE W/ADJACENT STRUCTURES: Performed by: INTERNAL MEDICINE

## 2023-01-11 PROCEDURE — 2500000003 HC RX 250 WO HCPCS: Performed by: REGISTERED NURSE

## 2023-01-11 PROCEDURE — 7100000010 HC PHASE II RECOVERY - FIRST 15 MIN: Performed by: INTERNAL MEDICINE

## 2023-01-11 PROCEDURE — 7100000011 HC PHASE II RECOVERY - ADDTL 15 MIN: Performed by: INTERNAL MEDICINE

## 2023-01-11 PROCEDURE — 83690 ASSAY OF LIPASE: CPT

## 2023-01-11 PROCEDURE — 2580000003 HC RX 258: Performed by: FAMILY MEDICINE

## 2023-01-11 PROCEDURE — 3700000000 HC ANESTHESIA ATTENDED CARE: Performed by: INTERNAL MEDICINE

## 2023-01-11 PROCEDURE — 74018 RADEX ABDOMEN 1 VIEW: CPT

## 2023-01-11 PROCEDURE — C1753 CATH, INTRAVAS ULTRASOUND: HCPCS | Performed by: INTERNAL MEDICINE

## 2023-01-11 RX ORDER — PROPOFOL 10 MG/ML
INJECTION, EMULSION INTRAVENOUS PRN
Status: DISCONTINUED | OUTPATIENT
Start: 2023-01-11 | End: 2023-01-11 | Stop reason: SDUPTHER

## 2023-01-11 RX ORDER — PROPOFOL 10 MG/ML
INJECTION, EMULSION INTRAVENOUS CONTINUOUS PRN
Status: DISCONTINUED | OUTPATIENT
Start: 2023-01-11 | End: 2023-01-11 | Stop reason: SDUPTHER

## 2023-01-11 RX ORDER — LIDOCAINE HYDROCHLORIDE 20 MG/ML
INJECTION, SOLUTION EPIDURAL; INFILTRATION; INTRACAUDAL; PERINEURAL PRN
Status: DISCONTINUED | OUTPATIENT
Start: 2023-01-11 | End: 2023-01-11 | Stop reason: SDUPTHER

## 2023-01-11 RX ADMIN — ONDANSETRON 4 MG: 2 INJECTION INTRAMUSCULAR; INTRAVENOUS at 15:53

## 2023-01-11 RX ADMIN — POLYETHYLENE GLYCOL 3350 17 G: 17 POWDER, FOR SOLUTION ORAL at 09:26

## 2023-01-11 RX ADMIN — ASPIRIN 81 MG 81 MG: 81 TABLET ORAL at 09:27

## 2023-01-11 RX ADMIN — OXYCODONE HYDROCHLORIDE 5 MG: 5 TABLET ORAL at 03:47

## 2023-01-11 RX ADMIN — ENOXAPARIN SODIUM 40 MG: 100 INJECTION SUBCUTANEOUS at 09:27

## 2023-01-11 RX ADMIN — DIATRIZOATE MEGLUMINE AND DIATRIZOATE SODIUM 30 ML: 660; 100 LIQUID ORAL; RECTAL at 16:55

## 2023-01-11 RX ADMIN — DICYCLOMINE HYDROCHLORIDE 10 MG: 10 CAPSULE ORAL at 06:40

## 2023-01-11 RX ADMIN — SODIUM CHLORIDE, PRESERVATIVE FREE 5 ML: 5 INJECTION INTRAVENOUS at 21:21

## 2023-01-11 RX ADMIN — HYDROMORPHONE HYDROCHLORIDE 1 MG: 1 INJECTION, SOLUTION INTRAMUSCULAR; INTRAVENOUS; SUBCUTANEOUS at 15:50

## 2023-01-11 RX ADMIN — OXYCODONE HYDROCHLORIDE 5 MG: 5 TABLET ORAL at 09:27

## 2023-01-11 RX ADMIN — METOPROLOL TARTRATE 25 MG: 25 TABLET, FILM COATED ORAL at 09:27

## 2023-01-11 RX ADMIN — PHENYLEPHRINE HYDROCHLORIDE 50 MCG: 10 INJECTION INTRAVENOUS at 11:41

## 2023-01-11 RX ADMIN — PROPOFOL 20 MG: 10 INJECTION, EMULSION INTRAVENOUS at 11:15

## 2023-01-11 RX ADMIN — LISINOPRIL 5 MG: 5 TABLET ORAL at 09:27

## 2023-01-11 RX ADMIN — ATORVASTATIN CALCIUM 40 MG: 40 TABLET, FILM COATED ORAL at 21:22

## 2023-01-11 RX ADMIN — DICYCLOMINE HYDROCHLORIDE 10 MG: 10 CAPSULE ORAL at 12:49

## 2023-01-11 RX ADMIN — ALUMINUM HYDROXIDE, MAGNESIUM HYDROXIDE, DIMETHICONE 30 ML: 200; 200; 20 LIQUID ORAL at 13:23

## 2023-01-11 RX ADMIN — PROPOFOL 200 MCG/KG/MIN: 10 INJECTION, EMULSION INTRAVENOUS at 11:13

## 2023-01-11 RX ADMIN — BUPROPION HYDROCHLORIDE 100 MG: 100 TABLET, FILM COATED, EXTENDED RELEASE ORAL at 21:21

## 2023-01-11 RX ADMIN — HYDROMORPHONE HYDROCHLORIDE 1 MG: 1 INJECTION, SOLUTION INTRAMUSCULAR; INTRAVENOUS; SUBCUTANEOUS at 07:44

## 2023-01-11 RX ADMIN — PANTOPRAZOLE SODIUM 40 MG: 40 TABLET, DELAYED RELEASE ORAL at 21:21

## 2023-01-11 RX ADMIN — LIDOCAINE HYDROCHLORIDE 100 MG: 20 INJECTION, SOLUTION EPIDURAL; INFILTRATION; INTRACAUDAL; PERINEURAL at 11:13

## 2023-01-11 RX ADMIN — PHENYLEPHRINE HYDROCHLORIDE 50 MCG: 10 INJECTION INTRAVENOUS at 11:22

## 2023-01-11 RX ADMIN — HYDROMORPHONE HYDROCHLORIDE 1 MG: 1 INJECTION, SOLUTION INTRAMUSCULAR; INTRAVENOUS; SUBCUTANEOUS at 21:22

## 2023-01-11 RX ADMIN — PHENYLEPHRINE HYDROCHLORIDE 50 MCG: 10 INJECTION INTRAVENOUS at 11:32

## 2023-01-11 RX ADMIN — HYDROMORPHONE HYDROCHLORIDE 2 MG: 1 INJECTION, SOLUTION INTRAMUSCULAR; INTRAVENOUS; SUBCUTANEOUS at 23:23

## 2023-01-11 RX ADMIN — HYDROMORPHONE HYDROCHLORIDE 1 MG: 1 INJECTION, SOLUTION INTRAMUSCULAR; INTRAVENOUS; SUBCUTANEOUS at 12:48

## 2023-01-11 RX ADMIN — PANTOPRAZOLE SODIUM 40 MG: 40 TABLET, DELAYED RELEASE ORAL at 09:27

## 2023-01-11 RX ADMIN — DICYCLOMINE HYDROCHLORIDE 10 MG: 10 CAPSULE ORAL at 15:50

## 2023-01-11 RX ADMIN — BUPROPION HYDROCHLORIDE 100 MG: 100 TABLET, FILM COATED, EXTENDED RELEASE ORAL at 09:27

## 2023-01-11 RX ADMIN — HYDROMORPHONE HYDROCHLORIDE 1 MG: 1 INJECTION, SOLUTION INTRAMUSCULAR; INTRAVENOUS; SUBCUTANEOUS at 04:37

## 2023-01-11 RX ADMIN — TICAGRELOR 90 MG: 90 TABLET ORAL at 09:27

## 2023-01-11 RX ADMIN — HYDROMORPHONE HYDROCHLORIDE 1 MG: 1 INJECTION, SOLUTION INTRAMUSCULAR; INTRAVENOUS; SUBCUTANEOUS at 01:27

## 2023-01-11 RX ADMIN — TICAGRELOR 90 MG: 90 TABLET ORAL at 21:21

## 2023-01-11 RX ADMIN — SODIUM CHLORIDE, PRESERVATIVE FREE 5 ML: 5 INJECTION INTRAVENOUS at 09:28

## 2023-01-11 RX ADMIN — METOPROLOL TARTRATE 25 MG: 25 TABLET, FILM COATED ORAL at 21:21

## 2023-01-11 RX ADMIN — PROPOFOL 30 MG: 10 INJECTION, EMULSION INTRAVENOUS at 11:14

## 2023-01-11 RX ADMIN — PROPOFOL 50 MG: 10 INJECTION, EMULSION INTRAVENOUS at 11:13

## 2023-01-11 RX ADMIN — SODIUM CHLORIDE, SODIUM LACTATE, POTASSIUM CHLORIDE, AND CALCIUM CHLORIDE: 600; 310; 30; 20 INJECTION, SOLUTION INTRAVENOUS at 03:43

## 2023-01-11 RX ADMIN — OXYCODONE HYDROCHLORIDE 5 MG: 5 TABLET ORAL at 14:31

## 2023-01-11 RX ADMIN — HYDROMORPHONE HYDROCHLORIDE 1 MG: 1 INJECTION, SOLUTION INTRAMUSCULAR; INTRAVENOUS; SUBCUTANEOUS at 18:27

## 2023-01-11 ASSESSMENT — PAIN DESCRIPTION - LOCATION
LOCATION: ABDOMEN

## 2023-01-11 ASSESSMENT — PAIN DESCRIPTION - DESCRIPTORS
DESCRIPTORS: CRAMPING
DESCRIPTORS: THROBBING;SHARP;CRAMPING
DESCRIPTORS: STABBING;THROBBING;SHARP
DESCRIPTORS: PRESSURE
DESCRIPTORS: SHARP;STABBING;THROBBING
DESCRIPTORS: THROBBING;STABBING;SHARP
DESCRIPTORS: ACHING;CRAMPING;DULL
DESCRIPTORS: THROBBING;STABBING;SHARP
DESCRIPTORS: ACHING;CRAMPING;DULL

## 2023-01-11 ASSESSMENT — PAIN SCALES - GENERAL
PAINLEVEL_OUTOF10: 9
PAINLEVEL_OUTOF10: 5
PAINLEVEL_OUTOF10: 9
PAINLEVEL_OUTOF10: 0
PAINLEVEL_OUTOF10: 8
PAINLEVEL_OUTOF10: 9
PAINLEVEL_OUTOF10: 0
PAINLEVEL_OUTOF10: 6
PAINLEVEL_OUTOF10: 9
PAINLEVEL_OUTOF10: 8
PAINLEVEL_OUTOF10: 8
PAINLEVEL_OUTOF10: 9
PAINLEVEL_OUTOF10: 7
PAINLEVEL_OUTOF10: 9
PAINLEVEL_OUTOF10: 6

## 2023-01-11 ASSESSMENT — PAIN - FUNCTIONAL ASSESSMENT: PAIN_FUNCTIONAL_ASSESSMENT: 0-10

## 2023-01-11 ASSESSMENT — PAIN DESCRIPTION - ORIENTATION
ORIENTATION: MID

## 2023-01-11 ASSESSMENT — LIFESTYLE VARIABLES: SMOKING_STATUS: 1

## 2023-01-11 NOTE — PROGRESS NOTES
1210-TRANSFER - OUT REPORT:    Verbal report given to ELI Fay on Mingo Tejada  being transferred to ED overflow for routine progression of patient care       Report consisted of patient's Situation, Background, Assessment and   Recommendations(SBAR).     Information from the following report(s) Nurse Handoff Report was reviewed with the receiving nurse.  [place] Assessment: No data recorded  Lines:   Peripheral IV 01/08/23 Left Antecubital (Active)   Site Assessment Clean, dry & intact 01/11/23 1053   Line Status Flushed;Infusing;Brisk blood return 01/11/23 1053   Line Care Connections checked and tightened 01/11/23 0732   Phlebitis Assessment No symptoms 01/11/23 1053   Infiltration Assessment 0 01/11/23 1053   Alcohol Cap Used Yes 01/10/23 1600   Dressing Status Clean, dry & intact 01/11/23 1053   Dressing Type Transparent 01/11/23 1053        Opportunity for questions and clarification was provided.      Patient will be transported with:  Tech  1230-To ED with Transport

## 2023-01-11 NOTE — PROGRESS NOTES
Hospitalist Progress Note   Admit Date:  2023 10:11 PM   Name:  Brenda Hermosillo   Age:  44 y.o. Sex:  male  :  1985   MRN:  [id number]   Room:  O1[facility]/    Presenting Complaint: No chief complaint on file. Reason(s) for Admission: Recurrent pancreatitis [K85.90]     Hospital Course:   Brenda Hermosillo is a 44 y.o. male with medical history of ETOH pancreatitis complicated with pseudocyst s/p stent placement  removed  and STEMI s/p PCI with TULIO who presented to [facility] ER with a complaint of recurrent epigastric pain with associated N/V. Has had multiple admission for pancreatitis and recently discharged after treatment for pancreatitis on 23     \"1. Necrotizing pancreatitis, with associated acute necrotic collection measuring   up to 2.5 x 4.3 x 3.2 cm. The necrotic collection appears more defined when   compared to 2023 study. 2. Mass effect and narrowing of the splenic vein near the confluence with the   SMV. 3. Circumferential wall thickening of the distal stomach, adjacent to the   pancreatic abnormality. Findings likely reactive gastritis. 4. Large stool volume in the colon. No evidence of colitis, diverticulitis or   bowel obstruction. \"     Subjective & 24hr Events (23): Patient examined at bedside. No acute overnight events but with recurrence of his abdominal pain after trying to advance his diet. Went for EUS and NJ tube placement, patient examined after these procedures finished. Denies concurrent chest pain or shortness of breath. Abdominal pain under control and nausea under control at this time. Denies fevers/chills. 10 point ROS negative except for HPI above.    Assessment & Plan:     Principal Problem:    Recurrent pancreatitis  - complicated by necrotizing pancreatitis and recurrent pseudocysts  - EUS showing persistent necrosis and now patient with rising lipase again  - strict NPO  - agree with stopping lisinopril as this is linked to increased risk of pancreatitis  - IVF resuscitation  - NJ tube placed, recs per GI  - RD consult tomorrow for nutrition  - follow lipase, CBC, CMP  - supportive management       History of ST elevation myocardial infarction (STEMI)  - no active chest pain  -ASA/Brillinta/Statin       Primary hypertension  Plan:   -Stable BP  - stop lisinopril     Depression  -Wellbutrin      Anticipated discharge needs:      Likely discharge in next few days. Discussed with patient at bedside. All questions answered. Diet:  Diet NPO Exceptions are: Sips of Water with Meds  DVT PPx: Lovenox SQ   Code status: Full Code    Hospital Problems:  Principal Problem:    Necrotizing pancreatitis  Active Problems:    Pancreatic pseudocyst    History of ST elevation myocardial infarction (STEMI)    Coronary artery disease involving native coronary artery of native heart    Acute on chronic pancreatitis ([facility] [address]    Primary hypertension  Resolved Problems:    * No resolved hospital problems. *      Objective:   Patient Vitals for the past 24 hrs:   Temp Pulse Resp BP SpO2   01/11/23 1531 -- 67 18 117/77 100 %   01/11/23 1247 97.9 °F (36.6 °C) 66 18 115/77 99 %   01/11/23 1226 -- 55 -- (!) 93/50 100 %   01/11/23 1216 -- 56 -- (!) 96/53 100 %   01/11/23 1205 -- 62 16 (!) 98/56 99 %   01/11/23 1150 98.6 °F (37 °C) 73 12 (!) 95/54 93 %   01/11/23 1050 97.6 °F (36.4 °C) 68 16 110/66 100 %   01/11/23 0717 -- -- 18 103/67 100 %   01/11/23 0507 -- -- 18 -- --   01/11/23 0447 97.8 °F (36.6 °C) 70 17 110/68 99 %   01/11/23 0157 -- -- 18 -- --   01/10/23 2310 -- -- 17 -- --   01/10/23 2104 -- -- 16 -- --   01/10/23 1950 -- -- 15 -- --   01/10/23 1925 97.9 °F (36.6 °C) 71 16 133/81 99 %       Oxygen Therapy  SpO2: 100 %  Pulse Oximetry Type:  Intermittent  Pulse via Oximetry: 54 beats per minute  Pulse Oximeter Device Mode: Continuous  Pulse Oximeter Device Location: Finger  O2 Device: None (Room air)  Skin Assessment: Clean, dry, & intact  O2 Flow Rate (L/min): 3 L/min  Oxygen Therapy: None (Room air)    Estimated body mass index is 25.1 kg/m² as calculated from the following:    Height as of this encounter: 5' 9\" (1.753 m). Weight as of this encounter: 170 lb (77.1 kg). Intake/Output Summary (Last 24 hours) at 1/11/2023 1900  Last data filed at 1/11/2023 1115  Gross per 24 hour   Intake --   Output 0 ml   Net 0 ml         Physical Exam:     Blood pressure 117/77, pulse 67, temperature 97.9 °F (36.6 °C), temperature source Oral, resp. rate 18, height 5' 9\" (1.753 m), weight 170 lb (77.1 kg), SpO2 100 %. General:    Well nourished. Head:  Normocephalic, atraumatic  Eyes:  Sclerae appear normal.  Pupils equally round. ENT:  Nares appear normal.  Moist oral mucosa  Neck:  No restricted ROM. Trachea midline   CV:   RRR. No jugular venous distension. Lungs:   CTAB. No wheezing, rhonchi, or rales. Symmetric expansion. Abdomen:   Soft, generalized abdominal tenderness without rebound tenderness, nondistended. Extremities: No cyanosis or clubbing. No edema  Skin:     No rashes and normal coloration. Warm and dry. Neuro:  CN II-XII grossly intact. Sensation intact. Psych:  Normal mood and affect.       I have personally reviewed labs and tests showing:  Recent Labs:  Recent Results (from the past 48 hour(s))   CBC    Collection Time: 01/11/23  6:59 AM   Result Value Ref Range    WBC 6.5 4.3 - 11.1 K/uL    RBC 4.76 4.23 - 5.6 M/uL    Hemoglobin 12.1 (L) 13.6 - 17.2 g/dL    Hematocrit 37.4 (L) 41.1 - 50.3 %    MCV 78.6 (L) 82 - 102 FL    MCH 25.4 (L) 26.1 - 32.9 PG    MCHC 32.4 31.4 - 35.0 g/dL    RDW 16.7 (H) 11.9 - 14.6 %    Platelets 295 (H) 339 - 450 K/uL    MPV 8.8 (L) 9.4 - 12.3 FL    nRBC 0.00 0.0 - 0.2 K/uL   Comprehensive Metabolic Panel    Collection Time: 01/11/23  6:59 AM   Result Value Ref Range    Sodium 133 133 - 143 mmol/L    Potassium 4.3 3.5 - 5.1 mmol/L    Chloride 103 101 - 110 mmol/L    CO2 25 21 - 32 mmol/L    Anion Gap 5 2 - 11 mmol/L    Glucose 97 65 - 100 mg/dL    BUN 8 6 - 23 MG/DL    Creatinine 0.90 0.8 - 1.5 MG/DL    Est, Glom Filt Rate >60 >60 ml/min/1.73m2    Calcium 8.9 8.3 - 10.4 MG/DL    Total Bilirubin 0.2 0.2 - 1.1 MG/DL    ALT 27 12 - 65 U/L    AST 18 15 - 37 U/L    Alk Phosphatase 78 50 - 136 U/L    Total Protein 6.9 6.3 - 8.2 g/dL    Albumin 2.6 (L) 3.5 - 5.0 g/dL    Globulin 4.3 2.8 - 4.5 g/dL    Albumin/Globulin Ratio 0.6 0.4 - 1.6     Lipase    Collection Time: 01/11/23  6:59 AM   Result Value Ref Range    Lipase 6,439 (H) 73 - 393 U/L       I have personally reviewed imaging studies showing: Other Studies:  XR ABDOMEN (KUB) (SINGLE AP VIEW)   Final Result   Feeding tube tip is projected over the upper body of stomach. Length   is adequate to reach the ligament of Treitz. CT ABDOMEN PELVIS W IV CONTRAST Additional Contrast? None   Final Result      1. Necrotizing pancreatitis, with associated acute necrotic collection measuring   up to 2.5 x 4.3 x 3.2 cm. The necrotic collection appears more defined when   compared to January 1, 2023 study. 2. Mass effect and narrowing of the splenic vein near the confluence with the   SMV. 3. Circumferential wall thickening of the distal stomach, adjacent to the   pancreatic abnormality. Findings likely reactive gastritis. 4. Large stool volume in the colon. No evidence of colitis, diverticulitis or   bowel obstruction.       FL INTRO LONG GI TUBE    (Results Pending)       Current Meds:  Current Facility-Administered Medications   Medication Dose Route Frequency    diatrizoate meglumine-sodium (GASTROGRAFIN) 66-10 % solution 30 mL  30 mL Per NG tube ONCE PRN    polyethylene glycol (GLYCOLAX) packet 17 g  17 g Oral Daily    dicyclomine (BENTYL) capsule 10 mg  10 mg Oral TID AC    aluminum & magnesium hydroxide-simethicone (MAALOX) 200-200-20 MG/5ML suspension 30 mL  30 mL Oral Q6H PRN    HYDROmorphone (DILAUDID) injection 1 mg  1 mg IntraVENous Q3H PRN    atorvastatin (LIPITOR) tablet 40 mg  40 mg Oral Nightly    buPROPion [facility] SR) extended release tablet 100 mg  100 mg Oral BID    hyoscyamine (LEVSIN/SL) sublingual tablet 125 mcg  125 mcg SubLINGual Q4H PRN    metoprolol tartrate (LOPRESSOR) tablet 25 mg  25 mg Oral BID    pantoprazole (PROTONIX) tablet 40 mg  40 mg Oral BID    simethicone (MYLICON) chewable tablet 80 mg  80 mg Oral Q6H PRN    ticagrelor (BRILINTA) tablet 90 mg  90 mg Oral BID    sodium chloride flush 0.9 % injection 5-40 mL  5-40 mL IntraVENous 2 times per day    sodium chloride flush 0.9 % injection 5-40 mL  5-40 mL IntraVENous PRN    0.9 % sodium chloride infusion   IntraVENous PRN    enoxaparin (LOVENOX) injection 40 mg  40 mg SubCUTAneous Daily    ondansetron (ZOFRAN-ODT) disintegrating tablet 4 mg  4 mg Oral Q8H PRN    Or    ondansetron (ZOFRAN) injection 4 mg  4 mg IntraVENous Q6H PRN    polyethylene glycol (GLYCOLAX) packet 17 g  17 g Oral Daily PRN    acetaminophen (TYLENOL) tablet 650 mg  650 mg Oral Q6H PRN    Or    acetaminophen (TYLENOL) suppository 650 mg  650 mg Rectal Q6H PRN    aspirin chewable tablet 81 mg  81 mg Oral Daily    oxyCODONE (ROXICODONE) immediate release tablet 5 mg  5 mg Oral Q4H PRN    lactated ringers infusion   IntraVENous Continuous       Signed:  MARYANA YU DO    Part of this note may have been written by using a voice dictation software. The note has been proof read but may still contain some grammatical/other typographical errors.

## 2023-01-11 NOTE — ANESTHESIA PRE PROCEDURE
Department of Anesthesiology  Preprocedure Note       Name:  Fady Lazcano   Age:  44 y.o.  :  1985                                          MRN:  [id number]         Date:  2023      Surgeon: Anthony Gamboa):  Tera Da Silva MD    Procedure: Procedure(s):  ENDOSCOPIC ULTRASOUND    Medications prior to admission:   Prior to Admission medications    Medication Sig Start Date End Date Taking? Authorizing Provider   hyoscyamine (LEVSIN/SL) 125 MCG sublingual tablet Place 1 tablet under the tongue every 4 hours as needed for Cramping 23   Mario Chand MD   pantoprazole (PROTONIX) 40 MG tablet Take 1 tablet by mouth in the morning and at bedtime 23  Mario Chand MD   simethicone (MYLICON) 80 MG chewable tablet Take 1 tablet by mouth every 6 hours as needed for Flatulence 23   Mario Chand MD   lipase-protease-amylase (ZENPEP) 5000-09674 units CPEP delayed release capsule Take by mouth 3 times daily (with meals)  Patient not taking: Reported on 2023  Mario Chand MD   ondansetron (ZOFRAN) 4 MG tablet Take 1 tablet by mouth 3 times daily as needed for Nausea or Vomiting  Patient not taking: Reported on 22   Carlos Eduardo Posada DO   naloxone [facility]) 4 MG/0.1ML LIQD nasal spray 1 spray by Nasal route as needed for Opioid Reversal  Patient not taking: Reported on 22   Trevor Lyons DO   nitroGLYCERIN (NITROSTAT) 0.4 MG SL tablet Place 1 tablet under the tongue every 5 minutes as needed for Chest pain up to max of 3 total doses.  If no relief after 1 dose, call 911. 22   Mario Chand MD   buPROPion [facility]) 100 MG extended release tablet Take 1 tablet by mouth 2 times daily 22   Mario Chand MD   aspirin 81 MG chewable tablet Take 1 tablet by mouth daily  Patient not taking: Reported on 22   ANJALI George   atorvastatin (LIPITOR) 40 MG tablet Take 1 tablet by mouth nightly 11/10/22 ANJALI Gold   lisinopril (PRINIVIL;ZESTRIL) 5 MG tablet Take 1 tablet by mouth daily 11/11/22   ANJALI Gold   metoprolol tartrate (LOPRESSOR) 25 MG tablet Take 1 tablet by mouth 2 times daily 11/10/22   ANJALI Gold   ticagrelor (BRILINTA) 90 MG TABS tablet Take 1 tablet by mouth 2 times daily 11/10/22   ANJALI Gold   thiamine 100 MG tablet Take 1 tablet by mouth in the morning. 8/3/22 8/2/22  Fredo Fernandez MD       Current medications:    No current facility-administered medications for this visit. No current outpatient medications on file.      Facility-Administered Medications Ordered in Other Visits   Medication Dose Route Frequency Provider Last Rate Last Admin    polyethylene glycol (GLYCOLAX) packet 17 g  17 g Oral Daily Rafa Ochoa MD   17 g at 01/11/23 0926    dicyclomine (BENTYL) capsule 10 mg  10 mg Oral TID AC Rafa Ochoa MD   10 mg at 01/11/23 0640    aluminum & magnesium hydroxide-simethicone (MAALOX) 530-998-46 MG/5ML suspension 30 mL  30 mL Oral Q6H PRN Mccoy Baumgarten, MD   30 mL at 01/10/23 0740    HYDROmorphone (DILAUDID) injection 1 mg  1 mg IntraVENous Q3H PRN Mccoy Baumgarten, MD   1 mg at 01/11/23 0744    atorvastatin (LIPITOR) tablet 40 mg  40 mg Oral Nightly Krysten Zimmerman, DO   40 mg at 01/10/23 2033    buPROPion [facility]) extended release tablet 100 mg  100 mg Oral BID Krysten Zimmerman, DO   100 mg at 01/11/23 9109    hyoscyamine (LEVSIN/SL) sublingual tablet 125 mcg  125 mcg SubLINGual Q4H PRN Krysten Zimmerman, DO        lisinopril (PRINIVIL;ZESTRIL) tablet 5 mg  5 mg Oral Daily Krysten Zimmerman, DO   5 mg at 01/11/23 6794    metoprolol tartrate (LOPRESSOR) tablet 25 mg  25 mg Oral BID Krysten Zimmerman, DO   25 mg at 01/11/23 1974    pantoprazole (PROTONIX) tablet 40 mg  40 mg Oral BID Krysten Zimmerman, DO   40 mg at 01/11/23 0927    simethicone (MYLICON) chewable tablet 80 mg  80 mg Oral Q6H PRN Krysten Zimmerman, DO        ticagrelor (BRILINTA) tablet 90 mg  90 mg Oral BID [facility]   90 mg at 01/11/23 0426    sodium chloride flush 0.9 % injection 5-40 mL  5-40 mL IntraVENous 2 times per day [facility], [facility]   5 mL at 01/09/23 2151    sodium chloride flush 0.9 % injection 5-40 mL  5-40 mL IntraVENous PRN [facility] Oas, DO   5 mL at 01/11/23 0928    0.9 % sodium chloride infusion   IntraVENous PRN [facility] Oas, DO        enoxaparin (LOVENOX) injection 40 mg  40 mg SubCUTAneous Daily [facility], [facility]   40 mg at 01/11/23 0927    ondansetron (ZOFRAN-ODT) disintegrating tablet 4 mg  4 mg Oral Q8H PRN [facility] Oas, DO   4 mg at 01/08/23 2324    Or    ondansetron (ZOFRAN) injection 4 mg  4 mg IntraVENous Q6H PRN [facility] Oas, DO        polyethylene glycol (GLYCOLAX) packet 17 g  17 g Oral Daily PRN [facility] Oas, DO        acetaminophen (TYLENOL) tablet 650 mg  650 mg Oral Q6H PRN [facility] Oas, DO   650 mg at 01/09/23 3869    Or    acetaminophen (TYLENOL) suppository 650 mg  650 mg Rectal Q6H PRN [facility] Oas, DO        aspirin chewable tablet 81 mg  81 mg Oral Daily [facility] Oas, DO   81 mg at 01/11/23 8667    oxyCODONE (ROXICODONE) immediate release tablet 5 mg  5 mg Oral Q4H PRN [facility] Oas, DO   5 mg at 01/11/23 5780    lactated ringers infusion   IntraVENous Continuous Laura Gallop, APRN -  mL/hr at 01/11/23 1046 NoRateChange at 01/11/23 1046       Allergies:  No Known Allergies    Problem List:    Patient Active Problem List   Diagnosis Code    Primary hypertension I10    Chronic pancreatitis ([facility] [address] K86.1    Alcohol use disorder, severe, in early remission, dependence ([facility] [address] F10.21    Attention deficit hyperactivity disorder (ADHD), combined type F90.2    Tobacco abuse [Z72.0 (ICD-10-CM)] Z72.0    Pancreatic pseudocyst K86.3    History of ST elevation myocardial infarction (STEMI) I25.2    Coronary artery disease involving native coronary artery of native heart I25.10    Acute on chronic pancreatitis (HCC) K85.90, K86.1    Dependence on nicotine from cigarettes F17.210    GERD (gastroesophageal reflux disease) K21.9    Recurrent pancreatitis K85.90       Past Medical History:        Diagnosis Date    Abdominal pain 10/25/2022    Abnormal CT of the abdomen 07/30/2022    Acute alcoholic pancreatitis 34/75/6571    Acute kidney injury (nontraumatic) ([facility] [address] 10/31/2022    pt denies    Acute on chronic pancreatitis ([facility] [address] 07/30/2022    Alcohol use disorder, severe, dependence ([facility] [address] 08/21/2019    CAD (coronary artery disease)     Carpal tunnel syndrome, left     Cigarette smoker     Coronary artery disease involving native coronary artery of native heart 11/13/2022    Dependence on nicotine from cigarettes 11/20/2022    GERD (gastroesophageal reflux disease)     controlled with med    History of pancreatitis     x 4 or 5 times-- last time admitted to hospital 10/10/26/22 x 7 days    Hypertension     controlled with med    STEMI (ST elevation myocardial infarction) ([facility] [address] 11/2/2022       Past Surgical History:        Procedure Laterality Date    CARDIAC PROCEDURE N/A 11/2/2022    LEFT HEART CATH / CORONARY ANGIOGRAPHY performed by Uyen Bond MD at [facility] CATH LAB   [address] [place] [address] N/A 11/2/2022    Percutaneous coronary intervention performed by Uyen Bond MD at [address] [place] [address] CATH LAB    CHOLECYSTECTOMY  2012    ORTHOPEDIC SURGERY Left     wrist surg--nerve surg    ORTHOPEDIC SURGERY Left     foot surg as child    UPPER GASTROINTESTINAL ENDOSCOPY N/A 11/9/2022    EGD/ENDOSCOPIC ULTRASOUND/AXIOS STENT ROOM 2226 **Rep will be present performed by Eva Goldmann, MD at [facility] N/A 11/30/2022    EGD/ENDOSCOPIC ULTRASOUND stent removal  performed by Eva Goldmann, MD at [facility] N/A 11/30/2022    EGD ESOPHAGOGASTRODUODENOSCOPY performed by Sourav Schmid MD at [facility] ENDOSCOPY       Social History:    Social History     Tobacco Use    Smoking status: Every Day     Packs/day: 1.00     Years: 20.00     Pack years: 20.00     Types: Cigarettes    Smokeless tobacco: Never   Substance Use Topics    Alcohol use: Not Currently     Comment: none since 10/4/22-- had drank 3 glasses of wine daily                                Ready to quit: Not Answered  Counseling given: Not Answered      Vital Signs (Current): There were no vitals filed for this visit.                                            BP Readings from Last 3 Encounters:   01/11/23 110/66   01/08/23 122/72   01/05/23 125/75       NPO Status:                                                                                 BMI:   Wt Readings from Last 3 Encounters:   01/11/23 170 lb (77.1 kg)   01/08/23 180 lb (81.6 kg)   01/01/23 180 lb (81.6 kg)     There is no height or weight on file to calculate BMI.    CBC:   Lab Results   Component Value Date/Time    WBC 6.5 01/11/2023 06:59 AM    RBC 4.76 01/11/2023 06:59 AM    HGB 12.1 01/11/2023 06:59 AM    HCT 37.4 01/11/2023 06:59 AM    MCV 78.6 01/11/2023 06:59 AM    MCV 95.0 02/08/2021 03:53 PM    RDW 16.7 01/11/2023 06:59 AM     01/11/2023 06:59 AM       CMP:   Lab Results   Component Value Date/Time     01/11/2023 06:59 AM    K 4.3 01/11/2023 06:59 AM     01/11/2023 06:59 AM    CO2 25 01/11/2023 06:59 AM    BUN 8 01/11/2023 06:59 AM    CREATININE 0.90 01/11/2023 06:59 AM    GFRAA >60 08/01/2022 05:41 AM    AGRATIO 1.1 10/03/2021 03:38 PM    LABGLOM >60 01/11/2023 06:59 AM    GLUCOSE 97 01/11/2023 06:59 AM    PROT 6.9 01/11/2023 06:59 AM    CALCIUM 8.9 01/11/2023 06:59 AM    BILITOT 0.2 01/11/2023 06:59 AM    ALKPHOS 78 01/11/2023 06:59 AM    ALKPHOS 52 10/03/2021 03:38 PM    AST 18 01/11/2023 06:59 AM    ALT 27 01/11/2023 06:59 AM       POC Tests: No results for input(s): POCGLU, POCNA, POCK, POCCL, POCBUN, POCHEMO, POCHCT in the last 72 hours. Coags: No results found for: PROTIME, INR, APTT    HCG (If Applicable): No results found for: PREGTESTUR, PREGSERUM, HCG, HCGQUANT     ABGs: No results found for: PHART, PO2ART, IYA7FXI, QFX4SJB, BEART, H5SHAXAJ     Type & Screen (If Applicable):  No results found for: LABABO, LABRH    Drug/Infectious Status (If Applicable):  No results found for: HIV, HEPCAB    COVID-19 Screening (If Applicable): No results found for: COVID19        Anesthesia Evaluation  Patient summary reviewed and Nursing notes reviewed no history of anesthetic complications:   Airway: Mallampati: II  TM distance: >3 FB   Neck ROM: full  Mouth opening: > = 3 FB   Dental: normal exam         Pulmonary:normal exam  breath sounds clear to auscultation  (+) current smoker (quit december 2022)                           Cardiovascular:  Exercise tolerance: good (>4 METS),   (+) hypertension:, past MI: 0-3 months, CAD:, CABG/stent (Stent x1 placed 11/2/2022):,         Rhythm: regular  Rate: normal                 ROS comment: Admitted for DAPT bridge. Off Plavix for 5 days. Integrilin drip off since 55    Strong family h/o CAD (both father and grandfather had MI's at young ages)     Neuro/Psych:   (+) psychiatric history (ADHD):             ROS comment: H/o EtOH abuse, none in 7 months GI/Hepatic/Renal:   (+) GERD:, liver disease:,          ROS comment: Necrotizing pancreatitis,. Endo/Other:                     Abdominal:             Vascular: Other Findings:             Anesthesia Plan      TIVA     ASA 4       Induction: intravenous. Anesthetic plan and risks discussed with patient.                         Ciera Park MD   1/11/2023

## 2023-01-11 NOTE — PROGRESS NOTES
Shift summary  A/Ox4. VSS on RA. Independent. Medicated x6 for pain with minimal relief; pt reported pain level 6-10/10 throughout shift. IVF infusing per order. Diet advanced to full liquids; tolerating well. Continent of B/B; denied passing gas. Bed low and locked, call light within reach. Rounded per policy. Will continue to monitor.

## 2023-01-11 NOTE — PROGRESS NOTES
TRANSFER - OUT REPORT:    Verbal report given to ELI Vieira on Annamaria Ferrara  being transferred to [facility] for ordered procedure       Report consisted of patient's Situation, Background, Assessment and   Recommendations(SBAR). Information from the following report(s) Nurse Handoff Report was reviewed with the receiving nurse. [place] Assessment: No data recorded  Lines:   Peripheral IV 01/08/23 Left Antecubital (Active)   Site Assessment Clean, dry & intact 01/11/23 0732   Line Status Infusing 01/11/23 0732   Line Care Connections checked and tightened 01/11/23 0732   Phlebitis Assessment No symptoms 01/11/23 0732   Infiltration Assessment 0 01/11/23 0732   Alcohol Cap Used Yes 01/10/23 1600   Dressing Status Clean, dry & intact 01/11/23 0732   Dressing Type Transparent 01/11/23 0732        Opportunity for questions and clarification was provided.       Patient transported with:  [employer]

## 2023-01-11 NOTE — OP NOTE
ENDOSCOPIC ULTRASOUND PROCEDURE NOTE    DATE OF PROCEDURE: 1/11/2023    PRE-OP DIAGNOSIS:  Recurrent acute pancreatitis  Epigastric pain  Abnormal CT    POST-OP DIAGNOSIS:  Pancreatic fluid collections- pseudocyst and WON  Gastritis    MEDICATIONS ADMINISTERED: MAC    INSTRUMENT: [address] [id number]    PROCEDURE:  After obtaining informed consent, the patient was placed in the left lateral position and sedated. The echoendoscope was advanced to the upper GI tract without difficulty. The scope was slowly removed while detailed images of the adjacent organs were obtained. The patient was taken to the recovery area in stable condition. FINDINGS:  ESOPHAGUS: Not well visualized  STOMACH: Limited views with retained fluid and bile present. Moderate erythema and friability in the antrum. Superficial biopsies obtained to r/o H pylori infection. DUODENUM: Limited views with the oblique-viewing echoendoscope were unremarkable. The ampulla was Visualized tangentially and appears normal.   Thin clear bile draining. PANCREAS: The pancreas was well visualized from the head to the tail. The ampulla appears normal endosonographically. The common bile duct measures 9 mm upstream from the pancreas. There is compression of the bile duct in the head of the pancreas 1-2 mm. The main pancreatic duct in the body and tail measures 2-4 mm. It is irregular, tortuous, with dilated side branches. In the head of the pancreas pancreatic duct appears compressed by a large fluid collection 40 x 25 mm. This has a thin septation. There is no solid material present. The appearance suggests probable pseudocyst.  This remains much smaller than prior to cyst gastrostomy drainage. Another fluid collection in the body of the pancreas is 15 mm in diameter. This contains a small amount of mobile debris consistent with pancreatic necrosis.   There is no discrete mass in the pancreas but the parenchyma is hypoechoic, heterogeneous, with extensive edema and loss of the normal tissue planes.  These findings are typical and acute pancreatitis.  They do decrease sensitivity of endoscopic ultrasound in detecting pancreatic neoplasia.  BILIARY SYSTEM: The common bile duct was well visualized   As above, 9 mm proximal to the pancreatic head and 1-2 mm within the pancreas.  No intraductal stones or abnormal wall thickening.  The gallbladder was   Surgically absent .  OTHER ORGANS: There was no ascites in the upper abdomen.  Limited views of the left lobe of the liver demonstrated no solid mass lesions.  The celiac axis appears normal.   Multiple small but prominent lymph nodes scattered around the head of the pancreas and portal vein.  The largest of these nodes is 11 x 6 mm.  The nodes are oval, homogeneous, with well-defined margins.  They are not pathologically enlarged and likely reflect benign reactive nodes.    Estimated blood loss: 0-minimal     PLAN:    Continue NPO.    Recommend nasal jejunal tube placement in IR.  Patient had recurrent symptoms once he began advancing diet.    Discontinue ACE inhibitor which has been associated with medication induced pancreatitis.  Follow up pathology from gastric biopsies.  Repeat labs in am    CAYDEN BULLARD MD  [facility]

## 2023-01-11 NOTE — PROGRESS NOTES
Comprehensive Nutrition Assessment    Type and Reason for Visit: Reassess  Malnutrition Screening Tool: Malnutrition Screen  Have you recently lost weight without trying?: Unsure of amount of weight loss (2 points)  Have you been eating poorly because of a decreased appetite?: Yes (1 point)  Malnutrition Screening Tool Score: 3    Nutrition Recommendations/Plan:   NPO/Clear liquid diet status day number 11 (previous admission, home, and current admission) related to pancreatitis . Patient now with NT in place, pending advancement to jejunum in IR. Please consult RD if TF management needed     Malnutrition Assessment:  Malnutrition Status: At risk for malnutrition (Comment) (recurrent etoh pancreatitis, prolonged NPO/CLD status)    No candace wasting  Nutrition Assessment:  Nutrition History: Patient reports at least 3 meals per day prior to recent hospitalizations. He states since recent discharge he has been on a CLD at home. He reports UBW ~185# prior to recurrent hospitalizations over the last few months. He does admit that he has re-gained some weight when RD reviewing wt history. This is consistent with EMR. Wt history: 7/30/22 185#, 10/11/2022 184#, 11/17/22 166#, 12/1/22 167#, 12/13/2022 177#     Do You Have Any Cultural, Jain, or Ethnic Food Preferences?: No   Nutrition Background:       Patient with PMH significant for etoh abuse, pancreatitis complicated by pseudocyst s/p gastrotomy with axios stent, CAD, STEMI, GERD, HTN. He has recurrent admissions for pancreatitis, most recently 1/1/23 and discharge on 1/5/23. He presented back with abdominal pain. Nutrition Interval:  Diet advanced to CLD 1/10, then to FLD later that day. Diet advanced to GI soft 1/11 prior to making NPO again with rising lipase. Now s/p endoscopic ultrasound with gastric biopsies. GI now recommending NJT placement. RN has placed NG and awaiting progression by IR. Patient seen sitting up in bed.  He has no questions or needs. Explained that RD will continue to follow. Current Nutrition Therapies:  Diet NPO Exceptions are: Sips of Water with Meds    Current Intake:   Average Meal Intake: NPO        Anthropometric Measures:  Height: 5' 9\" (175.3 cm)  Current Body Wt: 170 lb 6.7 oz (77.3 kg) (1/9), Weight source: Bed Scale  BMI: 25.2, Overweight (BMI 25.0-29. 9)  Admission Body Weight: 177 lb 14.6 oz (80.7 kg) (1/8 stated)  Ideal Body Weight (Kg) (Calculated): 73 kg (160 lbs), 106.5 %  Usual Body Wt: 185 lb (83.9 kg) (per pt and review of EMR), Percent weight change: -7.9       Edema:Peripheral Vascular  Peripheral Vascular (WDL): Within Defined Limits   BMI Category Overweight (BMI 25.0-29. 9)  Estimated Daily Nutrient Needs:  Energy (kcal/day): 0423-0972 (Kcal/kg (25-30) Weight used: 77.3 kg Current  Protein (g/day): 77-93 (1-1.2 g/kg) Weight Used: (Current) 77.3 kg  Fluid (ml/day):   (1 ml/kcal)    Nutrition Diagnosis:   Inadequate oral intake related to altered GI function as evidenced by  (recurrent pancreatitis, CLD PTA, wt loss)  Nutrition Interventions:   Food and/or Nutrient Delivery: Continue NPO     Coordination of Nutrition Care: Continue to monitor while inpatient       Goals: Active Goal: Initiate nutrition support, within 2 days       Nutrition Monitoring and Evaluation:      Food/Nutrient Intake Outcomes:  (EN pending correct EN access)  Physical Signs/Symptoms Outcomes: Biochemical Data, GI Status, Weight    Discharge Planning:     Too soon to determine    FRANKY SHELTON RD

## 2023-01-11 NOTE — PROGRESS NOTES
[facility] Progress Note         Admit Date:  1/8/2023    Today's Date:  1/11/2023    CC:  Recurrent pancreatitis with known necrosis in pancreatic tail    Subjective:     Patient states pain is approximately the same. He continues on Miralax but has not had a BM today. States he feels as if he could have a BM now. Nausea has improved. No episodes of emesis. Continues to pass flatus without issues. Heartburn and reflux under control.     Medications:   Current Facility-Administered Medications   Medication Dose Route Frequency    polyethylene glycol (GLYCOLAX) packet 17 g  17 g Oral Daily    dicyclomine (BENTYL) capsule 10 mg  10 mg Oral TID AC    aluminum & magnesium hydroxide-simethicone (MAALOX) 200-200-20 MG/5ML suspension 30 mL  30 mL Oral Q6H PRN    HYDROmorphone (DILAUDID) injection 1 mg  1 mg IntraVENous Q3H PRN    atorvastatin (LIPITOR) tablet 40 mg  40 mg Oral Nightly    buPROPion (WELLBUTRIN SR) extended release tablet 100 mg  100 mg Oral BID    hyoscyamine (LEVSIN/SL) sublingual tablet 125 mcg  125 mcg SubLINGual Q4H PRN    lisinopril (PRINIVIL;ZESTRIL) tablet 5 mg  5 mg Oral Daily    metoprolol tartrate (LOPRESSOR) tablet 25 mg  25 mg Oral BID    pantoprazole (PROTONIX) tablet 40 mg  40 mg Oral BID    simethicone (MYLICON) chewable tablet 80 mg  80 mg Oral Q6H PRN    ticagrelor (BRILINTA) tablet 90 mg  90 mg Oral BID    sodium chloride flush 0.9 % injection 5-40 mL  5-40 mL IntraVENous 2 times per day    sodium chloride flush 0.9 % injection 5-40 mL  5-40 mL IntraVENous PRN    0.9 % sodium chloride infusion   IntraVENous PRN    enoxaparin (LOVENOX) injection 40 mg  40 mg SubCUTAneous Daily    ondansetron (ZOFRAN-ODT) disintegrating tablet 4 mg  4 mg Oral Q8H PRN    Or    ondansetron (ZOFRAN) injection 4 mg  4 mg IntraVENous Q6H PRN    polyethylene glycol (GLYCOLAX) packet 17 g  17 g Oral Daily PRN    acetaminophen (TYLENOL) tablet 650 mg  650 mg Oral Q6H PRN    Or    acetaminophen (TYLENOL) suppository 650 mg  650 mg Rectal Q6H PRN    aspirin chewable tablet 81 mg  81 mg Oral Daily    oxyCODONE (ROXICODONE) immediate release tablet 5 mg  5 mg Oral Q4H PRN    lactated ringers infusion   IntraVENous Continuous       Review of Systems:  ROS was obtained, with pertinent positives as listed above. No chest pain or SOB. Diet:  NPO    Objective:   Vitals:  /67   Pulse 70   Temp 97.8 °F (36.6 °C) (Oral)   Resp 18   Ht 5' 9\" (1.753 m)   Wt 170 lb 6.4 oz (77.3 kg)   SpO2 100%   BMI 25.16 kg/m²   Intake/Output:  No intake/output data recorded. 01/09 1901 - 01/11 0700  In: 4236.7 [I.V.:4236.7]  Out: -   Exam:  General appearance: alert, cooperative, no distress, lying in bed  Lungs: clear to auscultation bilaterally anteriorly  Heart: regular rate and rhythm  Abdomen: soft, TTP over upper abdomen bilaterally. Bowel sounds normal. No masses, no organomegaly  Extremities: extremities normal, atraumatic, no cyanosis or edema  Neuro:  alert and oriented    Data Review (Labs):    Recent Labs     01/08/23  1734 01/09/23  0850 01/11/23  0659   WBC 10.6 7.9 6.5   HGB 14.7 11.0* 12.1*   HCT 44.5 34.0* 37.4*   * 431 523*   MCV 76.7* 78.3* 78.6*    132* 133   K 4.8 4.4 4.3   CL 93* 102 103   CO2 23 23 25   BUN 17 12 8   CREATININE 0.92 0.90 0.90   CALCIUM 10.3 8.9 8.9   GLUCOSE 98 88 97   ALKPHOS 85  --  78   AST 29  --  18   ALT 28  --  27   BILITOT 0.7  --  0.2   LABALBU 4.2  --  2.6*   PROT 8.7*  --  6.9   LIPASE 837*  --  6,439*     CT abdomen and pelvis with contrast , 1/9/23       COMPARISON: January 1, 2023. INDICATION: Recurrent pancreatitis, worsening. Any evidence of necrosis, cyst.       TECHNIQUE: CT imaging was performed of the abdomen and pelvis following the   uncomplicated administration of intravenous contrast (Isovue 370, 100 mL). Oral   contrast was administered.  Radiation dose reduction techniques were used for   this study:  Our CT scanners use one or all of the following: Automated exposure   control, adjustment of the mA and/or kVp according to patient's size, iterative   reconstruction. FINDINGS:       Visualized lung bases are unremarkable. Abdomen findings: There is peripancreatic inflammatory stranding. The pancreatic parenchyma is not   well defined. There is lobulated cystic mass involving the pancreatic body,   measuring up to 2.5 cm AP x 4.3 cm transverse x 3.2 cm craniocaudal. Similar   adjacent 15 mm cystic lesion is noted in the pancreatic body. There is mass   effect on the splenic vein, near the confluence with the SMV. There is circumferential wall thickening of the distal stomach, as into the   pancreatic abnormality. Gallbladder is surgically absent. Liver and the spleen not the upper limits for   size. The adrenal glands, kidneys and abdominal aorta are unremarkable. No   evidence of lymphadenopathy. There is fluid within the esophagus, suggesting gastroesophageal reflux. Small   bowel loops are normal in caliber. No small bowel obstruction. Pelvic findings:       Urinary bladder is normal in contour. The colon is normal in course and caliber   with large stool volume. No evidence of appendicitis. There is no free air or free fluid. Surrounding bones are intact. Impression       1. Necrotizing pancreatitis, with associated acute necrotic collection measuring   up to 2.5 x 4.3 x 3.2 cm. The necrotic collection appears more defined when   compared to January 1, 2023 study. 2. Mass effect and narrowing of the splenic vein near the confluence with the   SMV. 3. Circumferential wall thickening of the distal stomach, adjacent to the   pancreatic abnormality. Findings likely reactive gastritis. 4. Large stool volume in the colon. No evidence of colitis, diverticulitis or   bowel obstruction.         Assessment:     Principal Problem:    Recurrent pancreatitis  Active Problems:    History of ST elevation myocardial infarction (STEMI)    Primary hypertension  Resolved Problems:    * No resolved hospital problems. *       39 y.o. 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 Cassandra Zapata for recurrent pancreatitis with known necrosis in pancreatic tail after recent discharge on 1/5/23 for the same complaints. Patient was discharged and continued to have upper abdominal pain, nausea, and inability to tolerate PO intake. He denies recent EtOH use and quit smoking tobacco 3 weeks ago. Labs on admit significant for Lipase 837, total protein 8.7, RBC 5.8, MCV 76.7, Platelets 803. CT A/P with 1. Necrotizing pancreatitis, with associated acute necrotic collection measuring up to 2.5 x 4.3 x 3.2 cm. The necrotic collection appears more defined when compared to January 1, 2023 study. 2. Mass effect and narrowing of the splenic vein near the confluence with the SMV. 3. Circumferential wall thickening of the distal stomach, adjacent to the pancreatic abnormality. Findings likely reactive gastritis. 4. Large stool volume in the colon. No evidence of colitis, diverticulitis or bowel obstruction. Pancreatic fluid collections are greatly reduced in size. Patient was scheduled for OP follow up with Dr. Pierce Briseno on 1/10/23 to discuss referral to tertiary treatment center for management of chronic pancreatitis vs possible [place] procedure given chronically dilated pancreatic duct. 1/10/23: Increased heartburn/nausea due to pancreatic enzymes on empty stomach - improved with Maalox and PPI. Continues to have constipation. Pain remains the same. Hgb decreased to 11 from 14.7 yesterday. Likely dilutional.. No overt GI bleeding. 1/11/23: Lipase increased to 6,439 with persistent upper abdominal pain. Concerning for possible stone. On Miralax and states he feels that he could have a BM. Reported minimal relief with Bentyl. Pain 6/10 today. Has not eaten today but did have clear liquids this AM.  Plan:     - Supportive care, anti-emetics PRN, pain control PRN, IV fluids  - NPO  - Continue to monitor CBC, CMP, lipase  - Due to significant increase in lipase and persistent abd pain, concern for stone causing obstruction and recurrent symptoms. Schedule patient for diagnostic EUS with Dr Kahlil Ely today. Dr. Kahlil Ely is aware that patient has been on Brilinta. Discussed risks including but not limited to bleeding, perforation, acute cardiopulmonary event, sedation risks, IV complications, aspiration, and pt states understanding and agrees to proceed. - Continue PPI BID  - Following procedure, may continue Miralax for constipation  - Continue EtOH and tobacco cessation. Ministerio Sanchez PA-C  [facility]    Patient is seen and examined in collaboration with Dr. Gibson Villa. Assessment and plan as per Dr. Gibson Villa.

## 2023-01-11 NOTE — INTERVAL H&P NOTE
Update History & Physical    The Patient's History and Physical attached below was reviewed with the patient and I examined the patient. There was no change in the plan, or pertinent findings. The surgical site was confirmed with the patient. Plan:  The risk, benefits, expected outcome, and alternative to the recommended procedure have been discussed with the patient. Patient understands and wants to proceed with the procedure.     Electronically signed by Clara Charles MD

## 2023-01-11 NOTE — PLAN OF CARE
Problem: Safety - Adult  Goal: Free from fall injury  Outcome: Progressing Patient/Caregiver provided printed discharge information.

## 2023-01-11 NOTE — PROGRESS NOTES
TRANSFER - IN REPORT:    Verbal report received from ELI Harrison on Florian Burgerf  being received from [facility] for routine progression of patient care      Report consisted of patient's Situation, Background, Assessment and   Recommendations(SBAR). Information from the following report(s) Nurse Handoff Report was reviewed with the receiving nurse. Opportunity for questions and clarification was provided. Assessment completed upon patient's arrival to unit and care assumed.

## 2023-01-11 NOTE — CARE COORDINATION
CM following for continued hospital stay for recurrent pancreatitis. GI performed EGD today. Gastric biopsies taken. Plan for IR to place nasal jejunal tube per GI note. CM will continue to follow needs on self pay patient.

## 2023-01-11 NOTE — ANESTHESIA POSTPROCEDURE EVALUATION
Department of Anesthesiology  Postprocedure Note    Patient: Liz Curry  MRN: [id number]  YOB: 1985  Date of evaluation: 1/11/2023      Procedure Summary     Date: 01/11/23 Room / Location: [facility] ENDO 05 / [facility] ENDOSCOPY    Anesthesia Start: 1109 Anesthesia Stop: 56    Procedure: ENDOSCOPIC ULTRASOUND, biopsy (Upper GI Region) Diagnosis:       Recurrent pancreatitis      (Recurrent pancreatitis [K85.90])    Surgeons: Rafa Ochoa MD Responsible Provider: Mauro Calderon MD    Anesthesia Type: TIVA ASA Status: 4          Anesthesia Type: No value filed.     Mack Phase I: Mack Score: 10    Mack Phase II: Mack Score: 8      Anesthesia Post Evaluation    Patient location during evaluation: bedside  Patient participation: complete - patient participated  Level of consciousness: awake and alert  Pain score: 1  Airway patency: patent  Nausea & Vomiting: no vomiting  Complications: no  Cardiovascular status: hemodynamically stable  Respiratory status: acceptable  Hydration status: euvolemic

## 2023-01-12 LAB
ALBUMIN SERPL-MCNC: 2.5 G/DL (ref 3.5–5)
ALBUMIN/GLOB SERPL: 0.6 (ref 0.4–1.6)
ALP SERPL-CCNC: 69 U/L (ref 50–136)
ALT SERPL-CCNC: 22 U/L (ref 12–65)
ANION GAP SERPL CALC-SCNC: 9 MMOL/L (ref 2–11)
AST SERPL-CCNC: 18 U/L (ref 15–37)
BASOPHILS # BLD: 0.1 K/UL (ref 0–0.2)
BASOPHILS NFR BLD: 1 % (ref 0–2)
BILIRUB SERPL-MCNC: 0.5 MG/DL (ref 0.2–1.1)
BUN SERPL-MCNC: 9 MG/DL (ref 6–23)
CALCIUM SERPL-MCNC: 8.6 MG/DL (ref 8.3–10.4)
CHLORIDE SERPL-SCNC: 102 MMOL/L (ref 101–110)
CO2 SERPL-SCNC: 24 MMOL/L (ref 21–32)
CREAT SERPL-MCNC: 0.9 MG/DL (ref 0.8–1.5)
DIFFERENTIAL METHOD BLD: ABNORMAL
EOSINOPHIL # BLD: 0.2 K/UL (ref 0–0.8)
EOSINOPHIL NFR BLD: 3 % (ref 0.5–7.8)
ERYTHROCYTE [DISTWIDTH] IN BLOOD BY AUTOMATED COUNT: 16.8 % (ref 11.9–14.6)
FERRITIN SERPL-MCNC: 323 NG/ML (ref 8–388)
FOLATE SERPL-MCNC: 7.4 NG/ML (ref 3.1–17.5)
GLOBULIN SER CALC-MCNC: 4.1 G/DL (ref 2.8–4.5)
GLUCOSE SERPL-MCNC: 79 MG/DL (ref 65–100)
HCT VFR BLD AUTO: 31.6 % (ref 41.1–50.3)
HGB BLD-MCNC: 10.2 G/DL (ref 13.6–17.2)
IMM GRANULOCYTES # BLD AUTO: 0 K/UL (ref 0–0.5)
IMM GRANULOCYTES NFR BLD AUTO: 0 % (ref 0–5)
IRON SERPL-MCNC: 13 UG/DL (ref 35–150)
LIPASE SERPL-CCNC: 1998 U/L (ref 73–393)
LIPASE SERPL-CCNC: 2205 U/L (ref 73–393)
LYMPHOCYTES # BLD: 1.7 K/UL (ref 0.5–4.6)
LYMPHOCYTES NFR BLD: 26 % (ref 13–44)
MAGNESIUM SERPL-MCNC: 2.1 MG/DL (ref 1.8–2.4)
MCH RBC QN AUTO: 25.6 PG (ref 26.1–32.9)
MCHC RBC AUTO-ENTMCNC: 32.3 G/DL (ref 31.4–35)
MCV RBC AUTO: 79.2 FL (ref 82–102)
MONOCYTES # BLD: 0.7 K/UL (ref 0.1–1.3)
MONOCYTES NFR BLD: 11 % (ref 4–12)
NEUTS SEG # BLD: 3.8 K/UL (ref 1.7–8.2)
NEUTS SEG NFR BLD: 59 % (ref 43–78)
NRBC # BLD: 0 K/UL (ref 0–0.2)
PHOSPHATE SERPL-MCNC: 3.7 MG/DL (ref 2.5–4.5)
PLATELET # BLD AUTO: 506 K/UL (ref 150–450)
PMV BLD AUTO: 9.7 FL (ref 9.4–12.3)
POTASSIUM SERPL-SCNC: 4.2 MMOL/L (ref 3.5–5.1)
PROT SERPL-MCNC: 6.6 G/DL (ref 6.3–8.2)
RBC # BLD AUTO: 3.99 M/UL (ref 4.23–5.6)
SODIUM SERPL-SCNC: 135 MMOL/L (ref 133–143)
VIT B12 SERPL-MCNC: 802 PG/ML (ref 193–986)
WBC # BLD AUTO: 6.4 K/UL (ref 4.3–11.1)

## 2023-01-12 PROCEDURE — 83690 ASSAY OF LIPASE: CPT

## 2023-01-12 PROCEDURE — 82728 ASSAY OF FERRITIN: CPT

## 2023-01-12 PROCEDURE — 83735 ASSAY OF MAGNESIUM: CPT

## 2023-01-12 PROCEDURE — 6360000002 HC RX W HCPCS: Performed by: PHYSICIAN ASSISTANT

## 2023-01-12 PROCEDURE — C9113 INJ PANTOPRAZOLE SODIUM, VIA: HCPCS | Performed by: PHYSICIAN ASSISTANT

## 2023-01-12 PROCEDURE — 80053 COMPREHEN METABOLIC PANEL: CPT

## 2023-01-12 PROCEDURE — 2580000003 HC RX 258: Performed by: PHYSICIAN ASSISTANT

## 2023-01-12 PROCEDURE — 6370000000 HC RX 637 (ALT 250 FOR IP): Performed by: PHYSICIAN ASSISTANT

## 2023-01-12 PROCEDURE — 6370000000 HC RX 637 (ALT 250 FOR IP): Performed by: FAMILY MEDICINE

## 2023-01-12 PROCEDURE — 85025 COMPLETE CBC W/AUTO DIFF WBC: CPT

## 2023-01-12 PROCEDURE — 3E0H76Z INTRODUCTION OF NUTRITIONAL SUBSTANCE INTO LOWER GI, VIA NATURAL OR ARTIFICIAL OPENING: ICD-10-PCS | Performed by: INTERNAL MEDICINE

## 2023-01-12 PROCEDURE — 82607 VITAMIN B-12: CPT

## 2023-01-12 PROCEDURE — 36415 COLL VENOUS BLD VENIPUNCTURE: CPT

## 2023-01-12 PROCEDURE — 6360000002 HC RX W HCPCS: Performed by: FAMILY MEDICINE

## 2023-01-12 PROCEDURE — 6360000002 HC RX W HCPCS: Performed by: HOSPITALIST

## 2023-01-12 PROCEDURE — 2580000003 HC RX 258: Performed by: NURSE PRACTITIONER

## 2023-01-12 PROCEDURE — 83540 ASSAY OF IRON: CPT

## 2023-01-12 PROCEDURE — 84100 ASSAY OF PHOSPHORUS: CPT

## 2023-01-12 PROCEDURE — 6370000000 HC RX 637 (ALT 250 FOR IP): Performed by: INTERNAL MEDICINE

## 2023-01-12 PROCEDURE — 2580000003 HC RX 258: Performed by: FAMILY MEDICINE

## 2023-01-12 PROCEDURE — 1100000000 HC RM PRIVATE

## 2023-01-12 PROCEDURE — A4216 STERILE WATER/SALINE, 10 ML: HCPCS | Performed by: PHYSICIAN ASSISTANT

## 2023-01-12 PROCEDURE — 82746 ASSAY OF FOLIC ACID SERUM: CPT

## 2023-01-12 RX ORDER — POTASSIUM CHLORIDE 7.45 MG/ML
10 INJECTION INTRAVENOUS PRN
Status: DISCONTINUED | OUTPATIENT
Start: 2023-01-12 | End: 2023-01-18 | Stop reason: HOSPADM

## 2023-01-12 RX ORDER — OXYCODONE HCL 20 MG/ML
10 CONCENTRATE, ORAL ORAL
Status: DISCONTINUED | OUTPATIENT
Start: 2023-01-12 | End: 2023-01-14 | Stop reason: SDUPTHER

## 2023-01-12 RX ORDER — MAGNESIUM SULFATE IN WATER 40 MG/ML
2000 INJECTION, SOLUTION INTRAVENOUS PRN
Status: DISCONTINUED | OUTPATIENT
Start: 2023-01-12 | End: 2023-01-18 | Stop reason: HOSPADM

## 2023-01-12 RX ORDER — LANOLIN ALCOHOL/MO/W.PET/CERES
100 CREAM (GRAM) TOPICAL DAILY
Status: COMPLETED | OUTPATIENT
Start: 2023-01-12 | End: 2023-01-18

## 2023-01-12 RX ORDER — POTASSIUM CHLORIDE 20 MEQ/1
40 TABLET, EXTENDED RELEASE ORAL PRN
Status: DISCONTINUED | OUTPATIENT
Start: 2023-01-12 | End: 2023-01-18 | Stop reason: HOSPADM

## 2023-01-12 RX ORDER — METOPROLOL TARTRATE 5 MG/5ML
2.5 INJECTION INTRAVENOUS EVERY 6 HOURS PRN
Status: DISCONTINUED | OUTPATIENT
Start: 2023-01-12 | End: 2023-01-13

## 2023-01-12 RX ORDER — METOPROLOL TARTRATE 5 MG/5ML
5 INJECTION INTRAVENOUS EVERY 6 HOURS
Status: DISCONTINUED | OUTPATIENT
Start: 2023-01-12 | End: 2023-01-12

## 2023-01-12 RX ADMIN — BUPROPION HYDROCHLORIDE 100 MG: 100 TABLET, FILM COATED, EXTENDED RELEASE ORAL at 20:39

## 2023-01-12 RX ADMIN — HYDROMORPHONE HYDROCHLORIDE 1 MG: 1 INJECTION, SOLUTION INTRAMUSCULAR; INTRAVENOUS; SUBCUTANEOUS at 02:24

## 2023-01-12 RX ADMIN — Medication 100 MG: at 12:22

## 2023-01-12 RX ADMIN — HYDROMORPHONE HYDROCHLORIDE 1 MG: 1 INJECTION, SOLUTION INTRAMUSCULAR; INTRAVENOUS; SUBCUTANEOUS at 18:12

## 2023-01-12 RX ADMIN — TICAGRELOR 90 MG: 90 TABLET ORAL at 20:39

## 2023-01-12 RX ADMIN — ENOXAPARIN SODIUM 40 MG: 100 INJECTION SUBCUTANEOUS at 09:13

## 2023-01-12 RX ADMIN — HYDROMORPHONE HYDROCHLORIDE 1 MG: 1 INJECTION, SOLUTION INTRAMUSCULAR; INTRAVENOUS; SUBCUTANEOUS at 09:12

## 2023-01-12 RX ADMIN — HYDROMORPHONE HYDROCHLORIDE 1 MG: 1 INJECTION, SOLUTION INTRAMUSCULAR; INTRAVENOUS; SUBCUTANEOUS at 21:17

## 2023-01-12 RX ADMIN — SODIUM CHLORIDE, SODIUM LACTATE, POTASSIUM CHLORIDE, AND CALCIUM CHLORIDE: 600; 310; 30; 20 INJECTION, SOLUTION INTRAVENOUS at 04:00

## 2023-01-12 RX ADMIN — ASPIRIN 81 MG 81 MG: 81 TABLET ORAL at 09:12

## 2023-01-12 RX ADMIN — DICYCLOMINE HYDROCHLORIDE 10 MG: 10 CAPSULE ORAL at 15:39

## 2023-01-12 RX ADMIN — SODIUM CHLORIDE, PRESERVATIVE FREE 40 MG: 5 INJECTION INTRAVENOUS at 20:39

## 2023-01-12 RX ADMIN — SODIUM CHLORIDE, SODIUM LACTATE, POTASSIUM CHLORIDE, AND CALCIUM CHLORIDE: 600; 310; 30; 20 INJECTION, SOLUTION INTRAVENOUS at 10:20

## 2023-01-12 RX ADMIN — SODIUM CHLORIDE, PRESERVATIVE FREE 10 ML: 5 INJECTION INTRAVENOUS at 20:39

## 2023-01-12 RX ADMIN — DICYCLOMINE HYDROCHLORIDE 10 MG: 10 CAPSULE ORAL at 06:23

## 2023-01-12 RX ADMIN — TICAGRELOR 90 MG: 90 TABLET ORAL at 09:12

## 2023-01-12 RX ADMIN — HYDROMORPHONE HYDROCHLORIDE 1 MG: 1 INJECTION, SOLUTION INTRAMUSCULAR; INTRAVENOUS; SUBCUTANEOUS at 06:23

## 2023-01-12 RX ADMIN — OXYCODONE HYDROCHLORIDE 10 MG: 100 SOLUTION ORAL at 14:24

## 2023-01-12 RX ADMIN — SODIUM CHLORIDE, PRESERVATIVE FREE 5 ML: 5 INJECTION INTRAVENOUS at 09:13

## 2023-01-12 RX ADMIN — DICYCLOMINE HYDROCHLORIDE 10 MG: 10 CAPSULE ORAL at 10:20

## 2023-01-12 RX ADMIN — OXYCODONE HYDROCHLORIDE 10 MG: 100 SOLUTION ORAL at 23:02

## 2023-01-12 RX ADMIN — OXYCODONE HYDROCHLORIDE 5 MG: 5 TABLET ORAL at 01:06

## 2023-01-12 RX ADMIN — SODIUM CHLORIDE, PRESERVATIVE FREE 40 MG: 5 INJECTION INTRAVENOUS at 09:12

## 2023-01-12 RX ADMIN — HYDROMORPHONE HYDROCHLORIDE 1 MG: 1 INJECTION, SOLUTION INTRAMUSCULAR; INTRAVENOUS; SUBCUTANEOUS at 12:22

## 2023-01-12 RX ADMIN — Medication 1 SPRAY: at 01:24

## 2023-01-12 RX ADMIN — ATORVASTATIN CALCIUM 40 MG: 40 TABLET, FILM COATED ORAL at 20:39

## 2023-01-12 RX ADMIN — HYDROMORPHONE HYDROCHLORIDE 1 MG: 1 INJECTION, SOLUTION INTRAMUSCULAR; INTRAVENOUS; SUBCUTANEOUS at 15:36

## 2023-01-12 RX ADMIN — BUPROPION HYDROCHLORIDE 100 MG: 100 TABLET, FILM COATED, EXTENDED RELEASE ORAL at 09:12

## 2023-01-12 ASSESSMENT — PAIN SCALES - GENERAL
PAINLEVEL_OUTOF10: 9
PAINLEVEL_OUTOF10: 6
PAINLEVEL_OUTOF10: 8
PAINLEVEL_OUTOF10: 7
PAINLEVEL_OUTOF10: 9
PAINLEVEL_OUTOF10: 10
PAINLEVEL_OUTOF10: 8
PAINLEVEL_OUTOF10: 0
PAINLEVEL_OUTOF10: 5

## 2023-01-12 ASSESSMENT — PAIN DESCRIPTION - DESCRIPTORS
DESCRIPTORS: THROBBING;SHARP
DESCRIPTORS: GNAWING
DESCRIPTORS: THROBBING;SHARP;STABBING
DESCRIPTORS: CRAMPING;ACHING
DESCRIPTORS: THROBBING;SHARP;STABBING
DESCRIPTORS: SORE
DESCRIPTORS: PRESSURE;CRAMPING

## 2023-01-12 ASSESSMENT — PAIN DESCRIPTION - LOCATION
LOCATION: ABDOMEN;BACK
LOCATION: ABDOMEN
LOCATION: ABDOMEN
LOCATION: THROAT
LOCATION: ABDOMEN
LOCATION: ABDOMEN;BACK
LOCATION: ABDOMEN

## 2023-01-12 ASSESSMENT — PAIN DESCRIPTION - ORIENTATION
ORIENTATION: RIGHT;LEFT;ANTERIOR;POSTERIOR
ORIENTATION: RIGHT;LEFT;ANTERIOR;POSTERIOR
ORIENTATION: INNER

## 2023-01-12 NOTE — PROGRESS NOTES
Hospitalist Progress Note   Admit Date:  2023 10:11 PM   Name:  Israel Jimenes   Age:  44 y.o. Sex:  male  :  1985   MRN:  [id number]   Room:  [facility]/    Presenting Complaint: No chief complaint on file. Reason(s) for Admission: Recurrent pancreatitis [K85.90]     Hospital Course:   Israel Jimenes is a 44 y.o. male with medical history of ETOH pancreatitis complicated with pseudocyst s/p stent placement  removed  and STEMI s/p PCI with TULIO who presented to [facility] ER with a complaint of recurrent epigastric pain with associated N/V. Has had multiple admission for pancreatitis and recently discharged after treatment for pancreatitis on 23      \"1. Necrotizing pancreatitis, with associated acute necrotic collection measuring   up to 2.5 x 4.3 x 3.2 cm. The necrotic collection appears more defined when   compared to 2023 study. 2. Mass effect and narrowing of the splenic vein near the confluence with the   SMV. 3. Circumferential wall thickening of the distal stomach, adjacent to the   pancreatic abnormality. Findings likely reactive gastritis. 4. Large stool volume in the colon. No evidence of colitis, diverticulitis or   bowel obstruction. \"     While here EUS showing persistent necrosis and increasing lipase. Did not tolerate advancement of diet therefore NJ placed, patient NPO, with plans to start TF today. Of note patients lisinopril stopped during this admission given increased risk of pancreatitis. Subjective & 24hr Events (23): Seen at bedside. Case also discussed with RN. Seems there is some concern that patient has not maintained his NPO status as he has been overheard asking his wife for chex mix and beef jerky wrappers have been found in the garbage although unclear of these are from patient.  He is complaining of continued pain and asking for a specific increase of his dilaudid to 1.5mg q3h as he feels the pain meds are not lasting long enough for his pain. Otherwise has not had a BM in 3 days, urinating well. No CP, n/v/, SOB. Last drink in October, was drinking 2 glasses of wine daily, does not drink beer or liquor. Assessment & Plan:     Principal Problem:    Necrotizing pancreatitis    Pancreatic pseudocyst  - AM labs pending, continue to follow lipase and CMP  - NJ advanced yesterday with IR and plans to start TF today after nutrition cs  - continue IVF  - will consider increase in pain meds, although pt appears comfortable on exam    Microcytic anemia  - likely dilutional, continue to monitor  - iron panel, B12, folate; pt with poor nutrition      History of ST elevation myocardial infarction (STEMI)    Coronary artery disease involving native coronary artery of native heart  - will need ASA, brilinta, and statin; understand risk of clogging his NJ and have attempted to transition other meds as possible to IV      Primary hypertension  - pt has actually by a bit hypotensive, lisinopril has been d/c'd as above  - lopressor has been changed in IV PRN, continue to monitor    Depression  - continue wellbutrin      Anticipated discharge needs:    - pending clinical course    Diet:  Diet NPO Exceptions are: Sips of Water with Meds  DVT PPx: lovenox  Code status: Full Code    Hospital Problems:  Principal Problem:    Necrotizing pancreatitis  Active Problems:    Pancreatic pseudocyst    History of ST elevation myocardial infarction (STEMI)    Coronary artery disease involving native coronary artery of native heart    Acute on chronic pancreatitis ([facility] [address]    Primary hypertension  Resolved Problems:    * No resolved hospital problems.  *      Objective:   Patient Vitals for the past 24 hrs:   Temp Pulse Resp BP SpO2   01/12/23 0805 97.4 °F (36.3 °C) 62 17 (!) 93/57 99 %   01/12/23 0424 97.5 °F (36.4 °C) 65 16 (!) 105/59 --   01/12/23 0224 -- -- 16 -- --   01/12/23 0001 97.9 °F (36.6 °C) 75 17 (!) 131/55 99 %   01/11/23 2353 -- -- 17 -- -- 01/11/23 1946 98.4 °F (36.9 °C) 79 18 96/61 99 %   01/11/23 1531 -- 67 18 117/77 100 %   01/11/23 1247 97.9 °F (36.6 °C) 66 18 115/77 99 %   01/11/23 1226 -- 55 -- (!) 93/50 100 %   01/11/23 1216 -- 56 -- (!) 96/53 100 %   01/11/23 1205 -- 62 16 (!) 98/56 99 %   01/11/23 1150 98.6 °F (37 °C) 73 12 (!) 95/54 93 %   01/11/23 1050 97.6 °F (36.4 °C) 68 16 110/66 100 %       Oxygen Therapy  SpO2: 99 %  Pulse Oximetry Type: Intermittent  Pulse via Oximetry: 54 beats per minute  Pulse Oximeter Device Mode: Continuous  Pulse Oximeter Device Location: Finger  O2 Device: None (Room air)  Skin Assessment: Clean, dry, & intact  O2 Flow Rate (L/min): 3 L/min  Oxygen Therapy: None (Room air)    Estimated body mass index is 25.1 kg/m² as calculated from the following:    Height as of this encounter: 5' 9\" (1.753 m). Weight as of this encounter: 170 lb (77.1 kg). Intake/Output Summary (Last 24 hours) at 1/12/2023 0856  Last data filed at 1/11/2023 1115  Gross per 24 hour   Intake --   Output 0 ml   Net 0 ml         Physical Exam:   Blood pressure (!) 93/57, pulse 62, temperature 97.4 °F (36.3 °C), temperature source Oral, resp. rate 17, height 5' 9\" (1.753 m), weight 170 lb (77.1 kg), SpO2 99 %. General:    Well nourished. Head:  Normocephalic, atraumatic  Eyes:  Sclerae appear normal.  Pupils equally round. ENT:  Nares appear normal.  Moist oral mucosa  Neck:  No restricted ROM. Trachea midline   CV:   RRR. No m/r/g. No jugular venous distension. Lungs:   CTAB. No wheezing, rhonchi, or rales. Symmetric expansion. Abdomen:   Soft, nondistended. Mild epigastric tenderness. Hypoactive BS. Extremities: No cyanosis or clubbing. No edema  Skin:     No rashes and normal coloration. Warm and dry. Neuro:  CN II-XII grossly intact. Sensation intact. Psych:  Normal mood and affect.       I have personally reviewed labs and tests showing:  Recent Labs:  Recent Results (from the past 48 hour(s))   CBC    Collection Time: 01/11/23  6:59 AM   Result Value Ref Range    WBC 6.5 4.3 - 11.1 K/uL    RBC 4.76 4.23 - 5.6 M/uL    Hemoglobin 12.1 (L) 13.6 - 17.2 g/dL    Hematocrit 37.4 (L) 41.1 - 50.3 %    MCV 78.6 (L) 82 - 102 FL    MCH 25.4 (L) 26.1 - 32.9 PG    MCHC 32.4 31.4 - 35.0 g/dL    RDW 16.7 (H) 11.9 - 14.6 %    Platelets 928 (H) 601 - 450 K/uL    MPV 8.8 (L) 9.4 - 12.3 FL    nRBC 0.00 0.0 - 0.2 K/uL   Comprehensive Metabolic Panel    Collection Time: 01/11/23  6:59 AM   Result Value Ref Range    Sodium 133 133 - 143 mmol/L    Potassium 4.3 3.5 - 5.1 mmol/L    Chloride 103 101 - 110 mmol/L    CO2 25 21 - 32 mmol/L    Anion Gap 5 2 - 11 mmol/L    Glucose 97 65 - 100 mg/dL    BUN 8 6 - 23 MG/DL    Creatinine 0.90 0.8 - 1.5 MG/DL    Est, Glom Filt Rate >60 >60 ml/min/1.73m2    Calcium 8.9 8.3 - 10.4 MG/DL    Total Bilirubin 0.2 0.2 - 1.1 MG/DL    ALT 27 12 - 65 U/L    AST 18 15 - 37 U/L    Alk Phosphatase 78 50 - 136 U/L    Total Protein 6.9 6.3 - 8.2 g/dL    Albumin 2.6 (L) 3.5 - 5.0 g/dL    Globulin 4.3 2.8 - 4.5 g/dL    Albumin/Globulin Ratio 0.6 0.4 - 1.6     Lipase    Collection Time: 01/11/23  6:59 AM   Result Value Ref Range    Lipase 6,439 (H) 73 - 393 U/L   CBC with Auto Differential    Collection Time: 01/12/23  7:18 AM   Result Value Ref Range    WBC 6.4 4.3 - 11.1 K/uL    RBC 3.99 (L) 4.23 - 5.6 M/uL    Hemoglobin 10.2 (L) 13.6 - 17.2 g/dL    Hematocrit 31.6 (L) 41.1 - 50.3 %    MCV 79.2 (L) 82 - 102 FL    MCH 25.6 (L) 26.1 - 32.9 PG    MCHC 32.3 31.4 - 35.0 g/dL    RDW 16.8 (H) 11.9 - 14.6 %    Platelets 314 (H) 674 - 450 K/uL    MPV 9.7 9.4 - 12.3 FL    nRBC 0.00 0.0 - 0.2 K/uL    Differential Type AUTOMATED      Seg Neutrophils 59 43 - 78 %    Lymphocytes 26 13 - 44 %    Monocytes 11 4.0 - 12.0 %    Eosinophils % 3 0.5 - 7.8 %    Basophils 1 0.0 - 2.0 %    Immature Granulocytes 0 0.0 - 5.0 %    Segs Absolute 3.8 1.7 - 8.2 K/UL    Absolute Lymph # 1.7 0.5 - 4.6 K/UL    Absolute Mono # 0.7 0.1 - 1.3 K/UL    Absolute Eos # 0.2 0.0 - 0.8 K/UL    Basophils Absolute 0.1 0.0 - 0.2 K/UL    Absolute Immature Granulocyte 0.0 0.0 - 0.5 K/UL       I have personally reviewed imaging studies showing: Other Studies:  FL INTRO LONG GI TUBE   Final Result   Fluoroscopic guided manipulation of feeding tube from the stomach to   the jejunum. XR ABDOMEN (KUB) (SINGLE AP VIEW)   Final Result   Feeding tube tip is projected over the upper body of stomach. Length   is adequate to reach the ligament of Treitz. CT ABDOMEN PELVIS W IV CONTRAST Additional Contrast? None   Final Result      1. Necrotizing pancreatitis, with associated acute necrotic collection measuring   up to 2.5 x 4.3 x 3.2 cm. The necrotic collection appears more defined when   compared to January 1, 2023 study. 2. Mass effect and narrowing of the splenic vein near the confluence with the   SMV. 3. Circumferential wall thickening of the distal stomach, adjacent to the   pancreatic abnormality. Findings likely reactive gastritis. 4. Large stool volume in the colon. No evidence of colitis, diverticulitis or   bowel obstruction.           Current Meds:  Current Facility-Administered Medications   Medication Dose Route Frequency    phenol 1.4 % mouth spray 1 spray  1 spray Mouth/Throat Q2H PRN    metoprolol (LOPRESSOR) injection 5 mg  5 mg IntraVENous Q6H    pantoprazole (PROTONIX) 40 mg in sodium chloride (PF) 0.9 % 10 mL injection  40 mg IntraVENous BID    diatrizoate meglumine-sodium (GASTROGRAFIN) 66-10 % solution 30 mL  30 mL Per NG tube ONCE PRN    polyethylene glycol (GLYCOLAX) packet 17 g  17 g Oral Daily    dicyclomine (BENTYL) capsule 10 mg  10 mg Oral TID AC    aluminum & magnesium hydroxide-simethicone (MAALOX) 200-200-20 MG/5ML suspension 30 mL  30 mL Oral Q6H PRN    HYDROmorphone (DILAUDID) injection 1 mg  1 mg IntraVENous Q3H PRN    atorvastatin (LIPITOR) tablet 40 mg  40 mg Oral Nightly    buPROPion (WELLBUTRIN SR) extended release tablet 100 mg  100 mg Oral BID    hyoscyamine (LEVSIN/SL) sublingual tablet 125 mcg  125 mcg SubLINGual Q4H PRN    [Held by provider] metoprolol tartrate (LOPRESSOR) tablet 25 mg  25 mg Oral BID    [Held by provider] pantoprazole (PROTONIX) tablet 40 mg  40 mg Oral BID    simethicone (MYLICON) chewable tablet 80 mg  80 mg Oral Q6H PRN    ticagrelor (BRILINTA) tablet 90 mg  90 mg Oral BID    sodium chloride flush 0.9 % injection 5-40 mL  5-40 mL IntraVENous 2 times per day    sodium chloride flush 0.9 % injection 5-40 mL  5-40 mL IntraVENous PRN    0.9 % sodium chloride infusion   IntraVENous PRN    enoxaparin (LOVENOX) injection 40 mg  40 mg SubCUTAneous Daily    ondansetron (ZOFRAN-ODT) disintegrating tablet 4 mg  4 mg Oral Q8H PRN    Or    ondansetron (ZOFRAN) injection 4 mg  4 mg IntraVENous Q6H PRN    polyethylene glycol (GLYCOLAX) packet 17 g  17 g Oral Daily PRN    acetaminophen (TYLENOL) tablet 650 mg  650 mg Oral Q6H PRN    Or    acetaminophen (TYLENOL) suppository 650 mg  650 mg Rectal Q6H PRN    aspirin chewable tablet 81 mg  81 mg Oral Daily    [Held by provider] oxyCODONE (ROXICODONE) immediate release tablet 5 mg  5 mg Oral Q4H PRN    lactated ringers infusion   IntraVENous Continuous       Signed:  ANJALI Bell

## 2023-01-12 NOTE — PROGRESS NOTES
[facility] Progress Note         Admit Date:  1/8/2023    Today's Date:  1/12/2023    CC:  Recurrent pancreatitis with known necrosis in pancreatic tail    Subjective:     Patient s/p EUS on 1/11/23 with Dr. Parikh Found with findings of pancreatic fluid collections- pseudocyst, WON, and gastritis. NJ tube was placed and patient was made strictly NPO. Tube feeds started this AM. He states pain is slightly better today. Denies N/V. Has not had a BM in 24 hrs but is passing flatus with good bowel sounds.      Medications:   Current Facility-Administered Medications   Medication Dose Route Frequency    phenol 1.4 % mouth spray 1 spray  1 spray Mouth/Throat Q2H PRN    pantoprazole (PROTONIX) 40 mg in sodium chloride (PF) 0.9 % 10 mL injection  40 mg IntraVENous BID    metoprolol (LOPRESSOR) injection 2.5 mg  2.5 mg IntraVENous Q6H PRN    potassium chloride (KLOR-CON M) extended release tablet 40 mEq  40 mEq Oral PRN    Or    potassium bicarb-citric acid (EFFER-K) effervescent tablet 40 mEq  40 mEq Oral PRN    Or    potassium chloride 10 mEq/100 mL IVPB (Peripheral Line)  10 mEq IntraVENous PRN    magnesium sulfate 2000 mg in 50 mL IVPB premix  2,000 mg IntraVENous PRN    sodium phosphate 10 mmol in sodium chloride 0.9 % 250 mL IVPB  10 mmol IntraVENous PRN    Or    sodium phosphate 15 mmol in sodium chloride 0.9 % 250 mL IVPB  15 mmol IntraVENous PRN    Or    sodium phosphate 20 mmol in sodium chloride 0.9 % 500 mL IVPB  20 mmol IntraVENous PRN    thiamine tablet 100 mg  100 mg Oral Daily    diatrizoate meglumine-sodium (GASTROGRAFIN) 66-10 % solution 30 mL  30 mL Per NG tube ONCE PRN    polyethylene glycol (GLYCOLAX) packet 17 g  17 g Oral Daily    dicyclomine (BENTYL) capsule 10 mg  10 mg Oral TID AC    aluminum & magnesium hydroxide-simethicone (MAALOX) 200-200-20 MG/5ML suspension 30 mL  30 mL Oral Q6H PRN    HYDROmorphone (DILAUDID) injection 1 mg  1 mg IntraVENous Q3H PRN    atorvastatin (LIPITOR) tablet 40 mg  40 mg Oral Nightly    buPROPion [facility] SR) extended release tablet 100 mg  100 mg Oral BID    hyoscyamine (LEVSIN/SL) sublingual tablet 125 mcg  125 mcg SubLINGual Q4H PRN    [Held by provider] metoprolol tartrate (LOPRESSOR) tablet 25 mg  25 mg Oral BID    [Held by provider] pantoprazole (PROTONIX) tablet 40 mg  40 mg Oral BID    simethicone (MYLICON) chewable tablet 80 mg  80 mg Oral Q6H PRN    ticagrelor (BRILINTA) tablet 90 mg  90 mg Oral BID    sodium chloride flush 0.9 % injection 5-40 mL  5-40 mL IntraVENous 2 times per day    sodium chloride flush 0.9 % injection 5-40 mL  5-40 mL IntraVENous PRN    0.9 % sodium chloride infusion   IntraVENous PRN    enoxaparin (LOVENOX) injection 40 mg  40 mg SubCUTAneous Daily    ondansetron (ZOFRAN-ODT) disintegrating tablet 4 mg  4 mg Oral Q8H PRN    Or    ondansetron (ZOFRAN) injection 4 mg  4 mg IntraVENous Q6H PRN    polyethylene glycol (GLYCOLAX) packet 17 g  17 g Oral Daily PRN    acetaminophen (TYLENOL) tablet 650 mg  650 mg Oral Q6H PRN    Or    acetaminophen (TYLENOL) suppository 650 mg  650 mg Rectal Q6H PRN    aspirin chewable tablet 81 mg  81 mg Oral Daily    [Held by provider] oxyCODONE (ROXICODONE) immediate release tablet 5 mg  5 mg Oral Q4H PRN    lactated ringers infusion   IntraVENous Continuous       Review of Systems:  ROS was obtained, with pertinent positives as listed above. No chest pain or SOB. Diet:  NPO    Objective:   Vitals:  BP (!) 93/57   Pulse 62   Temp 97.4 °F (36.3 °C) (Oral)   Resp 17   Ht 5' 9\" (1.753 m)   Wt 170 lb (77.1 kg)   SpO2 99%   BMI 25.10 kg/m²   Intake/Output:  No intake/output data recorded. No intake/output data recorded. Exam:  General appearance: alert, cooperative, no distress, NJ tube placed  Lungs: clear to auscultation bilaterally anteriorly  Heart: regular rate and rhythm  Abdomen: soft, TTP over upper abdomen bilaterally and epigastrium. No guarding or rigidity.  Bowel sounds normal. No masses, no organomegaly  Extremities: extremities normal, atraumatic, no cyanosis or edema  Neuro:  alert and oriented    Data Review (Labs):    Recent Labs     01/11/23  0659 01/12/23  0718   WBC 6.5 6.4   HGB 12.1* 10.2*   HCT 37.4* 31.6*   * 506*   MCV 78.6* 79.2*    135   K 4.3 4.2    102   CO2 25 24   BUN 8 9   CREATININE 0.90 0.90   CALCIUM 8.9 8.6   MG  --  2.1   PHOS  --  3.7   GLUCOSE 97 79   ALKPHOS 78 69   AST 18 18   ALT 27 22   BILITOT 0.2 0.5   LABALBU 2.6* 2.5*   PROT 6.9 6.6   LIPASE 6,439* 1,998*  2,205*      Latest Reference Range & Units 1/12/23 07:18   Ferritin 8 - 388 NG/   Iron 35 - 150 ug/dL 13 (L)   FOLATE, FOLAT 3.1 - 17.5 ng/mL 7.4   Vitamin B-12 193 - 986 pg/mL 802     CT abdomen and pelvis with contrast , 1/9/23       COMPARISON: January 1, 2023. INDICATION: Recurrent pancreatitis, worsening. Any evidence of necrosis, cyst.       TECHNIQUE: CT imaging was performed of the abdomen and pelvis following the   uncomplicated administration of intravenous contrast (Isovue 370, 100 mL). Oral   contrast was administered. Radiation dose reduction techniques were used for   this study:  Our CT scanners use one or all of the following: Automated exposure   control, adjustment of the mA and/or kVp according to patient's size, iterative   reconstruction. FINDINGS:       Visualized lung bases are unremarkable. Abdomen findings: There is peripancreatic inflammatory stranding. The pancreatic parenchyma is not   well defined. There is lobulated cystic mass involving the pancreatic body,   measuring up to 2.5 cm AP x 4.3 cm transverse x 3.2 cm craniocaudal. Similar   adjacent 15 mm cystic lesion is noted in the pancreatic body. There is mass   effect on the splenic vein, near the confluence with the SMV. There is circumferential wall thickening of the distal stomach, as into the   pancreatic abnormality.        Gallbladder is surgically absent. Liver and the spleen not the upper limits for   size. The adrenal glands, kidneys and abdominal aorta are unremarkable. No   evidence of lymphadenopathy. There is fluid within the esophagus, suggesting gastroesophageal reflux. Small   bowel loops are normal in caliber. No small bowel obstruction. Pelvic findings:       Urinary bladder is normal in contour. The colon is normal in course and caliber   with large stool volume. No evidence of appendicitis. There is no free air or free fluid. Surrounding bones are intact. Impression       1. Necrotizing pancreatitis, with associated acute necrotic collection measuring   up to 2.5 x 4.3 x 3.2 cm. The necrotic collection appears more defined when   compared to January 1, 2023 study. 2. Mass effect and narrowing of the splenic vein near the confluence with the   SMV. 3. Circumferential wall thickening of the distal stomach, adjacent to the   pancreatic abnormality. Findings likely reactive gastritis. 4. Large stool volume in the colon. No evidence of colitis, diverticulitis or   bowel obstruction. ENDOSCOPIC ULTRASOUND PROCEDURE NOTE, 1/11/23, Dr. Dalia Floyd  PRE-OP DIAGNOSIS:  Recurrent acute pancreatitis  Epigastric pain  Abnormal CT  POST-OP DIAGNOSIS:  Pancreatic fluid collections- pseudocyst and WON  Gastritis  PROCEDURE:  After obtaining informed consent, the patient was placed in the left lateral position and sedated. The echoendoscope was advanced to the upper GI tract without difficulty. The scope was slowly removed while detailed images of the adjacent organs were obtained. The patient was taken to the recovery area in stable condition. FINDINGS:  ESOPHAGUS: Not well visualized  STOMACH: Limited views with retained fluid and bile present. Moderate erythema and friability in the antrum. Superficial biopsies obtained to r/o H pylori infection.     DUODENUM: Limited views with the oblique-viewing echoendoscope were unremarkable. The ampulla was Visualized tangentially and appears normal.   Thin clear bile draining. PANCREAS: The pancreas was well visualized from the head to the tail. The ampulla appears normal endosonographically. The common bile duct measures 9 mm upstream from the pancreas. There is compression of the bile duct in the head of the pancreas 1-2 mm. The main pancreatic duct in the body and tail measures 2-4 mm. It is irregular, tortuous, with dilated side branches. In the head of the pancreas pancreatic duct appears compressed by a large fluid collection 40 x 25 mm. This has a thin septation. There is no solid material present. The appearance suggests probable pseudocyst.  This remains much smaller than prior to cyst gastrostomy drainage. Another fluid collection in the body of the pancreas is 15 mm in diameter. This contains a small amount of mobile debris consistent with pancreatic necrosis. There is no discrete mass in the pancreas but the parenchyma is hypoechoic, heterogeneous, with extensive edema and loss of the normal tissue planes. These findings are typical and acute pancreatitis. They do decrease sensitivity of endoscopic ultrasound in detecting pancreatic neoplasia. BILIARY SYSTEM: The common bile duct was well visualized   As above, 9 mm proximal to the pancreatic head and 1-2 mm within the pancreas. No intraductal stones or abnormal wall thickening. The gallbladder was   Surgically absent . OTHER ORGANS: There was no ascites in the upper abdomen. Limited views of the left lobe of the liver demonstrated no solid mass lesions. The celiac axis appears normal.   Multiple small but prominent lymph nodes scattered around the head of the pancreas and portal vein. The largest of these nodes is 11 x 6 mm. The nodes are oval, homogeneous, with well-defined margins. They are not pathologically enlarged and likely reflect benign reactive nodes. PLAN:    Continue NPO. Recommend nasal jejunal tube placement in IR. Patient had recurrent symptoms once he began advancing diet. Discontinue ACE inhibitor which has been associated with medication induced pancreatitis. Follow up pathology from gastric biopsies. Repeat labs in am    Assessment:     Principal Problem:    Necrotizing pancreatitis  Active Problems:    Pancreatic pseudocyst    History of ST elevation myocardial infarction (STEMI)    Coronary artery disease involving native coronary artery of native heart    Acute on chronic pancreatitis ([address]    Primary hypertension  Resolved Problems:    * No resolved hospital problems. *    42 y.o. 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 Bob Razo for recurrent pancreatitis with known necrosis in pancreatic tail after recent discharge on 1/5/23 for the same complaints. Patient was discharged and continued to have upper abdominal pain, nausea, and inability to tolerate PO intake. He denies recent EtOH use and quit smoking tobacco 3 weeks ago. Labs on admit significant for Lipase 837, total protein 8.7, RBC 5.8, MCV 76.7, Platelets 005. CT A/P with 1. Necrotizing pancreatitis, with associated acute necrotic collection measuring up to 2.5 x 4.3 x 3.2 cm. The necrotic collection appears more defined when compared to January 1, 2023 study. 2. Mass effect and narrowing of the splenic vein near the confluence with the SMV. 3. Circumferential wall thickening of the distal stomach, adjacent to the pancreatic abnormality. Findings likely reactive gastritis. 4. Large stool volume in the colon. No evidence of colitis, diverticulitis or bowel obstruction.  Pancreatic fluid collections are greatly reduced in size. Patient was scheduled for OP follow up with Dr. Rhonda Suarez on 1/10/23 to discuss referral to tertiary treatment center for management of chronic pancreatitis vs possible [place] procedure given chronically dilated pancreatic duct. 1/10/23: Increased heartburn/nausea due to pancreatic enzymes on empty stomach - improved with Maalox and PPI. Continues to have constipation. Pain remains the same. Hgb decreased to 11 from 14.7 yesterday. Likely dilutional.. No overt GI bleeding. 1/11/23: Lipase increased to 6,439 with persistent upper abdominal pain. Concerning for possible stone. On Miralax and states he feels that he could have a BM. Reported minimal relief with Bentyl. Pain 6/10 today. Has not eaten today but did have clear liquids this AM.    1/12/23: EUS yesterday with pancreatic fluid collections- pseudocyst and WON, Gastritis. NJ tube placed. Repeat labs pending. ACE inhibitor discontinued due to possible pancreatitis side effects. Pain is improving. No N/V. No BM's in 24 hrs but patient has had limited PO intake. +flatus. Labs with Lipase improving at 1998, Vit B12 802, Folate 7.4, Iron 13 L, Ferritin 323. No overt GIB. Hgb 10.2 down from 12.1 yesterday. Plan:     - Supportive care, anti-emetics PRN, pain control PRN, IV fluids  - NPO  - Tube feeds started by dietary today  - Continue to monitor CBC, CMP, lipase  - Continue PPI BID  - Continue EtOH and tobacco cessation.  - Pathology pending from gastric biopsies. Will follow. - May repeat dulcolax suppository if constipation persists. Janet Sanderson PA-C  [facility]      Patient is seen and examined in collaboration with Dr. Zulay Zhao. Assessment and plan as per Dr. Zulay Zhao.

## 2023-01-12 NOTE — PROGRESS NOTES
Comprehensive Nutrition Assessment    Type and Reason for Visit: Reassess, Consult  Malnutrition Screening Tool: Malnutrition Screen  Have you recently lost weight without trying?: Unsure of amount of weight loss (2 points)  Have you been eating poorly because of a decreased appetite?: Yes (1 point)  Malnutrition Screening Tool Score: 3  Tube Feeding Management (GI)     Nutrition Recommendations/Plan:   Enteral Nutrition:   Enteral Access: Nasojejunal  Initiate  Formula: Peptide Based (Vital AF 1.2 Richie)  Goal Rate: Continuous 70 ml/hr  Initiate  Water flush  80 ml every 3 hours  Modulars: None not indicated at this time   Enteral regimen at above goal to provide per 24 hours:  2016 calories, 126 grams protein and 2002 ml free fluid. Above regimen: Intended to meet macronutrient goals  IV Fluids:  Continue per MD order  Labs:   EN labs: BMP daily, Mg daily x 3 days at initiation then MWF and Phos daily x 3 days at initiation then MWF. POC Glucoses/SSI Not indicated  Nutrition Related Medication Management:  Electrolyte Replacement Protocol PRN Initiate for Potassium, Phosphorus, and Magnesium  Thiamine 100 mg daily x 7 days  Bowel Regimen Not indicated at this time  Meals and Snacks:  Continue current diet. NPO     Malnutrition Assessment:  Malnutrition Status: At risk for malnutrition (Comment) (recurrent etoh pancreatitis, prolonged NPO/CLD status)    No candace wasting  Nutrition Assessment:  Nutrition History: Patient reports at least 3 meals per day prior to recent hospitalizations. He states since recent discharge he has been on a CLD at home. He reports UBW ~185# prior to recurrent hospitalizations over the last few months. He does admit that he has re-gained some weight when RD reviewing wt history. This is consistent with EMR.    Wt history: 7/30/22 185#, 10/11/2022 184#, 11/17/22 166#, 12/1/22 167#, 12/13/2022 177#     Do You Have Any Cultural, Protestant, or Ethnic Food Preferences?: No   Nutrition Background:       Patient with PMH significant for etoh abuse, pancreatitis complicated by pseudocyst s/p gastrotomy with axios stent, CAD, STEMI, GERD, HTN. He has recurrent admissions for pancreatitis, most recently 1/1/23 and discharge on 1/5/23. He presented back with abdominal pain. Nutrition Interval:  Diet advanced to CLD 1/10, then to FLD later that day. Diet advanced to GI soft 1/11 prior to making NPO again with rising lipase. Now s/p endoscopic ultrasound with gastric biopsies. GI now recommending NJT placement with post pyloric TF. NGT placed and advanced to duodenum in IR with peristalsis to further advance to jejunum. TF consult placed per GI. Patient seen reclined in bed. He states that he is hungry. Discussed start of TF and he is agreeable. Discussed with RN, Demian Diez. TF formula provided to unit. Patient is at risk for refeeding with prolonged CLD/NPO status.     Abdominal Status (last documented):   Last BM (including prior to admit): 01/08/23, GI Symptoms: Cramping   Pertinent Medications: Bentyl, protonix, glycolax  Continuous: none  IVF: LR @ 100 ml/hr  Electrolyte Replacement:  none  PRN: Maalox (utilizing daily), zofran (utliizing daily)  Pertinent Labs:   Lab Results   Component Value Date/Time     01/11/2023 06:59 AM    K 4.3 01/11/2023 06:59 AM     01/11/2023 06:59 AM    CO2 25 01/11/2023 06:59 AM    BUN 8 01/11/2023 06:59 AM    CREATININE 0.90 01/11/2023 06:59 AM    GLUCOSE 97 01/11/2023 06:59 AM    CALCIUM 8.9 01/11/2023 06:59 AM    PHOS 3.0 10/31/2022 05:35 AM    MG 2.1 01/04/2023 05:47 AM      Remarkable for: 1/12 labs still pending, previous labs only remarkable for Na low end normal      Current Nutrition Therapies:  Diet NPO Exceptions are: Sips of Water with Meds    Current Intake:   Average Meal Intake: NPO        Anthropometric Measures:  Height: 5' 9\" (175.3 cm)  Current Body Wt: 170 lb 6.7 oz (77.3 kg) (1/9), Weight source: Bed Scale  BMI: 25.2, Overweight (BMI 25.0-29. 9)  Admission Body Weight: 177 lb 14.6 oz (80.7 kg) (1/8 stated)  Ideal Body Weight (Kg) (Calculated): 73 kg (160 lbs), 106.5 %  Usual Body Wt: 185 lb (83.9 kg) (per pt and review of EMR), Percent weight change: -7.9       Edema:Peripheral Vascular  Peripheral Vascular (WDL): Within Defined Limits   BMI Category Overweight (BMI 25.0-29. 9)  Estimated Daily Nutrient Needs:  Energy (kcal/day): 3856-0515 (Kcal/kg (25-30) Weight used: 77.3 kg Current  Protein (g/day): 77-93 (1-1.2 g/kg) Weight Used: (Current) 77.3 kg  Fluid (ml/day):   (1 ml/kcal)    Nutrition Diagnosis:   Inadequate oral intake related to altered GI function as evidenced by  (recurrent pancreatitis, NPO, NJT for post pyloric feeds)  Nutrition Interventions:   Food and/or Nutrient Delivery: Continue NPO, Start Tube Feeding     Coordination of Nutrition Care: Continue to monitor while inpatient       Goals:   Previous Goal Met: Goal(s) Achieved  Active Goal: Tolerate nutrition support at goal rate, within 2 days       Nutrition Monitoring and Evaluation:      Food/Nutrient Intake Outcomes: Enteral Nutrition Intake/Tolerance  Physical Signs/Symptoms Outcomes: Biochemical Data, GI Status, Weight    Discharge Planning:     Too soon to determine    Livier Leon

## 2023-01-12 NOTE — PROGRESS NOTES
Pt with c/o severe pain in abdomen throughout shift. Of note, patient does not appear to be in significant pain, playing on phone and ambulating to bathroom. Required pain medication almost every 1.5-2 hours. Pt found multiple times eating ice chips. Unsure if patient has eaten anything other than ice. Pt instructed on the need to remain NPO.

## 2023-01-12 NOTE — PROGRESS NOTES
Pt requesting to start gi soft diet. Orders for advance as tolerated. Lipase significantly increased. GI with orders for NPO with sips for meds. Pt instructed on new diet. Chex mix and beef jerky at bedside. Unsure if patient or wife consumed. Pt instructed again on importance of NPO status.

## 2023-01-12 NOTE — PROGRESS NOTES
Verbal Bedside shift report received from Marie Dillon RN. Patient in bed with IV infusing. No complaints verbalized.

## 2023-01-12 NOTE — CARE COORDINATION
LOS 4 D  CM followed up with patient with continued hospitalization. Patient has nasal jejunostomy feeding infusing. Patient reports his pain level is unchanged. CM remains available to assist with discharge needs.

## 2023-01-13 ENCOUNTER — APPOINTMENT (OUTPATIENT)
Dept: GENERAL RADIOLOGY | Age: 38
DRG: 439 | End: 2023-01-13
Attending: FAMILY MEDICINE

## 2023-01-13 LAB
ALBUMIN SERPL-MCNC: 2.8 G/DL (ref 3.5–5)
ALBUMIN/GLOB SERPL: 0.6 (ref 0.4–1.6)
ALP SERPL-CCNC: 78 U/L (ref 50–136)
ALT SERPL-CCNC: 22 U/L (ref 12–65)
ANION GAP SERPL CALC-SCNC: 10 MMOL/L (ref 2–11)
AST SERPL-CCNC: 16 U/L (ref 15–37)
BASOPHILS # BLD: 0 K/UL (ref 0–0.2)
BASOPHILS NFR BLD: 0 % (ref 0–2)
BILIRUB SERPL-MCNC: 0.5 MG/DL (ref 0.2–1.1)
BUN SERPL-MCNC: 9 MG/DL (ref 6–23)
CALCIUM SERPL-MCNC: 9.9 MG/DL (ref 8.3–10.4)
CHLORIDE SERPL-SCNC: 99 MMOL/L (ref 101–110)
CO2 SERPL-SCNC: 21 MMOL/L (ref 21–32)
CREAT SERPL-MCNC: 1 MG/DL (ref 0.8–1.5)
DIFFERENTIAL METHOD BLD: ABNORMAL
EOSINOPHIL # BLD: 0.1 K/UL (ref 0–0.8)
EOSINOPHIL NFR BLD: 1 % (ref 0.5–7.8)
ERYTHROCYTE [DISTWIDTH] IN BLOOD BY AUTOMATED COUNT: 16.7 % (ref 11.9–14.6)
GLOBULIN SER CALC-MCNC: 4.4 G/DL (ref 2.8–4.5)
GLUCOSE SERPL-MCNC: 85 MG/DL (ref 65–100)
HCT VFR BLD AUTO: 39.6 % (ref 41.1–50.3)
HGB BLD-MCNC: 12.8 G/DL (ref 13.6–17.2)
IMM GRANULOCYTES # BLD AUTO: 0 K/UL (ref 0–0.5)
IMM GRANULOCYTES NFR BLD AUTO: 0 % (ref 0–5)
LIPASE SERPL-CCNC: 1861 U/L (ref 73–393)
LYMPHOCYTES # BLD: 1.2 K/UL (ref 0.5–4.6)
LYMPHOCYTES NFR BLD: 10 % (ref 13–44)
MAGNESIUM SERPL-MCNC: 1.9 MG/DL (ref 1.8–2.4)
MCH RBC QN AUTO: 25.3 PG (ref 26.1–32.9)
MCHC RBC AUTO-ENTMCNC: 32.3 G/DL (ref 31.4–35)
MCV RBC AUTO: 78.4 FL (ref 82–102)
MONOCYTES # BLD: 1.2 K/UL (ref 0.1–1.3)
MONOCYTES NFR BLD: 10 % (ref 4–12)
NEUTS SEG # BLD: 9.4 K/UL (ref 1.7–8.2)
NEUTS SEG NFR BLD: 79 % (ref 43–78)
NRBC # BLD: 0 K/UL (ref 0–0.2)
PHOSPHATE SERPL-MCNC: 3 MG/DL (ref 2.5–4.5)
PLATELET # BLD AUTO: 500 K/UL (ref 150–450)
PMV BLD AUTO: 9 FL (ref 9.4–12.3)
POTASSIUM SERPL-SCNC: 4.4 MMOL/L (ref 3.5–5.1)
PROT SERPL-MCNC: 7.2 G/DL (ref 6.3–8.2)
RBC # BLD AUTO: 5.05 M/UL (ref 4.23–5.6)
SODIUM SERPL-SCNC: 130 MMOL/L (ref 133–143)
WBC # BLD AUTO: 11.9 K/UL (ref 4.3–11.1)

## 2023-01-13 PROCEDURE — 83735 ASSAY OF MAGNESIUM: CPT

## 2023-01-13 PROCEDURE — 85025 COMPLETE CBC W/AUTO DIFF WBC: CPT

## 2023-01-13 PROCEDURE — 6370000000 HC RX 637 (ALT 250 FOR IP): Performed by: FAMILY MEDICINE

## 2023-01-13 PROCEDURE — 1100000000 HC RM PRIVATE

## 2023-01-13 PROCEDURE — 36415 COLL VENOUS BLD VENIPUNCTURE: CPT

## 2023-01-13 PROCEDURE — 6360000002 HC RX W HCPCS: Performed by: NURSE PRACTITIONER

## 2023-01-13 PROCEDURE — 6360000002 HC RX W HCPCS: Performed by: FAMILY MEDICINE

## 2023-01-13 PROCEDURE — 6370000000 HC RX 637 (ALT 250 FOR IP): Performed by: INTERNAL MEDICINE

## 2023-01-13 PROCEDURE — C9113 INJ PANTOPRAZOLE SODIUM, VIA: HCPCS | Performed by: PHYSICIAN ASSISTANT

## 2023-01-13 PROCEDURE — 80053 COMPREHEN METABOLIC PANEL: CPT

## 2023-01-13 PROCEDURE — 83690 ASSAY OF LIPASE: CPT

## 2023-01-13 PROCEDURE — A4216 STERILE WATER/SALINE, 10 ML: HCPCS | Performed by: PHYSICIAN ASSISTANT

## 2023-01-13 PROCEDURE — 2580000003 HC RX 258: Performed by: NURSE PRACTITIONER

## 2023-01-13 PROCEDURE — 6370000000 HC RX 637 (ALT 250 FOR IP): Performed by: PHYSICIAN ASSISTANT

## 2023-01-13 PROCEDURE — 6360000002 HC RX W HCPCS: Performed by: HOSPITALIST

## 2023-01-13 PROCEDURE — 84100 ASSAY OF PHOSPHORUS: CPT

## 2023-01-13 PROCEDURE — 6360000002 HC RX W HCPCS: Performed by: PHYSICIAN ASSISTANT

## 2023-01-13 PROCEDURE — 2580000003 HC RX 258: Performed by: PHYSICIAN ASSISTANT

## 2023-01-13 PROCEDURE — 71045 X-RAY EXAM CHEST 1 VIEW: CPT

## 2023-01-13 PROCEDURE — 2580000003 HC RX 258: Performed by: FAMILY MEDICINE

## 2023-01-13 RX ORDER — HYDRALAZINE HYDROCHLORIDE 20 MG/ML
10 INJECTION INTRAMUSCULAR; INTRAVENOUS EVERY 6 HOURS PRN
Status: DISCONTINUED | OUTPATIENT
Start: 2023-01-13 | End: 2023-01-13

## 2023-01-13 RX ORDER — POLYETHYLENE GLYCOL 3350 17 G/17G
17 POWDER, FOR SOLUTION ORAL DAILY
Status: DISCONTINUED | OUTPATIENT
Start: 2023-01-13 | End: 2023-01-14

## 2023-01-13 RX ADMIN — ASPIRIN 81 MG 81 MG: 81 TABLET ORAL at 07:54

## 2023-01-13 RX ADMIN — SODIUM CHLORIDE, PRESERVATIVE FREE 40 MG: 5 INJECTION INTRAVENOUS at 20:55

## 2023-01-13 RX ADMIN — HYDROMORPHONE HYDROCHLORIDE 1.5 MG: 1 INJECTION, SOLUTION INTRAMUSCULAR; INTRAVENOUS; SUBCUTANEOUS at 14:56

## 2023-01-13 RX ADMIN — OXYCODONE HYDROCHLORIDE 10 MG: 100 SOLUTION ORAL at 16:51

## 2023-01-13 RX ADMIN — SODIUM CHLORIDE, SODIUM LACTATE, POTASSIUM CHLORIDE, AND CALCIUM CHLORIDE: 600; 310; 30; 20 INJECTION, SOLUTION INTRAVENOUS at 10:45

## 2023-01-13 RX ADMIN — HYDROMORPHONE HYDROCHLORIDE 1.5 MG: 1 INJECTION, SOLUTION INTRAMUSCULAR; INTRAVENOUS; SUBCUTANEOUS at 21:20

## 2023-01-13 RX ADMIN — ATORVASTATIN CALCIUM 40 MG: 40 TABLET, FILM COATED ORAL at 20:54

## 2023-01-13 RX ADMIN — HYDROMORPHONE HYDROCHLORIDE 0.5 MG: 1 INJECTION, SOLUTION INTRAMUSCULAR; INTRAVENOUS; SUBCUTANEOUS at 11:58

## 2023-01-13 RX ADMIN — HYDROMORPHONE HYDROCHLORIDE 1 MG: 1 INJECTION, SOLUTION INTRAMUSCULAR; INTRAVENOUS; SUBCUTANEOUS at 00:15

## 2023-01-13 RX ADMIN — DICYCLOMINE HYDROCHLORIDE 10 MG: 10 CAPSULE ORAL at 16:51

## 2023-01-13 RX ADMIN — SODIUM CHLORIDE, SODIUM LACTATE, POTASSIUM CHLORIDE, AND CALCIUM CHLORIDE: 600; 310; 30; 20 INJECTION, SOLUTION INTRAVENOUS at 00:18

## 2023-01-13 RX ADMIN — SODIUM CHLORIDE, SODIUM LACTATE, POTASSIUM CHLORIDE, AND CALCIUM CHLORIDE: 600; 310; 30; 20 INJECTION, SOLUTION INTRAVENOUS at 20:57

## 2023-01-13 RX ADMIN — BUPROPION HYDROCHLORIDE 100 MG: 100 TABLET, FILM COATED, EXTENDED RELEASE ORAL at 20:54

## 2023-01-13 RX ADMIN — OXYCODONE HYDROCHLORIDE 10 MG: 100 SOLUTION ORAL at 13:37

## 2023-01-13 RX ADMIN — HYDROMORPHONE HYDROCHLORIDE 1.5 MG: 1 INJECTION, SOLUTION INTRAMUSCULAR; INTRAVENOUS; SUBCUTANEOUS at 17:54

## 2023-01-13 RX ADMIN — HYOSCYAMINE SULFATE 125 MCG: 0.12 TABLET ORAL; SUBLINGUAL at 11:28

## 2023-01-13 RX ADMIN — OXYCODONE HYDROCHLORIDE 10 MG: 100 SOLUTION ORAL at 22:42

## 2023-01-13 RX ADMIN — HYDROMORPHONE HYDROCHLORIDE 2 MG: 1 INJECTION, SOLUTION INTRAMUSCULAR; INTRAVENOUS; SUBCUTANEOUS at 04:19

## 2023-01-13 RX ADMIN — ENOXAPARIN SODIUM 40 MG: 100 INJECTION SUBCUTANEOUS at 07:54

## 2023-01-13 RX ADMIN — HYDROMORPHONE HYDROCHLORIDE 1 MG: 1 INJECTION, SOLUTION INTRAMUSCULAR; INTRAVENOUS; SUBCUTANEOUS at 03:19

## 2023-01-13 RX ADMIN — Medication 100 MG: at 07:53

## 2023-01-13 RX ADMIN — DICYCLOMINE HYDROCHLORIDE 10 MG: 10 CAPSULE ORAL at 05:21

## 2023-01-13 RX ADMIN — HYOSCYAMINE SULFATE 125 MCG: 0.12 TABLET ORAL at 20:54

## 2023-01-13 RX ADMIN — TICAGRELOR 90 MG: 90 TABLET ORAL at 20:54

## 2023-01-13 RX ADMIN — HYDROMORPHONE HYDROCHLORIDE 1 MG: 1 INJECTION, SOLUTION INTRAMUSCULAR; INTRAVENOUS; SUBCUTANEOUS at 07:43

## 2023-01-13 RX ADMIN — TICAGRELOR 90 MG: 90 TABLET ORAL at 07:53

## 2023-01-13 RX ADMIN — BUPROPION HYDROCHLORIDE 100 MG: 100 TABLET, FILM COATED, EXTENDED RELEASE ORAL at 07:54

## 2023-01-13 RX ADMIN — SODIUM CHLORIDE, PRESERVATIVE FREE 5 ML: 5 INJECTION INTRAVENOUS at 07:54

## 2023-01-13 RX ADMIN — OXYCODONE HYDROCHLORIDE 10 MG: 100 SOLUTION ORAL at 08:55

## 2023-01-13 RX ADMIN — SODIUM CHLORIDE, PRESERVATIVE FREE 40 MG: 5 INJECTION INTRAVENOUS at 07:44

## 2023-01-13 RX ADMIN — POLYETHYLENE GLYCOL 3350 17 G: 17 POWDER, FOR SOLUTION ORAL at 13:37

## 2023-01-13 RX ADMIN — DICYCLOMINE HYDROCHLORIDE 10 MG: 10 CAPSULE ORAL at 10:43

## 2023-01-13 RX ADMIN — HYDROMORPHONE HYDROCHLORIDE 1 MG: 1 INJECTION, SOLUTION INTRAMUSCULAR; INTRAVENOUS; SUBCUTANEOUS at 10:43

## 2023-01-13 ASSESSMENT — PAIN SCALES - GENERAL
PAINLEVEL_OUTOF10: 9
PAINLEVEL_OUTOF10: 9
PAINLEVEL_OUTOF10: 8
PAINLEVEL_OUTOF10: 8
PAINLEVEL_OUTOF10: 10
PAINLEVEL_OUTOF10: 6
PAINLEVEL_OUTOF10: 9
PAINLEVEL_OUTOF10: 8
PAINLEVEL_OUTOF10: 7
PAINLEVEL_OUTOF10: 7
PAINLEVEL_OUTOF10: 9
PAINLEVEL_OUTOF10: 7
PAINLEVEL_OUTOF10: 10
PAINLEVEL_OUTOF10: 10
PAINLEVEL_OUTOF10: 6
PAINLEVEL_OUTOF10: 9
PAINLEVEL_OUTOF10: 5
PAINLEVEL_OUTOF10: 7
PAINLEVEL_OUTOF10: 9
PAINLEVEL_OUTOF10: 6
PAINLEVEL_OUTOF10: 8
PAINLEVEL_OUTOF10: 10
PAINLEVEL_OUTOF10: 7
PAINLEVEL_OUTOF10: 9

## 2023-01-13 ASSESSMENT — PAIN DESCRIPTION - DESCRIPTORS
DESCRIPTORS: ACHING;DISCOMFORT
DESCRIPTORS: CRAMPING;ACHING
DESCRIPTORS: ACHING;CRAMPING
DESCRIPTORS: ACHING;CRAMPING
DESCRIPTORS: ACHING;DISCOMFORT

## 2023-01-13 ASSESSMENT — PAIN DESCRIPTION - LOCATION
LOCATION: ABDOMEN
LOCATION: ABDOMEN;BACK
LOCATION: ABDOMEN
LOCATION: ABDOMEN;BACK
LOCATION: ABDOMEN
LOCATION: ABDOMEN;BACK

## 2023-01-13 ASSESSMENT — PAIN DESCRIPTION - ORIENTATION
ORIENTATION: ANTERIOR;POSTERIOR
ORIENTATION: LEFT;UPPER
ORIENTATION: ANTERIOR;POSTERIOR
ORIENTATION: UPPER
ORIENTATION: ANTERIOR;POSTERIOR

## 2023-01-13 NOTE — PROGRESS NOTES
Hospitalist Progress Note   Admit Date:  2023 10:11 PM   Name:  Mingo Tejada   Age:  37 y.o.  Sex:  male  :  1985   MRN:  [id number]   Room:  [facility]/    Presenting Complaint: No chief complaint on file.     Reason(s) for Admission: Recurrent pancreatitis [K85.90]     Hospital Course:   Mingo Tejada is a 37 y.o. male with a PMH of EtOH pancreatitis complicated with pseudocyst s/p stent placement  removed  and STEMI s/p PCI with TULIO who presented to [place] with a complaint of recurrent epigastric pain with associated N/V. Has had multiple admission for pancreatitis and recently discharged after treatment for pancreatitis on 23      \"1. Necrotizing pancreatitis, with associated acute necrotic collection measuring   up to 2.5 x 4.3 x 3.2 cm. The necrotic collection appears more defined when   compared to 2023 study.       2. Mass effect and narrowing of the splenic vein near the confluence with the   SMV.       3. Circumferential wall thickening of the distal stomach, adjacent to the   pancreatic abnormality. Findings likely reactive gastritis.       4. Large stool volume in the colon. No evidence of colitis, diverticulitis or   bowel obstruction.\"      While here EUS showing persistent necrosis and increasing lipase. Did not tolerate advancement of diet therefore NJ placed, patient NPO, not tolerating TF so now held. Of note, patients lisinopril stopped during this admission given increased risk of pancreatitis.      Subjective & 24hr Events (23):   The patient c/o abd pain and asks for more pain medication.  TF on hold.  He reports no bowel movement x 4 days.  Follow up on GI recommendations.    Assessment & Plan:     Principal Problem:  Necrotizing pancreatitis  Pancreatic pseudocyst  - NJ advanced yesterday with IR; TF on hold now due to severe pain per patient  - continue IVF  - PRN analgesia  - PPI BID IV  - may require J-tube placement pending course    Microcytic anemia  - likely dilutional, continue to monitor  - ferritin ok, iron low;  B12, folate wnl     History of ST elevation myocardial infarction (STEMI)  CAD involving native coronary artery of native heart  - will need ASA, brilinta, and statin     Primary hypertension  - Monitor     Depression  - continue wellbutrin      Anticipated discharge needs: Pending      Diet:  Diet NPO Exceptions are: Sips of Water with Meds  ADULT TUBE FEEDING; Nasojejunal; Peptide Based; Continuous; 10; Yes; 10; Q 8 hours; 70; 80; Q 3 hours  DVT PPx: enoxaparin  Code status: Full Code    Hospital Problems:  Principal Problem:    Necrotizing pancreatitis  Active Problems:    Pancreatic pseudocyst    History of ST elevation myocardial infarction (STEMI)    Coronary artery disease involving native coronary artery of native heart    Acute on chronic pancreatitis ([address]    Primary hypertension  Resolved Problems:    * No resolved hospital problems. *      Objective:   Patient Vitals for the past 24 hrs:   Temp Pulse Resp BP SpO2   01/13/23 1200 97.6 °F (36.4 °C) 84 16 112/75 100 %   01/13/23 0851 97.7 °F (36.5 °C) 88 18 105/71 99 %   01/13/23 0449 -- -- 18 -- --   01/13/23 0349 -- -- 18 -- --   01/13/23 0319 97.5 °F (36.4 °C) 80 18 125/74 100 %   01/13/23 0045 -- -- 18 -- --   01/13/23 0015 -- -- 18 -- --   01/12/23 2332 -- -- 18 -- --   01/12/23 2302 -- -- 18 -- --   01/12/23 2147 -- -- 16 -- --   01/12/23 2117 -- -- 16 -- --   01/12/23 1932 98.8 °F (37.1 °C) 72 18 111/67 --   01/12/23 1812 -- -- 18 -- --   01/12/23 1618 97.5 °F (36.4 °C) 66 17 107/62 100 %       Oxygen Therapy  SpO2: 100 %  Pulse Oximetry Type:  Intermittent  Pulse via Oximetry: 54 beats per minute  Pulse Oximeter Device Mode: Continuous  Pulse Oximeter Device Location: Finger  O2 Device: None (Room air)  Skin Assessment: Clean, dry, & intact  O2 Flow Rate (L/min): 3 L/min  Oxygen Therapy: None (Room air)    Estimated body mass index is 25.1 kg/m² as calculated from the following:    Height as of this encounter: 5' 9\" (1.753 m). Weight as of this encounter: 170 lb (77.1 kg). Intake/Output Summary (Last 24 hours) at 1/13/2023 1345  Last data filed at 1/13/2023 1128  Gross per 24 hour   Intake 4343 ml   Output --   Net 4343 ml         Physical Exam:   Blood pressure 112/75, pulse 84, temperature 97.6 °F (36.4 °C), temperature source Oral, resp. rate 16, height 5' 9\" (1.753 m), weight 170 lb (77.1 kg), SpO2 100 %. General:    No acute distress  Head:  Normocephalic, atraumatic  Eyes:  Sclerae appear normal.  Pupils equally round. ENT:  Nares appear normal.  Moist oral mucosa  Neck:  No restricted ROM. Trachea midline   CV:   RRR. No m/r/g. Lungs: No wheezing. Unlabored on room air. Abdomen:   Soft, nondistended. Extremities: No cyanosis or clubbing. Skin:     No rashes and normal coloration. Warm and dry. Neuro:  CN II-XII grossly intact. Psych:  Normal mood and affect.       I have personally reviewed labs and tests showing:  Recent Labs:  Recent Results (from the past 48 hour(s))   CBC with Auto Differential    Collection Time: 01/12/23  7:18 AM   Result Value Ref Range    WBC 6.4 4.3 - 11.1 K/uL    RBC 3.99 (L) 4.23 - 5.6 M/uL    Hemoglobin 10.2 (L) 13.6 - 17.2 g/dL    Hematocrit 31.6 (L) 41.1 - 50.3 %    MCV 79.2 (L) 82 - 102 FL    MCH 25.6 (L) 26.1 - 32.9 PG    MCHC 32.3 31.4 - 35.0 g/dL    RDW 16.8 (H) 11.9 - 14.6 %    Platelets 334 (H) 347 - 450 K/uL    MPV 9.7 9.4 - 12.3 FL    nRBC 0.00 0.0 - 0.2 K/uL    Differential Type AUTOMATED      Seg Neutrophils 59 43 - 78 %    Lymphocytes 26 13 - 44 %    Monocytes 11 4.0 - 12.0 %    Eosinophils % 3 0.5 - 7.8 %    Basophils 1 0.0 - 2.0 %    Immature Granulocytes 0 0.0 - 5.0 %    Segs Absolute 3.8 1.7 - 8.2 K/UL    Absolute Lymph # 1.7 0.5 - 4.6 K/UL    Absolute Mono # 0.7 0.1 - 1.3 K/UL    Absolute Eos # 0.2 0.0 - 0.8 K/UL    Basophils Absolute 0.1 0.0 - 0.2 K/UL    Absolute Immature Granulocyte 0.0 0.0 - 0.5 K/UL   Comprehensive Metabolic Panel w/ Reflex to MG    Collection Time: 01/12/23  7:18 AM   Result Value Ref Range    Sodium 135 133 - 143 mmol/L    Potassium 4.2 3.5 - 5.1 mmol/L    Chloride 102 101 - 110 mmol/L    CO2 24 21 - 32 mmol/L    Anion Gap 9 2 - 11 mmol/L    Glucose 79 65 - 100 mg/dL    BUN 9 6 - 23 MG/DL    Creatinine 0.90 0.8 - 1.5 MG/DL    Est, Glom Filt Rate >60 >60 ml/min/1.73m2    Calcium 8.6 8.3 - 10.4 MG/DL    Total Bilirubin 0.5 0.2 - 1.1 MG/DL    ALT 22 12 - 65 U/L    AST 18 15 - 37 U/L    Alk Phosphatase 69 50 - 136 U/L    Total Protein 6.6 6.3 - 8.2 g/dL    Albumin 2.5 (L) 3.5 - 5.0 g/dL    Globulin 4.1 2.8 - 4.5 g/dL    Albumin/Globulin Ratio 0.6 0.4 - 1.6     Lipase    Collection Time: 01/12/23  7:18 AM   Result Value Ref Range    Lipase 2,205 (H) 73 - 393 U/L   Magnesium    Collection Time: 01/12/23  7:18 AM   Result Value Ref Range    Magnesium 2.1 1.8 - 2.4 mg/dL   Phosphorus    Collection Time: 01/12/23  7:18 AM   Result Value Ref Range    Phosphorus 3.7 2.5 - 4.5 MG/DL   Lipase    Collection Time: 01/12/23  7:18 AM   Result Value Ref Range    Lipase 1,998 (H) 73 - 393 U/L   Vitamin B12    Collection Time: 01/12/23  7:18 AM   Result Value Ref Range    Vitamin B-12 802 193 - 986 pg/mL   Ferritin    Collection Time: 01/12/23  7:18 AM   Result Value Ref Range    Ferritin 323 8 - 388 NG/ML   Folate    Collection Time: 01/12/23  7:18 AM   Result Value Ref Range    Folate 7.4 3.1 - 17.5 ng/mL   Iron    Collection Time: 01/12/23  7:18 AM   Result Value Ref Range    Iron 13 (L) 35 - 150 ug/dL   CBC with Auto Differential    Collection Time: 01/13/23 11:01 AM   Result Value Ref Range    WBC 11.9 (H) 4.3 - 11.1 K/uL    RBC 5.05 4.23 - 5.6 M/uL    Hemoglobin 12.8 (L) 13.6 - 17.2 g/dL    Hematocrit 39.6 (L) 41.1 - 50.3 %    MCV 78.4 (L) 82 - 102 FL    MCH 25.3 (L) 26.1 - 32.9 PG    MCHC 32.3 31.4 - 35.0 g/dL    RDW 16.7 (H) 11.9 - 14.6 %    Platelets 598 (H) 884 - 450 K/uL    MPV 9.0 (L) 9.4 - 12.3 FL    nRBC 0.00 0.0 - 0.2 K/uL    Differential Type AUTOMATED      Seg Neutrophils 79 (H) 43 - 78 %    Lymphocytes 10 (L) 13 - 44 %    Monocytes 10 4.0 - 12.0 %    Eosinophils % 1 0.5 - 7.8 %    Basophils 0 0.0 - 2.0 %    Immature Granulocytes 0 0.0 - 5.0 %    Segs Absolute 9.4 (H) 1.7 - 8.2 K/UL    Absolute Lymph # 1.2 0.5 - 4.6 K/UL    Absolute Mono # 1.2 0.1 - 1.3 K/UL    Absolute Eos # 0.1 0.0 - 0.8 K/UL    Basophils Absolute 0.0 0.0 - 0.2 K/UL    Absolute Immature Granulocyte 0.0 0.0 - 0.5 K/UL   Comprehensive Metabolic Panel w/ Reflex to MG    Collection Time: 01/13/23 11:01 AM   Result Value Ref Range    Sodium 130 (L) 133 - 143 mmol/L    Potassium 4.4 3.5 - 5.1 mmol/L    Chloride 99 (L) 101 - 110 mmol/L    CO2 21 21 - 32 mmol/L    Anion Gap 10 2 - 11 mmol/L    Glucose 85 65 - 100 mg/dL    BUN 9 6 - 23 MG/DL    Creatinine 1.00 0.8 - 1.5 MG/DL    Est, Glom Filt Rate >60 >60 ml/min/1.73m2    Calcium 9.9 8.3 - 10.4 MG/DL    Total Bilirubin 0.5 0.2 - 1.1 MG/DL    ALT 22 12 - 65 U/L    AST 16 15 - 37 U/L    Alk Phosphatase 78 50 - 136 U/L    Total Protein 7.2 6.3 - 8.2 g/dL    Albumin 2.8 (L) 3.5 - 5.0 g/dL    Globulin 4.4 2.8 - 4.5 g/dL    Albumin/Globulin Ratio 0.6 0.4 - 1.6     Lipase    Collection Time: 01/13/23 11:01 AM   Result Value Ref Range    Lipase 1,861 (H) 73 - 393 U/L   Magnesium    Collection Time: 01/13/23 11:01 AM   Result Value Ref Range    Magnesium 1.9 1.8 - 2.4 mg/dL   Phosphorus    Collection Time: 01/13/23 11:01 AM   Result Value Ref Range    Phosphorus 3.0 2.5 - 4.5 MG/DL       I have personally reviewed imaging studies showing: Other Studies:  XR CHEST PORTABLE   Final Result      1. No evidence of pneumonia or pulmonary edema. FL INTRO LONG GI TUBE   Final Result   Fluoroscopic guided manipulation of feeding tube from the stomach to   the jejunum. XR ABDOMEN (KUB) (SINGLE AP VIEW)   Final Result   Feeding tube tip is projected over the upper body of stomach.  Length is adequate to reach the ligament of Treitz. CT ABDOMEN PELVIS W IV CONTRAST Additional Contrast? None   Final Result      1. Necrotizing pancreatitis, with associated acute necrotic collection measuring   up to 2.5 x 4.3 x 3.2 cm. The necrotic collection appears more defined when   compared to January 1, 2023 study. 2. Mass effect and narrowing of the splenic vein near the confluence with the   SMV. 3. Circumferential wall thickening of the distal stomach, adjacent to the   pancreatic abnormality. Findings likely reactive gastritis. 4. Large stool volume in the colon. No evidence of colitis, diverticulitis or   bowel obstruction.           Current Meds:  Current Facility-Administered Medications   Medication Dose Route Frequency    HYDROmorphone (DILAUDID) injection 1.5 mg  1.5 mg IntraVENous Q3H PRN    phenol 1.4 % mouth spray 1 spray  1 spray Mouth/Throat Q2H PRN    pantoprazole (PROTONIX) 40 mg in sodium chloride (PF) 0.9 % 10 mL injection  40 mg IntraVENous BID    metoprolol (LOPRESSOR) injection 2.5 mg  2.5 mg IntraVENous Q6H PRN    potassium chloride (KLOR-CON M) extended release tablet 40 mEq  40 mEq Oral PRN    Or    potassium bicarb-citric acid (EFFER-K) effervescent tablet 40 mEq  40 mEq Oral PRN    Or    potassium chloride 10 mEq/100 mL IVPB (Peripheral Line)  10 mEq IntraVENous PRN    magnesium sulfate 2000 mg in 50 mL IVPB premix  2,000 mg IntraVENous PRN    sodium phosphate 10 mmol in sodium chloride 0.9 % 250 mL IVPB  10 mmol IntraVENous PRN    Or    sodium phosphate 15 mmol in sodium chloride 0.9 % 250 mL IVPB  15 mmol IntraVENous PRN    Or    sodium phosphate 20 mmol in sodium chloride 0.9 % 500 mL IVPB  20 mmol IntraVENous PRN    thiamine tablet 100 mg  100 mg Oral Daily    oxyCODONE (ROXICODONE INTENSOL) 100 MG/5ML concentrated solution 10 mg  10 mg Oral Q3H PRN    diatrizoate meglumine-sodium (GASTROGRAFIN) 66-10 % solution 30 mL  30 mL Per NG tube ONCE PRN polyethylene glycol (GLYCOLAX) packet 17 g  17 g Oral Daily    dicyclomine (BENTYL) capsule 10 mg  10 mg Oral TID AC    aluminum & magnesium hydroxide-simethicone (MAALOX) 200-200-20 MG/5ML suspension 30 mL  30 mL Oral Q6H PRN    atorvastatin (LIPITOR) tablet 40 mg  40 mg Oral Nightly    buPROPion (WELLBUTRIN SR) extended release tablet 100 mg  100 mg Oral BID    hyoscyamine (LEVSIN/SL) sublingual tablet 125 mcg  125 mcg SubLINGual Q4H PRN    [Held by provider] metoprolol tartrate (LOPRESSOR) tablet 25 mg  25 mg Oral BID    [Held by provider] pantoprazole (PROTONIX) tablet 40 mg  40 mg Oral BID    simethicone (MYLICON) chewable tablet 80 mg  80 mg Oral Q6H PRN    ticagrelor (BRILINTA) tablet 90 mg  90 mg Oral BID    sodium chloride flush 0.9 % injection 5-40 mL  5-40 mL IntraVENous 2 times per day    sodium chloride flush 0.9 % injection 5-40 mL  5-40 mL IntraVENous PRN    0.9 % sodium chloride infusion   IntraVENous PRN    enoxaparin (LOVENOX) injection 40 mg  40 mg SubCUTAneous Daily    ondansetron (ZOFRAN-ODT) disintegrating tablet 4 mg  4 mg Oral Q8H PRN    Or    ondansetron (ZOFRAN) injection 4 mg  4 mg IntraVENous Q6H PRN    polyethylene glycol (GLYCOLAX) packet 17 g  17 g Oral Daily PRN    acetaminophen (TYLENOL) tablet 650 mg  650 mg Oral Q6H PRN    Or    acetaminophen (TYLENOL) suppository 650 mg  650 mg Rectal Q6H PRN    aspirin chewable tablet 81 mg  81 mg Oral Daily    [Held by provider] oxyCODONE (ROXICODONE) immediate release tablet 5 mg  5 mg Oral Q4H PRN    lactated ringers infusion   IntraVENous Continuous       Signed:  Tj Wood APRN - CNP    Part of this note may have been written by using a voice dictation software. The note has been proof read but may still contain some grammatical/other typographical errors.

## 2023-01-13 NOTE — PROGRESS NOTES
[facility] Progress Note         Admit Date:  1/8/2023    Today's Date:  1/13/2023    CC:  Recurrent pancreatitis with known necrosis in pancreatic tail. Subjective:     Patient reports increased pain with tube feeds last night. Tube feeds were held for a few hours and resumed this AM. Pain is LUQ and radiating to umbilicus. States it ws 10/10 when first started but medications are helping. He denies N/V. States he still has not had a BM. Passing flatus.      Medications:   Current Facility-Administered Medications   Medication Dose Route Frequency    HYDROmorphone (DILAUDID) injection 1.5 mg  1.5 mg IntraVENous Q3H PRN    phenol 1.4 % mouth spray 1 spray  1 spray Mouth/Throat Q2H PRN    pantoprazole (PROTONIX) 40 mg in sodium chloride (PF) 0.9 % 10 mL injection  40 mg IntraVENous BID    potassium chloride (KLOR-CON M) extended release tablet 40 mEq  40 mEq Oral PRN    Or    potassium bicarb-citric acid (EFFER-K) effervescent tablet 40 mEq  40 mEq Oral PRN    Or    potassium chloride 10 mEq/100 mL IVPB (Peripheral Line)  10 mEq IntraVENous PRN    magnesium sulfate 2000 mg in 50 mL IVPB premix  2,000 mg IntraVENous PRN    sodium phosphate 10 mmol in sodium chloride 0.9 % 250 mL IVPB  10 mmol IntraVENous PRN    Or    sodium phosphate 15 mmol in sodium chloride 0.9 % 250 mL IVPB  15 mmol IntraVENous PRN    Or    sodium phosphate 20 mmol in sodium chloride 0.9 % 500 mL IVPB  20 mmol IntraVENous PRN    thiamine tablet 100 mg  100 mg Oral Daily    oxyCODONE (ROXICODONE INTENSOL) 100 MG/5ML concentrated solution 10 mg  10 mg Oral Q3H PRN    diatrizoate meglumine-sodium (GASTROGRAFIN) 66-10 % solution 30 mL  30 mL Per NG tube ONCE PRN    polyethylene glycol (GLYCOLAX) packet 17 g  17 g Oral Daily    dicyclomine (BENTYL) capsule 10 mg  10 mg Oral TID AC    aluminum & magnesium hydroxide-simethicone (MAALOX) 200-200-20 MG/5ML suspension 30 mL  30 mL Oral Q6H PRN    atorvastatin (LIPITOR) tablet 40 mg  40 mg Oral Nightly    buPROPion [facility] SR) extended release tablet 100 mg  100 mg Oral BID    hyoscyamine (LEVSIN/SL) sublingual tablet 125 mcg  125 mcg SubLINGual Q4H PRN    [Held by provider] metoprolol tartrate (LOPRESSOR) tablet 25 mg  25 mg Oral BID    [Held by provider] pantoprazole (PROTONIX) tablet 40 mg  40 mg Oral BID    simethicone (MYLICON) chewable tablet 80 mg  80 mg Oral Q6H PRN    ticagrelor (BRILINTA) tablet 90 mg  90 mg Oral BID    sodium chloride flush 0.9 % injection 5-40 mL  5-40 mL IntraVENous 2 times per day    sodium chloride flush 0.9 % injection 5-40 mL  5-40 mL IntraVENous PRN    0.9 % sodium chloride infusion   IntraVENous PRN    enoxaparin (LOVENOX) injection 40 mg  40 mg SubCUTAneous Daily    ondansetron (ZOFRAN-ODT) disintegrating tablet 4 mg  4 mg Oral Q8H PRN    Or    ondansetron (ZOFRAN) injection 4 mg  4 mg IntraVENous Q6H PRN    polyethylene glycol (GLYCOLAX) packet 17 g  17 g Oral Daily PRN    acetaminophen (TYLENOL) tablet 650 mg  650 mg Oral Q6H PRN    Or    acetaminophen (TYLENOL) suppository 650 mg  650 mg Rectal Q6H PRN    aspirin chewable tablet 81 mg  81 mg Oral Daily    [Held by provider] oxyCODONE (ROXICODONE) immediate release tablet 5 mg  5 mg Oral Q4H PRN    lactated ringers infusion   IntraVENous Continuous       Review of Systems:  ROS was obtained, with pertinent positives as listed above. No chest pain or SOB. Diet:  NPO, tube feeds    Objective:   Vitals:  /75   Pulse 84   Temp 97.6 °F (36.4 °C) (Oral)   Resp 16   Ht 5' 9\" (1.753 m)   Wt 170 lb (77.1 kg)   SpO2 100%   BMI 25.10 kg/m²   Intake/Output:  01/13 0701 - 01/13 1900  In: 4057 [I.V.:4343]  Out: -   01/11 1901 - 01/13 0700  In: 30   Out: -   Exam:  General appearance: alert, cooperative, no distress, lying on bed  Lungs: clear to auscultation bilaterally anteriorly  Heart: regular rate and rhythm  Abdomen: soft, TTP to epigastrium, LUQ, and left periumbilical region.  Bowel sounds normal. No masses, no organomegaly  Extremities: extremities normal, atraumatic, no cyanosis or edema  Neuro:  alert and oriented    Data Review (Labs):    Recent Labs     01/11/23  0659 01/12/23  0718 01/13/23  1101   WBC 6.5 6.4 11.9*   HGB 12.1* 10.2* 12.8*   HCT 37.4* 31.6* 39.6*   * 506* 500*   MCV 78.6* 79.2* 78.4*    135 130*   K 4.3 4.2 4.4    102 99*   CO2 25 24 21   BUN 8 9 9   CREATININE 0.90 0.90 1.00   CALCIUM 8.9 8.6 9.9   MG  --  2.1 1.9   PHOS  --  3.7 3.0   GLUCOSE 97 79 85   ALKPHOS 78 69 78   AST 18 18 16   ALT 27 22 22   BILITOT 0.2 0.5 0.5   LABALBU 2.6* 2.5* 2.8*   PROT 6.9 6.6 7.2   LIPASE 6,439* 1,998*  2,205* 1,861*       Latest Reference Range & Units 1/12/23 07:18   Ferritin 8 - 388 NG/   Iron 35 - 150 ug/dL 13 (L)   FOLATE, FOLAT 3.1 - 17.5 ng/mL 7.4   Vitamin B-12 193 - 986 pg/mL 802      CT abdomen and pelvis with contrast , 1/9/23       COMPARISON: January 1, 2023. INDICATION: Recurrent pancreatitis, worsening. Any evidence of necrosis, cyst.       TECHNIQUE: CT imaging was performed of the abdomen and pelvis following the   uncomplicated administration of intravenous contrast (Isovue 370, 100 mL). Oral   contrast was administered. Radiation dose reduction techniques were used for   this study:  Our CT scanners use one or all of the following: Automated exposure   control, adjustment of the mA and/or kVp according to patient's size, iterative   reconstruction. FINDINGS:       Visualized lung bases are unremarkable. Abdomen findings: There is peripancreatic inflammatory stranding. The pancreatic parenchyma is not   well defined. There is lobulated cystic mass involving the pancreatic body,   measuring up to 2.5 cm AP x 4.3 cm transverse x 3.2 cm craniocaudal. Similar   adjacent 15 mm cystic lesion is noted in the pancreatic body. There is mass   effect on the splenic vein, near the confluence with the SMV.        There is circumferential wall thickening of the distal stomach, as into the   pancreatic abnormality. Gallbladder is surgically absent. Liver and the spleen not the upper limits for   size. The adrenal glands, kidneys and abdominal aorta are unremarkable. No   evidence of lymphadenopathy. There is fluid within the esophagus, suggesting gastroesophageal reflux. Small   bowel loops are normal in caliber. No small bowel obstruction. Pelvic findings:       Urinary bladder is normal in contour. The colon is normal in course and caliber   with large stool volume. No evidence of appendicitis. There is no free air or free fluid. Surrounding bones are intact. Impression       1. Necrotizing pancreatitis, with associated acute necrotic collection measuring   up to 2.5 x 4.3 x 3.2 cm. The necrotic collection appears more defined when   compared to January 1, 2023 study. 2. Mass effect and narrowing of the splenic vein near the confluence with the   SMV. 3. Circumferential wall thickening of the distal stomach, adjacent to the   pancreatic abnormality. Findings likely reactive gastritis. 4. Large stool volume in the colon. No evidence of colitis, diverticulitis or   bowel obstruction. ENDOSCOPIC ULTRASOUND PROCEDURE NOTE, 1/11/23, Dr. Gris Terrell  PRE-OP DIAGNOSIS:  Recurrent acute pancreatitis  Epigastric pain  Abnormal CT  POST-OP DIAGNOSIS:  Pancreatic fluid collections- pseudocyst and WON  Gastritis  PROCEDURE:  After obtaining informed consent, the patient was placed in the left lateral position and sedated. The echoendoscope was advanced to the upper GI tract without difficulty. The scope was slowly removed while detailed images of the adjacent organs were obtained. The patient was taken to the recovery area in stable condition. FINDINGS:  ESOPHAGUS: Not well visualized  STOMACH: Limited views with retained fluid and bile present.   Moderate erythema and friability in the antrum. Superficial biopsies obtained to r/o H pylori infection. DUODENUM: Limited views with the oblique-viewing echoendoscope were unremarkable. The ampulla was Visualized tangentially and appears normal.   Thin clear bile draining. PANCREAS: The pancreas was well visualized from the head to the tail. The ampulla appears normal endosonographically. The common bile duct measures 9 mm upstream from the pancreas. There is compression of the bile duct in the head of the pancreas 1-2 mm. The main pancreatic duct in the body and tail measures 2-4 mm. It is irregular, tortuous, with dilated side branches. In the head of the pancreas pancreatic duct appears compressed by a large fluid collection 40 x 25 mm. This has a thin septation. There is no solid material present. The appearance suggests probable pseudocyst.  This remains much smaller than prior to cyst gastrostomy drainage. Another fluid collection in the body of the pancreas is 15 mm in diameter. This contains a small amount of mobile debris consistent with pancreatic necrosis. There is no discrete mass in the pancreas but the parenchyma is hypoechoic, heterogeneous, with extensive edema and loss of the normal tissue planes. These findings are typical and acute pancreatitis. They do decrease sensitivity of endoscopic ultrasound in detecting pancreatic neoplasia. BILIARY SYSTEM: The common bile duct was well visualized   As above, 9 mm proximal to the pancreatic head and 1-2 mm within the pancreas. No intraductal stones or abnormal wall thickening. The gallbladder was   Surgically absent . OTHER ORGANS: There was no ascites in the upper abdomen. Limited views of the left lobe of the liver demonstrated no solid mass lesions. The celiac axis appears normal.   Multiple small but prominent lymph nodes scattered around the head of the pancreas and portal vein. The largest of these nodes is 11 x 6 mm.   The nodes are oval, homogeneous, with well-defined margins. They are not pathologically enlarged and likely reflect benign reactive nodes. PLAN:    Continue NPO. Recommend nasal jejunal tube placement in IR. Patient had recurrent symptoms once he began advancing diet. Discontinue ACE inhibitor which has been associated with medication induced pancreatitis. Follow up pathology from gastric biopsies. Repeat labs in am      Assessment:     Principal Problem:    Necrotizing pancreatitis  Active Problems:    Pancreatic pseudocyst    History of ST elevation myocardial infarction (STEMI)    Coronary artery disease involving native coronary artery of native heart    Acute on chronic pancreatitis ([address]    Primary hypertension  Resolved Problems:    * No resolved hospital problems. *    42 y.o. 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 Anish Lagunas for recurrent pancreatitis with known necrosis in pancreatic tail after recent discharge on 1/5/23 for the same complaints. Patient was discharged and continued to have upper abdominal pain, nausea, and inability to tolerate PO intake. He denies recent EtOH use and quit smoking tobacco 3 weeks ago. Labs on admit significant for Lipase 837, total protein 8.7, RBC 5.8, MCV 76.7, Platelets 218. CT A/P with 1. Necrotizing pancreatitis, with associated acute necrotic collection measuring up to 2.5 x 4.3 x 3.2 cm. The necrotic collection appears more defined when compared to January 1, 2023 study. 2. Mass effect and narrowing of the splenic vein near the confluence with the SMV. 3. Circumferential wall thickening of the distal stomach, adjacent to the pancreatic abnormality. Findings likely reactive gastritis.  4. Large stool volume in the colon. No evidence of colitis, diverticulitis or bowel obstruction. Pancreatic fluid collections are greatly reduced in size. Patient was scheduled for OP follow up with Dr. Laisha Acharya on 1/10/23 to discuss referral to tertiary treatment center for management of chronic pancreatitis vs possible [place] procedure given chronically dilated pancreatic duct. 1/10/23: Increased heartburn/nausea due to pancreatic enzymes on empty stomach - improved with Maalox and PPI. Continues to have constipation. Pain remains the same. Hgb decreased to 11 from 14.7 yesterday. Likely dilutional.. No overt GI bleeding. 1/11/23: Lipase increased to 6,439 with persistent upper abdominal pain. Concerning for possible stone. On Miralax and states he feels that he could have a BM. Reported minimal relief with Bentyl. Pain 6/10 today. Has not eaten today but did have clear liquids this AM.     1/12/23: EUS yesterday with pancreatic fluid collections- pseudocyst and WON, Gastritis. [place] tube placed. Repeat labs pending. ACE inhibitor discontinued due to possible pancreatitis side effects. Pain is improving. No N/V. No BM's in 24 hrs but patient has had limited PO intake. +flatus. Labs with Lipase improving at 1998, Vit B12 802, Folate 7.4, Iron 13 L, Ferritin 323. No overt GIB. Hgb 10.2 down from 12.1 yesterday. 1/13/23: Increased LUQ pain overnight with feedings. CBC with elevated WBC at 11.9, Hgb stable at 12.8, Na 130. LFTs normal. Lipase slightly decreased at 1861. Still no BM. Is passing flatus. Denies N/V. Plan:     - Supportive care, anti-emetics PRN, pain control PRN, IV fluids  - NPO, Continue tube feeds  - Increased abd pain could be due to lack of BM's. Will start Miralax via [address] [facility] tube. - Continue to monitor CBC, CMP, lipase  - Continue PPI BID  - Continue EtOH and tobacco cessation.  - Pathology pending from gastric biopsies. Will follow. - May require J tube placement pending course.      Asuncion ROTH [facility]  [facility]      Patient is seen and examined in collaboration with Dr. Vinicio Soto. Assessment and plan as per Dr. Vinicio Soto.

## 2023-01-13 NOTE — PROGRESS NOTES
Feeding restarted at 0330 after MD ordered 4hr hold. MD notified for pt pain 10/10 with no relief after pain meds see MAR. MD notified and ordered 1x dose of Dilaudid 2mg see MAR and to hold feeding till day shift.

## 2023-01-13 NOTE — PROGRESS NOTES
Pt having increased pain stating it started before 1930 increase of tube feeding. MD notified with orders to hold feeding for 4 hours.  Pain meds given see MAR

## 2023-01-14 ENCOUNTER — APPOINTMENT (OUTPATIENT)
Dept: GENERAL RADIOLOGY | Age: 38
DRG: 439 | End: 2023-01-14
Attending: FAMILY MEDICINE

## 2023-01-14 LAB
BASOPHILS # BLD: 0 K/UL (ref 0–0.2)
BASOPHILS NFR BLD: 0 % (ref 0–2)
DIFFERENTIAL METHOD BLD: ABNORMAL
EOSINOPHIL # BLD: 0.1 K/UL (ref 0–0.8)
EOSINOPHIL NFR BLD: 1 % (ref 0.5–7.8)
ERYTHROCYTE [DISTWIDTH] IN BLOOD BY AUTOMATED COUNT: 16.4 % (ref 11.9–14.6)
HCT VFR BLD AUTO: 31.9 % (ref 41.1–50.3)
HGB BLD-MCNC: 10.4 G/DL (ref 13.6–17.2)
IMM GRANULOCYTES # BLD AUTO: 0 K/UL (ref 0–0.5)
IMM GRANULOCYTES NFR BLD AUTO: 0 % (ref 0–5)
LIPASE SERPL-CCNC: 1147 U/L (ref 73–393)
LYMPHOCYTES # BLD: 1.1 K/UL (ref 0.5–4.6)
LYMPHOCYTES NFR BLD: 15 % (ref 13–44)
MCH RBC QN AUTO: 25.2 PG (ref 26.1–32.9)
MCHC RBC AUTO-ENTMCNC: 32.6 G/DL (ref 31.4–35)
MCV RBC AUTO: 77.2 FL (ref 82–102)
MONOCYTES # BLD: 1.2 K/UL (ref 0.1–1.3)
MONOCYTES NFR BLD: 16 % (ref 4–12)
NEUTS SEG # BLD: 4.9 K/UL (ref 1.7–8.2)
NEUTS SEG NFR BLD: 68 % (ref 43–78)
NRBC # BLD: 0 K/UL (ref 0–0.2)
PLATELET # BLD AUTO: 373 K/UL (ref 150–450)
PMV BLD AUTO: 9.6 FL (ref 9.4–12.3)
RBC # BLD AUTO: 4.13 M/UL (ref 4.23–5.6)
WBC # BLD AUTO: 7.2 K/UL (ref 4.3–11.1)

## 2023-01-14 PROCEDURE — 1100000000 HC RM PRIVATE

## 2023-01-14 PROCEDURE — 2580000003 HC RX 258: Performed by: PHYSICIAN ASSISTANT

## 2023-01-14 PROCEDURE — 36415 COLL VENOUS BLD VENIPUNCTURE: CPT

## 2023-01-14 PROCEDURE — 6370000000 HC RX 637 (ALT 250 FOR IP): Performed by: PHYSICIAN ASSISTANT

## 2023-01-14 PROCEDURE — 83690 ASSAY OF LIPASE: CPT

## 2023-01-14 PROCEDURE — C9113 INJ PANTOPRAZOLE SODIUM, VIA: HCPCS | Performed by: PHYSICIAN ASSISTANT

## 2023-01-14 PROCEDURE — 6360000002 HC RX W HCPCS: Performed by: PHYSICIAN ASSISTANT

## 2023-01-14 PROCEDURE — 6370000000 HC RX 637 (ALT 250 FOR IP): Performed by: FAMILY MEDICINE

## 2023-01-14 PROCEDURE — 85025 COMPLETE CBC W/AUTO DIFF WBC: CPT

## 2023-01-14 PROCEDURE — 2580000003 HC RX 258: Performed by: NURSE PRACTITIONER

## 2023-01-14 PROCEDURE — 6360000002 HC RX W HCPCS: Performed by: FAMILY MEDICINE

## 2023-01-14 PROCEDURE — 6360000002 HC RX W HCPCS: Performed by: NURSE PRACTITIONER

## 2023-01-14 PROCEDURE — 6370000000 HC RX 637 (ALT 250 FOR IP): Performed by: INTERNAL MEDICINE

## 2023-01-14 PROCEDURE — A4216 STERILE WATER/SALINE, 10 ML: HCPCS | Performed by: PHYSICIAN ASSISTANT

## 2023-01-14 PROCEDURE — 74022 RADEX COMPL AQT ABD SERIES: CPT

## 2023-01-14 PROCEDURE — 2580000003 HC RX 258: Performed by: FAMILY MEDICINE

## 2023-01-14 RX ORDER — SODIUM PHOSPHATE, DIBASIC AND SODIUM PHOSPHATE, MONOBASIC 7; 19 G/133ML; G/133ML
1 ENEMA RECTAL ONCE
Status: COMPLETED | OUTPATIENT
Start: 2023-01-14 | End: 2023-01-14

## 2023-01-14 RX ORDER — POLYETHYLENE GLYCOL 3350 17 G/17G
17 POWDER, FOR SOLUTION ORAL 2 TIMES DAILY
Status: DISCONTINUED | OUTPATIENT
Start: 2023-01-14 | End: 2023-01-18 | Stop reason: HOSPADM

## 2023-01-14 RX ORDER — OXYCODONE HCL 5 MG/5 ML
10 SOLUTION, ORAL ORAL EVERY 4 HOURS PRN
Status: DISCONTINUED | OUTPATIENT
Start: 2023-01-14 | End: 2023-01-14

## 2023-01-14 RX ORDER — OXYCODONE HCL 5 MG/5 ML
10 SOLUTION, ORAL ORAL
Status: DISCONTINUED | OUTPATIENT
Start: 2023-01-14 | End: 2023-01-15 | Stop reason: SDUPTHER

## 2023-01-14 RX ADMIN — SODIUM CHLORIDE, PRESERVATIVE FREE 40 MG: 5 INJECTION INTRAVENOUS at 20:33

## 2023-01-14 RX ADMIN — OXYCODONE HYDROCHLORIDE 10 MG: 100 SOLUTION ORAL at 04:44

## 2023-01-14 RX ADMIN — HYDROMORPHONE HYDROCHLORIDE 1.5 MG: 1 INJECTION, SOLUTION INTRAMUSCULAR; INTRAVENOUS; SUBCUTANEOUS at 00:40

## 2023-01-14 RX ADMIN — BUPROPION HYDROCHLORIDE 100 MG: 100 TABLET, FILM COATED, EXTENDED RELEASE ORAL at 20:32

## 2023-01-14 RX ADMIN — METOPROLOL TARTRATE 25 MG: 25 TABLET, FILM COATED ORAL at 20:32

## 2023-01-14 RX ADMIN — HYDROMORPHONE HYDROCHLORIDE 1.5 MG: 1 INJECTION, SOLUTION INTRAMUSCULAR; INTRAVENOUS; SUBCUTANEOUS at 12:39

## 2023-01-14 RX ADMIN — POLYETHYLENE GLYCOL 3350 17 G: 17 POWDER, FOR SOLUTION ORAL at 20:32

## 2023-01-14 RX ADMIN — TICAGRELOR 90 MG: 90 TABLET ORAL at 20:32

## 2023-01-14 RX ADMIN — ENOXAPARIN SODIUM 40 MG: 100 INJECTION SUBCUTANEOUS at 08:27

## 2023-01-14 RX ADMIN — OXYCODONE HYDROCHLORIDE 10 MG: 100 SOLUTION ORAL at 08:27

## 2023-01-14 RX ADMIN — SODIUM CHLORIDE, SODIUM LACTATE, POTASSIUM CHLORIDE, AND CALCIUM CHLORIDE: 600; 310; 30; 20 INJECTION, SOLUTION INTRAVENOUS at 06:41

## 2023-01-14 RX ADMIN — HYDROMORPHONE HYDROCHLORIDE 1.5 MG: 1 INJECTION, SOLUTION INTRAMUSCULAR; INTRAVENOUS; SUBCUTANEOUS at 22:34

## 2023-01-14 RX ADMIN — HYDROMORPHONE HYDROCHLORIDE 1.5 MG: 1 INJECTION, SOLUTION INTRAMUSCULAR; INTRAVENOUS; SUBCUTANEOUS at 09:37

## 2023-01-14 RX ADMIN — HYOSCYAMINE SULFATE 125 MCG: 0.12 TABLET ORAL at 20:32

## 2023-01-14 RX ADMIN — HYOSCYAMINE SULFATE 125 MCG: 0.12 TABLET ORAL at 14:18

## 2023-01-14 RX ADMIN — HYDROMORPHONE HYDROCHLORIDE 1.5 MG: 1 INJECTION, SOLUTION INTRAMUSCULAR; INTRAVENOUS; SUBCUTANEOUS at 18:33

## 2023-01-14 RX ADMIN — TICAGRELOR 90 MG: 90 TABLET ORAL at 08:26

## 2023-01-14 RX ADMIN — OXYCODONE HYDROCHLORIDE 10 MG: 5 SOLUTION ORAL at 20:33

## 2023-01-14 RX ADMIN — SODIUM PHOSPHATE 1 ENEMA: 7; 19 ENEMA RECTAL at 17:03

## 2023-01-14 RX ADMIN — SODIUM CHLORIDE, PRESERVATIVE FREE 40 MG: 5 INJECTION INTRAVENOUS at 08:26

## 2023-01-14 RX ADMIN — OXYCODONE HYDROCHLORIDE 10 MG: 5 SOLUTION ORAL at 16:59

## 2023-01-14 RX ADMIN — POLYETHYLENE GLYCOL 3350 17 G: 17 POWDER, FOR SOLUTION ORAL at 08:27

## 2023-01-14 RX ADMIN — SODIUM CHLORIDE, PRESERVATIVE FREE 10 ML: 5 INJECTION INTRAVENOUS at 08:26

## 2023-01-14 RX ADMIN — SODIUM CHLORIDE, SODIUM LACTATE, POTASSIUM CHLORIDE, AND CALCIUM CHLORIDE: 600; 310; 30; 20 INJECTION, SOLUTION INTRAVENOUS at 22:40

## 2023-01-14 RX ADMIN — HYDROMORPHONE HYDROCHLORIDE 1.5 MG: 1 INJECTION, SOLUTION INTRAMUSCULAR; INTRAVENOUS; SUBCUTANEOUS at 03:26

## 2023-01-14 RX ADMIN — HYDROMORPHONE HYDROCHLORIDE 1.5 MG: 1 INJECTION, SOLUTION INTRAMUSCULAR; INTRAVENOUS; SUBCUTANEOUS at 15:40

## 2023-01-14 RX ADMIN — SODIUM CHLORIDE, PRESERVATIVE FREE 10 ML: 5 INJECTION INTRAVENOUS at 20:52

## 2023-01-14 RX ADMIN — ATORVASTATIN CALCIUM 40 MG: 40 TABLET, FILM COATED ORAL at 20:32

## 2023-01-14 RX ADMIN — HYDROMORPHONE HYDROCHLORIDE 1.5 MG: 1 INJECTION, SOLUTION INTRAMUSCULAR; INTRAVENOUS; SUBCUTANEOUS at 06:41

## 2023-01-14 RX ADMIN — ASPIRIN 81 MG 81 MG: 81 TABLET ORAL at 08:26

## 2023-01-14 RX ADMIN — BUPROPION HYDROCHLORIDE 100 MG: 100 TABLET, FILM COATED, EXTENDED RELEASE ORAL at 08:26

## 2023-01-14 RX ADMIN — HYOSCYAMINE SULFATE 125 MCG: 0.12 TABLET ORAL at 08:26

## 2023-01-14 RX ADMIN — Medication 100 MG: at 08:26

## 2023-01-14 ASSESSMENT — PAIN DESCRIPTION - ORIENTATION
ORIENTATION: ANTERIOR
ORIENTATION: ANTERIOR
ORIENTATION: MID
ORIENTATION: ANTERIOR
ORIENTATION: ANTERIOR
ORIENTATION: MID
ORIENTATION: MID
ORIENTATION: ANTERIOR

## 2023-01-14 ASSESSMENT — PAIN DESCRIPTION - DESCRIPTORS
DESCRIPTORS: ACHING;DISCOMFORT
DESCRIPTORS: ACHING
DESCRIPTORS: ACHING
DESCRIPTORS: CRAMPING;DISCOMFORT;THROBBING
DESCRIPTORS: ACHING;CRAMPING;DISCOMFORT
DESCRIPTORS: ACHING;DISCOMFORT
DESCRIPTORS: ACHING;CRAMPING
DESCRIPTORS: ACHING
DESCRIPTORS: ACHING;CRAMPING;DISCOMFORT
DESCRIPTORS: ACHING;CRAMPING
DESCRIPTORS: ACHING;DISCOMFORT
DESCRIPTORS: ACHING;CRAMPING
DESCRIPTORS: ACHING;DISCOMFORT

## 2023-01-14 ASSESSMENT — PAIN SCALES - GENERAL
PAINLEVEL_OUTOF10: 9
PAINLEVEL_OUTOF10: 10
PAINLEVEL_OUTOF10: 5
PAINLEVEL_OUTOF10: 8
PAINLEVEL_OUTOF10: 8
PAINLEVEL_OUTOF10: 6
PAINLEVEL_OUTOF10: 9
PAINLEVEL_OUTOF10: 8
PAINLEVEL_OUTOF10: 6
PAINLEVEL_OUTOF10: 8
PAINLEVEL_OUTOF10: 6
PAINLEVEL_OUTOF10: 9
PAINLEVEL_OUTOF10: 8

## 2023-01-14 ASSESSMENT — PAIN DESCRIPTION - LOCATION
LOCATION: ABDOMEN
LOCATION: ABDOMEN;THROAT
LOCATION: ABDOMEN

## 2023-01-14 NOTE — PROGRESS NOTES
Fleets enema and Roxicodone Intensol requested from Pharmacy twice by text and phone call. New orders received now to replace Roxicodone.

## 2023-01-14 NOTE — PROGRESS NOTES
Hospitalist Progress Note   Admit Date:  2023 10:11 PM   Name:  Florian Harris   Age:  44 y.o. Sex:  male  :  1985   MRN:  [id number]   Room:  [facility]/    Presenting Complaint: No chief complaint on file. Reason(s) for Admission: Recurrent pancreatitis [K85.90]     Hospital Course:   Florian Harris is a 44 y.o. male with a PMH of EtOH pancreatitis complicated with pseudocyst s/p stent placement  removed  and STEMI s/p PCI with TULIO who presented to [facility] with a complaint of recurrent epigastric pain with associated N/V. Has had multiple admission for pancreatitis and recently discharged after treatment for pancreatitis on 23      \"1. Necrotizing pancreatitis, with associated acute necrotic collection measuring   up to 2.5 x 4.3 x 3.2 cm. The necrotic collection appears more defined when   compared to 2023 study. 2. Mass effect and narrowing of the splenic vein near the confluence with the   SMV. 3. Circumferential wall thickening of the distal stomach, adjacent to the   pancreatic abnormality. Findings likely reactive gastritis. 4. Large stool volume in the colon. No evidence of colitis, diverticulitis or   bowel obstruction. \"      While here EUS showing persistent necrosis and increasing lipase. Did not tolerate advancement of diet therefore NJ placed, patient NPO, TF started. Of note, patients lisinopril stopped during this admission given increased risk of pancreatitis. Subjective & 24hr Events (23): The patient reports improvement this morning. TF resumed last night per GI and he is tolerating. He reports no bowel movement x 5 days, GI ordered MiraLAX. He may require an enema. Trend labs. GI recommendations appreciated.     Assessment & Plan:     Principal Problem:  Necrotizing pancreatitis  Pancreatic pseudocyst  - NJ tube in pace; TF resumed evening of  and patient tolerating  - continue IVF  - PRN analgesia  - PPI BID IV  - may require J-tube placement pending course  - lipase 6k on admission, now 1.1k on Jan/14     Microcytic anemia  - possiblly dilutional, continue to monitor  - ferritin ok, iron low;  B12, folate wnl     History of ST elevation myocardial infarction (STEMI)  CAD involving native coronary artery of native heart  - will need ASA, brilinta, and statin     Primary hypertension  - Monitor     Depression  - continue wellbutrin      Anticipated discharge needs: Pending      Diet:  Diet NPO Exceptions are: Sips of Water with Meds  ADULT TUBE FEEDING; Nasojejunal; Peptide Based; Continuous; 10; Yes; 10; Q 8 hours; 70; 80; Q 3 hours  DVT PPx: enoxaparin  Code status: Full Code    Hospital Problems:  Principal Problem:    Necrotizing pancreatitis  Active Problems:    Pancreatic pseudocyst    History of ST elevation myocardial infarction (STEMI)    Coronary artery disease involving native coronary artery of native heart    Acute on chronic pancreatitis ([facility] [address]    Primary hypertension  Resolved Problems:    * No resolved hospital problems. *      Objective:   Patient Vitals for the past 24 hrs:   Temp Pulse Resp BP SpO2   01/14/23 0937 -- -- 16 -- --   01/14/23 0827 -- -- 16 -- --   01/14/23 0729 97.7 °F (36.5 °C) 85 20 111/62 97 %   01/14/23 0447 97.6 °F (36.4 °C) 83 12 100/62 98 %   01/13/23 2032 98.2 °F (36.8 °C) 82 12 110/63 99 %   01/13/23 1913 98.3 °F (36.8 °C) 92 16 115/60 98 %   01/13/23 1527 97.9 °F (36.6 °C) 85 18 122/70 100 %   01/13/23 1200 97.6 °F (36.4 °C) 84 16 112/75 100 %         Oxygen Therapy  SpO2: 97 %  Pulse Oximetry Type:  Intermittent  Pulse via Oximetry: 54 beats per minute  Pulse Oximeter Device Mode: Continuous  Pulse Oximeter Device Location: Finger  O2 Device: None (Room air)  Skin Assessment: Clean, dry, & intact  O2 Flow Rate (L/min): 3 L/min  Oxygen Therapy: None (Room air)    Estimated body mass index is 25.1 kg/m² as calculated from the following:    Height as of this encounter: 5' 9\" (1.753 m). Weight as of this encounter: 170 lb (77.1 kg). Intake/Output Summary (Last 24 hours) at 1/14/2023 1042  Last data filed at 1/14/2023 0726  Gross per 24 hour   Intake 4423 ml   Output --   Net 4423 ml           Physical Exam:   Blood pressure 111/62, pulse 85, temperature 97.7 °F (36.5 °C), temperature source Oral, resp. rate 16, height 5' 9\" (1.753 m), weight 170 lb (77.1 kg), SpO2 97 %. General:    No acute distress  Head:  Normocephalic, atraumatic  Eyes:  Sclerae appear normal.  Pupils equally round. ENT:  Nares appear normal.  Moist oral mucosa  Neck:  No restricted ROM. Trachea midline   CV:   RRR. No m/r/g. Lungs: No wheezing. Unlabored on room air. Abdomen:   Soft, nondistended. Extremities: No cyanosis or clubbing. Skin:     No rashes and normal coloration. Warm and dry. Neuro:  CN II-XII grossly intact. Psych:  Normal mood and affect.       I have personally reviewed labs and tests showing:  Recent Labs:  Recent Results (from the past 48 hour(s))   CBC with Auto Differential    Collection Time: 01/13/23 11:01 AM   Result Value Ref Range    WBC 11.9 (H) 4.3 - 11.1 K/uL    RBC 5.05 4.23 - 5.6 M/uL    Hemoglobin 12.8 (L) 13.6 - 17.2 g/dL    Hematocrit 39.6 (L) 41.1 - 50.3 %    MCV 78.4 (L) 82 - 102 FL    MCH 25.3 (L) 26.1 - 32.9 PG    MCHC 32.3 31.4 - 35.0 g/dL    RDW 16.7 (H) 11.9 - 14.6 %    Platelets 231 (H) 004 - 450 K/uL    MPV 9.0 (L) 9.4 - 12.3 FL    nRBC 0.00 0.0 - 0.2 K/uL    Differential Type AUTOMATED      Seg Neutrophils 79 (H) 43 - 78 %    Lymphocytes 10 (L) 13 - 44 %    Monocytes 10 4.0 - 12.0 %    Eosinophils % 1 0.5 - 7.8 %    Basophils 0 0.0 - 2.0 %    Immature Granulocytes 0 0.0 - 5.0 %    Segs Absolute 9.4 (H) 1.7 - 8.2 K/UL    Absolute Lymph # 1.2 0.5 - 4.6 K/UL    Absolute Mono # 1.2 0.1 - 1.3 K/UL    Absolute Eos # 0.1 0.0 - 0.8 K/UL    Basophils Absolute 0.0 0.0 - 0.2 K/UL    Absolute Immature Granulocyte 0.0 0.0 - 0.5 K/UL   Comprehensive Metabolic Panel w/ Reflex to MG    Collection Time: 01/13/23 11:01 AM   Result Value Ref Range    Sodium 130 (L) 133 - 143 mmol/L    Potassium 4.4 3.5 - 5.1 mmol/L    Chloride 99 (L) 101 - 110 mmol/L    CO2 21 21 - 32 mmol/L    Anion Gap 10 2 - 11 mmol/L    Glucose 85 65 - 100 mg/dL    BUN 9 6 - 23 MG/DL    Creatinine 1.00 0.8 - 1.5 MG/DL    Est, Glom Filt Rate >60 >60 ml/min/1.73m2    Calcium 9.9 8.3 - 10.4 MG/DL    Total Bilirubin 0.5 0.2 - 1.1 MG/DL    ALT 22 12 - 65 U/L    AST 16 15 - 37 U/L    Alk Phosphatase 78 50 - 136 U/L    Total Protein 7.2 6.3 - 8.2 g/dL    Albumin 2.8 (L) 3.5 - 5.0 g/dL    Globulin 4.4 2.8 - 4.5 g/dL    Albumin/Globulin Ratio 0.6 0.4 - 1.6     Lipase    Collection Time: 01/13/23 11:01 AM   Result Value Ref Range    Lipase 1,861 (H) 73 - 393 U/L   Magnesium    Collection Time: 01/13/23 11:01 AM   Result Value Ref Range    Magnesium 1.9 1.8 - 2.4 mg/dL   Phosphorus    Collection Time: 01/13/23 11:01 AM   Result Value Ref Range    Phosphorus 3.0 2.5 - 4.5 MG/DL   CBC with Auto Differential    Collection Time: 01/14/23  4:36 AM   Result Value Ref Range    WBC 7.2 4.3 - 11.1 K/uL    RBC 4.13 (L) 4.23 - 5.6 M/uL    Hemoglobin 10.4 (L) 13.6 - 17.2 g/dL    Hematocrit 31.9 (L) 41.1 - 50.3 %    MCV 77.2 (L) 82 - 102 FL    MCH 25.2 (L) 26.1 - 32.9 PG    MCHC 32.6 31.4 - 35.0 g/dL    RDW 16.4 (H) 11.9 - 14.6 %    Platelets 478 737 - 087 K/uL    MPV 9.6 9.4 - 12.3 FL    nRBC 0.00 0.0 - 0.2 K/uL    Differential Type AUTOMATED      Seg Neutrophils 68 43 - 78 %    Lymphocytes 15 13 - 44 %    Monocytes 16 (H) 4.0 - 12.0 %    Eosinophils % 1 0.5 - 7.8 %    Basophils 0 0.0 - 2.0 %    Immature Granulocytes 0 0.0 - 5.0 %    Segs Absolute 4.9 1.7 - 8.2 K/UL    Absolute Lymph # 1.1 0.5 - 4.6 K/UL    Absolute Mono # 1.2 0.1 - 1.3 K/UL    Absolute Eos # 0.1 0.0 - 0.8 K/UL    Basophils Absolute 0.0 0.0 - 0.2 K/UL    Absolute Immature Granulocyte 0.0 0.0 - 0.5 K/UL   Lipase    Collection Time: 01/14/23  4:36 AM   Result Value Ref Range    Lipase 1,147 (H) 73 - 393 U/L       I have personally reviewed imaging studies showing: Other Studies:  XR CHEST PORTABLE   Final Result      1. No evidence of pneumonia or pulmonary edema. FL INTRO LONG GI TUBE   Final Result   Fluoroscopic guided manipulation of feeding tube from the stomach to   the jejunum. XR ABDOMEN (KUB) (SINGLE AP VIEW)   Final Result   Feeding tube tip is projected over the upper body of stomach. Length   is adequate to reach the ligament of Treitz. CT ABDOMEN PELVIS W IV CONTRAST Additional Contrast? None   Final Result      1. Necrotizing pancreatitis, with associated acute necrotic collection measuring   up to 2.5 x 4.3 x 3.2 cm. The necrotic collection appears more defined when   compared to January 1, 2023 study. 2. Mass effect and narrowing of the splenic vein near the confluence with the   SMV. 3. Circumferential wall thickening of the distal stomach, adjacent to the   pancreatic abnormality. Findings likely reactive gastritis. 4. Large stool volume in the colon. No evidence of colitis, diverticulitis or   bowel obstruction.       XR ACUTE ABD SERIES CHEST 1 VW    (Results Pending)       Current Meds:  Current Facility-Administered Medications   Medication Dose Route Frequency    HYDROmorphone (DILAUDID) injection 1.5 mg  1.5 mg IntraVENous Q3H PRN    polyethylene glycol (GLYCOLAX) packet 17 g  17 g Per J Tube Daily    hyoscyamine (LEVSIN/SL) sublingual tablet 125 mcg  125 mcg SubLINGual TID    phenol 1.4 % mouth spray 1 spray  1 spray Mouth/Throat Q2H PRN    pantoprazole (PROTONIX) 40 mg in sodium chloride (PF) 0.9 % 10 mL injection  40 mg IntraVENous BID    potassium chloride (KLOR-CON M) extended release tablet 40 mEq  40 mEq Oral PRN    Or    potassium bicarb-citric acid (EFFER-K) effervescent tablet 40 mEq  40 mEq Oral PRN    Or    potassium chloride 10 mEq/100 mL IVPB (Peripheral Line)  10 mEq IntraVENous PRN magnesium sulfate 2000 mg in 50 mL IVPB premix  2,000 mg IntraVENous PRN    sodium phosphate 10 mmol in sodium chloride 0.9 % 250 mL IVPB  10 mmol IntraVENous PRN    Or    sodium phosphate 15 mmol in sodium chloride 0.9 % 250 mL IVPB  15 mmol IntraVENous PRN    Or    sodium phosphate 20 mmol in sodium chloride 0.9 % 500 mL IVPB  20 mmol IntraVENous PRN    thiamine tablet 100 mg  100 mg Oral Daily    oxyCODONE (ROXICODONE INTENSOL) 100 MG/5ML concentrated solution 10 mg  10 mg Oral Q3H PRN    diatrizoate meglumine-sodium (GASTROGRAFIN) 66-10 % solution 30 mL  30 mL Per NG tube ONCE PRN    aluminum & magnesium hydroxide-simethicone (MAALOX) 200-200-20 MG/5ML suspension 30 mL  30 mL Oral Q6H PRN    atorvastatin (LIPITOR) tablet 40 mg  40 mg Oral Nightly    buPROPion (WELLBUTRIN SR) extended release tablet 100 mg  100 mg Oral BID    [Held by provider] metoprolol tartrate (LOPRESSOR) tablet 25 mg  25 mg Oral BID    [Held by provider] pantoprazole (PROTONIX) tablet 40 mg  40 mg Oral BID    simethicone (MYLICON) chewable tablet 80 mg  80 mg Oral Q6H PRN    ticagrelor (BRILINTA) tablet 90 mg  90 mg Oral BID    sodium chloride flush 0.9 % injection 5-40 mL  5-40 mL IntraVENous 2 times per day    sodium chloride flush 0.9 % injection 5-40 mL  5-40 mL IntraVENous PRN    0.9 % sodium chloride infusion   IntraVENous PRN    enoxaparin (LOVENOX) injection 40 mg  40 mg SubCUTAneous Daily    ondansetron (ZOFRAN-ODT) disintegrating tablet 4 mg  4 mg Oral Q8H PRN    Or    ondansetron (ZOFRAN) injection 4 mg  4 mg IntraVENous Q6H PRN    acetaminophen (TYLENOL) tablet 650 mg  650 mg Oral Q6H PRN    Or    acetaminophen (TYLENOL) suppository 650 mg  650 mg Rectal Q6H PRN    aspirin chewable tablet 81 mg  81 mg Oral Daily    [Held by provider] oxyCODONE (ROXICODONE) immediate release tablet 5 mg  5 mg Oral Q4H PRN    lactated ringers infusion   IntraVENous Continuous       Signed:  Francis Lam, APRN - CNP    Part of this note may have been written by using a voice dictation software. The note has been proof read but may still contain some grammatical/other typographical errors.

## 2023-01-14 NOTE — PROGRESS NOTES
TRANSFER - IN REPORT:    Verbal report received from ELI Bianchi on Trev Ohara  being received from ED overflow for routine progression of patient care      Report consisted of patient's Situation, Background, Assessment and   Recommendations(SBAR). Information from the following report(s) Nurse Handoff Report, ED Encounter Summary, ED SBAR, Adult Overview, MAR, Med Rec Status, and Neuro Assessment was reviewed with the receiving nurse. Opportunity for questions and clarification was provided. Assessment completed upon patient's arrival to unit and care assumed.

## 2023-01-14 NOTE — PROGRESS NOTES
TRANSFER - OUT REPORT:    Verbal report given to EVELIA [facility] on Carla Cameron  being transferred to [phone] for routine progression of patient care       Report consisted of patient's Situation, Background, Assessment and   Recommendations(SBAR). Information from the following report(s) Intake/Output, MAR, and Recent Results was reviewed with the receiving nurse. [place] Assessment: No data recorded  Lines:   Peripheral IV 01/08/23 Left Antecubital (Active)   Site Assessment Clean, dry & intact 01/14/23 0724   Line Status Infusing 01/14/23 [facility] Connections checked and tightened 01/14/23 0724   Phlebitis Assessment No symptoms 01/14/23 0724   Infiltration Assessment 0 01/14/23 0724   Alcohol Cap Used Yes 01/14/23 0724   Dressing Status Clean, dry & intact 01/14/23 0724   Dressing Type Transparent 01/14/23 0724        Opportunity for questions and clarification was provided.       Patient transported with:  [employer]

## 2023-01-14 NOTE — PROGRESS NOTES
[facility] Progress Note         Admit Date:  1/8/2023    Today's Date:  1/14/2023    CC:  Recurrent acute pancreatitis    Subjective:     Feeling better this a.m. [place] Money Passing some flatus. Less abdominal distention. Tolerating tube feeds at slow rate. Still no BM. On miralax.      Medications:   Current Facility-Administered Medications   Medication Dose Route Frequency    HYDROmorphone (DILAUDID) injection 1.5 mg  1.5 mg IntraVENous Q3H PRN    polyethylene glycol (GLYCOLAX) packet 17 g  17 g Per J Tube Daily    hyoscyamine (LEVSIN/SL) sublingual tablet 125 mcg  125 mcg SubLINGual TID    phenol 1.4 % mouth spray 1 spray  1 spray Mouth/Throat Q2H PRN    pantoprazole (PROTONIX) 40 mg in sodium chloride (PF) 0.9 % 10 mL injection  40 mg IntraVENous BID    potassium chloride (KLOR-CON M) extended release tablet 40 mEq  40 mEq Oral PRN    Or    potassium bicarb-citric acid (EFFER-K) effervescent tablet 40 mEq  40 mEq Oral PRN    Or    potassium chloride 10 mEq/100 mL IVPB (Peripheral Line)  10 mEq IntraVENous PRN    magnesium sulfate 2000 mg in 50 mL IVPB premix  2,000 mg IntraVENous PRN    sodium phosphate 10 mmol in sodium chloride 0.9 % 250 mL IVPB  10 mmol IntraVENous PRN    Or    sodium phosphate 15 mmol in sodium chloride 0.9 % 250 mL IVPB  15 mmol IntraVENous PRN    Or    sodium phosphate 20 mmol in sodium chloride 0.9 % 500 mL IVPB  20 mmol IntraVENous PRN    thiamine tablet 100 mg  100 mg Oral Daily    oxyCODONE (ROXICODONE INTENSOL) 100 MG/5ML concentrated solution 10 mg  10 mg Oral Q3H PRN    diatrizoate meglumine-sodium (GASTROGRAFIN) 66-10 % solution 30 mL  30 mL Per NG tube ONCE PRN    aluminum & magnesium hydroxide-simethicone (MAALOX) 200-200-20 MG/5ML suspension 30 mL  30 mL Oral Q6H PRN    atorvastatin (LIPITOR) tablet 40 mg  40 mg Oral Nightly    buPROPion [facility]) extended release tablet 100 mg  100 mg Oral BID    metoprolol tartrate (LOPRESSOR) tablet 25 mg  25 mg Oral BID [Held by provider] pantoprazole (PROTONIX) tablet 40 mg  40 mg Oral BID    simethicone (MYLICON) chewable tablet 80 mg  80 mg Oral Q6H PRN    ticagrelor (BRILINTA) tablet 90 mg  90 mg Oral BID    sodium chloride flush 0.9 % injection 5-40 mL  5-40 mL IntraVENous 2 times per day    sodium chloride flush 0.9 % injection 5-40 mL  5-40 mL IntraVENous PRN    0.9 % sodium chloride infusion   IntraVENous PRN    enoxaparin (LOVENOX) injection 40 mg  40 mg SubCUTAneous Daily    ondansetron (ZOFRAN-ODT) disintegrating tablet 4 mg  4 mg Oral Q8H PRN    Or    ondansetron (ZOFRAN) injection 4 mg  4 mg IntraVENous Q6H PRN    acetaminophen (TYLENOL) tablet 650 mg  650 mg Oral Q6H PRN    Or    acetaminophen (TYLENOL) suppository 650 mg  650 mg Rectal Q6H PRN    aspirin chewable tablet 81 mg  81 mg Oral Daily    [Held by provider] oxyCODONE (ROXICODONE) immediate release tablet 5 mg  5 mg Oral Q4H PRN    lactated ringers infusion   IntraVENous Continuous       Review of Systems:  ROS was obtained, with pertinent positives as listed above. No chest pain or SOB. Diet:  NPO, tube feeds    Objective:   Vitals:  /62   Pulse 85   Temp 97.7 °F (36.5 °C) (Oral)   Resp 16   Ht 5' 9\" (1.753 m)   Wt 170 lb (77.1 kg)   SpO2 97%   BMI 25.10 kg/m²   Intake/Output:  01/14 0701 - 01/14 1900  In: 80   Out: -   01/12 1901 - 01/14 0700  In: 4343 [I.V.:4343]  Out: -   Exam:  General appearance: alert, cooperative, no distress, lying on bed  Lungs: clear to auscultation bilaterally anteriorly  Heart: regular rate and rhythm  Abdomen: soft, mild TTP LUQ, and left periumbilical region.  Bowel sounds normal. No masses, no organomegaly  Extremities: extremities normal, atraumatic, no cyanosis or edema  Neuro:  alert and oriented    Data Review (Labs):    Recent Labs     01/12/23  0718 01/13/23  1101 01/14/23  0436   WBC 6.4 11.9* 7.2   HGB 10.2* 12.8* 10.4*   HCT 31.6* 39.6* 31.9*   * 500* 373   MCV 79.2* 78.4* 77.2*    130*  -- K 4.2 4.4  --     99*  --    CO2 24 21  --    BUN 9 9  --    CREATININE 0.90 1.00  --    CALCIUM 8.6 9.9  --    MG 2.1 1.9  --    PHOS 3.7 3.0  --    GLUCOSE 79 85  --    ALKPHOS 69 78  --    AST 18 16  --    ALT 22 22  --    BILITOT 0.5 0.5  --    LABALBU 2.5* 2.8*  --    PROT 6.6 7.2  --    LIPASE 1,998*  2,205* 1,861* 1,147*         Latest Reference Range & Units 1/12/23 07:18   Ferritin 8 - 388 NG/   Iron 35 - 150 ug/dL 13 (L)   FOLATE, FOLAT 3.1 - 17.5 ng/mL 7.4   Vitamin B-12 193 - 986 pg/mL 802      CT abdomen and pelvis with contrast , 1/9/23       COMPARISON: January 1, 2023. INDICATION: Recurrent pancreatitis, worsening. Any evidence of necrosis, cyst.       TECHNIQUE: CT imaging was performed of the abdomen and pelvis following the   uncomplicated administration of intravenous contrast (Isovue 370, 100 mL). Oral   contrast was administered. Radiation dose reduction techniques were used for   this study:  Our CT scanners use one or all of the following: Automated exposure   control, adjustment of the mA and/or kVp according to patient's size, iterative   reconstruction. FINDINGS:       Visualized lung bases are unremarkable. Abdomen findings: There is peripancreatic inflammatory stranding. The pancreatic parenchyma is not   well defined. There is lobulated cystic mass involving the pancreatic body,   measuring up to 2.5 cm AP x 4.3 cm transverse x 3.2 cm craniocaudal. Similar   adjacent 15 mm cystic lesion is noted in the pancreatic body. There is mass   effect on the splenic vein, near the confluence with the SMV. There is circumferential wall thickening of the distal stomach, as into the   pancreatic abnormality. Gallbladder is surgically absent. Liver and the spleen not the upper limits for   size. The adrenal glands, kidneys and abdominal aorta are unremarkable. No   evidence of lymphadenopathy.        There is fluid within the esophagus, suggesting gastroesophageal reflux. Small   bowel loops are normal in caliber. No small bowel obstruction. Pelvic findings:       Urinary bladder is normal in contour. The colon is normal in course and caliber   with large stool volume. No evidence of appendicitis. There is no free air or free fluid. Surrounding bones are intact. Impression       1. Necrotizing pancreatitis, with associated acute necrotic collection measuring   up to 2.5 x 4.3 x 3.2 cm. The necrotic collection appears more defined when   compared to January 1, 2023 study. 2. Mass effect and narrowing of the splenic vein near the confluence with the   SMV. 3. Circumferential wall thickening of the distal stomach, adjacent to the   pancreatic abnormality. Findings likely reactive gastritis. 4. Large stool volume in the colon. No evidence of colitis, diverticulitis or   bowel obstruction. ENDOSCOPIC ULTRASOUND PROCEDURE NOTE, 1/11/23, Dr. Clark Sees  PRE-OP DIAGNOSIS:  Recurrent acute pancreatitis  Epigastric pain  Abnormal CT  POST-OP DIAGNOSIS:  Pancreatic fluid collections- pseudocyst and WON  Gastritis  PROCEDURE:  After obtaining informed consent, the patient was placed in the left lateral position and sedated. The echoendoscope was advanced to the upper GI tract without difficulty. The scope was slowly removed while detailed images of the adjacent organs were obtained. The patient was taken to the recovery area in stable condition. FINDINGS:  ESOPHAGUS: Not well visualized  STOMACH: Limited views with retained fluid and bile present. Moderate erythema and friability in the antrum. Superficial biopsies obtained to r/o H pylori infection. DUODENUM: Limited views with the oblique-viewing echoendoscope were unremarkable. The ampulla was Visualized tangentially and appears normal.   Thin clear bile draining. PANCREAS: The pancreas was well visualized from the head to the tail.   The ampulla appears normal endosonographically. The common bile duct measures 9 mm upstream from the pancreas. There is compression of the bile duct in the head of the pancreas 1-2 mm. The main pancreatic duct in the body and tail measures 2-4 mm. It is irregular, tortuous, with dilated side branches. In the head of the pancreas pancreatic duct appears compressed by a large fluid collection 40 x 25 mm. This has a thin septation. There is no solid material present. The appearance suggests probable pseudocyst.  This remains much smaller than prior to cyst gastrostomy drainage. Another fluid collection in the body of the pancreas is 15 mm in diameter. This contains a small amount of mobile debris consistent with pancreatic necrosis. There is no discrete mass in the pancreas but the parenchyma is hypoechoic, heterogeneous, with extensive edema and loss of the normal tissue planes. These findings are typical and acute pancreatitis. They do decrease sensitivity of endoscopic ultrasound in detecting pancreatic neoplasia. BILIARY SYSTEM: The common bile duct was well visualized   As above, 9 mm proximal to the pancreatic head and 1-2 mm within the pancreas. No intraductal stones or abnormal wall thickening. The gallbladder was   Surgically absent . OTHER ORGANS: There was no ascites in the upper abdomen. Limited views of the left lobe of the liver demonstrated no solid mass lesions. The celiac axis appears normal.   Multiple small but prominent lymph nodes scattered around the head of the pancreas and portal vein. The largest of these nodes is 11 x 6 mm. The nodes are oval, homogeneous, with well-defined margins. They are not pathologically enlarged and likely reflect benign reactive nodes. PLAN:    Continue NPO. Recommend nasal jejunal tube placement in IR. Patient had recurrent symptoms once he began advancing diet.     Discontinue ACE inhibitor which has been associated with medication induced pancreatitis. Follow up pathology from gastric biopsies. Repeat labs in am      Assessment:     Principal Problem:    Necrotizing pancreatitis  Active Problems:    Pancreatic pseudocyst    History of ST elevation myocardial infarction (STEMI)    Coronary artery disease involving native coronary artery of native heart    Acute on chronic pancreatitis ([facility] [address]    Primary hypertension  Resolved Problems:    * No resolved hospital problems. *    42 y.o. 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 Vanessa Ashton for recurrent pancreatitis with known necrosis in pancreatic tail after recent discharge on 1/5/23 for the same complaints. Patient was discharged and continued to have upper abdominal pain, nausea, and inability to tolerate PO intake. He denies recent EtOH use and quit smoking tobacco 3 weeks ago. Plan:     - Supportive care, anti-emetics PRN, pain control PRN, IV fluids  - NPO, Continue tube feeds for nutrition  - Abdominal series obtained today. Radiology report pending but images were personally reviewed. Significant retained stool throughout the entire colon. No definite obstruction. Continue MiraLax, increase to b.I.d. Reduce narcotics as tolerated,  likely cause of constipation    Recommend fleets enema x1 today to stimulate from below  - Continue to monitor CBC, CMP, lipase  - Continue PPI BID  - Continue EtOH and tobacco cessation. -  if moving his bowels and feeling better, hopefully advance to clears in a day or 2    Cynthia Mckeon MD-C  [facility]      Patient is seen and examined in collaboration with Dr. Yael Martini.   Assessment and plan as per Dr. Silva Avalos Brackbill.

## 2023-01-14 NOTE — PROGRESS NOTES
Reviewed notes for new spiritual concerns      Will continue to assess how we can best serve this family

## 2023-01-15 LAB — LIPASE SERPL-CCNC: 1618 U/L (ref 73–393)

## 2023-01-15 PROCEDURE — 36415 COLL VENOUS BLD VENIPUNCTURE: CPT

## 2023-01-15 PROCEDURE — 6370000000 HC RX 637 (ALT 250 FOR IP): Performed by: FAMILY MEDICINE

## 2023-01-15 PROCEDURE — 6360000002 HC RX W HCPCS: Performed by: FAMILY MEDICINE

## 2023-01-15 PROCEDURE — A4216 STERILE WATER/SALINE, 10 ML: HCPCS | Performed by: PHYSICIAN ASSISTANT

## 2023-01-15 PROCEDURE — 6370000000 HC RX 637 (ALT 250 FOR IP): Performed by: INTERNAL MEDICINE

## 2023-01-15 PROCEDURE — 2580000003 HC RX 258: Performed by: NURSE PRACTITIONER

## 2023-01-15 PROCEDURE — 6360000002 HC RX W HCPCS: Performed by: NURSE PRACTITIONER

## 2023-01-15 PROCEDURE — C9113 INJ PANTOPRAZOLE SODIUM, VIA: HCPCS | Performed by: PHYSICIAN ASSISTANT

## 2023-01-15 PROCEDURE — 2580000003 HC RX 258: Performed by: PHYSICIAN ASSISTANT

## 2023-01-15 PROCEDURE — 2580000003 HC RX 258: Performed by: FAMILY MEDICINE

## 2023-01-15 PROCEDURE — 1100000000 HC RM PRIVATE

## 2023-01-15 PROCEDURE — 6360000002 HC RX W HCPCS: Performed by: INTERNAL MEDICINE

## 2023-01-15 PROCEDURE — 83690 ASSAY OF LIPASE: CPT

## 2023-01-15 PROCEDURE — 6360000002 HC RX W HCPCS: Performed by: PHYSICIAN ASSISTANT

## 2023-01-15 RX ORDER — HYDROCODONE BITARTRATE AND ACETAMINOPHEN 7.5; 325 MG/1; MG/1
1 TABLET ORAL EVERY 6 HOURS PRN
Status: DISCONTINUED | OUTPATIENT
Start: 2023-01-15 | End: 2023-01-18 | Stop reason: HOSPADM

## 2023-01-15 RX ADMIN — HYDROMORPHONE HYDROCHLORIDE 0.5 MG: 1 INJECTION, SOLUTION INTRAMUSCULAR; INTRAVENOUS; SUBCUTANEOUS at 12:35

## 2023-01-15 RX ADMIN — BUPROPION HYDROCHLORIDE 100 MG: 100 TABLET, FILM COATED, EXTENDED RELEASE ORAL at 20:21

## 2023-01-15 RX ADMIN — OXYCODONE HYDROCHLORIDE 10 MG: 5 SOLUTION ORAL at 04:14

## 2023-01-15 RX ADMIN — METOPROLOL TARTRATE 25 MG: 25 TABLET, FILM COATED ORAL at 20:21

## 2023-01-15 RX ADMIN — HYDROMORPHONE HYDROCHLORIDE 0.5 MG: 1 INJECTION, SOLUTION INTRAMUSCULAR; INTRAVENOUS; SUBCUTANEOUS at 21:36

## 2023-01-15 RX ADMIN — HYDROMORPHONE HYDROCHLORIDE 1.5 MG: 1 INJECTION, SOLUTION INTRAMUSCULAR; INTRAVENOUS; SUBCUTANEOUS at 05:11

## 2023-01-15 RX ADMIN — HYOSCYAMINE SULFATE 125 MCG: 0.12 TABLET ORAL at 20:21

## 2023-01-15 RX ADMIN — SODIUM CHLORIDE, PRESERVATIVE FREE 10 ML: 5 INJECTION INTRAVENOUS at 20:14

## 2023-01-15 RX ADMIN — SODIUM CHLORIDE, PRESERVATIVE FREE 10 ML: 5 INJECTION INTRAVENOUS at 08:42

## 2023-01-15 RX ADMIN — TICAGRELOR 90 MG: 90 TABLET ORAL at 08:39

## 2023-01-15 RX ADMIN — SODIUM CHLORIDE, SODIUM LACTATE, POTASSIUM CHLORIDE, AND CALCIUM CHLORIDE: 600; 310; 30; 20 INJECTION, SOLUTION INTRAVENOUS at 08:36

## 2023-01-15 RX ADMIN — HYDROCODONE BITARTRATE AND ACETAMINOPHEN 1 TABLET: 7.5; 325 TABLET ORAL at 19:41

## 2023-01-15 RX ADMIN — HYDROMORPHONE HYDROCHLORIDE 0.5 MG: 1 INJECTION, SOLUTION INTRAMUSCULAR; INTRAVENOUS; SUBCUTANEOUS at 17:36

## 2023-01-15 RX ADMIN — SODIUM CHLORIDE, PRESERVATIVE FREE 40 MG: 5 INJECTION INTRAVENOUS at 20:21

## 2023-01-15 RX ADMIN — HYOSCYAMINE SULFATE 125 MCG: 0.12 TABLET ORAL at 08:39

## 2023-01-15 RX ADMIN — ATORVASTATIN CALCIUM 40 MG: 40 TABLET, FILM COATED ORAL at 20:21

## 2023-01-15 RX ADMIN — HYDROMORPHONE HYDROCHLORIDE 1.5 MG: 1 INJECTION, SOLUTION INTRAMUSCULAR; INTRAVENOUS; SUBCUTANEOUS at 08:52

## 2023-01-15 RX ADMIN — ACETAMINOPHEN 650 MG: 325 TABLET ORAL at 12:36

## 2023-01-15 RX ADMIN — Medication 100 MG: at 08:39

## 2023-01-15 RX ADMIN — ENOXAPARIN SODIUM 40 MG: 100 INJECTION SUBCUTANEOUS at 08:39

## 2023-01-15 RX ADMIN — SODIUM CHLORIDE, SODIUM LACTATE, POTASSIUM CHLORIDE, AND CALCIUM CHLORIDE: 600; 310; 30; 20 INJECTION, SOLUTION INTRAVENOUS at 19:41

## 2023-01-15 RX ADMIN — HYDROCODONE BITARTRATE AND ACETAMINOPHEN 1 TABLET: 7.5; 325 TABLET ORAL at 10:35

## 2023-01-15 RX ADMIN — POLYETHYLENE GLYCOL 3350 17 G: 17 POWDER, FOR SOLUTION ORAL at 08:39

## 2023-01-15 RX ADMIN — HYOSCYAMINE SULFATE 125 MCG: 0.12 TABLET ORAL at 12:38

## 2023-01-15 RX ADMIN — SODIUM CHLORIDE, PRESERVATIVE FREE 40 MG: 5 INJECTION INTRAVENOUS at 08:39

## 2023-01-15 RX ADMIN — BUPROPION HYDROCHLORIDE 100 MG: 100 TABLET, FILM COATED, EXTENDED RELEASE ORAL at 08:39

## 2023-01-15 RX ADMIN — TICAGRELOR 90 MG: 90 TABLET ORAL at 20:21

## 2023-01-15 RX ADMIN — ASPIRIN 81 MG 81 MG: 81 TABLET ORAL at 08:39

## 2023-01-15 RX ADMIN — OXYCODONE HYDROCHLORIDE 10 MG: 5 SOLUTION ORAL at 00:57

## 2023-01-15 RX ADMIN — HYDROMORPHONE HYDROCHLORIDE 1.5 MG: 1 INJECTION, SOLUTION INTRAMUSCULAR; INTRAVENOUS; SUBCUTANEOUS at 02:07

## 2023-01-15 ASSESSMENT — PAIN SCALES - GENERAL
PAINLEVEL_OUTOF10: 4
PAINLEVEL_OUTOF10: 8
PAINLEVEL_OUTOF10: 4
PAINLEVEL_OUTOF10: 3
PAINLEVEL_OUTOF10: 4
PAINLEVEL_OUTOF10: 8
PAINLEVEL_OUTOF10: 8
PAINLEVEL_OUTOF10: 6
PAINLEVEL_OUTOF10: 3
PAINLEVEL_OUTOF10: 8
PAINLEVEL_OUTOF10: 9
PAINLEVEL_OUTOF10: 8
PAINLEVEL_OUTOF10: 6
PAINLEVEL_OUTOF10: 8
PAINLEVEL_OUTOF10: 5
PAINLEVEL_OUTOF10: 8
PAINLEVEL_OUTOF10: 5

## 2023-01-15 ASSESSMENT — PAIN - FUNCTIONAL ASSESSMENT

## 2023-01-15 ASSESSMENT — PAIN DESCRIPTION - DESCRIPTORS
DESCRIPTORS: SHARP
DESCRIPTORS: CRAMPING
DESCRIPTORS: ACHING;DISCOMFORT;THROBBING;CRAMPING
DESCRIPTORS: CRAMPING
DESCRIPTORS: ACHING;DISCOMFORT;THROBBING
DESCRIPTORS: SORE
DESCRIPTORS: CRAMPING;GNAWING
DESCRIPTORS: CRAMPING;DISCOMFORT;SORE
DESCRIPTORS: DISCOMFORT;CRAMPING

## 2023-01-15 ASSESSMENT — PAIN DESCRIPTION - ORIENTATION
ORIENTATION: RIGHT;MID
ORIENTATION: LOWER
ORIENTATION: ANTERIOR
ORIENTATION: LOWER

## 2023-01-15 ASSESSMENT — PAIN DESCRIPTION - LOCATION
LOCATION: ABDOMEN

## 2023-01-15 ASSESSMENT — PAIN SCALES - WONG BAKER
WONGBAKER_NUMERICALRESPONSE: 0

## 2023-01-15 NOTE — PROGRESS NOTES
Progress Note    Patient: Penny Perez MRN: [id number]  SSN: [id number]    YOB: 1985  Age: 44 y.o. Sex: male      Admit Date: 1/8/2023    LOS: 7 days     Assessment and Plan:   44 y.o. male with a PMH of EtOH pancreatitis complicated with pseudocyst s/p stent placement 11/9 removed 11/30 and STEMI s/p PCI with TULIO who presented to [facility] with a complaint of recurrent epigastric pain with associated N/V    Necrotizing pancreatitis  Pancreatic pseudocyst  - Continue tube feeds  - Continue IVF  - PRN analgesia  - PPI BID IV  - Advance diet as per gastroenterology recommendations     Microcytic anemia  - Monitor H&H  - Transfuse if hemoglobin lower than 7      History of ST elevation myocardial infarction (STEMI)  CAD involving native coronary artery of native heart  - Continue aspirin, ticagrelor and statin     Primary hypertension  - Continue metoprolol     Depression  - Continue wellbutrin     DVT PPx: enoxaparin      Subjective:   44 y.o. male with a PMH of EtOH pancreatitis complicated with pseudocyst s/p stent placement 11/9 removed 11/30 and STEMI s/p PCI with TULIO who presented to [facility] with a complaint of recurrent epigastric pain with associated N/V. Patient seen and examined at bedside. This morning still presenting with some abdominal pain. Denies any nausea or vomiting. Objective:     Vitals:    01/15/23 0852 01/15/23 0922 01/15/23 1035 01/15/23 1105   BP:       Pulse:       Resp: 18 18 18 16   Temp:       TempSrc:       SpO2:       Weight:       Height:            Intake and Output:  Current Shift: No intake/output data recorded.   Last three shifts: 01/13 1901 - 01/15 0700  In: 80   Out: -     ROS  10 ROS negative except from stated on subjective    Physical Exam:   General: Alert, oriented, NAD  HEENT: NC/AT, EOM are intact  Neck: supple, no JVD  Cardiovascular: RRR, S1, S2, no murmurs  Respiratory: Lungs are clear, no wheezes or rales  Abdomen: Soft, NT, ND  Back: No CVA tenderness, no paraspinal tenderness  Extremities: LE without pedal edema, no erythema  Neuro: A&O, CN are intact, no focal deficits  Skin: no rash or ulcers  Psych: good mood and affect    Lab/Data Review:  I have personally reviewed patients laboratory data showing  Recent Results (from the past 24 hour(s))   Lipase    Collection Time: 01/15/23  5:57 AM   Result Value Ref Range    Lipase 1,618 (H) 73 - 393 U/L        Image:  I have personally reviewed patients imaging showing  XR ACUTE ABD SERIES CHEST 1 VW   Final Result   1. No bowel obstruction. 2. Feeding tube tip likely within left upper quadrant jejunum. 3. Constipation. XR CHEST PORTABLE   Final Result      1. No evidence of pneumonia or pulmonary edema. FL INTRO LONG GI TUBE   Final Result   Fluoroscopic guided manipulation of feeding tube from the stomach to   the jejunum. XR ABDOMEN (KUB) (SINGLE AP VIEW)   Final Result   Feeding tube tip is projected over the upper body of stomach. Length   is adequate to reach the ligament of Treitz. CT ABDOMEN PELVIS W IV CONTRAST Additional Contrast? None   Final Result      1. Necrotizing pancreatitis, with associated acute necrotic collection measuring   up to 2.5 x 4.3 x 3.2 cm. The necrotic collection appears more defined when   compared to January 1, 2023 study. 2. Mass effect and narrowing of the splenic vein near the confluence with the   SMV. 3. Circumferential wall thickening of the distal stomach, adjacent to the   pancreatic abnormality. Findings likely reactive gastritis. 4. Large stool volume in the colon. No evidence of colitis, diverticulitis or   bowel obstruction.            Current Facility-Administered Medications   Medication Dose Route Frequency Provider Last Rate Last Admin    HYDROmorphone (DILAUDID) injection 0.5 mg  0.5 mg IntraVENous Q4H PRN Van Kehr, MD   0.5 mg at 01/15/23 1235    HYDROcodone-acetaminophen ([facility]) 7.5-325 MG per tablet 1 tablet  1 tablet Oral Q6H PRN Rhys Klinefelter, MD   1 tablet at 01/15/23 1035    polyethylene glycol (GLYCOLAX) packet 17 g  17 g Per J Tube BID Rhys Klinefelter, MD   17 g at 01/15/23 0839    hyoscyamine (LEVSIN/SL) sublingual tablet 125 mcg  125 mcg SubLINGual TID Rhys Klinefelter, MD   125 mcg at 01/15/23 1238    phenol 1.4 % mouth spray 1 spray  1 spray Mouth/Throat Q2H PRN Cesar Wong MD   1 spray at 01/12/23 0124    pantoprazole (PROTONIX) 40 mg in sodium chloride (PF) 0.9 % 10 mL injection  40 mg IntraVENous BID ANJALI Garcia   40 mg at 01/15/23 0839    potassium chloride (KLOR-CON M) extended release tablet 40 mEq  40 mEq Oral PRN Rhys Klinefelter, MD        Or    potassium bicarb-citric acid (EFFER-K) effervescent tablet 40 mEq  40 mEq Oral PRN Rhys Klinefelter, MD        Or    potassium chloride 10 mEq/100 mL IVPB (Peripheral Line)  10 mEq IntraVENous PRN Rhys Klinefelter, MD        magnesium sulfate 2000 mg in 50 mL IVPB premix  2,000 mg IntraVENous PRN Rhys Klinefelter, MD        sodium phosphate 10 mmol in sodium chloride 0.9 % 250 mL IVPB  10 mmol IntraVENous PRN Rhys Klinefelter, MD        Or    sodium phosphate 15 mmol in sodium chloride 0.9 % 250 mL IVPB  15 mmol IntraVENous PRN Rhys Klinefelter, MD        Or    sodium phosphate 20 mmol in sodium chloride 0.9 % 500 mL IVPB  20 mmol IntraVENous PRN Rhys Klinefelter, MD        thiamine tablet 100 mg  100 mg Oral Daily Rhys Klinefelter, MD   100 mg at 01/15/23 0839    diatrizoate meglumine-sodium (GASTROGRAFIN) 66-10 % solution 30 mL  30 mL Per NG tube ONCE PRN Rhys Klinefelter, MD   30 mL at 01/11/23 1655    aluminum & magnesium hydroxide-simethicone (MAALOX) 214-593-98 MG/5ML suspension 30 mL  30 mL Oral Q6H PRN Phillip Fowler MD   30 mL at 01/11/23 1323    atorvastatin (LIPITOR) tablet 40 mg  40 mg Oral Nightly Anish Lagunas DO   40 mg at 01/14/23 2032    buPROPion [facility]) extended release tablet 100 mg  100 mg Oral BID Jed Cecelia, DO   100 mg at 01/15/23 0839    metoprolol tartrate (LOPRESSOR) tablet 25 mg  25 mg Oral BID Jed Cecelia, DO   25 mg at 01/14/23 2032    [Held by provider] pantoprazole (PROTONIX) tablet 40 mg  40 mg Oral BID Jed Cecelia, DO   40 mg at 01/11/23 2121    simethicone (MYLICON) chewable tablet 80 mg  80 mg Oral Q6H PRN Jed Cecelia, DO        ticagrelor (BRILINTA) tablet 90 mg  90 mg Oral BID Jed Cecelia, DO   90 mg at 01/15/23 0839    sodium chloride flush 0.9 % injection 5-40 mL  5-40 mL IntraVENous 2 times per day Jed Cecelia, DO   10 mL at 01/15/23 0842    sodium chloride flush 0.9 % injection 5-40 mL  5-40 mL IntraVENous PRN Jed Cecelia, DO   5 mL at 01/11/23 0928    0.9 % sodium chloride infusion   IntraVENous PRN Jed Cecelia, DO        enoxaparin (LOVENOX) injection 40 mg  40 mg SubCUTAneous Daily Jed Cecelia, DO   40 mg at 01/15/23 0839    ondansetron (ZOFRAN-ODT) disintegrating tablet 4 mg  4 mg Oral Q8H PRN Jed Cecelia, DO   4 mg at 01/08/23 2324    Or    ondansetron (ZOFRAN) injection 4 mg  4 mg IntraVENous Q6H PRN Jed Cecelia, DO   4 mg at 01/11/23 1553    acetaminophen (TYLENOL) tablet 650 mg  650 mg Oral Q6H PRN Jed Cecelia, DO   650 mg at 01/15/23 1236    Or    acetaminophen (TYLENOL) suppository 650 mg  650 mg Rectal Q6H PRN Jed Cecelia, DO        aspirin chewable tablet 81 mg  81 mg Oral Daily Jed Cecelia, DO   81 mg at 01/15/23 5668    lactated ringers infusion   IntraVENous Continuous MELISSA Rodriguez -  mL/hr at 01/15/23 0836 New Bag at 01/15/23 [address] [place] problems     Patient Active Problem List   Diagnosis    Primary hypertension    Chronic pancreatitis (HCC)    [facility] disorder, severe, in early [facility]    Attention deficit hyperactivity disorder (ADHD), combined type    Tobacco abuse [Z72.0 (ICD-10-CM)]    Pancreatic pseudocyst    History of ST elevation myocardial infarction (STEMI)    Coronary artery disease involving native coronary artery of native heart    Acute on chronic pancreatitis (HCC)    Dependence on nicotine from cigarettes    GERD (gastroesophageal reflux disease)    Necrotizing pancreatitis        I have reviewed, updated, and verified this note's content and spent 38 minutes of my 42 minutes visit performing counseling and coordination of care regarding medical management.        Signed By: Nick Kaufman MD     January 15, 2023

## 2023-01-15 NOTE — PROGRESS NOTES
[facility] Progress Note         Admit Date:  1/8/2023    Today's Date:  1/15/2023    CC:  Recurrent acute pancreatitis    Subjective:     Feeling better this a.m..  2 large BM's yesterday.  Less abdominal distention today and less L sided pain.  Tolerating tube feeds at slow rate.   On miralax. Still getting dilaudid q3 hrs.      Medications:   Current Facility-Administered Medications   Medication Dose Route Frequency    polyethylene glycol (GLYCOLAX) packet 17 g  17 g Per J Tube BID    oxyCODONE (ROXICODONE) 5 MG/5ML solution 10 mg  10 mg Per G Tube Q3H PRN    HYDROmorphone (DILAUDID) injection 1.5 mg  1.5 mg IntraVENous Q3H PRN    hyoscyamine (LEVSIN/SL) sublingual tablet 125 mcg  125 mcg SubLINGual TID    phenol 1.4 % mouth spray 1 spray  1 spray Mouth/Throat Q2H PRN    pantoprazole (PROTONIX) 40 mg in sodium chloride (PF) 0.9 % 10 mL injection  40 mg IntraVENous BID    potassium chloride (KLOR-CON M) extended release tablet 40 mEq  40 mEq Oral PRN    Or    potassium bicarb-citric acid (EFFER-K) effervescent tablet 40 mEq  40 mEq Oral PRN    Or    potassium chloride 10 mEq/100 mL IVPB (Peripheral Line)  10 mEq IntraVENous PRN    magnesium sulfate 2000 mg in 50 mL IVPB premix  2,000 mg IntraVENous PRN    sodium phosphate 10 mmol in sodium chloride 0.9 % 250 mL IVPB  10 mmol IntraVENous PRN    Or    sodium phosphate 15 mmol in sodium chloride 0.9 % 250 mL IVPB  15 mmol IntraVENous PRN    Or    sodium phosphate 20 mmol in sodium chloride 0.9 % 500 mL IVPB  20 mmol IntraVENous PRN    thiamine tablet 100 mg  100 mg Oral Daily    diatrizoate meglumine-sodium (GASTROGRAFIN) 66-10 % solution 30 mL  30 mL Per NG tube ONCE PRN    aluminum & magnesium hydroxide-simethicone (MAALOX) 200-200-20 MG/5ML suspension 30 mL  30 mL Oral Q6H PRN    atorvastatin (LIPITOR) tablet 40 mg  40 mg Oral Nightly    buPROPion (WELLBUTRIN SR) extended release tablet 100 mg  100 mg Oral BID    metoprolol tartrate (LOPRESSOR)  tablet 25 mg  25 mg Oral BID    [Held by provider] pantoprazole (PROTONIX) tablet 40 mg  40 mg Oral BID    simethicone (MYLICON) chewable tablet 80 mg  80 mg Oral Q6H PRN    ticagrelor (BRILINTA) tablet 90 mg  90 mg Oral BID    sodium chloride flush 0.9 % injection 5-40 mL  5-40 mL IntraVENous 2 times per day    sodium chloride flush 0.9 % injection 5-40 mL  5-40 mL IntraVENous PRN    0.9 % sodium chloride infusion   IntraVENous PRN    enoxaparin (LOVENOX) injection 40 mg  40 mg SubCUTAneous Daily    ondansetron (ZOFRAN-ODT) disintegrating tablet 4 mg  4 mg Oral Q8H PRN    Or    ondansetron (ZOFRAN) injection 4 mg  4 mg IntraVENous Q6H PRN    acetaminophen (TYLENOL) tablet 650 mg  650 mg Oral Q6H PRN    Or    acetaminophen (TYLENOL) suppository 650 mg  650 mg Rectal Q6H PRN    aspirin chewable tablet 81 mg  81 mg Oral Daily    lactated ringers infusion   IntraVENous Continuous       Review of Systems:  ROS was obtained, with pertinent positives as listed above. No chest pain or SOB. Diet:  NPO, tube feeds    Objective:   Vitals:  /68   Pulse 75   Temp 97.7 °F (36.5 °C) (Oral)   Resp 18   Ht 5' 9\" (1.753 m)   Wt 170 lb (77.1 kg)   SpO2 96%   BMI 25.10 kg/m²   Intake/Output:  No intake/output data recorded. 01/13 1901 - 01/15 0700  In: 80   Out: -   Exam:  General appearance: alert, cooperative, no distress, lying on bed  Lungs: clear to auscultation bilaterally anteriorly  Heart: regular rate and rhythm  Abdomen: soft, minimal TTP LUQ, and left periumbilical region.  Bowel sounds normal. No masses, no organomegaly  Extremities: extremities normal, atraumatic, no cyanosis or edema  Neuro:  alert and oriented    Data Review (Labs):    Recent Labs     01/13/23  1101 01/14/23  0436 01/15/23  0557   WBC 11.9* 7.2  --    HGB 12.8* 10.4*  --    HCT 39.6* 31.9*  --    * 373  --    MCV 78.4* 77.2*  --    *  --   --    K 4.4  --   --    CL 99*  --   --    CO2 21  --   --    BUN 9  --   -- CREATININE 1.00  --   --    CALCIUM 9.9  --   --    MG 1.9  --   --    PHOS 3.0  --   --    GLUCOSE 85  --   --    ALKPHOS 78  --   --    AST 16  --   --    ALT 22  --   --    BILITOT 0.5  --   --    LABALBU 2.8*  --   --    PROT 7.2  --   --    LIPASE 1,861* 1,147* 1,618*         Latest Reference Range & Units 1/12/23 07:18   Ferritin 8 - 388 NG/   Iron 35 - 150 ug/dL 13 (L)   FOLATE, FOLAT 3.1 - 17.5 ng/mL 7.4   Vitamin B-12 193 - 986 pg/mL 802      CT abdomen and pelvis with contrast , 1/9/23       COMPARISON: January 1, 2023. INDICATION: Recurrent pancreatitis, worsening. Any evidence of necrosis, cyst.       TECHNIQUE: CT imaging was performed of the abdomen and pelvis following the   uncomplicated administration of intravenous contrast (Isovue 370, 100 mL). Oral   contrast was administered. Radiation dose reduction techniques were used for   this study:  Our CT scanners use one or all of the following: Automated exposure   control, adjustment of the mA and/or kVp according to patient's size, iterative   reconstruction. FINDINGS:       Visualized lung bases are unremarkable. Abdomen findings: There is peripancreatic inflammatory stranding. The pancreatic parenchyma is not   well defined. There is lobulated cystic mass involving the pancreatic body,   measuring up to 2.5 cm AP x 4.3 cm transverse x 3.2 cm craniocaudal. Similar   adjacent 15 mm cystic lesion is noted in the pancreatic body. There is mass   effect on the splenic vein, near the confluence with the SMV. There is circumferential wall thickening of the distal stomach, as into the   pancreatic abnormality. Gallbladder is surgically absent. Liver and the spleen not the upper limits for   size. The adrenal glands, kidneys and abdominal aorta are unremarkable. No   evidence of lymphadenopathy. There is fluid within the esophagus, suggesting gastroesophageal reflux.  Small   bowel loops are normal in caliber. No small bowel obstruction. Pelvic findings:       Urinary bladder is normal in contour. The colon is normal in course and caliber   with large stool volume. No evidence of appendicitis. There is no free air or free fluid. Surrounding bones are intact. Impression       1. Necrotizing pancreatitis, with associated acute necrotic collection measuring   up to 2.5 x 4.3 x 3.2 cm. The necrotic collection appears more defined when   compared to January 1, 2023 study. 2. Mass effect and narrowing of the splenic vein near the confluence with the   SMV. 3. Circumferential wall thickening of the distal stomach, adjacent to the   pancreatic abnormality. Findings likely reactive gastritis. 4. Large stool volume in the colon. No evidence of colitis, diverticulitis or   bowel obstruction. ENDOSCOPIC ULTRASOUND PROCEDURE NOTE, 1/11/23, Dr. Abiel Osman  PRE-OP DIAGNOSIS:  Recurrent acute pancreatitis  Epigastric pain  Abnormal CT  POST-OP DIAGNOSIS:  Pancreatic fluid collections- pseudocyst and WON  Gastritis  PROCEDURE:  After obtaining informed consent, the patient was placed in the left lateral position and sedated. The echoendoscope was advanced to the upper GI tract without difficulty. The scope was slowly removed while detailed images of the adjacent organs were obtained. The patient was taken to the recovery area in stable condition. FINDINGS:  ESOPHAGUS: Not well visualized  STOMACH: Limited views with retained fluid and bile present. Moderate erythema and friability in the antrum. Superficial biopsies obtained to r/o H pylori infection. DUODENUM: Limited views with the oblique-viewing echoendoscope were unremarkable. The ampulla was Visualized tangentially and appears normal.   Thin clear bile draining. PANCREAS: The pancreas was well visualized from the head to the tail. The ampulla appears normal endosonographically.     The common bile duct measures 9 mm upstream from the pancreas. There is compression of the bile duct in the head of the pancreas 1-2 mm. The main pancreatic duct in the body and tail measures 2-4 mm. It is irregular, tortuous, with dilated side branches. In the head of the pancreas pancreatic duct appears compressed by a large fluid collection 40 x 25 mm. This has a thin septation. There is no solid material present. The appearance suggests probable pseudocyst.  This remains much smaller than prior to cyst gastrostomy drainage. Another fluid collection in the body of the pancreas is 15 mm in diameter. This contains a small amount of mobile debris consistent with pancreatic necrosis. There is no discrete mass in the pancreas but the parenchyma is hypoechoic, heterogeneous, with extensive edema and loss of the normal tissue planes. These findings are typical and acute pancreatitis. They do decrease sensitivity of endoscopic ultrasound in detecting pancreatic neoplasia. BILIARY SYSTEM: The common bile duct was well visualized   As above, 9 mm proximal to the pancreatic head and 1-2 mm within the pancreas. No intraductal stones or abnormal wall thickening. The gallbladder was   Surgically absent . OTHER ORGANS: There was no ascites in the upper abdomen. Limited views of the left lobe of the liver demonstrated no solid mass lesions. The celiac axis appears normal.   Multiple small but prominent lymph nodes scattered around the head of the pancreas and portal vein. The largest of these nodes is 11 x 6 mm. The nodes are oval, homogeneous, with well-defined margins. They are not pathologically enlarged and likely reflect benign reactive nodes. PLAN:    Continue NPO. Recommend nasal jejunal tube placement in IR. Patient had recurrent symptoms once he began advancing diet. Discontinue ACE inhibitor which has been associated with medication induced pancreatitis.   Follow up pathology from gastric biopsies. Repeat labs in am      Assessment:     Principal Problem:    Necrotizing pancreatitis  Active Problems:    Pancreatic pseudocyst    History of ST elevation myocardial infarction (STEMI)    Coronary artery disease involving native coronary artery of native heart    Acute on chronic pancreatitis ([facility] [address]    Primary hypertension  Resolved Problems:    * No resolved hospital problems. *    42 y.o. 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 Jann Han for recurrent pancreatitis with known necrosis in pancreatic tail after recent discharge on 1/5/23 for the same complaints. Patient was discharged and continued to have upper abdominal pain, nausea, and inability to tolerate PO intake. He denies recent EtOH use and quit smoking tobacco 3 weeks ago. Plan:     - Acute pancreatitis clinically resolving after several days NPO w/ NJ-tube feeding  - Advance to clears today  - Discussed need to reduce narcotics to avoid recurrent constipation and pain   - Recommend changing to [address] [facility] today and reducing dilaudid    Continue MiraLax b.I.d.  - Continue PPI BID.   No recurrent reflux.   - Continue EtOH and tobacco cessation.  -      Bhavna Pavon MD-C  [facility]

## 2023-01-15 NOTE — PROGRESS NOTES
Pt has been having uncontrollable pain that can not be managed well. Medicated pt through the night. Pt is sitting in bed watching television. Hourly rounds performed this shift. All needs met at this time. Shift report will be given to oncoming nurse.

## 2023-01-15 NOTE — PROGRESS NOTES
Pt resting, denies needs at this time, NAD. Hourly rounds completed. Bed in L/L position, call light and personal items within reach. Pt medicated with PRN pain meds see MAR. Will give bedside report to oncoming nurse.

## 2023-01-16 ENCOUNTER — APPOINTMENT (OUTPATIENT)
Dept: GENERAL RADIOLOGY | Age: 38
DRG: 439 | End: 2023-01-16
Attending: FAMILY MEDICINE

## 2023-01-16 LAB
ALBUMIN SERPL-MCNC: 2.5 G/DL (ref 3.5–5)
ALBUMIN/GLOB SERPL: 0.6 (ref 0.4–1.6)
ALP SERPL-CCNC: 69 U/L (ref 50–136)
ALT SERPL-CCNC: 23 U/L (ref 12–65)
ANION GAP SERPL CALC-SCNC: 11 MMOL/L (ref 2–11)
AST SERPL-CCNC: 17 U/L (ref 15–37)
BASOPHILS # BLD: 0 K/UL (ref 0–0.2)
BASOPHILS NFR BLD: 0 % (ref 0–2)
BILIRUB SERPL-MCNC: 0.3 MG/DL (ref 0.2–1.1)
BUN SERPL-MCNC: 7 MG/DL (ref 6–23)
CALCIUM SERPL-MCNC: 9.6 MG/DL (ref 8.3–10.4)
CHLORIDE SERPL-SCNC: 103 MMOL/L (ref 101–110)
CO2 SERPL-SCNC: 22 MMOL/L (ref 21–32)
CREAT SERPL-MCNC: 0.8 MG/DL (ref 0.8–1.5)
DIFFERENTIAL METHOD BLD: ABNORMAL
EOSINOPHIL # BLD: 0.1 K/UL (ref 0–0.8)
EOSINOPHIL NFR BLD: 1 % (ref 0.5–7.8)
ERYTHROCYTE [DISTWIDTH] IN BLOOD BY AUTOMATED COUNT: 16.9 % (ref 11.9–14.6)
GLOBULIN SER CALC-MCNC: 4.3 G/DL (ref 2.8–4.5)
GLUCOSE SERPL-MCNC: 127 MG/DL (ref 65–100)
HCT VFR BLD AUTO: 30.8 % (ref 41.1–50.3)
HGB BLD-MCNC: 10 G/DL (ref 13.6–17.2)
IMM GRANULOCYTES # BLD AUTO: 0 K/UL (ref 0–0.5)
IMM GRANULOCYTES NFR BLD AUTO: 0 % (ref 0–5)
LYMPHOCYTES # BLD: 0.8 K/UL (ref 0.5–4.6)
LYMPHOCYTES NFR BLD: 15 % (ref 13–44)
MAGNESIUM SERPL-MCNC: 2.2 MG/DL (ref 1.8–2.4)
MCH RBC QN AUTO: 24.9 PG (ref 26.1–32.9)
MCHC RBC AUTO-ENTMCNC: 32.5 G/DL (ref 31.4–35)
MCV RBC AUTO: 76.8 FL (ref 82–102)
MONOCYTES # BLD: 0.6 K/UL (ref 0.1–1.3)
MONOCYTES NFR BLD: 12 % (ref 4–12)
NEUTS SEG # BLD: 3.7 K/UL (ref 1.7–8.2)
NEUTS SEG NFR BLD: 72 % (ref 43–78)
NRBC # BLD: 0 K/UL (ref 0–0.2)
PHOSPHATE SERPL-MCNC: 2.8 MG/DL (ref 2.5–4.5)
PLATELET # BLD AUTO: 546 K/UL (ref 150–450)
PMV BLD AUTO: 9.7 FL (ref 9.4–12.3)
POTASSIUM SERPL-SCNC: 4 MMOL/L (ref 3.5–5.1)
PROT SERPL-MCNC: 6.8 G/DL (ref 6.3–8.2)
RBC # BLD AUTO: 4.01 M/UL (ref 4.23–5.6)
SODIUM SERPL-SCNC: 136 MMOL/L (ref 133–143)
WBC # BLD AUTO: 5.2 K/UL (ref 4.3–11.1)

## 2023-01-16 PROCEDURE — 36415 COLL VENOUS BLD VENIPUNCTURE: CPT

## 2023-01-16 PROCEDURE — 1100000000 HC RM PRIVATE

## 2023-01-16 PROCEDURE — 2580000003 HC RX 258: Performed by: NURSE PRACTITIONER

## 2023-01-16 PROCEDURE — 84100 ASSAY OF PHOSPHORUS: CPT

## 2023-01-16 PROCEDURE — 80053 COMPREHEN METABOLIC PANEL: CPT

## 2023-01-16 PROCEDURE — 6370000000 HC RX 637 (ALT 250 FOR IP): Performed by: INTERNAL MEDICINE

## 2023-01-16 PROCEDURE — 6370000000 HC RX 637 (ALT 250 FOR IP): Performed by: FAMILY MEDICINE

## 2023-01-16 PROCEDURE — C9113 INJ PANTOPRAZOLE SODIUM, VIA: HCPCS | Performed by: PHYSICIAN ASSISTANT

## 2023-01-16 PROCEDURE — 6360000002 HC RX W HCPCS: Performed by: PHYSICIAN ASSISTANT

## 2023-01-16 PROCEDURE — 6360000002 HC RX W HCPCS: Performed by: FAMILY MEDICINE

## 2023-01-16 PROCEDURE — 6360000002 HC RX W HCPCS: Performed by: INTERNAL MEDICINE

## 2023-01-16 PROCEDURE — 74018 RADEX ABDOMEN 1 VIEW: CPT

## 2023-01-16 PROCEDURE — 83735 ASSAY OF MAGNESIUM: CPT

## 2023-01-16 PROCEDURE — 85025 COMPLETE CBC W/AUTO DIFF WBC: CPT

## 2023-01-16 PROCEDURE — 2580000003 HC RX 258: Performed by: PHYSICIAN ASSISTANT

## 2023-01-16 PROCEDURE — A4216 STERILE WATER/SALINE, 10 ML: HCPCS | Performed by: PHYSICIAN ASSISTANT

## 2023-01-16 PROCEDURE — 2580000003 HC RX 258: Performed by: FAMILY MEDICINE

## 2023-01-16 RX ADMIN — TICAGRELOR 90 MG: 90 TABLET ORAL at 19:42

## 2023-01-16 RX ADMIN — HYDROCODONE BITARTRATE AND ACETAMINOPHEN 1 TABLET: 7.5; 325 TABLET ORAL at 19:42

## 2023-01-16 RX ADMIN — BUPROPION HYDROCHLORIDE 100 MG: 100 TABLET, FILM COATED, EXTENDED RELEASE ORAL at 10:06

## 2023-01-16 RX ADMIN — METOPROLOL TARTRATE 25 MG: 25 TABLET, FILM COATED ORAL at 10:06

## 2023-01-16 RX ADMIN — SODIUM CHLORIDE, PRESERVATIVE FREE 40 MG: 5 INJECTION INTRAVENOUS at 19:43

## 2023-01-16 RX ADMIN — HYOSCYAMINE SULFATE 125 MCG: 0.12 TABLET ORAL at 19:43

## 2023-01-16 RX ADMIN — SODIUM CHLORIDE, PRESERVATIVE FREE 10 ML: 5 INJECTION INTRAVENOUS at 19:42

## 2023-01-16 RX ADMIN — METOPROLOL TARTRATE 25 MG: 25 TABLET, FILM COATED ORAL at 19:43

## 2023-01-16 RX ADMIN — ENOXAPARIN SODIUM 40 MG: 100 INJECTION SUBCUTANEOUS at 10:53

## 2023-01-16 RX ADMIN — HYOSCYAMINE SULFATE 125 MCG: 0.12 TABLET ORAL at 14:24

## 2023-01-16 RX ADMIN — ASPIRIN 81 MG 81 MG: 81 TABLET ORAL at 10:06

## 2023-01-16 RX ADMIN — SODIUM CHLORIDE, PRESERVATIVE FREE 40 MG: 5 INJECTION INTRAVENOUS at 10:10

## 2023-01-16 RX ADMIN — Medication 100 MG: at 10:06

## 2023-01-16 RX ADMIN — BUPROPION HYDROCHLORIDE 100 MG: 100 TABLET, FILM COATED, EXTENDED RELEASE ORAL at 19:43

## 2023-01-16 RX ADMIN — HYDROMORPHONE HYDROCHLORIDE 0.5 MG: 1 INJECTION, SOLUTION INTRAMUSCULAR; INTRAVENOUS; SUBCUTANEOUS at 18:33

## 2023-01-16 RX ADMIN — ATORVASTATIN CALCIUM 40 MG: 40 TABLET, FILM COATED ORAL at 19:42

## 2023-01-16 RX ADMIN — HYDROCODONE BITARTRATE AND ACETAMINOPHEN 1 TABLET: 7.5; 325 TABLET ORAL at 02:32

## 2023-01-16 RX ADMIN — TICAGRELOR 90 MG: 90 TABLET ORAL at 10:06

## 2023-01-16 RX ADMIN — SODIUM CHLORIDE, PRESERVATIVE FREE 10 ML: 5 INJECTION INTRAVENOUS at 10:49

## 2023-01-16 RX ADMIN — HYDROMORPHONE HYDROCHLORIDE 0.5 MG: 1 INJECTION, SOLUTION INTRAMUSCULAR; INTRAVENOUS; SUBCUTANEOUS at 06:04

## 2023-01-16 RX ADMIN — HYDROMORPHONE HYDROCHLORIDE 0.5 MG: 1 INJECTION, SOLUTION INTRAMUSCULAR; INTRAVENOUS; SUBCUTANEOUS at 22:57

## 2023-01-16 RX ADMIN — POLYETHYLENE GLYCOL 3350 17 G: 17 POWDER, FOR SOLUTION ORAL at 10:11

## 2023-01-16 RX ADMIN — HYDROMORPHONE HYDROCHLORIDE 0.5 MG: 1 INJECTION, SOLUTION INTRAMUSCULAR; INTRAVENOUS; SUBCUTANEOUS at 10:06

## 2023-01-16 RX ADMIN — HYOSCYAMINE SULFATE 125 MCG: 0.12 TABLET ORAL at 10:06

## 2023-01-16 RX ADMIN — HYDROMORPHONE HYDROCHLORIDE 0.5 MG: 1 INJECTION, SOLUTION INTRAMUSCULAR; INTRAVENOUS; SUBCUTANEOUS at 14:24

## 2023-01-16 RX ADMIN — HYDROCODONE BITARTRATE AND ACETAMINOPHEN 1 TABLET: 7.5; 325 TABLET ORAL at 12:41

## 2023-01-16 RX ADMIN — SODIUM CHLORIDE, SODIUM LACTATE, POTASSIUM CHLORIDE, AND CALCIUM CHLORIDE: 600; 310; 30; 20 INJECTION, SOLUTION INTRAVENOUS at 05:52

## 2023-01-16 RX ADMIN — HYDROMORPHONE HYDROCHLORIDE 0.5 MG: 1 INJECTION, SOLUTION INTRAMUSCULAR; INTRAVENOUS; SUBCUTANEOUS at 01:32

## 2023-01-16 ASSESSMENT — PAIN DESCRIPTION - ONSET
ONSET: ON-GOING

## 2023-01-16 ASSESSMENT — PAIN SCALES - GENERAL
PAINLEVEL_OUTOF10: 8
PAINLEVEL_OUTOF10: 8
PAINLEVEL_OUTOF10: 4
PAINLEVEL_OUTOF10: 4
PAINLEVEL_OUTOF10: 8
PAINLEVEL_OUTOF10: 4
PAINLEVEL_OUTOF10: 8
PAINLEVEL_OUTOF10: 5
PAINLEVEL_OUTOF10: 8
PAINLEVEL_OUTOF10: 4
PAINLEVEL_OUTOF10: 5
PAINLEVEL_OUTOF10: 4
PAINLEVEL_OUTOF10: 6

## 2023-01-16 ASSESSMENT — PAIN DESCRIPTION - DESCRIPTORS
DESCRIPTORS: DISCOMFORT;ACHING
DESCRIPTORS: CRAMPING
DESCRIPTORS: DISCOMFORT;ACHING
DESCRIPTORS: CRAMPING
DESCRIPTORS: ACHING;DISCOMFORT

## 2023-01-16 ASSESSMENT — PAIN DESCRIPTION - LOCATION
LOCATION: ABDOMEN

## 2023-01-16 ASSESSMENT — PAIN - FUNCTIONAL ASSESSMENT
PAIN_FUNCTIONAL_ASSESSMENT: ACTIVITIES ARE NOT PREVENTED

## 2023-01-16 ASSESSMENT — PAIN DESCRIPTION - ORIENTATION
ORIENTATION: LOWER

## 2023-01-16 ASSESSMENT — PAIN DESCRIPTION - FREQUENCY
FREQUENCY: CONTINUOUS

## 2023-01-16 ASSESSMENT — PAIN DESCRIPTION - PAIN TYPE
TYPE: ACUTE PAIN

## 2023-01-16 NOTE — CARE COORDINATION
Chart reviewed by  for continued stay. LOS 8 days. Patient remains hospitalized, pending medical clearance. No CM needs identified at this time. Patient will return home when stable. Please consult  if needs arise. CM continues to follow and will assist as needed.

## 2023-01-16 NOTE — PROGRESS NOTES
Progress Note    Patient: Mars Plascencia MRN: [id number]  SSN: [id number]    YOB: 1985  Age: 44 y.o. Sex: male      Admit Date: 1/8/2023    LOS: 8 days     Assessment and Plan:   44 y.o. male with a PMH of EtOH pancreatitis complicated with pseudocyst s/p stent placement 11/9 removed 11/30 and STEMI s/p PCI with TULIO who presented to [facility] ER with a complaint of recurrent epigastric pain with associated N/V. Necrotizing pancreatitis  Pancreatic pseudocyst  - Continue tube feeds  - Continue IVF  - PRN analgesia  - PPI BID IV  - Tolerating clear liquid diet, will advance to full liquid  - GI following, appreciate recommendation     Microcytic anemia  - Monitor H&H  - Transfuse if hemoglobin lower than 7      History of ST elevation myocardial infarction (STEMI)  CAD involving native coronary artery of native heart  - Continue aspirin, ticagrelor and statin     Primary hypertension  - Continue metoprolol     Depression  - Continue wellbutrin     DVT PPx: enoxaparin      Subjective:   44 y.o. male with a PMH of EtOH pancreatitis complicated with pseudocyst s/p stent placement 11/9 removed 11/30 and STEMI s/p PCI with TULIO who presented to [facility] ER with a complaint of recurrent epigastric pain with associated N/V. Patient seen and examined at bedside. Reports better. Abdominal pain has been improved. Denies nausea or vomiting. Diet has been advanced to full liquid diet. Objective:     Vitals:    01/16/23 0634 01/16/23 0756 01/16/23 1006 01/16/23 1241   BP:  114/74     Pulse:  77     Resp: 18 18 14 16   Temp:  98.2 °F (36.8 °C)     TempSrc:  Oral     SpO2:  97%     Weight:       Height:            Intake and Output:  Current Shift: No intake/output data recorded.   Last three shifts: 01/14 1901 - 01/16 0700  In: 160   Out: -     ROS  10 ROS negative except from stated on subjective    Physical Exam:   General: Alert, oriented, NAD  HEENT: NC/AT, EOM are intact  Neck: supple, no JVD  Cardiovascular: RRR, S1, S2, no murmurs  Respiratory: Lungs are clear, no wheezes or rales  Abdomen: Soft, NT, ND  Back: No CVA tenderness, no paraspinal tenderness  Extremities: LE without pedal edema, no erythema  Neuro: A&O, CN are intact, no focal deficits  Skin: no rash or ulcers  Psych: good mood and affect    Lab/Data Review:  I have personally reviewed patients laboratory data showing  Recent Results (from the past 24 hour(s))   Magnesium    Collection Time: 01/16/23  6:00 AM   Result Value Ref Range    Magnesium 2.2 1.8 - 2.4 mg/dL   Phosphorus    Collection Time: 01/16/23  6:00 AM   Result Value Ref Range    Phosphorus 2.8 2.5 - 4.5 MG/DL   CBC with Auto Differential    Collection Time: 01/16/23  6:00 AM   Result Value Ref Range    WBC 5.2 4.3 - 11.1 K/uL    RBC 4.01 (L) 4.23 - 5.6 M/uL    Hemoglobin 10.0 (L) 13.6 - 17.2 g/dL    Hematocrit 30.8 (L) 41.1 - 50.3 %    MCV 76.8 (L) 82 - 102 FL    MCH 24.9 (L) 26.1 - 32.9 PG    MCHC 32.5 31.4 - 35.0 g/dL    RDW 16.9 (H) 11.9 - 14.6 %    Platelets 391 (H) 351 - 450 K/uL    MPV 9.7 9.4 - 12.3 FL    nRBC 0.00 0.0 - 0.2 K/uL    Differential Type AUTOMATED      Seg Neutrophils 72 43 - 78 %    Lymphocytes 15 13 - 44 %    Monocytes 12 4.0 - 12.0 %    Eosinophils % 1 0.5 - 7.8 %    Basophils 0 0.0 - 2.0 %    Immature Granulocytes 0 0.0 - 5.0 %    Segs Absolute 3.7 1.7 - 8.2 K/UL    Absolute Lymph # 0.8 0.5 - 4.6 K/UL    Absolute Mono # 0.6 0.1 - 1.3 K/UL    Absolute Eos # 0.1 0.0 - 0.8 K/UL    Basophils Absolute 0.0 0.0 - 0.2 K/UL    Absolute Immature Granulocyte 0.0 0.0 - 0.5 K/UL   Comprehensive Metabolic Panel    Collection Time: 01/16/23  6:00 AM   Result Value Ref Range    Sodium 136 133 - 143 mmol/L    Potassium 4.0 3.5 - 5.1 mmol/L    Chloride 103 101 - 110 mmol/L    CO2 22 21 - 32 mmol/L    Anion Gap 11 2 - 11 mmol/L    Glucose 127 (H) 65 - 100 mg/dL    BUN 7 6 - 23 MG/DL    Creatinine 0.80 0.8 - 1.5 MG/DL    Est, Glom Filt Rate >60 >60 ml/min/1.73m2 Calcium 9.6 8.3 - 10.4 MG/DL    Total Bilirubin 0.3 0.2 - 1.1 MG/DL    ALT 23 12 - 65 U/L    AST 17 15 - 37 U/L    Alk Phosphatase 69 50 - 136 U/L    Total Protein 6.8 6.3 - 8.2 g/dL    Albumin 2.5 (L) 3.5 - 5.0 g/dL    Globulin 4.3 2.8 - 4.5 g/dL    Albumin/Globulin Ratio 0.6 0.4 - 1.6          Image:  I have personally reviewed patients imaging showing  XR ACUTE ABD SERIES CHEST 1 VW   Final Result   1. No bowel obstruction. 2. Feeding tube tip likely within left upper quadrant jejunum. 3. Constipation. XR CHEST PORTABLE   Final Result      1. No evidence of pneumonia or pulmonary edema. FL INTRO LONG GI TUBE   Final Result   Fluoroscopic guided manipulation of feeding tube from the stomach to   the jejunum. XR ABDOMEN (KUB) (SINGLE AP VIEW)   Final Result   Feeding tube tip is projected over the upper body of stomach. Length   is adequate to reach the ligament of Treitz. CT ABDOMEN PELVIS W IV CONTRAST Additional Contrast? None   Final Result      1. Necrotizing pancreatitis, with associated acute necrotic collection measuring   up to 2.5 x 4.3 x 3.2 cm. The necrotic collection appears more defined when   compared to January 1, 2023 study. 2. Mass effect and narrowing of the splenic vein near the confluence with the   SMV. 3. Circumferential wall thickening of the distal stomach, adjacent to the   pancreatic abnormality. Findings likely reactive gastritis. 4. Large stool volume in the colon. No evidence of colitis, diverticulitis or   bowel obstruction.            Current Facility-Administered Medications   Medication Dose Route Frequency Provider Last Rate Last Admin    HYDROmorphone (DILAUDID) injection 0.5 mg  0.5 mg IntraVENous Q4H PRN Ozzie Corado MD   0.5 mg at 01/16/23 1006    HYDROcodone-acetaminophen (NORCO) 7.5-325 MG per tablet 1 tablet  1 tablet Oral Q6H PRN Ozzie Corado MD   1 tablet at 01/16/23 1241    polyethylene glycol (GLYCOLAX) packet 17 g  17 g Per J Tube BID Anthony Sánchez MD   17 g at 01/16/23 1011    hyoscyamine (LEVSIN/SL) sublingual tablet 125 mcg  125 mcg SubLINGual TID Anthony Sánchez MD   125 mcg at 01/16/23 1006    phenol 1.4 % mouth spray 1 spray  1 spray Mouth/Throat Q2H PRN Aubrey Olivera MD   1 spray at 01/12/23 0124    pantoprazole (PROTONIX) 40 mg in sodium chloride (PF) 0.9 % 10 mL injection  40 mg IntraVENous BID ANJALI Crowley   40 mg at 01/16/23 1010    potassium chloride (KLOR-CON M) extended release tablet 40 mEq  40 mEq Oral PRN Anthony Sánchez MD        Or    potassium bicarb-citric acid (EFFER-K) effervescent tablet 40 mEq  40 mEq Oral PRN Anthony Sánchez MD        Or    potassium chloride 10 mEq/100 mL IVPB (Peripheral Line)  10 mEq IntraVENous PRN Anthony Sánchez MD        magnesium sulfate 2000 mg in 50 mL IVPB premix  2,000 mg IntraVENous PRN Anthony Sánchez MD        sodium phosphate 10 mmol in sodium chloride 0.9 % 250 mL IVPB  10 mmol IntraVENous PRN Anthony Sánchez MD        Or    sodium phosphate 15 mmol in sodium chloride 0.9 % 250 mL IVPB  15 mmol IntraVENous PRN Anthony Sánchez MD        Or    sodium phosphate 20 mmol in sodium chloride 0.9 % 500 mL IVPB  20 mmol IntraVENous PRN Anthony Sánchez MD        thiamine tablet 100 mg  100 mg Oral Daily Anthony Sánchez MD   100 mg at 01/16/23 1006    diatrizoate meglumine-sodium (GASTROGRAFIN) 66-10 % solution 30 mL  30 mL Per NG tube ONCE PRN Anthony Sánchez MD   30 mL at 01/11/23 1655    aluminum & magnesium hydroxide-simethicone (MAALOX) 580-694-89 MG/5ML suspension 30 mL  30 mL Oral Q6H PRN Terrance Sevilla MD   30 mL at 01/11/23 1323    atorvastatin (LIPITOR) tablet 40 mg  40 mg Oral Nightly Laura Calhoun DO   40 mg at 01/15/23 2021    buPROPion [facility]) extended release tablet 100 mg  100 mg Oral BID Laura Calhoun DO   100 mg at 01/16/23 1008 metoprolol tartrate (LOPRESSOR) tablet 25 mg  25 mg Oral BID [facility], DO   25 mg at 01/16/23 1006    [Held by provider] pantoprazole (PROTONIX) tablet 40 mg  40 mg Oral BID [facility], DO   40 mg at 01/11/23 2121    simethicone (MYLICON) chewable tablet 80 mg  80 mg Oral Q6H PRN [facility], DO        ticagrelor (BRILINTA) tablet 90 mg  90 mg Oral BID [facility], DO   90 mg at 01/16/23 1006    sodium chloride flush 0.9 % injection 5-40 mL  5-40 mL IntraVENous 2 times per day [facility], DO   10 mL at 01/16/23 1049    sodium chloride flush 0.9 % injection 5-40 mL  5-40 mL IntraVENous PRN [facility], DO   5 mL at 01/11/23 0928    0.9 % sodium chloride infusion   IntraVENous PRN [facility], DO        enoxaparin (LOVENOX) injection 40 mg  40 mg SubCUTAneous Daily [facility], DO   40 mg at 01/16/23 1053    ondansetron (ZOFRAN-ODT) disintegrating tablet 4 mg  4 mg Oral Q8H PRN [facility], DO   4 mg at 01/08/23 2324    Or    ondansetron (ZOFRAN) injection 4 mg  4 mg IntraVENous Q6H PRN [facility], DO   4 mg at 01/11/23 1553    acetaminophen (TYLENOL) tablet 650 mg  650 mg Oral Q6H PRN [facility], DO   650 mg at 01/15/23 1236    Or    acetaminophen (TYLENOL) suppository 650 mg  650 mg Rectal Q6H PRN [facility], DO        aspirin chewable tablet 81 mg  81 mg Oral Daily [facility], DO   81 mg at 01/16/23 1006    lactated ringers infusion   IntraVENous Continuous MELISSA Murcia -  mL/hr at 01/16/23 0552 New Bag at 01/16/23 [address] problems     Patient Active Problem List   Diagnosis    Primary hypertension    Chronic pancreatitis ([facility] [address]    Al[facility] disorder, severe, in early remission, [facility]    Attention deficit hyperactivity disorder (ADHD), combined type    Tobacco abuse [Z72.0 (ICD-10-CM)]    Pancreatic pseudocyst    History of ST elevation myocardial infarction (STEMI)    Coronary artery disease involving native coronary artery of native heart    Acute on chronic pancreatitis (HCC)    Dependence on nicotine from cigarettes    GERD (gastroesophageal reflux disease)    Necrotizing pancreatitis        I have reviewed, updated, and verified this note's content and spent 38 minutes of my 42 minutes visit performing counseling and coordination of care regarding medical management.        Signed By: Fabiola Doyle MD     January 16, 2023

## 2023-01-16 NOTE — PROGRESS NOTES
Comprehensive Nutrition Assessment    Type and Reason for Visit: Reassess  Malnutrition Screening Tool: Malnutrition Screen  Have you recently lost weight without trying?: Unsure of amount of weight loss (2 points)  Have you been eating poorly because of a decreased appetite?: Yes (1 point)  Malnutrition Screening Tool Score: 3  Tube Feeding Management (GI)     Nutrition Recommendations/Plan:   Enteral Nutrition:   Enteral Access: Nasojejunal  Initiate  Formula: Peptide Based (Vital AF 1.2 Richie)  Goal Rate: Continuous 70 ml/hr  Initiate  Water flush  80 ml every 3 hours  Modulars: None not indicated at this time   Enteral regimen at above goal to provide per 24 hours:  2016 calories, 126 grams protein and 2002 ml free fluid. Above regimen: Intended to meet macronutrient goals  IV Fluids:  Continue per MD order  Labs:   EN labs: BMP daily, Mg daily x 3 days at initiation then MWF and Phos daily x 3 days at initiation then MWF. POC Glucoses/SSI Not indicated  Nutrition Related Medication Management:  Electrolyte Replacement Protocol PRN Initiate for Potassium, Phosphorus, and Magnesium  Thiamine 100 mg daily x 7 days  Bowel Regimen Not indicated at this time  Meals and Snacks:  Diet: Continue current diet and advance as medically appropriate  Nutrition Supplement Therapy:  Medical food supplement therapy:  Initiate Ensure High Protein three times per day (this provides 160 kcal and 16 grams protein per bottle)     Malnutrition Assessment:  Malnutrition Status: At risk for malnutrition (Comment) (recurrent etoh pancreatitis, prolonged NPO/CLD status)    No candace wasting  Nutrition Assessment:  Nutrition History: Patient reports at least 3 meals per day prior to recent hospitalizations. He states since recent discharge he has been on a CLD at home. He reports UBW ~185# prior to recurrent hospitalizations over the last few months. He does admit that he has re-gained some weight when RD reviewing wt history.  This is consistent with EMR. Wt history: 7/30/22 185#, 10/11/2022 184#, 11/17/22 166#, 12/1/22 167#, 12/13/2022 177#     Do You Have Any Cultural, Baptist, or Ethnic Food Preferences?: No   Nutrition Background:       Patient with PMH significant for etoh abuse, pancreatitis complicated by pseudocyst s/p gastrotomy with axios stent, CAD, STEMI, GERD, HTN. He has recurrent admissions for pancreatitis, most recently 1/1/23 and discharge on 1/5/23. He presented back with abdominal pain. Nutrition Interval:  Diet advanced to CLD 1/10, then to FLD later that day. Diet advanced to GI soft 1/11 prior to making NPO again with rising lipase. Now s/p endoscopic ultrasound with gastric biopsies. GI now recommending NJT placement with post pyloric TF. NGT placed and advanced to duodenum in IR with peristalsis to further advance to jejunum. TF consult placed per GI. Noted TF held during progression to goal r/t pain. Resumed 4 hrs later. Diet advanced to CLD 1/15. Then progressed to FLD today. Patient seen and discussed with Anjali Rouse and Trung Mckee, RNs. TF currently infusing at 50 ml/hr, unknown reason why not advanced to goal. RN to advance to 60 ml/hr now and then goal in 4 hrs. Perfect Serve with Dr. Sanna Mckeon and will continue TF today until PO diet tolerance established. Patient ate most of CLD today. He states abdominal pain improved. Looking forward to FLD. Discussed starting a supplement and he states that he likes Ensure if chocolate. Discussed starting with lower fat option until diet tolerance established. He voiced understanding. Bed scale weight obtained.      Abdominal Status (last documented):   Last BM (including prior to admit): 01/15/23, GI Symptoms: Cramping   Pertinent Medications: Bentyl, protonix, glycolax  Continuous: none  IVF: LR @ 100 ml/hr  Electrolyte Replacement:  none  PRN: Maalox (utilizing daily), zofran (utliizing daily)  Pertinent Labs:   Lab Results   Component Value Date/Time     01/16/2023 06:00 AM    K 4.0 01/16/2023 06:00 AM     01/16/2023 06:00 AM    CO2 22 01/16/2023 06:00 AM    BUN 7 01/16/2023 06:00 AM    CREATININE 0.80 01/16/2023 06:00 AM    GLUCOSE 127 01/16/2023 06:00 AM    CALCIUM 9.6 01/16/2023 06:00 AM    PHOS 2.8 01/16/2023 06:00 AM    MG 2.2 01/16/2023 06:00 AM      Remarkable for: mildly elevated glucose      Current Nutrition Therapies:  ADULT TUBE FEEDING; Nasojejunal; Peptide Based; Continuous; 10; Yes; 10; Q 8 hours; 70; 80; Q 3 hours  ADULT DIET; Full Liquid  Current Tube Feeding (TF) Orders:  Feeding Route: Nasojejunal  Formula: Peptide Based  Schedule: Continuous  Feeding Regimen: 70 ml/hr  Additives/Modulars: None  Water Flushes: 80 ml Q3 hrs  Current TF & Flush Orders Provides: ~70 goal  Goal TF & Flush Orders Provides: 2016 calories, 126 grams protein and 2002 ml free fluid      Current Intake:   Average Meal Intake: Unable to assess        Anthropometric Measures:  Height: 5' 9\" (175.3 cm)  Current Body Wt: 161 lb 2.5 oz (73.1 kg) (1/16), Weight source: Bed Scale  BMI: 23.8, Normal Weight (BMI 18.5-24. 9)  Admission Body Weight: 177 lb 14.6 oz (80.7 kg) (1/8 stated)  Ideal Body Weight (Kg) (Calculated): 73 kg (160 lbs), 106.5 %  Usual Body Wt: 185 lb (83.9 kg) (per pt and review of EMR), Percent weight change: -7.9       Edema:    BMI Category Normal Weight (BMI 18.5-24. 9)  Estimated Daily Nutrient Needs:  Energy (kcal/day): 6980-8265 (Kcal/kg (25-30) Weight used: 77.3 kg Current  Protein (g/day): 77-93 (1-1.2 g/kg) Weight Used: (Current) 77.3 kg  Fluid (ml/day):   (1 ml/kcal)    Nutrition Diagnosis:   Inadequate oral intake related to altered GI function as evidenced by  (NJT for nutrition with slow advance of diet)  Nutrition Interventions:   Food and/or Nutrient Delivery: Continue Current Diet, Start Oral Nutrition Supplement, Continue Current Tube Feeding     Coordination of Nutrition Care: Continue to monitor while inpatient       Goals:   Previous Goal Met: Progressing toward Goal(s)  Active Goal:  (Maintain TF until diet advance tolerance established)       Nutrition Monitoring and Evaluation:      Food/Nutrient Intake Outcomes: Diet Advancement/Tolerance, Food and Nutrient Intake, Supplement Intake, Enteral Nutrition Intake/Tolerance  Physical Signs/Symptoms Outcomes: Biochemical Data    Discharge Planning:     Too soon to determine    Livier Delarosa

## 2023-01-16 NOTE — PROGRESS NOTES
[facility]   DAILY PROGRESS NOTE    Admit Date:  1/8/2023    CC:  Recurrent acute pancreatitis    Problem List:  Principal Problem:    Necrotizing pancreatitis  Active Problems:    Pancreatic pseudocyst    History of ST elevation myocardial infarction (STEMI)    Coronary artery disease involving native coronary artery of native heart    Acute on chronic pancreatitis ([facility] [address]    Primary hypertension  Resolved Problems:    * No resolved hospital problems. *    42 y.o. 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 He denies recent EtOH use and quit smoking tobacco 3 weeks ago. He is feeling better today. Tolerating clear liquid diet without nausea/ vomiting. Recurrent Acute Pancreatitis    - Okay to advance diet to full liquid diet  - He had a BM yesterday  - Continue MiraLax BID  - Continue supportive care otherwise per primary team    Wendy Rico MD   [facility]      Subjective:     Patient is feeling better today. Tolerating clear liquid diet without nausea/ vomiting.     Medications:   Current Facility-Administered Medications   Medication Dose Route Frequency Provider Last Rate Last Admin    HYDROmorphone (DILAUDID) injection 0.5 mg  0.5 mg IntraVENous Q4H PRN Birgit Segura MD   0.5 mg at 01/16/23 1424    HYDROcodone-acetaminophen (Francies Reek) 7.5-325 MG per tablet 1 tablet  1 tablet Oral Q6H PRN Birgit Segura MD   1 tablet at 01/16/23 1241    polyethylene glycol (GLYCOLAX) packet 17 g  17 g Per J Tube BID Birgit Segura MD   17 g at 01/16/23 1011    hyoscyamine (LEVSIN/SL) sublingual tablet 125 mcg  125 mcg SubLINGual TID Kristan Martinez MD   125 mcg at 01/16/23 1424    phenol 1.4 % mouth spray 1 spray  1 spray Mouth/Throat Q2H PRN Shanice Tubbs MD   1 spray at 01/12/23 0124    pantoprazole (PROTONIX) 40 mg in sodium chloride (PF) 0.9 % 10 mL injection  40 mg IntraVENous BID ANJALI Tierney   40 mg at 01/16/23 1010    potassium chloride (KLOR-CON M) extended release tablet 40 mEq  40 mEq Oral PRN Kristan Martinez MD        Or    potassium bicarb-citric acid (EFFER-K) effervescent tablet 40 mEq  40 mEq Oral PRN Kristan Martinez MD        Or    potassium chloride 10 mEq/100 mL IVPB (Peripheral Line)  10 mEq IntraVENous PRN Kristan Martinez MD        magnesium sulfate 2000 mg in 50 mL IVPB premix  2,000 mg IntraVENous PRN Kristan Martinez MD        sodium phosphate 10 mmol in sodium chloride 0.9 % 250 mL IVPB  10 mmol IntraVENous PRN Kristan Martinez MD        Or    sodium phosphate 15 mmol in sodium chloride 0.9 % 250 mL IVPB  15 mmol IntraVENous PRN Kristan Martinez MD        Or    sodium phosphate 20 mmol in sodium chloride 0.9 % 500 mL IVPB  20 mmol IntraVENous PRN Kristan Martinez MD        thiamine tablet 100 mg  100 mg Oral Daily Kristan Martinez MD   100 mg at 01/16/23 1006    diatrizoate meglumine-sodium (GASTROGRAFIN) 66-10 % solution 30 mL  30 mL Per NG tube ONCE PRN Kristan Martinez MD   30 mL at 01/11/23 1655    aluminum & magnesium hydroxide-simethicone (MAALOX) 262-152-76 MG/5ML suspension 30 mL  30 mL Oral Q6H PRN Shobha Mckenzie MD   30 mL at 01/11/23 1323    atorvastatin (LIPITOR) tablet 40 mg  40 mg Oral Nightly Leldon [place], DO   40 mg at 01/15/23 2021    buPROPion [facility]) extended release tablet 100 mg  100 mg Oral BID Leldon [place], DO   100 mg at 01/16/23 1006    metoprolol tartrate (LOPRESSOR) tablet 25 mg  25 mg Oral BID Leldon Sharon, DO   25 mg at 01/16/23 1006    [Held by provider] pantoprazole (PROTONIX) tablet 40 mg  40 mg Oral BID Leldon Sharon, DO   40 mg at 01/11/23 2121    simethicone (MYLICON) chewable tablet 80 mg  80 mg Oral Q6H PRN Leldon Sharon, DO        ticagrelor (BRILINTA) tablet 90 mg  90 mg Oral BID Leldon Sharon, DO   90 mg at 01/16/23 1006    sodium chloride flush 0.9 % injection 5-40 mL  5-40 mL IntraVENous 2 times per day Leldon Sharon, DO   10 mL at 01/16/23 1049    sodium chloride flush 0.9 % injection 5-40 mL  5-40 mL IntraVENous PRN Leldon Sharon, DO   5 mL at 01/11/23 0928    0.9 % sodium chloride infusion   IntraVENous PRN Leldon Sharon, DO        enoxaparin (LOVENOX) injection 40 mg  40 mg SubCUTAneous Daily Leldon Sharon, DO   40 mg at 01/16/23 1053    ondansetron (ZOFRAN-ODT) disintegrating tablet 4 mg  4 mg Oral Q8H PRN Leldon Sharon, DO   4 mg at 01/08/23 2324    Or    ondansetron (ZOFRAN) injection 4 mg  4 mg IntraVENous Q6H PRN Leldon Sharon, DO   4 mg at 01/11/23 1553    acetaminophen (TYLENOL) tablet 650 mg  650 mg Oral Q6H PRN Leldon Sharon, DO   650 mg at 01/15/23 1236    Or    acetaminophen (TYLENOL) suppository 650 mg  650 mg Rectal Q6H PRN Leldon Sharon, DO        aspirin chewable tablet 81 mg  81 mg Oral Daily Leldon Sharon, DO   81 mg at 01/16/23 1006    lactated ringers infusion   IntraVENous Continuous MELISSA Mcmillan -  mL/hr at 01/16/23 0552 New Bag at 01/16/23 0552       Review of Systems:  ROS was obtained, with pertinent positives as listed above. No chest pain or SOB. Diet:      Objective:   Vitals:  /78   Pulse 77   Temp 98.1 °F (36.7 °C) (Oral)   Resp 18   Ht 5' 9\" (1.753 m)   Wt 161 lb 3.2 oz (73.1 kg)   SpO2 100%   BMI 23.81 kg/m²   Intake/Output:  No intake/output data recorded.   01/14 1901 - 01/16 0700  In: 160   Out: -   Exam:  General appearance: alert, cooperative, no distress  Lungs: clear to auscultation bilaterally anteriorly  Heart: regular rate and rhythm  Abdomen: soft, non-tender.  Bowel sounds normal. Mild TTP to the LUQ  Extremities: extremities normal, atraumatic, no cyanosis or edema  Neuro:  alert and oriented    Data Review (Labs):    Recent Labs     01/14/23  0436 01/15/23  0557 01/16/23  0600   WBC 7.2  --  5.2   HGB 10.4*  --  10.0*   HCT 31.9*  --  30.8*     --  546*   MCV 77.2*  --  76.8*   NA  --   --  136   K  --   --  4.0   CL  --   --  103   CO2  --   --  22   BUN  --   --  7   CREATININE  --   --  0.80   CALCIUM  --   --  9.6   MG  --   --  2.2   PHOS  --   --  2.8   GLUCOSE  --   --  127*   AST  --   --  17   ALT  --   --  23   BILITOT  --   --  0.3   ALBUMIN  --   --  0.6   PROT  --   --  6.8   LIPASE 1,147* 1,618*  --          Tesha Rangel MD  [facility]

## 2023-01-16 NOTE — PROGRESS NOTES
Pt is alert and oriented x4. Pt in bed, resting. Bed in low position, wheels locked, call light within reach, instructed to call with any needs. Hourly rounds completed this shift. NAD noted. VSS. Pt has c/o pain, treated per MAR. IV is infusing, and dressing is clean, dry, and intact. Pt tolerating CL diet. TF currently infusing at 50mL/hr, due to be increased again at 1130 today. Report to be given to day shift nurse.

## 2023-01-17 PROCEDURE — A4216 STERILE WATER/SALINE, 10 ML: HCPCS | Performed by: PHYSICIAN ASSISTANT

## 2023-01-17 PROCEDURE — 6360000002 HC RX W HCPCS: Performed by: PHYSICIAN ASSISTANT

## 2023-01-17 PROCEDURE — 6360000002 HC RX W HCPCS: Performed by: FAMILY MEDICINE

## 2023-01-17 PROCEDURE — 6360000002 HC RX W HCPCS: Performed by: INTERNAL MEDICINE

## 2023-01-17 PROCEDURE — 2580000003 HC RX 258: Performed by: FAMILY MEDICINE

## 2023-01-17 PROCEDURE — 6370000000 HC RX 637 (ALT 250 FOR IP): Performed by: FAMILY MEDICINE

## 2023-01-17 PROCEDURE — 6370000000 HC RX 637 (ALT 250 FOR IP): Performed by: INTERNAL MEDICINE

## 2023-01-17 PROCEDURE — 2580000003 HC RX 258: Performed by: NURSE PRACTITIONER

## 2023-01-17 PROCEDURE — C9113 INJ PANTOPRAZOLE SODIUM, VIA: HCPCS | Performed by: PHYSICIAN ASSISTANT

## 2023-01-17 PROCEDURE — 1100000000 HC RM PRIVATE

## 2023-01-17 PROCEDURE — 2580000003 HC RX 258: Performed by: PHYSICIAN ASSISTANT

## 2023-01-17 RX ADMIN — TICAGRELOR 90 MG: 90 TABLET ORAL at 08:28

## 2023-01-17 RX ADMIN — SODIUM CHLORIDE, PRESERVATIVE FREE 10 ML: 5 INJECTION INTRAVENOUS at 08:29

## 2023-01-17 RX ADMIN — Medication 100 MG: at 08:28

## 2023-01-17 RX ADMIN — METOPROLOL TARTRATE 25 MG: 25 TABLET, FILM COATED ORAL at 08:28

## 2023-01-17 RX ADMIN — POLYETHYLENE GLYCOL 3350 17 G: 17 POWDER, FOR SOLUTION ORAL at 10:09

## 2023-01-17 RX ADMIN — SODIUM CHLORIDE, SODIUM LACTATE, POTASSIUM CHLORIDE, AND CALCIUM CHLORIDE: 600; 310; 30; 20 INJECTION, SOLUTION INTRAVENOUS at 02:35

## 2023-01-17 RX ADMIN — TICAGRELOR 90 MG: 90 TABLET ORAL at 20:06

## 2023-01-17 RX ADMIN — HYDROMORPHONE HYDROCHLORIDE 0.5 MG: 1 INJECTION, SOLUTION INTRAMUSCULAR; INTRAVENOUS; SUBCUTANEOUS at 08:30

## 2023-01-17 RX ADMIN — HYDROCODONE BITARTRATE AND ACETAMINOPHEN 1 TABLET: 7.5; 325 TABLET ORAL at 10:10

## 2023-01-17 RX ADMIN — HYOSCYAMINE SULFATE 125 MCG: 0.12 TABLET ORAL at 20:06

## 2023-01-17 RX ADMIN — HYOSCYAMINE SULFATE 125 MCG: 0.12 TABLET ORAL at 14:19

## 2023-01-17 RX ADMIN — HYDROCODONE BITARTRATE AND ACETAMINOPHEN 1 TABLET: 7.5; 325 TABLET ORAL at 02:35

## 2023-01-17 RX ADMIN — ATORVASTATIN CALCIUM 40 MG: 40 TABLET, FILM COATED ORAL at 20:06

## 2023-01-17 RX ADMIN — BUPROPION HYDROCHLORIDE 100 MG: 100 TABLET, FILM COATED, EXTENDED RELEASE ORAL at 20:06

## 2023-01-17 RX ADMIN — POLYETHYLENE GLYCOL 3350 17 G: 17 POWDER, FOR SOLUTION ORAL at 20:07

## 2023-01-17 RX ADMIN — SODIUM CHLORIDE, PRESERVATIVE FREE 10 ML: 5 INJECTION INTRAVENOUS at 20:08

## 2023-01-17 RX ADMIN — ASPIRIN 81 MG 81 MG: 81 TABLET ORAL at 08:27

## 2023-01-17 RX ADMIN — BUPROPION HYDROCHLORIDE 100 MG: 100 TABLET, FILM COATED, EXTENDED RELEASE ORAL at 08:27

## 2023-01-17 RX ADMIN — HYDROMORPHONE HYDROCHLORIDE 0.5 MG: 1 INJECTION, SOLUTION INTRAMUSCULAR; INTRAVENOUS; SUBCUTANEOUS at 03:42

## 2023-01-17 RX ADMIN — SODIUM CHLORIDE, PRESERVATIVE FREE 40 MG: 5 INJECTION INTRAVENOUS at 08:28

## 2023-01-17 RX ADMIN — HYDROMORPHONE HYDROCHLORIDE 0.5 MG: 1 INJECTION, SOLUTION INTRAMUSCULAR; INTRAVENOUS; SUBCUTANEOUS at 17:10

## 2023-01-17 RX ADMIN — HYOSCYAMINE SULFATE 125 MCG: 0.12 TABLET ORAL at 08:28

## 2023-01-17 RX ADMIN — ENOXAPARIN SODIUM 40 MG: 100 INJECTION SUBCUTANEOUS at 08:28

## 2023-01-17 RX ADMIN — SODIUM CHLORIDE, PRESERVATIVE FREE 40 MG: 5 INJECTION INTRAVENOUS at 20:07

## 2023-01-17 RX ADMIN — METOPROLOL TARTRATE 25 MG: 25 TABLET, FILM COATED ORAL at 20:06

## 2023-01-17 RX ADMIN — HYDROCODONE BITARTRATE AND ACETAMINOPHEN 1 TABLET: 7.5; 325 TABLET ORAL at 18:35

## 2023-01-17 ASSESSMENT — PAIN DESCRIPTION - LOCATION
LOCATION: ABDOMEN

## 2023-01-17 ASSESSMENT — PAIN - FUNCTIONAL ASSESSMENT
PAIN_FUNCTIONAL_ASSESSMENT: ACTIVITIES ARE NOT PREVENTED

## 2023-01-17 ASSESSMENT — PAIN DESCRIPTION - FREQUENCY
FREQUENCY: CONTINUOUS

## 2023-01-17 ASSESSMENT — PAIN SCALES - GENERAL
PAINLEVEL_OUTOF10: 8
PAINLEVEL_OUTOF10: 6
PAINLEVEL_OUTOF10: 4
PAINLEVEL_OUTOF10: 7
PAINLEVEL_OUTOF10: 4
PAINLEVEL_OUTOF10: 4
PAINLEVEL_OUTOF10: 7
PAINLEVEL_OUTOF10: 8

## 2023-01-17 ASSESSMENT — PAIN DESCRIPTION - ONSET
ONSET: ON-GOING

## 2023-01-17 ASSESSMENT — PAIN DESCRIPTION - DESCRIPTORS
DESCRIPTORS: ACHING;DISCOMFORT
DESCRIPTORS: CRAMPING
DESCRIPTORS: ACHING;DISCOMFORT
DESCRIPTORS: CRAMPING
DESCRIPTORS: ACHING;DISCOMFORT

## 2023-01-17 ASSESSMENT — PAIN DESCRIPTION - ORIENTATION
ORIENTATION: LOWER
ORIENTATION: MID
ORIENTATION: MID
ORIENTATION: LOWER

## 2023-01-17 NOTE — PROGRESS NOTES
[facility]   DAILY PROGRESS NOTE    Admit Date:  1/8/2023    CC:  Recurrent acute pancreatitis    Problem List:  Principal Problem:    Necrotizing pancreatitis  Active Problems:    Pancreatic pseudocyst    History of ST elevation myocardial infarction (STEMI)    Coronary artery disease involving native coronary artery of native heart    Acute on chronic pancreatitis ([facility] [address]    Primary hypertension  Resolved Problems:    * No resolved hospital problems. *    Mr. Mars Plascencia is a 44 y.o. 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 Shea Eng for recurrent pancreatitis with known necrosis in pancreatic tail after recent discharge on 1/5/23 for the same complaints. He denies recent EtOH use and quit smoking tobacco 3 weeks ago. GJ tube got dislodged into the stomach overnight. Patient tolerating PO intake. Pain better. Recurrent Acute Pancreatitis/ Necrotizing Pancreatitis  Chronic Constipation     - Okay to advance diet to regular diet  - Continue MiraLax BID  - Continue supportive care otherwise per primary team  - Okay to hold on replacing [facility] for now as he is tolerating PO intake. I am hopeful that the tube can actually be removed soon. - Advised on alcohol and tobacco cessation       Ros Black MD   [facility]  Subjective:     Patient sneezed and dislodged his tube last night per nursing. KUB shows that it is coiled in the stomach.     Medications:   Current Facility-Administered Medications   Medication Dose Route Frequency Provider Last Rate Last Admin    HYDROmorphone (DILAUDID) injection 0.5 mg  0.5 mg IntraVENous Q8H PRN Lorena Aguilar MD HYDROcodone-acetaminophen (NORCO) 7.5-325 MG per tablet 1 tablet  1 tablet Oral Q6H PRN Erlin Villegas MD   1 tablet at 01/17/23 1010    polyethylene glycol (GLYCOLAX) packet 17 g  17 g Per J Tube BID Erlin Villegas MD   17 g at 01/17/23 1009    hyoscyamine (LEVSIN/SL) sublingual tablet 125 mcg  125 mcg SubLINGual TID Erlin Villegas MD   125 mcg at 01/17/23 0828    phenol 1.4 % mouth spray 1 spray  1 spray Mouth/Throat Q2H PRN Zamzam Kaufman MD   1 spray at 01/12/23 0124    pantoprazole (PROTONIX) 40 mg in sodium chloride (PF) 0.9 % 10 mL injection  40 mg IntraVENous BID ANJALI Angulo   40 mg at 01/17/23 0828    potassium chloride (KLOR-CON M) extended release tablet 40 mEq  40 mEq Oral PRN Erlin Villegas MD        Or    potassium bicarb-citric acid (EFFER-K) effervescent tablet 40 mEq  40 mEq Oral PRN Erlin Villegas MD        Or    potassium chloride 10 mEq/100 mL IVPB (Peripheral Line)  10 mEq IntraVENous PRN Erlin Villegas MD        magnesium sulfate 2000 mg in 50 mL IVPB premix  2,000 mg IntraVENous PRN Erlin Villegas MD        sodium phosphate 10 mmol in sodium chloride 0.9 % 250 mL IVPB  10 mmol IntraVENous PRN Erlin Villegas MD        Or    sodium phosphate 15 mmol in sodium chloride 0.9 % 250 mL IVPB  15 mmol IntraVENous PRN Erlin Villegas MD        Or    sodium phosphate 20 mmol in sodium chloride 0.9 % 500 mL IVPB  20 mmol IntraVENous PRN Erlin Villegas MD        thiamine tablet 100 mg  100 mg Oral Daily Erlin Villegas MD   100 mg at 01/17/23 0828    diatrizoate meglumine-sodium (GASTROGRAFIN) 66-10 % solution 30 mL  30 mL Per NG tube ONCE PRN Erlin Villegas MD   30 mL at 01/11/23 1655    aluminum & magnesium hydroxide-simethicone (MAALOX) 200-200-20 MG/5ML suspension 30 mL  30 mL Oral Q6H PRN Bentley Almeida MD   30 mL at 01/11/23 1323    atorvastatin (LIPITOR) tablet 40 mg  40 mg Oral Nightly  Roberto Mingo, DO   40 mg at 01/16/23 1942    buPROPion [facility]) extended release tablet 100 mg  100 mg Oral BID Roberto Mingo, DO   100 mg at 01/17/23 0827    metoprolol tartrate (LOPRESSOR) tablet 25 mg  25 mg Oral BID Roberto Mingo, DO   25 mg at 01/17/23 0223    [Held by provider] pantoprazole (PROTONIX) tablet 40 mg  40 mg Oral BID Roberto Mingo, DO   40 mg at 01/11/23 2121    simethicone (MYLICON) chewable tablet 80 mg  80 mg Oral Q6H PRN Roberto Mingo, DO        ticagrelor (BRILINTA) tablet 90 mg  90 mg Oral BID Roberto Mingo, DO   90 mg at 01/17/23 2192    sodium chloride flush 0.9 % injection 5-40 mL  5-40 mL IntraVENous 2 times per day Roberto Mingo, DO   10 mL at 01/17/23 0829    sodium chloride flush 0.9 % injection 5-40 mL  5-40 mL IntraVENous PRN Robreto Mingo, DO   5 mL at 01/11/23 0928    0.9 % sodium chloride infusion   IntraVENous PRN Roberto Mingo, DO        enoxaparin (LOVENOX) injection 40 mg  40 mg SubCUTAneous Daily Roberto Mingo, DO   40 mg at 01/17/23 0828    ondansetron (ZOFRAN-ODT) disintegrating tablet 4 mg  4 mg Oral Q8H PRN Roberto Mingo, DO   4 mg at 01/08/23 2324    Or    ondansetron (ZOFRAN) injection 4 mg  4 mg IntraVENous Q6H PRN Roberto Mingo, DO   4 mg at 01/11/23 1553    acetaminophen (TYLENOL) tablet 650 mg  650 mg Oral Q6H PRN Roberto Mingo, DO   650 mg at 01/15/23 1236    Or    acetaminophen (TYLENOL) suppository 650 mg  650 mg Rectal Q6H PRN Roberto Mingo, DO        aspirin chewable tablet 81 mg  81 mg Oral Daily Roberto Mingo, DO   81 mg at 01/17/23 0827    lactated ringers infusion   IntraVENous Continuous MELISSA Garcia -  mL/hr at 01/17/23 0235 New Bag at 01/17/23 0235       Review of Systems:  ROS was obtained, with pertinent positives as listed above. No chest pain or SOB.       Objective:   Vitals:  /76   Pulse 65   Temp 98.7 °F (37.1 °C) (Oral)   Resp 14   Ht 5' 9\" (1.753 m)   Wt 161 lb 3.2 oz (73.1 kg)   SpO2 100%   BMI 23.81 kg/m²   Intake/Output:  No intake/output data recorded. 01/15 1901 - 01/17 0700  In: 240   Out: -   Exam:  General appearance: alert, cooperative, no distress  HEENT: feeding tube in nare  Lungs: clear to auscultation bilaterally anteriorly  Heart: regular rate and rhythm  Abdomen: soft, non-tender.  Bowel sounds normal. No masses, no organomegaly  Extremities: extremities normal, atraumatic, no cyanosis or edema  Neuro:  alert and oriented    Data Review (Labs):    Recent Labs     01/15/23  0557 01/16/23  0600   WBC  --  5.2   HGB  --  10.0*   HCT  --  30.8*   PLT  --  546*   MCV  --  76.8*   NA  --  136   K  --  4.0   CL  --  103   CO2  --  22   BUN  --  7   CREATININE  --  0.80   CALCIUM  --  9.6   MG  --  2.2   PHOS  --  2.8   GLUCOSE  --  127*   AST  --  17   ALT  --  23   BILITOT  --  0.3   ALBUMIN  --  0.6   PROT  --  6.8   LIPASE 1,618*  --          Franci Jimenez MD  [facility]

## 2023-01-17 NOTE — PROGRESS NOTES
Pt called RN to room stating he sneezed and his NJT came out \"about 6 inches. \"  Upon entering pt's room, pt stated he pushed the NJT back in himself. CTF stopped and on call hospitalist Dr. Park Thorpe notified. Order received to obtain KUB to confirm placement.

## 2023-01-17 NOTE — PROGRESS NOTES
Progress Note    Patient: Mingo Tejada MRN: [id number]  SSN: [id number]    YOB: 1985  Age: 37 y.o.  Sex: male      Admit Date: 1/8/2023    LOS: 9 days     Assessment and Plan:   37 y.o. male with a PMH of EtOH pancreatitis complicated with pseudocyst s/p stent placement 11/9 removed 11/30 and STEMI s/p PCI with TULIO who presented to [place] [facility] with a complaint of recurrent epigastric pain with associated N/V.    Necrotizing pancreatitis  Pancreatic pseudocyst  - Tube feeding held 1/17  - Diet advanced to regular 1/17   - Decrease IV fluid continue IVF  - PRN analgesia, decrease Dilaudid to every 8 hours PRN  - PPI BID IV  - GI following, appreciate recommendation     Microcytic anemia  - Monitor H&H  - Transfuse if hemoglobin lower than 7      History of ST elevation myocardial infarction (STEMI)  CAD involving native coronary artery of native heart  - Continue aspirin, ticagrelor and statin     Primary hypertension  - Continue metoprolol     Depression  - Continue wellbutrin     DVT PPx: enoxaparin    Dispo: Hopefully can discharge patient tomorrow      Subjective:   37 y.o. male with a PMH of EtOH pancreatitis complicated with pseudocyst s/p stent placement 11/9 removed 11/30 and STEMI s/p PCI with TULIO who presented to [place] [facility] with a complaint of recurrent epigastric pain with associated N/V.     Subjective 1/17: Patient seen and examined at bedside.  Reports feeling better.  Denies nausea, vomiting or abdominal pain.  Tolerating full liquid diet.      Vitals:    01/17/23 0412 01/17/23 0818 01/17/23 0830 01/17/23 1010   BP:  116/76     Pulse:  65     Resp: 16 18 16 14   Temp:  98.7 °F (37.1 °C)     TempSrc:  Oral     SpO2:  100%     Weight:       Height:            Intake and Output:  Current Shift: No intake/output data recorded.  Last three shifts: 01/15 1901 - 01/17 0700  In: 240   Out: -     ROS  10 ROS negative except from stated on subjective    Physical Exam:   General: Alert,  oriented, NAD  HEENT: NC/AT, EOM are intact  Neck: supple, no JVD  Cardiovascular: RRR, S1, S2, no murmurs  Respiratory: Lungs are clear, no wheezes or rales  Abdomen: Soft, NT, ND  Back: No CVA tenderness, no paraspinal tenderness  Extremities: LE without pedal edema, no erythema  Neuro: A&O, CN are intact, no focal deficits  Skin: no rash or ulcers  Psych: good mood and affect    Lab/Data Review:  I have personally reviewed patients laboratory data showing  No results found for this or any previous visit (from the past 24 hour(s)). Image:  I have personally reviewed patients imaging showing  XR ABDOMEN (KUB) (SINGLE AP VIEW)   Final Result      1. Findings as described above. XR ACUTE ABD SERIES CHEST 1 VW   Final Result   1. No bowel obstruction. 2. Feeding tube tip likely within left upper quadrant jejunum. 3. Constipation. XR CHEST PORTABLE   Final Result      1. No evidence of pneumonia or pulmonary edema. FL INTRO LONG GI TUBE   Final Result   Fluoroscopic guided manipulation of feeding tube from the stomach to   the jejunum. XR ABDOMEN (KUB) (SINGLE AP VIEW)   Final Result   Feeding tube tip is projected over the upper body of stomach. Length   is adequate to reach the ligament of Treitz. CT ABDOMEN PELVIS W IV CONTRAST Additional Contrast? None   Final Result      1. Necrotizing pancreatitis, with associated acute necrotic collection measuring   up to 2.5 x 4.3 x 3.2 cm. The necrotic collection appears more defined when   compared to January 1, 2023 study. 2. Mass effect and narrowing of the splenic vein near the confluence with the   SMV. 3. Circumferential wall thickening of the distal stomach, adjacent to the   pancreatic abnormality. Findings likely reactive gastritis. 4. Large stool volume in the colon. No evidence of colitis, diverticulitis or   bowel obstruction.            Current Facility-Administered Medications   Medication Dose Route Frequency Provider Last Rate Last Admin    HYDROmorphone (DILAUDID) injection 0.5 mg  0.5 mg IntraVENous Q8H PRN Barbara Dumont MD        HYDROcodone-acetaminophen ([facility]) 7.5-325 MG per tablet 1 tablet  1 tablet Oral Q6H PRN Azalia Michaels MD   1 tablet at 01/17/23 1010    polyethylene glycol (GLYCOLAX) packet 17 g  17 g Per J Tube BID Azalia Michaels MD   17 g at 01/17/23 1009    hyoscyamine (LEVSIN/SL) sublingual tablet 125 mcg  125 mcg SubLINGual TID Azalia Michaels MD   125 mcg at 01/17/23 1419    phenol 1.4 % mouth spray 1 spray  1 spray Mouth/Throat Q2H PRN Slick Welch MD   1 spray at 01/12/23 0124    pantoprazole (PROTONIX) 40 mg in sodium chloride (PF) 0.9 % 10 mL injection  40 mg IntraVENous BID ANJALI Partida   40 mg at 01/17/23 0828    potassium chloride (KLOR-CON M) extended release tablet 40 mEq  40 mEq Oral PRN Azalia Michaels MD        Or    potassium bicarb-citric acid (EFFER-K) effervescent tablet 40 mEq  40 mEq Oral PRN Azalia Michaels MD        Or    potassium chloride 10 mEq/100 mL IVPB (Peripheral Line)  10 mEq IntraVENous PRN Azalia Michaels MD        magnesium sulfate 2000 mg in 50 mL IVPB premix  2,000 mg IntraVENous PRN Azalia Michaels MD        sodium phosphate 10 mmol in sodium chloride 0.9 % 250 mL IVPB  10 mmol IntraVENous PRN Azalia Michaels MD        Or    sodium phosphate 15 mmol in sodium chloride 0.9 % 250 mL IVPB  15 mmol IntraVENous PRN Azalia Michaels MD        Or    sodium phosphate 20 mmol in sodium chloride 0.9 % 500 mL IVPB  20 mmol IntraVENous PRN Azalia Michaels MD        thiamine tablet 100 mg  100 mg Oral Daily Azalia Michaels MD   100 mg at 01/17/23 0828    diatrizoate meglumine-sodium (GASTROGRAFIN) 66-10 % solution 30 mL  30 mL Per NG tube ONCE PRN Azalia Michaels MD   30 mL at 01/11/23 1655    aluminum & magnesium hydroxide-simethicone (MAALOX) 575-394-47 MG/5ML suspension 30 mL  30 mL Oral Q6H PRN Julia Real MD   30 mL at 01/11/23 1323    atorvastatin (LIPITOR) tablet 40 mg  40 mg Oral Nightly Chris Linker, DO   40 mg at 01/16/23 1942    buPROPion [facility]) extended release tablet 100 mg  100 mg Oral BID Chris Linker, DO   100 mg at 01/17/23 0827    metoprolol tartrate (LOPRESSOR) tablet 25 mg  25 mg Oral BID Chris Linker, DO   25 mg at 01/17/23 4229    [Held by provider] pantoprazole (PROTONIX) tablet 40 mg  40 mg Oral BID Chris Linker, DO   40 mg at 01/11/23 2121    simethicone (MYLICON) chewable tablet 80 mg  80 mg Oral Q6H PRN Chris Linker, DO        ticagrelor (BRILINTA) tablet 90 mg  90 mg Oral BID Chris Linker, DO   90 mg at 01/17/23 4224    sodium chloride flush 0.9 % injection 5-40 mL  5-40 mL IntraVENous 2 times per day Chris Linker, DO   10 mL at 01/17/23 0829    sodium chloride flush 0.9 % injection 5-40 mL  5-40 mL IntraVENous PRN Chris Linker, DO   5 mL at 01/11/23 0928    0.9 % sodium chloride infusion   IntraVENous PRN Chris Linker, DO        enoxaparin (LOVENOX) injection 40 mg  40 mg SubCUTAneous Daily Chris Linker, DO   40 mg at 01/17/23 0828    ondansetron (ZOFRAN-ODT) disintegrating tablet 4 mg  4 mg Oral Q8H PRN Chris Linker, DO   4 mg at 01/08/23 2324    Or    ondansetron (ZOFRAN) injection 4 mg  4 mg IntraVENous Q6H PRN Chris Linker, DO   4 mg at 01/11/23 1553    acetaminophen (TYLENOL) tablet 650 mg  650 mg Oral Q6H PRN Chris Linker, DO   650 mg at 01/15/23 1236    Or    acetaminophen (TYLENOL) suppository 650 mg  650 mg Rectal Q6H PRN Chris Linker, DO        aspirin chewable tablet 81 mg  81 mg Oral Daily Chris Linker, DO   81 mg at 01/17/23 0827    lactated ringers infusion   IntraVENous Continuous Joselin MELISSA Constantino -  mL/hr at 01/17/23 0235 New Bag at 01/17/23 Janey López 364 problems     Patient Active Problem List   Diagnosis    Primary hypertension    Chronic pancreatitis ([facility] [address]    Alcohol use disorder, severe, in early remission, dependence ([facility] [address]    Attention deficit hyperactivity disorder (ADHD), combined type    Tobacco abuse [Z72.0 (ICD-10-CM)]    Pancreatic pseudocyst    History of ST elevation myocardial infarction (STEMI)    Coronary artery disease involving native coronary artery of native heart    Acute on chronic pancreatitis (HCC)    Dependence on nicotine from cigarettes    GERD (gastroesophageal reflux disease)    Necrotizing pancreatitis        I have reviewed, updated, and verified this note's content and spent 38 minutes of my 42 minutes visit performing counseling and coordination of care regarding medical management.        Signed By: Hilary Ferreira MD     January 17, 2023

## 2023-01-17 NOTE — PROGRESS NOTES
Notified on call hospitalist Dr. Alex Tran of KUB results. Per Dr. Jazmine Melgar, leave tube in place and keep CTF off until dayshift doctor can assess.

## 2023-01-18 ENCOUNTER — HOSPITAL ENCOUNTER (EMERGENCY)
Age: 38
Discharge: HOME | End: 2023-01-18
Attending: EMERGENCY MEDICINE

## 2023-01-18 ENCOUNTER — APPOINTMENT (OUTPATIENT)
Dept: CT IMAGING | Age: 38
End: 2023-01-18

## 2023-01-18 VITALS
HEART RATE: 78 BPM | SYSTOLIC BLOOD PRESSURE: 129 MMHG | RESPIRATION RATE: 18 BRPM | HEIGHT: 69 IN | DIASTOLIC BLOOD PRESSURE: 85 MMHG | BODY MASS INDEX: 23.88 KG/M2 | TEMPERATURE: 98.6 F | WEIGHT: 161.2 LBS | OXYGEN SATURATION: 100 %

## 2023-01-18 VITALS
HEART RATE: 63 BPM | WEIGHT: 160 LBS | SYSTOLIC BLOOD PRESSURE: 132 MMHG | RESPIRATION RATE: 16 BRPM | HEIGHT: 69 IN | TEMPERATURE: 98 F | DIASTOLIC BLOOD PRESSURE: 89 MMHG | OXYGEN SATURATION: 100 % | BODY MASS INDEX: 23.7 KG/M2

## 2023-01-18 DIAGNOSIS — K85.91 ACUTE NECROTIZING PANCREATITIS: Primary | ICD-10-CM

## 2023-01-18 LAB
ALBUMIN SERPL-MCNC: 3.5 G/DL (ref 3.5–5)
ALBUMIN/GLOB SERPL: 0.9 (ref 0.4–1.6)
ALP SERPL-CCNC: 84 U/L (ref 45–117)
ALT SERPL-CCNC: 32 U/L (ref 13–61)
AMPHET UR QL SCN: NEGATIVE
ANION GAP SERPL CALC-SCNC: 13 MMOL/L (ref 2–11)
APPEARANCE UR: CLEAR
AST SERPL-CCNC: 22 U/L (ref 15–37)
BARBITURATES UR QL SCN: NEGATIVE
BASOPHILS # BLD: 0.1 K/UL (ref 0–0.2)
BASOPHILS NFR BLD: 1 % (ref 0–2)
BENZODIAZ UR QL: NEGATIVE
BILIRUB SERPL-MCNC: 0.2 MG/DL (ref 0.2–1.1)
BILIRUB UR QL: NEGATIVE
BUN SERPL-MCNC: 9 MG/DL (ref 7–18)
CALCIUM SERPL-MCNC: 9.4 MG/DL (ref 8.3–10.4)
CANNABINOIDS UR QL SCN: NEGATIVE
CHLORIDE SERPL-SCNC: 97 MMOL/L (ref 98–107)
CO2 SERPL-SCNC: 25 MMOL/L (ref 21–32)
COCAINE UR QL SCN: NEGATIVE
COLOR UR: YELLOW
CREAT SERPL-MCNC: 0.7 MG/DL (ref 0.8–1.5)
DIFFERENTIAL METHOD BLD: ABNORMAL
EOSINOPHIL # BLD: 0.3 K/UL (ref 0–0.8)
EOSINOPHIL NFR BLD: 3 % (ref 0.5–7.8)
ERYTHROCYTE [DISTWIDTH] IN BLOOD BY AUTOMATED COUNT: 17.3 % (ref 11.9–14.6)
ETHANOL SERPL-MCNC: <10 MG/DL (ref 0–0.08)
GLOBULIN SER CALC-MCNC: 3.7 G/DL (ref 2.8–4.5)
GLUCOSE SERPL-MCNC: 102 MG/DL (ref 65–100)
GLUCOSE UR STRIP.AUTO-MCNC: NEGATIVE MG/DL
HCT VFR BLD AUTO: 33.6 % (ref 41.1–50.3)
HGB BLD-MCNC: 10.9 G/DL (ref 13.6–17.2)
HGB UR QL STRIP: NEGATIVE
IMM GRANULOCYTES # BLD AUTO: 0 K/UL (ref 0–0.5)
IMM GRANULOCYTES NFR BLD AUTO: 0 % (ref 0–5)
KETONES UR QL STRIP.AUTO: NEGATIVE MG/DL
LACTATE SERPL-SCNC: 1.4 MMOL/L (ref 0.4–2)
LEUKOCYTE ESTERASE UR QL STRIP.AUTO: NEGATIVE
LIPASE SERPL-CCNC: 2285 U/L (ref 13–60)
LYMPHOCYTES # BLD: 1.9 K/UL (ref 0.5–4.6)
LYMPHOCYTES NFR BLD: 18 % (ref 13–44)
MAGNESIUM SERPL-MCNC: 2 MG/DL (ref 1.8–2.4)
MCH RBC QN AUTO: 25.1 PG (ref 26.1–32.9)
MCHC RBC AUTO-ENTMCNC: 32.4 G/DL (ref 31.4–35)
MCV RBC AUTO: 77.2 FL (ref 82–102)
METHADONE UR QL: NEGATIVE
MONOCYTES # BLD: 1 K/UL (ref 0.1–1.3)
MONOCYTES NFR BLD: 9 % (ref 4–12)
NEUTS SEG # BLD: 7.3 K/UL (ref 1.7–8.2)
NEUTS SEG NFR BLD: 69 % (ref 43–78)
NITRITE UR QL STRIP.AUTO: NEGATIVE
NRBC # BLD: 0 K/UL (ref 0–0.2)
OPIATES UR QL: POSITIVE
PCP UR QL: NEGATIVE
PH UR STRIP: 8 (ref 5–9)
PHOSPHATE SERPL-MCNC: 3 MG/DL (ref 2.5–4.5)
PLATELET # BLD AUTO: 605 K/UL (ref 150–450)
PMV BLD AUTO: 8.7 FL (ref 9.4–12.3)
POTASSIUM SERPL-SCNC: 4.2 MMOL/L (ref 3.5–5.1)
PROT SERPL-MCNC: 7.2 G/DL (ref 6.4–8.2)
PROT UR STRIP-MCNC: NEGATIVE MG/DL
RBC # BLD AUTO: 4.35 M/UL (ref 4.23–5.6)
SODIUM SERPL-SCNC: 135 MMOL/L (ref 133–143)
SP GR UR REFRACTOMETRY: 1.02 (ref 1–1.02)
UROBILINOGEN UR QL STRIP.AUTO: 0.2 EU/DL (ref 0.2–1)
WBC # BLD AUTO: 10.6 K/UL (ref 4.3–11.1)

## 2023-01-18 PROCEDURE — 80053 COMPREHEN METABOLIC PANEL: CPT

## 2023-01-18 PROCEDURE — 85025 COMPLETE CBC W/AUTO DIFF WBC: CPT

## 2023-01-18 PROCEDURE — 82077 ASSAY SPEC XCP UR&BREATH IA: CPT

## 2023-01-18 PROCEDURE — A4216 STERILE WATER/SALINE, 10 ML: HCPCS | Performed by: PHYSICIAN ASSISTANT

## 2023-01-18 PROCEDURE — 36415 COLL VENOUS BLD VENIPUNCTURE: CPT

## 2023-01-18 PROCEDURE — 81003 URINALYSIS AUTO W/O SCOPE: CPT

## 2023-01-18 PROCEDURE — 74177 CT ABD & PELVIS W/CONTRAST: CPT

## 2023-01-18 PROCEDURE — 2580000003 HC RX 258: Performed by: EMERGENCY MEDICINE

## 2023-01-18 PROCEDURE — 6360000002 HC RX W HCPCS: Performed by: PHYSICIAN ASSISTANT

## 2023-01-18 PROCEDURE — 6370000000 HC RX 637 (ALT 250 FOR IP): Performed by: FAMILY MEDICINE

## 2023-01-18 PROCEDURE — 83605 ASSAY OF LACTIC ACID: CPT

## 2023-01-18 PROCEDURE — 96375 TX/PRO/DX INJ NEW DRUG ADDON: CPT

## 2023-01-18 PROCEDURE — 80307 DRUG TEST PRSMV CHEM ANLYZR: CPT

## 2023-01-18 PROCEDURE — C9113 INJ PANTOPRAZOLE SODIUM, VIA: HCPCS | Performed by: PHYSICIAN ASSISTANT

## 2023-01-18 PROCEDURE — 99285 EMERGENCY DEPT VISIT HI MDM: CPT

## 2023-01-18 PROCEDURE — 2580000003 HC RX 258: Performed by: FAMILY MEDICINE

## 2023-01-18 PROCEDURE — 2580000003 HC RX 258: Performed by: PHYSICIAN ASSISTANT

## 2023-01-18 PROCEDURE — 83735 ASSAY OF MAGNESIUM: CPT

## 2023-01-18 PROCEDURE — 6370000000 HC RX 637 (ALT 250 FOR IP): Performed by: INTERNAL MEDICINE

## 2023-01-18 PROCEDURE — 6360000002 HC RX W HCPCS: Performed by: FAMILY MEDICINE

## 2023-01-18 PROCEDURE — 6360000002 HC RX W HCPCS: Performed by: EMERGENCY MEDICINE

## 2023-01-18 PROCEDURE — 6360000004 HC RX CONTRAST MEDICATION: Performed by: EMERGENCY MEDICINE

## 2023-01-18 PROCEDURE — 84100 ASSAY OF PHOSPHORUS: CPT

## 2023-01-18 PROCEDURE — 96374 THER/PROPH/DIAG INJ IV PUSH: CPT

## 2023-01-18 PROCEDURE — 83690 ASSAY OF LIPASE: CPT

## 2023-01-18 RX ORDER — 0.9 % SODIUM CHLORIDE 0.9 %
1000 INTRAVENOUS SOLUTION INTRAVENOUS
Status: COMPLETED | OUTPATIENT
Start: 2023-01-18 | End: 2023-01-18

## 2023-01-18 RX ORDER — HYDROCODONE BITARTRATE AND ACETAMINOPHEN 7.5; 325 MG/1; MG/1
1 TABLET ORAL EVERY 6 HOURS PRN
Qty: 12 TABLET | Refills: 0 | Status: SHIPPED | OUTPATIENT
Start: 2023-01-18 | End: 2023-01-21

## 2023-01-18 RX ORDER — MORPHINE SULFATE 10 MG/ML
6 INJECTION, SOLUTION INTRAMUSCULAR; INTRAVENOUS
Status: COMPLETED | OUTPATIENT
Start: 2023-01-18 | End: 2023-01-18

## 2023-01-18 RX ORDER — PANCRELIPASE 36000; 180000; 114000 [USP'U]/1; [USP'U]/1; [USP'U]/1
1 CAPSULE, DELAYED RELEASE PELLETS ORAL
Qty: 180 CAPSULE | Refills: 1 | Status: SHIPPED | OUTPATIENT
Start: 2023-01-18

## 2023-01-18 RX ORDER — 0.9 % SODIUM CHLORIDE 0.9 %
100 INTRAVENOUS SOLUTION INTRAVENOUS ONCE
Status: COMPLETED | OUTPATIENT
Start: 2023-01-18 | End: 2023-01-18

## 2023-01-18 RX ORDER — ONDANSETRON 2 MG/ML
4 INJECTION INTRAMUSCULAR; INTRAVENOUS
Status: COMPLETED | OUTPATIENT
Start: 2023-01-18 | End: 2023-01-18

## 2023-01-18 RX ORDER — POLYETHYLENE GLYCOL 3350 17 G/17G
17 POWDER, FOR SOLUTION ORAL 2 TIMES DAILY
Qty: 30 EACH | Refills: 0 | Status: SHIPPED | OUTPATIENT
Start: 2023-01-18

## 2023-01-18 RX ORDER — OXYCODONE HYDROCHLORIDE 10 MG/1
10 TABLET ORAL EVERY 6 HOURS PRN
Qty: 15 TABLET | Refills: 0 | Status: SHIPPED | OUTPATIENT
Start: 2023-01-18 | End: 2023-01-23

## 2023-01-18 RX ORDER — HYDROMORPHONE HYDROCHLORIDE 1 MG/ML
1 INJECTION, SOLUTION INTRAMUSCULAR; INTRAVENOUS; SUBCUTANEOUS
Status: COMPLETED | OUTPATIENT
Start: 2023-01-18 | End: 2023-01-18

## 2023-01-18 RX ADMIN — ONDANSETRON 4 MG: 2 INJECTION INTRAMUSCULAR; INTRAVENOUS at 21:01

## 2023-01-18 RX ADMIN — HYDROMORPHONE HYDROCHLORIDE 0.5 MG: 1 INJECTION, SOLUTION INTRAMUSCULAR; INTRAVENOUS; SUBCUTANEOUS at 01:13

## 2023-01-18 RX ADMIN — TICAGRELOR 90 MG: 90 TABLET ORAL at 09:06

## 2023-01-18 RX ADMIN — HYOSCYAMINE SULFATE 125 MCG: 0.12 TABLET ORAL at 09:06

## 2023-01-18 RX ADMIN — HYDROMORPHONE HYDROCHLORIDE 1 MG: 1 INJECTION, SOLUTION INTRAMUSCULAR; INTRAVENOUS; SUBCUTANEOUS at 22:07

## 2023-01-18 RX ADMIN — SODIUM CHLORIDE, PRESERVATIVE FREE 10 ML: 5 INJECTION INTRAVENOUS at 09:06

## 2023-01-18 RX ADMIN — Medication 100 MG: at 09:05

## 2023-01-18 RX ADMIN — IOPAMIDOL 100 ML: 755 INJECTION, SOLUTION INTRAVENOUS at 21:38

## 2023-01-18 RX ADMIN — HYDROMORPHONE HYDROCHLORIDE 0.5 MG: 1 INJECTION, SOLUTION INTRAMUSCULAR; INTRAVENOUS; SUBCUTANEOUS at 09:15

## 2023-01-18 RX ADMIN — ASPIRIN 81 MG 81 MG: 81 TABLET ORAL at 09:06

## 2023-01-18 RX ADMIN — HYDROCODONE BITARTRATE AND ACETAMINOPHEN 1 TABLET: 7.5; 325 TABLET ORAL at 10:10

## 2023-01-18 RX ADMIN — BUPROPION HYDROCHLORIDE 100 MG: 100 TABLET, FILM COATED, EXTENDED RELEASE ORAL at 09:06

## 2023-01-18 RX ADMIN — POLYETHYLENE GLYCOL 3350 17 G: 17 POWDER, FOR SOLUTION ORAL at 09:05

## 2023-01-18 RX ADMIN — SODIUM CHLORIDE, SODIUM LACTATE, POTASSIUM CHLORIDE, AND CALCIUM CHLORIDE: 600; 310; 30; 20 INJECTION, SOLUTION INTRAVENOUS at 09:09

## 2023-01-18 RX ADMIN — MORPHINE SULFATE 6 MG: 10 INJECTION, SOLUTION INTRAMUSCULAR; INTRAVENOUS at 21:01

## 2023-01-18 RX ADMIN — METOPROLOL TARTRATE 25 MG: 25 TABLET, FILM COATED ORAL at 09:06

## 2023-01-18 RX ADMIN — ENOXAPARIN SODIUM 40 MG: 100 INJECTION SUBCUTANEOUS at 09:05

## 2023-01-18 RX ADMIN — SODIUM CHLORIDE, PRESERVATIVE FREE 40 MG: 5 INJECTION INTRAVENOUS at 09:06

## 2023-01-18 RX ADMIN — SODIUM CHLORIDE 1000 ML: 9 INJECTION, SOLUTION INTRAVENOUS at 21:00

## 2023-01-18 RX ADMIN — SODIUM CHLORIDE 100 ML: 9 INJECTION, SOLUTION INTRAVENOUS at 21:38

## 2023-01-18 RX ADMIN — HYDROCODONE BITARTRATE AND ACETAMINOPHEN 1 TABLET: 7.5; 325 TABLET ORAL at 02:10

## 2023-01-18 ASSESSMENT — ENCOUNTER SYMPTOMS
BACK PAIN: 0
SORE THROAT: 0
COUGH: 0
NAUSEA: 1
RHINORRHEA: 0
CONSTIPATION: 0
COLOR CHANGE: 0
SHORTNESS OF BREATH: 0
DIARRHEA: 0
ABDOMINAL PAIN: 1
VOMITING: 1

## 2023-01-18 ASSESSMENT — PAIN DESCRIPTION - LOCATION
LOCATION: ABDOMEN

## 2023-01-18 ASSESSMENT — PAIN DESCRIPTION - ORIENTATION: ORIENTATION: ANTERIOR

## 2023-01-18 ASSESSMENT — PAIN SCALES - GENERAL
PAINLEVEL_OUTOF10: 10
PAINLEVEL_OUTOF10: 10
PAINLEVEL_OUTOF10: 7
PAINLEVEL_OUTOF10: 8
PAINLEVEL_OUTOF10: 9
PAINLEVEL_OUTOF10: 10
PAINLEVEL_OUTOF10: 6
PAINLEVEL_OUTOF10: 6

## 2023-01-18 ASSESSMENT — PAIN DESCRIPTION - DESCRIPTORS: DESCRIPTORS: DISCOMFORT

## 2023-01-18 ASSESSMENT — PAIN - FUNCTIONAL ASSESSMENT
PAIN_FUNCTIONAL_ASSESSMENT: 0-10
PAIN_FUNCTIONAL_ASSESSMENT: ACTIVITIES ARE NOT PREVENTED

## 2023-01-18 NOTE — CARE COORDINATION
Patient is medically stable for discharge today. Patient voiced concerns with purchasing prescription medication- Zenpep. CM provided information for patient assistant program. Patient was appreciative of this information and will complete application online. Hospitalist will prescribe Creon as GI recommended in the interim, while patient awaits approval for financial assistance for Zenpep. CM placed referral with [facility], last admission. Patient was not scheduled for a new patient appointment, due to physician stating there is nothing further they can offer this patient at this time. No additional discharge needs voiced or identified at this time.    Spouse to transport.        01/09/23 1401   Service Assessment   Patient Orientation Alert and Oriented   Cognition Alert   History Provided By Patient   Primary Caregiver Self   Support Systems Spouse/Significant Other   Patient's Healthcare Decision Maker is: Legal Next of Kin   PCP Verified by CM Yes   Last Visit to PCP Within last 6 months   Prior Functional Level Independent in ADLs/IADLs   Current Functional Level Independent in ADLs/IADLs   Can patient return to prior living arrangement Yes   Ability to make needs known: Good   Family able to assist with home care needs: Yes   Would you like for me to discuss the discharge plan with any other family members/significant others, and if so, who? No   Financial Resources Other (Comment)  (Self pay)   Social/Functional History   Lives With Family;Spouse   Type of Home House   Home Layout One level   Receives Help From Family   ADL Assistance Independent   Ambulation Assistance Independent   Transfer Assistance Independent   Active  Yes   Mode of Transportation Car   Occupation Full time employment   Type of Occupation [employer]   Discharge Planning   Type of Residence House   Living Arrangements Spouse/Significant Other;Children   Current Services Prior To Admission None  Potential Assistance Needed N/A   DME Ordered? No   Type of Home Care Services None   Patient expects to be discharged to: [facility]   Transition of Care Consult (CM Consult) Discharge Planning;Medication [address] Discharge None   [facility] Information Provided? No   Mode of Transport at Discharge   (Family)   Confirm Follow Up Transport Family   Condition of Participation: Discharge Planning   The Plan for Transition of Care is related to the following treatment goals: Home with family assistance to return to baseline function.

## 2023-01-18 NOTE — PROGRESS NOTES
Comprehensive Nutrition Assessment    Type and Reason for Visit: Reassess  Malnutrition Screening Tool: Malnutrition Screen  Have you recently lost weight without trying?: Unsure of amount of weight loss (2 points)  Have you been eating poorly because of a decreased appetite?: Yes (1 point)  Malnutrition Screening Tool Score: 3  Tube Feeding Management (GI)     Nutrition Recommendations/Plan:   Enteral Nutrition:   Enteral Access: Nasojejunal tube remains in at this time but was dislodged 1/16 evening  Labs:   EN labs: Discontinued. POC Glucoses/SSI Not indicated  Nutrition Related Medication Management:  Electrolyte Replacement Protocol PRN Continue for Potassium, Phosphorus, and Magnesium  Thiamine Completed    Bowel Regimen Not indicated at this time  Meals and Snacks:  Diet: Continue current order  Nutrition Supplement Therapy:  Medical food supplement therapy:  Continue Ensure High Protein three times per day (this provides 160 kcal and 16 grams protein per bottle)     Malnutrition Assessment:  Malnutrition Status: At risk for malnutrition (Comment) (recurrent etoh pancreatitis, prolonged NPO/CLD status)    No candace wasting  Nutrition Assessment:  Nutrition History: Patient reports at least 3 meals per day prior to recent hospitalizations. He states since recent discharge he has been on a CLD at home. He reports UBW ~185# prior to recurrent hospitalizations over the last few months. He does admit that he has re-gained some weight when RD reviewing wt history. This is consistent with EMR. Wt history: 7/30/22 185#, 10/11/2022 184#, 11/17/22 166#, 12/1/22 167#, 12/13/2022 177#     Do You Have Any Cultural, Jainism, or Ethnic Food Preferences?: No   Nutrition Background:       Patient with PMH significant for etoh abuse, pancreatitis complicated by pseudocyst s/p gastrotomy with axios stent, CAD, STEMI, GERD, HTN. He has recurrent admissions for pancreatitis, most recently 1/1/23 and discharge on 1/5/23.  He presented back with abdominal pain. Nutrition Interval:  Diet advanced to CLD 1/10, then to FLD later that day. Diet advanced to GI soft 1/11 prior to making NPO again with rising lipase. Now s/p endoscopic ultrasound with gastric biopsies. GI now recommending NJT placement with post pyloric TF. NGT placed and advanced to duodenum in IR with peristalsis to further advance to jejunum. TF consult placed per GI. Tube dislodged with sneeze 1/16 evening, TF held since. Diet advanced at noon meal 1/17. Pt has consumed all of noon and evening meals per pt report, >75% of am tray observed at RD visit. He reports + tolerance and regular BMs, indicates a little full sensation, discussed benefits of eating more frequently and reinforced low fat intake per prior education sessions w pt. Abdominal Status (last documented):   Last BM (including prior to admit): 01/16/23, GI Symptoms: Cramping   Pertinent Medications: protonix, glycolax  Continuous: none  IVF: LR @ 75 ml/hr  Electrolyte Replacement:  none  PRN: Maalox (last dose 1/11), zofran (last dose 1/11)  Pertinent Labs:   Lab Results   Component Value Date/Time     01/16/2023 06:00 AM    K 4.0 01/16/2023 06:00 AM     01/16/2023 06:00 AM    CO2 22 01/16/2023 06:00 AM    BUN 7 01/16/2023 06:00 AM    CREATININE 0.80 01/16/2023 06:00 AM    GLUCOSE 127 01/16/2023 06:00 AM    CALCIUM 9.6 01/16/2023 06:00 AM    PHOS 3.0 01/18/2023 06:35 AM    MG 2.0 01/18/2023 06:35 AM      Remarkable for: continues mildly elevated glucose      Current Nutrition Therapies:  ADULT TUBE FEEDING; Nasojejunal; Peptide Based; Continuous; 10; Yes; 10; Q 8 hours; 70; 80; Q 3 hours  ADULT ORAL NUTRITION SUPPLEMENT; Breakfast, Lunch, Dinner; Low Calorie/High Protein Oral Supplement  ADULT DIET;  Regular; 3 carb choices (45 gm/meal)  Current Tube Feeding (TF) Orders:  Feeding Route: Nasojejunal  Formula: Peptide Based  Schedule: Continuous  Feeding Regimen: 70 ml/hr  Additives/Modulars: None  Water Flushes: 80 ml Q3 hrs  Current TF & Flush Orders Provides: held since overnight 1/16  Goal TF & Flush Orders Provides: 2016 calories, 126 grams protein and 2002 ml free fluid      Current Intake:   Average Meal Intake: % (per pt report, am tray visualized) Average Supplements Intake: 51-75%      Anthropometric Measures:  Height: 5' 9\" (175.3 cm)  Current Body Wt: 161 lb 2.5 oz (73.1 kg) (1/16), Weight source: Bed Scale  BMI: 23.8, Normal Weight (BMI 18.5-24. 9)  Admission Body Weight: 177 lb 14.6 oz (80.7 kg) (1/8 stated)  Ideal Body Weight (Kg) (Calculated): 73 kg (160 lbs), 106.5 %  Usual Body Wt: 185 lb (83.9 kg) (per pt and review of EMR), Percent weight change: -7.9       Edema:Peripheral Vascular  Peripheral Vascular (WDL): Within Defined Limits  Edema: None   BMI Category Normal Weight (BMI 18.5-24. 9)  Estimated Daily Nutrient Needs:  Energy (kcal/day): 9871-0292 (Kcal/kg (25-30) Weight used: 77.3 kg Current  Protein (g/day): 77-93 (1-1.2 g/kg) Weight Used: (Current) 77.3 kg  Fluid (ml/day):   (1 ml/kcal)    Nutrition Diagnosis:   Inadequate oral intake related to altered GI function as evidenced by  (necrotizing pancreatitis intially w NJ feeds, helds since tube dislodged w sneeze, po advanced meeting ~80% needs)  Nutrition Interventions:   Food and/or Nutrient Delivery: Continue Current Diet, Continue Oral Nutrition Supplement, Discontinue Tube Feeding     Coordination of Nutrition Care: Continue to monitor while inpatient, Interdisciplinary Rounds       Goals:   Previous Goal Met: Goal(s) Achieved  Active Goal: by next RD assessment (Maintain po intake >75% estimated needs)       Nutrition Monitoring and Evaluation:      Food/Nutrient Intake Outcomes: Food and Nutrient Intake, Supplement Intake  Physical Signs/Symptoms Outcomes: Biochemical Data, GI Status, Meal Time Behavior, Weight    Discharge Planning:    Continue current diet    John Staff T, RD

## 2023-01-18 NOTE — DISCHARGE SUMMARY
Hospitalist Discharge Summary   Admit Date:  2023 10:11 PM   DC Note date: 2023  Name:  Abel Alvarez   Age:  44 y.o. Sex:  male  :  1985   MRN:  [id number]   Room:  [facility]  PCP:  Nimesh Crockett MD    Presenting Complaint: No chief complaint on file. Initial Admission Diagnosis: Recurrent pancreatitis [K85.90]     Problem List for this Hospitalization (present on admission):    Principal Problem:    Necrotizing pancreatitis  Active Problems:    Pancreatic pseudocyst    History of ST elevation myocardial infarction (STEMI)    Coronary artery disease involving native coronary artery of native heart    Acute on chronic pancreatitis ([facility] [address]    Primary hypertension  Resolved Problems:    * No resolved hospital problems. [facility] Course:  Please refer to the admission H&P for details of presentation. In summary, Abel Alvarez is a 44 y.o. male with past medical history significant for EtOH pancreatitis complicated with pseudocyst s/p stent placement  removed  and STEMI s/p PCI with TULIO who presented to [facility] ER with a complaint of recurrent epigastric pain with associated N/V.    NGT placed with TF initially and diet restarted on . Patient's abdominal pain improved and is stable for discharge. Patient is medically stable for discharge. Patient is to continue taking medications as prescribed and to follow up with PCP on discharge. Patient is instructed to to call a physician or return to ED if any concerns/symptoms worsened. Discharge summary and encounter summary was sent to PCP electronically via \"Comm Mgt\" link in [facility], if possible. Disposition: Home  Diet: ADULT ORAL NUTRITION SUPPLEMENT; Breakfast, Lunch, Dinner; Low Calorie/High Protein Oral Supplement  ADULT DIET; Regular; 3 carb choices (45 gm/meal)  Code Status: Full Code    Follow Ups:   Follow-up Information     Nimesh Crockett MD Follow up.     Specialty: Internal Medicine  Why: As needed  Contact information:  [address] Follow up in 2 week(s). Why: post-hospitalization followup  Contact information:  [address] [facility] [address] [facility]  [phone]                     Time spent in patient discharge and coordination 36 minutes. Plan was discussed with patient. All questions answered. Patient was stable at time of discharge. Instructions given to call a physician or return if any concerns. Current Discharge Medication List        START taking these medications    Details   HYDROcodone-acetaminophen (NORCO) 7.5-325 MG per tablet Take 1 tablet by mouth every 6 hours as needed for Pain for up to 3 days. Max Daily Amount: 4 tablets  Qty: 12 tablet, Refills: 0    Comments: Reduce doses taken as pain becomes manageable  Associated Diagnoses: Necrotizing pancreatitis      polyethylene glycol (GLYCOLAX) 17 g packet Take 17 g by mouth 2 times daily  Qty: 30 each, Refills: 0           CONTINUE these medications which have NOT CHANGED    Details   hyoscyamine (LEVSIN/SL) 125 MCG sublingual tablet Place 1 tablet under the tongue every 4 hours as needed for Cramping  Qty: 90 tablet, Refills: 3      pantoprazole (PROTONIX) 40 MG tablet Take 1 tablet by mouth in the morning and at bedtime  Qty: 30 tablet, Refills: 3      simethicone (MYLICON) 80 MG chewable tablet Take 1 tablet by mouth every 6 hours as needed for Flatulence  Qty: 180 tablet, Refills: 3      lipase-protease-amylase (ZENPEP) 5000-93887 units CPEP delayed release capsule Take by mouth 3 times daily (with meals)  Qty: 450 capsule, Refills: 3      nitroGLYCERIN (NITROSTAT) 0.4 MG SL tablet Place 1 tablet under the tongue every 5 minutes as needed for Chest pain up to max of 3 total doses. If no relief after 1 dose, call 911.   Qty: 25 tablet, Refills: 3      buPROPion (WELLBUTRIN SR) 100 MG extended release tablet Take 1 tablet by mouth 2 times daily  Qty: 60 tablet, Refills: 3      aspirin 81 MG chewable tablet Take 1 tablet by mouth daily  Qty: 30 tablet, Refills: 3      atorvastatin (LIPITOR) 40 MG tablet Take 1 tablet by mouth nightly  Qty: 30 tablet, Refills: 3      metoprolol tartrate (LOPRESSOR) 25 MG tablet Take 1 tablet by mouth 2 times daily  Qty: 60 tablet, Refills: 3      ticagrelor (BRILINTA) 90 MG TABS tablet Take 1 tablet by mouth 2 times daily  Qty: 60 tablet, Refills: 11           STOP taking these medications       HYDROmorphone (DILAUDID) 2 MG tablet Comments:   Reason for Stopping:         oxyCODONE (OXY-IR) 10 MG immediate release tablet Comments:   Reason for Stopping:         ondansetron (ZOFRAN) 4 MG tablet Comments:   Reason for Stopping:         naloxone (NARCAN) 4 MG/0.1ML LIQD nasal spray Comments:   Reason for Stopping:         lisinopril (PRINIVIL;ZESTRIL) 5 MG tablet Comments:   Reason for Stopping:         thiamine 100 MG tablet Comments:   Reason for Stopping:               Procedures done this admission:  Procedure(s):  ENDOSCOPIC ULTRASOUND, biopsy    Consults this admission:  IP CONSULT TO GI  IP CONSULT TO INTERVENTIONAL RADIOLOGY  IP CONSULT TO DIETITIAN    Echocardiogram results:  11/02/22    TRANSTHORACIC ECHOCARDIOGRAM (TTE) COMPLETE (CONTRAST/BUBBLE/3D PRN) 11/02/2022  3:31 PM, 11/02/2022 12:00 AM (Final)    Interpretation Summary    Left Ventricle: Normal left ventricular systolic function with a visually estimated EF of 55 - 60%. Left ventricle size is normal. Normal wall thickness. Normal wall motion. Normal diastolic function. Technical qualifiers: Color flow Doppler was performed and pulse wave and/or continuous wave Doppler was performed.     Signed by: Sara Dhaliwal MD on 11/2/2022  3:31 PM, Signed by: Unknown Provider Result on 11/2/2022 12:00 AM      Diagnostic Imaging/Tests:   XR ABDOMEN (KUB) (SINGLE AP VIEW)    Result Date: 1/17/2023  1. Findings as described above. XR ABDOMEN (KUB) (SINGLE AP VIEW)    Result Date: 1/11/2023  Feeding tube tip is projected over the upper body of stomach. Length is adequate to reach the ligament of Treitz. XR ACUTE ABD SERIES CHEST 1 VW    Result Date: 1/14/2023  1. No bowel obstruction. 2. Feeding tube tip likely within left upper quadrant jejunum. 3. Constipation. CT ABDOMEN PELVIS W IV CONTRAST Additional Contrast? None    Result Date: 1/9/2023  1. Necrotizing pancreatitis, with associated acute necrotic collection measuring up to 2.5 x 4.3 x 3.2 cm. The necrotic collection appears more defined when compared to January 1, 2023 study. 2. Mass effect and narrowing of the splenic vein near the confluence with the SMV. 3. Circumferential wall thickening of the distal stomach, adjacent to the pancreatic abnormality. Findings likely reactive gastritis. 4. Large stool volume in the colon. No evidence of colitis, diverticulitis or bowel obstruction. CT ABDOMEN PELVIS W IV CONTRAST Additional Contrast? None    Result Date: 1/1/2023  1. Worsening inflammatory changes about the pancreas suggesting worsening pancreatitis. This includes evolving mesenteric edema and fluid collections particularly in the lesser sac of the abdomen. Stable necrotic appearing changes are seen in the pancreatic tail. Likely reactive regional mesenteric lymph nodes are seen. 2. Increasing mesenteric varices. The splenic and portal veins remain patent although the distal splenic vein appears attenuated likely from evolving inflammatory changes. This report was made using voice transcription. Despite my best efforts to avoid any, transcription errors may persist. If there is any question about the accuracy of the report or need for clarification, then please call [phone], or text me through [email or web address] for clarification or correction.      CT ABDOMEN PELVIS W IV CONTRAST Additional Contrast? Radiologist Recommendation    Result Date: 12/23/2022  Improved inflammatory changes within the upper abdomen with a mild residual remaining. . The pseudocysts have also mostly improved although one at the pancreatic mid body is unchanged in size. XR CHEST PORTABLE    Result Date: 1/13/2023  1. No evidence of pneumonia or pulmonary edema. FL INTRO LONG GI TUBE    Result Date: 1/11/2023  Fluoroscopic guided manipulation of feeding tube from the stomach to the jejunum.        Labs: Results:       BMP, Mg, Phos Recent Labs     01/16/23  0600 01/18/23  0635     --    K 4.0  --      --    CO2 22  --    ANIONGAP 11  --    BUN 7  --    CREATININE 0.80  --    LABGLOM >60  --    CALCIUM 9.6  --    GLUCOSE 127*  --    MG 2.2 2.0   PHOS 2.8 3.0      CBC Recent Labs     01/16/23  0600   WBC 5.2   RBC 4.01*   HGB 10.0*   HCT 30.8*   MCV 76.8*   MCH 24.9*   MCHC 32.5   RDW 16.9*   *   MPV 9.7   NRBC 0.00   SEGS 72   LYMPHOPCT 15   EOSRELPCT 1   MONOPCT 12   BASOPCT 0   IMMGRAN 0   SEGSABS 3.7   LYMPHSABS 0.8   EOSABS 0.1   MONOSABS 0.6   BASOSABS 0.0   ABSIMMGRAN 0.0      LFT Recent Labs     01/16/23  0600   BILITOT 0.3   ALKPHOS 69   AST 17   ALT 23   PROT 6.8   LABALBU 2.5*   GLOB 4.3      Cardiac  Lab Results   Component Value Date/Time    TROPHS 300.0 11/02/2022 10:53 AM      Coags No results found for: PROTIME, INR, APTT   A1c Lab Results   Component Value Date/Time    LABA1C 5.8 10/26/2022 05:58 AM     10/26/2022 05:58 AM      Lipids Lab Results   Component Value Date/Time    CHOL 88 11/03/2022 05:39 AM    LDLCALC 58.4 11/03/2022 05:39 AM    LABVLDL 16.6 11/03/2022 05:39 AM    HDL 13 11/03/2022 05:39 AM    CHOLHDLRATIO 6.8 11/03/2022 05:39 AM    TRIG 93 11/21/2022 05:58 AM      Thyroid  Lab Results   Component Value Date/Time    TSHELE 2.38 10/26/2022 05:58 AM        Most Recent UA Lab Results   Component Value Date/Time    COLORU CHEKO 01/08/2023 08:13 PM    APPEARANCE CLEAR 01/08/2023 08:13 PM    SPECGRAV 1.025 01/08/2023 08:13 PM    LABPH 7.0 01/08/2023 08:13 PM    PROTEINU 100 01/08/2023 08:13 PM    GLUCOSEU 100 01/08/2023 08:13 PM    KETUA 40 01/08/2023 08:13 PM    BILIRUBINUR SMALL 01/08/2023 08:13 PM    BLOODU Trace Intact 01/08/2023 08:13 PM    UROBILINOGEN >=8.0 01/08/2023 08:13 PM    NITRU Negative 01/08/2023 08:13 PM    LEUKOCYTESUR Negative 01/08/2023 08:13 PM    WBCUA 0-3 01/08/2023 08:13 PM    RBCUA 3-5 01/08/2023 08:13 PM    EPITHUA 0-3 01/08/2023 08:13 PM    BACTERIA 0 01/08/2023 08:13 PM    LABCAST 0 01/08/2023 08:13 PM    MUCUS TRACE 01/08/2023 08:13 PM        Recent Labs     01/01/23  1949 01/01/23  1855   CULTURE NO GROWTH 5 DAYS NO GROWTH 5 DAYS       All Labs from Last 24 Hrs:  Recent Results (from the past 24 hour(s))   Magnesium    Collection Time: 01/18/23  6:35 AM   Result Value Ref Range    Magnesium 2.0 1.8 - 2.4 mg/dL   Phosphorus    Collection Time: 01/18/23  6:35 AM   Result Value Ref Range    Phosphorus 3.0 2.5 - 4.5 MG/DL       No Known Allergies  Immunization History   Administered Date(s) Administered    COVID-19, MODERNA BLUE border, Primary or Immunocompromised, (age 12y+), IM, 100 mcg/0.5mL 08/19/2021    Influenza Virus Vaccine 11/05/2015, 09/19/2018, 01/18/2020, 01/18/2020, 10/05/2020, 10/05/2020    Influenza, FLUARIX, FLULAVAL, FLUZONE (age 10 mo+) AND AFLURIA, (age 1 y+), PF, 0.5mL 11/05/2015, 01/18/2020, 10/05/2020    Pneumococcal Polysaccharide ([id number]) 02/22/2018    Tdap (Boostrix, Adacel) 10/11/2022       Recent Vital Data:  Patient Vitals for the past 24 hrs:   Temp Pulse Resp BP SpO2   01/18/23 1127 98.6 °F (37 °C) 78 18 129/85 100 %   01/18/23 1010 -- -- 18 -- --   01/18/23 0915 -- -- 16 -- --   01/18/23 0731 97.9 °F (36.6 °C) 74 18 126/87 99 %   01/18/23 0328 97.7 °F (36.5 °C) 78 16 (!) 107/90 99 %   01/17/23 2324 98.3 °F (36.8 °C) 78 16 126/65 100 %   01/17/23 1926 98.2 °F (36.8 °C) 94 16 119/84 99 %   01/17/23 1800 98.2 °F (36.8 °C) 72 16 121/82 100 %       Oxygen Therapy  SpO2: 100 %  Pulse Oximetry Type: Intermittent  Pulse via Oximetry: 54 beats per minute  Pulse Oximeter Device Mode: Continuous  Pulse Oximeter Device Location: Finger  O2 Device: None (Room air)  Skin Assessment: Clean, dry, & intact  O2 Flow Rate (L/min): 3 L/min  Oxygen Therapy: None (Room air)    Estimated body mass index is 23.81 kg/m² as calculated from the following:    Height as of this encounter: 5' 9\" (1.753 m). Weight as of this encounter: 161 lb 3.2 oz (73.1 kg). No intake or output data in the 24 hours ending 01/18/23 1351      Physical Exam:    General:    Well nourished. No overt distress  Head:  Normocephalic, atraumatic  Eyes:  Sclerae appear normal.  Pupils equally round. HENT:  Nares appear normal, no drainage. Moist mucous membranes  Neck:  No restricted ROM. Trachea midline  CV:   RRR. No m/r/g. No JVD  Lungs:   CTAB. No wheezin. Respirations even, unlabored  Abdomen:   Soft, nontender, nondistended. Extremities: Warm and dry. No cyanosis or clubbing. No edema. Skin:     No rashes. Normal coloration  Neuro:  CN II-XII grossly intact. Psych:  Normal mood and affect. Signed:  Agatha Zheng MD    Part of this note may have been written by using a voice dictation software. The note has been proof read but may still contain some grammatical/other typographical errors.

## 2023-01-18 NOTE — PROGRESS NOTES
[facility] Progress Note         Admit Date:  1/8/2023    Today's Date:  1/18/2023    CC:  Recurrent acute pancreatitis    Subjective:     Patient: Tolerating diet now. Asking for GJ to be removed.  One BM normal. No abdominal pain, N&V    Medications:   Current Facility-Administered Medications   Medication Dose Route Frequency    HYDROmorphone (DILAUDID) injection 0.5 mg  0.5 mg IntraVENous Q8H PRN    HYDROcodone-acetaminophen (NORCO) 7.5-325 MG per tablet 1 tablet  1 tablet Oral Q6H PRN    polyethylene glycol (GLYCOLAX) packet 17 g  17 g Per J Tube BID    hyoscyamine (LEVSIN/SL) sublingual tablet 125 mcg  125 mcg SubLINGual TID    phenol 1.4 % mouth spray 1 spray  1 spray Mouth/Throat Q2H PRN    pantoprazole (PROTONIX) 40 mg in sodium chloride (PF) 0.9 % 10 mL injection  40 mg IntraVENous BID    potassium chloride (KLOR-CON M) extended release tablet 40 mEq  40 mEq Oral PRN    Or    potassium bicarb-citric acid (EFFER-K) effervescent tablet 40 mEq  40 mEq Oral PRN    Or    potassium chloride 10 mEq/100 mL IVPB (Peripheral Line)  10 mEq IntraVENous PRN    magnesium sulfate 2000 mg in 50 mL IVPB premix  2,000 mg IntraVENous PRN    sodium phosphate 10 mmol in sodium chloride 0.9 % 250 mL IVPB  10 mmol IntraVENous PRN    Or    sodium phosphate 15 mmol in sodium chloride 0.9 % 250 mL IVPB  15 mmol IntraVENous PRN    Or    sodium phosphate 20 mmol in sodium chloride 0.9 % 500 mL IVPB  20 mmol IntraVENous PRN    diatrizoate meglumine-sodium (GASTROGRAFIN) 66-10 % solution 30 mL  30 mL Per NG tube ONCE PRN    aluminum & magnesium hydroxide-simethicone (MAALOX) 200-200-20 MG/5ML suspension 30 mL  30 mL Oral Q6H PRN    atorvastatin (LIPITOR) tablet 40 mg  40 mg Oral Nightly    buPROPion (WELLBUTRIN SR) extended release tablet 100 mg  100 mg Oral BID    metoprolol tartrate (LOPRESSOR) tablet 25 mg  25 mg Oral BID    [Held by provider] pantoprazole (PROTONIX) tablet 40 mg  40 mg Oral BID    simethicone [facility]) chewable tablet 80 mg  80 mg Oral Q6H PRN    ticagrelor (BRILINTA) tablet 90 mg  90 mg Oral BID    sodium chloride flush 0.9 % injection 5-40 mL  5-40 mL IntraVENous 2 times per day    sodium chloride flush 0.9 % injection 5-40 mL  5-40 mL IntraVENous PRN    0.9 % sodium chloride infusion   IntraVENous PRN    enoxaparin (LOVENOX) injection 40 mg  40 mg SubCUTAneous Daily    ondansetron (ZOFRAN-ODT) disintegrating tablet 4 mg  4 mg Oral Q8H PRN    Or    ondansetron (ZOFRAN) injection 4 mg  4 mg IntraVENous Q6H PRN    acetaminophen (TYLENOL) tablet 650 mg  650 mg Oral Q6H PRN    Or    acetaminophen (TYLENOL) suppository 650 mg  650 mg Rectal Q6H PRN    aspirin chewable tablet 81 mg  81 mg Oral Daily    lactated ringers infusion   IntraVENous Continuous       Review of Systems:  ROS was obtained, with pertinent positives as listed above. No chest pain or SOB. Diet:  regular 3gm     Objective:   Vitals:  /87   Pulse 74   Temp 97.9 °F (36.6 °C) (Oral)   Resp 16   Ht 5' 9\" (1.753 m)   Wt 161 lb 3.2 oz (73.1 kg)   SpO2 99%   BMI 23.81 kg/m²   Intake/Output:  No intake/output data recorded. 01/16 1901 - 01/18 0700  In: 80   Out: -   Exam:  General appearance: alert, cooperative, no distress NG in nares  Lungs: clear to auscultation bilaterally anteriorly  Heart: regular rate and rhythm  Abdomen: soft, non-tender.  Bowel sounds normal. No masses, no organomegaly  Extremities: extremities normal, atraumatic, no cyanosis or edema  Neuro:  alert and oriented    Data Review (Labs):    Recent Labs     01/16/23  0600 01/18/23  0635   WBC 5.2  --    HGB 10.0*  --    HCT 30.8*  --    *  --    MCV 76.8*  --      --    K 4.0  --      --    CO2 22  --    BUN 7  --    CREATININE 0.80  --    CALCIUM 9.6  --    MG 2.2 2.0   PHOS 2.8 3.0   GLUCOSE 127*  --    ALKPHOS 69  --    AST 17  --    ALT 23  --    BILITOT 0.3  --    ALBUMIN 0.6  --    PROT 6.8  --        Assessment:     Principal Problem:    Necrotizing pancreatitis  Active Problems:    Pancreatic pseudocyst    History of ST elevation myocardial infarction (STEMI)    Coronary artery disease involving native coronary artery of native heart    Acute on chronic pancreatitis ([address]    Primary hypertension  Resolved Problems:    * No resolved hospital problems. *    Mr. Jordan Araujo is a 44 y.o. male with PMH including but not limited to alcohol use disorder in remission, alcohol induced pancreatitis complicated by pseudocyst s/p gastrostomy with axios stent placed 11/9/22 and removed on 11/20/2022 via EUS, CAD s/p STEMI and PCI with TULIO 11/2/22 on ASA and Brilinta, GERD, HTN, who we are following for recurrent pancreatitis with known necrosis in pancreatic tail after recent discharge on 1/5/23 for the same complaints. He denies recent EtOH use and quit smoking tobacco 3 weeks ago. Plan:     -Ok to pull GJ today  -Continue regular diet low fat  -Continue Miralax bid  -Continue ETOH and tobacco cessation  Barbara Taylor.  Victoria Rizo in collaboration with Dr. Gokul Salazar  [facility]

## 2023-01-19 ENCOUNTER — CARE COORDINATION (OUTPATIENT)
Dept: CARE COORDINATION | Facility: CLINIC | Age: 38
End: 2023-01-19

## 2023-01-19 NOTE — ED NOTES
I have reviewed discharge instructions with the patient. The patient verbalized understanding. Patient left ED via Discharge Method: ambulatory to Home with friend. Opportunity for questions and clarification provided. Patient given 1 scripts. To continue your aftercare when you leave the hospital, you may receive an automated call from our care team to check in on how you are doing. This is a free service and part of our promise to provide the best care and service to meet your aftercare needs.  If you have questions, or wish to unsubscribe from this service please call [phone]. Thank you for Choosing our [facility] Emergency Department.         Jersey Minor RN  01/18/23 9108

## 2023-01-19 NOTE — CARE COORDINATION
Care Transitions Outreach Attempt    Call within 2 business days of discharge: Yes   Attempted to reach patient for transitions of care follow up. Unable to reach patient. Patient: Israel Jimenes Patient : [id number] MRN: [id number]    Last Discharge  Street       Date Complaint Diagnosis Description Type Department Provider    23 Abdominal Pain Acute necrotizing pancreatitis ED (DISCHARGE) [facility] Ciera Mina MD              Was this an external facility discharge?  No Discharge Facility: [facility]    Noted following upcoming appointments from discharge chart review:   [facility] follow up appointment(s):   Future Appointments   Date Time Provider Angeli Whitney   2023  4:00 PM Abigail Cuevas MD [facility]     [facility] follow up appointment(s): CONCHA Rahman

## 2023-01-19 NOTE — ED PROVIDER NOTES
Emergency Department Provider Note                   PCP:                DIMITRIS RENEE MD               Age: 37 y.o.      Sex: male       ICD-10-CM    1. Acute necrotizing pancreatitis  K85.91 oxyCODONE HCl (OXY-IR) 10 MG immediate release tablet          DISPOSITION Decision To Discharge 01/18/2023 11:10:55 PM       Medical Decision Making  37 y.o. patient  has a past medical history of Acute alcoholic pancreatitis (10/03/2021), Acute kidney injury (nontraumatic) ([facility]) (10/31/2022), Acute on chronic pancreatitis ([facility]) (07/30/2022), Alcohol use disorder, severe, dependence ([facility]) (08/21/2019), CAD (coronary artery disease), Coronary artery disease involving native coronary artery of native heart (11/13/2022), Dependence on nicotine from cigarettes (11/20/2022), GERD (gastroesophageal reflux disease), History of pancreatitis, Hypertension, and STEMI (ST elevation myocardial infarction) ([facility]) (11/2/2022).   Patient was just discharged from the hospital with necrotizing pancreatitis.  Developed pain nausea and vomiting after discharge.  Blood work shows elevation of lipase here.  CT confirms necrotizing pancreatitis.  Small new fluid collection.  Discussed these findings with patient who is now much improved with his pain and nausea.  He would like to go home instead of being readmitted back to the hospital.  I will increase his pain medication to oxycodone and discharge with return precautions.     Ordered and Reviewed Labs: Labs Reviewed  CBC WITH AUTO DIFFERENTIAL - Abnormal; Notable for the following components:      Hemoglobin                    10.9 (*)               Hematocrit                    33.6 (*)               MCV                           77.2 (*)               MCH                           25.1 (*)               RDW                           17.3 (*)               Platelets                     605 (*)                MPV                           8.7 (*)             All other components  within normal limits  COMPREHENSIVE METABOLIC PANEL - Abnormal; Notable for the following components:     Chloride                      97 (*)                 Anion Gap                     13 (*)                 Glucose                       102 (*)                Creatinine                    0.70 (*)            All other components within normal limits  LIPASE - Abnormal; Notable for the following components:     Lipase                        2,285 (*)            All other components within normal limits  ETHANOL - Abnormal; Notable for the following components:     Ethanol Lvl                   <10 (*)             All other components within normal limits  URINALYSIS  URINE DRUG SCREEN  LACTIC ACID    Imaging Independent Interpretation: CT ABDOMEN PELVIS W IV CONTRAST Additional Contrast? None   Final Result  Necrotizing pancreatitis        External Note Reviewed: Prior admission by hospitalist.    Consideration regarding hospitalization: The patient and I considered hospitalization for his pancreatitis and elevated lipase. He states he has family at home and would like to go home with some pain medication and nausea medication and attempt a clear liquid diet. Patient's pain has improved here looking much better. He will attempt to go home and if needed return to the hospital likely for admission if pain becomes more severe. Reassess: Pain improved    Disposition: Discharge home with clear liquid diet and pain medication    Consideration of rx meds: Pain medication for pancreatitis. Reviewed current meds and recommend continuing current home medication except for the hydrocodone. Replaced with oxycodone. Final DX: Acute necrotizing pancreatitis      Amount and/or Complexity of Data Reviewed  Labs: ordered. Radiology: ordered. Risk  Prescription drug management.                                 Orders Placed This Encounter   Procedures    CT ABDOMEN PELVIS W IV CONTRAST Additional Contrast? None CBC with Diff    CMP    Lipase    Urinalysis w rflx microscopic    Urine Drug Screen    Ethanol    Lactic Acid    Diet NPO    Saline lock IV        Medications   0.9 % sodium chloride bolus (1,000 mLs IntraVENous New Bag 1/18/23 2100)   morphine injection 6 mg (6 mg IntraVENous Given 1/18/23 2101)   ondansetron (ZOFRAN) injection 4 mg (4 mg IntraVENous Given 1/18/23 2101)   0.9 % sodium chloride bolus (0 mLs IntraVENous Stopped 1/18/23 2226)   iopamidol (ISOVUE-370) 76 % injection 100 mL (100 mLs IntraVENous Given 1/18/23 2138)   HYDROmorphone HCl PF (DILAUDID) injection 1 mg (1 mg IntraVENous Given 1/18/23 2207)       New Prescriptions    OXYCODONE HCL (OXY-IR) 10 MG IMMEDIATE RELEASE TABLET    Take 1 tablet by mouth every 6 hours as needed for Pain for up to 5 days. Intended supply: 30 days Max Daily Amount: 40 mg        Germain Montoya is a 44 y.o. male who presents to the Emergency Department with chief complaint of    Chief Complaint   Patient presents with    Abdominal Pain      Patient was admitted to the hospital 10 days ago discharged today for pancreatitis. Note follows. Hospital Course:  Please refer to the admission H&P for details of presentation. In summary, Germain Montoya is a 44 y.o. male with past medical history significant for EtOH pancreatitis complicated with pseudocyst s/p stent placement 11/9 removed 11/30 and STEMI s/p PCI with TULIO who presented to [facility] ER with a complaint of recurrent epigastric pain with associated N/V.     NGT placed with TF initially and diet restarted on 1/17. Patient's abdominal pain improved and is stable for discharge. He states an hour after he was discharged he started having epigastric pain and nausea and vomiting again. Denies any diarrhea. No dysuria. No chest pain or shortness of breath. Comes here for evaluation. The history is provided by the patient. No  was used.    Abdominal Pain  Pain location:  Epigastric  Pain quality: sharp    Pain severity:  Moderate  Duration:  5 hours  Timing:  Constant  Progression:  Worsening  Chronicity:  Recurrent  Context: eating    Context: not alcohol use    Relieved by:  Nothing  Worsened by:  Eating  Associated symptoms: nausea and vomiting    Associated symptoms: no chest pain, no chills, no constipation, no cough, no diarrhea, no dysuria, no fever, no hematuria, no shortness of breath and no sore throat       Review of Systems   Constitutional:  Negative for chills and fever. HENT:  Negative for rhinorrhea and sore throat. Respiratory:  Negative for cough and shortness of breath. Cardiovascular:  Negative for chest pain and palpitations. Gastrointestinal:  Positive for abdominal pain, nausea and vomiting. Negative for constipation and diarrhea. Genitourinary:  Negative for dysuria and hematuria. Musculoskeletal:  Negative for back pain and neck pain. Skin:  Negative for color change and rash. Neurological:  Negative for numbness and headaches. All other systems reviewed and are negative.     Past Medical History:   Diagnosis Date    Abdominal pain 10/25/2022    Abnormal CT of the abdomen 97/40/9699    Acute alcoholic pancreatitis 49/71/5207    Acute kidney injury (nontraumatic) ([address] 10/31/2022    pt denies    Acute on chronic pancreatitis ([address] 07/30/2022    Alcohol use disorder, severe, dependence ([address] 08/21/2019    CAD (coronary artery disease)     Carpal tunnel syndrome, left     Cigarette smoker     Coronary artery disease involving native coronary artery of native heart 11/13/2022    Dependence on nicotine from cigarettes 11/20/2022    GERD (gastroesophageal reflux disease)     controlled with med    History of pancreatitis     x 4 or 5 times-- last time admitted to hospital 10/10/26/22 x 7 days    Hypertension     controlled with med    STEMI (ST elevation myocardial infarction) ([address] 11/2/2022        Past Surgical History:   Procedure Laterality Date    CARDIAC PROCEDURE N/A 11/2/2022    LEFT HEART CATH / CORONARY ANGIOGRAPHY performed by Bentley Armstrong MD at [address] [place] [address] CATH LAB    CARDIAC PROCEDURE N/A 11/2/2022    Percutaneous coronary intervention performed by Bentley Armstrong MD at [address] [place] [address] CATH LAB    CHOLECYSTECTOMY  2012    ORTHOPEDIC SURGERY Left     wrist surg--nerve surg    ORTHOPEDIC SURGERY Left     foot surg as child    UPPER GASTROINTESTINAL ENDOSCOPY N/A 11/9/2022    EGD/ENDOSCOPIC ULTRASOUND/AXIOS STENT ROOM 2226 **Rep will be present performed by Eliza Hamman, MD at [facility] ENDOSCOPY    UPPER GASTROINTESTINAL ENDOSCOPY N/A 11/30/2022    EGD/ENDOSCOPIC ULTRASOUND stent removal  performed by Eliza Hamman, MD at [facility] ENDOSCOPY    UPPER GASTROINTESTINAL ENDOSCOPY N/A 11/30/2022    EGD ESOPHAGOGASTRODUODENOSCOPY performed by Eliza Hamman, MD at [facility] ENDOSCOPY    UPPER GASTROINTESTINAL ENDOSCOPY N/A 1/11/2023    ENDOSCOPIC ULTRASOUND, biopsy performed by Marisela Arredondo MD at [facility] ENDOSCOPY        Family History   Problem Relation Age of Onset    No Known Problems Mother     Heart Attack Father         MI at 58        Social History     Socioeconomic History    Marital status:    Tobacco Use    Smoking status: Every Day     Packs/day: 1.00     Years: 20.00     Pack years: 20.00     Types: Cigarettes    Smokeless tobacco: Never   Vaping Use    Vaping Use: Never used   Substance and Sexual Activity    Alcohol use: Not Currently     Comment: none since 10/4/22-- had drank 3 glasses of wine daily    Drug use: No        Allergies: Patient has no known allergies.     Previous Medications    ASPIRIN 81 MG CHEWABLE TABLET    Take 1 tablet by mouth daily    ATORVASTATIN (LIPITOR) 40 MG TABLET    Take 1 tablet by mouth nightly    BUPROPION (WELLBUTRIN SR) 100 MG EXTENDED RELEASE TABLET    Take 1 tablet by mouth 2 times daily    HYDROCODONE-ACETAMINOPHEN (NORCO) 7.5-325 MG PER TABLET    Take 1 tablet by mouth every 6 hours as needed for Pain for up to 3 days. Max Daily Amount: 4 tablets    HYOSCYAMINE (LEVSIN/SL) 125 MCG SUBLINGUAL TABLET    Place 1 tablet under the tongue every 4 hours as needed for Cramping    LIPASE-PROTEASE-AMYLASE (CREON) 42120-434609 UNITS CPEP DELAYED RELEASE CAPSULE    Take 1 capsule by mouth 3 times daily (with meals)    METOPROLOL TARTRATE (LOPRESSOR) 25 MG TABLET    Take 1 tablet by mouth 2 times daily    NITROGLYCERIN (NITROSTAT) 0.4 MG SL TABLET    Place 1 tablet under the tongue every 5 minutes as needed for Chest pain up to max of 3 total doses. If no relief after 1 dose, call 911. PANTOPRAZOLE (PROTONIX) 40 MG TABLET    Take 1 tablet by mouth in the morning and at bedtime    POLYETHYLENE GLYCOL (GLYCOLAX) 17 G PACKET    Take 17 g by mouth 2 times daily    SIMETHICONE (MYLICON) 80 MG CHEWABLE TABLET    Take 1 tablet by mouth every 6 hours as needed for Flatulence    TICAGRELOR (BRILINTA) 90 MG TABS TABLET    Take 1 tablet by mouth 2 times daily        Vitals signs and nursing note reviewed. Patient Vitals for the past 4 hrs:   Temp Pulse Resp SpO2   01/18/23 2010 97.7 °F (36.5 °C) 65 18 100 %          Physical Exam  Vitals and nursing note reviewed. Constitutional:       Appearance: Normal appearance. HENT:      Head: Normocephalic and atraumatic. Cardiovascular:      Rate and Rhythm: Normal rate and regular rhythm. Pulmonary:      Effort: Pulmonary effort is normal.      Breath sounds: Normal breath sounds. No wheezing. Abdominal:      General: Bowel sounds are normal.      Palpations: Abdomen is soft. Tenderness: There is abdominal tenderness (epigastric). There is guarding. Musculoskeletal:         General: No swelling. Normal range of motion. Cervical back: Normal range of motion. No tenderness. Skin:     General: Skin is warm and dry. Neurological:      Mental Status: He is alert.         Procedures           CT ABDOMEN PELVIS W IV CONTRAST Additional Contrast? None   Final Result Findings described above are again consistent with necrotizing pancreatitis. The   largest adjacent fluid collection has decreased in size. There has been interval development of a 2.5 x 2.6 cm low-density fluid   collection within or adjacent to the tail of the pancreas which could represent   necrosis/abscess. The peripancreatic fluid again courses along the stomach, particularly the   lesser curvature. Again there may be thickening of the wall of the distal   stomach suggesting gastritis. Mild left basilar atelectasis or infiltrate. Moderate amount of stool in the colon. Voice dictation software was used during the making of this note. This software is not perfect and grammatical and other typographical errors may be present. This note has not been completely proofread for errors.      Yane De La Torre III, MD  01/18/23 7188

## 2023-01-19 NOTE — CARE COORDINATION
Care Transitions Outreach Attempt    Call within 2 business days of discharge: Yes   Second attempt to reach patient for transitions of care follow up. Unable to reach patient. Patient: Sharee Hummel Patient : [id number] MRN: [id number]    Last Discharge  Street       Date Complaint Diagnosis Description Type Department Provider    23 Abdominal Pain Acute necrotizing pancreatitis ED (DISCHARGE) MONROE Melgoza MD              Was this an external facility discharge?  No Discharge Facility: [facility]    Noted following upcoming appointments from discharge chart review:   [facility] follow up appointment(s):   Future Appointments   Date Time Provider Angeli Whitney   2023  4:00 PM Marylee Bell, MD [facility]     [facility] follow up appointment(s): [facility]

## 2023-01-19 NOTE — ED TRIAGE NOTES
Pt states he has pancreatitis, pt states he was discharged from [facility] this afternoon. Pt c/o abdominal pain and n/v. Pt denies diarrhea.

## 2023-01-20 ENCOUNTER — CARE COORDINATION (OUTPATIENT)
Dept: CARE COORDINATION | Facility: CLINIC | Age: 38
End: 2023-01-20

## 2023-01-20 NOTE — CARE COORDINATION
Care Transitions Outreach Attempt    Call within 2 business days of discharge: Yes   Third attempt to reach patient for transitions of care follow up. Unable to reach patient.  No further outreach is indicated.    Patient: Mingo Tejada Patient : 1985 MRN: [id number]    Last Discharge [facility] Facility       Date Complaint Diagnosis Description Type Department Provider    23 Abdominal Pain Acute necrotizing pancreatitis ED (DISCHARGE) [facility] Bj Dawson III, MD              Was this an external facility discharge? No Discharge Facility: [facility]    Noted following upcoming appointments from discharge chart review:   [facility] follow up appointment(s):   Future Appointments   Date Time Provider Department Center   2023  4:00 PM Tomy Zelaya MD [facility]     Non-[facility] follow up appointment(s): [facility]

## 2023-01-24 ENCOUNTER — TELEMEDICINE (OUTPATIENT)
Dept: INTERNAL MEDICINE CLINIC | Facility: CLINIC | Age: 38
End: 2023-01-24

## 2023-01-24 DIAGNOSIS — Z09 HOSPITAL DISCHARGE FOLLOW-UP: Primary | ICD-10-CM

## 2023-01-24 DIAGNOSIS — K85.90 ACUTE ON CHRONIC PANCREATITIS (HCC): ICD-10-CM

## 2023-01-24 DIAGNOSIS — K86.1 ACUTE ON CHRONIC PANCREATITIS (HCC): ICD-10-CM

## 2023-01-24 DIAGNOSIS — F17.211 CIGARETTE NICOTINE DEPENDENCE IN REMISSION: Chronic | ICD-10-CM

## 2023-01-24 DIAGNOSIS — I25.10 CORONARY ARTERY DISEASE INVOLVING NATIVE CORONARY ARTERY OF NATIVE HEART WITHOUT ANGINA PECTORIS: Chronic | ICD-10-CM

## 2023-01-24 DIAGNOSIS — Z72.0 TOBACCO ABUSE: ICD-10-CM

## 2023-01-24 DIAGNOSIS — K86.0 ALCOHOL-INDUCED CHRONIC PANCREATITIS (HCC): ICD-10-CM

## 2023-01-24 DIAGNOSIS — K85.91 NECROTIZING PANCREATITIS: ICD-10-CM

## 2023-01-24 RX ORDER — HYDROCODONE BITARTRATE AND ACETAMINOPHEN 10; 325 MG/1; MG/1
1 TABLET ORAL EVERY 8 HOURS PRN
Qty: 90 TABLET | Refills: 0 | Status: SHIPPED | OUTPATIENT
Start: 2023-01-24 | End: 2023-02-23

## 2023-01-24 ASSESSMENT — ENCOUNTER SYMPTOMS
NAUSEA: 0
COUGH: 0
ABDOMINAL DISTENTION: 0
SHORTNESS OF BREATH: 0

## 2023-01-24 NOTE — ASSESSMENT & PLAN NOTE
Monitored by specialist- no acute findings meriting change in the plan   Key CAD CHF Meds          atorvastatin (LIPITOR) 40 MG tablet (Taking)    Sig - Route: Take 1 tablet by mouth nightly - Oral    metoprolol tartrate (LOPRESSOR) 25 MG tablet (Taking)    Sig - Route:  Take 1 tablet by mouth 2 times daily - Oral

## 2023-01-24 NOTE — ASSESSMENT & PLAN NOTE
Monitored by specialist- no acute findings meriting change in the plan   Will continue with creon  everardo by GI next week  Tolerating current diet and meds

## 2023-01-24 NOTE — ASSESSMENT & PLAN NOTE
Asymptomatic, continue current medications, lifestyle modifications recommended and Congratulated for his efforts, last cigarette December 2022

## 2023-01-24 NOTE — PROGRESS NOTES
Post-Discharge Transitional Care Follow Up      Zeeshan Jennings   YOB: 1985    Date of Office Visit:  1/24/2023  Date of Hospital Admission: 1/18/23  Date of Hospital Discharge: 1/18/23  Readmission Risk Score(high >=14%. Medium >=10%):Readmission Risk Score: 37.5      Care management risk score Rising risk (score 2-5) and Complex Care (Scores >=6): No Risk Score On File     Non face to face  following discharge, date last encounter closed (first attempt may have been earlier): 01/20/2023     Call initiated 2 business days of discharge: Yes     Below is the assessment and plan developed based on review of pertinent history, physical exam, labs, studies, and medications. Hospital discharge follow-up  -     UT DISCHARGE MEDS RECONCILED W/ CURRENT OUTPATIENT MED LIST  Acute on chronic pancreatitis ([facility] [address]  Tobacco abuse  Necrotizing pancreatitis  Assessment & Plan:   Asymptomatic, continue current medications   No aberrant behaviors   Orders:  -     HYDROcodone-acetaminophen (LORCET HD)  MG per tablet; Take 1 tablet by mouth every 8 hours as needed for Pain for up to 30 days. Intended supply: 30 days Max Daily Amount: 3 tablets, Disp-90 tablet, R-0Normal  Coronary artery disease involving native coronary artery of native heart without angina pectoris  Assessment & Plan:   Monitored by specialist- no acute findings meriting change in the plan   Key CAD CHF Meds            atorvastatin (LIPITOR) 40 MG tablet (Taking)    Sig - Route: Take 1 tablet by mouth nightly - Oral    metoprolol tartrate (LOPRESSOR) 25 MG tablet (Taking)    Sig - Route:  Take 1 tablet by mouth 2 times daily - Oral            Alcohol-induced chronic pancreatitis ([facility] [address]  Assessment & Plan:   Monitored by specialist- no acute findings meriting change in the plan   Will continue with creon  eval by GI next week  Tolerating current diet and meds  Cigarette nicotine dependence in remission  Assessment & Plan:   Asymptomatic, continue current medications, lifestyle modifications recommended and Congratulated for his efforts, last cigarette December 2022    Medical Decision Making: high complexity  Return in 1 month (on 2/24/2023). On this date 1/24/2023 I have spent 25 minutes reviewing previous notes, test results and face to face with the patient discussing the diagnosis and importance of compliance with the treatment plan as well as documenting on the day of the visit. Subjective:   He is being seen for virtual visit after follow-up of complicated admission for pancreatitis, complicated with pseudocyst over the last few months he has been in the hospital multiple times and readmitted for management of the pain, and necrosis in pancreatic tail he is currently tolerating solid foods, he was in the hospital at least for 11 days, he was placed on an feeding tube, given pain management, support, monitored by GI, and released on the 18, he reports has been able to tolerate solid foods for the last 4 days, is moving his bowels regularly, denies any constipation, his pain has been under control, but present but not as intense as it was. He reports taking hydromorphone in the hospital, later on transition to oxycodone, but he feels these medications are making him more tired, and is sleepy, he feels hydrocodone 10 mg has been good for him to attend his pain under control, sometimes he can take 3-4, but some days he does not. Been trying to quit smoking, has been almost a month since his last cigarette, has been taking Wellbutrin for anxiety, and is smoking cessation, tolerating the medication without significant side effects    Has a follow-up with cardiology tomorrow, for his STEMI  Admit Date:     1/8/2023 10:  DC Note date: 1/18/2023  CT Result (most recent):  CT ABDOMEN PELVIS W IV CONTRAST 01/18/2023    - Narrative -  EXAM: CT ABDOMEN PELVIS W IV CONTRAST    HISTORY: abd pain.     TECHNIQUE: Axial images of the abdomen and pelvis were performed following the  administration of intravenous contrast. Images were obtained in the axial plane  and coronal reformatted images were created and reviewed. Dose reduction technique used: Automated exposure control/Adjustment of the mA  and/or kV according to patient size/Use of iterative reconstruction technique. 100 mL of Isovue-370 were utilized. COMPARISON: 1/9/2023    FINDINGS:  The visualized lung bases show mild left basilar atelectasis or infiltrate. The visualized mediastinum appears unremarkable. The liver, spleen, adrenal glands, and kidneys are within normal limits. The   bladder appears grossly normal. The gallbladder has been removed. The pancreatic head and body show enhancement. There is persistent  peripancreatic fatty stranding and fluid. This fluid lies along the lesser  curvature of the stomach and gastric fundus. Similar to the previous examination, there are multiple peripancreatic  low-density fluid collections. The largest of these is again located anterior to  the body and has decreased in size measuring 3.1 x 2.1 cm. (Previously it  measured up to 4.3 x 2.5 cm.)    There has been interval development of a 2.5 x 2.6 cm low-density fluid  collection either within or adjacent to the tail of the pancreas. Multiple retroperitoneal peripancreatic lymph nodes are observed. Distal esophagus contains low-density fluid. The stomach shows no outlet  obstruction. Small and large bowel show no evidence of obstruction. An appendix  is not visualized. There is a moderate amount of stool noted in the colon. No evidence of free air. No abdominal aortic aneurysm. Osseous structures show no evidence of acute fracture or suspicious lesion.    - Impression -  Findings described above are again consistent with necrotizing pancreatitis. The  largest adjacent fluid collection has decreased in size.     There has been interval development of a 2.5 x 2.6 cm low-density fluid  collection within or adjacent to the tail of the pancreas which could represent  necrosis/abscess. The peripancreatic fluid again courses along the stomach, particularly the  lesser curvature. Again there may be thickening of the wall of the distal  stomach suggesting gastritis. Mild left basilar atelectasis or infiltrate. Moderate amount of stool in the colon. Inpatient course: Discharge summary reviewed- see chart. Interval history/Current status: Reports that since the discharge, started tolerating solid food 4 days ago. He feels the pain has been well controlled, has been sober for at least 4 to 5 months, and has not been smoking for at least a month    Patient Active Problem List   Diagnosis    Primary hypertension    Chronic pancreatitis ([facility] [address]    Alcohol use disorder, severe, in early remission, dependence ([facility] [address]    Attention deficit hyperactivity disorder (ADHD), combined type    Tobacco abuse [Z72.0 (ICD-10-CM)]    Pancreatic pseudocyst    History of ST elevation myocardial infarction (STEMI)    Coronary artery disease involving native coronary artery of native heart    Dependence on nicotine from cigarettes    GERD (gastroesophageal reflux disease)    Necrotizing pancreatitis       Medications listed as started at the time of discharge from hospital  Cannot display discharge medications since this is not an admission. Medications marked \"taking\" at this time  Outpatient Medications Marked as Taking for the 1/24/23 encounter (Telemedicine) with Vijaya Ward MD   Medication Sig Dispense Refill    HYDROcodone-acetaminophen (LORCET HD)  MG per tablet Take 1 tablet by mouth every 8 hours as needed for Pain for up to 30 days.  Intended supply: 30 days Max Daily Amount: 3 tablets 90 tablet 0    lipase-protease-amylase (CREON) 48149-619764 units CPEP delayed release capsule Take 1 capsule by mouth 3 times daily (with meals) 180 capsule 1    pantoprazole (PROTONIX) 40 MG tablet Take 1 tablet by mouth in the morning and at bedtime 30 tablet 3    buPROPion (WELLBUTRIN SR) 100 MG extended release tablet Take 1 tablet by mouth 2 times daily 60 tablet 3    aspirin 81 MG chewable tablet Take 1 tablet by mouth daily 30 tablet 3    atorvastatin (LIPITOR) 40 MG tablet Take 1 tablet by mouth nightly 30 tablet 3    metoprolol tartrate (LOPRESSOR) 25 MG tablet Take 1 tablet by mouth 2 times daily 60 tablet 3    ticagrelor (BRILINTA) 90 MG TABS tablet Take 1 tablet by mouth 2 times daily 60 tablet 11        Medications patient taking as of now reconciled against medications ordered at time of hospital discharge: Yes    Review of Systems   Constitutional:  Negative for activity change, appetite change, fatigue, fever and unexpected weight change. Respiratory:  Negative for cough and shortness of breath. Cardiovascular:  Negative for leg swelling. Gastrointestinal:  Negative for abdominal distention and nausea. Anal bleeding: improving. Endocrine: Negative for polydipsia and polyphagia. Neurological:  Negative for dizziness and headaches. Psychiatric/Behavioral:  The patient is not nervous/anxious. Objective:    Patient-Reported Vitals  No data recorded    Physical Exam  Constitutional:       Appearance: Normal appearance. HENT:      Head: Normocephalic and atraumatic. Pulmonary:      Effort: Pulmonary effort is normal. No respiratory distress. Neurological:      General: No focal deficit present. Mental Status: He is alert and oriented to person, place, and time. Psychiatric:         Mood and Affect: Mood normal.         Germaine Casasrobert, was evaluated through a synchronous (real-time) audio-video encounter. The patient (or guardian if applicable) is aware that this is a billable service, which includes applicable co-pays. This Virtual Visit was conducted with patient's (and/or legal guardian's) consent.  The visit was conducted pursuant to the emergency declaration under the [facility] waiver authority and the [employer] and [employer] Act. Patient identification was verified, and a caregiver was present when appropriate. The patient was located at Other: parking lot, on his car. parked . Provider was located at [facility] (Appt Dept): [address] [place] [address],  [address] [place] [address]. An electronic signature was used to authenticate this note.   --Niraj Loomis MD

## 2023-01-25 ENCOUNTER — OFFICE VISIT (OUTPATIENT)
Dept: CARDIOLOGY CLINIC | Age: 38
End: 2023-01-25

## 2023-01-25 VITALS
WEIGHT: 175 LBS | HEART RATE: 80 BPM | SYSTOLIC BLOOD PRESSURE: 102 MMHG | DIASTOLIC BLOOD PRESSURE: 58 MMHG | BODY MASS INDEX: 25.92 KG/M2 | HEIGHT: 69 IN

## 2023-01-25 DIAGNOSIS — K85.91 NECROTIZING PANCREATITIS: ICD-10-CM

## 2023-01-25 DIAGNOSIS — I10 PRIMARY HYPERTENSION: ICD-10-CM

## 2023-01-25 DIAGNOSIS — I10 ESSENTIAL HYPERTENSION: ICD-10-CM

## 2023-01-25 DIAGNOSIS — Z72.0 TOBACCO ABUSE: ICD-10-CM

## 2023-01-25 DIAGNOSIS — I21.29 ST ELEVATION MYOCARDIAL INFARCTION (STEMI) INVOLVING OTHER CORONARY ARTERY (HCC): Primary | ICD-10-CM

## 2023-01-25 DIAGNOSIS — K86.0 ALCOHOL-INDUCED CHRONIC PANCREATITIS (HCC): ICD-10-CM

## 2023-01-25 PROCEDURE — 99214 OFFICE O/P EST MOD 30 MIN: CPT | Performed by: INTERNAL MEDICINE

## 2023-01-25 PROCEDURE — 3074F SYST BP LT 130 MM HG: CPT | Performed by: INTERNAL MEDICINE

## 2023-01-25 PROCEDURE — 3078F DIAST BP <80 MM HG: CPT | Performed by: INTERNAL MEDICINE

## 2023-01-25 NOTE — PROGRESS NOTES
[address], [address] [place] [address], [address] [place] [address]  PHONE: [phone]    SUBJECTIVE: Ari Almaraz ([birth year]) is a 44 y.o. male seen for a follow up visit regarding the following:   Specialty Problems          Cardiology Problems    Coronary artery disease involving native coronary artery of native heart        Primary hypertension         Patient was in the outpatient surgical center at the hospital with plans for I believe a pancreatic pseudocyst drainage when he serendipitously closed off a circumflex and had an ST elevation myocardial infarction this was intervened on November 2, 2022. Patient had a essentially normal LAD and circumflex system other than the OM vessel that was acutely occluded there was really no underlying plaque noted therefore the remainder of coronary arteries are essentially normal he also has normal ejection fraction. He was subsequently able to go onto the procedures for drainage of his pancreatic pseudocyst that was another hospitalization sometime after the sentinel hospitalization but any event he presents now to the office for routine follow-up    Pt doing well. No chest pain. No palpitations. Patient denies syncope. No dyspnea. States they are taking meds. Maintains a normal level of activity for them without symptoms. No dizziness or lightheadedness. All above conditions stable. Past Medical History, Past Surgical History, Family history, Social History, and Medications were all reviewed with the patient today and updated as necessary.     No Known Allergies  Past Medical History:   Diagnosis Date    Abdominal pain 10/25/2022    Abnormal CT of the abdomen 86/82/8143    Acute alcoholic pancreatitis 84/02/1900    Acute kidney injury (nontraumatic) ([address] 10/31/2022    pt denies    Acute on chronic pancreatitis ([address] 07/30/2022    Alcohol use disorder, severe, dependence ([address] 08/21/2019    CAD (coronary artery disease)     Carpal tunnel syndrome, left     Cigarette smoker     Coronary artery disease involving native coronary artery of native heart 2022    Dependence on nicotine from cigarettes 2022    GERD (gastroesophageal reflux disease)     controlled with med    History of pancreatitis     x 4 or 5 times-- last time admitted to hospital 10/10/26/22 x 7 days    Hypertension     controlled with med    STEMI (ST elevation myocardial infarction) ([facility] 2022     Past Surgical History:   Procedure Laterality Date    CARDIAC PROCEDURE N/A 2022    LEFT HEART CATH / CORONARY ANGIOGRAPHY performed by Ramos Reinoso MD at [id number] [facility] N/A 2022    Percutaneous coronary intervention performed by Ramos Reinoso MD at [facility] CATH LAB    CHOLECYSTECTOMY      ORTHOPEDIC SURGERY Left     wrist surg--nerve surg    ORTHOPEDIC SURGERY Left     foot surg as child    UPPER GASTROINTESTINAL ENDOSCOPY N/A 2022    EGD/ENDOSCOPIC ULTRASOUND/AXIOS STENT ROOM 2226 **Rep will be present performed by Gabriela Bourgeois MD at [facility] ENDOSCOPY    UPPER GASTROINTESTINAL ENDOSCOPY N/A 2022    EGD/ENDOSCOPIC ULTRASOUND stent removal  performed by Gabriela Bourgeois MD at [place] [address] N/A 2022    EGD ESOPHAGOGASTRODUODENOSCOPY performed by Gabriela Bourgeois MD at [facility] N/A 2023    ENDOSCOPIC ULTRASOUND, biopsy performed by Darrel Barkley MD at [facility] ENDOSCOPY     Family History   Problem Relation Age of Onset    No Known Problems Mother     Heart Attack Father         MI at 58      Social History     Tobacco Use    Smoking status: Former     Packs/day: 1.00     Years: 20.00     Pack years: 20.00     Types: Cigarettes     Quit date: 2022     Years since quittin.1    Smokeless tobacco: Never   Substance Use Topics    Alcohol use: Not Currently     Comment: none since 10/4/22-- had drank 3 glasses of wine daily       Current Outpatient Medications:     HYDROcodone-acetaminophen (LORCET HD)  MG per tablet, Take 1 tablet by mouth every 8 hours as needed for Pain for up to 30 days. Intended supply: 30 days Max Daily Amount: 3 tablets, Disp: 90 tablet, Rfl: 0    lipase-protease-amylase (CREON) 08164-438877 units CPEP delayed release capsule, Take 1 capsule by mouth 3 times daily (with meals), Disp: 180 capsule, Rfl: 1    hyoscyamine (LEVSIN/SL) 125 MCG sublingual tablet, Place 1 tablet under the tongue every 4 hours as needed for Cramping, Disp: 90 tablet, Rfl: 3    pantoprazole (PROTONIX) 40 MG tablet, Take 1 tablet by mouth in the morning and at bedtime, Disp: 30 tablet, Rfl: 3    nitroGLYCERIN (NITROSTAT) 0.4 MG SL tablet, Place 1 tablet under the tongue every 5 minutes as needed for Chest pain up to max of 3 total doses.  If no relief after 1 dose, call 911., Disp: 25 tablet, Rfl: 3    buPROPion (WELLBUTRIN SR) 100 MG extended release tablet, Take 1 tablet by mouth 2 times daily, Disp: 60 tablet, Rfl: 3    aspirin 81 MG chewable tablet, Take 1 tablet by mouth daily, Disp: 30 tablet, Rfl: 3    atorvastatin (LIPITOR) 40 MG tablet, Take 1 tablet by mouth nightly, Disp: 30 tablet, Rfl: 3    metoprolol tartrate (LOPRESSOR) 25 MG tablet, Take 1 tablet by mouth 2 times daily, Disp: 60 tablet, Rfl: 3    ticagrelor (BRILINTA) 90 MG TABS tablet, Take 1 tablet by mouth 2 times daily, Disp: 60 tablet, Rfl: 11    polyethylene glycol (GLYCOLAX) 17 g packet, Take 17 g by mouth 2 times daily (Patient not taking: No sig reported), Disp: 30 each, Rfl: 0    simethicone (MYLICON) 80 MG chewable tablet, Take 1 tablet by mouth every 6 hours as needed for Flatulence (Patient not taking: No sig reported), Disp: 180 tablet, Rfl: 3    ROS:  Review of Systems - General ROS: negative for - chills, fatigue or fever  Hematological and Lymphatic ROS: negative for - bleeding problems, bruising or jaundice  Respiratory ROS: no cough, shortness of breath, or wheezing  Cardiovascular ROS: no chest pain or dyspnea on exertion  Gastrointestinal ROS: no abdominal pain, change in bowel habits, or black or bloody stools  Neurological ROS: no TIA or stroke symptoms  All other systems negative. Wt Readings from Last 3 Encounters:   01/25/23 175 lb (79.4 kg)   01/18/23 160 lb (72.6 kg)   01/16/23 161 lb 3.2 oz (73.1 kg)     Temp Readings from Last 3 Encounters:   01/18/23 98 °F (36.7 °C) (Oral)   01/18/23 98.6 °F (37 °C) (Oral)   01/08/23 98.7 °F (37.1 °C) (Oral)     BP Readings from Last 3 Encounters:   01/25/23 (!) 102/58   01/18/23 132/89   01/18/23 129/85     Pulse Readings from Last 3 Encounters:   01/25/23 80   01/18/23 63   01/18/23 78       PHYSICAL EXAM:  BP (!) 102/58   Pulse 80   Ht 5' 9\" (1.753 m)   Wt 175 lb (79.4 kg)   BMI 25.84 kg/m²   Physical Examination: General appearance - alert, well appearing, and in no distress  Mental status - alert, oriented to person, place, and time  Eyes - pupils equal and reactive, extraocular eye movements intact  Neck/lymph - supple, no significant adenopathy  Chest/lungs - clear to auscultation, no wheezes, rales or rhonchi, symmetric air entry  Heart/CV - normal rate, regular rhythm, normal S1, S2, no murmurs, rubs, clicks or gallops  Abdomen/GI - soft, nontender, nondistended, no masses or organomegaly  Musculoskeletal - no joint tenderness, deformity or swelling  Extremities - peripheral pulses normal, no pedal edema, no clubbing or cyanosis  Skin - normal coloration and turgor, no rashes, no suspicious skin lesions noted    EKG: normal EKG, normal sinus rhythm.          Medications reviewed and questions answered    Recent Results (from the past 672 hour(s))   CBC with Auto Differential    Collection Time: 01/01/23 11:09 AM   Result Value Ref Range    WBC 16.2 (H) 4.3 - 11.1 K/uL    RBC 5.66 (H) 4.23 - 5.60 M/uL    Hemoglobin 14.5 13.6 - 17.2 g/dL    Hematocrit 45.5 41.1 - 50.3 %    MCV 80.4 (L) 82.0 - 102.0 FL    MCH 25.6 (L) 26.1 - 32.9 PG    MCHC 31.9 31.4 - 35.0 g/dL    RDW 18.9 (H) 11.9 - 14.6 %    Platelets 222 (H) 499 - 450 K/uL    MPV 8.5 (L) 9.4 - 12.3 FL    nRBC 0.00 0.0 - 0.2 K/uL    Differential Type AUTOMATED      Seg Neutrophils 85 (H) 43 - 78 %    Lymphocytes 9 (L) 13 - 44 %    Monocytes 5 4.0 - 12.0 %    Eosinophils % 1 0.5 - 7.8 %    Basophils 1 0.0 - 2.0 %    Immature Granulocytes 1 0.0 - 5.0 %    Segs Absolute 13.7 (H) 1.7 - 8.2 K/UL    Absolute Lymph # 1.4 0.5 - 4.6 K/UL    Absolute Mono # 0.8 0.1 - 1.3 K/UL    Absolute Eos # 0.1 0.0 - 0.8 K/UL    Basophils Absolute 0.1 0.0 - 0.2 K/UL    Absolute Immature Granulocyte 0.1 0.0 - 0.5 K/UL   CMP    Collection Time: 01/01/23 11:09 AM   Result Value Ref Range    Sodium 141 133 - 143 mmol/L    Potassium 5.9 (H) 3.5 - 5.1 mmol/L    Chloride 102 98 - 107 mmol/L    CO2 22 21 - 32 mmol/L    Anion Gap 17 (H) 2 - 11 mmol/L    Glucose 92 65 - 100 mg/dL    BUN 14 7.0 - 18.0 MG/DL    Creatinine 0.84 0.8 - 1.5 MG/DL    Est, Glom Filt Rate >60 >60 ml/min/1.73m2    Calcium 9.8 8.3 - 10.4 MG/DL    Total Bilirubin 0.4 0.2 - 1.1 MG/DL    ALT 27 13.0 - 61.0 U/L    AST 36 15 - 37 U/L    Alk Phosphatase 66 45.0 - 117.0 U/L    Total Protein 8.0 6.4 - 8.2 g/dL    Albumin 3.9 3.5 - 5.0 g/dL    Globulin 4.1 2.8 - 4.5 g/dL    Albumin/Globulin Ratio 1.0 0.4 - 1.6     Lipase    Collection Time: 01/01/23 11:09 AM   Result Value Ref Range    Lipase 505 (H) 13 - 60 U/L   Magnesium    Collection Time: 01/01/23 11:09 AM   Result Value Ref Range    Magnesium 2.1 1.2 - 2.6 mg/dL   Culture, Blood 1    Collection Time: 01/01/23  6:55 PM    Specimen: Blood   Result Value Ref Range    Special Requests RIGHT  Antecubital        Culture NO GROWTH 5 DAYS     Lactic Acid    Collection Time: 01/01/23  6:55 PM   Result Value Ref Range    Lactic Acid, Plasma 1.4 0.4 - 2.0 MMOL/L   Potassium    Collection Time: 01/01/23  6:55 PM   Result Value Ref Range    Potassium 4.4 3.5 - 5.1 mmol/L Culture, Blood 1    Collection Time: 01/01/23  7:49 PM    Specimen: Blood   Result Value Ref Range    Special Requests RIGHT  HAND        Culture NO GROWTH 5 DAYS     CBC with Auto Differential    Collection Time: 01/02/23  6:46 AM   Result Value Ref Range    WBC 10.1 4.3 - 11.1 K/uL    RBC 4.41 4.23 - 5.6 M/uL    Hemoglobin 11.3 (L) 13.6 - 17.2 g/dL    Hematocrit 35.3 (L) 41.1 - 50.3 %    MCV 80.0 (L) 82 - 102 FL    MCH 25.6 (L) 26.1 - 32.9 PG    MCHC 32.0 31.4 - 35.0 g/dL    RDW 17.5 (H) 11.9 - 14.6 %    Platelets 668 (H) 428 - 450 K/uL    MPV 8.8 (L) 9.4 - 12.3 FL    nRBC 0.00 0.0 - 0.2 K/uL    Differential Type AUTOMATED      Seg Neutrophils 77 43 - 78 %    Lymphocytes 11 (L) 13 - 44 %    Monocytes 9 4.0 - 12.0 %    Eosinophils % 1 0.5 - 7.8 %    Basophils 1 0.0 - 2.0 %    Immature Granulocytes 1 0.0 - 5.0 %    Segs Absolute 7.9 1.7 - 8.2 K/UL    Absolute Lymph # 1.1 0.5 - 4.6 K/UL    Absolute Mono # 0.9 0.1 - 1.3 K/UL    Absolute Eos # 0.1 0.0 - 0.8 K/UL    Basophils Absolute 0.1 0.0 - 0.2 K/UL    Absolute Immature Granulocyte 0.1 0.0 - 0.5 K/UL   Magnesium    Collection Time: 01/02/23  6:46 AM   Result Value Ref Range    Magnesium 2.1 1.8 - 2.4 mg/dL   Comprehensive Metabolic Panel    Collection Time: 01/02/23  6:46 AM   Result Value Ref Range    Sodium 134 133 - 143 mmol/L    Potassium 4.1 3.5 - 5.1 mmol/L    Chloride 104 101 - 110 mmol/L    CO2 26 21 - 32 mmol/L    Anion Gap 4 2 - 11 mmol/L    Glucose 91 65 - 100 mg/dL    BUN 10 6 - 23 MG/DL    Creatinine 1.00 0.8 - 1.5 MG/DL    Est, Glom Filt Rate >60 >60 ml/min/1.73m2    Calcium 9.0 8.3 - 10.4 MG/DL    Total Bilirubin 0.6 0.2 - 1.1 MG/DL    ALT 28 12 - 65 U/L    AST 16 15 - 37 U/L    Alk Phosphatase 49 (L) 50 - 136 U/L    Total Protein 6.5 6.3 - 8.2 g/dL    Albumin 2.5 (L) 3.5 - 5.0 g/dL    Globulin 4.0 2.8 - 4.5 g/dL    Albumin/Globulin Ratio 0.6 0.4 - 1.6     CBC with Auto Differential    Collection Time: 01/03/23  5:41 AM   Result Value Ref Range WBC 5.3 4.3 - 11.1 K/uL    RBC 4.34 4.23 - 5.6 M/uL    Hemoglobin 11.0 (L) 13.6 - 17.2 g/dL    Hematocrit 34.6 (L) 41.1 - 50.3 %    MCV 79.7 (L) 82 - 102 FL    MCH 25.3 (L) 26.1 - 32.9 PG    MCHC 31.8 31.4 - 35.0 g/dL    RDW 17.3 (H) 11.9 - 14.6 %    Platelets 184 426 - 483 K/uL    MPV 8.9 (L) 9.4 - 12.3 FL    nRBC 0.00 0.0 - 0.2 K/uL    Differential Type AUTOMATED      Seg Neutrophils 63 43 - 78 %    Lymphocytes 23 13 - 44 %    Monocytes 12 4.0 - 12.0 %    Eosinophils % 1 0.5 - 7.8 %    Basophils 1 0.0 - 2.0 %    Immature Granulocytes 0 0.0 - 5.0 %    Segs Absolute 3.4 1.7 - 8.2 K/UL    Absolute Lymph # 1.2 0.5 - 4.6 K/UL    Absolute Mono # 0.6 0.1 - 1.3 K/UL    Absolute Eos # 0.0 0.0 - 0.8 K/UL    Basophils Absolute 0.0 0.0 - 0.2 K/UL    Absolute Immature Granulocyte 0.0 0.0 - 0.5 K/UL   Magnesium    Collection Time: 01/03/23  5:41 AM   Result Value Ref Range    Magnesium 2.1 1.8 - 2.4 mg/dL   Comprehensive Metabolic Panel    Collection Time: 01/03/23  5:41 AM   Result Value Ref Range    Sodium 133 133 - 143 mmol/L    Potassium 3.8 3.5 - 5.1 mmol/L    Chloride 104 101 - 110 mmol/L    CO2 23 21 - 32 mmol/L    Anion Gap 6 2 - 11 mmol/L    Glucose 78 65 - 100 mg/dL    BUN 7 6 - 23 MG/DL    Creatinine 0.80 0.8 - 1.5 MG/DL    Est, Glom Filt Rate >60 >60 ml/min/1.73m2    Calcium 8.9 8.3 - 10.4 MG/DL    Total Bilirubin 0.4 0.2 - 1.1 MG/DL    ALT 27 12 - 65 U/L    AST 22 15 - 37 U/L    Alk Phosphatase 46 (L) 50 - 136 U/L    Total Protein 6.5 6.3 - 8.2 g/dL    Albumin 2.4 (L) 3.5 - 5.0 g/dL    Globulin 4.1 2.8 - 4.5 g/dL    Albumin/Globulin Ratio 0.6 0.4 - 1.6     Lipase    Collection Time: 01/04/23  5:46 AM   Result Value Ref Range    Lipase 2,614 (H) 73 - 393 U/L   CBC with Auto Differential    Collection Time: 01/04/23  5:47 AM   Result Value Ref Range    WBC 5.3 4.3 - 11.1 K/uL    RBC 4.17 (L) 4.23 - 5.6 M/uL    Hemoglobin 10.6 (L) 13.6 - 17.2 g/dL    Hematocrit 33.7 (L) 41.1 - 50.3 %    MCV 80.8 (L) 82 - 102 FL    MCH 25.4 (L) 26.1 - 32.9 PG    MCHC 31.5 31.4 - 35.0 g/dL    RDW 17.0 (H) 11.9 - 14.6 %    Platelets 238 (H) 843 - 450 K/uL    MPV 9.1 (L) 9.4 - 12.3 FL    nRBC 0.00 0.0 - 0.2 K/uL    Differential Type AUTOMATED      Seg Neutrophils 60 43 - 78 %    Lymphocytes 22 13 - 44 %    Monocytes 16 (H) 4.0 - 12.0 %    Eosinophils % 1 0.5 - 7.8 %    Basophils 1 0.0 - 2.0 %    Immature Granulocytes 0 0.0 - 5.0 %    Segs Absolute 3.2 1.7 - 8.2 K/UL    Absolute Lymph # 1.2 0.5 - 4.6 K/UL    Absolute Mono # 0.9 0.1 - 1.3 K/UL    Absolute Eos # 0.1 0.0 - 0.8 K/UL    Basophils Absolute 0.0 0.0 - 0.2 K/UL    Absolute Immature Granulocyte 0.0 0.0 - 0.5 K/UL   Magnesium    Collection Time: 01/04/23  5:47 AM   Result Value Ref Range    Magnesium 2.1 1.8 - 2.4 mg/dL   Comprehensive Metabolic Panel    Collection Time: 01/04/23  5:47 AM   Result Value Ref Range    Sodium 133 133 - 143 mmol/L    Potassium 4.0 3.5 - 5.1 mmol/L    Chloride 102 101 - 110 mmol/L    CO2 24 21 - 32 mmol/L    Anion Gap 7 2 - 11 mmol/L    Glucose 78 65 - 100 mg/dL    BUN 6 6 - 23 MG/DL    Creatinine 1.00 0.8 - 1.5 MG/DL    Est, Glom Filt Rate >60 >60 ml/min/1.73m2    Calcium 9.1 8.3 - 10.4 MG/DL    Total Bilirubin 0.3 0.2 - 1.1 MG/DL    ALT 32 12 - 65 U/L    AST 25 15 - 37 U/L    Alk Phosphatase 46 (L) 50 - 136 U/L    Total Protein 6.7 6.3 - 8.2 g/dL    Albumin 2.2 (L) 3.5 - 5.0 g/dL    Globulin 4.5 2.8 - 4.5 g/dL    Albumin/Globulin Ratio 0.5 0.4 - 1.6     Lipase    Collection Time: 01/05/23  4:53 AM   Result Value Ref Range    Lipase 2,320 (H) 73 - 393 U/L   Lipase    Collection Time: 01/05/23 12:58 PM   Result Value Ref Range    Lipase 2,586 (H) 73 - 393 U/L   Lipase    Collection Time: 01/08/23  5:34 PM   Result Value Ref Range    Lipase 837 (H) 13 - 60 U/L   Comprehensive Metabolic Panel    Collection Time: 01/08/23  5:34 PM   Result Value Ref Range    Sodium 135 133 - 143 mmol/L    Potassium 4.8 3.5 - 5.1 mmol/L    Chloride 93 (L) 98 - 107 mmol/L    CO2 23 21 - 32 mmol/L    Anion Gap 19 (H) 2 - 11 mmol/L    Glucose 98 65 - 100 mg/dL    BUN 17 7.0 - 18.0 MG/DL    Creatinine 0.92 0.8 - 1.5 MG/DL    Est, Glom Filt Rate >60 >60 ml/min/1.73m2    Calcium 10.3 8.3 - 10.4 MG/DL    Total Bilirubin 0.7 0.2 - 1.1 MG/DL    ALT 28 13.0 - 61.0 U/L    AST 29 15 - 37 U/L    Alk Phosphatase 85 45.0 - 117.0 U/L    Total Protein 8.7 (H) 6.4 - 8.2 g/dL    Albumin 4.2 3.5 - 5.0 g/dL    Globulin 4.5 2.8 - 4.5 g/dL    Albumin/Globulin Ratio 0.9 0.4 - 1.6     CBC with Auto Differential    Collection Time: 01/08/23  5:34 PM   Result Value Ref Range    WBC 10.6 4.3 - 11.1 K/uL    RBC 5.80 (H) 4.23 - 5.60 M/uL    Hemoglobin 14.7 13.6 - 17.2 g/dL    Hematocrit 44.5 41.1 - 50.3 %    MCV 76.7 (L) 82.0 - 102.0 FL    MCH 25.3 (L) 26.1 - 32.9 PG    MCHC 33.0 31.4 - 35.0 g/dL    RDW 17.0 (H) 11.9 - 14.6 %    Platelets 717 (H) 150 - 450 K/uL    MPV 8.5 (L) 9.4 - 12.3 FL    nRBC 0.00 0.0 - 0.2 K/uL    Differential Type AUTOMATED      Seg Neutrophils 79 (H) 43 - 78 %    Lymphocytes 13 13 - 44 %    Monocytes 6 4.0 - 12.0 %    Eosinophils % 1 0.5 - 7.8 %    Basophils 0 0.0 - 2.0 %    Immature Granulocytes 0 0.0 - 5.0 %    Segs Absolute 8.4 (H) 1.7 - 8.2 K/UL    Absolute Lymph # 1.4 0.5 - 4.6 K/UL    Absolute Mono # 0.7 0.1 - 1.3 K/UL    Absolute Eos # 0.1 0.0 - 0.8 K/UL    Basophils Absolute 0.0 0.0 - 0.2 K/UL    Absolute Immature Granulocyte 0.0 0.0 - 0.5 K/UL   Urinalysis w rflx microscopic    Collection Time: 01/08/23  8:13 PM   Result Value Ref Range    Color, UA CHEKO      Appearance CLEAR      Specific Gravity, UA 1.025 (H) 1.001 - 1.023      pH, Urine 7.0 5.0 - 9.0      Protein,  (A) NEG mg/dL    Glucose,  mg/dL    Ketones, Urine 40 (A) NEG mg/dL    Bilirubin Urine SMALL (A) NEG      Blood, Urine Trace Intact (A) NEG      Urobilinogen, Urine >=8.0 0.2 - 1.0 EU/dL    Nitrite, Urine Negative NEG      Leukocyte Esterase, Urine Negative NEG     Urinalysis, Micro    Collection Time: 01/08/23  8:13  PM   Result Value Ref Range    WBC, UA 0-3 0 /hpf    RBC, UA 3-5 0 /hpf    Epithelial Cells UA 0-3 0 /hpf    BACTERIA, URINE 0 0 /hpf    Casts 0 0 /lpf    Crystals 0 0 /LPF    Amorphous Crystal 1+ (H) 0    Mucus, UA TRACE 0 /lpf    Other observations RESULTS VERIFIED MANUALLY     Basic Metabolic Panel w/ Reflex to MG    Collection Time: 01/09/23  8:50 AM   Result Value Ref Range    Sodium 132 (L) 133 - 143 mmol/L    Potassium 4.4 3.5 - 5.1 mmol/L    Chloride 102 101 - 110 mmol/L    CO2 23 21 - 32 mmol/L    Anion Gap 7 2 - 11 mmol/L    Glucose 88 65 - 100 mg/dL    BUN 12 6 - 23 MG/DL    Creatinine 0.90 0.8 - 1.5 MG/DL    Est, Glom Filt Rate >60 >60 ml/min/1.73m2    Calcium 8.9 8.3 - 10.4 MG/DL   CBC with Auto Differential    Collection Time: 01/09/23  8:50 AM   Result Value Ref Range    WBC 7.9 4.3 - 11.1 K/uL    RBC 4.34 4.23 - 5.6 M/uL    Hemoglobin 11.0 (L) 13.6 - 17.2 g/dL    Hematocrit 34.0 (L) 41.1 - 50.3 %    MCV 78.3 (L) 82 - 102 FL    MCH 25.3 (L) 26.1 - 32.9 PG    MCHC 32.4 31.4 - 35.0 g/dL    RDW 17.0 (H) 11.9 - 14.6 %    Platelets 684 681 - 732 K/uL    MPV 9.0 (L) 9.4 - 12.3 FL    nRBC 0.00 0.0 - 0.2 K/uL    Differential Type AUTOMATED      Seg Neutrophils 70 43 - 78 %    Lymphocytes 18 13 - 44 %    Monocytes 10 4.0 - 12.0 %    Eosinophils % 1 0.5 - 7.8 %    Basophils 1 0.0 - 2.0 %    Immature Granulocytes 0 0.0 - 5.0 %    Segs Absolute 5.5 1.7 - 8.2 K/UL    Absolute Lymph # 1.4 0.5 - 4.6 K/UL    Absolute Mono # 0.8 0.1 - 1.3 K/UL    Absolute Eos # 0.1 0.0 - 0.8 K/UL    Basophils Absolute 0.0 0.0 - 0.2 K/UL    Absolute Immature Granulocyte 0.0 0.0 - 0.5 K/UL   CBC    Collection Time: 01/11/23  6:59 AM   Result Value Ref Range    WBC 6.5 4.3 - 11.1 K/uL    RBC 4.76 4.23 - 5.6 M/uL    Hemoglobin 12.1 (L) 13.6 - 17.2 g/dL    Hematocrit 37.4 (L) 41.1 - 50.3 %    MCV 78.6 (L) 82 - 102 FL    MCH 25.4 (L) 26.1 - 32.9 PG    MCHC 32.4 31.4 - 35.0 g/dL    RDW 16.7 (H) 11.9 - 14.6 %    Platelets 580 (H) 559 - 450 K/uL    MPV 8.8 (L) 9.4 - 12.3 FL    nRBC 0.00 0.0 - 0.2 K/uL   Comprehensive Metabolic Panel    Collection Time: 01/11/23  6:59 AM   Result Value Ref Range    Sodium 133 133 - 143 mmol/L    Potassium 4.3 3.5 - 5.1 mmol/L    Chloride 103 101 - 110 mmol/L    CO2 25 21 - 32 mmol/L    Anion Gap 5 2 - 11 mmol/L    Glucose 97 65 - 100 mg/dL    BUN 8 6 - 23 MG/DL    Creatinine 0.90 0.8 - 1.5 MG/DL    Est, Glom Filt Rate >60 >60 ml/min/1.73m2    Calcium 8.9 8.3 - 10.4 MG/DL    Total Bilirubin 0.2 0.2 - 1.1 MG/DL    ALT 27 12 - 65 U/L    AST 18 15 - 37 U/L    Alk Phosphatase 78 50 - 136 U/L    Total Protein 6.9 6.3 - 8.2 g/dL    Albumin 2.6 (L) 3.5 - 5.0 g/dL    Globulin 4.3 2.8 - 4.5 g/dL    Albumin/Globulin Ratio 0.6 0.4 - 1.6     Lipase    Collection Time: 01/11/23  6:59 AM   Result Value Ref Range    Lipase 6,439 (H) 73 - 393 U/L   CBC with Auto Differential    Collection Time: 01/12/23  7:18 AM   Result Value Ref Range    WBC 6.4 4.3 - 11.1 K/uL    RBC 3.99 (L) 4.23 - 5.6 M/uL    Hemoglobin 10.2 (L) 13.6 - 17.2 g/dL    Hematocrit 31.6 (L) 41.1 - 50.3 %    MCV 79.2 (L) 82 - 102 FL    MCH 25.6 (L) 26.1 - 32.9 PG    MCHC 32.3 31.4 - 35.0 g/dL    RDW 16.8 (H) 11.9 - 14.6 %    Platelets 452 (H) 156 - 450 K/uL    MPV 9.7 9.4 - 12.3 FL    nRBC 0.00 0.0 - 0.2 K/uL    Differential Type AUTOMATED      Seg Neutrophils 59 43 - 78 %    Lymphocytes 26 13 - 44 %    Monocytes 11 4.0 - 12.0 %    Eosinophils % 3 0.5 - 7.8 %    Basophils 1 0.0 - 2.0 %    Immature Granulocytes 0 0.0 - 5.0 %    Segs Absolute 3.8 1.7 - 8.2 K/UL    Absolute Lymph # 1.7 0.5 - 4.6 K/UL    Absolute Mono # 0.7 0.1 - 1.3 K/UL    Absolute Eos # 0.2 0.0 - 0.8 K/UL    Basophils Absolute 0.1 0.0 - 0.2 K/UL    Absolute Immature Granulocyte 0.0 0.0 - 0.5 K/UL   Comprehensive Metabolic Panel w/ Reflex to MG    Collection Time: 01/12/23  7:18 AM   Result Value Ref Range    Sodium 135 133 - 143 mmol/L    Potassium 4.2 3.5 - 5.1 mmol/L    Chloride 102 101 - 110 mmol/L CO2 24 21 - 32 mmol/L    Anion Gap 9 2 - 11 mmol/L    Glucose 79 65 - 100 mg/dL    BUN 9 6 - 23 MG/DL    Creatinine 0.90 0.8 - 1.5 MG/DL    Est, Glom Filt Rate >60 >60 ml/min/1.73m2    Calcium 8.6 8.3 - 10.4 MG/DL    Total Bilirubin 0.5 0.2 - 1.1 MG/DL    ALT 22 12 - 65 U/L    AST 18 15 - 37 U/L    Alk Phosphatase 69 50 - 136 U/L    Total Protein 6.6 6.3 - 8.2 g/dL    Albumin 2.5 (L) 3.5 - 5.0 g/dL    Globulin 4.1 2.8 - 4.5 g/dL    Albumin/Globulin Ratio 0.6 0.4 - 1.6     Lipase    Collection Time: 01/12/23  7:18 AM   Result Value Ref Range    Lipase 2,205 (H) 73 - 393 U/L   Magnesium    Collection Time: 01/12/23  7:18 AM   Result Value Ref Range    Magnesium 2.1 1.8 - 2.4 mg/dL   Phosphorus    Collection Time: 01/12/23  7:18 AM   Result Value Ref Range    Phosphorus 3.7 2.5 - 4.5 MG/DL   Lipase    Collection Time: 01/12/23  7:18 AM   Result Value Ref Range    Lipase 1,998 (H) 73 - 393 U/L   Vitamin B12    Collection Time: 01/12/23  7:18 AM   Result Value Ref Range    Vitamin B-12 802 193 - 986 pg/mL   Ferritin    Collection Time: 01/12/23  7:18 AM   Result Value Ref Range    Ferritin 323 8 - 388 NG/ML   Folate    Collection Time: 01/12/23  7:18 AM   Result Value Ref Range    Folate 7.4 3.1 - 17.5 ng/mL   Iron    Collection Time: 01/12/23  7:18 AM   Result Value Ref Range    Iron 13 (L) 35 - 150 ug/dL   CBC with Auto Differential    Collection Time: 01/13/23 11:01 AM   Result Value Ref Range    WBC 11.9 (H) 4.3 - 11.1 K/uL    RBC 5.05 4.23 - 5.6 M/uL    Hemoglobin 12.8 (L) 13.6 - 17.2 g/dL    Hematocrit 39.6 (L) 41.1 - 50.3 %    MCV 78.4 (L) 82 - 102 FL    MCH 25.3 (L) 26.1 - 32.9 PG    MCHC 32.3 31.4 - 35.0 g/dL    RDW 16.7 (H) 11.9 - 14.6 %    Platelets 212 (H) 484 - 450 K/uL    MPV 9.0 (L) 9.4 - 12.3 FL    nRBC 0.00 0.0 - 0.2 K/uL    Differential Type AUTOMATED      Seg Neutrophils 79 (H) 43 - 78 %    Lymphocytes 10 (L) 13 - 44 %    Monocytes 10 4.0 - 12.0 %    Eosinophils % 1 0.5 - 7.8 %    Basophils 0 0.0 - 2.0 %    Immature Granulocytes 0 0.0 - 5.0 %    Segs Absolute 9.4 (H) 1.7 - 8.2 K/UL    Absolute Lymph # 1.2 0.5 - 4.6 K/UL    Absolute Mono # 1.2 0.1 - 1.3 K/UL    Absolute Eos # 0.1 0.0 - 0.8 K/UL    Basophils Absolute 0.0 0.0 - 0.2 K/UL    Absolute Immature Granulocyte 0.0 0.0 - 0.5 K/UL   Comprehensive Metabolic Panel w/ Reflex to MG    Collection Time: 01/13/23 11:01 AM   Result Value Ref Range    Sodium 130 (L) 133 - 143 mmol/L    Potassium 4.4 3.5 - 5.1 mmol/L    Chloride 99 (L) 101 - 110 mmol/L    CO2 21 21 - 32 mmol/L    Anion Gap 10 2 - 11 mmol/L    Glucose 85 65 - 100 mg/dL    BUN 9 6 - 23 MG/DL    Creatinine 1.00 0.8 - 1.5 MG/DL    Est, Glom Filt Rate >60 >60 ml/min/1.73m2    Calcium 9.9 8.3 - 10.4 MG/DL    Total Bilirubin 0.5 0.2 - 1.1 MG/DL    ALT 22 12 - 65 U/L    AST 16 15 - 37 U/L    Alk Phosphatase 78 50 - 136 U/L    Total Protein 7.2 6.3 - 8.2 g/dL    Albumin 2.8 (L) 3.5 - 5.0 g/dL    Globulin 4.4 2.8 - 4.5 g/dL    Albumin/Globulin Ratio 0.6 0.4 - 1.6     Lipase    Collection Time: 01/13/23 11:01 AM   Result Value Ref Range    Lipase 1,861 (H) 73 - 393 U/L   Magnesium    Collection Time: 01/13/23 11:01 AM   Result Value Ref Range    Magnesium 1.9 1.8 - 2.4 mg/dL   Phosphorus    Collection Time: 01/13/23 11:01 AM   Result Value Ref Range    Phosphorus 3.0 2.5 - 4.5 MG/DL   CBC with Auto Differential    Collection Time: 01/14/23  4:36 AM   Result Value Ref Range    WBC 7.2 4.3 - 11.1 K/uL    RBC 4.13 (L) 4.23 - 5.6 M/uL    Hemoglobin 10.4 (L) 13.6 - 17.2 g/dL    Hematocrit 31.9 (L) 41.1 - 50.3 %    MCV 77.2 (L) 82 - 102 FL    MCH 25.2 (L) 26.1 - 32.9 PG    MCHC 32.6 31.4 - 35.0 g/dL    RDW 16.4 (H) 11.9 - 14.6 %    Platelets 447 009 - 955 K/uL    MPV 9.6 9.4 - 12.3 FL    nRBC 0.00 0.0 - 0.2 K/uL    Differential Type AUTOMATED      Seg Neutrophils 68 43 - 78 %    Lymphocytes 15 13 - 44 %    Monocytes 16 (H) 4.0 - 12.0 %    Eosinophils % 1 0.5 - 7.8 %    Basophils 0 0.0 - 2.0 %    Immature Granulocytes 0 0.0 - 5.0 %    Segs Absolute 4.9 1.7 - 8.2 K/UL    Absolute Lymph # 1.1 0.5 - 4.6 K/UL    Absolute Mono # 1.2 0.1 - 1.3 K/UL    Absolute Eos # 0.1 0.0 - 0.8 K/UL    Basophils Absolute 0.0 0.0 - 0.2 K/UL    Absolute Immature Granulocyte 0.0 0.0 - 0.5 K/UL   Lipase    Collection Time: 01/14/23  4:36 AM   Result Value Ref Range    Lipase 1,147 (H) 73 - 393 U/L   Lipase    Collection Time: 01/15/23  5:57 AM   Result Value Ref Range    Lipase 1,618 (H) 73 - 393 U/L   Magnesium    Collection Time: 01/16/23  6:00 AM   Result Value Ref Range    Magnesium 2.2 1.8 - 2.4 mg/dL   Phosphorus    Collection Time: 01/16/23  6:00 AM   Result Value Ref Range    Phosphorus 2.8 2.5 - 4.5 MG/DL   CBC with Auto Differential    Collection Time: 01/16/23  6:00 AM   Result Value Ref Range    WBC 5.2 4.3 - 11.1 K/uL    RBC 4.01 (L) 4.23 - 5.6 M/uL    Hemoglobin 10.0 (L) 13.6 - 17.2 g/dL    Hematocrit 30.8 (L) 41.1 - 50.3 %    MCV 76.8 (L) 82 - 102 FL    MCH 24.9 (L) 26.1 - 32.9 PG    MCHC 32.5 31.4 - 35.0 g/dL    RDW 16.9 (H) 11.9 - 14.6 %    Platelets 259 (H) 769 - 450 K/uL    MPV 9.7 9.4 - 12.3 FL    nRBC 0.00 0.0 - 0.2 K/uL    Differential Type AUTOMATED      Seg Neutrophils 72 43 - 78 %    Lymphocytes 15 13 - 44 %    Monocytes 12 4.0 - 12.0 %    Eosinophils % 1 0.5 - 7.8 %    Basophils 0 0.0 - 2.0 %    Immature Granulocytes 0 0.0 - 5.0 %    Segs Absolute 3.7 1.7 - 8.2 K/UL    Absolute Lymph # 0.8 0.5 - 4.6 K/UL    Absolute Mono # 0.6 0.1 - 1.3 K/UL    Absolute Eos # 0.1 0.0 - 0.8 K/UL    Basophils Absolute 0.0 0.0 - 0.2 K/UL    Absolute Immature Granulocyte 0.0 0.0 - 0.5 K/UL   Comprehensive Metabolic Panel    Collection Time: 01/16/23  6:00 AM   Result Value Ref Range    Sodium 136 133 - 143 mmol/L    Potassium 4.0 3.5 - 5.1 mmol/L    Chloride 103 101 - 110 mmol/L    CO2 22 21 - 32 mmol/L    Anion Gap 11 2 - 11 mmol/L    Glucose 127 (H) 65 - 100 mg/dL    BUN 7 6 - 23 MG/DL    Creatinine 0.80 0.8 - 1.5 MG/DL    Est, Glom Filt Rate >60 >60 ml/min/1.73m2    Calcium 9.6 8.3 - 10.4 MG/DL    Total Bilirubin 0.3 0.2 - 1.1 MG/DL    ALT 23 12 - 65 U/L    AST 17 15 - 37 U/L    Alk Phosphatase 69 50 - 136 U/L    Total Protein 6.8 6.3 - 8.2 g/dL    Albumin 2.5 (L) 3.5 - 5.0 g/dL    Globulin 4.3 2.8 - 4.5 g/dL    Albumin/Globulin Ratio 0.6 0.4 - 1.6     Magnesium    Collection Time: 01/18/23  6:35 AM   Result Value Ref Range    Magnesium 2.0 1.8 - 2.4 mg/dL   Phosphorus    Collection Time: 01/18/23  6:35 AM   Result Value Ref Range    Phosphorus 3.0 2.5 - 4.5 MG/DL   CBC with Diff    Collection Time: 01/18/23  8:46 PM   Result Value Ref Range    WBC 10.6 4.3 - 11.1 K/uL    RBC 4.35 4.23 - 5.60 M/uL    Hemoglobin 10.9 (L) 13.6 - 17.2 g/dL    Hematocrit 33.6 (L) 41.1 - 50.3 %    MCV 77.2 (L) 82.0 - 102.0 FL    MCH 25.1 (L) 26.1 - 32.9 PG    MCHC 32.4 31.4 - 35.0 g/dL    RDW 17.3 (H) 11.9 - 14.6 %    Platelets 390 (H) 857 - 450 K/uL    MPV 8.7 (L) 9.4 - 12.3 FL    nRBC 0.00 0.0 - 0.2 K/uL    Differential Type AUTOMATED      Seg Neutrophils 69 43 - 78 %    Lymphocytes 18 13 - 44 %    Monocytes 9 4.0 - 12.0 %    Eosinophils % 3 0.5 - 7.8 %    Basophils 1 0.0 - 2.0 %    Immature Granulocytes 0 0.0 - 5.0 %    Segs Absolute 7.3 1.7 - 8.2 K/UL    Absolute Lymph # 1.9 0.5 - 4.6 K/UL    Absolute Mono # 1.0 0.1 - 1.3 K/UL    Absolute Eos # 0.3 0.0 - 0.8 K/UL    Basophils Absolute 0.1 0.0 - 0.2 K/UL    Absolute Immature Granulocyte 0.0 0.0 - 0.5 K/UL   CMP    Collection Time: 01/18/23  8:46 PM   Result Value Ref Range    Sodium 135 133 - 143 mmol/L    Potassium 4.2 3.5 - 5.1 mmol/L    Chloride 97 (L) 98 - 107 mmol/L    CO2 25 21 - 32 mmol/L    Anion Gap 13 (H) 2 - 11 mmol/L    Glucose 102 (H) 65 - 100 mg/dL    BUN 9 7.0 - 18.0 MG/DL    Creatinine 0.70 (L) 0.8 - 1.5 MG/DL    Est, Glom Filt Rate >60 >60 ml/min/1.73m2    Calcium 9.4 8.3 - 10.4 MG/DL    Total Bilirubin 0.2 0.2 - 1.1 MG/DL    ALT 32 13.0 - 61.0 U/L    AST 22 15 - 37 U/L    Alk Phosphatase 84 45.0 - 117.0 U/L Total Protein 7.2 6.4 - 8.2 g/dL    Albumin 3.5 3.5 - 5.0 g/dL    Globulin 3.7 2.8 - 4.5 g/dL    Albumin/Globulin Ratio 0.9 0.4 - 1.6     Lipase    Collection Time: 01/18/23  8:46 PM   Result Value Ref Range    Lipase 2,285 (H) 13 - 60 U/L   Ethanol    Collection Time: 01/18/23  8:46 PM   Result Value Ref Range    Ethanol Lvl <10 (H) 0 MG/DL   Lactic Acid    Collection Time: 01/18/23  8:46 PM   Result Value Ref Range    Lactic Acid 1.40 0.4 - 2.0 mmol/L   Urinalysis w rflx microscopic    Collection Time: 01/18/23 10:34 PM   Result Value Ref Range    Color, UA YELLOW      Appearance CLEAR      Specific Gravity, UA 1.020 1.001 - 1.023      pH, Urine 8.0 5.0 - 9.0      Protein, UA Negative NEG mg/dL    Glucose, UA Negative mg/dL    Ketones, Urine Negative NEG mg/dL    Bilirubin Urine Negative NEG      Blood, Urine Negative NEG      Urobilinogen, Urine 0.2 0.2 - 1.0 EU/dL    Nitrite, Urine Negative NEG      Leukocyte Esterase, Urine Negative NEG     Urine Drug Screen    Collection Time: 01/18/23 10:34 PM   Result Value Ref Range    PCP, Urine Negative NEG      Benzodiazepines, Urine Negative NEG      Cocaine, Urine Negative NEG      Amphetamine, Urine Negative NEG      Methadone, Urine Negative NEG      THC, TH-Cannabinol, Urine Negative NEG      Opiates, Urine Positive (A) NEG      Barbiturates, Urine Negative NEG       Lab Results   Component Value Date/Time    CHOL 88 11/03/2022 05:39 AM    HDL 13 11/03/2022 05:39 AM       ASSESSMENT and PLAN  Problem List Items Addressed This Visit          Circulatory    Primary hypertension       Digestive    Necrotizing pancreatitis    Chronic pancreatitis (HCC)       Other    Tobacco abuse [Z72.0 (ICD-10-CM)]     Other Visit Diagnoses       ST elevation myocardial infarction (STEMI) involving other coronary artery ([facility] [address]    -  Primary    Essential hypertension               Patient is on appropriate post MI medication specifically dual antiplatelet therapy he is on a statin but really has no underlying plaque he is on post MI beta-blocker which is appropriate we will follow-up in 6 months time but at this juncture he needs no further testing    Continue meds as below    Current Outpatient Medications:     HYDROcodone-acetaminophen (LORCET HD)  MG per tablet, Take 1 tablet by mouth every 8 hours as needed for Pain for up to 30 days. Intended supply: 30 days Max Daily Amount: 3 tablets, Disp: 90 tablet, Rfl: 0    lipase-protease-amylase (CREON) 19506-128777 units CPEP delayed release capsule, Take 1 capsule by mouth 3 times daily (with meals), Disp: 180 capsule, Rfl: 1    hyoscyamine (LEVSIN/SL) 125 MCG sublingual tablet, Place 1 tablet under the tongue every 4 hours as needed for Cramping, Disp: 90 tablet, Rfl: 3    pantoprazole (PROTONIX) 40 MG tablet, Take 1 tablet by mouth in the morning and at bedtime, Disp: 30 tablet, Rfl: 3    nitroGLYCERIN (NITROSTAT) 0.4 MG SL tablet, Place 1 tablet under the tongue every 5 minutes as needed for Chest pain up to max of 3 total doses.  If no relief after 1 dose, call 911., Disp: 25 tablet, Rfl: 3    buPROPion (WELLBUTRIN SR) 100 MG extended release tablet, Take 1 tablet by mouth 2 times daily, Disp: 60 tablet, Rfl: 3    aspirin 81 MG chewable tablet, Take 1 tablet by mouth daily, Disp: 30 tablet, Rfl: 3    atorvastatin (LIPITOR) 40 MG tablet, Take 1 tablet by mouth nightly, Disp: 30 tablet, Rfl: 3    metoprolol tartrate (LOPRESSOR) 25 MG tablet, Take 1 tablet by mouth 2 times daily, Disp: 60 tablet, Rfl: 3    ticagrelor (BRILINTA) 90 MG TABS tablet, Take 1 tablet by mouth 2 times daily, Disp: 60 tablet, Rfl: 11    polyethylene glycol (GLYCOLAX) 17 g packet, Take 17 g by mouth 2 times daily (Patient not taking: No sig reported), Disp: 30 each, Rfl: 0    simethicone (MYLICON) 80 MG chewable tablet, Take 1 tablet by mouth every 6 hours as needed for Flatulence (Patient not taking: No sig reported), Disp: 180 tablet, Rfl: [address] [facility], [place]  1/25/2023  4:29 PM    Pt is instructed to follow all appropriate cardiac risk factor recommendations and to be compliant with meds and testing. Instructed to follow up appropriately and seek urgent medical care if acute or unstable issues arise.  Results of some tests may be viewed thru [address] but this does not substitute for follow up with MD. If follow up is not scheduled pt is instructed to call for follow up

## 2023-02-03 ENCOUNTER — PATIENT MESSAGE (OUTPATIENT)
Dept: INTERNAL MEDICINE CLINIC | Facility: CLINIC | Age: 38
End: 2023-02-03

## 2023-02-22 ENCOUNTER — OFFICE VISIT (OUTPATIENT)
Dept: INTERNAL MEDICINE CLINIC | Facility: CLINIC | Age: 38
End: 2023-02-22

## 2023-02-22 VITALS
WEIGHT: 191.4 LBS | DIASTOLIC BLOOD PRESSURE: 100 MMHG | SYSTOLIC BLOOD PRESSURE: 144 MMHG | HEART RATE: 105 BPM | HEIGHT: 69 IN | OXYGEN SATURATION: 97 % | BODY MASS INDEX: 28.35 KG/M2

## 2023-02-22 DIAGNOSIS — I25.10 CORONARY ARTERY DISEASE INVOLVING NATIVE CORONARY ARTERY OF NATIVE HEART WITHOUT ANGINA PECTORIS: Primary | Chronic | ICD-10-CM

## 2023-02-22 DIAGNOSIS — Z72.0 TOBACCO ABUSE: ICD-10-CM

## 2023-02-22 DIAGNOSIS — K86.0 ALCOHOL-INDUCED CHRONIC PANCREATITIS (HCC): ICD-10-CM

## 2023-02-22 DIAGNOSIS — F10.21 ALCOHOL USE DISORDER, SEVERE, IN EARLY REMISSION, DEPENDENCE (HCC): Chronic | ICD-10-CM

## 2023-02-22 DIAGNOSIS — K85.91 NECROTIZING PANCREATITIS: ICD-10-CM

## 2023-02-22 DIAGNOSIS — I10 PRIMARY HYPERTENSION: ICD-10-CM

## 2023-02-22 PROCEDURE — 3077F SYST BP >= 140 MM HG: CPT | Performed by: INTERNAL MEDICINE

## 2023-02-22 PROCEDURE — 3080F DIAST BP >= 90 MM HG: CPT | Performed by: INTERNAL MEDICINE

## 2023-02-22 PROCEDURE — 99214 OFFICE O/P EST MOD 30 MIN: CPT | Performed by: INTERNAL MEDICINE

## 2023-02-22 RX ORDER — HYDROCODONE BITARTRATE AND ACETAMINOPHEN 10; 325 MG/1; MG/1
1 TABLET ORAL EVERY 8 HOURS PRN
Qty: 90 TABLET | Refills: 0 | Status: SHIPPED | OUTPATIENT
Start: 2023-02-22 | End: 2023-03-24

## 2023-02-22 RX ORDER — NALOXONE HYDROCHLORIDE 4 MG/.1ML
1 SPRAY NASAL PRN
Qty: 1 EACH | Refills: 1 | Status: SHIPPED | OUTPATIENT
Start: 2023-02-22

## 2023-02-22 RX ORDER — LISINOPRIL 5 MG/1
5 TABLET ORAL DAILY
COMMUNITY

## 2023-02-22 SDOH — ECONOMIC STABILITY: FOOD INSECURITY: WITHIN THE PAST 12 MONTHS, YOU WORRIED THAT YOUR FOOD WOULD RUN OUT BEFORE YOU GOT MONEY TO BUY MORE.: NEVER TRUE

## 2023-02-22 SDOH — ECONOMIC STABILITY: INCOME INSECURITY: HOW HARD IS IT FOR YOU TO PAY FOR THE VERY BASICS LIKE FOOD, HOUSING, MEDICAL CARE, AND HEATING?: NOT VERY HARD

## 2023-02-22 SDOH — ECONOMIC STABILITY: HOUSING INSECURITY
IN THE LAST 12 MONTHS, WAS THERE A TIME WHEN YOU DID NOT HAVE A STEADY PLACE TO SLEEP OR SLEPT IN A SHELTER (INCLUDING NOW)?: NO

## 2023-02-22 ASSESSMENT — ENCOUNTER SYMPTOMS
ABDOMINAL DISTENTION: 1
PHOTOPHOBIA: 0
NAUSEA: 0
VOMITING: 0
CHEST TIGHTNESS: 0
ABDOMINAL PAIN: 1
SHORTNESS OF BREATH: 0
WHEEZING: 0
RECTAL PAIN: 0

## 2023-02-22 NOTE — ASSESSMENT & PLAN NOTE
Asymptomatic, continue current medications, medication adherence emphasized and lifestyle modifications recommended     Key CAD CHF Meds          lisinopril (PRINIVIL;ZESTRIL) 5 MG tablet (Taking)    Class: Historical Med    atorvastatin (LIPITOR) 40 MG tablet (Taking)    Sig - Route: Take 1 tablet by mouth nightly - Oral    metoprolol tartrate (LOPRESSOR) 25 MG tablet (Taking)    Sig - Route:  Take 1 tablet by mouth 2 times daily - Oral

## 2023-02-22 NOTE — ASSESSMENT & PLAN NOTE
At goal, continue current medications, medication adherence emphasized and lifestyle modifications recommended on Wellbutrin , > 6 weeks off tobacco , congratulated

## 2023-02-22 NOTE — ASSESSMENT & PLAN NOTE
Borderline controlled, continue current medications, medication adherence emphasized and lifestyle modifications recommended   Pain contract has been signed today  He has an increased risk of dependency and abuse, we will continue monitoring closely, he is not currently presenting with aberrant behaviors, other medications like anti-inflammatories are not indicated due to the recent NSTEMI.   We will continue with pancreatic enzymes, Levsin and follow-up closely with GI.

## 2023-02-22 NOTE — PROGRESS NOTES
Chief Complaint   Patient presents with    Follow-up     4 week f/u. Paula Badillo is a 44 y.o. male who presents today for Follow-up (4 week f/u. )     Follow-up for chronic pain due to chronic pancreatitis, he is currently doing better, has not been in the hospital for at least a month, planning to have his CT scan repeated in a couple months, was seen by GI 2 to 3 weeks ago, continued to have occasional flares, at least 3 to 4/month, but he tries to take hydrocodone before the pain is started getting worse, and has to take it every 4-6 hours for 2 to 3 days and has been able to avoid the use of the ER  He has been able to tolerate food, reports some dietary indiscretions, has been sober for few months now, and has been able to quit the smoking on Wellbutrin    He has not yet taken his blood pressure over the last 2 or 3 days, as he is planning to go to the pharmacy to get them. His blood pressure today is elevated, he denies any symptoms. He denies any aberrant behaviors and has been using the medications as prescribed. Wt Readings from Last 3 Encounters:   02/22/23 191 lb 6.4 oz (86.8 kg)   01/25/23 175 lb (79.4 kg)   01/18/23 160 lb (72.6 kg)     Vitals:    02/22/23 0839   BP: (!) 144/100   Site: Left Upper Arm   Position: Sitting   Pulse: (!) 105   SpO2: 97%   Weight: 191 lb 6.4 oz (86.8 kg)   Height: 5' 9\" (1.753 m)        Assessment and plan:  1. Coronary artery disease involving native coronary artery of native heart without angina pectoris  Assessment & Plan:   Asymptomatic, continue current medications, medication adherence emphasized and lifestyle modifications recommended     Key CAD CHF Meds            lisinopril (PRINIVIL;ZESTRIL) 5 MG tablet (Taking)    Class: Historical Med    atorvastatin (LIPITOR) 40 MG tablet (Taking)    Sig - Route: Take 1 tablet by mouth nightly - Oral    metoprolol tartrate (LOPRESSOR) 25 MG tablet (Taking)    Sig - Route:  Take 1 tablet by mouth 2 times daily - Oral            2. Primary hypertension  Assessment & Plan:   Uncontrolled, continue current medications, medication adherence emphasized and lifestyle modifications recommended follow up in 6 week, goal < 130/80  Key Anti-Hypertensive Meds            lisinopril (PRINIVIL;ZESTRIL) 5 MG tablet (Taking)    Class: Historical Med    metoprolol tartrate (LOPRESSOR) 25 MG tablet (Taking)    Sig - Route: Take 1 tablet by mouth 2 times daily - Oral            3. Alcohol-induced chronic pancreatitis (HCC)  Assessment & Plan:   Borderline controlled, continue current medications, medication adherence emphasized and lifestyle modifications recommended   Pain contract has been signed today  He has an increased risk of dependency and abuse, we will continue monitoring closely, he is not currently presenting with aberrant behaviors, other medications like anti-inflammatories are not indicated due to the recent NSTEMI. We will continue with pancreatic enzymes, Levsin and follow-up closely with GI. Orders:  -     HYDROcodone-acetaminophen (LORCET HD)  MG per tablet; Take 1 tablet by mouth every 8 hours as needed for Pain for up to 30 days. Intended supply: 30 days Max Daily Amount: 3 tablets, Disp-90 tablet, R-0Normal  -     naloxone (NARCAN) 4 MG/0.1ML LIQD nasal spray; 1 spray by Nasal route as needed for Opioid Reversal, Disp-1 each, R-1Normal  4. Necrotizing pancreatitis  -     HYDROcodone-acetaminophen (LORCET HD)  MG per tablet; Take 1 tablet by mouth every 8 hours as needed for Pain for up to 30 days. Intended supply: 30 days Max Daily Amount: 3 tablets, Disp-90 tablet, R-0Normal  5. Alcohol use disorder, severe, in early remission, dependence ([facility] [address]  Assessment & Plan:   At goal, lifestyle modifications recommended and congratulated for his efforts  6.  Tobacco abuse [Z72.0 (ICD-10-CM)]  Assessment & Plan:   At goal, continue current medications, medication adherence emphasized and lifestyle modifications recommended on Wellbutrin , > 6 weeks off tobacco , congratulated    Return in about 4 weeks (around 3/22/2023) for pain, HTN. Review of system:    Review of Systems   Constitutional:  Negative for activity change, fatigue and unexpected weight change. Eyes:  Negative for photophobia and visual disturbance. Respiratory:  Negative for chest tightness, shortness of breath and wheezing. Cardiovascular:  Negative for chest pain, palpitations and leg swelling. Gastrointestinal:  Positive for abdominal distention and abdominal pain. Negative for nausea, rectal pain and vomiting. Genitourinary:  Negative for difficulty urinating and frequency. Musculoskeletal:  Negative for myalgias. Neurological:  Negative for dizziness and headaches. Immunization history:    Immunization History   Administered Date(s) Administered    COVID-19, MODERNA BLUE border, Primary or Immunocompromised, (age 12y+), IM, 100 mcg/0.5mL 08/19/2021    Influenza Virus Vaccine 11/05/2015, 09/19/2018, 01/18/2020, 01/18/2020, 10/05/2020, 10/05/2020    Influenza, FLUARIX, FLULAVAL, FLUZONE (age 10 mo+) AND AFLURIA, (age 1 y+), PF, 0.5mL 11/05/2015, 01/18/2020, 10/05/2020    Pneumococcal Polysaccharide ([id number]) 02/22/2018    Tdap (Boostrix, Adacel) 10/11/2022       Current medications:      Current Outpatient Medications:     lisinopril (PRINIVIL;ZESTRIL) 5 MG tablet, Take 5 mg by mouth daily, Disp: , Rfl:     HYDROcodone-acetaminophen (LORCET HD)  MG per tablet, Take 1 tablet by mouth every 8 hours as needed for Pain for up to 30 days.  Intended supply: 30 days Max Daily Amount: 3 tablets, Disp: 90 tablet, Rfl: 0    naloxone (NARCAN) 4 MG/0.1ML LIQD nasal spray, 1 spray by Nasal route as needed for Opioid Reversal, Disp: 1 each, Rfl: 1    lipase-protease-amylase (CREON) 81422-326907 units CPEP delayed release capsule, Take 1 capsule by mouth 3 times daily (with meals), Disp: 180 capsule, Rfl: 1    hyoscyamine (LEVSIN/SL) 125 MCG sublingual tablet, Place 1 tablet under the tongue every 4 hours as needed for Cramping, Disp: 90 tablet, Rfl: 3    pantoprazole (PROTONIX) 40 MG tablet, Take 1 tablet by mouth in the morning and at bedtime, Disp: 30 tablet, Rfl: 3    nitroGLYCERIN (NITROSTAT) 0.4 MG SL tablet, Place 1 tablet under the tongue every 5 minutes as needed for Chest pain up to max of 3 total doses.  If no relief after 1 dose, call 911., Disp: 25 tablet, Rfl: 3    buPROPion (WELLBUTRIN SR) 100 MG extended release tablet, Take 1 tablet by mouth 2 times daily, Disp: 60 tablet, Rfl: 3    aspirin 81 MG chewable tablet, Take 1 tablet by mouth daily, Disp: 30 tablet, Rfl: 3    atorvastatin (LIPITOR) 40 MG tablet, Take 1 tablet by mouth nightly, Disp: 30 tablet, Rfl: 3    metoprolol tartrate (LOPRESSOR) 25 MG tablet, Take 1 tablet by mouth 2 times daily, Disp: 60 tablet, Rfl: 3    ticagrelor (BRILINTA) 90 MG TABS tablet, Take 1 tablet by mouth 2 times daily, Disp: 60 tablet, Rfl: 11      Family history:    Family History   Problem Relation Age of Onset    No Known Problems Mother     Heart Attack Father         MI at 58        Past medical history:    Past Medical History:   Diagnosis Date    Abdominal pain 10/25/2022    Abnormal CT of the abdomen 01/88/5227    Acute alcoholic pancreatitis 90/52/0755    Acute kidney injury (nontraumatic) ([facility] [address] 10/31/2022    pt denies    Acute on chronic pancreatitis ([facility] [address] 07/30/2022    Alcohol use disorder, severe, dependence ([facility] [address] 08/21/2019    CAD (coronary artery disease)     Carpal tunnel syndrome, left     Cigarette smoker     Coronary artery disease involving native coronary artery of native heart 11/13/2022    Dependence on nicotine from cigarettes 11/20/2022    GERD (gastroesophageal reflux disease)     controlled with med    History of pancreatitis     x 4 or 5 times-- last time admitted to hospital 10/10/26/22 x 7 days    Hypertension     controlled with med    STEMI (ST elevation myocardial infarction) ([facility] [address] 11/2/2022        Past Surgical History:   Procedure Laterality Date    CARDIAC PROCEDURE N/A 11/2/2022    LEFT HEART CATH / CORONARY ANGIOGRAPHY performed by Cornelia Ventura MD at [facility] CATH LAB    CARDIAC PROCEDURE N/A 11/2/2022    Percutaneous coronary intervention performed by Cornelia Ventura MD at [address] [place] [address] CATH LAB    CHOLECYSTECTOMY  2012    ORTHOPEDIC SURGERY Left     wrist surg--nerve surg    ORTHOPEDIC SURGERY Left     foot surg as child    UPPER GASTROINTESTINAL ENDOSCOPY N/A 11/9/2022    EGD/ENDOSCOPIC ULTRASOUND/AXIOS STENT ROOM 2226 **Rep will be present performed by Sourav Schmid MD at [address] [place] [address] 11/30/2022    EGD/ENDOSCOPIC ULTRASOUND stent removal  performed by Sourav Schmid MD at [address] N/A 11/30/2022    EGD ESOPHAGOGASTRODUODENOSCOPY performed by Sourav Schmid MD at [address] N/A 1/11/2023    ENDOSCOPIC ULTRASOUND, biopsy performed by Rafa Ochoa MD at [facility] ENDOSCOPY        Physical exam:    BP (!) 144/100 (Site: Left Upper Arm, Position: Sitting)   Pulse (!) 105   Ht 5' 9\" (1.753 m)   Wt 191 lb 6.4 oz (86.8 kg)   SpO2 97%   BMI 28.26 kg/m²     Physical Exam  Vitals reviewed. Constitutional:       Appearance: Normal appearance. HENT:      Head: Normocephalic and atraumatic. Cardiovascular:      Rate and Rhythm: Normal rate and regular rhythm. Pulmonary:      Effort: Pulmonary effort is normal.      Breath sounds: Normal breath sounds. Abdominal:      Palpations: Abdomen is soft. Neurological:      General: No focal deficit present. Mental Status: He is alert and oriented to person, place, and time.    Psychiatric:         Mood and Affect: Mood normal.        Recent labs:    Hemoglobin A1C   Date Value Ref Range Status   10/26/2022 5.8 (H) 4.8 - 5.6 % Final        Lab Results   Component Value Date    CHOL 88 11/03/2022     Lab Results   Component Value Date    TRIG 93 11/21/2022    TRIG 83 11/03/2022    TRIG 140 10/26/2022     Lab Results   Component Value Date    HDL 13 (L) 11/03/2022     Lab Results   Component Value Date    LDLCALC 58.4 11/03/2022     Lab Results   Component Value Date    LABVLDL 16.6 11/03/2022     Lab Results   Component Value Date    CHOLHDLRATIO 6.8 11/03/2022       No results found for: TSHFT4, TSH, TSHREFLEX, TQT5UNT    Lab Results   Component Value Date     01/18/2023    K 4.2 01/18/2023    CL 97 (L) 01/18/2023    CO2 25 01/18/2023    BUN 9 01/18/2023    CREATININE 0.70 (L) 01/18/2023    GLUCOSE 102 (H) 01/18/2023    CALCIUM 9.4 01/18/2023    PROT 7.2 01/18/2023    LABALBU 3.5 01/18/2023    BILITOT 0.2 01/18/2023    ALKPHOS 84 01/18/2023    AST 22 01/18/2023    ALT 32 01/18/2023    LABGLOM >60 01/18/2023    GFRAA >60 08/01/2022    AGRATIO 1.1 (L) 10/03/2021    GLOB 3.7 01/18/2023       Lab Results   Component Value Date    WBC 10.6 01/18/2023    HGB 10.9 (L) 01/18/2023    HCT 33.6 (L) 01/18/2023    MCV 77.2 (L) 01/18/2023     (H) 01/18/2023             This document was generated with the aid of voice recognition software. . Please be aware that there may be inadvertent transcription errors not identified and corrected by the [employer]

## 2023-02-22 NOTE — ASSESSMENT & PLAN NOTE
Uncontrolled, continue current medications, medication adherence emphasized and lifestyle modifications recommended follow up in 6 week, goal < 130/80  Key Anti-Hypertensive Meds          lisinopril (PRINIVIL;ZESTRIL) 5 MG tablet (Taking)    Class: Historical Med    metoprolol tartrate (LOPRESSOR) 25 MG tablet (Taking)    Sig - Route:  Take 1 tablet by mouth 2 times daily - Oral

## 2023-03-01 ENCOUNTER — TELEPHONE (OUTPATIENT)
Dept: CARDIOLOGY CLINIC | Age: 38
End: 2023-03-01

## 2023-03-01 NOTE — TELEPHONE ENCOUNTER
Requested Prescriptions     Signed Prescriptions Disp Refills    ticagrelor (BRILINTA) 90 MG TABS tablet 180 tablet 3     Sig: Take 1 tablet by mouth 2 times daily     Authorizing Provider: Prasanna Spain     Ordering User: Trung Guo

## 2023-03-01 NOTE — TELEPHONE ENCOUNTER
Patient called and said he needs a new prescription for Brilenta sent to [phone]  . See other telephone note from 12/23/2022. The first sent fax was wrong number. Patient is requesting samples if available for Brilenta and will  at [facility] or [place]. Please let patient know.

## 2023-03-27 DIAGNOSIS — K85.91 NECROTIZING PANCREATITIS: Primary | ICD-10-CM

## 2023-03-27 RX ORDER — HYDROCODONE BITARTRATE AND ACETAMINOPHEN 10; 325 MG/1; MG/1
1 TABLET ORAL SEE ADMIN INSTRUCTIONS
COMMUNITY
End: 2023-03-27 | Stop reason: SDUPTHER

## 2023-03-27 RX ORDER — HYDROCODONE BITARTRATE AND ACETAMINOPHEN 10; 325 MG/1; MG/1
1 TABLET ORAL SEE ADMIN INSTRUCTIONS
Qty: 90 TABLET | Refills: 0 | Status: SHIPPED | OUTPATIENT
Start: 2023-03-27 | End: 2023-04-26

## 2023-03-27 NOTE — TELEPHONE ENCOUNTER
[facility] look up last filled 02/23/2023. Requested Prescriptions     Pending Prescriptions Disp Refills    HYDROcodone-acetaminophen (NORCO)  MG per tablet 90 tablet 0     Sig: Take 1 tablet by mouth See Admin Instructions for 30 days. Take 1 tablet by mouth every 8 hours as needed for Pain for up to 30 days.  Intended supply: 30 days Max Daily Amount: 3 tablets

## 2023-04-24 DIAGNOSIS — K85.91 NECROTIZING PANCREATITIS: ICD-10-CM

## 2023-04-24 RX ORDER — HYDROCODONE BITARTRATE AND ACETAMINOPHEN 10; 325 MG/1; MG/1
1 TABLET ORAL SEE ADMIN INSTRUCTIONS
Qty: 90 TABLET | Refills: 0 | Status: SHIPPED | OUTPATIENT
Start: 2023-04-24 | End: 2023-05-24

## 2023-04-24 NOTE — TELEPHONE ENCOUNTER
Requested Prescriptions     Pending Prescriptions Disp Refills    HYDROcodone-acetaminophen (NORCO)  MG per tablet 90 tablet 0     Sig: Take 1 tablet by mouth See Admin Instructions for 30 days. Take 1 tablet by mouth every 8 hours as needed for Pain for up to 30 days.  Intended supply: 30 days Max Daily Amount: 3 tablets     [facility]  3/27/23

## 2023-05-20 ENCOUNTER — HOSPITAL ENCOUNTER (EMERGENCY)
Age: 38
Discharge: HOME | End: 2023-05-20
Attending: STUDENT IN AN ORGANIZED HEALTH CARE EDUCATION/TRAINING PROGRAM

## 2023-05-20 ENCOUNTER — APPOINTMENT (OUTPATIENT)
Dept: CT IMAGING | Age: 38
End: 2023-05-20

## 2023-05-20 VITALS
WEIGHT: 185 LBS | BODY MASS INDEX: 27.4 KG/M2 | DIASTOLIC BLOOD PRESSURE: 88 MMHG | OXYGEN SATURATION: 100 % | SYSTOLIC BLOOD PRESSURE: 130 MMHG | HEART RATE: 77 BPM | TEMPERATURE: 98.5 F | HEIGHT: 69 IN | RESPIRATION RATE: 18 BRPM

## 2023-05-20 DIAGNOSIS — R10.13 EPIGASTRIC PAIN: Primary | ICD-10-CM

## 2023-05-20 DIAGNOSIS — R11.2 NAUSEA AND VOMITING, UNSPECIFIED VOMITING TYPE: ICD-10-CM

## 2023-05-20 LAB
ALBUMIN SERPL-MCNC: 4.8 G/DL (ref 3.5–5)
ALBUMIN/GLOB SERPL: 1.4 (ref 0.4–1.6)
ALP SERPL-CCNC: 77 U/L (ref 45–117)
ALT SERPL-CCNC: 42 U/L (ref 13–61)
ANION GAP SERPL CALC-SCNC: 12 MMOL/L (ref 2–11)
AST SERPL-CCNC: 41 U/L (ref 15–37)
BASOPHILS # BLD: 0.1 K/UL (ref 0–0.2)
BASOPHILS NFR BLD: 1 % (ref 0–2)
BILIRUB SERPL-MCNC: 0.4 MG/DL (ref 0.2–1.1)
BUN SERPL-MCNC: 13 MG/DL (ref 7–18)
CALCIUM SERPL-MCNC: 10 MG/DL (ref 8.3–10.4)
CHLORIDE SERPL-SCNC: 102 MMOL/L (ref 98–107)
CO2 SERPL-SCNC: 21 MMOL/L (ref 21–32)
CREAT SERPL-MCNC: 0.88 MG/DL (ref 0.8–1.5)
DIFFERENTIAL METHOD BLD: ABNORMAL
EOSINOPHIL # BLD: 0.1 K/UL (ref 0–0.8)
EOSINOPHIL NFR BLD: 1 % (ref 0.5–7.8)
ERYTHROCYTE [DISTWIDTH] IN BLOOD BY AUTOMATED COUNT: 14.5 % (ref 11.9–14.6)
GLOBULIN SER CALC-MCNC: 3.4 G/DL (ref 2.8–4.5)
GLUCOSE SERPL-MCNC: 115 MG/DL (ref 65–100)
HCT VFR BLD AUTO: 54.3 % (ref 41.1–50.3)
HGB BLD-MCNC: 18.2 G/DL (ref 13.6–17.2)
IMM GRANULOCYTES # BLD AUTO: 0 K/UL (ref 0–0.5)
IMM GRANULOCYTES NFR BLD AUTO: 0 % (ref 0–5)
LIPASE SERPL-CCNC: 46 U/L (ref 13–60)
LYMPHOCYTES # BLD: 1.6 K/UL (ref 0.5–4.6)
LYMPHOCYTES NFR BLD: 21 % (ref 13–44)
MCH RBC QN AUTO: 28.1 PG (ref 26.1–32.9)
MCHC RBC AUTO-ENTMCNC: 33.5 G/DL (ref 31.4–35)
MCV RBC AUTO: 83.9 FL (ref 82–102)
MONOCYTES # BLD: 0.7 K/UL (ref 0.1–1.3)
MONOCYTES NFR BLD: 9 % (ref 4–12)
NEUTS SEG # BLD: 5.2 K/UL (ref 1.7–8.2)
NEUTS SEG NFR BLD: 68 % (ref 43–78)
NRBC # BLD: 0 K/UL (ref 0–0.2)
PLATELET # BLD AUTO: 333 K/UL (ref 150–450)
PMV BLD AUTO: 10 FL (ref 9.4–12.3)
POTASSIUM SERPL-SCNC: 4.5 MMOL/L (ref 3.5–5.1)
PROT SERPL-MCNC: 8.2 G/DL (ref 6.4–8.2)
RBC # BLD AUTO: 6.47 M/UL (ref 4.23–5.6)
SODIUM SERPL-SCNC: 135 MMOL/L (ref 133–143)
WBC # BLD AUTO: 7.6 K/UL (ref 4.3–11.1)

## 2023-05-20 PROCEDURE — 74177 CT ABD & PELVIS W/CONTRAST: CPT

## 2023-05-20 PROCEDURE — 85025 COMPLETE CBC W/AUTO DIFF WBC: CPT

## 2023-05-20 PROCEDURE — 6360000002 HC RX W HCPCS: Performed by: STUDENT IN AN ORGANIZED HEALTH CARE EDUCATION/TRAINING PROGRAM

## 2023-05-20 PROCEDURE — 83690 ASSAY OF LIPASE: CPT

## 2023-05-20 PROCEDURE — 99285 EMERGENCY DEPT VISIT HI MDM: CPT

## 2023-05-20 PROCEDURE — 96361 HYDRATE IV INFUSION ADD-ON: CPT

## 2023-05-20 PROCEDURE — 96375 TX/PRO/DX INJ NEW DRUG ADDON: CPT

## 2023-05-20 PROCEDURE — 2580000003 HC RX 258: Performed by: STUDENT IN AN ORGANIZED HEALTH CARE EDUCATION/TRAINING PROGRAM

## 2023-05-20 PROCEDURE — 80053 COMPREHEN METABOLIC PANEL: CPT

## 2023-05-20 PROCEDURE — 96374 THER/PROPH/DIAG INJ IV PUSH: CPT

## 2023-05-20 PROCEDURE — 96376 TX/PRO/DX INJ SAME DRUG ADON: CPT

## 2023-05-20 PROCEDURE — 6370000000 HC RX 637 (ALT 250 FOR IP): Performed by: STUDENT IN AN ORGANIZED HEALTH CARE EDUCATION/TRAINING PROGRAM

## 2023-05-20 PROCEDURE — 6360000004 HC RX CONTRAST MEDICATION: Performed by: STUDENT IN AN ORGANIZED HEALTH CARE EDUCATION/TRAINING PROGRAM

## 2023-05-20 RX ORDER — OXYCODONE AND ACETAMINOPHEN 10; 325 MG/1; MG/1
1 TABLET ORAL EVERY 6 HOURS PRN
Qty: 15 TABLET | Refills: 0 | Status: ON HOLD | OUTPATIENT
Start: 2023-05-20 | End: 2023-05-23 | Stop reason: HOSPADM

## 2023-05-20 RX ORDER — HYDROMORPHONE HYDROCHLORIDE 1 MG/ML
0.5 INJECTION, SOLUTION INTRAMUSCULAR; INTRAVENOUS; SUBCUTANEOUS
Status: COMPLETED | OUTPATIENT
Start: 2023-05-20 | End: 2023-05-20

## 2023-05-20 RX ORDER — ONDANSETRON 2 MG/ML
4 INJECTION INTRAMUSCULAR; INTRAVENOUS ONCE
Status: COMPLETED | OUTPATIENT
Start: 2023-05-20 | End: 2023-05-20

## 2023-05-20 RX ORDER — SODIUM CHLORIDE 0.9 % (FLUSH) 0.9 %
10 SYRINGE (ML) INJECTION
Status: COMPLETED | OUTPATIENT
Start: 2023-05-20 | End: 2023-05-20

## 2023-05-20 RX ORDER — ONDANSETRON 4 MG/1
4 TABLET, FILM COATED ORAL 3 TIMES DAILY PRN
Qty: 15 TABLET | Refills: 0 | Status: ON HOLD | OUTPATIENT
Start: 2023-05-20 | End: 2023-05-21

## 2023-05-20 RX ORDER — MORPHINE SULFATE 4 MG/ML
4 INJECTION, SOLUTION INTRAMUSCULAR; INTRAVENOUS
Status: COMPLETED | OUTPATIENT
Start: 2023-05-20 | End: 2023-05-20

## 2023-05-20 RX ORDER — OXYCODONE AND ACETAMINOPHEN 7.5; 325 MG/1; MG/1
1 TABLET ORAL
Status: COMPLETED | OUTPATIENT
Start: 2023-05-20 | End: 2023-05-20

## 2023-05-20 RX ORDER — HYDROMORPHONE HYDROCHLORIDE 1 MG/ML
1 INJECTION, SOLUTION INTRAMUSCULAR; INTRAVENOUS; SUBCUTANEOUS ONCE
Status: COMPLETED | OUTPATIENT
Start: 2023-05-20 | End: 2023-05-20

## 2023-05-20 RX ORDER — 0.9 % SODIUM CHLORIDE 0.9 %
1000 INTRAVENOUS SOLUTION INTRAVENOUS ONCE
Status: COMPLETED | OUTPATIENT
Start: 2023-05-20 | End: 2023-05-20

## 2023-05-20 RX ORDER — 0.9 % SODIUM CHLORIDE 0.9 %
100 INTRAVENOUS SOLUTION INTRAVENOUS
Status: COMPLETED | OUTPATIENT
Start: 2023-05-20 | End: 2023-05-20

## 2023-05-20 RX ADMIN — ONDANSETRON 4 MG: 2 INJECTION INTRAMUSCULAR; INTRAVENOUS at 19:13

## 2023-05-20 RX ADMIN — HYDROMORPHONE HYDROCHLORIDE 0.5 MG: 1 INJECTION, SOLUTION INTRAMUSCULAR; INTRAVENOUS; SUBCUTANEOUS at 21:53

## 2023-05-20 RX ADMIN — IOPAMIDOL 100 ML: 755 INJECTION, SOLUTION INTRAVENOUS at 20:43

## 2023-05-20 RX ADMIN — MORPHINE SULFATE 4 MG: 4 INJECTION INTRAVENOUS at 19:14

## 2023-05-20 RX ADMIN — SODIUM CHLORIDE 1000 ML: 9 INJECTION, SOLUTION INTRAVENOUS at 19:12

## 2023-05-20 RX ADMIN — SODIUM CHLORIDE 100 ML: 9 INJECTION, SOLUTION INTRAVENOUS at 20:44

## 2023-05-20 RX ADMIN — HYDROMORPHONE HYDROCHLORIDE 1 MG: 1 INJECTION, SOLUTION INTRAMUSCULAR; INTRAVENOUS; SUBCUTANEOUS at 20:09

## 2023-05-20 RX ADMIN — OXYCODONE AND ACETAMINOPHEN 1 TABLET: 7.5; 325 TABLET ORAL at 22:44

## 2023-05-20 RX ADMIN — SODIUM CHLORIDE, PRESERVATIVE FREE 10 ML: 5 INJECTION INTRAVENOUS at 20:44

## 2023-05-20 ASSESSMENT — PAIN DESCRIPTION - LOCATION
LOCATION: ABDOMEN
LOCATION: ABDOMEN;BACK

## 2023-05-20 ASSESSMENT — PAIN SCALES - GENERAL
PAINLEVEL_OUTOF10: 8
PAINLEVEL_OUTOF10: 9
PAINLEVEL_OUTOF10: 8
PAINLEVEL_OUTOF10: 7
PAINLEVEL_OUTOF10: 9
PAINLEVEL_OUTOF10: 7

## 2023-05-20 ASSESSMENT — PAIN - FUNCTIONAL ASSESSMENT
PAIN_FUNCTIONAL_ASSESSMENT: 0-10
PAIN_FUNCTIONAL_ASSESSMENT: 0-10

## 2023-05-20 NOTE — ED PROVIDER NOTES
ondansetron [facility]) injection 4 mg (4 mg IntraVENous Given 5/20/23 1913)   HYDROmorphone HCl PF (DILAUDID) injection 1 mg (1 mg IntraVENous Given 5/20/23 2009)   0.9 % sodium chloride bolus (0 mLs IntraVENous Stopped 5/20/23 2056)   sodium chloride flush 0.9 % injection 10 mL (10 mLs IntraVENous Given 5/20/23 2044)   iopamidol (ISOVUE-370) 76 % injection 100 mL (100 mLs IntraVENous Given 5/20/23 2043)   HYDROmorphone HCl PF (DILAUDID) injection 0.5 mg (0.5 mg IntraVENous Given 5/20/23 2153)   oxyCODONE-acetaminophen (PERCOCET) 7.5-325 MG per tablet 1 tablet (1 tablet Oral Given 5/20/23 2244)     CT ABDOMEN PELVIS W IV CONTRAST Additional Contrast? None   Final Result      1. Question subtle peripancreatic edema. Correlation with lipase level is    recommended. There is otherwise no evidence of acute disease in the abdomen or    pelvis. 2. Either dilatation of the tip of the pancreatic duct in the tail, versus a    small residual collection. 3. Status post cholecystectomy. Cece Peace M.D.    5/20/2023 9:35:00 PM        Voice dictation software was used during the making of this note. This software is not perfect and grammatical and other typographical errors may be present. This note has not been completely proofread for errors.      Milas Gaucher, MD  05/20/23 0052

## 2023-05-20 NOTE — ED TRIAGE NOTES
Pt ambulatory to triage for reports of abdominal pain that started around 0300 this morning, awakening pt from sleep. Pt also endorsing n/v but no diarrhea. Last BM 2 days ago, pt reports attempting bowel rest. Pt with hx of pancreatitis.

## 2023-05-21 ENCOUNTER — APPOINTMENT (OUTPATIENT)
Dept: CT IMAGING | Age: 38
End: 2023-05-21

## 2023-05-21 ENCOUNTER — APPOINTMENT (OUTPATIENT)
Dept: ULTRASOUND IMAGING | Age: 38
DRG: 440 | End: 2023-05-21
Attending: FAMILY MEDICINE

## 2023-05-21 ENCOUNTER — HOSPITAL ENCOUNTER (EMERGENCY)
Age: 38
Discharge: ANOTHER HOSPITAL | End: 2023-05-21
Attending: EMERGENCY MEDICINE

## 2023-05-21 ENCOUNTER — HOSPITAL ENCOUNTER (INPATIENT)
Age: 38
LOS: 2 days | Discharge: HOME | DRG: 440 | End: 2023-05-23
Attending: FAMILY MEDICINE | Admitting: INTERNAL MEDICINE

## 2023-05-21 VITALS
TEMPERATURE: 97.5 F | BODY MASS INDEX: 27.4 KG/M2 | SYSTOLIC BLOOD PRESSURE: 174 MMHG | HEART RATE: 75 BPM | WEIGHT: 185 LBS | OXYGEN SATURATION: 98 % | RESPIRATION RATE: 20 BRPM | DIASTOLIC BLOOD PRESSURE: 97 MMHG | HEIGHT: 69 IN

## 2023-05-21 DIAGNOSIS — K85.90 ACUTE RECURRENT PANCREATITIS: Primary | ICD-10-CM

## 2023-05-21 DIAGNOSIS — R11.2 INTRACTABLE NAUSEA AND VOMITING: ICD-10-CM

## 2023-05-21 DIAGNOSIS — R52 INTRACTABLE PAIN: ICD-10-CM

## 2023-05-21 LAB
ALBUMIN SERPL-MCNC: 5.1 G/DL (ref 3.5–5)
ALBUMIN/GLOB SERPL: 1.4 (ref 0.4–1.6)
ALP SERPL-CCNC: 78 U/L (ref 45–117)
ALT SERPL-CCNC: 41 U/L (ref 13–61)
ANION GAP SERPL CALC-SCNC: 15 MMOL/L (ref 2–11)
APPEARANCE UR: ABNORMAL
AST SERPL-CCNC: 42 U/L (ref 15–37)
BACTERIA URNS QL MICRO: ABNORMAL /HPF
BASOPHILS # BLD: 0.1 K/UL (ref 0–0.2)
BASOPHILS NFR BLD: 0 % (ref 0–2)
BILIRUB SERPL-MCNC: 0.7 MG/DL (ref 0.2–1.1)
BILIRUB UR QL: ABNORMAL
BUN SERPL-MCNC: 14 MG/DL (ref 7–18)
CALCIUM SERPL-MCNC: 9.7 MG/DL (ref 8.3–10.4)
CASTS URNS QL MICRO: 0 /LPF
CHLORIDE SERPL-SCNC: 101 MMOL/L (ref 98–107)
CO2 SERPL-SCNC: 20 MMOL/L (ref 21–32)
COLOR UR: ABNORMAL
CREAT SERPL-MCNC: 1.03 MG/DL (ref 0.8–1.5)
CRYSTALS URNS QL MICRO: 0 /LPF
DIFFERENTIAL METHOD BLD: ABNORMAL
EOSINOPHIL # BLD: 0.1 K/UL (ref 0–0.8)
EOSINOPHIL NFR BLD: 0 % (ref 0.5–7.8)
EPI CELLS #/AREA URNS HPF: ABNORMAL /HPF
ERYTHROCYTE [DISTWIDTH] IN BLOOD BY AUTOMATED COUNT: 14.6 % (ref 11.9–14.6)
GLOBULIN SER CALC-MCNC: 3.7 G/DL (ref 2.8–4.5)
GLUCOSE SERPL-MCNC: 116 MG/DL (ref 65–100)
GLUCOSE UR STRIP.AUTO-MCNC: NEGATIVE MG/DL
HCT VFR BLD AUTO: 56 % (ref 41.1–50.3)
HGB BLD-MCNC: 18.7 G/DL (ref 13.6–17.2)
HGB UR QL STRIP: ABNORMAL
IMM GRANULOCYTES # BLD AUTO: 0.1 K/UL (ref 0–0.5)
IMM GRANULOCYTES NFR BLD AUTO: 0 % (ref 0–5)
KETONES UR QL STRIP.AUTO: 15 MG/DL
LEUKOCYTE ESTERASE UR QL STRIP.AUTO: NEGATIVE
LIPASE SERPL-CCNC: 228 U/L (ref 13–60)
LYMPHOCYTES # BLD: 2.1 K/UL (ref 0.5–4.6)
LYMPHOCYTES NFR BLD: 13 % (ref 13–44)
MCH RBC QN AUTO: 28.3 PG (ref 26.1–32.9)
MCHC RBC AUTO-ENTMCNC: 33.4 G/DL (ref 31.4–35)
MCV RBC AUTO: 84.7 FL (ref 82–102)
MONOCYTES # BLD: 1 K/UL (ref 0.1–1.3)
MONOCYTES NFR BLD: 6 % (ref 4–12)
MUCOUS THREADS URNS QL MICRO: ABNORMAL /LPF
NEUTS SEG # BLD: 12.8 K/UL (ref 1.7–8.2)
NEUTS SEG NFR BLD: 80 % (ref 43–78)
NITRITE UR QL STRIP.AUTO: NEGATIVE
NRBC # BLD: 0 K/UL (ref 0–0.2)
OTHER OBSERVATIONS: ABNORMAL
PH UR STRIP: 6.5 (ref 5–9)
PLATELET # BLD AUTO: 401 K/UL (ref 150–450)
PMV BLD AUTO: 9.6 FL (ref 9.4–12.3)
POTASSIUM SERPL-SCNC: 4.7 MMOL/L (ref 3.5–5.1)
PROT SERPL-MCNC: 8.8 G/DL (ref 6.4–8.2)
PROT UR STRIP-MCNC: >300 MG/DL
RBC # BLD AUTO: 6.61 M/UL (ref 4.23–5.6)
RBC #/AREA URNS HPF: ABNORMAL /HPF
SODIUM SERPL-SCNC: 136 MMOL/L (ref 133–143)
SP GR UR REFRACTOMETRY: 1.02 (ref 1–1.02)
UROBILINOGEN UR QL STRIP.AUTO: 1 EU/DL (ref 0.2–1)
WBC # BLD AUTO: 16.1 K/UL (ref 4.3–11.1)
WBC URNS QL MICRO: ABNORMAL /HPF

## 2023-05-21 PROCEDURE — 80053 COMPREHEN METABOLIC PANEL: CPT

## 2023-05-21 PROCEDURE — 83690 ASSAY OF LIPASE: CPT

## 2023-05-21 PROCEDURE — C9113 INJ PANTOPRAZOLE SODIUM, VIA: HCPCS | Performed by: EMERGENCY MEDICINE

## 2023-05-21 PROCEDURE — 1100000000 HC RM PRIVATE

## 2023-05-21 PROCEDURE — A4216 STERILE WATER/SALINE, 10 ML: HCPCS | Performed by: EMERGENCY MEDICINE

## 2023-05-21 PROCEDURE — 96374 THER/PROPH/DIAG INJ IV PUSH: CPT

## 2023-05-21 PROCEDURE — 6360000002 HC RX W HCPCS: Performed by: NURSE PRACTITIONER

## 2023-05-21 PROCEDURE — 96376 TX/PRO/DX INJ SAME DRUG ADON: CPT

## 2023-05-21 PROCEDURE — 76700 US EXAM ABDOM COMPLETE: CPT

## 2023-05-21 PROCEDURE — 2580000003 HC RX 258: Performed by: EMERGENCY MEDICINE

## 2023-05-21 PROCEDURE — 6370000000 HC RX 637 (ALT 250 FOR IP): Performed by: NURSE PRACTITIONER

## 2023-05-21 PROCEDURE — 6360000002 HC RX W HCPCS: Performed by: EMERGENCY MEDICINE

## 2023-05-21 PROCEDURE — 99285 EMERGENCY DEPT VISIT HI MDM: CPT

## 2023-05-21 PROCEDURE — 81001 URINALYSIS AUTO W/SCOPE: CPT

## 2023-05-21 PROCEDURE — 96375 TX/PRO/DX INJ NEW DRUG ADDON: CPT

## 2023-05-21 PROCEDURE — 96361 HYDRATE IV INFUSION ADD-ON: CPT

## 2023-05-21 PROCEDURE — 85025 COMPLETE CBC W/AUTO DIFF WBC: CPT

## 2023-05-21 PROCEDURE — 2580000003 HC RX 258: Performed by: NURSE PRACTITIONER

## 2023-05-21 PROCEDURE — 6360000002 HC RX W HCPCS

## 2023-05-21 RX ORDER — HYDROMORPHONE HYDROCHLORIDE 1 MG/ML
1 INJECTION, SOLUTION INTRAMUSCULAR; INTRAVENOUS; SUBCUTANEOUS EVERY 4 HOURS PRN
Status: DISCONTINUED | OUTPATIENT
Start: 2023-05-21 | End: 2023-05-21

## 2023-05-21 RX ORDER — ONDANSETRON 2 MG/ML
4 INJECTION INTRAMUSCULAR; INTRAVENOUS EVERY 6 HOURS PRN
Status: DISCONTINUED | OUTPATIENT
Start: 2023-05-21 | End: 2023-05-23 | Stop reason: HOSPADM

## 2023-05-21 RX ORDER — HYDROMORPHONE HYDROCHLORIDE 1 MG/ML
1 INJECTION, SOLUTION INTRAMUSCULAR; INTRAVENOUS; SUBCUTANEOUS
Status: DISCONTINUED | OUTPATIENT
Start: 2023-05-21 | End: 2023-05-23 | Stop reason: HOSPADM

## 2023-05-21 RX ORDER — HYDROMORPHONE HYDROCHLORIDE 1 MG/ML
1 INJECTION, SOLUTION INTRAMUSCULAR; INTRAVENOUS; SUBCUTANEOUS
Status: COMPLETED | OUTPATIENT
Start: 2023-05-21 | End: 2023-05-21

## 2023-05-21 RX ORDER — ONDANSETRON 4 MG/1
4 TABLET, ORALLY DISINTEGRATING ORAL EVERY 8 HOURS PRN
Status: DISCONTINUED | OUTPATIENT
Start: 2023-05-21 | End: 2023-05-23 | Stop reason: HOSPADM

## 2023-05-21 RX ORDER — HYDROMORPHONE HYDROCHLORIDE 1 MG/ML
0.5 INJECTION, SOLUTION INTRAMUSCULAR; INTRAVENOUS; SUBCUTANEOUS
Status: DISCONTINUED | OUTPATIENT
Start: 2023-05-21 | End: 2023-05-23 | Stop reason: HOSPADM

## 2023-05-21 RX ORDER — PANTOPRAZOLE SODIUM 40 MG/1
40 TABLET, DELAYED RELEASE ORAL NIGHTLY
Status: DISCONTINUED | OUTPATIENT
Start: 2023-05-21 | End: 2023-05-23 | Stop reason: HOSPADM

## 2023-05-21 RX ORDER — HYDROMORPHONE HYDROCHLORIDE 1 MG/ML
0.5 INJECTION, SOLUTION INTRAMUSCULAR; INTRAVENOUS; SUBCUTANEOUS EVERY 4 HOURS PRN
Status: DISCONTINUED | OUTPATIENT
Start: 2023-05-21 | End: 2023-05-21

## 2023-05-21 RX ORDER — HYDROMORPHONE HYDROCHLORIDE 1 MG/ML
0.5 INJECTION, SOLUTION INTRAMUSCULAR; INTRAVENOUS; SUBCUTANEOUS
Status: DISCONTINUED | OUTPATIENT
Start: 2023-05-21 | End: 2023-05-21

## 2023-05-21 RX ORDER — ENOXAPARIN SODIUM 100 MG/ML
40 INJECTION SUBCUTANEOUS EVERY 24 HOURS
Status: DISCONTINUED | OUTPATIENT
Start: 2023-05-21 | End: 2023-05-23 | Stop reason: HOSPADM

## 2023-05-21 RX ORDER — PROCHLORPERAZINE EDISYLATE 5 MG/ML
10 INJECTION INTRAMUSCULAR; INTRAVENOUS EVERY 6 HOURS PRN
Status: DISCONTINUED | OUTPATIENT
Start: 2023-05-21 | End: 2023-05-23 | Stop reason: HOSPADM

## 2023-05-21 RX ORDER — SODIUM CHLORIDE 0.9 % (FLUSH) 0.9 %
5-40 SYRINGE (ML) INJECTION PRN
Status: DISCONTINUED | OUTPATIENT
Start: 2023-05-21 | End: 2023-05-23 | Stop reason: HOSPADM

## 2023-05-21 RX ORDER — POLYETHYLENE GLYCOL 3350 17 G/17G
17 POWDER, FOR SOLUTION ORAL DAILY PRN
Status: DISCONTINUED | OUTPATIENT
Start: 2023-05-21 | End: 2023-05-23 | Stop reason: HOSPADM

## 2023-05-21 RX ORDER — ASPIRIN 81 MG/1
81 TABLET, CHEWABLE ORAL DAILY
Status: DISCONTINUED | OUTPATIENT
Start: 2023-05-21 | End: 2023-05-23 | Stop reason: HOSPADM

## 2023-05-21 RX ORDER — ACETAMINOPHEN 650 MG/1
650 SUPPOSITORY RECTAL EVERY 6 HOURS PRN
Status: DISCONTINUED | OUTPATIENT
Start: 2023-05-21 | End: 2023-05-23 | Stop reason: HOSPADM

## 2023-05-21 RX ORDER — SODIUM CHLORIDE, SODIUM LACTATE, POTASSIUM CHLORIDE, CALCIUM CHLORIDE 600; 310; 30; 20 MG/100ML; MG/100ML; MG/100ML; MG/100ML
INJECTION, SOLUTION INTRAVENOUS CONTINUOUS
Status: DISCONTINUED | OUTPATIENT
Start: 2023-05-21 | End: 2023-05-23 | Stop reason: HOSPADM

## 2023-05-21 RX ORDER — SODIUM CHLORIDE 9 MG/ML
INJECTION, SOLUTION INTRAVENOUS PRN
Status: DISCONTINUED | OUTPATIENT
Start: 2023-05-21 | End: 2023-05-23 | Stop reason: HOSPADM

## 2023-05-21 RX ORDER — HYDROMORPHONE HYDROCHLORIDE 1 MG/ML
1 INJECTION, SOLUTION INTRAMUSCULAR; INTRAVENOUS; SUBCUTANEOUS
Status: DISCONTINUED | OUTPATIENT
Start: 2023-05-21 | End: 2023-05-21

## 2023-05-21 RX ORDER — SODIUM CHLORIDE 0.9 % (FLUSH) 0.9 %
5-40 SYRINGE (ML) INJECTION EVERY 12 HOURS SCHEDULED
Status: DISCONTINUED | OUTPATIENT
Start: 2023-05-21 | End: 2023-05-23 | Stop reason: HOSPADM

## 2023-05-21 RX ORDER — ACETAMINOPHEN 325 MG/1
650 TABLET ORAL EVERY 6 HOURS PRN
Status: DISCONTINUED | OUTPATIENT
Start: 2023-05-21 | End: 2023-05-23 | Stop reason: HOSPADM

## 2023-05-21 RX ORDER — HYDRALAZINE HYDROCHLORIDE 20 MG/ML
10 INJECTION INTRAMUSCULAR; INTRAVENOUS EVERY 4 HOURS PRN
Status: DISCONTINUED | OUTPATIENT
Start: 2023-05-21 | End: 2023-05-23 | Stop reason: HOSPADM

## 2023-05-21 RX ORDER — ONDANSETRON 2 MG/ML
8 INJECTION INTRAMUSCULAR; INTRAVENOUS ONCE
Status: COMPLETED | OUTPATIENT
Start: 2023-05-21 | End: 2023-05-21

## 2023-05-21 RX ORDER — 0.9 % SODIUM CHLORIDE 0.9 %
1000 INTRAVENOUS SOLUTION INTRAVENOUS ONCE
Status: COMPLETED | OUTPATIENT
Start: 2023-05-21 | End: 2023-05-21

## 2023-05-21 RX ORDER — PROCHLORPERAZINE EDISYLATE 5 MG/ML
10 INJECTION INTRAMUSCULAR; INTRAVENOUS
Status: COMPLETED | OUTPATIENT
Start: 2023-05-21 | End: 2023-05-21

## 2023-05-21 RX ADMIN — SODIUM CHLORIDE, POTASSIUM CHLORIDE, SODIUM LACTATE AND CALCIUM CHLORIDE: 600; 310; 30; 20 INJECTION, SOLUTION INTRAVENOUS at 17:15

## 2023-05-21 RX ADMIN — SODIUM CHLORIDE, POTASSIUM CHLORIDE, SODIUM LACTATE AND CALCIUM CHLORIDE: 600; 310; 30; 20 INJECTION, SOLUTION INTRAVENOUS at 21:39

## 2023-05-21 RX ADMIN — PANTOPRAZOLE SODIUM 40 MG: 40 INJECTION, POWDER, FOR SOLUTION INTRAVENOUS at 09:29

## 2023-05-21 RX ADMIN — HYDROMORPHONE HYDROCHLORIDE 1 MG: 1 INJECTION, SOLUTION INTRAMUSCULAR; INTRAVENOUS; SUBCUTANEOUS at 12:50

## 2023-05-21 RX ADMIN — METOPROLOL TARTRATE 25 MG: 25 TABLET, FILM COATED ORAL at 20:33

## 2023-05-21 RX ADMIN — HYDROMORPHONE HYDROCHLORIDE 1 MG: 1 INJECTION, SOLUTION INTRAMUSCULAR; INTRAVENOUS; SUBCUTANEOUS at 11:12

## 2023-05-21 RX ADMIN — SODIUM CHLORIDE 1000 ML: 9 INJECTION, SOLUTION INTRAVENOUS at 09:21

## 2023-05-21 RX ADMIN — TICAGRELOR 90 MG: 90 TABLET ORAL at 20:33

## 2023-05-21 RX ADMIN — ENOXAPARIN SODIUM 40 MG: 100 INJECTION SUBCUTANEOUS at 14:20

## 2023-05-21 RX ADMIN — ONDANSETRON 8 MG: 2 INJECTION INTRAMUSCULAR; INTRAVENOUS at 09:29

## 2023-05-21 RX ADMIN — SODIUM CHLORIDE, PRESERVATIVE FREE 10 ML: 5 INJECTION INTRAVENOUS at 20:34

## 2023-05-21 RX ADMIN — HYDROMORPHONE HYDROCHLORIDE 1 MG: 1 INJECTION, SOLUTION INTRAMUSCULAR; INTRAVENOUS; SUBCUTANEOUS at 09:29

## 2023-05-21 RX ADMIN — HYDROMORPHONE HYDROCHLORIDE 1 MG: 1 INJECTION, SOLUTION INTRAMUSCULAR; INTRAVENOUS; SUBCUTANEOUS at 22:06

## 2023-05-21 RX ADMIN — HYDROMORPHONE HYDROCHLORIDE 1 MG: 1 INJECTION, SOLUTION INTRAMUSCULAR; INTRAVENOUS; SUBCUTANEOUS at 19:06

## 2023-05-21 RX ADMIN — METOPROLOL TARTRATE 25 MG: 25 TABLET, FILM COATED ORAL at 14:20

## 2023-05-21 RX ADMIN — HYDROMORPHONE HYDROCHLORIDE 1 MG: 1 INJECTION, SOLUTION INTRAMUSCULAR; INTRAVENOUS; SUBCUTANEOUS at 10:22

## 2023-05-21 RX ADMIN — PROCHLORPERAZINE EDISYLATE 10 MG: 5 INJECTION INTRAMUSCULAR; INTRAVENOUS at 10:25

## 2023-05-21 RX ADMIN — HYDRALAZINE HYDROCHLORIDE 10 MG: 20 INJECTION INTRAMUSCULAR; INTRAVENOUS at 16:01

## 2023-05-21 RX ADMIN — HYDROMORPHONE HYDROCHLORIDE 1 MG: 1 INJECTION, SOLUTION INTRAMUSCULAR; INTRAVENOUS; SUBCUTANEOUS at 15:58

## 2023-05-21 RX ADMIN — ASPIRIN 81 MG 81 MG: 81 TABLET ORAL at 14:20

## 2023-05-21 RX ADMIN — PANTOPRAZOLE SODIUM 40 MG: 40 TABLET, DELAYED RELEASE ORAL at 20:33

## 2023-05-21 RX ADMIN — SODIUM CHLORIDE, POTASSIUM CHLORIDE, SODIUM LACTATE AND CALCIUM CHLORIDE: 600; 310; 30; 20 INJECTION, SOLUTION INTRAVENOUS at 12:51

## 2023-05-21 ASSESSMENT — PAIN - FUNCTIONAL ASSESSMENT: PAIN_FUNCTIONAL_ASSESSMENT: 0-10

## 2023-05-21 ASSESSMENT — PAIN SCALES - GENERAL
PAINLEVEL_OUTOF10: 10
PAINLEVEL_OUTOF10: 10
PAINLEVEL_OUTOF10: 9
PAINLEVEL_OUTOF10: 4
PAINLEVEL_OUTOF10: 9
PAINLEVEL_OUTOF10: 3
PAINLEVEL_OUTOF10: 9
PAINLEVEL_OUTOF10: 9
PAINLEVEL_OUTOF10: 7
PAINLEVEL_OUTOF10: 9

## 2023-05-21 ASSESSMENT — PAIN DESCRIPTION - ORIENTATION
ORIENTATION: LEFT;RIGHT
ORIENTATION: LEFT;RIGHT
ORIENTATION: RIGHT;LEFT

## 2023-05-21 ASSESSMENT — PAIN DESCRIPTION - LOCATION
LOCATION: ABDOMEN;BACK
LOCATION: ABDOMEN
LOCATION: ABDOMEN;BACK
LOCATION: ABDOMEN

## 2023-05-21 ASSESSMENT — PAIN DESCRIPTION - DESCRIPTORS
DESCRIPTORS: SHOOTING
DESCRIPTORS: THROBBING;SHARP
DESCRIPTORS: THROBBING;SHOOTING

## 2023-05-21 ASSESSMENT — ENCOUNTER SYMPTOMS
ABDOMINAL PAIN: 1
VOMITING: 1
NAUSEA: 1

## 2023-05-21 ASSESSMENT — PAIN DESCRIPTION - FREQUENCY: FREQUENCY: CONTINUOUS

## 2023-05-22 LAB
ALBUMIN SERPL-MCNC: 3.4 G/DL (ref 3.5–5)
ALBUMIN/GLOB SERPL: 1.1 (ref 0.4–1.6)
ALP SERPL-CCNC: 55 U/L (ref 50–136)
ALT SERPL-CCNC: 36 U/L (ref 12–65)
ANION GAP SERPL CALC-SCNC: 5 MMOL/L (ref 2–11)
AST SERPL-CCNC: 29 U/L (ref 15–37)
BILIRUB SERPL-MCNC: 0.7 MG/DL (ref 0.2–1.1)
BUN SERPL-MCNC: 10 MG/DL (ref 6–23)
CALCIUM SERPL-MCNC: 8.2 MG/DL (ref 8.3–10.4)
CHLORIDE SERPL-SCNC: 105 MMOL/L (ref 101–110)
CO2 SERPL-SCNC: 23 MMOL/L (ref 21–32)
CREAT SERPL-MCNC: 0.84 MG/DL (ref 0.8–1.5)
ERYTHROCYTE [DISTWIDTH] IN BLOOD BY AUTOMATED COUNT: 14.2 % (ref 11.9–14.6)
GLOBULIN SER CALC-MCNC: 3.2 G/DL (ref 2.8–4.5)
GLUCOSE SERPL-MCNC: 90 MG/DL (ref 65–100)
HCT VFR BLD AUTO: 46.8 % (ref 41.1–50.3)
HGB BLD-MCNC: 15.2 G/DL (ref 13.6–17.2)
LIPASE SERPL-CCNC: 588 U/L (ref 73–393)
MCH RBC QN AUTO: 27.7 PG (ref 26.1–32.9)
MCHC RBC AUTO-ENTMCNC: 32.5 G/DL (ref 31.4–35)
MCV RBC AUTO: 85.4 FL (ref 82–102)
NRBC # BLD: 0 K/UL (ref 0–0.2)
PLATELET # BLD AUTO: 232 K/UL (ref 150–450)
PMV BLD AUTO: 9.7 FL (ref 9.4–12.3)
POTASSIUM SERPL-SCNC: 4 MMOL/L (ref 3.5–5.1)
PROT SERPL-MCNC: 6.6 G/DL (ref 6.3–8.2)
RBC # BLD AUTO: 5.48 M/UL (ref 4.23–5.6)
SODIUM SERPL-SCNC: 133 MMOL/L (ref 133–143)
TRIGL SERPL-MCNC: 95 MG/DL (ref 35–150)
WBC # BLD AUTO: 5.7 K/UL (ref 4.3–11.1)

## 2023-05-22 PROCEDURE — 6360000002 HC RX W HCPCS: Performed by: NURSE PRACTITIONER

## 2023-05-22 PROCEDURE — 6370000000 HC RX 637 (ALT 250 FOR IP): Performed by: NURSE PRACTITIONER

## 2023-05-22 PROCEDURE — 2580000003 HC RX 258: Performed by: NURSE PRACTITIONER

## 2023-05-22 PROCEDURE — 85027 COMPLETE CBC AUTOMATED: CPT

## 2023-05-22 PROCEDURE — 80053 COMPREHEN METABOLIC PANEL: CPT

## 2023-05-22 PROCEDURE — 1100000000 HC RM PRIVATE

## 2023-05-22 PROCEDURE — 36415 COLL VENOUS BLD VENIPUNCTURE: CPT

## 2023-05-22 PROCEDURE — 83690 ASSAY OF LIPASE: CPT

## 2023-05-22 PROCEDURE — 84478 ASSAY OF TRIGLYCERIDES: CPT

## 2023-05-22 RX ORDER — AMLODIPINE BESYLATE 5 MG/1
5 TABLET ORAL DAILY
Status: DISCONTINUED | OUTPATIENT
Start: 2023-05-22 | End: 2023-05-23 | Stop reason: HOSPADM

## 2023-05-22 RX ADMIN — PANCRELIPASE LIPASE, PANCRELIPASE PROTEASE, PANCRELIPASE AMYLASE 15000 UNITS: 5000; 17000; 24000 CAPSULE, DELAYED RELEASE ORAL at 12:02

## 2023-05-22 RX ADMIN — HYDROMORPHONE HYDROCHLORIDE 1 MG: 1 INJECTION, SOLUTION INTRAMUSCULAR; INTRAVENOUS; SUBCUTANEOUS at 10:19

## 2023-05-22 RX ADMIN — METOPROLOL TARTRATE 25 MG: 25 TABLET, FILM COATED ORAL at 19:34

## 2023-05-22 RX ADMIN — HYDROMORPHONE HYDROCHLORIDE 1 MG: 1 INJECTION, SOLUTION INTRAMUSCULAR; INTRAVENOUS; SUBCUTANEOUS at 19:31

## 2023-05-22 RX ADMIN — SODIUM CHLORIDE, POTASSIUM CHLORIDE, SODIUM LACTATE AND CALCIUM CHLORIDE: 600; 310; 30; 20 INJECTION, SOLUTION INTRAVENOUS at 06:21

## 2023-05-22 RX ADMIN — PANCRELIPASE LIPASE, PANCRELIPASE PROTEASE, PANCRELIPASE AMYLASE 15000 UNITS: 5000; 17000; 24000 CAPSULE, DELAYED RELEASE ORAL at 07:32

## 2023-05-22 RX ADMIN — HYDROMORPHONE HYDROCHLORIDE 1 MG: 1 INJECTION, SOLUTION INTRAMUSCULAR; INTRAVENOUS; SUBCUTANEOUS at 16:37

## 2023-05-22 RX ADMIN — PANCRELIPASE LIPASE, PANCRELIPASE PROTEASE, PANCRELIPASE AMYLASE 15000 UNITS: 5000; 17000; 24000 CAPSULE, DELAYED RELEASE ORAL at 16:38

## 2023-05-22 RX ADMIN — SODIUM CHLORIDE, POTASSIUM CHLORIDE, SODIUM LACTATE AND CALCIUM CHLORIDE: 600; 310; 30; 20 INJECTION, SOLUTION INTRAVENOUS at 16:38

## 2023-05-22 RX ADMIN — AMLODIPINE BESYLATE 5 MG: 5 TABLET ORAL at 09:51

## 2023-05-22 RX ADMIN — SODIUM CHLORIDE, POTASSIUM CHLORIDE, SODIUM LACTATE AND CALCIUM CHLORIDE: 600; 310; 30; 20 INJECTION, SOLUTION INTRAVENOUS at 02:15

## 2023-05-22 RX ADMIN — SODIUM CHLORIDE, POTASSIUM CHLORIDE, SODIUM LACTATE AND CALCIUM CHLORIDE: 600; 310; 30; 20 INJECTION, SOLUTION INTRAVENOUS at 10:19

## 2023-05-22 RX ADMIN — HYDROMORPHONE HYDROCHLORIDE 1 MG: 1 INJECTION, SOLUTION INTRAMUSCULAR; INTRAVENOUS; SUBCUTANEOUS at 01:10

## 2023-05-22 RX ADMIN — PANCRELIPASE LIPASE, PANCRELIPASE PROTEASE, PANCRELIPASE AMYLASE 20000 UNITS: 20000; 63000; 84000 CAPSULE, DELAYED RELEASE ORAL at 12:02

## 2023-05-22 RX ADMIN — PANTOPRAZOLE SODIUM 40 MG: 40 TABLET, DELAYED RELEASE ORAL at 19:34

## 2023-05-22 RX ADMIN — TICAGRELOR 90 MG: 90 TABLET ORAL at 19:34

## 2023-05-22 RX ADMIN — METOPROLOL TARTRATE 25 MG: 25 TABLET, FILM COATED ORAL at 07:32

## 2023-05-22 RX ADMIN — PANCRELIPASE LIPASE, PANCRELIPASE PROTEASE, PANCRELIPASE AMYLASE 20000 UNITS: 20000; 63000; 84000 CAPSULE, DELAYED RELEASE ORAL at 16:38

## 2023-05-22 RX ADMIN — TICAGRELOR 90 MG: 90 TABLET ORAL at 07:33

## 2023-05-22 RX ADMIN — HYDROMORPHONE HYDROCHLORIDE 1 MG: 1 INJECTION, SOLUTION INTRAMUSCULAR; INTRAVENOUS; SUBCUTANEOUS at 13:43

## 2023-05-22 RX ADMIN — PANCRELIPASE LIPASE, PANCRELIPASE PROTEASE, PANCRELIPASE AMYLASE 20000 UNITS: 20000; 63000; 84000 CAPSULE, DELAYED RELEASE ORAL at 07:33

## 2023-05-22 RX ADMIN — HYDROMORPHONE HYDROCHLORIDE 1 MG: 1 INJECTION, SOLUTION INTRAMUSCULAR; INTRAVENOUS; SUBCUTANEOUS at 22:29

## 2023-05-22 RX ADMIN — HYDROMORPHONE HYDROCHLORIDE 1 MG: 1 INJECTION, SOLUTION INTRAMUSCULAR; INTRAVENOUS; SUBCUTANEOUS at 07:32

## 2023-05-22 RX ADMIN — SODIUM CHLORIDE, POTASSIUM CHLORIDE, SODIUM LACTATE AND CALCIUM CHLORIDE: 600; 310; 30; 20 INJECTION, SOLUTION INTRAVENOUS at 20:50

## 2023-05-22 RX ADMIN — ASPIRIN 81 MG 81 MG: 81 TABLET ORAL at 07:33

## 2023-05-22 RX ADMIN — HYDROMORPHONE HYDROCHLORIDE 1 MG: 1 INJECTION, SOLUTION INTRAMUSCULAR; INTRAVENOUS; SUBCUTANEOUS at 04:13

## 2023-05-22 ASSESSMENT — PAIN DESCRIPTION - LOCATION
LOCATION: ABDOMEN
LOCATION: ABDOMEN;BACK
LOCATION: ABDOMEN

## 2023-05-22 ASSESSMENT — PAIN SCALES - GENERAL
PAINLEVEL_OUTOF10: 9
PAINLEVEL_OUTOF10: 3
PAINLEVEL_OUTOF10: 9
PAINLEVEL_OUTOF10: 8
PAINLEVEL_OUTOF10: 8
PAINLEVEL_OUTOF10: 7
PAINLEVEL_OUTOF10: 8
PAINLEVEL_OUTOF10: 8

## 2023-05-22 ASSESSMENT — PAIN DESCRIPTION - DESCRIPTORS
DESCRIPTORS: ACHING;SORE
DESCRIPTORS: ACHING;SORE

## 2023-05-22 NOTE — PROGRESS NOTES
Hospitalist Progress Note   Admit Date:  2023 12:18 PM   Name:  Leyla Blackmon   Age:  43 y.o. Sex:  male  :  1985   MRN:  [id number]   Room:  [facility]    Presenting/Chief Complaint: No chief complaint on file. Reason(s) for Admission: Acute recurrent pancreatitis [K85.90]     Hospital Course:   Leyla Blackmon is a 43 y.o. CM w/ a PMH of CAD, cholecystectomy, EtOH pancreatitis complicated with pseudocyst s/p stent placement on  removed  and STEMI s/p PCI with TULIO who presented to [facility] ER with cc of abdominal pain. He stated that he has also had continuous nausea and vomiting for the past 2 days. He endorses chills, he denies subjective fevers. He denies shortness of breath, he denies chest pain. He denies urinary symptoms. He stated that this pain feels the same as his previous episodes of pancreatitis. He said he ran out of his Creon 1 month ago and has not had it refilled. He said he had a glass of wine several weeks ago to celebrate his anniversary, none since. In the ER: lipase 228, WBC 16.1. Appears dehydrated on CBC. UA w/ protein and ketones, not infectious. CT w/ questionable peripancreatic edema and either dilatation of the tip of the pancreatic duct in the tail vs a small residual collection. He was transferred from [facility] to [address] [place] [address] for admission to the Hospitalist service. Subjective & 24hr Events (23): No AEO. Mr. Nelson Nesbitt says he feels better this morning and has tolerated CLD. Will advance to FLD today. Continue IVF. US abdomen reviewed. Add CCB for HTN. If he continues to improve, hopefully home tomorrow.     Assessment & Plan:     Principal Problem:  Acute recurrent pancreatitis  Intractable N/V  BISAP score zero  CT imaging reviewed  Start LR @ 250 mL/hr  US abdomen w/ prominent-appearing pancreas  Advance to full liquid diet on May/22  Lipase 228 --> 588  Triglycerides 95  PRN

## 2023-05-23 ENCOUNTER — TELEPHONE (OUTPATIENT)
Dept: INTERNAL MEDICINE CLINIC | Facility: CLINIC | Age: 38
End: 2023-05-23

## 2023-05-23 VITALS
OXYGEN SATURATION: 97 % | SYSTOLIC BLOOD PRESSURE: 157 MMHG | WEIGHT: 185 LBS | RESPIRATION RATE: 18 BRPM | HEART RATE: 85 BPM | DIASTOLIC BLOOD PRESSURE: 88 MMHG | TEMPERATURE: 97.5 F | BODY MASS INDEX: 27.4 KG/M2 | HEIGHT: 69 IN

## 2023-05-23 PROCEDURE — 6360000002 HC RX W HCPCS: Performed by: NURSE PRACTITIONER

## 2023-05-23 PROCEDURE — 2580000003 HC RX 258: Performed by: NURSE PRACTITIONER

## 2023-05-23 PROCEDURE — 6370000000 HC RX 637 (ALT 250 FOR IP): Performed by: NURSE PRACTITIONER

## 2023-05-23 RX ORDER — OXYCODONE HYDROCHLORIDE 10 MG/1
10 TABLET ORAL EVERY 8 HOURS PRN
Qty: 9 TABLET | Refills: 0 | Status: SHIPPED | OUTPATIENT
Start: 2023-05-23 | End: 2023-05-23 | Stop reason: SDUPTHER

## 2023-05-23 RX ORDER — NALOXONE HYDROCHLORIDE 4 MG/.1ML
1 SPRAY NASAL PRN
Qty: 2 EACH | Refills: 0 | Status: SHIPPED | OUTPATIENT
Start: 2023-05-23 | End: 2023-05-23 | Stop reason: SDUPTHER

## 2023-05-23 RX ORDER — PANCRELIPASE 36000; 180000; 114000 [USP'U]/1; [USP'U]/1; [USP'U]/1
1 CAPSULE, DELAYED RELEASE PELLETS ORAL
Qty: 180 CAPSULE | Refills: 2 | Status: SHIPPED | OUTPATIENT
Start: 2023-05-23 | End: 2023-11-19

## 2023-05-23 RX ORDER — PANTOPRAZOLE SODIUM 40 MG/1
40 TABLET, DELAYED RELEASE ORAL NIGHTLY
Qty: 30 TABLET | Refills: 3 | Status: SHIPPED | OUTPATIENT
Start: 2023-05-23

## 2023-05-23 RX ORDER — AMLODIPINE BESYLATE 5 MG/1
5 TABLET ORAL DAILY
Qty: 30 TABLET | Refills: 3 | Status: SHIPPED | OUTPATIENT
Start: 2023-05-24

## 2023-05-23 RX ORDER — NALOXONE HYDROCHLORIDE 4 MG/.1ML
1 SPRAY NASAL PRN
Qty: 2 EACH | Refills: 0 | Status: SHIPPED | OUTPATIENT
Start: 2023-05-23

## 2023-05-23 RX ORDER — OXYCODONE HYDROCHLORIDE 10 MG/1
10 TABLET ORAL EVERY 8 HOURS PRN
Qty: 9 TABLET | Refills: 0 | Status: SHIPPED | OUTPATIENT
Start: 2023-05-23 | End: 2023-05-25 | Stop reason: ALTCHOICE

## 2023-05-23 RX ADMIN — HYDROMORPHONE HYDROCHLORIDE 1 MG: 1 INJECTION, SOLUTION INTRAMUSCULAR; INTRAVENOUS; SUBCUTANEOUS at 08:04

## 2023-05-23 RX ADMIN — ASPIRIN 81 MG 81 MG: 81 TABLET ORAL at 08:06

## 2023-05-23 RX ADMIN — SODIUM CHLORIDE, POTASSIUM CHLORIDE, SODIUM LACTATE AND CALCIUM CHLORIDE: 600; 310; 30; 20 INJECTION, SOLUTION INTRAVENOUS at 01:01

## 2023-05-23 RX ADMIN — AMLODIPINE BESYLATE 5 MG: 5 TABLET ORAL at 08:10

## 2023-05-23 RX ADMIN — TICAGRELOR 90 MG: 90 TABLET ORAL at 08:06

## 2023-05-23 RX ADMIN — METOPROLOL TARTRATE 25 MG: 25 TABLET, FILM COATED ORAL at 08:10

## 2023-05-23 RX ADMIN — SODIUM CHLORIDE, PRESERVATIVE FREE 10 ML: 5 INJECTION INTRAVENOUS at 08:06

## 2023-05-23 RX ADMIN — HYDROMORPHONE HYDROCHLORIDE 1 MG: 1 INJECTION, SOLUTION INTRAMUSCULAR; INTRAVENOUS; SUBCUTANEOUS at 11:10

## 2023-05-23 RX ADMIN — PANCRELIPASE LIPASE, PANCRELIPASE PROTEASE, PANCRELIPASE AMYLASE 20000 UNITS: 20000; 63000; 84000 CAPSULE, DELAYED RELEASE ORAL at 08:06

## 2023-05-23 RX ADMIN — PANCRELIPASE LIPASE, PANCRELIPASE PROTEASE, PANCRELIPASE AMYLASE 15000 UNITS: 5000; 17000; 24000 CAPSULE, DELAYED RELEASE ORAL at 08:15

## 2023-05-23 RX ADMIN — PANCRELIPASE LIPASE, PANCRELIPASE PROTEASE, PANCRELIPASE AMYLASE 15000 UNITS: 5000; 17000; 24000 CAPSULE, DELAYED RELEASE ORAL at 11:30

## 2023-05-23 RX ADMIN — HYDROMORPHONE HYDROCHLORIDE 1 MG: 1 INJECTION, SOLUTION INTRAMUSCULAR; INTRAVENOUS; SUBCUTANEOUS at 01:02

## 2023-05-23 RX ADMIN — HYDROMORPHONE HYDROCHLORIDE 1 MG: 1 INJECTION, SOLUTION INTRAMUSCULAR; INTRAVENOUS; SUBCUTANEOUS at 03:41

## 2023-05-23 RX ADMIN — PANCRELIPASE LIPASE, PANCRELIPASE PROTEASE, PANCRELIPASE AMYLASE 20000 UNITS: 20000; 63000; 84000 CAPSULE, DELAYED RELEASE ORAL at 11:23

## 2023-05-23 RX ADMIN — SODIUM CHLORIDE, POTASSIUM CHLORIDE, SODIUM LACTATE AND CALCIUM CHLORIDE: 600; 310; 30; 20 INJECTION, SOLUTION INTRAVENOUS at 05:20

## 2023-05-23 ASSESSMENT — PAIN SCALES - GENERAL
PAINLEVEL_OUTOF10: 7
PAINLEVEL_OUTOF10: 8
PAINLEVEL_OUTOF10: 7
PAINLEVEL_OUTOF10: 7
PAINLEVEL_OUTOF10: 3
PAINLEVEL_OUTOF10: 3

## 2023-05-23 ASSESSMENT — PAIN - FUNCTIONAL ASSESSMENT: PAIN_FUNCTIONAL_ASSESSMENT: ACTIVITIES ARE NOT PREVENTED

## 2023-05-23 ASSESSMENT — PAIN DESCRIPTION - LOCATION
LOCATION: ABDOMEN;BACK
LOCATION: BACK;ABDOMEN
LOCATION: ABDOMEN
LOCATION: ABDOMEN

## 2023-05-23 ASSESSMENT — PAIN DESCRIPTION - DESCRIPTORS: DESCRIPTORS: ACHING;SORE

## 2023-05-23 NOTE — CARE COORDINATION
Chart reviewed and patient discussed in IDT rounds. Patient discharging ome with spouse and 2 children. No discharge needs identified.

## 2023-05-23 NOTE — PROGRESS NOTES
Discharge paperwork discussed with the patient and all questioned fully answered. IV removed. Prescriptions, discharge paperwork and all belongings sent home with pt. Pt ambulate to discharge transportation. No distress noted.

## 2023-05-23 NOTE — DISCHARGE INSTRUCTIONS
Learning About Acute Pancreatitis  What is acute pancreatitis? The pancreas is an organ behind the stomach. It makes hormones like insulin that help control how your body uses sugar. It also makes enzymes that help you digest food. Usually these enzymes flow from the pancreas to the intestines. But if they leak into the pancreas, they can irritate it and cause pain and swelling. When this happens suddenly, it's called acute pancreatitis. What causes it? Most of the time, acute pancreatitis is caused by gallstones or by heavy alcohol use. But several other things can cause it, such as:  High levels of fats in the blood. An injury. A problem after a surgery or a procedure. Certain medicines. What are the symptoms? The main symptom is pain in the upper belly. The pain can be severe. You also may have a fever, nausea, or vomiting. Some people get so sick that they have problems breathing. They also may have low blood pressure. How is it diagnosed? Your doctor will diagnose pancreatitis with an exam and by looking at your lab tests. Your doctor may think that you have this problem based on your symptoms and where you have pain in your belly. You may have blood tests of enzymes called amylase and lipase. In pancreatitis, the level of these enzymes is usually much higher than normal.  You also may have imaging tests of the belly. These may include an ultrasound, a CT scan, or an MRI. A special MRI called MRCP can show images of the bile ducts. This test can be very helpful when gallstones are causing the problem. How is it treated? Treatment of acute pancreatitis usually takes place in the hospital. It focuses on taking care of pain and supporting your body while your pancreas heals. In severe cases, treatment may occur in an intensive care unit to support breathing, treat serious infections, or help raise very low blood pressure. If a gallstone is causing the problem, the gallstone may need to be removed.

## 2023-05-23 NOTE — DISCHARGE SUMMARY
Hospitalist Discharge Summary   Admit Date:  2023 12:18 PM   DC Note date: 2023  Name:  Felipe Burton   Age:  43 y.o. Sex:  male  :  1985   MRN:  [id number]   Room:  [facility]  PCP:  Damaris Snider MD    Presenting Complaint: No chief complaint on file. Initial Admission Diagnosis: Acute recurrent pancreatitis [K85.90]     Problem List for this Hospitalization (present on admission):    Principal Problem:    Acute recurrent pancreatitis  Resolved Problems:    * No resolved hospital problems. *      Hospital Course:  Felipe Burton is a 43 y.o. CM w/ a PMH of CAD, cholecystectomy, EtOH pancreatitis complicated with pseudocyst s/p stent placement on  removed  and STEMI s/p PCI with TULIO who presented to [facility] ER with cc of abdominal pain. He stated that he has also had continuous nausea and vomiting for the past 2 days. He endorses chills, he denies subjective fevers. He denies shortness of breath, he denies chest pain. He denies urinary symptoms. He stated that this pain feels the same as his previous episodes of pancreatitis. He said he ran out of his Creon 1 month ago and has not had it refilled. He said he had a glass of wine several weeks ago to celebrate his anniversary, none since. In the ER: lipase 228, WBC 16.1. Appears dehydrated on CBC. UA w/ protein and ketones, not infectious. CT w/ questionable peripancreatic edema and either dilatation of the tip of the pancreatic duct in the tail vs a small residual collection. He was transferred from [facility] to [address] [place] [address] for admission to the Hospitalist service. Mr. Santo Goldstein improved with LR IVF and slow diet advancement. He endorses feeling nearly back to baseline on May/23 and is tolerating his GI soft diet with no N/V. Abd pain resolved, (+) bowel mvmt. He feels ready to return home.   He stated that he will follow up with his PCP, we recommend in the next

## 2023-05-24 ENCOUNTER — TELEPHONE (OUTPATIENT)
Dept: INTERNAL MEDICINE CLINIC | Facility: CLINIC | Age: 38
End: 2023-05-24

## 2023-05-25 ENCOUNTER — TELEPHONE (OUTPATIENT)
Dept: INTERNAL MEDICINE CLINIC | Facility: CLINIC | Age: 38
End: 2023-05-25

## 2023-05-25 DIAGNOSIS — K86.1 ACUTE ON CHRONIC PANCREATITIS (HCC): Primary | ICD-10-CM

## 2023-05-25 DIAGNOSIS — K85.90 ACUTE ON CHRONIC PANCREATITIS (HCC): Primary | ICD-10-CM

## 2023-05-25 RX ORDER — HYDROCODONE BITARTRATE AND ACETAMINOPHEN 10; 325 MG/1; MG/1
1 TABLET ORAL EVERY 8 HOURS PRN
Qty: 90 TABLET | Refills: 0 | Status: SHIPPED | OUTPATIENT
Start: 2023-05-25 | End: 2023-06-24

## 2023-05-25 NOTE — TELEPHONE ENCOUNTER
Regarding: Medication refill  Contact: [phone]  ----- Message from Razia Rosa sent at 5/25/2023  9:09 AM EDT -----       ----- Message from Yrn Onofre to Demetrius Galindo MD sent at 5/25/2023  8:42 AM -----   I need a refill on my 10mg Hydrocodone but it is not on my prescription list. I just got out of the hospital so I think the care team at [facility] may have removed it bc they gave me a 3 day supply of a stronger medicine bc I was having acute pancreatitis episode

## 2023-05-30 ENCOUNTER — OFFICE VISIT (OUTPATIENT)
Dept: INTERNAL MEDICINE CLINIC | Facility: CLINIC | Age: 38
End: 2023-05-30

## 2023-05-30 VITALS
BODY MASS INDEX: 28.97 KG/M2 | SYSTOLIC BLOOD PRESSURE: 108 MMHG | HEART RATE: 82 BPM | HEIGHT: 69 IN | WEIGHT: 195.6 LBS | DIASTOLIC BLOOD PRESSURE: 60 MMHG

## 2023-05-30 DIAGNOSIS — K85.90 ACUTE ON CHRONIC PANCREATITIS (HCC): ICD-10-CM

## 2023-05-30 DIAGNOSIS — Z09 HOSPITAL DISCHARGE FOLLOW-UP: Primary | ICD-10-CM

## 2023-05-30 DIAGNOSIS — K86.1 ACUTE ON CHRONIC PANCREATITIS (HCC): ICD-10-CM

## 2023-05-30 SDOH — ECONOMIC STABILITY: FOOD INSECURITY: WITHIN THE PAST 12 MONTHS, YOU WORRIED THAT YOUR FOOD WOULD RUN OUT BEFORE YOU GOT MONEY TO BUY MORE.: NEVER TRUE

## 2023-05-30 SDOH — ECONOMIC STABILITY: INCOME INSECURITY: HOW HARD IS IT FOR YOU TO PAY FOR THE VERY BASICS LIKE FOOD, HOUSING, MEDICAL CARE, AND HEATING?: NOT VERY HARD

## 2023-05-30 SDOH — ECONOMIC STABILITY: FOOD INSECURITY: WITHIN THE PAST 12 MONTHS, THE FOOD YOU BOUGHT JUST DIDN'T LAST AND YOU DIDN'T HAVE MONEY TO GET MORE.: NEVER TRUE

## 2023-05-30 SDOH — ECONOMIC STABILITY: TRANSPORTATION INSECURITY
IN THE PAST 12 MONTHS, HAS LACK OF TRANSPORTATION KEPT YOU FROM MEETINGS, WORK, OR FROM GETTING THINGS NEEDED FOR DAILY LIVING?: NO

## 2023-05-30 ASSESSMENT — PATIENT HEALTH QUESTIONNAIRE - PHQ9
SUM OF ALL RESPONSES TO PHQ QUESTIONS 1-9: 0
2. FEELING DOWN, DEPRESSED OR HOPELESS: 0
1. LITTLE INTEREST OR PLEASURE IN DOING THINGS: 0
SUM OF ALL RESPONSES TO PHQ9 QUESTIONS 1 & 2: 0
SUM OF ALL RESPONSES TO PHQ QUESTIONS 1-9: 0

## 2023-05-30 NOTE — PROGRESS NOTES
Post-Discharge Transitional Care  Follow Up      Cristi Aldridge   YOB: 1985    Date of Office Visit:  5/30/2023  Date of Hospital Admission: 5/21/23  Date of Hospital Discharge: 5/23/23  Risk of hospital readmission (high >=14%. Medium >=10%) :Readmission Risk Score: 18.7      Care management risk score Rising risk (score 2-5) and Complex Care (Scores >=6): No Risk Score On File     Non face to face  following discharge, date last encounter closed (first attempt may have been earlier): *No documented post hospital discharge outreach found in the last 14 days    Call initiated 2 business days of discharge: *No response recorded in the last 14 days    ASSESSMENT/PLAN:   Hospital discharge follow-up  -     [facility] DISCHARGE MEDS RECONCILED W/ CURRENT OUTPATIENT MED LIST  Acute on chronic pancreatitis ([facility]  -     hyoscyamine (LEVSIN/SL) 125 MCG sublingual tablet; Place 1 tablet under the tongue every 4 hours as needed for Cramping, Disp-60 tablet, R-0Normal    Medical Decision Making: straightforward  No follow-ups on file. On this date 5/30/2023 I have spent 20 minutes reviewing previous notes, test results and face to face with the patient discussing the diagnosis and importance of compliance with the treatment plan as well as documenting on the day of the visit. Subjective:   HPI:  Follow up of Hospital problems/diagnosis(es):  pancreatitis, patient having recurrence of pancreatitis, seen on May 21 for worsened pain, nausea, vomiting, CT scan done report below. Patient is back to his baseline, continue to experience pain after eating, he takes hydrocodone Tylenol 2 or 3 times a day, and is trying to maintain hydrated.   He has been making significant changes in diet, and is no longer drinking,  Reports visiting his smoking, and process of quitting  Taking his medications as prescribed, following with cardiology NSTEMI, tolerating the medications  CT Result (most recent):  CT ABDOMEN PELVIS W

## 2023-06-23 DIAGNOSIS — K86.1 ACUTE ON CHRONIC PANCREATITIS (HCC): ICD-10-CM

## 2023-06-23 DIAGNOSIS — K85.90 ACUTE ON CHRONIC PANCREATITIS (HCC): ICD-10-CM

## 2023-06-26 RX ORDER — HYDROCODONE BITARTRATE AND ACETAMINOPHEN 10; 325 MG/1; MG/1
1 TABLET ORAL EVERY 8 HOURS PRN
Qty: 90 TABLET | Refills: 0 | Status: ON HOLD | OUTPATIENT
Start: 2023-06-26 | End: 2023-07-14

## 2023-07-06 NOTE — PROGRESS NOTES
Ativan 0.25mg prn    am  11/9/2022 6:47 AM    Admit Date: 11/2/2022    Admit Diagnosis: STEMI (ST elevation myocardial infarction) ([facility] [I21.3]  ST elevation myocardial infarction (STEMI), unspecified artery ([facility] [I21.3]      Subjective:    Patient : Abd pseudocyst drainage scheduled for today. Abd pain worse today and visible mass in LUQ. No chest pain, SOB, syncope, palpitations. Objective:    /72   Pulse 77   Temp 98.9 °F (37.2 °C) (Temporal)   Resp 16   Ht 5' 9\" (1.753 m)   Wt 169 lb (76.7 kg)   SpO2 100%   BMI 24.96 kg/m²     ROS:  General ROS: negative for - chills  Hematological and Lymphatic ROS: negative for - blood clots or jaundice  Respiratory ROS: no cough, shortness of breath, or wheezing  Cardiovascular ROS: no chest pain or dyspnea on exertion  Gastrointestinal ROS: +abdominal pain, no change in bowel habits, or black or bloody stools  Neurological ROS: no TIA or stroke symptoms    Physical Exam:      Physical Examination: General appearance - Appearance: alert, well appearing, and in mild distress.    Neck/lymph - supple, no significant adenopathy  Chest/CV - clear to auscultation, no wheezes, rales or rhonchi, symmetric air entry  Heart - normal rate, regular rhythm, normal S1, S2, no murmurs, rubs, clicks or gallops  Abdomen/GI - palpable, tender mass LUQ   Musculoskeletal - no joint tenderness, deformity or swelling  Extremities - peripheral pulses normal, no pedal edema, no clubbing or cyanosis  Skin - normal coloration and turgor, no rashes, no suspicious skin lesions noted    Current Facility-Administered Medications   Medication Dose Route Frequency    eptifibatide (INTEGRILIN) 0.75 mg/mL infusion  1 mcg/kg/min IntraVENous Continuous    HYDROmorphone HCl PF (DILAUDID) injection 1.5 mg  1.5 mg IntraVENous Q4H PRN    [Held by provider] ticagrelor (BRILINTA) tablet 90 mg  90 mg Oral BID    LORazepam (ATIVAN) tablet 1 mg  1 mg Oral Q6H PRN    pantoprazole (PROTONIX) tablet 40 mg  40 mg Oral QAM AC oxyCODONE (ROXICODONE) immediate release tablet 5 mg  5 mg Oral Q4H PRN    sodium chloride flush 0.9 % injection 5-40 mL  5-40 mL IntraVENous 2 times per day    sodium chloride flush 0.9 % injection 5-40 mL  5-40 mL IntraVENous PRN    0.9 % sodium chloride infusion   IntraVENous PRN    ondansetron (ZOFRAN-ODT) disintegrating tablet 4 mg  4 mg Oral Q8H PRN    Or    ondansetron (ZOFRAN) injection 4 mg  4 mg IntraVENous Q6H PRN    acetaminophen (TYLENOL) tablet 650 mg  650 mg Oral Q6H PRN    Or    acetaminophen (TYLENOL) suppository 650 mg  650 mg Rectal Q6H PRN    polyethylene glycol (GLYCOLAX) packet 17 g  17 g Oral Daily PRN    aspirin chewable tablet 81 mg  81 mg Oral Daily    atorvastatin (LIPITOR) tablet 40 mg  40 mg Oral Nightly    metoprolol tartrate (LOPRESSOR) tablet 25 mg  25 mg Oral BID    lisinopril (PRINIVIL;ZESTRIL) tablet 5 mg  5 mg Oral Daily    nitroGLYCERIN (NITROSTAT) SL tablet 0.4 mg  0.4 mg SubLINGual Q5 Min PRN    colchicine (COLCRYS) tablet 0.6 mg  0.6 mg Oral Daily       Data Review: data included in this note has been independently reviewed by the author     TELEMETRY:     Assessment/Plan:     Patient Active Problem List   Diagnosis    Primary hypertension    Chronic pancreatitis ([facility]    Alcohol abuse    Attention deficit hyperactivity disorder (ADHD), combined type    Tobacco abuse [Z72.0 (ICD-10-CM)]    STEMI (ST elevation myocardial infarction) ([facility]     PLAN  CAD  STEMI s/p LHC and PCI  Now 7 days post-intervention. On Integrilin, ASA, Lipitor  Integrilin until procedure today, then back on Brilinta     Chronic pancreatitis and pseudocyst  GI plans intervention today 11/9     HTN  Continue meds, 120/72        Sylwia Ni  I have personally seen and evaluated the patient and reviewed the students note and agree with the following assessment and plan and findings. I was present for and observed the key components of this note.   Any appropriate additions or editing of the information have been done by me.     Vivi Green MD, [facility]  Cardiology

## 2023-07-13 ENCOUNTER — HOSPITAL ENCOUNTER (EMERGENCY)
Age: 38
Discharge: HOME | End: 2023-07-14
Attending: EMERGENCY MEDICINE

## 2023-07-13 ENCOUNTER — APPOINTMENT (OUTPATIENT)
Dept: CT IMAGING | Age: 38
End: 2023-07-13

## 2023-07-13 DIAGNOSIS — K86.0 ALCOHOL-INDUCED CHRONIC PANCREATITIS (HCC): ICD-10-CM

## 2023-07-13 DIAGNOSIS — R10.13 ABDOMINAL PAIN, EPIGASTRIC: Primary | ICD-10-CM

## 2023-07-13 LAB
ALBUMIN SERPL-MCNC: 4.8 G/DL (ref 3.5–5)
ALBUMIN/GLOB SERPL: 1.8 (ref 0.4–1.6)
ALP SERPL-CCNC: 69 U/L (ref 45–117)
ALT SERPL-CCNC: 57 U/L (ref 13–61)
ANION GAP SERPL CALC-SCNC: 15 MMOL/L (ref 2–11)
AST SERPL-CCNC: 47 U/L (ref 15–37)
BASOPHILS # BLD: 0 K/UL (ref 0–0.2)
BASOPHILS NFR BLD: 0 % (ref 0–2)
BILIRUB SERPL-MCNC: 0.9 MG/DL (ref 0.2–1.1)
BUN SERPL-MCNC: 12 MG/DL (ref 6–23)
CALCIUM SERPL-MCNC: 11.5 MG/DL (ref 8.3–10.4)
CHLORIDE SERPL-SCNC: 100 MMOL/L (ref 98–107)
CO2 SERPL-SCNC: 22 MMOL/L (ref 21–32)
CREAT SERPL-MCNC: 0.99 MG/DL (ref 0.8–1.5)
DIFFERENTIAL METHOD BLD: ABNORMAL
EOSINOPHIL # BLD: 0.1 K/UL (ref 0–0.8)
EOSINOPHIL NFR BLD: 1 % (ref 0.5–7.8)
ERYTHROCYTE [DISTWIDTH] IN BLOOD BY AUTOMATED COUNT: 14.2 % (ref 11.9–14.6)
GLOBULIN SER CALC-MCNC: 2.7 G/DL (ref 2.8–4.5)
GLUCOSE SERPL-MCNC: 99 MG/DL (ref 65–100)
HCT VFR BLD AUTO: 49.3 % (ref 41.1–50.3)
HGB BLD-MCNC: 16.5 G/DL (ref 13.6–17.2)
IMM GRANULOCYTES # BLD AUTO: 0 K/UL (ref 0–0.5)
IMM GRANULOCYTES NFR BLD AUTO: 0 % (ref 0–5)
LIPASE SERPL-CCNC: 148 U/L (ref 13–60)
LYMPHOCYTES # BLD: 1.8 K/UL (ref 0.5–4.6)
LYMPHOCYTES NFR BLD: 15 % (ref 13–44)
MCH RBC QN AUTO: 27.6 PG (ref 26.1–32.9)
MCHC RBC AUTO-ENTMCNC: 33.5 G/DL (ref 31.4–35)
MCV RBC AUTO: 82.6 FL (ref 82–102)
MONOCYTES # BLD: 1 K/UL (ref 0.1–1.3)
MONOCYTES NFR BLD: 8 % (ref 4–12)
NEUTS SEG # BLD: 8.8 K/UL (ref 1.7–8.2)
NEUTS SEG NFR BLD: 75 % (ref 43–78)
NRBC # BLD: 0 K/UL (ref 0–0.2)
PLATELET # BLD AUTO: 292 K/UL (ref 150–450)
PMV BLD AUTO: 9.2 FL (ref 9.4–12.3)
POTASSIUM SERPL-SCNC: 3.8 MMOL/L (ref 3.5–5.1)
PROT SERPL-MCNC: 7.5 G/DL (ref 6.4–8.2)
RBC # BLD AUTO: 5.97 M/UL (ref 4.23–5.6)
SODIUM SERPL-SCNC: 137 MMOL/L (ref 133–143)
TROPONIN T SERPL HS-MCNC: 18.9 NG/L (ref 0–22)
WBC # BLD AUTO: 11.7 K/UL (ref 4.3–11.1)

## 2023-07-13 PROCEDURE — 83690 ASSAY OF LIPASE: CPT

## 2023-07-13 PROCEDURE — 80053 COMPREHEN METABOLIC PANEL: CPT

## 2023-07-13 PROCEDURE — 99285 EMERGENCY DEPT VISIT HI MDM: CPT

## 2023-07-13 PROCEDURE — 74177 CT ABD & PELVIS W/CONTRAST: CPT

## 2023-07-13 PROCEDURE — 6360000002 HC RX W HCPCS: Performed by: EMERGENCY MEDICINE

## 2023-07-13 PROCEDURE — 6360000004 HC RX CONTRAST MEDICATION: Performed by: EMERGENCY MEDICINE

## 2023-07-13 PROCEDURE — 93005 ELECTROCARDIOGRAM TRACING: CPT | Performed by: EMERGENCY MEDICINE

## 2023-07-13 PROCEDURE — 96374 THER/PROPH/DIAG INJ IV PUSH: CPT

## 2023-07-13 PROCEDURE — 2580000003 HC RX 258: Performed by: EMERGENCY MEDICINE

## 2023-07-13 PROCEDURE — 96375 TX/PRO/DX INJ NEW DRUG ADDON: CPT

## 2023-07-13 PROCEDURE — 84484 ASSAY OF TROPONIN QUANT: CPT

## 2023-07-13 PROCEDURE — 85025 COMPLETE CBC W/AUTO DIFF WBC: CPT

## 2023-07-13 PROCEDURE — 81001 URINALYSIS AUTO W/SCOPE: CPT

## 2023-07-13 PROCEDURE — 96376 TX/PRO/DX INJ SAME DRUG ADON: CPT

## 2023-07-13 RX ORDER — HYDROMORPHONE HYDROCHLORIDE 1 MG/ML
1 INJECTION, SOLUTION INTRAMUSCULAR; INTRAVENOUS; SUBCUTANEOUS ONCE
Status: COMPLETED | OUTPATIENT
Start: 2023-07-14 | End: 2023-07-13

## 2023-07-13 RX ORDER — ONDANSETRON 2 MG/ML
8 INJECTION INTRAMUSCULAR; INTRAVENOUS
Status: COMPLETED | OUTPATIENT
Start: 2023-07-13 | End: 2023-07-13

## 2023-07-13 RX ORDER — SODIUM CHLORIDE 0.9 % (FLUSH) 0.9 %
10 SYRINGE (ML) INJECTION
Status: DISCONTINUED | OUTPATIENT
Start: 2023-07-13 | End: 2023-07-14 | Stop reason: HOSPADM

## 2023-07-13 RX ORDER — 0.9 % SODIUM CHLORIDE 0.9 %
1000 INTRAVENOUS SOLUTION INTRAVENOUS
Status: COMPLETED | OUTPATIENT
Start: 2023-07-13 | End: 2023-07-14

## 2023-07-13 RX ORDER — HYDROMORPHONE HYDROCHLORIDE 1 MG/ML
1 INJECTION, SOLUTION INTRAMUSCULAR; INTRAVENOUS; SUBCUTANEOUS
Status: COMPLETED | OUTPATIENT
Start: 2023-07-13 | End: 2023-07-13

## 2023-07-13 RX ORDER — 0.9 % SODIUM CHLORIDE 0.9 %
100 INTRAVENOUS SOLUTION INTRAVENOUS
Status: DISCONTINUED | OUTPATIENT
Start: 2023-07-13 | End: 2023-07-14 | Stop reason: HOSPADM

## 2023-07-13 RX ADMIN — HYDROMORPHONE HYDROCHLORIDE 1 MG: 1 INJECTION, SOLUTION INTRAMUSCULAR; INTRAVENOUS; SUBCUTANEOUS at 23:46

## 2023-07-13 RX ADMIN — IOPAMIDOL 100 ML: 755 INJECTION, SOLUTION INTRAVENOUS at 23:20

## 2023-07-13 RX ADMIN — SODIUM CHLORIDE 1000 ML: 9 INJECTION, SOLUTION INTRAVENOUS at 22:57

## 2023-07-13 RX ADMIN — ONDANSETRON 8 MG: 2 INJECTION INTRAMUSCULAR; INTRAVENOUS at 22:59

## 2023-07-13 RX ADMIN — HYDROMORPHONE HYDROCHLORIDE 1 MG: 1 INJECTION, SOLUTION INTRAMUSCULAR; INTRAVENOUS; SUBCUTANEOUS at 22:58

## 2023-07-13 ASSESSMENT — LIFESTYLE VARIABLES
HOW MANY STANDARD DRINKS CONTAINING ALCOHOL DO YOU HAVE ON A TYPICAL DAY: PATIENT DOES NOT DRINK
HOW OFTEN DO YOU HAVE A DRINK CONTAINING ALCOHOL: NEVER

## 2023-07-13 ASSESSMENT — ENCOUNTER SYMPTOMS
ABDOMINAL PAIN: 1
COLOR CHANGE: 0
CONSTIPATION: 0
SHORTNESS OF BREATH: 0
BACK PAIN: 1
COUGH: 0
VOMITING: 1
DIARRHEA: 0
NAUSEA: 1

## 2023-07-13 ASSESSMENT — PAIN SCALES - GENERAL
PAINLEVEL_OUTOF10: 9
PAINLEVEL_OUTOF10: 7

## 2023-07-13 ASSESSMENT — PAIN DESCRIPTION - LOCATION: LOCATION: ABDOMEN

## 2023-07-13 ASSESSMENT — PAIN - FUNCTIONAL ASSESSMENT: PAIN_FUNCTIONAL_ASSESSMENT: 0-10

## 2023-07-13 ASSESSMENT — PAIN DESCRIPTION - ORIENTATION: ORIENTATION: UPPER

## 2023-07-14 ENCOUNTER — HOSPITAL ENCOUNTER (INPATIENT)
Age: 38
LOS: 3 days | Discharge: HOME | DRG: 440 | End: 2023-07-17
Attending: FAMILY MEDICINE | Admitting: FAMILY MEDICINE

## 2023-07-14 ENCOUNTER — HOSPITAL ENCOUNTER (EMERGENCY)
Age: 38
Discharge: ANOTHER HOSPITAL | End: 2023-07-14
Attending: EMERGENCY MEDICINE

## 2023-07-14 VITALS
SYSTOLIC BLOOD PRESSURE: 174 MMHG | OXYGEN SATURATION: 97 % | HEART RATE: 81 BPM | BODY MASS INDEX: 27.2 KG/M2 | DIASTOLIC BLOOD PRESSURE: 110 MMHG | TEMPERATURE: 98.1 F | HEIGHT: 70 IN | WEIGHT: 190 LBS | RESPIRATION RATE: 15 BRPM

## 2023-07-14 VITALS
BODY MASS INDEX: 27.2 KG/M2 | SYSTOLIC BLOOD PRESSURE: 171 MMHG | WEIGHT: 190 LBS | HEART RATE: 92 BPM | DIASTOLIC BLOOD PRESSURE: 100 MMHG | TEMPERATURE: 97.8 F | OXYGEN SATURATION: 98 % | RESPIRATION RATE: 18 BRPM | HEIGHT: 70 IN

## 2023-07-14 DIAGNOSIS — K86.1 ACUTE ON CHRONIC PANCREATITIS (HCC): Primary | ICD-10-CM

## 2023-07-14 DIAGNOSIS — K86.1 ACUTE ON CHRONIC PANCREATITIS (HCC): ICD-10-CM

## 2023-07-14 DIAGNOSIS — R11.2 INTRACTABLE NAUSEA AND VOMITING: Primary | ICD-10-CM

## 2023-07-14 DIAGNOSIS — K85.90 ACUTE ON CHRONIC PANCREATITIS (HCC): Primary | ICD-10-CM

## 2023-07-14 DIAGNOSIS — K85.90 ACUTE ON CHRONIC PANCREATITIS (HCC): ICD-10-CM

## 2023-07-14 LAB
ALBUMIN SERPL-MCNC: 5.3 G/DL (ref 3.5–5)
ALBUMIN/GLOB SERPL: 1.8 (ref 0.4–1.6)
ALP SERPL-CCNC: 76 U/L (ref 45–117)
ALT SERPL-CCNC: 59 U/L (ref 13–61)
AMORPH CRY URNS QL MICRO: ABNORMAL
AMPHET UR QL SCN: NEGATIVE
ANION GAP SERPL CALC-SCNC: 15 MMOL/L (ref 2–11)
APPEARANCE UR: ABNORMAL
APPEARANCE UR: CLEAR
AST SERPL-CCNC: 47 U/L (ref 15–37)
BACTERIA URNS QL MICRO: ABNORMAL /HPF
BACTERIA URNS QL MICRO: NEGATIVE /HPF
BARBITURATES UR QL SCN: NEGATIVE
BASOPHILS # BLD: 0 K/UL (ref 0–0.2)
BASOPHILS NFR BLD: 0 % (ref 0–2)
BENZODIAZ UR QL: NEGATIVE
BILIRUB SERPL-MCNC: 1 MG/DL (ref 0.2–1.1)
BILIRUB UR QL: NEGATIVE
BILIRUB UR QL: NEGATIVE
BUN SERPL-MCNC: 11 MG/DL (ref 6–23)
CALCIUM SERPL-MCNC: 11.1 MG/DL (ref 8.3–10.4)
CANNABINOIDS UR QL SCN: NEGATIVE
CASTS URNS QL MICRO: 0 /LPF
CASTS URNS QL MICRO: ABNORMAL /LPF
CHLORIDE SERPL-SCNC: 102 MMOL/L (ref 98–107)
CO2 SERPL-SCNC: 22 MMOL/L (ref 21–32)
COCAINE UR QL SCN: NEGATIVE
COLOR UR: ABNORMAL
COLOR UR: YELLOW
CREAT SERPL-MCNC: 1.1 MG/DL (ref 0.8–1.5)
CRYSTALS URNS QL MICRO: 0 /LPF
DIFFERENTIAL METHOD BLD: ABNORMAL
EKG ATRIAL RATE: 64 BPM
EKG DIAGNOSIS: NORMAL
EKG P AXIS: 56 DEGREES
EKG P-R INTERVAL: 161 MS
EKG Q-T INTERVAL: 379 MS
EKG QRS DURATION: 115 MS
EKG QTC CALCULATION (BAZETT): 397 MS
EKG R AXIS: 36 DEGREES
EKG T AXIS: 59 DEGREES
EKG VENTRICULAR RATE: 66 BPM
EOSINOPHIL # BLD: 0 K/UL (ref 0–0.8)
EOSINOPHIL NFR BLD: 0 % (ref 0.5–7.8)
EPI CELLS #/AREA URNS HPF: ABNORMAL /HPF
EPI CELLS #/AREA URNS HPF: ABNORMAL /HPF
ERYTHROCYTE [DISTWIDTH] IN BLOOD BY AUTOMATED COUNT: 14.6 % (ref 11.9–14.6)
ETHANOL SERPL-MCNC: <10 MG/DL (ref 0–0.08)
GLOBULIN SER CALC-MCNC: 3 G/DL (ref 2.8–4.5)
GLUCOSE SERPL-MCNC: 134 MG/DL (ref 65–100)
GLUCOSE UR STRIP.AUTO-MCNC: NEGATIVE MG/DL
GLUCOSE UR STRIP.AUTO-MCNC: NEGATIVE MG/DL
HCT VFR BLD AUTO: 51.1 % (ref 41.1–50.3)
HGB BLD-MCNC: 17.5 G/DL (ref 13.6–17.2)
HGB UR QL STRIP: ABNORMAL
HGB UR QL STRIP: ABNORMAL
IMM GRANULOCYTES # BLD AUTO: 0.1 K/UL (ref 0–0.5)
IMM GRANULOCYTES NFR BLD AUTO: 0 % (ref 0–5)
KETONES UR QL STRIP.AUTO: 15 MG/DL
KETONES UR QL STRIP.AUTO: 15 MG/DL
LACTATE SERPL-SCNC: 1.2 MMOL/L (ref 0.4–2)
LEUKOCYTE ESTERASE UR QL STRIP.AUTO: NEGATIVE
LEUKOCYTE ESTERASE UR QL STRIP.AUTO: NEGATIVE
LIPASE SERPL-CCNC: 575 U/L (ref 13–60)
LYMPHOCYTES # BLD: 1.7 K/UL (ref 0.5–4.6)
LYMPHOCYTES NFR BLD: 12 % (ref 13–44)
MCH RBC QN AUTO: 28.2 PG (ref 26.1–32.9)
MCHC RBC AUTO-ENTMCNC: 34.2 G/DL (ref 31.4–35)
MCV RBC AUTO: 82.4 FL (ref 82–102)
METHADONE UR QL: NEGATIVE
MONOCYTES # BLD: 0.9 K/UL (ref 0.1–1.3)
MONOCYTES NFR BLD: 6 % (ref 4–12)
MUCOUS THREADS URNS QL MICRO: 0 /LPF
MUCOUS THREADS URNS QL MICRO: 0 /LPF
NEUTS SEG # BLD: 11.7 K/UL (ref 1.7–8.2)
NEUTS SEG NFR BLD: 81 % (ref 43–78)
NITRITE UR QL STRIP.AUTO: NEGATIVE
NITRITE UR QL STRIP.AUTO: NEGATIVE
NRBC # BLD: 0 K/UL (ref 0–0.2)
OPIATES UR QL: NEGATIVE
OTHER OBSERVATIONS: ABNORMAL
PCP UR QL: NEGATIVE
PH UR STRIP: 7 (ref 5–9)
PH UR STRIP: 7.5 (ref 5–9)
PLATELET # BLD AUTO: 368 K/UL (ref 150–450)
PMV BLD AUTO: 9.7 FL (ref 9.4–12.3)
POTASSIUM SERPL-SCNC: 4.1 MMOL/L (ref 3.5–5.1)
PROCALCITONIN SERPL-MCNC: <0.05 NG/ML (ref 0–0.49)
PROT SERPL-MCNC: 8.3 G/DL (ref 6.4–8.2)
PROT UR STRIP-MCNC: 300 MG/DL
PROT UR STRIP-MCNC: NEGATIVE MG/DL
RBC # BLD AUTO: 6.2 M/UL (ref 4.23–5.6)
RBC #/AREA URNS HPF: ABNORMAL /HPF
RBC #/AREA URNS HPF: ABNORMAL /HPF
SODIUM SERPL-SCNC: 139 MMOL/L (ref 133–143)
SP GR UR REFRACTOMETRY: 1.01 (ref 1–1.02)
SP GR UR REFRACTOMETRY: 1.02 (ref 1–1.02)
URINE CULTURE IF INDICATED: ABNORMAL
UROBILINOGEN UR QL STRIP.AUTO: 0.2 EU/DL (ref 0.2–1)
UROBILINOGEN UR QL STRIP.AUTO: 0.2 EU/DL (ref 0.2–1)
WBC # BLD AUTO: 14.4 K/UL (ref 4.3–11.1)
WBC URNS QL MICRO: ABNORMAL /HPF
WBC URNS QL MICRO: ABNORMAL /HPF

## 2023-07-14 PROCEDURE — 83605 ASSAY OF LACTIC ACID: CPT

## 2023-07-14 PROCEDURE — 2580000003 HC RX 258: Performed by: FAMILY MEDICINE

## 2023-07-14 PROCEDURE — 96361 HYDRATE IV INFUSION ADD-ON: CPT

## 2023-07-14 PROCEDURE — 96376 TX/PRO/DX INJ SAME DRUG ADON: CPT

## 2023-07-14 PROCEDURE — 87040 BLOOD CULTURE FOR BACTERIA: CPT

## 2023-07-14 PROCEDURE — 80307 DRUG TEST PRSMV CHEM ANLYZR: CPT

## 2023-07-14 PROCEDURE — 83690 ASSAY OF LIPASE: CPT

## 2023-07-14 PROCEDURE — 81001 URINALYSIS AUTO W/SCOPE: CPT

## 2023-07-14 PROCEDURE — 85025 COMPLETE CBC W/AUTO DIFF WBC: CPT

## 2023-07-14 PROCEDURE — 82077 ASSAY SPEC XCP UR&BREATH IA: CPT

## 2023-07-14 PROCEDURE — 36415 COLL VENOUS BLD VENIPUNCTURE: CPT

## 2023-07-14 PROCEDURE — 93010 ELECTROCARDIOGRAM REPORT: CPT | Performed by: INTERNAL MEDICINE

## 2023-07-14 PROCEDURE — 6370000000 HC RX 637 (ALT 250 FOR IP): Performed by: FAMILY MEDICINE

## 2023-07-14 PROCEDURE — 84145 PROCALCITONIN (PCT): CPT

## 2023-07-14 PROCEDURE — 99285 EMERGENCY DEPT VISIT HI MDM: CPT

## 2023-07-14 PROCEDURE — 96375 TX/PRO/DX INJ NEW DRUG ADDON: CPT

## 2023-07-14 PROCEDURE — 6360000002 HC RX W HCPCS: Performed by: FAMILY MEDICINE

## 2023-07-14 PROCEDURE — 6360000002 HC RX W HCPCS

## 2023-07-14 PROCEDURE — 6360000002 HC RX W HCPCS: Performed by: HOSPITALIST

## 2023-07-14 PROCEDURE — 6360000002 HC RX W HCPCS: Performed by: EMERGENCY MEDICINE

## 2023-07-14 PROCEDURE — 96374 THER/PROPH/DIAG INJ IV PUSH: CPT

## 2023-07-14 PROCEDURE — 80053 COMPREHEN METABOLIC PANEL: CPT

## 2023-07-14 PROCEDURE — 1100000000 HC RM PRIVATE

## 2023-07-14 PROCEDURE — 2580000003 HC RX 258: Performed by: EMERGENCY MEDICINE

## 2023-07-14 RX ORDER — SODIUM CHLORIDE, SODIUM LACTATE, POTASSIUM CHLORIDE, CALCIUM CHLORIDE 600; 310; 30; 20 MG/100ML; MG/100ML; MG/100ML; MG/100ML
INJECTION, SOLUTION INTRAVENOUS CONTINUOUS
Status: ACTIVE | OUTPATIENT
Start: 2023-07-14 | End: 2023-07-15

## 2023-07-14 RX ORDER — CALCIUM CARBONATE 500 MG/1
750 TABLET, CHEWABLE ORAL ONCE
Status: COMPLETED | OUTPATIENT
Start: 2023-07-14 | End: 2023-07-14

## 2023-07-14 RX ORDER — DROPERIDOL 2.5 MG/ML
1.25 INJECTION, SOLUTION INTRAMUSCULAR; INTRAVENOUS ONCE
Status: COMPLETED | OUTPATIENT
Start: 2023-07-14 | End: 2023-07-14

## 2023-07-14 RX ORDER — NALOXONE HYDROCHLORIDE 4 MG/.1ML
1 SPRAY NASAL PRN
Qty: 2 EACH | Refills: 0 | Status: ON HOLD | OUTPATIENT
Start: 2023-07-14 | End: 2023-07-14

## 2023-07-14 RX ORDER — OXYCODONE HYDROCHLORIDE 5 MG/1
10 TABLET ORAL PRN
Status: COMPLETED | OUTPATIENT
Start: 2023-07-14 | End: 2023-07-14

## 2023-07-14 RX ORDER — ONDANSETRON 4 MG/1
4 TABLET, ORALLY DISINTEGRATING ORAL EVERY 8 HOURS PRN
Status: DISCONTINUED | OUTPATIENT
Start: 2023-07-14 | End: 2023-07-17 | Stop reason: HOSPADM

## 2023-07-14 RX ORDER — HYDROMORPHONE HYDROCHLORIDE 1 MG/ML
1 INJECTION, SOLUTION INTRAMUSCULAR; INTRAVENOUS; SUBCUTANEOUS ONCE
Status: COMPLETED | OUTPATIENT
Start: 2023-07-14 | End: 2023-07-14

## 2023-07-14 RX ORDER — HYDROMORPHONE HYDROCHLORIDE 1 MG/ML
1 INJECTION, SOLUTION INTRAMUSCULAR; INTRAVENOUS; SUBCUTANEOUS
Status: COMPLETED | OUTPATIENT
Start: 2023-07-14 | End: 2023-07-14

## 2023-07-14 RX ORDER — 0.9 % SODIUM CHLORIDE 0.9 %
1000 INTRAVENOUS SOLUTION INTRAVENOUS ONCE
Status: COMPLETED | OUTPATIENT
Start: 2023-07-14 | End: 2023-07-14

## 2023-07-14 RX ORDER — HYDROMORPHONE HYDROCHLORIDE 1 MG/ML
0.5 INJECTION, SOLUTION INTRAMUSCULAR; INTRAVENOUS; SUBCUTANEOUS
Status: DISCONTINUED | OUTPATIENT
Start: 2023-07-14 | End: 2023-07-17

## 2023-07-14 RX ORDER — SODIUM CHLORIDE 0.9 % (FLUSH) 0.9 %
5-40 SYRINGE (ML) INJECTION PRN
Status: DISCONTINUED | OUTPATIENT
Start: 2023-07-14 | End: 2023-07-17 | Stop reason: HOSPADM

## 2023-07-14 RX ORDER — ONDANSETRON 4 MG/1
4 TABLET, ORALLY DISINTEGRATING ORAL 3 TIMES DAILY PRN
Qty: 21 TABLET | Refills: 0 | Status: ON HOLD | OUTPATIENT
Start: 2023-07-14 | End: 2023-07-14

## 2023-07-14 RX ORDER — ONDANSETRON 2 MG/ML
4 INJECTION INTRAMUSCULAR; INTRAVENOUS EVERY 6 HOURS PRN
Status: DISCONTINUED | OUTPATIENT
Start: 2023-07-14 | End: 2023-07-17 | Stop reason: HOSPADM

## 2023-07-14 RX ORDER — HYDROMORPHONE HYDROCHLORIDE 1 MG/ML
1 INJECTION, SOLUTION INTRAMUSCULAR; INTRAVENOUS; SUBCUTANEOUS
Status: DISCONTINUED | OUTPATIENT
Start: 2023-07-14 | End: 2023-07-17

## 2023-07-14 RX ORDER — ENOXAPARIN SODIUM 100 MG/ML
40 INJECTION SUBCUTANEOUS EVERY 24 HOURS
Status: DISCONTINUED | OUTPATIENT
Start: 2023-07-14 | End: 2023-07-17 | Stop reason: HOSPADM

## 2023-07-14 RX ORDER — LABETALOL HYDROCHLORIDE 5 MG/ML
10 INJECTION, SOLUTION INTRAVENOUS EVERY 4 HOURS PRN
Status: DISCONTINUED | OUTPATIENT
Start: 2023-07-14 | End: 2023-07-17 | Stop reason: HOSPADM

## 2023-07-14 RX ORDER — AMLODIPINE BESYLATE 5 MG/1
5 TABLET ORAL DAILY
Status: DISCONTINUED | OUTPATIENT
Start: 2023-07-14 | End: 2023-07-17 | Stop reason: HOSPADM

## 2023-07-14 RX ORDER — SODIUM CHLORIDE, SODIUM LACTATE, POTASSIUM CHLORIDE, AND CALCIUM CHLORIDE .6; .31; .03; .02 G/100ML; G/100ML; G/100ML; G/100ML
500 INJECTION, SOLUTION INTRAVENOUS ONCE
Status: COMPLETED | OUTPATIENT
Start: 2023-07-14 | End: 2023-07-14

## 2023-07-14 RX ORDER — SODIUM CHLORIDE 0.9 % (FLUSH) 0.9 %
5-40 SYRINGE (ML) INJECTION EVERY 12 HOURS SCHEDULED
Status: DISCONTINUED | OUTPATIENT
Start: 2023-07-14 | End: 2023-07-17 | Stop reason: HOSPADM

## 2023-07-14 RX ORDER — OXYCODONE HYDROCHLORIDE 5 MG/1
5 TABLET ORAL EVERY 8 HOURS PRN
Qty: 9 TABLET | Refills: 0 | Status: ON HOLD | OUTPATIENT
Start: 2023-07-14 | End: 2023-07-14

## 2023-07-14 RX ORDER — FENTANYL 12 UG/H
1 PATCH TRANSDERMAL
Status: DISCONTINUED | OUTPATIENT
Start: 2023-07-14 | End: 2023-07-15

## 2023-07-14 RX ORDER — SODIUM CHLORIDE 9 MG/ML
INJECTION, SOLUTION INTRAVENOUS PRN
Status: DISCONTINUED | OUTPATIENT
Start: 2023-07-14 | End: 2023-07-17 | Stop reason: HOSPADM

## 2023-07-14 RX ORDER — SODIUM CHLORIDE, SODIUM LACTATE, POTASSIUM CHLORIDE, CALCIUM CHLORIDE 600; 310; 30; 20 MG/100ML; MG/100ML; MG/100ML; MG/100ML
INJECTION, SOLUTION INTRAVENOUS CONTINUOUS
Status: ACTIVE | OUTPATIENT
Start: 2023-07-14 | End: 2023-07-14

## 2023-07-14 RX ORDER — OXYCODONE HYDROCHLORIDE 5 MG/1
5 TABLET ORAL PRN
Status: COMPLETED | OUTPATIENT
Start: 2023-07-14 | End: 2023-07-14

## 2023-07-14 RX ADMIN — LABETALOL HYDROCHLORIDE 10 MG: 5 INJECTION INTRAVENOUS at 13:04

## 2023-07-14 RX ADMIN — ENOXAPARIN SODIUM 40 MG: 100 INJECTION SUBCUTANEOUS at 09:23

## 2023-07-14 RX ADMIN — OXYCODONE HYDROCHLORIDE 10 MG: 5 TABLET ORAL at 09:30

## 2023-07-14 RX ADMIN — ONDANSETRON 4 MG: 4 TABLET, ORALLY DISINTEGRATING ORAL at 16:02

## 2023-07-14 RX ADMIN — HYDROMORPHONE HYDROCHLORIDE 1 MG: 1 INJECTION, SOLUTION INTRAMUSCULAR; INTRAVENOUS; SUBCUTANEOUS at 05:53

## 2023-07-14 RX ADMIN — SODIUM CHLORIDE, PRESERVATIVE FREE 10 ML: 5 INJECTION INTRAVENOUS at 09:24

## 2023-07-14 RX ADMIN — SODIUM CHLORIDE, PRESERVATIVE FREE 10 ML: 5 INJECTION INTRAVENOUS at 20:08

## 2023-07-14 RX ADMIN — SODIUM CHLORIDE 1000 ML: 9 INJECTION, SOLUTION INTRAVENOUS at 05:37

## 2023-07-14 RX ADMIN — HYDROMORPHONE HYDROCHLORIDE 1 MG: 1 INJECTION, SOLUTION INTRAMUSCULAR; INTRAVENOUS; SUBCUTANEOUS at 07:23

## 2023-07-14 RX ADMIN — HYDROMORPHONE HYDROCHLORIDE 0.5 MG: 1 INJECTION, SOLUTION INTRAMUSCULAR; INTRAVENOUS; SUBCUTANEOUS at 23:20

## 2023-07-14 RX ADMIN — HYDROMORPHONE HYDROCHLORIDE 1 MG: 1 INJECTION, SOLUTION INTRAMUSCULAR; INTRAVENOUS; SUBCUTANEOUS at 06:34

## 2023-07-14 RX ADMIN — AMLODIPINE BESYLATE 5 MG: 5 TABLET ORAL at 09:24

## 2023-07-14 RX ADMIN — ONDANSETRON 4 MG: 4 TABLET, ORALLY DISINTEGRATING ORAL at 09:23

## 2023-07-14 RX ADMIN — HYDROMORPHONE HYDROCHLORIDE 1 MG: 1 INJECTION, SOLUTION INTRAMUSCULAR; INTRAVENOUS; SUBCUTANEOUS at 11:23

## 2023-07-14 RX ADMIN — METOPROLOL TARTRATE 25 MG: 25 TABLET, FILM COATED ORAL at 09:23

## 2023-07-14 RX ADMIN — HYDROMORPHONE HYDROCHLORIDE 1 MG: 1 INJECTION, SOLUTION INTRAMUSCULAR; INTRAVENOUS; SUBCUTANEOUS at 16:02

## 2023-07-14 RX ADMIN — HYDROMORPHONE HYDROCHLORIDE 1 MG: 1 INJECTION, SOLUTION INTRAMUSCULAR; INTRAVENOUS; SUBCUTANEOUS at 13:20

## 2023-07-14 RX ADMIN — HYDROMORPHONE HYDROCHLORIDE 1 MG: 1 INJECTION, SOLUTION INTRAMUSCULAR; INTRAVENOUS; SUBCUTANEOUS at 19:56

## 2023-07-14 RX ADMIN — SODIUM CHLORIDE, POTASSIUM CHLORIDE, SODIUM LACTATE AND CALCIUM CHLORIDE 500 ML: 600; 310; 30; 20 INJECTION, SOLUTION INTRAVENOUS at 09:23

## 2023-07-14 RX ADMIN — ANTACID TABLETS 750 MG: 500 TABLET, CHEWABLE ORAL at 20:06

## 2023-07-14 RX ADMIN — SODIUM CHLORIDE, POTASSIUM CHLORIDE, SODIUM LACTATE AND CALCIUM CHLORIDE: 600; 310; 30; 20 INJECTION, SOLUTION INTRAVENOUS at 15:10

## 2023-07-14 RX ADMIN — HYDROMORPHONE HYDROCHLORIDE 1 MG: 1 INJECTION, SOLUTION INTRAMUSCULAR; INTRAVENOUS; SUBCUTANEOUS at 08:58

## 2023-07-14 RX ADMIN — METOPROLOL TARTRATE 25 MG: 25 TABLET, FILM COATED ORAL at 20:06

## 2023-07-14 RX ADMIN — DROPERIDOL 1.25 MG: 2.5 INJECTION, SOLUTION INTRAMUSCULAR; INTRAVENOUS at 05:41

## 2023-07-14 ASSESSMENT — PAIN DESCRIPTION - ORIENTATION
ORIENTATION: ANTERIOR
ORIENTATION: MID;RIGHT
ORIENTATION: RIGHT
ORIENTATION: ANTERIOR
ORIENTATION: RIGHT;MID

## 2023-07-14 ASSESSMENT — PAIN DESCRIPTION - DESCRIPTORS
DESCRIPTORS: SHARP;SHOOTING
DESCRIPTORS: ACHING;STABBING
DESCRIPTORS: ACHING
DESCRIPTORS: ACHING
DESCRIPTORS: ACHING;STABBING
DESCRIPTORS: SHARP;STABBING
DESCRIPTORS: SHARP

## 2023-07-14 ASSESSMENT — PAIN SCALES - GENERAL
PAINLEVEL_OUTOF10: 10
PAINLEVEL_OUTOF10: 6
PAINLEVEL_OUTOF10: 10
PAINLEVEL_OUTOF10: 10
PAINLEVEL_OUTOF10: 9
PAINLEVEL_OUTOF10: 10
PAINLEVEL_OUTOF10: 9
PAINLEVEL_OUTOF10: 10

## 2023-07-14 ASSESSMENT — PAIN DESCRIPTION - LOCATION
LOCATION: ABDOMEN;BACK
LOCATION: ABDOMEN

## 2023-07-14 ASSESSMENT — PAIN DESCRIPTION - ONSET: ONSET: AWAKENED FROM SLEEP

## 2023-07-14 ASSESSMENT — PAIN DESCRIPTION - PAIN TYPE: TYPE: ACUTE PAIN

## 2023-07-14 ASSESSMENT — PAIN - FUNCTIONAL ASSESSMENT
PAIN_FUNCTIONAL_ASSESSMENT: 0-10
PAIN_FUNCTIONAL_ASSESSMENT: PREVENTS OR INTERFERES SOME ACTIVE ACTIVITIES AND ADLS
PAIN_FUNCTIONAL_ASSESSMENT: ACTIVITIES ARE NOT PREVENTED
PAIN_FUNCTIONAL_ASSESSMENT: PREVENTS OR INTERFERES SOME ACTIVE ACTIVITIES AND ADLS
PAIN_FUNCTIONAL_ASSESSMENT: ACTIVITIES ARE NOT PREVENTED
PAIN_FUNCTIONAL_ASSESSMENT: PREVENTS OR INTERFERES SOME ACTIVE ACTIVITIES AND ADLS

## 2023-07-14 ASSESSMENT — PAIN DESCRIPTION - FREQUENCY: FREQUENCY: CONTINUOUS

## 2023-07-14 NOTE — ED PROVIDER NOTES
Emergency Department Provider Signout / Continuation of Care Note         DISPOSITION Decision To Discharge 07/14/2023 12:11:39 AM       ICD-10-CM    1. Abdominal pain, epigastric  R10.13 oxyCODONE (ROXICODONE) 5 MG immediate release tablet      2. Alcohol-induced chronic pancreatitis (HCC)  K86.0 oxyCODONE (ROXICODONE) 5 MG immediate release tablet          The patient's care was signed out to me at shift change at 2300. Awaiting all labs except CBC and CT scan. Final Plan      Patient required second test Dilaudid. His lipase here is less than 3 times normal.  His CT scan shows no signs of acute or chronic pancreatitis. I independently reviewed the CT scan prior to official radiology read noting no ascites, intra-abdominal abscess, free air. His lab results are otherwise unremarkable. I spoke with patient at discharge. Reports that he has hydrocodone at home that is not helping and advised that we can give an extra 5 mg tab oxycodone in a very limited supply. I reviewed his  aware. He received 90 hydrocodone on 626. He was given 9 oxycodone at discharge along with Zofran and a prescription for Narcan. He was given return precautions and advised to maintain a clear liquid diet for at least the next 48 hours. Patient stated understanding.      Bentley Richardson MD  07/14/23 0168

## 2023-07-14 NOTE — ED TRIAGE NOTES
Patient ambulatory into ED w/steady gait. Patient D/C from this ED around MN but symptoms have gotten worse. Patient still vomiting and pain has increased in stomach and back. On 0/10 pain patient states 10.

## 2023-07-14 NOTE — ED PROVIDER NOTES
mmol/L    Chloride 100 98 - 107 mmol/L    CO2 22 21 - 32 mmol/L    Anion Gap 15 (H) 2 - 11 mmol/L    Glucose 99 65 - 100 mg/dL    BUN 12 6 - 23 MG/DL    Creatinine 0.99 0.8 - 1.5 MG/DL    Est, Glom Filt Rate >60 >60 ml/min/1.73m2    Calcium 11.5 (H) 8.3 - 10.4 MG/DL    Total Bilirubin 0.9 0.2 - 1.1 MG/DL    ALT 57 13.0 - 61.0 U/L    AST 47 (H) 15 - 37 U/L    Alk Phosphatase 69 45.0 - 117.0 U/L    Total Protein 7.5 6.4 - 8.2 g/dL    Albumin 4.8 3.5 - 5.0 g/dL    Globulin 2.7 (L) 2.8 - 4.5 g/dL    Albumin/Globulin Ratio 1.8 (H) 0.4 - 1.6     Lipase   Result Value Ref Range    Lipase 148 (H) 13 - 60 U/L   Urinalysis w rflx microscopic   Result Value Ref Range    Color, UA YELLOW      Appearance SLIGHTLY CLOUDY      Specific Gravity, UA 1.020 1.001 - 1.023      pH, Urine 7.5 5.0 - 9.0      Protein, UA Negative NEG mg/dL    Glucose, UA Negative mg/dL    Ketones, Urine 15 (A) NEG mg/dL    Bilirubin Urine Negative NEG      Blood, Urine Trace Intact (A) NEG      Urobilinogen, Urine 0.2 0.2 - 1.0 EU/dL    Nitrite, Urine Negative NEG      Leukocyte Esterase, Urine Negative NEG     Troponin T   Result Value Ref Range    Troponin T 18.9 0 - 22 ng/L   Urinalysis, Micro   Result Value Ref Range    WBC, UA 0-3 0 /hpf    RBC, UA 0-3 0 /hpf    Epithelial Cells UA 0-3 0 /hpf    BACTERIA, URINE TRACE 0 /hpf    Casts 0 0 /lpf    Crystals 0 0 /LPF    Amorphous Crystal 3+ (H) 0    Mucus, UA 0 0 /lpf    Other observations RESULTS VERIFIED MANUALLY     EKG 12 Lead   Result Value Ref Range    Ventricular Rate 66 BPM    Atrial Rate 64 BPM    P-R Interval 161 ms    QRS Duration 115 ms    Q-T Interval 379 ms    QTc Calculation (Bazett) 397 ms    P Axis 56 degrees    R Axis 36 degrees    T Axis 59 degrees    Diagnosis       Sinus rhythm  Probable left ventricular hypertrophy  Anterior Q waves, possibly due to LVH    Confirmed by Sebastián Robert MD (), Genoveva Pisano ([id number]) on 7/14/2023 6:39:50 AM          No orders to display                     Voice

## 2023-07-14 NOTE — ED NOTES
Report called to [facility] 3rd floor. Report given to [facility] to transfer.      Hermelindo Brown RN  07/14/23 0280

## 2023-07-14 NOTE — ED TRIAGE NOTES
Pt ambulatory to room 7 with steady gait. Pt c/o upper abdominal pain since this morning with nausea and vomiting that started  a couple hours PTA. Pt with hx of chronic pancreatitis and states \"I think I am having a flare up\". Pt in NAD during triage.

## 2023-07-14 NOTE — ED PROVIDER NOTES
Emergency Department Provider Note       PCP: Erika Baum MD   Age: 43 y.o. Sex: male     [address]    1. Abdominal pain, epigastric  R10.13           Medical Decision Making     Complexity of Problems Addressed:  1 or more acute illnesses that pose a threat to life or bodily function. Data Reviewed and Analyzed:  Category 1:   I independently ordered and reviewed each unique test.  I reviewed external records: provider visit note from outside specialist.       Category 2:         Category 3: Discussion of management or test interpretation. Patient with possible recurrent pancreatitis. Having some epigastric pain going to his back. Denies any recent alcohol use. Has had similar flareups in the past most recent in May requiring an admission. He has cyst on his pancreas per him. Blood work and CT are pending. Will have oncoming doc follow-up on results. 10:53 PM EDT The patient's care will be transitioned to oncoming doctor. At this time, the plan is follow up on CT and blood work. Please see that provider's documentation for further information. Risk of Complications and/or Morbidity of Patient Management:  Prescription drug management performed. Parental controlled substances given in the ED. Drug therapy given requiring intensive monitoring for toxicity. Shared medical decision making was utilized in creating the patients health plan today. Is this patient to be included in the SEP-1 core measure due to severe sepsis or septic shock? No Exclusion criteria - the patient is NOT to be included for SEP-1 Core Measure due to: Infection is not suspected      History      Rafa Briggs is a 43 y.o. male who presents to the Emergency Department with chief complaint of    Chief Complaint   Patient presents with    Abdominal Pain      Patient with previous history of alcoholic pancreatitis. Has not had any alcohol since October.   Now has a cyst on his

## 2023-07-14 NOTE — ED NOTES
Report received from [facility]. Patient still in pain 8/10. Blood pressure still elevated at 176\106. Doctor aware.      Jozef Lerma RN  07/14/23 3388

## 2023-07-15 LAB
ALBUMIN SERPL-MCNC: 3.7 G/DL (ref 3.5–5)
ANION GAP SERPL CALC-SCNC: 9 MMOL/L (ref 2–11)
BUN SERPL-MCNC: 12 MG/DL (ref 6–23)
CALCIUM SERPL-MCNC: 9 MG/DL (ref 8.3–10.4)
CHLORIDE SERPL-SCNC: 100 MMOL/L (ref 101–110)
CO2 SERPL-SCNC: 24 MMOL/L (ref 21–32)
CREAT SERPL-MCNC: 0.94 MG/DL (ref 0.8–1.5)
ERYTHROCYTE [DISTWIDTH] IN BLOOD BY AUTOMATED COUNT: 15.2 % (ref 11.9–14.6)
GLUCOSE SERPL-MCNC: 102 MG/DL (ref 65–100)
HCT VFR BLD AUTO: 51.4 % (ref 41.1–50.3)
HGB BLD-MCNC: 17.3 G/DL (ref 13.6–17.2)
MCH RBC QN AUTO: 27.5 PG (ref 26.1–32.9)
MCHC RBC AUTO-ENTMCNC: 33.7 G/DL (ref 31.4–35)
MCV RBC AUTO: 81.8 FL (ref 82–102)
NRBC # BLD: 0 K/UL (ref 0–0.2)
PHOSPHATE SERPL-MCNC: 2.6 MG/DL (ref 2.5–4.5)
PLATELET # BLD AUTO: 321 K/UL (ref 150–450)
PMV BLD AUTO: 10.3 FL (ref 9.4–12.3)
POTASSIUM SERPL-SCNC: 4.1 MMOL/L (ref 3.5–5.1)
PROCALCITONIN SERPL-MCNC: <0.05 NG/ML (ref 0–0.49)
RBC # BLD AUTO: 6.28 M/UL (ref 4.23–5.6)
SODIUM SERPL-SCNC: 133 MMOL/L (ref 133–143)
TRIGL SERPL-MCNC: 89 MG/DL (ref 35–150)
WBC # BLD AUTO: 10.3 K/UL (ref 4.3–11.1)

## 2023-07-15 PROCEDURE — 80048 BASIC METABOLIC PNL TOTAL CA: CPT

## 2023-07-15 PROCEDURE — 6360000002 HC RX W HCPCS: Performed by: FAMILY MEDICINE

## 2023-07-15 PROCEDURE — 36415 COLL VENOUS BLD VENIPUNCTURE: CPT

## 2023-07-15 PROCEDURE — 85027 COMPLETE CBC AUTOMATED: CPT

## 2023-07-15 PROCEDURE — 2580000003 HC RX 258: Performed by: FAMILY MEDICINE

## 2023-07-15 PROCEDURE — 84100 ASSAY OF PHOSPHORUS: CPT

## 2023-07-15 PROCEDURE — 84478 ASSAY OF TRIGLYCERIDES: CPT

## 2023-07-15 PROCEDURE — 1100000000 HC RM PRIVATE

## 2023-07-15 PROCEDURE — 82040 ASSAY OF SERUM ALBUMIN: CPT

## 2023-07-15 PROCEDURE — 6370000000 HC RX 637 (ALT 250 FOR IP): Performed by: FAMILY MEDICINE

## 2023-07-15 PROCEDURE — 84145 PROCALCITONIN (PCT): CPT

## 2023-07-15 RX ORDER — FENTANYL 12 UG/H
2 PATCH TRANSDERMAL
Status: DISCONTINUED | OUTPATIENT
Start: 2023-07-15 | End: 2023-07-17 | Stop reason: HOSPADM

## 2023-07-15 RX ADMIN — HYDROMORPHONE HYDROCHLORIDE 1 MG: 1 INJECTION, SOLUTION INTRAMUSCULAR; INTRAVENOUS; SUBCUTANEOUS at 05:58

## 2023-07-15 RX ADMIN — HYDROMORPHONE HYDROCHLORIDE 1 MG: 1 INJECTION, SOLUTION INTRAMUSCULAR; INTRAVENOUS; SUBCUTANEOUS at 09:25

## 2023-07-15 RX ADMIN — SODIUM CHLORIDE, POTASSIUM CHLORIDE, SODIUM LACTATE AND CALCIUM CHLORIDE: 600; 310; 30; 20 INJECTION, SOLUTION INTRAVENOUS at 00:02

## 2023-07-15 RX ADMIN — SODIUM CHLORIDE, POTASSIUM CHLORIDE, SODIUM LACTATE AND CALCIUM CHLORIDE: 600; 310; 30; 20 INJECTION, SOLUTION INTRAVENOUS at 12:39

## 2023-07-15 RX ADMIN — METOPROLOL TARTRATE 25 MG: 25 TABLET, FILM COATED ORAL at 08:17

## 2023-07-15 RX ADMIN — ENOXAPARIN SODIUM 40 MG: 100 INJECTION SUBCUTANEOUS at 08:16

## 2023-07-15 RX ADMIN — HYDROMORPHONE HYDROCHLORIDE 1 MG: 1 INJECTION, SOLUTION INTRAMUSCULAR; INTRAVENOUS; SUBCUTANEOUS at 15:20

## 2023-07-15 RX ADMIN — HYDROMORPHONE HYDROCHLORIDE 1 MG: 1 INJECTION, SOLUTION INTRAMUSCULAR; INTRAVENOUS; SUBCUTANEOUS at 02:26

## 2023-07-15 RX ADMIN — AMLODIPINE BESYLATE 5 MG: 5 TABLET ORAL at 08:17

## 2023-07-15 RX ADMIN — SODIUM CHLORIDE, PRESERVATIVE FREE 10 ML: 5 INJECTION INTRAVENOUS at 21:34

## 2023-07-15 RX ADMIN — HYDROMORPHONE HYDROCHLORIDE 1 MG: 1 INJECTION, SOLUTION INTRAMUSCULAR; INTRAVENOUS; SUBCUTANEOUS at 21:33

## 2023-07-15 RX ADMIN — METOPROLOL TARTRATE 25 MG: 25 TABLET, FILM COATED ORAL at 21:34

## 2023-07-15 RX ADMIN — HYDROMORPHONE HYDROCHLORIDE 1 MG: 1 INJECTION, SOLUTION INTRAMUSCULAR; INTRAVENOUS; SUBCUTANEOUS at 18:38

## 2023-07-15 RX ADMIN — HYDROMORPHONE HYDROCHLORIDE 1 MG: 1 INJECTION, SOLUTION INTRAMUSCULAR; INTRAVENOUS; SUBCUTANEOUS at 12:30

## 2023-07-15 ASSESSMENT — PAIN SCALES - GENERAL
PAINLEVEL_OUTOF10: 8
PAINLEVEL_OUTOF10: 9
PAINLEVEL_OUTOF10: 8
PAINLEVEL_OUTOF10: 8
PAINLEVEL_OUTOF10: 9
PAINLEVEL_OUTOF10: 9
PAINLEVEL_OUTOF10: 8

## 2023-07-15 ASSESSMENT — PAIN DESCRIPTION - LOCATION
LOCATION: ABDOMEN

## 2023-07-15 ASSESSMENT — PAIN DESCRIPTION - DESCRIPTORS
DESCRIPTORS: ACHING
DESCRIPTORS: SHARP
DESCRIPTORS: ACHING

## 2023-07-16 LAB
ALBUMIN SERPL-MCNC: 3.6 G/DL (ref 3.5–5)
ANION GAP SERPL CALC-SCNC: 7 MMOL/L (ref 2–11)
BUN SERPL-MCNC: 16 MG/DL (ref 6–23)
CALCIUM SERPL-MCNC: 9 MG/DL (ref 8.3–10.4)
CHLORIDE SERPL-SCNC: 102 MMOL/L (ref 101–110)
CO2 SERPL-SCNC: 26 MMOL/L (ref 21–32)
CREAT SERPL-MCNC: 0.98 MG/DL (ref 0.8–1.5)
ERYTHROCYTE [DISTWIDTH] IN BLOOD BY AUTOMATED COUNT: 15 % (ref 11.9–14.6)
GLUCOSE SERPL-MCNC: 81 MG/DL (ref 65–100)
HCT VFR BLD AUTO: 49.7 % (ref 41.1–50.3)
HGB BLD-MCNC: 16.4 G/DL (ref 13.6–17.2)
MCH RBC QN AUTO: 27.5 PG (ref 26.1–32.9)
MCHC RBC AUTO-ENTMCNC: 33 G/DL (ref 31.4–35)
MCV RBC AUTO: 83.4 FL (ref 82–102)
NRBC # BLD: 0 K/UL (ref 0–0.2)
PHOSPHATE SERPL-MCNC: 1.9 MG/DL (ref 2.5–4.5)
PLATELET # BLD AUTO: 267 K/UL (ref 150–450)
PMV BLD AUTO: 10.3 FL (ref 9.4–12.3)
POTASSIUM SERPL-SCNC: 3.8 MMOL/L (ref 3.5–5.1)
PROCALCITONIN SERPL-MCNC: <0.05 NG/ML (ref 0–0.49)
RBC # BLD AUTO: 5.96 M/UL (ref 4.23–5.6)
SODIUM SERPL-SCNC: 135 MMOL/L (ref 133–143)
WBC # BLD AUTO: 6 K/UL (ref 4.3–11.1)

## 2023-07-16 PROCEDURE — 80048 BASIC METABOLIC PNL TOTAL CA: CPT

## 2023-07-16 PROCEDURE — 82040 ASSAY OF SERUM ALBUMIN: CPT

## 2023-07-16 PROCEDURE — 6370000000 HC RX 637 (ALT 250 FOR IP): Performed by: FAMILY MEDICINE

## 2023-07-16 PROCEDURE — 84145 PROCALCITONIN (PCT): CPT

## 2023-07-16 PROCEDURE — 1100000000 HC RM PRIVATE

## 2023-07-16 PROCEDURE — 85027 COMPLETE CBC AUTOMATED: CPT

## 2023-07-16 PROCEDURE — 2580000003 HC RX 258: Performed by: FAMILY MEDICINE

## 2023-07-16 PROCEDURE — 36415 COLL VENOUS BLD VENIPUNCTURE: CPT

## 2023-07-16 PROCEDURE — 6360000002 HC RX W HCPCS: Performed by: FAMILY MEDICINE

## 2023-07-16 PROCEDURE — 84100 ASSAY OF PHOSPHORUS: CPT

## 2023-07-16 RX ORDER — L. ACIDOPHILUS/L.BULGARICUS 1MM CELL
4 TABLET ORAL 3 TIMES DAILY
Status: DISCONTINUED | OUTPATIENT
Start: 2023-07-16 | End: 2023-07-17 | Stop reason: HOSPADM

## 2023-07-16 RX ADMIN — AMLODIPINE BESYLATE 5 MG: 5 TABLET ORAL at 09:32

## 2023-07-16 RX ADMIN — Medication 4 TABLET: at 21:22

## 2023-07-16 RX ADMIN — HYDROMORPHONE HYDROCHLORIDE 1 MG: 1 INJECTION, SOLUTION INTRAMUSCULAR; INTRAVENOUS; SUBCUTANEOUS at 00:25

## 2023-07-16 RX ADMIN — Medication 4 TABLET: at 15:19

## 2023-07-16 RX ADMIN — METOPROLOL TARTRATE 25 MG: 25 TABLET, FILM COATED ORAL at 09:32

## 2023-07-16 RX ADMIN — HYDROMORPHONE HYDROCHLORIDE 1 MG: 1 INJECTION, SOLUTION INTRAMUSCULAR; INTRAVENOUS; SUBCUTANEOUS at 12:18

## 2023-07-16 RX ADMIN — ENOXAPARIN SODIUM 40 MG: 100 INJECTION SUBCUTANEOUS at 09:32

## 2023-07-16 RX ADMIN — HYDROMORPHONE HYDROCHLORIDE 1 MG: 1 INJECTION, SOLUTION INTRAMUSCULAR; INTRAVENOUS; SUBCUTANEOUS at 03:41

## 2023-07-16 RX ADMIN — HYDROMORPHONE HYDROCHLORIDE 1 MG: 1 INJECTION, SOLUTION INTRAMUSCULAR; INTRAVENOUS; SUBCUTANEOUS at 21:19

## 2023-07-16 RX ADMIN — SODIUM CHLORIDE, PRESERVATIVE FREE 10 ML: 5 INJECTION INTRAVENOUS at 21:23

## 2023-07-16 RX ADMIN — HYDROMORPHONE HYDROCHLORIDE 1 MG: 1 INJECTION, SOLUTION INTRAMUSCULAR; INTRAVENOUS; SUBCUTANEOUS at 09:32

## 2023-07-16 RX ADMIN — HYDROMORPHONE HYDROCHLORIDE 1 MG: 1 INJECTION, SOLUTION INTRAMUSCULAR; INTRAVENOUS; SUBCUTANEOUS at 06:25

## 2023-07-16 RX ADMIN — METOPROLOL TARTRATE 25 MG: 25 TABLET, FILM COATED ORAL at 21:22

## 2023-07-16 RX ADMIN — SODIUM CHLORIDE, PRESERVATIVE FREE 10 ML: 5 INJECTION INTRAVENOUS at 09:32

## 2023-07-16 RX ADMIN — HYDROMORPHONE HYDROCHLORIDE 1 MG: 1 INJECTION, SOLUTION INTRAMUSCULAR; INTRAVENOUS; SUBCUTANEOUS at 15:18

## 2023-07-16 RX ADMIN — HYDROMORPHONE HYDROCHLORIDE 1 MG: 1 INJECTION, SOLUTION INTRAMUSCULAR; INTRAVENOUS; SUBCUTANEOUS at 18:16

## 2023-07-16 ASSESSMENT — PAIN DESCRIPTION - DESCRIPTORS
DESCRIPTORS: ACHING

## 2023-07-16 ASSESSMENT — PAIN SCALES - GENERAL
PAINLEVEL_OUTOF10: 6
PAINLEVEL_OUTOF10: 8
PAINLEVEL_OUTOF10: 7
PAINLEVEL_OUTOF10: 8
PAINLEVEL_OUTOF10: 2
PAINLEVEL_OUTOF10: 7
PAINLEVEL_OUTOF10: 8
PAINLEVEL_OUTOF10: 9
PAINLEVEL_OUTOF10: 8
PAINLEVEL_OUTOF10: 8

## 2023-07-16 ASSESSMENT — PAIN DESCRIPTION - LOCATION
LOCATION: ABDOMEN

## 2023-07-17 VITALS
WEIGHT: 190 LBS | SYSTOLIC BLOOD PRESSURE: 130 MMHG | DIASTOLIC BLOOD PRESSURE: 80 MMHG | OXYGEN SATURATION: 99 % | BODY MASS INDEX: 27.2 KG/M2 | HEART RATE: 73 BPM | HEIGHT: 70 IN | TEMPERATURE: 97.7 F | RESPIRATION RATE: 14 BRPM

## 2023-07-17 PROBLEM — K85.90 ACUTE ON CHRONIC PANCREATITIS (HCC): Status: RESOLVED | Noted: 2023-07-14 | Resolved: 2023-07-17

## 2023-07-17 PROBLEM — K86.1 ACUTE ON CHRONIC PANCREATITIS (HCC): Status: RESOLVED | Noted: 2023-07-14 | Resolved: 2023-07-17

## 2023-07-17 LAB
ALBUMIN SERPL-MCNC: 3.5 G/DL (ref 3.5–5)
ANION GAP SERPL CALC-SCNC: 5 MMOL/L (ref 2–11)
BUN SERPL-MCNC: 14 MG/DL (ref 6–23)
CALCIUM SERPL-MCNC: 8.6 MG/DL (ref 8.3–10.4)
CHLORIDE SERPL-SCNC: 103 MMOL/L (ref 101–110)
CO2 SERPL-SCNC: 28 MMOL/L (ref 21–32)
CREAT SERPL-MCNC: 0.99 MG/DL (ref 0.8–1.5)
ERYTHROCYTE [DISTWIDTH] IN BLOOD BY AUTOMATED COUNT: 14.2 % (ref 11.9–14.6)
GLUCOSE SERPL-MCNC: 117 MG/DL (ref 65–100)
HCT VFR BLD AUTO: 49.6 % (ref 41.1–50.3)
HGB BLD-MCNC: 16.4 G/DL (ref 13.6–17.2)
MCH RBC QN AUTO: 27.8 PG (ref 26.1–32.9)
MCHC RBC AUTO-ENTMCNC: 33.1 G/DL (ref 31.4–35)
MCV RBC AUTO: 84.2 FL (ref 82–102)
NRBC # BLD: 0 K/UL (ref 0–0.2)
PHOSPHATE SERPL-MCNC: 1.5 MG/DL (ref 2.5–4.5)
PLATELET # BLD AUTO: 253 K/UL (ref 150–450)
PMV BLD AUTO: 10.1 FL (ref 9.4–12.3)
POTASSIUM SERPL-SCNC: 3.8 MMOL/L (ref 3.5–5.1)
PROCALCITONIN SERPL-MCNC: <0.05 NG/ML (ref 0–0.49)
RBC # BLD AUTO: 5.89 M/UL (ref 4.23–5.6)
SODIUM SERPL-SCNC: 136 MMOL/L (ref 133–143)
WBC # BLD AUTO: 4.1 K/UL (ref 4.3–11.1)

## 2023-07-17 PROCEDURE — 84145 PROCALCITONIN (PCT): CPT

## 2023-07-17 PROCEDURE — 82040 ASSAY OF SERUM ALBUMIN: CPT

## 2023-07-17 PROCEDURE — 6360000002 HC RX W HCPCS: Performed by: FAMILY MEDICINE

## 2023-07-17 PROCEDURE — 85027 COMPLETE CBC AUTOMATED: CPT

## 2023-07-17 PROCEDURE — 36415 COLL VENOUS BLD VENIPUNCTURE: CPT

## 2023-07-17 PROCEDURE — 6370000000 HC RX 637 (ALT 250 FOR IP): Performed by: FAMILY MEDICINE

## 2023-07-17 PROCEDURE — 84100 ASSAY OF PHOSPHORUS: CPT

## 2023-07-17 PROCEDURE — 80048 BASIC METABOLIC PNL TOTAL CA: CPT

## 2023-07-17 RX ORDER — OXYCODONE HYDROCHLORIDE AND ACETAMINOPHEN 5; 325 MG/1; MG/1
1 TABLET ORAL EVERY 6 HOURS PRN
Qty: 20 TABLET | Refills: 0 | Status: SHIPPED | OUTPATIENT
Start: 2023-07-17 | End: 2023-07-22

## 2023-07-17 RX ORDER — NALOXONE HYDROCHLORIDE 4 MG/.1ML
1 SPRAY NASAL PRN
Qty: 2 EACH | Refills: 0 | Status: SHIPPED | OUTPATIENT
Start: 2023-07-17

## 2023-07-17 RX ORDER — OXYCODONE HYDROCHLORIDE 5 MG/1
10 TABLET ORAL EVERY 4 HOURS PRN
Status: DISCONTINUED | OUTPATIENT
Start: 2023-07-17 | End: 2023-07-17 | Stop reason: HOSPADM

## 2023-07-17 RX ORDER — PANTOPRAZOLE SODIUM 40 MG/1
40 TABLET, DELAYED RELEASE ORAL NIGHTLY
Qty: 30 TABLET | Refills: 0 | Status: SHIPPED | OUTPATIENT
Start: 2023-07-17

## 2023-07-17 RX ORDER — OXYCODONE HYDROCHLORIDE 5 MG/1
5 TABLET ORAL EVERY 4 HOURS PRN
Status: DISCONTINUED | OUTPATIENT
Start: 2023-07-17 | End: 2023-07-17 | Stop reason: HOSPADM

## 2023-07-17 RX ORDER — ACETAMINOPHEN 500 MG
1000 TABLET ORAL EVERY 6 HOURS SCHEDULED
Status: DISCONTINUED | OUTPATIENT
Start: 2023-07-17 | End: 2023-07-17 | Stop reason: HOSPADM

## 2023-07-17 RX ADMIN — ACETAMINOPHEN 1000 MG: 500 TABLET, FILM COATED ORAL at 11:47

## 2023-07-17 RX ADMIN — HYDROMORPHONE HYDROCHLORIDE 1 MG: 1 INJECTION, SOLUTION INTRAMUSCULAR; INTRAVENOUS; SUBCUTANEOUS at 00:46

## 2023-07-17 RX ADMIN — HYDROMORPHONE HYDROCHLORIDE 1 MG: 1 INJECTION, SOLUTION INTRAMUSCULAR; INTRAVENOUS; SUBCUTANEOUS at 06:37

## 2023-07-17 RX ADMIN — Medication 4 TABLET: at 09:27

## 2023-07-17 RX ADMIN — HYDROMORPHONE HYDROCHLORIDE 1 MG: 1 INJECTION, SOLUTION INTRAMUSCULAR; INTRAVENOUS; SUBCUTANEOUS at 09:31

## 2023-07-17 RX ADMIN — OXYCODONE HYDROCHLORIDE 10 MG: 5 TABLET ORAL at 11:46

## 2023-07-17 RX ADMIN — HYDROMORPHONE HYDROCHLORIDE 1 MG: 1 INJECTION, SOLUTION INTRAMUSCULAR; INTRAVENOUS; SUBCUTANEOUS at 03:27

## 2023-07-17 RX ADMIN — ENOXAPARIN SODIUM 40 MG: 100 INJECTION SUBCUTANEOUS at 09:27

## 2023-07-17 RX ADMIN — AMLODIPINE BESYLATE 5 MG: 5 TABLET ORAL at 09:27

## 2023-07-17 RX ADMIN — METOPROLOL TARTRATE 25 MG: 25 TABLET, FILM COATED ORAL at 09:27

## 2023-07-17 ASSESSMENT — PAIN - FUNCTIONAL ASSESSMENT: PAIN_FUNCTIONAL_ASSESSMENT: ACTIVITIES ARE NOT PREVENTED

## 2023-07-17 ASSESSMENT — PAIN DESCRIPTION - LOCATION
LOCATION: ABDOMEN
LOCATION: ABDOMEN;BACK
LOCATION: ABDOMEN

## 2023-07-17 ASSESSMENT — PAIN SCALES - GENERAL
PAINLEVEL_OUTOF10: 6
PAINLEVEL_OUTOF10: 8
PAINLEVEL_OUTOF10: 8
PAINLEVEL_OUTOF10: 6
PAINLEVEL_OUTOF10: 4
PAINLEVEL_OUTOF10: 8
PAINLEVEL_OUTOF10: 7

## 2023-07-17 ASSESSMENT — PAIN DESCRIPTION - DESCRIPTORS
DESCRIPTORS: ACHING
DESCRIPTORS: ACHING;TENDER

## 2023-07-17 NOTE — DISCHARGE SUMMARY
(86.2 kg). Intake/Output Summary (Last 24 hours) at 7/17/2023 1309  Last data filed at 7/16/2023 2300  Gross per 24 hour   Intake 700 ml   Output --   Net 700 ml       Physical Exam  Vitals and nursing note reviewed. Constitutional:       General: He is not in acute distress. Appearance: He is ill-appearing. He is not diaphoretic. HENT:      Head: Normocephalic and atraumatic. Eyes:      Extraocular Movements: Extraocular movements intact. Cardiovascular:      Rate and Rhythm: Normal rate. Pulmonary:      Effort: Pulmonary effort is normal. No respiratory distress. Abdominal:      General: There is no distension. Palpations: Abdomen is soft. Tenderness: There is abdominal tenderness. Musculoskeletal:         General: No deformity. Skin:     Coloration: Skin is not jaundiced or pale. Neurological:      General: No focal deficit present. Mental Status: He is alert and oriented to person, place, and time. Psychiatric:         Mood and Affect: Affect normal. Mood is not anxious. Behavior: Behavior normal. Behavior is cooperative.       Comments: affable        Signed:  Rose Ortega MD

## 2023-07-17 NOTE — CARE COORDINATION
07/17/23 1312   Service Assessment   Patient Orientation Alert and Oriented   Cognition Alert   History Provided By Patient   Primary Caregiver Self   Support Systems Spouse/Significant Other   PCP Verified by CM Yes   Prior Functional Level Independent in ADLs/IADLs   Current Functional Level Independent in ADLs/IADLs   Can patient return to prior living arrangement Yes   Ability to make needs known: Good   Family able to assist with home care needs: Yes   Would you like for me to discuss the discharge plan with any other family members/significant others, and if so, who? Yes   Financial Resources None   Social/Functional History   Lives With Spouse   Type of [address] None   ADL Assistance Independent   Mode of Transportation Car   Discharge Planning   Type of Residence House   Living Arrangements Spouse/Significant Other   Current Services Prior To Admission None   Potential Assistance Needed N/A   Potential Assistance Purchasing Medications No   Type of Home Care Services None   Patient expects to be discharged to: [facility]   Transition of Care Consult (CM Consult) N/A   Services At/After Discharge None     The patient is discharging home with his significant other. No case management needs were identified. Discharge planning was discussed with the Interdisciplinary Team prior to discharge.

## 2023-07-18 ENCOUNTER — TELEPHONE (OUTPATIENT)
Dept: INTERNAL MEDICINE CLINIC | Facility: CLINIC | Age: 38
End: 2023-07-18

## 2023-07-18 NOTE — TELEPHONE ENCOUNTER
Care Transitions Initial Follow Up Call    Outreach made within 2 business days of discharge: NO, pt hasn't called me back. Patient: Makayla Cuevas Patient : 1989   MRN: [id number]  Reason for Admission: There are no discharge diagnoses documented for the most recent discharge. Discharge Date: 23       Spoke with: LMM TO CMB                         LMM TO CMB    Discharge department/facility: [facility] [place] [facility] Patient Contact:  Was patient able to fill all prescriptions: {yes no:[id number]}  Was patient instructed to bring all medications to the follow-up visit: {yes no:[id number]}  Is patient taking all medications as directed in the discharge summary?  {YES / PU:[id number]}  Does patient understand their discharge instructions: {yes no:[id number]}  Does patient have questions or concerns that need addressed prior to 7-14 day follow up office visit: {YES***/NO:60}    Scheduled appointment with PCP within 7-14 days    Follow Up  Future Appointments   Date Time Provider [address]   2023  4:15 PM Margot Grimes MD [facility]   2023  8:00 AM María Moreno MD [facility]       Mary Ferguson LPN

## 2023-07-24 DIAGNOSIS — K85.91 NECROTIZING PANCREATITIS: Primary | ICD-10-CM

## 2023-07-24 DIAGNOSIS — K85.91 NECROTIZING PANCREATITIS: ICD-10-CM

## 2023-07-24 RX ORDER — HYDROCODONE BITARTRATE AND ACETAMINOPHEN 10; 325 MG/1; MG/1
1 TABLET ORAL EVERY 8 HOURS PRN
COMMUNITY
End: 2023-07-24 | Stop reason: SDUPTHER

## 2023-07-24 RX ORDER — HYDROCODONE BITARTRATE AND ACETAMINOPHEN 10; 325 MG/1; MG/1
1 TABLET ORAL EVERY 8 HOURS PRN
Qty: 90 TABLET | Refills: 0 | Status: SHIPPED | OUTPATIENT
Start: 2023-07-24 | End: 2023-08-23

## 2023-07-24 RX ORDER — HYDROCODONE BITARTRATE AND ACETAMINOPHEN 10; 325 MG/1; MG/1
1 TABLET ORAL EVERY 8 HOURS PRN
Qty: 90 TABLET | Refills: 0 | OUTPATIENT
Start: 2023-07-24 | End: 2023-08-23

## 2023-07-24 NOTE — TELEPHONE ENCOUNTER
Prescription monitoring program has been reviewed, noted some prescriptions given by ER providers, well be sending prescription and reevaluating patient during his next visit in office

## 2023-07-24 NOTE — TELEPHONE ENCOUNTER
[address] checked. Requested Prescriptions     Pending Prescriptions Disp Refills    HYDROcodone-acetaminophen (NORCO)  MG per tablet 90 tablet 0     Sig: Take 1 tablet by mouth every 8 hours as needed for Pain for up to 30 days.  Max Daily Amount: 3 tablets

## 2023-07-30 ENCOUNTER — APPOINTMENT (OUTPATIENT)
Dept: GENERAL RADIOLOGY | Age: 38
End: 2023-07-30

## 2023-07-30 ENCOUNTER — HOSPITAL ENCOUNTER (EMERGENCY)
Age: 38
Discharge: HOME | End: 2023-07-30
Attending: STUDENT IN AN ORGANIZED HEALTH CARE EDUCATION/TRAINING PROGRAM

## 2023-07-30 VITALS
BODY MASS INDEX: 25.77 KG/M2 | DIASTOLIC BLOOD PRESSURE: 88 MMHG | WEIGHT: 180 LBS | OXYGEN SATURATION: 98 % | SYSTOLIC BLOOD PRESSURE: 146 MMHG | TEMPERATURE: 97.7 F | HEART RATE: 81 BPM | RESPIRATION RATE: 19 BRPM | HEIGHT: 70 IN

## 2023-07-30 DIAGNOSIS — M25.462 KNEE EFFUSION, LEFT: Primary | ICD-10-CM

## 2023-07-30 PROCEDURE — 73562 X-RAY EXAM OF KNEE 3: CPT

## 2023-07-30 PROCEDURE — 99284 EMERGENCY DEPT VISIT MOD MDM: CPT

## 2023-07-30 PROCEDURE — 96372 THER/PROPH/DIAG INJ SC/IM: CPT

## 2023-07-30 PROCEDURE — 6360000002 HC RX W HCPCS: Performed by: STUDENT IN AN ORGANIZED HEALTH CARE EDUCATION/TRAINING PROGRAM

## 2023-07-30 PROCEDURE — 6370000000 HC RX 637 (ALT 250 FOR IP): Performed by: STUDENT IN AN ORGANIZED HEALTH CARE EDUCATION/TRAINING PROGRAM

## 2023-07-30 PROCEDURE — 73610 X-RAY EXAM OF ANKLE: CPT

## 2023-07-30 RX ORDER — HYDROCODONE BITARTRATE AND ACETAMINOPHEN 7.5; 325 MG/1; MG/1
1 TABLET ORAL
Status: COMPLETED | OUTPATIENT
Start: 2023-07-30 | End: 2023-07-30

## 2023-07-30 RX ORDER — KETOROLAC TROMETHAMINE 30 MG/ML
30 INJECTION, SOLUTION INTRAMUSCULAR; INTRAVENOUS ONCE
Status: COMPLETED | OUTPATIENT
Start: 2023-07-30 | End: 2023-07-30

## 2023-07-30 RX ORDER — KETOROLAC TROMETHAMINE 10 MG/1
10 TABLET, FILM COATED ORAL EVERY 6 HOURS PRN
Qty: 10 TABLET | Refills: 0 | Status: SHIPPED | OUTPATIENT
Start: 2023-07-30

## 2023-07-30 RX ADMIN — HYDROCODONE BITARTRATE AND ACETAMINOPHEN 1 TABLET: 7.5; 325 TABLET ORAL at 14:07

## 2023-07-30 RX ADMIN — KETOROLAC TROMETHAMINE 30 MG: 30 INJECTION, SOLUTION INTRAMUSCULAR; INTRAVENOUS at 14:07

## 2023-07-30 ASSESSMENT — PAIN DESCRIPTION - ORIENTATION
ORIENTATION: RIGHT

## 2023-07-30 ASSESSMENT — PAIN DESCRIPTION - LOCATION
LOCATION: KNEE
LOCATION: KNEE
LOCATION: FACE

## 2023-07-30 ASSESSMENT — PAIN DESCRIPTION - DESCRIPTORS: DESCRIPTORS: ACHING

## 2023-07-30 ASSESSMENT — PAIN SCALES - GENERAL
PAINLEVEL_OUTOF10: 7
PAINLEVEL_OUTOF10: 3
PAINLEVEL_OUTOF10: 6

## 2023-07-30 ASSESSMENT — PAIN - FUNCTIONAL ASSESSMENT: PAIN_FUNCTIONAL_ASSESSMENT: 0-10

## 2023-07-30 NOTE — ED TRIAGE NOTES
Pt presents to the ED in a wheelchair to room. Pt reports right knee pain and swelling and right ankle swelling that started yesterday after jumping at [employer]. Pt states he heard and felt a pop. Pt states the swelling and pain is worse today. Pt states he took tylenol and advil two hours ago.

## 2023-07-30 NOTE — DISCHARGE INSTRUCTIONS
Continue your Norco as previously prescribed for severe pain, use the Toradol prescribed as directed for moderate pain. Elevate and ice your knee when at rest.  Use the knee immobilizer as directed and crutches to get around. Please call the provider listed to arrange follow-up this week for further evaluation. Return for worsening symptoms, concerns or questions.

## 2023-07-30 NOTE — ED PROVIDER NOTES
There is no pain over the forefoot, pulses are palpated and stable on exam.  No fullness of the posterior aspect of the knee. Skin:     General: Skin is warm. Capillary Refill: Capillary refill takes less than 2 seconds. Neurological:      Mental Status: He is alert. Psychiatric:         Mood and Affect: Mood normal.        Procedures     Procedures    Orders Placed This Encounter   Procedures    XR KNEE RIGHT (3 VIEWS)    XR ANKLE RIGHT (MIN 3 VIEWS)    [facility] ORTH[employer] SUPPLIES Knee Immobilizer, Left; 16\"; Crutches; Pair, Left Side Injury;  Tall (5'10\"-6'6\")        Medications   HYDROcodone-acetaminophen (NORCO) 7.5-325 MG per tablet 1 tablet (1 tablet Oral Given 7/30/23 1407)   ketorolac (TORADOL) injection 30 mg (30 mg IntraMUSCular Given 7/30/23 1407)       New Prescriptions    KETOROLAC (TORADOL) 10 MG TABLET    Take 1 tablet by mouth every 6 hours as needed for Pain        Past Medical History:   Diagnosis Date    Abdominal pain 10/25/2022    Abnormal CT of the abdomen 26/37/9296    Acute alcoholic pancreatitis 54/02/3998    Acute kidney injury (nontraumatic) ([address]) 10/31/2022    pt denies    Acute on chronic pancreatitis ([address]) 07/30/2022    Acute on chronic pancreatitis ([address]) 7/14/2023    Alcohol use disorder, severe, dependence ([address]) 08/21/2019    CAD (coronary artery disease)     Carpal tunnel syndrome, left     Cigarette smoker     Coronary artery disease involving native coronary artery of native heart 11/13/2022    Dependence on nicotine from cigarettes 11/20/2022    GERD (gastroesophageal reflux disease)     controlled with med    History of pancreatitis     x 4 or 5 times-- last time admitted to hospital 10/10/26/22 x 7 days    Hypertension     controlled with med    STEMI (ST elevation myocardial infarction) ([address]) 11/2/2022        Past Surgical History:   Procedure Laterality Date    CARDIAC PROCEDURE N/A 11/2/2022

## 2023-08-07 ENCOUNTER — OFFICE VISIT (OUTPATIENT)
Dept: ORTHOPEDIC SURGERY | Age: 38
End: 2023-08-07

## 2023-08-07 DIAGNOSIS — S83.512A RUPTURE OF ANTERIOR CRUCIATE LIGAMENT OF LEFT KNEE, INITIAL ENCOUNTER: Primary | ICD-10-CM

## 2023-08-07 PROCEDURE — 99204 OFFICE O/P NEW MOD 45 MIN: CPT | Performed by: PHYSICIAN ASSISTANT

## 2023-08-07 NOTE — PROGRESS NOTES
Negative   Varus  @ 0 and 30deg Negative Negative   Valgus @ 0 and 30deg Negative Negative   Gait Mildly Antalgic      X-rays:   AP, lateral, and oblique views of the Left knee obtained 7- in the office today show  no evidence of arthritis, fracture, dislocation, loose body or other abnormality. Impression: Left Knee normal     Assessment:     ICD-10-CM    1. Rupture of anterior cruciate ligament of left knee, initial encounter  S83.512A MRI KNEE LEFT WO CONTRAST          Plan:  I had a long discussion with the patient regarding the natural history of the problem, as well as treatment options. I gave them information about the injury. I discussed with the patient my concern for ACL tear. We discussed the importance of obtaining an MRI as he is very young and active. We reviewed smoking cessation and its importance in preparation for possible surgical intervention. He is of significant increased complexity secondary to history of recent MI, smoking status, antiplatelet medication and hx of alcohol use. We will see the patient back with Dr. Angus Mortimer for definitive plans and management based on findings. Treatment:   Given the chronicity and severity of the patient's symptoms, we will obtain an MRI. We will see them back after the scan and make a determination regarding further treatment. If there is a tear or other problem, they may require surgery. If negative, would recommend NSAID's, PT, or injection. They are amenable to this treatment plan. Return for Angus Mortimer after MRI.       Mansi Paredes  08/07/23

## 2023-08-09 ENCOUNTER — TELEPHONE (OUTPATIENT)
Dept: INTERNAL MEDICINE CLINIC | Facility: CLINIC | Age: 38
End: 2023-08-09

## 2023-08-22 DIAGNOSIS — K85.91 NECROTIZING PANCREATITIS: ICD-10-CM

## 2023-08-22 RX ORDER — HYDROCODONE BITARTRATE AND ACETAMINOPHEN 10; 325 MG/1; MG/1
1 TABLET ORAL EVERY 8 HOURS PRN
Qty: 30 TABLET | Refills: 0 | Status: SHIPPED | OUTPATIENT
Start: 2023-08-22 | End: 2023-09-21

## 2023-08-22 NOTE — TELEPHONE ENCOUNTER
Requested Prescriptions     Pending Prescriptions Disp Refills    HYDROcodone-acetaminophen (NORCO)  MG per tablet 90 tablet 0     Sig: Take 1 tablet by mouth every 8 hours as needed for Pain for up to 30 days.  Max Daily Amount: 3 tablets

## 2023-08-22 NOTE — TELEPHONE ENCOUNTER
Left VM for patient advising enough medication sent until next appointment. Ask that he call back to office with any questions or concerns.

## 2023-08-23 ENCOUNTER — OFFICE VISIT (OUTPATIENT)
Dept: ORTHOPEDIC SURGERY | Age: 38
End: 2023-08-23

## 2023-08-23 DIAGNOSIS — S83.511A COMPLETE TEAR OF ANTERIOR CRUCIATE LIGAMENT OF RIGHT KNEE, INITIAL ENCOUNTER: Primary | ICD-10-CM

## 2023-08-23 DIAGNOSIS — S83.281A TEAR OF LATERAL MENISCUS OF RIGHT KNEE, UNSPECIFIED TEAR TYPE, UNSPECIFIED WHETHER OLD OR CURRENT TEAR, INITIAL ENCOUNTER: ICD-10-CM

## 2023-08-23 DIAGNOSIS — S83.241A ACUTE MEDIAL MENISCUS TEAR, RIGHT, INITIAL ENCOUNTER: ICD-10-CM

## 2023-08-23 NOTE — PROGRESS NOTES
Name: Renate Ortega  YOB: 1985  Gender: male  MRN: [id number]    CC:   Chief Complaint   Patient presents with    Follow-up     MRI results right knee   , Right activity related knee pain, swelling and instability    HPI: This patient presents for an MRI follow-up of the right knee referred by my PA. He works in tiles sales. . Occasionally has to lift 60 pounds. Recall from the visit:the patient was at a [employer] park July 30 when he felt a pop in his knee. He states that he felt the leg gave out. He notes initially it was lateral but now is medial.  Does note swelling in the knee and decreased motion. Does note interference with sleep. H. The pain interferes with activities of daily living. There is a feeling of instability. There is swelling and stiffness. 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 He is very active in regards to this knee. Denies numbness, tingling, prior injury. Does note that he occasionally takes Tylenol. Of note:  The patient did have an MI approximately 7 months ago in January, is a 1 pack-a-day smoker and takes Brilinta    No Known Allergies  Past Medical History:   Diagnosis Date    Abdominal pain 10/25/2022    Abnormal CT of the abdomen 64/53/6309    Acute alcoholic pancreatitis 63/58/7476    Acute kidney injury (nontraumatic) ([address]) 10/31/2022    pt denies    Acute on chronic pancreatitis ([address]) 07/30/2022    Acute on chronic pancreatitis ([address]) 7/14/2023    Alcohol use disorder, severe, dependence ([address]) 08/21/2019    CAD (coronary artery disease)     Carpal tunnel syndrome, left     Cigarette smoker     Coronary artery disease involving native coronary artery of native heart 11/13/2022    Dependence on nicotine from cigarettes 11/20/2022    GERD (gastroesophageal reflux disease)     controlled with med    History of pancreatitis     x 4 or 5 times-- last time admitted to hospital

## 2023-08-23 NOTE — PROGRESS NOTES
Patient declined iceman machine. The brace was properly aligned medially and laterally along the length of the right Knee with the extension/flexion dials placed slightly above the patella (to insure that if brace slides down slightly the dials will still line up on either side of the patella/knee region). The brace is extended/shorten to the patient's leg length and to insure proper fit of the brace. The straps are tightened starting with the most distal strap and then strap proximal to the patella. The strap right below the patella is then secured and last is the strap highest on the quad. The straps are then trimmed for easier tightening of the brace for the patient. Patient read and signed documenting they understand and agree to [facility]s current DME return policy.

## 2023-08-29 ASSESSMENT — PATIENT HEALTH QUESTIONNAIRE - PHQ9
2. FEELING DOWN, DEPRESSED OR HOPELESS: 0
SUM OF ALL RESPONSES TO PHQ QUESTIONS 1-9: 0
1. LITTLE INTEREST OR PLEASURE IN DOING THINGS: 0
2. FEELING DOWN, DEPRESSED OR HOPELESS: NOT AT ALL
SUM OF ALL RESPONSES TO PHQ QUESTIONS 1-9: 0
1. LITTLE INTEREST OR PLEASURE IN DOING THINGS: NOT AT ALL
SUM OF ALL RESPONSES TO PHQ9 QUESTIONS 1 & 2: 0
SUM OF ALL RESPONSES TO PHQ9 QUESTIONS 1 & 2: 0

## 2023-08-30 ENCOUNTER — TELEPHONE (OUTPATIENT)
Age: 38
End: 2023-08-30

## 2023-08-30 ENCOUNTER — OFFICE VISIT (OUTPATIENT)
Dept: INTERNAL MEDICINE CLINIC | Facility: CLINIC | Age: 38
End: 2023-08-30

## 2023-08-30 VITALS
OXYGEN SATURATION: 98 % | WEIGHT: 186.2 LBS | SYSTOLIC BLOOD PRESSURE: 126 MMHG | HEART RATE: 68 BPM | DIASTOLIC BLOOD PRESSURE: 78 MMHG | BODY MASS INDEX: 26.66 KG/M2 | HEIGHT: 70 IN

## 2023-08-30 DIAGNOSIS — K21.9 GASTROESOPHAGEAL REFLUX DISEASE, UNSPECIFIED WHETHER ESOPHAGITIS PRESENT: ICD-10-CM

## 2023-08-30 DIAGNOSIS — I10 PRIMARY HYPERTENSION: Primary | Chronic | ICD-10-CM

## 2023-08-30 DIAGNOSIS — K85.91 NECROTIZING PANCREATITIS: ICD-10-CM

## 2023-08-30 PROCEDURE — 3078F DIAST BP <80 MM HG: CPT | Performed by: INTERNAL MEDICINE

## 2023-08-30 PROCEDURE — 99214 OFFICE O/P EST MOD 30 MIN: CPT | Performed by: INTERNAL MEDICINE

## 2023-08-30 PROCEDURE — 3074F SYST BP LT 130 MM HG: CPT | Performed by: INTERNAL MEDICINE

## 2023-08-30 RX ORDER — PANTOPRAZOLE SODIUM 40 MG/1
40 TABLET, DELAYED RELEASE ORAL NIGHTLY
Qty: 30 TABLET | Refills: 2 | Status: SHIPPED | OUTPATIENT
Start: 2023-08-30

## 2023-08-30 RX ORDER — HYDROCODONE BITARTRATE AND ACETAMINOPHEN 10; 325 MG/1; MG/1
1 TABLET ORAL EVERY 8 HOURS PRN
Qty: 30 TABLET | Refills: 0 | Status: CANCELLED | OUTPATIENT
Start: 2023-08-30 | End: 2023-09-29

## 2023-08-30 RX ORDER — HYDROCODONE BITARTRATE AND ACETAMINOPHEN 10; 325 MG/1; MG/1
1 TABLET ORAL EVERY 8 HOURS PRN
Qty: 60 TABLET | Refills: 0 | Status: SHIPPED | OUTPATIENT
Start: 2023-08-30 | End: 2023-09-29

## 2023-08-30 RX ORDER — AMLODIPINE BESYLATE 5 MG/1
5 TABLET ORAL DAILY
Qty: 30 TABLET | Refills: 3 | Status: SHIPPED | OUTPATIENT
Start: 2023-08-30

## 2023-08-30 ASSESSMENT — ENCOUNTER SYMPTOMS
WHEEZING: 0
SHORTNESS OF BREATH: 0
CHEST TIGHTNESS: 0
ABDOMINAL DISTENTION: 0
PHOTOPHOBIA: 0

## 2023-08-30 NOTE — TELEPHONE ENCOUNTER
Patient Having Right Knee scope ACL reconstruction with quad auto, lateral IT band tenodesis and lateral meniscus repair on TBD with Dr. Mili Martinez under Choice and Popliteal/ Adductor Canal Block. Requesting cardiac clearance and any medication hold.  Fax: [phone]

## 2023-09-21 DIAGNOSIS — K85.91 NECROTIZING PANCREATITIS: ICD-10-CM

## 2023-09-21 RX ORDER — HYDROCODONE BITARTRATE AND ACETAMINOPHEN 10; 325 MG/1; MG/1
1 TABLET ORAL EVERY 8 HOURS PRN
Qty: 60 TABLET | Refills: 0 | Status: SHIPPED | OUTPATIENT
Start: 2023-09-21 | End: 2023-10-09 | Stop reason: SDUPTHER

## 2023-09-21 NOTE — TELEPHONE ENCOUNTER
Spoke with patient. He said that he does not take them every day but when the pain hits he is taking this about every 4 hours (6-8 tabs). He stated that he has been able to go 6 weeks in the past without them but has been giving him trouble over the past 2 months.

## 2023-09-21 NOTE — TELEPHONE ENCOUNTER
Refill has been sent, he has been requesting the refills few days early, I have been refilling these medications for abdominal pain and chronic pancreatitis, but if he is currently requesting refills sooner , I will have to send him for pain clinic for management of his medications,  Please inquire more about why he is taking more

## 2023-10-09 DIAGNOSIS — K85.91 NECROTIZING PANCREATITIS: ICD-10-CM

## 2023-10-09 DIAGNOSIS — I10 PRIMARY HYPERTENSION: Chronic | ICD-10-CM

## 2023-10-09 RX ORDER — HYDROCODONE BITARTRATE AND ACETAMINOPHEN 10; 325 MG/1; MG/1
1 TABLET ORAL EVERY 8 HOURS PRN
Qty: 90 TABLET | Refills: 0 | Status: SHIPPED | OUTPATIENT
Start: 2023-10-09 | End: 2023-11-08

## 2023-10-09 RX ORDER — AMLODIPINE BESYLATE 5 MG/1
5 TABLET ORAL DAILY
Qty: 30 TABLET | Refills: 3 | Status: SHIPPED | OUTPATIENT
Start: 2023-10-09

## 2023-10-09 NOTE — TELEPHONE ENCOUNTER
[facility] 09/21/2023    Requested Prescriptions     Pending Prescriptions Disp Refills    amLODIPine (NORVASC) 5 MG tablet 30 tablet 3     Sig: Take 1 tablet by mouth daily    HYDROcodone-acetaminophen (NORCO)  MG per tablet 60 tablet 0     Sig: Take 1 tablet by mouth every 8 hours as needed for Pain for up to 30 days.  Max Daily Amount: 3 tablets

## 2023-11-06 DIAGNOSIS — I10 PRIMARY HYPERTENSION: Chronic | ICD-10-CM

## 2023-11-06 DIAGNOSIS — K85.91 NECROTIZING PANCREATITIS: ICD-10-CM

## 2023-11-06 DIAGNOSIS — K21.9 GASTROESOPHAGEAL REFLUX DISEASE, UNSPECIFIED WHETHER ESOPHAGITIS PRESENT: ICD-10-CM

## 2023-11-06 RX ORDER — AMLODIPINE BESYLATE 5 MG/1
5 TABLET ORAL DAILY
Qty: 30 TABLET | Refills: 3 | Status: SHIPPED | OUTPATIENT
Start: 2023-11-06

## 2023-11-06 RX ORDER — HYDROCODONE BITARTRATE AND ACETAMINOPHEN 10; 325 MG/1; MG/1
1 TABLET ORAL EVERY 8 HOURS PRN
Qty: 90 TABLET | Refills: 0 | Status: SHIPPED | OUTPATIENT
Start: 2023-11-06 | End: 2023-12-06

## 2023-11-06 RX ORDER — PANTOPRAZOLE SODIUM 40 MG/1
40 TABLET, DELAYED RELEASE ORAL NIGHTLY
Qty: 30 TABLET | Refills: 2 | Status: SHIPPED | OUTPATIENT
Start: 2023-11-06

## 2023-11-30 ENCOUNTER — OFFICE VISIT (OUTPATIENT)
Dept: INTERNAL MEDICINE CLINIC | Facility: CLINIC | Age: 38
End: 2023-11-30

## 2023-11-30 VITALS
BODY MASS INDEX: 27.32 KG/M2 | SYSTOLIC BLOOD PRESSURE: 118 MMHG | HEIGHT: 70 IN | OXYGEN SATURATION: 98 % | DIASTOLIC BLOOD PRESSURE: 80 MMHG | WEIGHT: 190.8 LBS | HEART RATE: 89 BPM

## 2023-11-30 DIAGNOSIS — K85.91 NECROTIZING PANCREATITIS: ICD-10-CM

## 2023-11-30 DIAGNOSIS — I25.10 CORONARY ARTERY DISEASE INVOLVING NATIVE CORONARY ARTERY OF NATIVE HEART WITHOUT ANGINA PECTORIS: Chronic | ICD-10-CM

## 2023-11-30 DIAGNOSIS — I10 PRIMARY HYPERTENSION: Primary | Chronic | ICD-10-CM

## 2023-11-30 DIAGNOSIS — K86.0 ALCOHOL-INDUCED CHRONIC PANCREATITIS (HCC): ICD-10-CM

## 2023-11-30 PROCEDURE — 99214 OFFICE O/P EST MOD 30 MIN: CPT | Performed by: INTERNAL MEDICINE

## 2023-11-30 PROCEDURE — 3074F SYST BP LT 130 MM HG: CPT | Performed by: INTERNAL MEDICINE

## 2023-11-30 PROCEDURE — 3079F DIAST BP 80-89 MM HG: CPT | Performed by: INTERNAL MEDICINE

## 2023-11-30 RX ORDER — HYDROCODONE BITARTRATE AND ACETAMINOPHEN 10; 325 MG/1; MG/1
1 TABLET ORAL EVERY 8 HOURS PRN
Qty: 90 TABLET | Refills: 0 | Status: SHIPPED | OUTPATIENT
Start: 2023-11-30 | End: 2023-12-30

## 2023-11-30 RX ORDER — METOCLOPRAMIDE 10 MG/1
10 TABLET ORAL
Qty: 60 TABLET | Refills: 0 | Status: SHIPPED | OUTPATIENT
Start: 2023-11-30

## 2023-11-30 ASSESSMENT — ENCOUNTER SYMPTOMS
CONSTIPATION: 1
SHORTNESS OF BREATH: 0
CHEST TIGHTNESS: 0
ABDOMINAL DISTENTION: 1
PHOTOPHOBIA: 0
ABDOMINAL PAIN: 1
WHEEZING: 0

## 2023-11-30 NOTE — PROGRESS NOTES
Chief Complaint   Patient presents with    Follow-up     3 month f/u          Sam Rosado is a 43 y.o. male who presents today for Follow-up (3 month f/u/)     He is here to follow-up in chronic pain syndrome associated with chronic pancreatitis, he has been at least 4 months without any readmissions to the hospital, he is currently taking hydrocodone acetaminophen every 6-8 hours, and he does not take it every day, daily he has been experiencing an increase in abdominal discomfort, he believes is associated with some dietary indiscretions during Thanksgiving. He reports occasional nausea, and constipation,  Today he also reports some decrease in sexual drive, he believes is associated with cardiac medications,  Has an appointment follow-up with GI for repeating MRI,  And also will need to make a follow-up with cardiology to determine date of the treatment with Brilinta and other medications, he has not yet needed to use nitroglycerin for any chest pain  UTD with UDS    Wt Readings from Last 3 Encounters:   11/30/23 86.5 kg (190 lb 12.8 oz)   08/30/23 84.5 kg (186 lb 3.2 oz)   07/30/23 81.6 kg (180 lb)     Vitals:    11/30/23 0804   BP: 118/80   Site: Left Upper Arm   Position: Sitting   Pulse: 89   SpO2: 98%   Weight: 86.5 kg (190 lb 12.8 oz)   Height: 1.778 m (5' 10\")        Assessment and plan:  1. Primary hypertension  2. Alcohol-induced chronic pancreatitis (HCC)  -     metoclopramide (REGLAN) 10 MG tablet; Take 1 tablet by mouth every 8-12 hours as needed (nausea), Disp-60 tablet, R-0Normal  3. Necrotizing pancreatitis  -     HYDROcodone-acetaminophen (NORCO)  MG per tablet; Take 1 tablet by mouth every 8 hours as needed for Pain for up to 30 days. Max Daily Amount: 3 tablets, Disp-90 tablet, R-0Normal  4.  Coronary artery disease involving native coronary artery of native heart without angina pectoris    Pain is currently borderline controlled, we will continue with current

## 2023-12-04 DIAGNOSIS — K85.91 NECROTIZING PANCREATITIS: ICD-10-CM

## 2023-12-04 DIAGNOSIS — K21.9 GASTROESOPHAGEAL REFLUX DISEASE, UNSPECIFIED WHETHER ESOPHAGITIS PRESENT: ICD-10-CM

## 2023-12-04 RX ORDER — PANTOPRAZOLE SODIUM 40 MG/1
40 TABLET, DELAYED RELEASE ORAL NIGHTLY
Qty: 30 TABLET | Refills: 2 | Status: SHIPPED | OUTPATIENT
Start: 2023-12-04

## 2023-12-04 RX ORDER — HYDROCODONE BITARTRATE AND ACETAMINOPHEN 10; 325 MG/1; MG/1
1 TABLET ORAL EVERY 8 HOURS PRN
Qty: 90 TABLET | Refills: 0 | Status: SHIPPED | OUTPATIENT
Start: 2023-12-04 | End: 2024-01-03

## 2023-12-04 NOTE — TELEPHONE ENCOUNTER
[facility]   11/7/23    Requested Prescriptions     Pending Prescriptions Disp Refills    pantoprazole (PROTONIX) 40 MG tablet 30 tablet 2     Sig: Take 1 tablet by mouth at bedtime    HYDROcodone-acetaminophen (NORCO)  MG per tablet 90 tablet 0     Sig: Take 1 tablet by mouth every 8 hours as needed for Pain for up to 30 days.  Max Daily Amount: 3 tablets

## 2023-12-13 ENCOUNTER — HOSPITAL ENCOUNTER (EMERGENCY)
Age: 38
Discharge: HOME | End: 2023-12-14
Attending: EMERGENCY MEDICINE

## 2023-12-13 DIAGNOSIS — K85.90 ACUTE PANCREATITIS, UNSPECIFIED COMPLICATION STATUS, UNSPECIFIED PANCREATITIS TYPE: Primary | ICD-10-CM

## 2023-12-13 LAB
ALBUMIN SERPL-MCNC: 4.5 G/DL (ref 3.5–5)
ALBUMIN/GLOB SERPL: 1.6 (ref 0.4–1.6)
ALP SERPL-CCNC: 62 U/L (ref 45–117)
ALT SERPL-CCNC: 53 U/L (ref 13–61)
ANION GAP SERPL CALC-SCNC: 15 MMOL/L (ref 2–11)
AST SERPL-CCNC: 58 U/L (ref 15–37)
BASOPHILS # BLD: 0.1 K/UL (ref 0–0.2)
BASOPHILS NFR BLD: 1 % (ref 0–2)
BILIRUB SERPL-MCNC: 0.8 MG/DL (ref 0.2–1.1)
BUN SERPL-MCNC: 11 MG/DL (ref 6–23)
CALCIUM SERPL-MCNC: 9.9 MG/DL (ref 8.3–10.4)
CHLORIDE SERPL-SCNC: 101 MMOL/L (ref 98–107)
CO2 SERPL-SCNC: 21 MMOL/L (ref 21–32)
CREAT SERPL-MCNC: 0.89 MG/DL (ref 0.8–1.5)
DIFFERENTIAL METHOD BLD: ABNORMAL
EOSINOPHIL # BLD: 0.1 K/UL (ref 0–0.8)
EOSINOPHIL NFR BLD: 1 % (ref 0.5–7.8)
ERYTHROCYTE [DISTWIDTH] IN BLOOD BY AUTOMATED COUNT: 13.5 % (ref 11.9–14.6)
GLOBULIN SER CALC-MCNC: 2.9 G/DL (ref 2.8–4.5)
GLUCOSE SERPL-MCNC: 88 MG/DL (ref 65–100)
HCT VFR BLD AUTO: 50.3 % (ref 41.1–50.3)
HGB BLD-MCNC: 17.2 G/DL (ref 13.6–17.2)
IMM GRANULOCYTES # BLD AUTO: 0 K/UL (ref 0–0.5)
IMM GRANULOCYTES NFR BLD AUTO: 0 % (ref 0–5)
LIPASE SERPL-CCNC: 176 U/L (ref 13–60)
LYMPHOCYTES # BLD: 1.6 K/UL (ref 0.5–4.6)
LYMPHOCYTES NFR BLD: 16 % (ref 13–44)
MAGNESIUM SERPL-MCNC: 1.4 MG/DL (ref 1.2–2.6)
MCH RBC QN AUTO: 30.2 PG (ref 26.1–32.9)
MCHC RBC AUTO-ENTMCNC: 34.2 G/DL (ref 31.4–35)
MCV RBC AUTO: 88.4 FL (ref 82–102)
MONOCYTES # BLD: 0.8 K/UL (ref 0.1–1.3)
MONOCYTES NFR BLD: 8 % (ref 4–12)
NEUTS SEG # BLD: 7.9 K/UL (ref 1.7–8.2)
NEUTS SEG NFR BLD: 75 % (ref 43–78)
NRBC # BLD: 0 K/UL (ref 0–0.2)
PLATELET # BLD AUTO: 291 K/UL (ref 150–450)
PMV BLD AUTO: 9 FL (ref 9.4–12.3)
POTASSIUM SERPL-SCNC: 3.9 MMOL/L (ref 3.5–5.1)
PROT SERPL-MCNC: 7.4 G/DL (ref 6.4–8.2)
RBC # BLD AUTO: 5.69 M/UL (ref 4.23–5.6)
SODIUM SERPL-SCNC: 137 MMOL/L (ref 133–143)
WBC # BLD AUTO: 10.5 K/UL (ref 4.3–11.1)

## 2023-12-13 PROCEDURE — 80053 COMPREHEN METABOLIC PANEL: CPT

## 2023-12-13 PROCEDURE — 2580000003 HC RX 258: Performed by: EMERGENCY MEDICINE

## 2023-12-13 PROCEDURE — 83735 ASSAY OF MAGNESIUM: CPT

## 2023-12-13 PROCEDURE — 96374 THER/PROPH/DIAG INJ IV PUSH: CPT

## 2023-12-13 PROCEDURE — 6360000002 HC RX W HCPCS: Performed by: EMERGENCY MEDICINE

## 2023-12-13 PROCEDURE — 85025 COMPLETE CBC W/AUTO DIFF WBC: CPT

## 2023-12-13 PROCEDURE — 96375 TX/PRO/DX INJ NEW DRUG ADDON: CPT

## 2023-12-13 PROCEDURE — 99284 EMERGENCY DEPT VISIT MOD MDM: CPT

## 2023-12-13 PROCEDURE — 83690 ASSAY OF LIPASE: CPT

## 2023-12-13 PROCEDURE — 96376 TX/PRO/DX INJ SAME DRUG ADON: CPT

## 2023-12-13 PROCEDURE — 96361 HYDRATE IV INFUSION ADD-ON: CPT

## 2023-12-13 RX ORDER — MORPHINE SULFATE 4 MG/ML
4 INJECTION INTRAVENOUS ONCE
Status: COMPLETED | OUTPATIENT
Start: 2023-12-13 | End: 2023-12-13

## 2023-12-13 RX ORDER — ONDANSETRON 2 MG/ML
4 INJECTION INTRAMUSCULAR; INTRAVENOUS
Status: COMPLETED | OUTPATIENT
Start: 2023-12-13 | End: 2023-12-13

## 2023-12-13 RX ORDER — HYDROMORPHONE HYDROCHLORIDE 1 MG/ML
1 INJECTION, SOLUTION INTRAMUSCULAR; INTRAVENOUS; SUBCUTANEOUS
Status: COMPLETED | OUTPATIENT
Start: 2023-12-13 | End: 2023-12-13

## 2023-12-13 RX ORDER — 0.9 % SODIUM CHLORIDE 0.9 %
1000 INTRAVENOUS SOLUTION INTRAVENOUS
Status: COMPLETED | OUTPATIENT
Start: 2023-12-13 | End: 2023-12-13

## 2023-12-13 RX ADMIN — SODIUM CHLORIDE 1000 ML: 9 INJECTION, SOLUTION INTRAVENOUS at 22:56

## 2023-12-13 RX ADMIN — ONDANSETRON 4 MG: 2 INJECTION INTRAMUSCULAR; INTRAVENOUS at 22:57

## 2023-12-13 RX ADMIN — MORPHINE SULFATE 4 MG: 4 INJECTION, SOLUTION INTRAMUSCULAR; INTRAVENOUS at 22:57

## 2023-12-13 RX ADMIN — HYDROMORPHONE HYDROCHLORIDE 1 MG: 1 INJECTION, SOLUTION INTRAMUSCULAR; INTRAVENOUS; SUBCUTANEOUS at 23:41

## 2023-12-13 ASSESSMENT — PAIN DESCRIPTION - LOCATION
LOCATION: ABDOMEN
LOCATION: ABDOMEN

## 2023-12-13 ASSESSMENT — PAIN SCALES - GENERAL
PAINLEVEL_OUTOF10: 9

## 2023-12-13 ASSESSMENT — PAIN - FUNCTIONAL ASSESSMENT: PAIN_FUNCTIONAL_ASSESSMENT: 0-10

## 2023-12-13 ASSESSMENT — PAIN DESCRIPTION - DESCRIPTORS: DESCRIPTORS: PRESSURE;TIGHTNESS

## 2023-12-13 ASSESSMENT — PAIN DESCRIPTION - ORIENTATION
ORIENTATION: RIGHT;LEFT
ORIENTATION: LEFT

## 2023-12-14 ENCOUNTER — HOSPITAL ENCOUNTER (EMERGENCY)
Age: 38
Discharge: ANOTHER HOSPITAL | End: 2023-12-14
Attending: EMERGENCY MEDICINE

## 2023-12-14 ENCOUNTER — HOSPITAL ENCOUNTER (INPATIENT)
Age: 38
LOS: 2 days | Discharge: HOME | End: 2023-12-16
Attending: FAMILY MEDICINE | Admitting: FAMILY MEDICINE

## 2023-12-14 VITALS
OXYGEN SATURATION: 97 % | BODY MASS INDEX: 27.64 KG/M2 | WEIGHT: 186.6 LBS | HEIGHT: 69 IN | RESPIRATION RATE: 16 BRPM | TEMPERATURE: 99.1 F | SYSTOLIC BLOOD PRESSURE: 151 MMHG | HEART RATE: 86 BPM | DIASTOLIC BLOOD PRESSURE: 99 MMHG

## 2023-12-14 VITALS
SYSTOLIC BLOOD PRESSURE: 132 MMHG | HEIGHT: 69 IN | HEART RATE: 88 BPM | TEMPERATURE: 97.7 F | OXYGEN SATURATION: 99 % | RESPIRATION RATE: 16 BRPM | WEIGHT: 186 LBS | DIASTOLIC BLOOD PRESSURE: 86 MMHG | BODY MASS INDEX: 27.55 KG/M2

## 2023-12-14 DIAGNOSIS — K86.1 ACUTE ON CHRONIC PANCREATITIS (HCC): Primary | ICD-10-CM

## 2023-12-14 DIAGNOSIS — K85.90 ACUTE ON CHRONIC PANCREATITIS (HCC): Primary | ICD-10-CM

## 2023-12-14 LAB
ALBUMIN SERPL-MCNC: 4.5 G/DL (ref 3.5–5)
ALBUMIN/GLOB SERPL: 1.4 (ref 0.4–1.6)
ALP SERPL-CCNC: 63 U/L (ref 45–117)
ALT SERPL-CCNC: 48 U/L (ref 13–61)
ANION GAP SERPL CALC-SCNC: 15 MMOL/L (ref 2–11)
APPEARANCE UR: CLEAR
AST SERPL-CCNC: 53 U/L (ref 15–37)
BACTERIA URNS QL MICRO: NORMAL /HPF
BASOPHILS # BLD: 0.1 K/UL (ref 0–0.2)
BASOPHILS NFR BLD: 0 % (ref 0–2)
BILIRUB SERPL-MCNC: 0.9 MG/DL (ref 0.2–1.1)
BILIRUB UR QL: NEGATIVE
BUN SERPL-MCNC: 9 MG/DL (ref 6–23)
CALCIUM SERPL-MCNC: 9.6 MG/DL (ref 8.3–10.4)
CASTS URNS QL MICRO: 0 /LPF
CHLORIDE SERPL-SCNC: 100 MMOL/L (ref 98–107)
CO2 SERPL-SCNC: 18 MMOL/L (ref 21–32)
COLOR UR: YELLOW
CREAT SERPL-MCNC: 0.78 MG/DL (ref 0.8–1.5)
CRYSTALS URNS QL MICRO: 0 /LPF
DIFFERENTIAL METHOD BLD: ABNORMAL
EOSINOPHIL # BLD: 0 K/UL (ref 0–0.8)
EOSINOPHIL NFR BLD: 0 % (ref 0.5–7.8)
EPI CELLS #/AREA URNS HPF: 0 /HPF
ERYTHROCYTE [DISTWIDTH] IN BLOOD BY AUTOMATED COUNT: 13.3 % (ref 11.9–14.6)
GLOBULIN SER CALC-MCNC: 3.3 G/DL (ref 2.8–4.5)
GLUCOSE SERPL-MCNC: 79 MG/DL (ref 65–100)
GLUCOSE UR STRIP.AUTO-MCNC: NEGATIVE MG/DL
HCT VFR BLD AUTO: 53 % (ref 41.1–50.3)
HGB BLD-MCNC: 17.8 G/DL (ref 13.6–17.2)
HGB UR QL STRIP: ABNORMAL
IMM GRANULOCYTES # BLD AUTO: 0 K/UL (ref 0–0.5)
IMM GRANULOCYTES NFR BLD AUTO: 0 % (ref 0–5)
KETONES UR QL STRIP.AUTO: >160 MG/DL
LEUKOCYTE ESTERASE UR QL STRIP.AUTO: NEGATIVE
LIPASE SERPL-CCNC: 248 U/L (ref 13–60)
LYMPHOCYTES # BLD: 0.9 K/UL (ref 0.5–4.6)
LYMPHOCYTES NFR BLD: 8 % (ref 13–44)
MCH RBC QN AUTO: 30 PG (ref 26.1–32.9)
MCHC RBC AUTO-ENTMCNC: 33.6 G/DL (ref 31.4–35)
MCV RBC AUTO: 89.2 FL (ref 82–102)
MONOCYTES # BLD: 0.7 K/UL (ref 0.1–1.3)
MONOCYTES NFR BLD: 6 % (ref 4–12)
MUCOUS THREADS URNS QL MICRO: 0 /LPF
NEUTS SEG # BLD: 9.7 K/UL (ref 1.7–8.2)
NEUTS SEG NFR BLD: 85 % (ref 43–78)
NITRITE UR QL STRIP.AUTO: NEGATIVE
NRBC # BLD: 0 K/UL (ref 0–0.2)
OTHER OBSERVATIONS: NORMAL
PH UR STRIP: 7 (ref 5–9)
PLATELET # BLD AUTO: 309 K/UL (ref 150–450)
PMV BLD AUTO: 8.9 FL (ref 9.4–12.3)
POTASSIUM SERPL-SCNC: 4.4 MMOL/L (ref 3.5–5.1)
PROT SERPL-MCNC: 7.8 G/DL (ref 6.4–8.2)
PROT UR STRIP-MCNC: 30 MG/DL
RBC # BLD AUTO: 5.94 M/UL (ref 4.23–5.6)
RBC #/AREA URNS HPF: NORMAL /HPF
SODIUM SERPL-SCNC: 133 MMOL/L (ref 133–143)
SP GR UR REFRACTOMETRY: 1.02 (ref 1–1.02)
UROBILINOGEN UR QL STRIP.AUTO: 0.2 EU/DL (ref 0.2–1)
WBC # BLD AUTO: 11.4 K/UL (ref 4.3–11.1)
WBC URNS QL MICRO: NORMAL /HPF

## 2023-12-14 PROCEDURE — 6360000002 HC RX W HCPCS: Performed by: FAMILY MEDICINE

## 2023-12-14 PROCEDURE — 96375 TX/PRO/DX INJ NEW DRUG ADDON: CPT

## 2023-12-14 PROCEDURE — 6360000002 HC RX W HCPCS: Performed by: EMERGENCY MEDICINE

## 2023-12-14 PROCEDURE — 85025 COMPLETE CBC W/AUTO DIFF WBC: CPT

## 2023-12-14 PROCEDURE — 81001 URINALYSIS AUTO W/SCOPE: CPT

## 2023-12-14 PROCEDURE — 96374 THER/PROPH/DIAG INJ IV PUSH: CPT

## 2023-12-14 PROCEDURE — 6370000000 HC RX 637 (ALT 250 FOR IP): Performed by: FAMILY MEDICINE

## 2023-12-14 PROCEDURE — 80053 COMPREHEN METABOLIC PANEL: CPT

## 2023-12-14 PROCEDURE — 83690 ASSAY OF LIPASE: CPT

## 2023-12-14 PROCEDURE — 1100000000 HC RM PRIVATE

## 2023-12-14 PROCEDURE — 96376 TX/PRO/DX INJ SAME DRUG ADON: CPT

## 2023-12-14 PROCEDURE — 6370000000 HC RX 637 (ALT 250 FOR IP): Performed by: STUDENT IN AN ORGANIZED HEALTH CARE EDUCATION/TRAINING PROGRAM

## 2023-12-14 PROCEDURE — 2580000003 HC RX 258: Performed by: STUDENT IN AN ORGANIZED HEALTH CARE EDUCATION/TRAINING PROGRAM

## 2023-12-14 PROCEDURE — 6360000002 HC RX W HCPCS: Performed by: STUDENT IN AN ORGANIZED HEALTH CARE EDUCATION/TRAINING PROGRAM

## 2023-12-14 PROCEDURE — 2580000003 HC RX 258: Performed by: FAMILY MEDICINE

## 2023-12-14 PROCEDURE — 99285 EMERGENCY DEPT VISIT HI MDM: CPT

## 2023-12-14 RX ORDER — ONDANSETRON 4 MG/1
4 TABLET, ORALLY DISINTEGRATING ORAL
Status: COMPLETED | OUTPATIENT
Start: 2023-12-14 | End: 2023-12-14

## 2023-12-14 RX ORDER — AMLODIPINE BESYLATE 5 MG/1
5 TABLET ORAL DAILY
Status: DISCONTINUED | OUTPATIENT
Start: 2023-12-15 | End: 2023-12-16 | Stop reason: HOSPADM

## 2023-12-14 RX ORDER — SODIUM CHLORIDE 0.9 % (FLUSH) 0.9 %
5-40 SYRINGE (ML) INJECTION EVERY 12 HOURS SCHEDULED
Status: DISCONTINUED | OUTPATIENT
Start: 2023-12-14 | End: 2023-12-16 | Stop reason: HOSPADM

## 2023-12-14 RX ORDER — SODIUM CHLORIDE, SODIUM LACTATE, POTASSIUM CHLORIDE, CALCIUM CHLORIDE 600; 310; 30; 20 MG/100ML; MG/100ML; MG/100ML; MG/100ML
INJECTION, SOLUTION INTRAVENOUS CONTINUOUS
Status: DISCONTINUED | OUTPATIENT
Start: 2023-12-14 | End: 2023-12-15

## 2023-12-14 RX ORDER — MAGNESIUM SULFATE IN WATER 40 MG/ML
2000 INJECTION, SOLUTION INTRAVENOUS PRN
Status: DISCONTINUED | OUTPATIENT
Start: 2023-12-14 | End: 2023-12-16 | Stop reason: HOSPADM

## 2023-12-14 RX ORDER — ACETAMINOPHEN 325 MG/1
650 TABLET ORAL EVERY 6 HOURS PRN
Status: DISCONTINUED | OUTPATIENT
Start: 2023-12-14 | End: 2023-12-16 | Stop reason: HOSPADM

## 2023-12-14 RX ORDER — SODIUM CHLORIDE 9 MG/ML
INJECTION, SOLUTION INTRAVENOUS PRN
Status: DISCONTINUED | OUTPATIENT
Start: 2023-12-14 | End: 2023-12-16 | Stop reason: HOSPADM

## 2023-12-14 RX ORDER — PANTOPRAZOLE SODIUM 40 MG/1
40 TABLET, DELAYED RELEASE ORAL NIGHTLY
Status: DISCONTINUED | OUTPATIENT
Start: 2023-12-14 | End: 2023-12-14

## 2023-12-14 RX ORDER — SODIUM CHLORIDE 0.9 % (FLUSH) 0.9 %
5-40 SYRINGE (ML) INJECTION PRN
Status: DISCONTINUED | OUTPATIENT
Start: 2023-12-14 | End: 2023-12-16 | Stop reason: HOSPADM

## 2023-12-14 RX ORDER — ACETAMINOPHEN 650 MG/1
650 SUPPOSITORY RECTAL EVERY 6 HOURS PRN
Status: DISCONTINUED | OUTPATIENT
Start: 2023-12-14 | End: 2023-12-16 | Stop reason: HOSPADM

## 2023-12-14 RX ORDER — ONDANSETRON 4 MG/1
4 TABLET, FILM COATED ORAL 3 TIMES DAILY PRN
Qty: 15 TABLET | Refills: 2 | Status: SHIPPED | OUTPATIENT
Start: 2023-12-14

## 2023-12-14 RX ORDER — HYDROMORPHONE HYDROCHLORIDE 1 MG/ML
1 INJECTION, SOLUTION INTRAMUSCULAR; INTRAVENOUS; SUBCUTANEOUS
Status: COMPLETED | OUTPATIENT
Start: 2023-12-14 | End: 2023-12-14

## 2023-12-14 RX ORDER — POTASSIUM CHLORIDE 20 MEQ/1
40 TABLET, EXTENDED RELEASE ORAL PRN
Status: DISCONTINUED | OUTPATIENT
Start: 2023-12-14 | End: 2023-12-16 | Stop reason: HOSPADM

## 2023-12-14 RX ORDER — MORPHINE SULFATE 2 MG/ML
2 INJECTION, SOLUTION INTRAMUSCULAR; INTRAVENOUS EVERY 4 HOURS PRN
Status: DISCONTINUED | OUTPATIENT
Start: 2023-12-14 | End: 2023-12-14

## 2023-12-14 RX ORDER — PANTOPRAZOLE SODIUM 40 MG/1
40 TABLET, DELAYED RELEASE ORAL
Status: DISCONTINUED | OUTPATIENT
Start: 2023-12-15 | End: 2023-12-16 | Stop reason: HOSPADM

## 2023-12-14 RX ORDER — POTASSIUM CHLORIDE 7.45 MG/ML
10 INJECTION INTRAVENOUS PRN
Status: DISCONTINUED | OUTPATIENT
Start: 2023-12-14 | End: 2023-12-16 | Stop reason: HOSPADM

## 2023-12-14 RX ORDER — ENOXAPARIN SODIUM 100 MG/ML
40 INJECTION SUBCUTANEOUS EVERY 24 HOURS
Status: DISCONTINUED | OUTPATIENT
Start: 2023-12-14 | End: 2023-12-16 | Stop reason: HOSPADM

## 2023-12-14 RX ORDER — KETOROLAC TROMETHAMINE 15 MG/ML
15 INJECTION, SOLUTION INTRAMUSCULAR; INTRAVENOUS ONCE
Status: COMPLETED | OUTPATIENT
Start: 2023-12-14 | End: 2023-12-14

## 2023-12-14 RX ORDER — MORPHINE SULFATE 15 MG/1
15 TABLET ORAL EVERY 4 HOURS PRN
Qty: 15 TABLET | Refills: 0 | Status: ON HOLD | OUTPATIENT
Start: 2023-12-14 | End: 2023-12-16

## 2023-12-14 RX ORDER — HYDROMORPHONE HYDROCHLORIDE 1 MG/ML
1 INJECTION, SOLUTION INTRAMUSCULAR; INTRAVENOUS; SUBCUTANEOUS
Status: DISCONTINUED | OUTPATIENT
Start: 2023-12-14 | End: 2023-12-16 | Stop reason: HOSPADM

## 2023-12-14 RX ORDER — 0.9 % SODIUM CHLORIDE 0.9 %
1000 INTRAVENOUS SOLUTION INTRAVENOUS
Status: COMPLETED | OUTPATIENT
Start: 2023-12-14 | End: 2023-12-14

## 2023-12-14 RX ORDER — ONDANSETRON 2 MG/ML
4 INJECTION INTRAMUSCULAR; INTRAVENOUS
Status: COMPLETED | OUTPATIENT
Start: 2023-12-14 | End: 2023-12-14

## 2023-12-14 RX ORDER — POLYETHYLENE GLYCOL 3350 17 G/17G
17 POWDER, FOR SOLUTION ORAL DAILY PRN
Status: DISCONTINUED | OUTPATIENT
Start: 2023-12-14 | End: 2023-12-16 | Stop reason: HOSPADM

## 2023-12-14 RX ORDER — ONDANSETRON 2 MG/ML
4 INJECTION INTRAMUSCULAR; INTRAVENOUS EVERY 6 HOURS PRN
Status: DISCONTINUED | OUTPATIENT
Start: 2023-12-14 | End: 2023-12-16 | Stop reason: HOSPADM

## 2023-12-14 RX ORDER — METOCLOPRAMIDE 10 MG/1
10 TABLET ORAL 3 TIMES DAILY PRN
Status: DISCONTINUED | OUTPATIENT
Start: 2023-12-15 | End: 2023-12-16 | Stop reason: HOSPADM

## 2023-12-14 RX ORDER — HYDROMORPHONE HYDROCHLORIDE 1 MG/ML
0.5 INJECTION, SOLUTION INTRAMUSCULAR; INTRAVENOUS; SUBCUTANEOUS
Status: COMPLETED | OUTPATIENT
Start: 2023-12-14 | End: 2023-12-14

## 2023-12-14 RX ORDER — ONDANSETRON 4 MG/1
4 TABLET, ORALLY DISINTEGRATING ORAL EVERY 8 HOURS PRN
Status: DISCONTINUED | OUTPATIENT
Start: 2023-12-14 | End: 2023-12-16 | Stop reason: HOSPADM

## 2023-12-14 RX ORDER — OXYCODONE HYDROCHLORIDE 5 MG/1
10 TABLET ORAL EVERY 4 HOURS PRN
Status: DISCONTINUED | OUTPATIENT
Start: 2023-12-14 | End: 2023-12-16 | Stop reason: HOSPADM

## 2023-12-14 RX ADMIN — HYDROMORPHONE HYDROCHLORIDE 1 MG: 1 INJECTION, SOLUTION INTRAMUSCULAR; INTRAVENOUS; SUBCUTANEOUS at 21:42

## 2023-12-14 RX ADMIN — FENTANYL CITRATE 50 MCG: 50 INJECTION INTRAMUSCULAR; INTRAVENOUS at 17:33

## 2023-12-14 RX ADMIN — ONDANSETRON 4 MG: 2 INJECTION INTRAMUSCULAR; INTRAVENOUS at 21:45

## 2023-12-14 RX ADMIN — ONDANSETRON 4 MG: 2 INJECTION INTRAMUSCULAR; INTRAVENOUS at 14:27

## 2023-12-14 RX ADMIN — SODIUM CHLORIDE 1000 ML: 9 INJECTION, SOLUTION INTRAVENOUS at 14:27

## 2023-12-14 RX ADMIN — SODIUM CHLORIDE, POTASSIUM CHLORIDE, SODIUM LACTATE AND CALCIUM CHLORIDE: 600; 310; 30; 20 INJECTION, SOLUTION INTRAVENOUS at 20:29

## 2023-12-14 RX ADMIN — HYDROMORPHONE HYDROCHLORIDE 1 MG: 1 INJECTION, SOLUTION INTRAMUSCULAR; INTRAVENOUS; SUBCUTANEOUS at 14:27

## 2023-12-14 RX ADMIN — KETOROLAC TROMETHAMINE 15 MG: 15 INJECTION, SOLUTION INTRAMUSCULAR; INTRAVENOUS at 17:35

## 2023-12-14 RX ADMIN — MORPHINE SULFATE 2 MG: 2 INJECTION, SOLUTION INTRAMUSCULAR; INTRAVENOUS at 20:23

## 2023-12-14 RX ADMIN — HYDROMORPHONE HYDROCHLORIDE 1 MG: 1 INJECTION, SOLUTION INTRAMUSCULAR; INTRAVENOUS; SUBCUTANEOUS at 00:43

## 2023-12-14 RX ADMIN — HYDROMORPHONE HYDROCHLORIDE 0.5 MG: 1 INJECTION, SOLUTION INTRAMUSCULAR; INTRAVENOUS; SUBCUTANEOUS at 15:50

## 2023-12-14 RX ADMIN — TICAGRELOR 90 MG: 90 TABLET ORAL at 20:24

## 2023-12-14 RX ADMIN — ONDANSETRON 4 MG: 4 TABLET, ORALLY DISINTEGRATING ORAL at 17:15

## 2023-12-14 RX ADMIN — ENOXAPARIN SODIUM 40 MG: 100 INJECTION SUBCUTANEOUS at 20:24

## 2023-12-14 RX ADMIN — SODIUM CHLORIDE, PRESERVATIVE FREE 10 ML: 5 INJECTION INTRAVENOUS at 20:24

## 2023-12-14 ASSESSMENT — PAIN DESCRIPTION - LOCATION
LOCATION: ABDOMEN

## 2023-12-14 ASSESSMENT — PAIN SCALES - GENERAL
PAINLEVEL_OUTOF10: 9
PAINLEVEL_OUTOF10: 8
PAINLEVEL_OUTOF10: 9
PAINLEVEL_OUTOF10: 6
PAINLEVEL_OUTOF10: 8
PAINLEVEL_OUTOF10: 9
PAINLEVEL_OUTOF10: 5
PAINLEVEL_OUTOF10: 9

## 2023-12-14 ASSESSMENT — PAIN - FUNCTIONAL ASSESSMENT: PAIN_FUNCTIONAL_ASSESSMENT: 0-10

## 2023-12-14 ASSESSMENT — PAIN DESCRIPTION - DESCRIPTORS: DESCRIPTORS: ACHING

## 2023-12-14 NOTE — ED TRIAGE NOTES
Pt ambulatory to triage for reports of abdominal pain. Pt states he was seen here last night for his chronic pancreatitis. Pt states he was offered admission but wanted to try & manage it at home first. Pt states he went home & vomiting episodes persisted so he is back. Pt reports taking zofran around 0600 this morning.

## 2023-12-14 NOTE — ED NOTES
Pt states that he has chronic pancreatitis. Pt c/o mid abd pain x 2 days.   States that he has been eating red meat and that may have been the trigger     Ivana Ortega RN  12/13/23 2229

## 2023-12-14 NOTE — ED PROVIDER NOTES
Emergency Department Provider Signout / Continuation of Care Note         DISPOSITION Discharge - Pending Orders Complete 12/14/2023 12:39:06 AM       ICD-10-CM    1. Acute pancreatitis, unspecified complication status, unspecified pancreatitis type  K85.90 morphine (MSIR) 15 MG tablet          The patient's care was signed out to me at shift change. Final Plan      Signout pending pain control. Given 2 additional doses of Dilaudid with some improvement. Patient would like to try outpatient management with morphine.      Vimal Vale MD  12/14/23 0744

## 2023-12-14 NOTE — ED PROVIDER NOTES
Emergency Department Provider Note       PCP: Sergio Worley MD   Age: 43 y.o. Sex: male     DISPOSITION Decision To Transfer 12/14/2023 03:35:02 PM       ICD-10-CM    1. Acute on chronic pancreatitis (HCC)  K85.90     K86.1           Medical Decision Making     Complexity of Problems Addressed:  1 or more chronic illnesses with a severe exacerbation or progression. Data Reviewed and Analyzed:  I independently ordered and reviewed each unique test.  I reviewed external records: provider visit note from PCP. I reviewed external records: previous lab results from outside ED. Discussion of management or test interpretation. In summary this is a 65-year-old male patient presented for evaluation of symptoms most consistent with acute on chronic pancreatitis. He has a history of flareups that are recurrent and has noted that this feels the exact same. His history and exam is quite consistent with his diagnosis. His labs are stable. He did attempt outpatient therapy but failed. Therefore we will bring him into the hospital for pain control and treatment. His lipase is uptrending today. Vital stable. Plan for admission. Consulted hospitalist who agreed to admit. Patient stable at time of transfer  Patient history, ROS, physical exam, labs, radiological workup and all pertinent findings were discussed with attending Dr. Cierra Rueda. The patient was admitted and I have discussed patient management with the admitting provider. The management of this patient was discussed with an external consultant. Risk of Complications and/or Morbidity of Patient Management:  Parental controlled substances given in the ED. Drug therapy given requiring intensive monitoring for toxicity. Discussion with external consultants. Chronic medical problems impacting care include pancreratitis. Shared medical decision making was utilized in creating the patients health plan today.     ED Course as of

## 2023-12-14 NOTE — ED NOTES
Pt continues to rate his pain at approx 8-9. Requesting more pain meds before d/c.      Heriberto Morris RN  12/14/23 1619

## 2023-12-14 NOTE — ED PROVIDER NOTES
Emergency Department Provider Note       PCP: Anjana Moreno MD   Age: 43 y.o. Sex: male     DISPOSITION       No diagnosis found. Medical Decision Making     Complexity of Problems Addressed:  1 or more chronic illnesses with a severe exacerbation or progression. Data Reviewed and Analyzed:  I independently ordered and reviewed each unique test.  I reviewed external records: ED visit note from an outside group. I reviewed external records: provider visit note from PCP. I reviewed external records: provider visit note from outside specialist.  I reviewed external records: previous lab results from outside ED. I reviewed external records: previous imaging study including radiologist interpretation. Discussion of management or test interpretation. Plan for screening labs here today and check electrolytes as well as LFTs and lipase. 11:12 PM EST  Laboratory data here fairly stable. LFTs normal.  Lipase only 176. Plan is for reassessment. 11:12 PM EST The patient's care will be transitioned to Dr Jose Powell. At this time, the plan is reassessment of pain and symptoms. Please see that provider's documentation for further information. Risk of Complications and/or Morbidity of Patient Management:  Parental controlled substances given in the ED. Shared medical decision making was utilized in creating the patients health plan today. History       Patient presents the ER complaint abdominal pain concern for pancreatitis. Patient with a history of alcohol induced pancreatitis presents complaining of worsening upper abdominal pain for the past 2 to 3 days. Reports onset of nausea and vomiting later today. Denies any hematemesis or melena. Denies any fevers or chills    The history is provided by the patient.    Abdominal Pain  Pain location:  Epigastric  Pain quality: aching and cramping    Pain severity:  Moderate  Onset quality:  Gradual  Duration:  2

## 2023-12-14 NOTE — ED NOTES
Pt requesting more pain meds.   States that he has not felt any relief from the Morphine given earlier     Janine Upton  12/13/23 3770

## 2023-12-14 NOTE — ED TRIAGE NOTES
Patient presents ambulatory to triage in mild distress with concerns for pancreatitis flare up. Patient sts he has hx chronic pancreatitis. About 24 hours ago he started to have abdominal pain and nausea. Pain 9/10 and diffuse in abdomen, described as pressure and \"like someone is tightening a belt around me\". Patient has norco that he takes at home as needed. Last dose 2-3 hours ago with no relief.

## 2023-12-15 LAB
ALBUMIN SERPL-MCNC: 3.3 G/DL (ref 3.5–5)
ALBUMIN/GLOB SERPL: 1 (ref 0.4–1.6)
ALP SERPL-CCNC: 51 U/L (ref 50–136)
ALT SERPL-CCNC: 48 U/L (ref 12–65)
ANION GAP SERPL CALC-SCNC: 7 MMOL/L (ref 2–11)
AST SERPL-CCNC: 35 U/L (ref 15–37)
BASOPHILS # BLD: 0 K/UL (ref 0–0.2)
BASOPHILS NFR BLD: 0 % (ref 0–2)
BILIRUB SERPL-MCNC: 1.2 MG/DL (ref 0.2–1.1)
BUN SERPL-MCNC: 9 MG/DL (ref 6–23)
CALCIUM SERPL-MCNC: 8.9 MG/DL (ref 8.3–10.4)
CANCER AG19-9 SERPL-ACNC: 15.5 U/ML (ref 2–37)
CEA SERPL-MCNC: 1.8 NG/ML (ref 0–3)
CHLORIDE SERPL-SCNC: 105 MMOL/L (ref 103–113)
CO2 SERPL-SCNC: 23 MMOL/L (ref 21–32)
CREAT SERPL-MCNC: 0.86 MG/DL (ref 0.8–1.5)
DIFFERENTIAL METHOD BLD: ABNORMAL
EOSINOPHIL # BLD: 0.1 K/UL (ref 0–0.8)
EOSINOPHIL NFR BLD: 2 % (ref 0.5–7.8)
ERYTHROCYTE [DISTWIDTH] IN BLOOD BY AUTOMATED COUNT: 13.3 % (ref 11.9–14.6)
GLOBULIN SER CALC-MCNC: 3.2 G/DL (ref 2.8–4.5)
GLUCOSE SERPL-MCNC: 71 MG/DL (ref 65–100)
HCT VFR BLD AUTO: 45 % (ref 41.1–50.3)
HGB BLD-MCNC: 15.2 G/DL (ref 13.6–17.2)
IMM GRANULOCYTES # BLD AUTO: 0 K/UL (ref 0–0.5)
IMM GRANULOCYTES NFR BLD AUTO: 0 % (ref 0–5)
LYMPHOCYTES # BLD: 1.2 K/UL (ref 0.5–4.6)
LYMPHOCYTES NFR BLD: 24 % (ref 13–44)
MCH RBC QN AUTO: 30.2 PG (ref 26.1–32.9)
MCHC RBC AUTO-ENTMCNC: 33.8 G/DL (ref 31.4–35)
MCV RBC AUTO: 89.5 FL (ref 82–102)
MONOCYTES # BLD: 0.5 K/UL (ref 0.1–1.3)
MONOCYTES NFR BLD: 10 % (ref 4–12)
NEUTS SEG # BLD: 3.3 K/UL (ref 1.7–8.2)
NEUTS SEG NFR BLD: 64 % (ref 43–78)
NRBC # BLD: 0 K/UL (ref 0–0.2)
PLATELET # BLD AUTO: 232 K/UL (ref 150–450)
PMV BLD AUTO: 9.3 FL (ref 9.4–12.3)
POTASSIUM SERPL-SCNC: 3.8 MMOL/L (ref 3.5–5.1)
PROT SERPL-MCNC: 6.5 G/DL (ref 6.3–8.2)
RBC # BLD AUTO: 5.03 M/UL (ref 4.23–5.6)
SODIUM SERPL-SCNC: 135 MMOL/L (ref 136–146)
WBC # BLD AUTO: 5.1 K/UL (ref 4.3–11.1)

## 2023-12-15 PROCEDURE — 86301 IMMUNOASSAY TUMOR CA 19-9: CPT

## 2023-12-15 PROCEDURE — 82378 CARCINOEMBRYONIC ANTIGEN: CPT

## 2023-12-15 PROCEDURE — 80053 COMPREHEN METABOLIC PANEL: CPT

## 2023-12-15 PROCEDURE — 6360000002 HC RX W HCPCS: Performed by: FAMILY MEDICINE

## 2023-12-15 PROCEDURE — 6370000000 HC RX 637 (ALT 250 FOR IP): Performed by: FAMILY MEDICINE

## 2023-12-15 PROCEDURE — 85025 COMPLETE CBC W/AUTO DIFF WBC: CPT

## 2023-12-15 PROCEDURE — 1100000000 HC RM PRIVATE

## 2023-12-15 PROCEDURE — 36415 COLL VENOUS BLD VENIPUNCTURE: CPT

## 2023-12-15 PROCEDURE — 2580000003 HC RX 258: Performed by: FAMILY MEDICINE

## 2023-12-15 RX ADMIN — HYDROMORPHONE HYDROCHLORIDE 1 MG: 1 INJECTION, SOLUTION INTRAMUSCULAR; INTRAVENOUS; SUBCUTANEOUS at 10:35

## 2023-12-15 RX ADMIN — PANTOPRAZOLE SODIUM 40 MG: 40 TABLET, DELAYED RELEASE ORAL at 06:25

## 2023-12-15 RX ADMIN — OXYCODONE 10 MG: 5 TABLET ORAL at 09:37

## 2023-12-15 RX ADMIN — SODIUM CHLORIDE, PRESERVATIVE FREE 10 ML: 5 INJECTION INTRAVENOUS at 22:01

## 2023-12-15 RX ADMIN — HYDROMORPHONE HYDROCHLORIDE 1 MG: 1 INJECTION, SOLUTION INTRAMUSCULAR; INTRAVENOUS; SUBCUTANEOUS at 06:25

## 2023-12-15 RX ADMIN — HYDROMORPHONE HYDROCHLORIDE 1 MG: 1 INJECTION, SOLUTION INTRAMUSCULAR; INTRAVENOUS; SUBCUTANEOUS at 00:36

## 2023-12-15 RX ADMIN — AMLODIPINE BESYLATE 5 MG: 5 TABLET ORAL at 08:33

## 2023-12-15 RX ADMIN — ENOXAPARIN SODIUM 40 MG: 100 INJECTION SUBCUTANEOUS at 19:43

## 2023-12-15 RX ADMIN — OXYCODONE 10 MG: 5 TABLET ORAL at 15:28

## 2023-12-15 RX ADMIN — TICAGRELOR 90 MG: 90 TABLET ORAL at 22:01

## 2023-12-15 RX ADMIN — HYDROMORPHONE HYDROCHLORIDE 1 MG: 1 INJECTION, SOLUTION INTRAMUSCULAR; INTRAVENOUS; SUBCUTANEOUS at 13:42

## 2023-12-15 RX ADMIN — OXYCODONE 10 MG: 5 TABLET ORAL at 02:03

## 2023-12-15 RX ADMIN — SODIUM CHLORIDE, PRESERVATIVE FREE 10 ML: 5 INJECTION INTRAVENOUS at 08:34

## 2023-12-15 RX ADMIN — HYDROMORPHONE HYDROCHLORIDE 1 MG: 1 INJECTION, SOLUTION INTRAMUSCULAR; INTRAVENOUS; SUBCUTANEOUS at 19:55

## 2023-12-15 RX ADMIN — OXYCODONE 10 MG: 5 TABLET ORAL at 22:00

## 2023-12-15 RX ADMIN — HYDROMORPHONE HYDROCHLORIDE 1 MG: 1 INJECTION, SOLUTION INTRAMUSCULAR; INTRAVENOUS; SUBCUTANEOUS at 03:36

## 2023-12-15 RX ADMIN — SODIUM CHLORIDE, POTASSIUM CHLORIDE, SODIUM LACTATE AND CALCIUM CHLORIDE: 600; 310; 30; 20 INJECTION, SOLUTION INTRAVENOUS at 03:39

## 2023-12-15 RX ADMIN — TICAGRELOR 90 MG: 90 TABLET ORAL at 08:33

## 2023-12-15 ASSESSMENT — PAIN SCALES - GENERAL
PAINLEVEL_OUTOF10: 9
PAINLEVEL_OUTOF10: 8
PAINLEVEL_OUTOF10: 9
PAINLEVEL_OUTOF10: 6

## 2023-12-15 ASSESSMENT — PAIN DESCRIPTION - LOCATION
LOCATION: ABDOMEN

## 2023-12-15 NOTE — H&P
Hospitalist History and Physical   Admit Date:  2023  7:46 PM   Name:  Cindi Mckeon   Age:  43 y.o. Sex:  male  :  1985   MRN:  [id number]     Presenting Complaint: abdominal pain  Reason(s) for Admission: Acute on chronic pancreatitis ([address]) [K85.90, K86.1]     History of Present Illness:   Cindi Mckeon is a 43 y.o. male with medical history of chronic pancreatitis who presented to the [facility] ED with abdominal pain. Symptoms started ~2 days ago. Pain is located in epigastrium and radiates to his back. He originally presented to the ER yesterday and was offered admission, however he wished to attempt management at home after pain improved with IV dilaudid. Unfortunately, symptoms worsened again with persistent nausea/vomiting. Upon ER evaluation, lipase has increased to 248 from 176 yesterday. WBC is 11.4. Hospitalist consulted for admission. Patient transferred to [facility] for further care. Review of Systems:  10 systems reviewed and negative except as noted in HPI. Assessment & Plan:   * Acute on chronic pancreatitis (HCC)  Assessment & Plan  - NPO  - PRN oxycodone and dilaudid for pain (reports that this has worked in the past, also reports need for fentanyl patch during prior hospitalizations)  - PRN zofran and reglan for nausea  - Holding home pain medications  - LR  - Advance diet as tolerated  - If pain does not improve with this treatment plan, would recommend CT, as patient has h/o pancreatic necrosis and cyst.    GERD (gastroesophageal reflux disease)  Assessment & Plan  - Continue home protonix    Alcohol use disorder, severe, in early remission, dependence ([address])  Assessment & Plan  - Denies recent alcohol usage    Primary hypertension  Assessment & Plan  - BP stable  - Continue home norvasc        Disposition: inpatient      Past medical history reviewed.     Past Medical History:   Diagnosis Date    Abdominal pain 10/25/2022    Abnormal CT of the abdomen

## 2023-12-15 NOTE — ASSESSMENT & PLAN NOTE
- NPO  - PRN oxycodone and dilaudid for pain (reports that this has worked in the past, also reports need for fentanyl patch during prior hospitalizations)  - PRN zofran and reglan for nausea  - Holding home pain medications  - LR  - Advance diet as tolerated  - If pain does not improve with this treatment plan, would recommend CT, as patient has h/o pancreatic necrosis and cyst.

## 2023-12-15 NOTE — PROGRESS NOTES
Hospitalist Progress Note   Admit Date:  2023  7:46 PM   Name:  Rebel Raw   Age:  43 y.o. Sex:  male  :  1985   MRN:  [id number]   Room:  [facility]/    Presenting/Chief Complaint: No chief complaint on file. Reason(s) for Admission: Acute on chronic pancreatitis ([address]) [K85.90, K86.1]     Hospital Course:   43 y.o. male with medical history of chronic pancreatitis who presented to the [place] ED with abdominal pain. Symptoms started ~2 days ago. Pain is located in epigastrium and radiates to his back. He originally presented to the ER yesterday and was offered admission, however he wished to attempt management at home after pain improved with IV dilaudid. Unfortunately, symptoms worsened again with persistent nausea/vomiting. Upon ER evaluation, lipase has increased to 248 from 176 yesterday. WBC is 11.4. Hospitalist consulted for admission. Patient transferred to [facility] for further care. Subjective & 24hr Events:   Able to tolerate food this morning. Advance diet throughout the day was able to eat a full meal at dinner. Amenable to liver pain medications we select. Reports pain is currently controlled. Case discussed with nurse,       Assessment & Plan:   Acute on chronic pancreatitis   -Advance diet as tolerated  -Discontinue IVF-Zofran, Roxicodone   active Problems:    Alcohol use disorder, severe, in early remission, dependence  -Encouraged continued cessation    Gastroesophageal reflux disease  -Pantoprazole    Primary hypertension  -Amlodipine      Anticipated Discharge Arrangements:   Home    PT/OT evals and PPD ordered? No  Diet:  ADULT DIET;  Regular; Low Fat/Low Chol/High Fiber/MARYANNE  VTE prophylaxis:  Enoxaparin  Code status: Full Code      Non-peripheral Lines and Tubes (if present):          Telemetry (if present):           Hospital Problems:  Principal Problem:    Acute on chronic pancreatitis ([address])  Active Problems:    Alcohol use disorder, ml/min/1.73m2    Calcium 9.6 8.3 - 10.4 MG/DL    Total Bilirubin 0.9 0.2 - 1.1 MG/DL    ALT 48 13.0 - 61.0 U/L    AST 53 (H) 15 - 37 U/L    Alk Phosphatase 63 45.0 - 117.0 U/L    Total Protein 7.8 6.4 - 8.2 g/dL    Albumin 4.5 3.5 - 5.0 g/dL    Globulin 3.3 2.8 - 4.5 g/dL    Albumin/Globulin Ratio 1.4 0.4 - 1.6     Lipase    Collection Time: 12/14/23  1:43 PM   Result Value Ref Range    Lipase 248 (H) 13 - 60 U/L   CBC with Auto Differential    Collection Time: 12/14/23  2:23 PM   Result Value Ref Range    WBC 11.4 (H) 4.3 - 11.1 K/uL    RBC 5.94 (H) 4.23 - 5.60 M/uL    Hemoglobin 17.8 (H) 13.6 - 17.2 g/dL    Hematocrit 53.0 (H) 41.1 - 50.3 %    MCV 89.2 82.0 - 102.0 FL    MCH 30.0 26.1 - 32.9 PG    MCHC 33.6 31.4 - 35.0 g/dL    RDW 13.3 11.9 - 14.6 %    Platelets 523 002 - 403 K/uL    MPV 8.9 (L) 9.4 - 12.3 FL    nRBC 0.00 0.0 - 0.2 K/uL    Differential Type AUTOMATED      Neutrophils % 85 (H) 43 - 78 %    Lymphocytes % 8 (L) 13 - 44 %    Monocytes % 6 4.0 - 12.0 %    Eosinophils % 0 (L) 0.5 - 7.8 %    Basophils % 0 0.0 - 2.0 %    Immature Granulocytes 0 0.0 - 5.0 %    Neutrophils Absolute 9.7 (H) 1.7 - 8.2 K/UL    Lymphocytes Absolute 0.9 0.5 - 4.6 K/UL    Monocytes Absolute 0.7 0.1 - 1.3 K/UL    Eosinophils Absolute 0.0 0.0 - 0.8 K/UL    Basophils Absolute 0.1 0.0 - 0.2 K/UL    Absolute Immature Granulocyte 0.0 0.0 - 0.5 K/UL   Urinalysis    Collection Time: 12/14/23  4:36 PM   Result Value Ref Range    Color, UA YELLOW      Appearance CLEAR      Specific Gravity, UA 1.020 1.001 - 1.023      pH, Urine 7.0 5.0 - 9.0      Protein, UA 30 (A) NEG mg/dL    Glucose, UA Negative mg/dL    Ketones, Urine >160 (A) NEG mg/dL    Bilirubin Urine Negative NEG      Blood, Urine SMALL (A) NEG      Urobilinogen, Urine 0.2 0.2 - 1.0 EU/dL    Nitrite, Urine Negative NEG      Leukocyte Esterase, Urine Negative NEG     Urinalysis, Micro    Collection Time: 12/14/23  4:36 PM   Result Value Ref Range    WBC, UA 0-3 0 /hpf    RBC, UA

## 2023-12-15 NOTE — CARE COORDINATION
12/15/23 0944   Service Assessment   Patient Orientation Alert and Oriented   Cognition Alert   History Provided By Patient   Primary Caregiver Self   Support Systems Spouse/Significant Other;Children   Patient's Healthcare Decision Maker is: Legal Next of Kin   PCP Verified by CM Yes   Last Visit to PCP Within last year   Prior Functional Level Independent in ADLs/IADLs   Current Functional Level Independent in ADLs/IADLs   Can patient return to prior living arrangement Yes   Ability to make needs known: Good   Family able to assist with home care needs: Yes   Would you like for me to discuss the discharge plan with any other family members/significant others, and if so, who? No   Financial Resources None   [employer] None   Social/Functional History   Lives With Spouse   Type of Home House   Condition of Participation: Discharge Planning   The Plan for Transition of Care is related to the following treatment goals: return home   The Patient and/or Patient Representative was provided with a Choice of Provider? Patient   The Patient and/Or Patient Representative agree with the Discharge Plan? Yes   Freedom of Choice list was provided with basic dialogue that supports the patient's individualized plan of care/goals, treatment preferences, and shares the quality data associated with the providers? Yes     Assessment completed with patient at bedside. Patient lives with spouse and kids. Patient is IND with all ALDs , drives and works at baseline. Demographics and PCP confirmed. Patient is uninsured. Self pay packet and financial assistance application given. No discharge needs identified.     Odalis COLLINS, ACM Nelida Ahumada

## 2023-12-16 VITALS
RESPIRATION RATE: 16 BRPM | OXYGEN SATURATION: 100 % | HEART RATE: 77 BPM | TEMPERATURE: 97.3 F | SYSTOLIC BLOOD PRESSURE: 132 MMHG | DIASTOLIC BLOOD PRESSURE: 84 MMHG

## 2023-12-16 LAB
ALBUMIN SERPL-MCNC: 3.4 G/DL (ref 3.5–5)
ALBUMIN/GLOB SERPL: 1 (ref 0.4–1.6)
ALP SERPL-CCNC: 55 U/L (ref 50–136)
ALT SERPL-CCNC: 49 U/L (ref 12–65)
ANION GAP SERPL CALC-SCNC: 5 MMOL/L (ref 2–11)
AST SERPL-CCNC: 32 U/L (ref 15–37)
BASOPHILS # BLD: 0 K/UL (ref 0–0.2)
BASOPHILS NFR BLD: 1 % (ref 0–2)
BILIRUB SERPL-MCNC: 0.5 MG/DL (ref 0.2–1.1)
BUN SERPL-MCNC: 9 MG/DL (ref 6–23)
CALCIUM SERPL-MCNC: 8.7 MG/DL (ref 8.3–10.4)
CHLORIDE SERPL-SCNC: 105 MMOL/L (ref 103–113)
CO2 SERPL-SCNC: 27 MMOL/L (ref 21–32)
CREAT SERPL-MCNC: 0.9 MG/DL (ref 0.8–1.5)
DIFFERENTIAL METHOD BLD: ABNORMAL
EOSINOPHIL # BLD: 0.1 K/UL (ref 0–0.8)
EOSINOPHIL NFR BLD: 1 % (ref 0.5–7.8)
ERYTHROCYTE [DISTWIDTH] IN BLOOD BY AUTOMATED COUNT: 13.2 % (ref 11.9–14.6)
GLOBULIN SER CALC-MCNC: 3.4 G/DL (ref 2.8–4.5)
GLUCOSE SERPL-MCNC: 103 MG/DL (ref 65–100)
HCT VFR BLD AUTO: 44.9 % (ref 41.1–50.3)
HGB BLD-MCNC: 14.8 G/DL (ref 13.6–17.2)
IMM GRANULOCYTES # BLD AUTO: 0 K/UL (ref 0–0.5)
IMM GRANULOCYTES NFR BLD AUTO: 0 % (ref 0–5)
LYMPHOCYTES # BLD: 1.3 K/UL (ref 0.5–4.6)
LYMPHOCYTES NFR BLD: 35 % (ref 13–44)
MCH RBC QN AUTO: 29.8 PG (ref 26.1–32.9)
MCHC RBC AUTO-ENTMCNC: 33 G/DL (ref 31.4–35)
MCV RBC AUTO: 90.3 FL (ref 82–102)
MONOCYTES # BLD: 0.5 K/UL (ref 0.1–1.3)
MONOCYTES NFR BLD: 14 % (ref 4–12)
NEUTS SEG # BLD: 1.9 K/UL (ref 1.7–8.2)
NEUTS SEG NFR BLD: 50 % (ref 43–78)
NRBC # BLD: 0 K/UL (ref 0–0.2)
PLATELET # BLD AUTO: 226 K/UL (ref 150–450)
PMV BLD AUTO: 9.6 FL (ref 9.4–12.3)
POTASSIUM SERPL-SCNC: 3.9 MMOL/L (ref 3.5–5.1)
PROT SERPL-MCNC: 6.8 G/DL (ref 6.3–8.2)
RBC # BLD AUTO: 4.97 M/UL (ref 4.23–5.6)
SODIUM SERPL-SCNC: 137 MMOL/L (ref 136–146)
WBC # BLD AUTO: 3.7 K/UL (ref 4.3–11.1)

## 2023-12-16 PROCEDURE — 6360000002 HC RX W HCPCS: Performed by: FAMILY MEDICINE

## 2023-12-16 PROCEDURE — 6370000000 HC RX 637 (ALT 250 FOR IP): Performed by: FAMILY MEDICINE

## 2023-12-16 PROCEDURE — 80053 COMPREHEN METABOLIC PANEL: CPT

## 2023-12-16 PROCEDURE — 85025 COMPLETE CBC W/AUTO DIFF WBC: CPT

## 2023-12-16 PROCEDURE — 36415 COLL VENOUS BLD VENIPUNCTURE: CPT

## 2023-12-16 PROCEDURE — 2580000003 HC RX 258: Performed by: FAMILY MEDICINE

## 2023-12-16 RX ORDER — OXYCODONE HYDROCHLORIDE 10 MG/1
10 TABLET ORAL EVERY 4 HOURS PRN
Qty: 30 TABLET | Refills: 0 | Status: SHIPPED | OUTPATIENT
Start: 2023-12-16 | End: 2023-12-21

## 2023-12-16 RX ADMIN — HYDROMORPHONE HYDROCHLORIDE 1 MG: 1 INJECTION, SOLUTION INTRAMUSCULAR; INTRAVENOUS; SUBCUTANEOUS at 03:55

## 2023-12-16 RX ADMIN — HYDROMORPHONE HYDROCHLORIDE 1 MG: 1 INJECTION, SOLUTION INTRAMUSCULAR; INTRAVENOUS; SUBCUTANEOUS at 07:36

## 2023-12-16 RX ADMIN — HYDROMORPHONE HYDROCHLORIDE 1 MG: 1 INJECTION, SOLUTION INTRAMUSCULAR; INTRAVENOUS; SUBCUTANEOUS at 00:23

## 2023-12-16 RX ADMIN — SODIUM CHLORIDE, PRESERVATIVE FREE 10 ML: 5 INJECTION INTRAVENOUS at 08:07

## 2023-12-16 RX ADMIN — TICAGRELOR 90 MG: 90 TABLET ORAL at 08:07

## 2023-12-16 RX ADMIN — OXYCODONE 10 MG: 5 TABLET ORAL at 12:36

## 2023-12-16 RX ADMIN — HYDROMORPHONE HYDROCHLORIDE 1 MG: 1 INJECTION, SOLUTION INTRAMUSCULAR; INTRAVENOUS; SUBCUTANEOUS at 11:03

## 2023-12-16 RX ADMIN — PANTOPRAZOLE SODIUM 40 MG: 40 TABLET, DELAYED RELEASE ORAL at 06:32

## 2023-12-16 RX ADMIN — AMLODIPINE BESYLATE 5 MG: 5 TABLET ORAL at 08:07

## 2023-12-16 ASSESSMENT — PAIN SCALES - GENERAL
PAINLEVEL_OUTOF10: 8
PAINLEVEL_OUTOF10: 9

## 2023-12-16 ASSESSMENT — PAIN DESCRIPTION - LOCATION
LOCATION: ABDOMEN
LOCATION: ABDOMEN

## 2023-12-16 ASSESSMENT — PAIN DESCRIPTION - ORIENTATION: ORIENTATION: RIGHT;LOWER;LEFT;MID

## 2023-12-16 ASSESSMENT — PAIN DESCRIPTION - DESCRIPTORS: DESCRIPTORS: DISCOMFORT;PRESSURE

## 2023-12-16 NOTE — DISCHARGE SUMMARY
Hospitalist Discharge Summary   Admit Date:  2023  7:46 PM   DC Note date: 2023  Name:  Emilee Andrews   Age:  43 y.o. Sex:  male  :  1985   MRN:  [id number]   Room:  [facility]  PCP:  Sergio Worley MD    Presenting Complaint: No chief complaint on file. Initial Admission Diagnosis: Acute on chronic pancreatitis (HCC) [K85.90, K86.1]     Problem List for this Hospitalization (present on admission):    Principal Problem:    Acute on chronic pancreatitis ([address])  Active Problems:    Alcohol use disorder, severe, in early remission, dependence ([address])    GERD (gastroesophageal reflux disease)    Primary hypertension  Resolved Problems:    * No resolved hospital problems. *      Hospital Course:  43 y.o. male with medical history of chronic pancreatitis who presented to the [facility] ED with abdominal pain. Symptoms started ~2 days ago. Pain is located in epigastrium and radiates to his back. He originally presented to the ER yesterday and was offered admission, however he wished to attempt management at home after pain improved with IV dilaudid. Unfortunately, symptoms worsened again with persistent nausea/vomiting. Upon ER evaluation, lipase has increased to 248 from 176 yesterday. WBC is 11.4. Patient transferred to [facility] for further care. He was admitted for further evaluation management. His pain was treated. His diet was advanced. His tumor markers were negative. He was determined to be appropriate for discharge . Disposition: Home  Diet: ADULT DIET; Regular; Low Fat/Low Chol/High Fiber/MARYANNE  Code Status: Full Code    Follow Ups:   Follow-up Information     Sergio Worley MD. Schedule an appointment as soon as   possible for a visit in 1 week(s).     Specialty: Internal Medicine  Why: Transition of Care Management  Contact information:  [address] [place] [address] Rei [address],  [address] [place] [address]  [phone]                       Time spent in patient LYMPHSABS 0.9 1.2 1.3   EOSABS 0.0 0.1 0.1   MONOSABS 0.7 0.5 0.5   BASOSABS 0.1 0.0 0.0   ABSIMMGRAN 0.0 0.0 0.0      LFT Recent Labs     12/14/23  1343 12/15/23  0455 12/16/23  0414   BILITOT 0.9 1.2* 0.5   ALKPHOS 63 51 55   AST 53* 35 32   ALT 48 48 49   PROT 7.8 6.5 6.8   LABALBU 4.5 3.3* 3.4*   GLOB 3.3 3.2 3.4      Cardiac  Lab Results   Component Value Date/Time    TROPHS 300.0 11/02/2022 10:53 AM      Coags No results found for: \"PROTIME\", \"INR\", \"APTT\"   A1c Lab Results   Component Value Date/Time    LABA1C 5.8 10/26/2022 05:58 AM     10/26/2022 05:58 AM      Lipids Lab Results   Component Value Date/Time    CHOL 88 11/03/2022 05:39 AM    LDLCALC 58.4 11/03/2022 05:39 AM    LABVLDL 16.6 11/03/2022 05:39 AM    HDL 13 11/03/2022 05:39 AM    CHOLHDLRATIO 6.8 11/03/2022 05:39 AM    TRIG 89 07/15/2023 04:17 AM      Thyroid  Lab Results   Component Value Date/Time    TSHELE 2.38 10/26/2022 05:58 AM        Most Recent UA Lab Results   Component Value Date/Time    COLORU YELLOW 12/14/2023 04:36 PM    APPEARANCE CLEAR 12/14/2023 04:36 PM    SPECGRAV 1.020 12/14/2023 04:36 PM    LABPH 7.0 12/14/2023 04:36 PM    PROTEINU 30 12/14/2023 04:36 PM    GLUCOSEU Negative 12/14/2023 04:36 PM    KETUA >160 12/14/2023 04:36 PM    BILIRUBINUR Negative 12/14/2023 04:36 PM    BLOODU SMALL 12/14/2023 04:36 PM    UROBILINOGEN 0.2 12/14/2023 04:36 PM    NITRU Negative 12/14/2023 04:36 PM    LEUKOCYTESUR Negative 12/14/2023 04:36 PM    WBCUA 0-3 12/14/2023 04:36 PM    RBCUA 3-5 12/14/2023 04:36 PM    EPITHUA 0 12/14/2023 04:36 PM    BACTERIA TRACE 12/14/2023 04:36 PM    LABCAST 0 12/14/2023 04:36 PM    MUCUS 0 12/14/2023 04:36 PM        Microbiology:  Results       ** No results found for the last 336 hours.  **            All Labs from Last 24 Hrs:  Recent Results (from the past 24 hour(s))   Comprehensive Metabolic Panel w/ Reflex to MG    Collection Time: 12/16/23  4:14 AM   Result Value Ref Range    Sodium 137 136 - 146

## 2023-12-27 DIAGNOSIS — I10 PRIMARY HYPERTENSION: Chronic | ICD-10-CM

## 2023-12-27 RX ORDER — AMLODIPINE BESYLATE 5 MG/1
5 TABLET ORAL DAILY
Qty: 30 TABLET | Refills: 0 | Status: SHIPPED | OUTPATIENT
Start: 2023-12-27

## 2024-01-02 DIAGNOSIS — K85.91 NECROTIZING PANCREATITIS: ICD-10-CM

## 2024-01-02 NOTE — TELEPHONE ENCOUNTER
Requested Prescriptions     Pending Prescriptions Disp Refills    HYDROcodone-acetaminophen (NORCO)  MG per tablet 90 tablet 0     Sig: Take 1 tablet by mouth every 8 hours as needed for Pain for up to 30 days. Max Daily Amount: 3 tablets     Pt requesting refill. Next f/u 3/06/2024

## 2024-01-03 RX ORDER — HYDROCODONE BITARTRATE AND ACETAMINOPHEN 10; 325 MG/1; MG/1
1 TABLET ORAL EVERY 8 HOURS PRN
Qty: 90 TABLET | Refills: 0 | Status: SHIPPED | OUTPATIENT
Start: 2024-01-03 | End: 2024-02-02

## 2024-02-01 DIAGNOSIS — K85.91 NECROTIZING PANCREATITIS: ICD-10-CM

## 2024-02-01 RX ORDER — HYDROCODONE BITARTRATE AND ACETAMINOPHEN 10; 325 MG/1; MG/1
1 TABLET ORAL EVERY 8 HOURS PRN
Qty: 90 TABLET | Refills: 0 | Status: SHIPPED | OUTPATIENT
Start: 2024-02-01 | End: 2024-02-02 | Stop reason: SDUPTHER

## 2024-02-01 RX ORDER — HYDROCODONE BITARTRATE AND ACETAMINOPHEN 10; 325 MG/1; MG/1
1 TABLET ORAL EVERY 8 HOURS PRN
Qty: 90 TABLET | Refills: 0 | Status: SHIPPED | OUTPATIENT
Start: 2024-02-01 | End: 2024-02-01 | Stop reason: SDUPTHER

## 2024-02-01 NOTE — TELEPHONE ENCOUNTER
Requested Prescriptions     Pending Prescriptions Disp Refills    HYDROcodone-acetaminophen (NORCO)  MG per tablet 90 tablet 0     Sig: Take 1 tablet by mouth every 8 hours as needed for Pain for up to 30 days. Max Daily Amount: 3 tablets      Pt requesting refill. Next OV 03/06/2024. Pharmacy confirmed.

## 2024-02-01 NOTE — TELEPHONE ENCOUNTER
Pt was seen 12/16/23 [facility] and prescribed Oxy.  Just FYI.  formerly [facility]  01/03/2024 [address]    Requested Prescriptions     Pending Prescriptions Disp Refills    HYDROcodone-acetaminophen (NORCO)  MG per tablet 90 tablet 0     Sig: Take 1 tablet by mouth every 8 hours as needed for Pain for up to 30 days. Max Daily Amount: 3 tablets

## 2024-02-01 NOTE — TELEPHONE ENCOUNTER
Pt called saying his pharmacy told him his prescription for NORCO is on back order and would need to get it from another pharmacy. He would like the prescription to be sent to the [facility] pharmacy on [address].

## 2024-02-02 DIAGNOSIS — K85.91 NECROTIZING PANCREATITIS: ICD-10-CM

## 2024-02-02 DIAGNOSIS — I10 PRIMARY HYPERTENSION: Chronic | ICD-10-CM

## 2024-02-02 RX ORDER — HYDROCODONE BITARTRATE AND ACETAMINOPHEN 10; 325 MG/1; MG/1
1 TABLET ORAL EVERY 8 HOURS PRN
Qty: 90 TABLET | Refills: 0 | Status: SHIPPED | OUTPATIENT
Start: 2024-02-02 | End: 2024-03-03

## 2024-02-02 NOTE — TELEPHONE ENCOUNTER
Terrell Cameron [facility] Internal Medicine Clinical Staff (supporting Disha Nova MD)6 minutes ago (9:37 AM)       The [facility] called and said they are on back order for the Norco 10mg also. [facility] and they HAVE it in stock. Can you please send the prescription there. Sorry for the inconvenience     Requested Prescriptions     Pending Prescriptions Disp Refills    HYDROcodone-acetaminophen (NORCO)  MG per tablet 90 tablet 0     Sig: Take 1 tablet by mouth every 8 hours as needed for Pain for up to 30 days. Max Daily Amount: 3 tablets

## 2024-02-21 DIAGNOSIS — K21.9 GASTROESOPHAGEAL REFLUX DISEASE, UNSPECIFIED WHETHER ESOPHAGITIS PRESENT: ICD-10-CM

## 2024-02-21 DIAGNOSIS — I10 PRIMARY HYPERTENSION: Chronic | ICD-10-CM

## 2024-02-21 RX ORDER — PANTOPRAZOLE SODIUM 40 MG/1
40 TABLET, DELAYED RELEASE ORAL NIGHTLY
Qty: 30 TABLET | Refills: 2 | Status: SHIPPED | OUTPATIENT
Start: 2024-02-21

## 2024-02-21 RX ORDER — AMLODIPINE BESYLATE 5 MG/1
5 TABLET ORAL DAILY
Qty: 30 TABLET | Refills: 0 | Status: SHIPPED | OUTPATIENT
Start: 2024-02-21

## 2024-02-27 DIAGNOSIS — K85.91 NECROTIZING PANCREATITIS: ICD-10-CM

## 2024-02-27 RX ORDER — HYDROCODONE BITARTRATE AND ACETAMINOPHEN 10; 325 MG/1; MG/1
1 TABLET ORAL EVERY 8 HOURS PRN
Qty: 90 TABLET | Refills: 0 | Status: SHIPPED | OUTPATIENT
Start: 2024-02-27 | End: 2024-03-28

## 2024-03-06 ENCOUNTER — OFFICE VISIT (OUTPATIENT)
Dept: INTERNAL MEDICINE CLINIC | Facility: CLINIC | Age: 39
End: 2024-03-06

## 2024-03-06 ENCOUNTER — TELEPHONE (OUTPATIENT)
Age: 39
End: 2024-03-06

## 2024-03-06 VITALS
HEART RATE: 89 BPM | DIASTOLIC BLOOD PRESSURE: 94 MMHG | HEIGHT: 69 IN | BODY MASS INDEX: 28.36 KG/M2 | SYSTOLIC BLOOD PRESSURE: 140 MMHG | OXYGEN SATURATION: 98 % | WEIGHT: 191.44 LBS

## 2024-03-06 DIAGNOSIS — Z72.0 TOBACCO ABUSE: Primary | ICD-10-CM

## 2024-03-06 DIAGNOSIS — I25.2 HISTORY OF ST ELEVATION MYOCARDIAL INFARCTION (STEMI): ICD-10-CM

## 2024-03-06 DIAGNOSIS — K86.0 ALCOHOL-INDUCED CHRONIC PANCREATITIS (HCC): ICD-10-CM

## 2024-03-06 PROBLEM — K85.91 NECROTIZING PANCREATITIS: Status: RESOLVED | Noted: 2023-01-11 | Resolved: 2024-03-06

## 2024-03-06 PROBLEM — K85.90 ACUTE RECURRENT PANCREATITIS: Status: RESOLVED | Noted: 2023-05-21 | Resolved: 2024-03-06

## 2024-03-06 PROCEDURE — 99214 OFFICE O/P EST MOD 30 MIN: CPT | Performed by: INTERNAL MEDICINE

## 2024-03-06 PROCEDURE — 3077F SYST BP >= 140 MM HG: CPT | Performed by: INTERNAL MEDICINE

## 2024-03-06 PROCEDURE — 3080F DIAST BP >= 90 MM HG: CPT | Performed by: INTERNAL MEDICINE

## 2024-03-06 ASSESSMENT — ENCOUNTER SYMPTOMS
SHORTNESS OF BREATH: 0
ABDOMINAL DISTENTION: 0
WHEEZING: 0
PHOTOPHOBIA: 0
CHEST TIGHTNESS: 0

## 2024-03-06 ASSESSMENT — PATIENT HEALTH QUESTIONNAIRE - PHQ9
SUM OF ALL RESPONSES TO PHQ QUESTIONS 1-9: 0
1. LITTLE INTEREST OR PLEASURE IN DOING THINGS: 0
2. FEELING DOWN, DEPRESSED OR HOPELESS: 0
SUM OF ALL RESPONSES TO PHQ9 QUESTIONS 1 & 2: 0
SUM OF ALL RESPONSES TO PHQ QUESTIONS 1-9: 0

## 2024-03-06 NOTE — TELEPHONE ENCOUNTER
Patient scheduled appt with Dr. Zelaya for July 2, 2024 (first available) and has 2 month supply of Brilinta 90 mg BID.  Patient states he has been on it for a year and wants to know if he can come off of it?  If not will need refill until appointment. Pt is on assistance through [employer]. Please let patient know.

## 2024-03-06 NOTE — PROGRESS NOTES
Chief Complaint   Patient presents with    Follow-up        Bhupinder Tejada is a 39 y.o. male who presents today for Follow-up     He is here for follow-up being chronic pain syndrome associated with acute on chronic pancreatitis, he is he has severe exacerbation was in December, he was seen in the emergency room, no images were done,  When he he is having increasing abdominal pain, he is time he continues in the diet, has been identified red meat is multiple trigger, he has not been drinking, he reports pain 2-3 times a week, and during the flares, he needs to take 3 to 5 tablets a day, for few days then stop  He is currently working full-time, has not missed any days older than when he was in the hospital    He is not trying to quit vaping, planning to start nicotine patches,  He also had ACL ligament patient that needs to be surgically repaired, but he has not planning.  Has not yet made the appointment with cardiology  Contact signed today      Wt Readings from Last 3 Encounters:   03/06/24 86.8 kg (191 lb 7 oz)   12/14/23 84.4 kg (186 lb)   12/13/23 84.6 kg (186 lb 9.6 oz)     Vitals:    03/06/24 0801   BP: (!) 140/94   Site: Left Upper Arm   Position: Sitting   Pulse: 89   SpO2: 98%   Weight: 86.8 kg (191 lb 7 oz)   Height: 1.753 m (5' 9\")        Assessment and plan:  1. Tobacco abuse [Z72.0 (ICD-10-CM)]  2. History of ST elevation myocardial infarction (STEMI)  3. Alcohol-induced chronic pancreatitis (HCC)    Pain contract has been signed again this year,  No aberrant behaviors  We discussed about chronic pain management with hydrocodone, Tylenol, patient has been encouraged to discuss follow-up with cardiology, he has been more than 7 months since her last visit,  We discussed about dependency of medications like hydrocodone, and techniques to possibly avoid abuse with dependency,  We will continue to find triggers and avoid them,  Congratulated for efforts of quitting alcohol, and encouraged him

## 2024-03-06 NOTE — TELEPHONE ENCOUNTER
Called and let pt know to stay on brilinta until he sees Dr. Zelaya in July for a follow up and he can discuss further. JS

## 2024-03-22 DIAGNOSIS — I10 PRIMARY HYPERTENSION: Chronic | ICD-10-CM

## 2024-03-22 DIAGNOSIS — K21.9 GASTROESOPHAGEAL REFLUX DISEASE, UNSPECIFIED WHETHER ESOPHAGITIS PRESENT: ICD-10-CM

## 2024-03-22 RX ORDER — PANTOPRAZOLE SODIUM 40 MG/1
40 TABLET, DELAYED RELEASE ORAL NIGHTLY
Qty: 30 TABLET | Refills: 2 | Status: SHIPPED | OUTPATIENT
Start: 2024-03-22

## 2024-03-22 RX ORDER — AMLODIPINE BESYLATE 5 MG/1
5 TABLET ORAL DAILY
Qty: 30 TABLET | Refills: 0 | Status: SHIPPED | OUTPATIENT
Start: 2024-03-22

## 2024-03-29 ENCOUNTER — PATIENT MESSAGE (OUTPATIENT)
Dept: INTERNAL MEDICINE CLINIC | Facility: CLINIC | Age: 39
End: 2024-03-29

## 2024-03-29 DIAGNOSIS — K85.91 NECROTIZING PANCREATITIS: Primary | ICD-10-CM

## 2024-03-29 RX ORDER — HYDROCODONE BITARTRATE AND ACETAMINOPHEN 10; 325 MG/1; MG/1
1 TABLET ORAL EVERY 6 HOURS PRN
COMMUNITY
End: 2024-03-29 | Stop reason: SDUPTHER

## 2024-03-29 RX ORDER — HYDROCODONE BITARTRATE AND ACETAMINOPHEN 10; 325 MG/1; MG/1
1 TABLET ORAL EVERY 6 HOURS PRN
Qty: 90 TABLET | Refills: 0 | Status: SHIPPED | OUTPATIENT
Start: 2024-03-29 | End: 2025-03-29

## 2024-03-29 NOTE — TELEPHONE ENCOUNTER
I need to get a refill on 10mg Norco but it’s not on my prescription list for some reason .    formerly [facility]   3/1/24.      Requested Prescriptions     Pending Prescriptions Disp Refills    HYDROcodone-acetaminophen (NORCO)  MG per tablet 90 tablet 0     Sig: Take 1 tablet by mouth every 6 hours as needed for Pain. Max Daily Amount: 4 tablets

## 2024-03-29 NOTE — TELEPHONE ENCOUNTER
From: Bhupinder Tejada  To: Dr. Disha Nova  Sent: 3/29/2024 11:57 AM EDT  Subject: [place] refill    I need to get a refill on 10mg Norco but it’s not on my prescription list for some reason

## 2024-04-17 DIAGNOSIS — K21.9 GASTROESOPHAGEAL REFLUX DISEASE, UNSPECIFIED WHETHER ESOPHAGITIS PRESENT: ICD-10-CM

## 2024-04-17 DIAGNOSIS — I10 PRIMARY HYPERTENSION: Chronic | ICD-10-CM

## 2024-04-18 RX ORDER — AMLODIPINE BESYLATE 5 MG/1
5 TABLET ORAL DAILY
Qty: 90 TABLET | Refills: 1 | Status: SHIPPED | OUTPATIENT
Start: 2024-04-18

## 2024-04-18 RX ORDER — PANTOPRAZOLE SODIUM 40 MG/1
40 TABLET, DELAYED RELEASE ORAL NIGHTLY
Qty: 90 TABLET | Refills: 1 | Status: SHIPPED | OUTPATIENT
Start: 2024-04-18

## 2024-04-28 DIAGNOSIS — K85.91 NECROTIZING PANCREATITIS: ICD-10-CM

## 2024-04-29 DIAGNOSIS — K85.91 NECROTIZING PANCREATITIS: ICD-10-CM

## 2024-04-29 RX ORDER — HYDROCODONE BITARTRATE AND ACETAMINOPHEN 10; 325 MG/1; MG/1
1 TABLET ORAL EVERY 6 HOURS PRN
Qty: 90 TABLET | Refills: 0 | OUTPATIENT
Start: 2024-04-29 | End: 2025-04-29

## 2024-04-29 RX ORDER — HYDROCODONE BITARTRATE AND ACETAMINOPHEN 10; 325 MG/1; MG/1
1 TABLET ORAL EVERY 6 HOURS PRN
Qty: 90 TABLET | Refills: 0 | Status: SHIPPED | OUTPATIENT
Start: 2024-04-29 | End: 2025-04-29

## 2024-05-28 DIAGNOSIS — K85.91 NECROTIZING PANCREATITIS: ICD-10-CM

## 2024-05-28 RX ORDER — HYDROCODONE BITARTRATE AND ACETAMINOPHEN 10; 325 MG/1; MG/1
1 TABLET ORAL EVERY 6 HOURS PRN
Qty: 90 TABLET | Refills: 0 | Status: SHIPPED | OUTPATIENT
Start: 2024-05-28 | End: 2025-05-28

## 2024-06-06 ENCOUNTER — OFFICE VISIT (OUTPATIENT)
Dept: INTERNAL MEDICINE CLINIC | Facility: CLINIC | Age: 39
End: 2024-06-06

## 2024-06-06 VITALS
WEIGHT: 180 LBS | BODY MASS INDEX: 26.66 KG/M2 | HEIGHT: 69 IN | OXYGEN SATURATION: 99 % | HEART RATE: 88 BPM | DIASTOLIC BLOOD PRESSURE: 100 MMHG | SYSTOLIC BLOOD PRESSURE: 150 MMHG

## 2024-06-06 DIAGNOSIS — Z11.9 SCREENING EXAMINATION FOR INFECTIOUS DISEASE: ICD-10-CM

## 2024-06-06 DIAGNOSIS — I25.10 CORONARY ARTERY DISEASE INVOLVING NATIVE CORONARY ARTERY OF NATIVE HEART WITHOUT ANGINA PECTORIS: Primary | Chronic | ICD-10-CM

## 2024-06-06 DIAGNOSIS — Z51.81 ENCOUNTER FOR MONITORING OPIOID MAINTENANCE THERAPY: ICD-10-CM

## 2024-06-06 DIAGNOSIS — I10 PRIMARY HYPERTENSION: Chronic | ICD-10-CM

## 2024-06-06 DIAGNOSIS — Z79.891 ENCOUNTER FOR MONITORING OPIOID MAINTENANCE THERAPY: ICD-10-CM

## 2024-06-06 DIAGNOSIS — K86.0 ALCOHOL-INDUCED CHRONIC PANCREATITIS (HCC): ICD-10-CM

## 2024-06-06 PROBLEM — K85.90 ACUTE ON CHRONIC PANCREATITIS (HCC): Status: RESOLVED | Noted: 2023-12-14 | Resolved: 2024-06-06

## 2024-06-06 PROBLEM — K86.1 ACUTE ON CHRONIC PANCREATITIS (HCC): Status: RESOLVED | Noted: 2023-12-14 | Resolved: 2024-06-06

## 2024-06-06 PROBLEM — F10.21 ALCOHOL USE DISORDER, SEVERE, IN EARLY REMISSION, DEPENDENCE (HCC): Chronic | Status: RESOLVED | Noted: 2019-08-21 | Resolved: 2024-06-06

## 2024-06-06 PROCEDURE — 3080F DIAST BP >= 90 MM HG: CPT | Performed by: INTERNAL MEDICINE

## 2024-06-06 PROCEDURE — 3077F SYST BP >= 140 MM HG: CPT | Performed by: INTERNAL MEDICINE

## 2024-06-06 PROCEDURE — 99214 OFFICE O/P EST MOD 30 MIN: CPT | Performed by: INTERNAL MEDICINE

## 2024-06-06 SDOH — ECONOMIC STABILITY: FOOD INSECURITY: WITHIN THE PAST 12 MONTHS, THE FOOD YOU BOUGHT JUST DIDN'T LAST AND YOU DIDN'T HAVE MONEY TO GET MORE.: NEVER TRUE

## 2024-06-06 SDOH — ECONOMIC STABILITY: INCOME INSECURITY: HOW HARD IS IT FOR YOU TO PAY FOR THE VERY BASICS LIKE FOOD, HOUSING, MEDICAL CARE, AND HEATING?: NOT VERY HARD

## 2024-06-06 SDOH — ECONOMIC STABILITY: FOOD INSECURITY: WITHIN THE PAST 12 MONTHS, YOU WORRIED THAT YOUR FOOD WOULD RUN OUT BEFORE YOU GOT MONEY TO BUY MORE.: NEVER TRUE

## 2024-06-06 ASSESSMENT — ENCOUNTER SYMPTOMS
ABDOMINAL PAIN: 0
ABDOMINAL DISTENTION: 0

## 2024-06-06 NOTE — PROGRESS NOTES
therapy  -     Urine Drug Screen; Future  5. Screening examination for infectious disease  -     Hepatitis C Ab, Rflx to Qt by PCR; Future  -     HIV 1/2 Ag/Ab, 4TH Generation,W Rflx Confirm; Future    Coronary artery disease has been stable, he will complete his labs prior to our next visit, we will reassess his cholesterol panel, and other metabolic panels,  Hypertension is currently suboptimal control, patient has been advised to start taking blood pressure at home, compliance with medications, continue efforts to remain sober, and decreasing vaping.  He will check the blood pressure for the next 2 weeks, and reach out for adjustment of the medications, if continues to be above 140/90, for now he is going to continue the amlodipine, we may consider ARB or diuretics,  He was congratulated for his efforts of smoking cessation, alcohol cessation, he will continue with chronic pain management, trying to decrease hydrocodone acetaminophen, he has been advised to start Tylenol instead, and continue with hydrocodone only as needed goal is to start decreasing medications to prevent dependency,  He has been out of the hospital for more than 6 months      Return in about 3 months (around 9/6/2024) for physical.     Review of system:    Review of Systems   Constitutional:  Negative for activity change, fatigue and unexpected weight change.   HENT:  Negative for congestion.    Cardiovascular:  Negative for chest pain and palpitations.   Gastrointestinal:  Negative for abdominal distention and abdominal pain.   Musculoskeletal:  Negative for arthralgias.   Neurological:  Negative for dizziness and headaches.   Psychiatric/Behavioral:  Negative for confusion and sleep disturbance.          Immunization history:    Immunization History   Administered Date(s) Administered    COVID-19, MODERNA BLUE border, Primary or Immunocompromised, (age 12y+), IM, 100 mcg/0.5mL 08/19/2021    Influenza Virus Vaccine 11/05/2015, 09/19/2018,

## 2024-06-15 ENCOUNTER — HOSPITAL ENCOUNTER (INPATIENT)
Age: 39
LOS: 2 days | Discharge: HOME | DRG: 392 | End: 2024-06-17
Attending: FAMILY MEDICINE | Admitting: FAMILY MEDICINE

## 2024-06-15 ENCOUNTER — HOSPITAL ENCOUNTER (EMERGENCY)
Age: 39
Discharge: HOME | End: 2024-06-15
Attending: EMERGENCY MEDICINE

## 2024-06-15 ENCOUNTER — APPOINTMENT (OUTPATIENT)
Dept: CT IMAGING | Age: 39
End: 2024-06-15

## 2024-06-15 ENCOUNTER — HOSPITAL ENCOUNTER (EMERGENCY)
Age: 39
Discharge: ANOTHER HOSPITAL | End: 2024-06-15
Attending: EMERGENCY MEDICINE

## 2024-06-15 VITALS
OXYGEN SATURATION: 99 % | HEART RATE: 83 BPM | DIASTOLIC BLOOD PRESSURE: 83 MMHG | TEMPERATURE: 98.8 F | HEIGHT: 69 IN | WEIGHT: 180 LBS | SYSTOLIC BLOOD PRESSURE: 138 MMHG | RESPIRATION RATE: 18 BRPM | BODY MASS INDEX: 26.66 KG/M2

## 2024-06-15 VITALS
HEIGHT: 69 IN | WEIGHT: 180 LBS | TEMPERATURE: 98.2 F | SYSTOLIC BLOOD PRESSURE: 157 MMHG | OXYGEN SATURATION: 97 % | HEART RATE: 89 BPM | BODY MASS INDEX: 26.66 KG/M2 | DIASTOLIC BLOOD PRESSURE: 97 MMHG | RESPIRATION RATE: 15 BRPM

## 2024-06-15 DIAGNOSIS — K85.91 NECROTIZING PANCREATITIS: ICD-10-CM

## 2024-06-15 DIAGNOSIS — K85.90 ACUTE ON CHRONIC PANCREATITIS (HCC): Primary | ICD-10-CM

## 2024-06-15 DIAGNOSIS — K85.90 ACUTE PANCREATITIS, UNSPECIFIED COMPLICATION STATUS, UNSPECIFIED PANCREATITIS TYPE: Primary | ICD-10-CM

## 2024-06-15 DIAGNOSIS — F10.10 ALCOHOL ABUSE: ICD-10-CM

## 2024-06-15 DIAGNOSIS — K86.1 ACUTE ON CHRONIC PANCREATITIS (HCC): Primary | ICD-10-CM

## 2024-06-15 LAB
ALBUMIN SERPL-MCNC: 4.5 G/DL (ref 3.5–5)
ALBUMIN SERPL-MCNC: 4.6 G/DL (ref 3.5–5)
ALBUMIN/GLOB SERPL: 1.6 (ref 0.4–1.6)
ALBUMIN/GLOB SERPL: 1.8 (ref 0.4–1.6)
ALP SERPL-CCNC: 68 U/L (ref 45–117)
ALP SERPL-CCNC: 75 U/L (ref 45–117)
ALT SERPL-CCNC: 45 U/L (ref 13–61)
ALT SERPL-CCNC: 46 U/L (ref 13–61)
ANION GAP SERPL CALC-SCNC: 18 MMOL/L (ref 2–11)
ANION GAP SERPL CALC-SCNC: 21 MMOL/L (ref 2–11)
AST SERPL-CCNC: 52 U/L (ref 15–37)
AST SERPL-CCNC: 53 U/L (ref 15–37)
BASOPHILS # BLD: 0 K/UL (ref 0–0.2)
BASOPHILS # BLD: 0.1 K/UL (ref 0–0.2)
BASOPHILS NFR BLD: 0 % (ref 0–2)
BASOPHILS NFR BLD: 1 % (ref 0–2)
BILIRUB SERPL-MCNC: 0.8 MG/DL (ref 0.2–1.1)
BILIRUB SERPL-MCNC: 0.8 MG/DL (ref 0.2–1.1)
BUN SERPL-MCNC: 12 MG/DL (ref 6–23)
BUN SERPL-MCNC: 12 MG/DL (ref 6–23)
CALCIUM SERPL-MCNC: 9.2 MG/DL (ref 8.3–10.4)
CALCIUM SERPL-MCNC: 9.6 MG/DL (ref 8.3–10.4)
CHLORIDE SERPL-SCNC: 96 MMOL/L (ref 98–107)
CHLORIDE SERPL-SCNC: 99 MMOL/L (ref 98–107)
CO2 SERPL-SCNC: 17 MMOL/L (ref 21–32)
CO2 SERPL-SCNC: 20 MMOL/L (ref 21–32)
CREAT SERPL-MCNC: 0.75 MG/DL (ref 0.8–1.5)
CREAT SERPL-MCNC: 0.78 MG/DL (ref 0.8–1.5)
DIFFERENTIAL METHOD BLD: ABNORMAL
DIFFERENTIAL METHOD BLD: ABNORMAL
EKG ATRIAL RATE: 91 BPM
EKG DIAGNOSIS: NORMAL
EKG P AXIS: 60 DEGREES
EKG P-R INTERVAL: 160 MS
EKG Q-T INTERVAL: 362 MS
EKG QRS DURATION: 121 MS
EKG QTC CALCULATION (BAZETT): 448 MS
EKG R AXIS: 13 DEGREES
EKG T AXIS: 40 DEGREES
EKG VENTRICULAR RATE: 92 BPM
EOSINOPHIL # BLD: 0 K/UL (ref 0–0.8)
EOSINOPHIL # BLD: 0.1 K/UL (ref 0–0.8)
EOSINOPHIL NFR BLD: 0 % (ref 0.5–7.8)
EOSINOPHIL NFR BLD: 1 % (ref 0.5–7.8)
ERYTHROCYTE [DISTWIDTH] IN BLOOD BY AUTOMATED COUNT: 14 % (ref 11.9–14.6)
ERYTHROCYTE [DISTWIDTH] IN BLOOD BY AUTOMATED COUNT: 14.1 % (ref 11.9–14.6)
GLOBULIN SER CALC-MCNC: 2.5 G/DL (ref 2.8–4.5)
GLOBULIN SER CALC-MCNC: 2.9 G/DL (ref 2.8–4.5)
GLUCOSE SERPL-MCNC: 85 MG/DL (ref 65–100)
GLUCOSE SERPL-MCNC: 90 MG/DL (ref 65–100)
HCT VFR BLD AUTO: 46.1 % (ref 41.1–50.3)
HCT VFR BLD AUTO: 46.6 % (ref 41.1–50.3)
HGB BLD-MCNC: 16 G/DL (ref 13.6–17.2)
HGB BLD-MCNC: 16.3 G/DL (ref 13.6–17.2)
IMM GRANULOCYTES # BLD AUTO: 0 K/UL (ref 0–0.5)
IMM GRANULOCYTES # BLD AUTO: 0 K/UL (ref 0–0.5)
IMM GRANULOCYTES NFR BLD AUTO: 0 % (ref 0–5)
IMM GRANULOCYTES NFR BLD AUTO: 0 % (ref 0–5)
LIPASE SERPL-CCNC: 433 U/L (ref 13–60)
LIPASE SERPL-CCNC: 589 U/L (ref 13–60)
LYMPHOCYTES # BLD: 0.8 K/UL (ref 0.5–4.6)
LYMPHOCYTES # BLD: 1.3 K/UL (ref 0.5–4.6)
LYMPHOCYTES NFR BLD: 10 % (ref 13–44)
LYMPHOCYTES NFR BLD: 10 % (ref 13–44)
MAGNESIUM SERPL-MCNC: 1.5 MG/DL (ref 1.2–2.6)
MCH RBC QN AUTO: 29.7 PG (ref 26.1–32.9)
MCH RBC QN AUTO: 29.9 PG (ref 26.1–32.9)
MCHC RBC AUTO-ENTMCNC: 34.7 G/DL (ref 31.4–35)
MCHC RBC AUTO-ENTMCNC: 35 G/DL (ref 31.4–35)
MCV RBC AUTO: 85.5 FL (ref 82–102)
MCV RBC AUTO: 85.5 FL (ref 82–102)
MONOCYTES # BLD: 0.7 K/UL (ref 0.1–1.3)
MONOCYTES # BLD: 0.8 K/UL (ref 0.1–1.3)
MONOCYTES NFR BLD: 7 % (ref 4–12)
MONOCYTES NFR BLD: 9 % (ref 4–12)
NEUTS SEG # BLD: 6.4 K/UL (ref 1.7–8.2)
NEUTS SEG # BLD: 9.9 K/UL (ref 1.7–8.2)
NEUTS SEG NFR BLD: 81 % (ref 43–78)
NEUTS SEG NFR BLD: 82 % (ref 43–78)
NRBC # BLD: 0 K/UL (ref 0–0.2)
NRBC # BLD: 0 K/UL (ref 0–0.2)
PLATELET # BLD AUTO: 231 K/UL (ref 150–450)
PLATELET # BLD AUTO: 241 K/UL (ref 150–450)
PMV BLD AUTO: 8.7 FL (ref 9.4–12.3)
PMV BLD AUTO: 9 FL (ref 9.4–12.3)
POTASSIUM SERPL-SCNC: 3.8 MMOL/L (ref 3.5–5.1)
POTASSIUM SERPL-SCNC: 4.2 MMOL/L (ref 3.5–5.1)
PROT SERPL-MCNC: 7 G/DL (ref 6.4–8.2)
PROT SERPL-MCNC: 7.5 G/DL (ref 6.4–8.2)
RBC # BLD AUTO: 5.39 M/UL (ref 4.23–5.6)
RBC # BLD AUTO: 5.45 M/UL (ref 4.23–5.6)
SODIUM SERPL-SCNC: 134 MMOL/L (ref 133–143)
SODIUM SERPL-SCNC: 137 MMOL/L (ref 133–143)
TROPONIN T SERPL HS-MCNC: 7.8 NG/L (ref 0–22)
WBC # BLD AUTO: 12.1 K/UL (ref 4.3–11.1)
WBC # BLD AUTO: 7.9 K/UL (ref 4.3–11.1)

## 2024-06-15 PROCEDURE — 99285 EMERGENCY DEPT VISIT HI MDM: CPT

## 2024-06-15 PROCEDURE — 80053 COMPREHEN METABOLIC PANEL: CPT

## 2024-06-15 PROCEDURE — 1100000000 HC RM PRIVATE

## 2024-06-15 PROCEDURE — 85025 COMPLETE CBC W/AUTO DIFF WBC: CPT

## 2024-06-15 PROCEDURE — 96374 THER/PROPH/DIAG INJ IV PUSH: CPT

## 2024-06-15 PROCEDURE — 6360000004 HC RX CONTRAST MEDICATION: Performed by: EMERGENCY MEDICINE

## 2024-06-15 PROCEDURE — 93010 ELECTROCARDIOGRAM REPORT: CPT | Performed by: INTERNAL MEDICINE

## 2024-06-15 PROCEDURE — 96376 TX/PRO/DX INJ SAME DRUG ADON: CPT

## 2024-06-15 PROCEDURE — 83735 ASSAY OF MAGNESIUM: CPT

## 2024-06-15 PROCEDURE — 96375 TX/PRO/DX INJ NEW DRUG ADDON: CPT

## 2024-06-15 PROCEDURE — 83690 ASSAY OF LIPASE: CPT

## 2024-06-15 PROCEDURE — 84484 ASSAY OF TROPONIN QUANT: CPT

## 2024-06-15 PROCEDURE — 2580000003 HC RX 258: Performed by: EMERGENCY MEDICINE

## 2024-06-15 PROCEDURE — 2500000003 HC RX 250 WO HCPCS: Performed by: FAMILY MEDICINE

## 2024-06-15 PROCEDURE — 74177 CT ABD & PELVIS W/CONTRAST: CPT

## 2024-06-15 PROCEDURE — 6360000002 HC RX W HCPCS: Performed by: EMERGENCY MEDICINE

## 2024-06-15 PROCEDURE — 93005 ELECTROCARDIOGRAM TRACING: CPT | Performed by: EMERGENCY MEDICINE

## 2024-06-15 RX ORDER — MORPHINE SULFATE 2 MG/ML
2 INJECTION, SOLUTION INTRAMUSCULAR; INTRAVENOUS ONCE
Status: COMPLETED | OUTPATIENT
Start: 2024-06-15 | End: 2024-06-15

## 2024-06-15 RX ORDER — HYDROMORPHONE HYDROCHLORIDE 1 MG/ML
1 INJECTION, SOLUTION INTRAMUSCULAR; INTRAVENOUS; SUBCUTANEOUS
Status: COMPLETED | OUTPATIENT
Start: 2024-06-15 | End: 2024-06-15

## 2024-06-15 RX ORDER — ONDANSETRON 4 MG/1
4 TABLET, ORALLY DISINTEGRATING ORAL EVERY 8 HOURS PRN
Status: DISCONTINUED | OUTPATIENT
Start: 2024-06-15 | End: 2024-06-17 | Stop reason: HOSPADM

## 2024-06-15 RX ORDER — ONDANSETRON 2 MG/ML
4 INJECTION INTRAMUSCULAR; INTRAVENOUS
Status: COMPLETED | OUTPATIENT
Start: 2024-06-15 | End: 2024-06-15

## 2024-06-15 RX ORDER — AMLODIPINE BESYLATE 5 MG/1
5 TABLET ORAL DAILY
Status: DISCONTINUED | OUTPATIENT
Start: 2024-06-16 | End: 2024-06-16

## 2024-06-15 RX ORDER — SODIUM CHLORIDE 0.9 % (FLUSH) 0.9 %
5-40 SYRINGE (ML) INJECTION PRN
Status: DISCONTINUED | OUTPATIENT
Start: 2024-06-15 | End: 2024-06-17 | Stop reason: HOSPADM

## 2024-06-15 RX ORDER — SODIUM CHLORIDE 9 MG/ML
INJECTION, SOLUTION INTRAVENOUS CONTINUOUS
Status: DISCONTINUED | OUTPATIENT
Start: 2024-06-15 | End: 2024-06-15 | Stop reason: HOSPADM

## 2024-06-15 RX ORDER — 0.9 % SODIUM CHLORIDE 0.9 %
1000 INTRAVENOUS SOLUTION INTRAVENOUS
Status: COMPLETED | OUTPATIENT
Start: 2024-06-15 | End: 2024-06-15

## 2024-06-15 RX ORDER — ACETAMINOPHEN 325 MG/1
650 TABLET ORAL EVERY 6 HOURS PRN
Status: DISCONTINUED | OUTPATIENT
Start: 2024-06-15 | End: 2024-06-17 | Stop reason: HOSPADM

## 2024-06-15 RX ORDER — POTASSIUM CHLORIDE 20 MEQ/1
40 TABLET, EXTENDED RELEASE ORAL PRN
Status: DISCONTINUED | OUTPATIENT
Start: 2024-06-15 | End: 2024-06-17 | Stop reason: HOSPADM

## 2024-06-15 RX ORDER — SODIUM CHLORIDE 9 MG/ML
INJECTION, SOLUTION INTRAVENOUS CONTINUOUS
Status: DISCONTINUED | OUTPATIENT
Start: 2024-06-16 | End: 2024-06-16

## 2024-06-15 RX ORDER — HYDROMORPHONE HYDROCHLORIDE 1 MG/ML
1 INJECTION, SOLUTION INTRAMUSCULAR; INTRAVENOUS; SUBCUTANEOUS EVERY 4 HOURS PRN
Status: DISCONTINUED | OUTPATIENT
Start: 2024-06-15 | End: 2024-06-16

## 2024-06-15 RX ORDER — HYDROMORPHONE HYDROCHLORIDE 1 MG/ML
1 INJECTION, SOLUTION INTRAMUSCULAR; INTRAVENOUS; SUBCUTANEOUS ONCE
Status: COMPLETED | OUTPATIENT
Start: 2024-06-15 | End: 2024-06-15

## 2024-06-15 RX ORDER — POTASSIUM CHLORIDE 7.45 MG/ML
10 INJECTION INTRAVENOUS PRN
Status: DISCONTINUED | OUTPATIENT
Start: 2024-06-15 | End: 2024-06-17 | Stop reason: HOSPADM

## 2024-06-15 RX ORDER — POLYETHYLENE GLYCOL 3350 17 G/17G
17 POWDER, FOR SOLUTION ORAL DAILY PRN
Status: DISCONTINUED | OUTPATIENT
Start: 2024-06-15 | End: 2024-06-17 | Stop reason: HOSPADM

## 2024-06-15 RX ORDER — PROCHLORPERAZINE EDISYLATE 5 MG/ML
10 INJECTION INTRAMUSCULAR; INTRAVENOUS
Status: COMPLETED | OUTPATIENT
Start: 2024-06-15 | End: 2024-06-15

## 2024-06-15 RX ORDER — PROMETHAZINE HYDROCHLORIDE 25 MG/1
25 TABLET ORAL 3 TIMES DAILY PRN
Status: DISCONTINUED | OUTPATIENT
Start: 2024-06-15 | End: 2024-06-17 | Stop reason: HOSPADM

## 2024-06-15 RX ORDER — ONDANSETRON 2 MG/ML
4 INJECTION INTRAMUSCULAR; INTRAVENOUS EVERY 6 HOURS PRN
Status: DISCONTINUED | OUTPATIENT
Start: 2024-06-15 | End: 2024-06-16 | Stop reason: SDUPTHER

## 2024-06-15 RX ORDER — ENOXAPARIN SODIUM 100 MG/ML
40 INJECTION SUBCUTANEOUS DAILY
Status: DISCONTINUED | OUTPATIENT
Start: 2024-06-16 | End: 2024-06-17 | Stop reason: HOSPADM

## 2024-06-15 RX ORDER — SODIUM CHLORIDE 0.9 % (FLUSH) 0.9 %
5-40 SYRINGE (ML) INJECTION EVERY 12 HOURS SCHEDULED
Status: DISCONTINUED | OUTPATIENT
Start: 2024-06-16 | End: 2024-06-17 | Stop reason: HOSPADM

## 2024-06-15 RX ORDER — ACETAMINOPHEN 650 MG/1
650 SUPPOSITORY RECTAL EVERY 6 HOURS PRN
Status: DISCONTINUED | OUTPATIENT
Start: 2024-06-15 | End: 2024-06-17 | Stop reason: HOSPADM

## 2024-06-15 RX ORDER — MAGNESIUM SULFATE IN WATER 40 MG/ML
2000 INJECTION, SOLUTION INTRAVENOUS PRN
Status: DISCONTINUED | OUTPATIENT
Start: 2024-06-15 | End: 2024-06-17 | Stop reason: HOSPADM

## 2024-06-15 RX ORDER — SODIUM CHLORIDE 9 MG/ML
INJECTION, SOLUTION INTRAVENOUS PRN
Status: DISCONTINUED | OUTPATIENT
Start: 2024-06-15 | End: 2024-06-17 | Stop reason: HOSPADM

## 2024-06-15 RX ORDER — ONDANSETRON 8 MG/1
8 TABLET, ORALLY DISINTEGRATING ORAL EVERY 8 HOURS PRN
Qty: 12 TABLET | Refills: 0 | Status: ON HOLD | OUTPATIENT
Start: 2024-06-15

## 2024-06-15 RX ORDER — DIPHENHYDRAMINE HYDROCHLORIDE 50 MG/ML
12.5 INJECTION INTRAMUSCULAR; INTRAVENOUS
Status: COMPLETED | OUTPATIENT
Start: 2024-06-15 | End: 2024-06-15

## 2024-06-15 RX ORDER — PROMETHAZINE HYDROCHLORIDE 25 MG/1
25 TABLET ORAL 3 TIMES DAILY PRN
Qty: 12 TABLET | Refills: 0 | Status: ON HOLD | OUTPATIENT
Start: 2024-06-15 | End: 2024-06-22

## 2024-06-15 RX ORDER — PANTOPRAZOLE SODIUM 40 MG/1
40 TABLET, DELAYED RELEASE ORAL NIGHTLY
Status: DISCONTINUED | OUTPATIENT
Start: 2024-06-16 | End: 2024-06-17 | Stop reason: HOSPADM

## 2024-06-15 RX ORDER — ONDANSETRON 2 MG/ML
4 INJECTION INTRAMUSCULAR; INTRAVENOUS EVERY 6 HOURS PRN
Status: DISCONTINUED | OUTPATIENT
Start: 2024-06-15 | End: 2024-06-17 | Stop reason: HOSPADM

## 2024-06-15 RX ORDER — MORPHINE SULFATE 2 MG/ML
2 INJECTION, SOLUTION INTRAMUSCULAR; INTRAVENOUS
Status: DISCONTINUED | OUTPATIENT
Start: 2024-06-15 | End: 2024-06-15

## 2024-06-15 RX ADMIN — PROCHLORPERAZINE EDISYLATE 10 MG: 5 INJECTION INTRAMUSCULAR; INTRAVENOUS at 07:53

## 2024-06-15 RX ADMIN — SODIUM CHLORIDE 1000 ML: 9 INJECTION, SOLUTION INTRAVENOUS at 07:05

## 2024-06-15 RX ADMIN — ONDANSETRON 4 MG: 2 INJECTION INTRAMUSCULAR; INTRAVENOUS at 07:06

## 2024-06-15 RX ADMIN — SODIUM CHLORIDE 1000 ML: 9 INJECTION, SOLUTION INTRAVENOUS at 19:27

## 2024-06-15 RX ADMIN — HYDROMORPHONE HYDROCHLORIDE 1 MG: 1 INJECTION, SOLUTION INTRAMUSCULAR; INTRAVENOUS; SUBCUTANEOUS at 07:06

## 2024-06-15 RX ADMIN — IOPAMIDOL 100 ML: 755 INJECTION, SOLUTION INTRAVENOUS at 08:14

## 2024-06-15 RX ADMIN — HYDROMORPHONE HYDROCHLORIDE 1 MG: 1 INJECTION, SOLUTION INTRAMUSCULAR; INTRAVENOUS; SUBCUTANEOUS at 09:13

## 2024-06-15 RX ADMIN — HYDROMORPHONE HYDROCHLORIDE 1 MG: 1 INJECTION, SOLUTION INTRAMUSCULAR; INTRAVENOUS; SUBCUTANEOUS at 20:47

## 2024-06-15 RX ADMIN — MORPHINE SULFATE 2 MG: 2 INJECTION, SOLUTION INTRAMUSCULAR; INTRAVENOUS at 23:00

## 2024-06-15 RX ADMIN — HYDROMORPHONE HYDROCHLORIDE 1 MG: 1 INJECTION, SOLUTION INTRAMUSCULAR; INTRAVENOUS; SUBCUTANEOUS at 07:54

## 2024-06-15 RX ADMIN — ONDANSETRON 4 MG: 2 INJECTION INTRAMUSCULAR; INTRAVENOUS at 19:29

## 2024-06-15 RX ADMIN — SODIUM CHLORIDE: 9 INJECTION, SOLUTION INTRAVENOUS at 21:00

## 2024-06-15 RX ADMIN — DIPHENHYDRAMINE HYDROCHLORIDE 12.5 MG: 50 INJECTION INTRAMUSCULAR; INTRAVENOUS at 07:51

## 2024-06-15 RX ADMIN — HYDROMORPHONE HYDROCHLORIDE 1 MG: 1 INJECTION, SOLUTION INTRAMUSCULAR; INTRAVENOUS; SUBCUTANEOUS at 19:28

## 2024-06-15 ASSESSMENT — ENCOUNTER SYMPTOMS
ABDOMINAL PAIN: 1
BACK PAIN: 0
EYE REDNESS: 0
NAUSEA: 1
CHOKING: 0
SORE THROAT: 0
CHEST TIGHTNESS: 0
NAUSEA: 1
FLATUS: 1
PHOTOPHOBIA: 0
DIARRHEA: 0
VOMITING: 1
COLOR CHANGE: 0
CONSTIPATION: 0
ABDOMINAL PAIN: 1
VOMITING: 1

## 2024-06-15 ASSESSMENT — PAIN DESCRIPTION - LOCATION
LOCATION: ABDOMEN;BACK
LOCATION: ABDOMEN

## 2024-06-15 ASSESSMENT — PAIN SCALES - GENERAL
PAINLEVEL_OUTOF10: 9
PAINLEVEL_OUTOF10: 8
PAINLEVEL_OUTOF10: 9
PAINLEVEL_OUTOF10: 8
PAINLEVEL_OUTOF10: 6
PAINLEVEL_OUTOF10: 5
PAINLEVEL_OUTOF10: 8
PAINLEVEL_OUTOF10: 9

## 2024-06-15 ASSESSMENT — PAIN DESCRIPTION - DESCRIPTORS
DESCRIPTORS: TIGHTNESS
DESCRIPTORS: TIGHTNESS

## 2024-06-15 ASSESSMENT — PAIN - FUNCTIONAL ASSESSMENT
PAIN_FUNCTIONAL_ASSESSMENT: 0-10
PAIN_FUNCTIONAL_ASSESSMENT: ACTIVITIES ARE NOT PREVENTED
PAIN_FUNCTIONAL_ASSESSMENT: 0-10
PAIN_FUNCTIONAL_ASSESSMENT: ACTIVITIES ARE NOT PREVENTED

## 2024-06-15 NOTE — ED TRIAGE NOTES
Patient verified by name and  prior to triage. Pt presents to the ER with steady gait.  Pt accompianed by self.  Pt and/or family reports history of chronic pancreatitis and is having a flare.

## 2024-06-15 NOTE — DISCHARGE INSTRUCTIONS
Try to avoid alcohol intake  Stay well-hydrated  Take medications as needed for nausea  Follow-up with your primary care physician  Return to the ER for any new, worsening or life-threatening symptoms    It has been my pleasure to care for you here in the ER  I hope we have addressed your concerns here today and offered you some answers  If you receive a survey concerning your care here, I hope you feel comfortable with giving your care team positive scores

## 2024-06-15 NOTE — ED TRIAGE NOTES
Patient ambulatory into ED w/steady gait.  Patient has hx of pancreatis and was seen this a.m. and given prescription for phenergan and zofran.  Pain has been getting worse throughout the day.  Pain is in abdomen and radiates to back.

## 2024-06-15 NOTE — ED PROVIDER NOTES
Emergency Department Provider Note       PCP: Disha Nieves MD   Age: 39 y.o.   Sex: male     DISPOSITION Decision To Transfer 06/15/2024 08:45:29 PM       ICD-10-CM    1. Acute pancreatitis, unspecified complication status, unspecified pancreatitis type  K85.90           Medical Decision Making     Patient is a 39-year-old male with a history of hypertension, chronic pancreatitis, CAD's status post STEMI 2 years ago and tobacco abuse who presents with upper abdominal pain.  He states it started yesterday and got progressively worse.  He has associated nausea and vomiting, denies fever or diarrhea.  He has been taking Norco 10 mg at home without improvement in his pain.  He has had similar pain in the past with exacerbations of his pancreatitis.  The pain gets severe at times.  The pain persisted thus he came here for evaluation.  He was here earlier this morning, had an extensive workup including a CT scan which was unremarkable.  He was discharged home feeling better but started hurting again once he got home thus he returned to the ER.    Differential diagnosis: Pancreatitis, pseudocyst, biliary colic, GERD, esophageal spasm    Labs show a mildly elevated lipase along with a metabolic acidosis, bicarb 17.  He initially had moderate relief with the Dilaudid, and is hurting again and requiring more medication.  Given that he is requiring multiple rounds of pain medication and this is his second visit within the past 12 hours for the same complaint, he will be admitted to the hospital.  The hospitalist has been contacted for admission.     1 chronic illness with exacerbation.  Drug therapy given requiring intensive monitoring for toxicity.  Discussion with external consultants.    I independently ordered and reviewed each unique test.  I reviewed external records: previous lab results from outside ED.       My Independent EKG Interpretation: sinus rhythm, no evidence of arrhythmia and no acute  Lymphocytes Absolute 1.3 0.5 - 4.6 K/UL    Monocytes Absolute 0.8 0.1 - 1.3 K/UL    Eosinophils Absolute 0.1 0.0 - 0.8 K/UL    Basophils Absolute 0.1 0.0 - 0.2 K/UL    Immature Granulocytes Absolute 0.0 0.0 - 0.5 K/UL   Comprehensive Metabolic Panel   Result Value Ref Range    Sodium 137 133 - 143 mmol/L    Potassium 3.8 3.5 - 5.1 mmol/L    Chloride 99 98 - 107 mmol/L    CO2 20 (L) 21 - 32 mmol/L    Anion Gap 18 (H) 2 - 11 mmol/L    Glucose 90 65 - 100 mg/dL    BUN 12 6 - 23 MG/DL    Creatinine 0.75 (L) 0.8 - 1.5 MG/DL    Est, Glom Filt Rate >90 >60 ml/min/1.73m2    Calcium 9.2 8.3 - 10.4 MG/DL    Total Bilirubin 0.8 0.2 - 1.1 MG/DL    ALT 45 13.0 - 61.0 U/L    AST 53 (H) 15 - 37 U/L    Alk Phosphatase 68 45.0 - 117.0 U/L    Total Protein 7.0 6.4 - 8.2 g/dL    Albumin 4.5 3.5 - 5.0 g/dL    Globulin 2.5 (L) 2.8 - 4.5 g/dL    Albumin/Globulin Ratio 1.8 (H) 0.4 - 1.6     Lipase   Result Value Ref Range    Lipase 589 (H) 13 - 60 U/L   Magnesium   Result Value Ref Range    Magnesium 1.5 1.2 - 2.6 mg/dL         No orders to display                No results for input(s): \"COVID19\" in the last 72 hours.    Voice dictation software was used during the making of this note.  This software is not perfect and grammatical and other typographical errors may be present.  This note has not been completely proofread for errors.     Thomas Sung MD  06/15/24 2051

## 2024-06-15 NOTE — ED NOTES
Patient mobility status  with no difficulty. Provider aware     I have reviewed discharge instructions with the patient.  The patient verbalized understanding.    Patient left ED via Discharge Method: ambulatory to Home.    Opportunity for questions and clarification provided.     Patient given 2 scripts.

## 2024-06-16 PROBLEM — E83.42 HYPOMAGNESEMIA: Status: ACTIVE | Noted: 2024-06-16

## 2024-06-16 LAB
ALBUMIN SERPL-MCNC: 4.2 G/DL (ref 3.5–5)
ALBUMIN/GLOB SERPL: 1.7 (ref 1–1.9)
ALP SERPL-CCNC: 58 U/L (ref 40–129)
ALT SERPL-CCNC: 41 U/L (ref 12–65)
ANION GAP SERPL CALC-SCNC: 18 MMOL/L (ref 9–18)
APPEARANCE UR: CLEAR
AST SERPL-CCNC: 41 U/L (ref 15–37)
BACTERIA URNS QL MICRO: 0 /HPF
BASOPHILS # BLD: 0 K/UL (ref 0–0.2)
BASOPHILS NFR BLD: 0 % (ref 0–2)
BILIRUB SERPL-MCNC: 0.7 MG/DL (ref 0–1.2)
BILIRUB UR QL: NEGATIVE
BUN SERPL-MCNC: 9 MG/DL (ref 6–23)
CALCIUM SERPL-MCNC: 8.8 MG/DL (ref 8.8–10.2)
CHLORIDE SERPL-SCNC: 96 MMOL/L (ref 98–107)
CO2 SERPL-SCNC: 18 MMOL/L (ref 20–28)
COLOR UR: ABNORMAL
CREAT SERPL-MCNC: 0.77 MG/DL (ref 0.8–1.3)
DIFFERENTIAL METHOD BLD: ABNORMAL
EOSINOPHIL # BLD: 0 K/UL (ref 0–0.8)
EOSINOPHIL NFR BLD: 0 % (ref 0.5–7.8)
EPI CELLS #/AREA URNS HPF: ABNORMAL /HPF
ERYTHROCYTE [DISTWIDTH] IN BLOOD BY AUTOMATED COUNT: 14 % (ref 11.9–14.6)
GLOBULIN SER CALC-MCNC: 2.5 G/DL (ref 2.3–3.5)
GLUCOSE SERPL-MCNC: 82 MG/DL (ref 70–99)
GLUCOSE UR STRIP.AUTO-MCNC: NEGATIVE MG/DL
HCT VFR BLD AUTO: 43 % (ref 41.1–50.3)
HGB BLD-MCNC: 14.9 G/DL (ref 13.6–17.2)
HGB UR QL STRIP: ABNORMAL
IMM GRANULOCYTES # BLD AUTO: 0 K/UL (ref 0–0.5)
IMM GRANULOCYTES NFR BLD AUTO: 0 % (ref 0–5)
KETONES UR QL STRIP.AUTO: 15 MG/DL
LACTATE SERPL-SCNC: 0.5 MMOL/L (ref 0.5–2)
LEUKOCYTE ESTERASE UR QL STRIP.AUTO: NEGATIVE
LIPASE SERPL-CCNC: 250 U/L (ref 13–60)
LYMPHOCYTES # BLD: 1 K/UL (ref 0.5–4.6)
LYMPHOCYTES NFR BLD: 9 % (ref 13–44)
MCH RBC QN AUTO: 29.6 PG (ref 26.1–32.9)
MCHC RBC AUTO-ENTMCNC: 34.7 G/DL (ref 31.4–35)
MCV RBC AUTO: 85.3 FL (ref 82–102)
MONOCYTES # BLD: 0.9 K/UL (ref 0.1–1.3)
MONOCYTES NFR BLD: 9 % (ref 4–12)
NEUTS SEG # BLD: 8.1 K/UL (ref 1.7–8.2)
NEUTS SEG NFR BLD: 80 % (ref 43–78)
NITRITE UR QL STRIP.AUTO: NEGATIVE
NRBC # BLD: 0 K/UL (ref 0–0.2)
PH UR STRIP: 7 (ref 5–9)
PHOSPHATE SERPL-MCNC: 2.9 MG/DL (ref 2.5–4.5)
PLATELET # BLD AUTO: 205 K/UL (ref 150–450)
PMV BLD AUTO: 9.1 FL (ref 9.4–12.3)
POTASSIUM SERPL-SCNC: 4 MMOL/L (ref 3.5–5.1)
PROCALCITONIN SERPL-MCNC: 0.05 NG/ML (ref 0–0.1)
PROT SERPL-MCNC: 6.7 G/DL (ref 6.3–8.2)
PROT UR STRIP-MCNC: NEGATIVE MG/DL
RBC # BLD AUTO: 5.04 M/UL (ref 4.23–5.6)
SODIUM SERPL-SCNC: 132 MMOL/L (ref 136–145)
SP GR UR REFRACTOMETRY: <1.005 (ref 1–1.02)
TRIGL SERPL-MCNC: 99 MG/DL (ref 0–150)
UROBILINOGEN UR QL STRIP.AUTO: 1 EU/DL (ref 0.2–1)
WBC # BLD AUTO: 10.1 K/UL (ref 4.3–11.1)
WBC URNS QL MICRO: ABNORMAL /HPF

## 2024-06-16 PROCEDURE — 84145 PROCALCITONIN (PCT): CPT

## 2024-06-16 PROCEDURE — 85025 COMPLETE CBC W/AUTO DIFF WBC: CPT

## 2024-06-16 PROCEDURE — 2580000003 HC RX 258: Performed by: FAMILY MEDICINE

## 2024-06-16 PROCEDURE — 83690 ASSAY OF LIPASE: CPT

## 2024-06-16 PROCEDURE — 6360000002 HC RX W HCPCS: Performed by: NURSE PRACTITIONER

## 2024-06-16 PROCEDURE — 83605 ASSAY OF LACTIC ACID: CPT

## 2024-06-16 PROCEDURE — 1100000000 HC RM PRIVATE

## 2024-06-16 PROCEDURE — 6370000000 HC RX 637 (ALT 250 FOR IP): Performed by: FAMILY MEDICINE

## 2024-06-16 PROCEDURE — 80053 COMPREHEN METABOLIC PANEL: CPT

## 2024-06-16 PROCEDURE — 6360000002 HC RX W HCPCS: Performed by: FAMILY MEDICINE

## 2024-06-16 PROCEDURE — 2580000003 HC RX 258: Performed by: NURSE PRACTITIONER

## 2024-06-16 PROCEDURE — 84100 ASSAY OF PHOSPHORUS: CPT

## 2024-06-16 PROCEDURE — 81001 URINALYSIS AUTO W/SCOPE: CPT

## 2024-06-16 PROCEDURE — 36415 COLL VENOUS BLD VENIPUNCTURE: CPT

## 2024-06-16 PROCEDURE — 84478 ASSAY OF TRIGLYCERIDES: CPT

## 2024-06-16 RX ORDER — HYDROCODONE BITARTRATE AND ACETAMINOPHEN 5; 325 MG/1; MG/1
1 TABLET ORAL EVERY 4 HOURS PRN
Status: DISCONTINUED | OUTPATIENT
Start: 2024-06-16 | End: 2024-06-17 | Stop reason: HOSPADM

## 2024-06-16 RX ORDER — HYDROMORPHONE HYDROCHLORIDE 1 MG/ML
1 INJECTION, SOLUTION INTRAMUSCULAR; INTRAVENOUS; SUBCUTANEOUS
Status: DISCONTINUED | OUTPATIENT
Start: 2024-06-16 | End: 2024-06-17 | Stop reason: HOSPADM

## 2024-06-16 RX ORDER — MAGNESIUM SULFATE IN WATER 40 MG/ML
2000 INJECTION, SOLUTION INTRAVENOUS ONCE
Status: COMPLETED | OUTPATIENT
Start: 2024-06-16 | End: 2024-06-16

## 2024-06-16 RX ORDER — SODIUM CHLORIDE, SODIUM LACTATE, POTASSIUM CHLORIDE, CALCIUM CHLORIDE 600; 310; 30; 20 MG/100ML; MG/100ML; MG/100ML; MG/100ML
INJECTION, SOLUTION INTRAVENOUS CONTINUOUS
Status: DISCONTINUED | OUTPATIENT
Start: 2024-06-16 | End: 2024-06-17 | Stop reason: HOSPADM

## 2024-06-16 RX ORDER — AMLODIPINE BESYLATE 10 MG/1
10 TABLET ORAL DAILY
Status: DISCONTINUED | OUTPATIENT
Start: 2024-06-17 | End: 2024-06-17 | Stop reason: HOSPADM

## 2024-06-16 RX ADMIN — SODIUM CHLORIDE, POTASSIUM CHLORIDE, SODIUM LACTATE AND CALCIUM CHLORIDE: 600; 310; 30; 20 INJECTION, SOLUTION INTRAVENOUS at 08:06

## 2024-06-16 RX ADMIN — AMLODIPINE BESYLATE 5 MG: 5 TABLET ORAL at 08:03

## 2024-06-16 RX ADMIN — HYDROCODONE BITARTRATE AND ACETAMINOPHEN 1 TABLET: 5; 325 TABLET ORAL at 02:01

## 2024-06-16 RX ADMIN — HYDROMORPHONE HYDROCHLORIDE 1 MG: 1 INJECTION, SOLUTION INTRAMUSCULAR; INTRAVENOUS; SUBCUTANEOUS at 11:03

## 2024-06-16 RX ADMIN — HYDROMORPHONE HYDROCHLORIDE 1 MG: 1 INJECTION, SOLUTION INTRAMUSCULAR; INTRAVENOUS; SUBCUTANEOUS at 03:03

## 2024-06-16 RX ADMIN — HYDROMORPHONE HYDROCHLORIDE 1 MG: 1 INJECTION, SOLUTION INTRAMUSCULAR; INTRAVENOUS; SUBCUTANEOUS at 16:58

## 2024-06-16 RX ADMIN — TICAGRELOR 90 MG: 90 TABLET ORAL at 00:35

## 2024-06-16 RX ADMIN — SODIUM CHLORIDE, POTASSIUM CHLORIDE, SODIUM LACTATE AND CALCIUM CHLORIDE: 600; 310; 30; 20 INJECTION, SOLUTION INTRAVENOUS at 17:01

## 2024-06-16 RX ADMIN — HYDROCODONE BITARTRATE AND ACETAMINOPHEN 1 TABLET: 5; 325 TABLET ORAL at 15:05

## 2024-06-16 RX ADMIN — HYDROMORPHONE HYDROCHLORIDE 1 MG: 1 INJECTION, SOLUTION INTRAMUSCULAR; INTRAVENOUS; SUBCUTANEOUS at 14:06

## 2024-06-16 RX ADMIN — PANTOPRAZOLE SODIUM 40 MG: 40 TABLET, DELAYED RELEASE ORAL at 00:15

## 2024-06-16 RX ADMIN — SODIUM CHLORIDE: 9 INJECTION, SOLUTION INTRAVENOUS at 00:12

## 2024-06-16 RX ADMIN — HYDROCODONE BITARTRATE AND ACETAMINOPHEN 1 TABLET: 5; 325 TABLET ORAL at 10:15

## 2024-06-16 RX ADMIN — HYDROCODONE BITARTRATE AND ACETAMINOPHEN 1 TABLET: 5; 325 TABLET ORAL at 06:03

## 2024-06-16 RX ADMIN — HYDROMORPHONE HYDROCHLORIDE 1 MG: 1 INJECTION, SOLUTION INTRAMUSCULAR; INTRAVENOUS; SUBCUTANEOUS at 23:01

## 2024-06-16 RX ADMIN — SODIUM CHLORIDE, POTASSIUM CHLORIDE, SODIUM LACTATE AND CALCIUM CHLORIDE: 600; 310; 30; 20 INJECTION, SOLUTION INTRAVENOUS at 23:49

## 2024-06-16 RX ADMIN — PANTOPRAZOLE SODIUM 40 MG: 40 TABLET, DELAYED RELEASE ORAL at 19:09

## 2024-06-16 RX ADMIN — HYDROMORPHONE HYDROCHLORIDE 1 MG: 1 INJECTION, SOLUTION INTRAMUSCULAR; INTRAVENOUS; SUBCUTANEOUS at 00:09

## 2024-06-16 RX ADMIN — TICAGRELOR 90 MG: 90 TABLET ORAL at 08:03

## 2024-06-16 RX ADMIN — TICAGRELOR 90 MG: 90 TABLET ORAL at 19:09

## 2024-06-16 RX ADMIN — HYDROMORPHONE HYDROCHLORIDE 1 MG: 1 INJECTION, SOLUTION INTRAMUSCULAR; INTRAVENOUS; SUBCUTANEOUS at 08:01

## 2024-06-16 RX ADMIN — HYDROMORPHONE HYDROCHLORIDE 1 MG: 1 INJECTION, SOLUTION INTRAMUSCULAR; INTRAVENOUS; SUBCUTANEOUS at 20:01

## 2024-06-16 RX ADMIN — HYDROCODONE BITARTRATE AND ACETAMINOPHEN 1 TABLET: 5; 325 TABLET ORAL at 19:09

## 2024-06-16 RX ADMIN — MAGNESIUM SULFATE HEPTAHYDRATE 2000 MG: 40 INJECTION, SOLUTION INTRAVENOUS at 08:07

## 2024-06-16 ASSESSMENT — PAIN - FUNCTIONAL ASSESSMENT

## 2024-06-16 ASSESSMENT — PAIN SCALES - GENERAL
PAINLEVEL_OUTOF10: 9
PAINLEVEL_OUTOF10: 7
PAINLEVEL_OUTOF10: 10
PAINLEVEL_OUTOF10: 7
PAINLEVEL_OUTOF10: 8
PAINLEVEL_OUTOF10: 9
PAINLEVEL_OUTOF10: 9
PAINLEVEL_OUTOF10: 10
PAINLEVEL_OUTOF10: 6
PAINLEVEL_OUTOF10: 10
PAINLEVEL_OUTOF10: 8
PAINLEVEL_OUTOF10: 6
PAINLEVEL_OUTOF10: 7
PAINLEVEL_OUTOF10: 8
PAINLEVEL_OUTOF10: 7
PAINLEVEL_OUTOF10: 8
PAINLEVEL_OUTOF10: 9
PAINLEVEL_OUTOF10: 7
PAINLEVEL_OUTOF10: 7
PAINLEVEL_OUTOF10: 5
PAINLEVEL_OUTOF10: 10
PAINLEVEL_OUTOF10: 8
PAINLEVEL_OUTOF10: 9
PAINLEVEL_OUTOF10: 7
PAINLEVEL_OUTOF10: 9
PAINLEVEL_OUTOF10: 9

## 2024-06-16 ASSESSMENT — PAIN DESCRIPTION - DESCRIPTORS
DESCRIPTORS: ACHING
DESCRIPTORS: ACHING;DISCOMFORT
DESCRIPTORS: ACHING
DESCRIPTORS: ACHING;DISCOMFORT
DESCRIPTORS: ACHING
DESCRIPTORS: ACHING

## 2024-06-16 ASSESSMENT — PAIN DESCRIPTION - LOCATION
LOCATION: ABDOMEN
LOCATION: ABDOMEN;BACK
LOCATION: ABDOMEN;BACK
LOCATION: ABDOMEN
LOCATION: ABDOMEN;BACK
LOCATION: ABDOMEN;BACK
LOCATION: ABDOMEN

## 2024-06-16 NOTE — ED NOTES
TRANSFER - OUT REPORT:    Verbal report given to Kaur BENITEZ on Bhupinder Tejada  being transferred to E room 319 for routine progression of patient care       Report consisted of patient's Situation, Background, Assessment and   Recommendations(SBAR).     Information from the following report(s) ED SBAR was reviewed with the receiving nurse.    Lines:   Peripheral IV 06/15/24 Left;Proximal;Anterior Forearm (Active)        Opportunity for questions and clarification was provided.      Patient transported with:  [employer]/ [employer]

## 2024-06-16 NOTE — PROGRESS NOTES
Received pt to room from [place] ED. Pt c/o 10/10 abdominal and chest pain. Pain medicine given. Oriented pt to room, bed controls and call bell.

## 2024-06-16 NOTE — CARE COORDINATION
39 yr-old M with pancreatitis.  Lives with family.  Provided [employer] brochure.  No other needs.     06/16/24 1037   Service Assessment   Patient Orientation Alert and Oriented   Cognition Alert   History Provided By Patient   Primary Caregiver Self   Support Systems Family Members   Patient's Healthcare Decision Maker is: Legal Next of Kin   PCP Verified by CM Yes   Last Visit to PCP Within last 6 months   Prior Functional Level Independent in ADLs/IADLs   Current Functional Level Independent in ADLs/IADLs   Can patient return to prior living arrangement Yes   Ability to make needs known: Good   Family able to assist with home care needs: Yes   Would you like for me to discuss the discharge plan with any other family members/significant others, and if so, who? No   Financial Resources Other (Comment)  ([employer])   Community Resources Other (Comment)  ([employer])

## 2024-06-16 NOTE — H&P
[facility] Hospitalist Initial History and Physical Note    Patient: Bhupinder Tejada Date: 6/15/2024  male, 39 y.o.  Admit Date: 6/15/2024  Attending: Juni Kaufman MD     ASSESSMENT AND PLAN:     Principal Problem:    Acute recurrent pancreatitis  Plan: Similar to previous episode. NPO. IVF. IV pain/nausea control. Follow clinically.         DVT Prophylaxis: Lovenox      Code Status: FULL CODE      Disposition: Admit to med/surg for evaluation and treatment as per above.      Anticipated discharge: 2-3 days     CHIEF COMPLAINT:  abdominal pain    HISTORY OF PRESENT ILLNESS:      Patient Active Problem List   Diagnosis    Primary hypertension    Chronic pancreatitis (HCC)    Attention deficit hyperactivity disorder (ADHD), combined type    Tobacco abuse [Z72.0 (ICD-10-CM)]    Pancreatic pseudocyst    History of ST elevation myocardial infarction (STEMI)    Coronary artery disease involving native coronary artery of native heart    Dependence on nicotine from cigarettes    GERD (gastroesophageal reflux disease)    Complete tear of right ACL    Tear of lateral meniscus of right knee    Acute medial meniscus tear, right, initial encounter    Encounter for monitoring opioid maintenance therapy    Acute recurrent pancreatitis       Bhupinder Tejada is a 39 y.o. male, with a history of  has a past medical history of Abdominal pain, Abnormal CT of the abdomen, Acute alcoholic pancreatitis, Acute kidney injury (nontraumatic) (HCC), Acute on chronic pancreatitis (HCC), Acute on chronic pancreatitis (HCC), Alcohol use disorder, severe, dependence (HCC), Alcohol use disorder, severe, in early remission, dependence (HCC), CAD (coronary artery disease), Carpal tunnel syndrome, left, Cigarette smoker, Coronary artery disease involving native coronary artery of native heart, Dependence on nicotine from cigarettes, GERD (gastroesophageal reflux disease), History of pancreatitis, Hypertension, and STEMI (ST elevation myocardial  or scleral icterus.  ENT:   External auricular and nasal exam within normal limits.   Cardiovascular: No cyanosis or edema of extremities.   Respiratory:    No respiratory distress or accessory muscle use.  Gastrointestinal:   Not actively vomiting, abdomen non-distended   Skin:      Normal color. No rashes, lesions, or jaundice.  Neurologic:  CN II-XII grossly intact and symmetrical.      Moving all four extremities well with no focal deficits.  Psychiatric:  Appropriate affect. Alert     Intake/Output last 3 shifts:  No intake or output data in the 24 hours ending 06/15/24 8789     Labs:  Lab Results   Component Value Date     06/15/2024    K 4.2 06/15/2024    CL 96 (L) 06/15/2024    CO2 17 (L) 06/15/2024    BUN 12 06/15/2024    CREATININE 0.78 (L) 06/15/2024    GLUCOSE 85 06/15/2024    CALCIUM 9.6 06/15/2024    BILITOT 0.8 06/15/2024    ALKPHOS 75 06/15/2024    AST 52 (H) 06/15/2024    ALT 46 06/15/2024    LABGLOM >90 06/15/2024    GFRAA >60 08/01/2022    AGRATIO 1.1 (L) 10/03/2021    GLOB 2.9 06/15/2024        Lab Results   Component Value Date/Time    MG 1.5 06/15/2024 06:55 AM     Lab Results   Component Value Date    CALCIUM 9.6 06/15/2024    PHOS 1.5 (L) 07/17/2023        Lab Results   Component Value Date    WBC 7.9 06/15/2024    HGB 16.3 06/15/2024    HCT 46.6 06/15/2024    MCV 85.5 06/15/2024     06/15/2024        No results found for: \"INR\", \"PROTIME\"     No results found for: \"CKTOTAL\", \"CKMB\", \"CKMBINDEX\", \"TROPONINI\"     Imagining & Other Studies  CT ABDOMEN PELVIS W IV CONTRAST Additional Contrast? None  Narrative: CT OF THE ABDOMEN AND PELVIS    INDICATION: Upper abdominal pain; history of pancreatitis.    TECHNIQUE: Multiple 2D axial images were obtained through the abdomen and  pelvis.  Oral contrast was used for bowel opacification.  100mL of Isovue 370  intravenous contrast was used for better evaluation of solid organs and vascular  structures.  Radiation dose reduction techniques

## 2024-06-16 NOTE — PROGRESS NOTES
Hospitalist Progress Note   Admit Date:  6/15/2024 10:07 PM   Name:  Bhupinder Tejada   Age:  39 y.o.  Sex:  male  :  1985   MRN:  [id number]   Room:  [facility]/    Presenting Complaint: No chief complaint on file.     Reason(s) for Admission: Acute recurrent pancreatitis [K85.90]     Hospital Course:   Bhupinder Tejada is a 39 y.o. male with medical history of pancreatitis, alcohol use disorder, coronary artery disease with stent in 2022, cigarette smoker, GERD, hypertension; with a surgical history of cardiac stent, cholecystectomy, orthopedic surgery, endoscopy; who presents to the emergency room with report of severe mid abdominal pain radiating around both of sides into his back.  Patient states this is consistent with his previous bouts of pancreatitis requiring admission for IV fluids and pain medication.  Pain started yesterday afternoon 6/15, and did not resolve.  Patient has several episodes of nausea and vomiting prior to coming in.  No diarrhea.  Patient denies any fever, chills, chest pain, shortness of breath, cough, congestion, blurry vision, ataxia, headaches.   Patient had a CT scan which showed no acute CT evidence of acute pancreatitis or abnormalities of the abdomen and pelvis but patient may have caught this early this time since his lipase was elevated at 589.  Patient's white cell count elevated at 12.1, hemoglobin 16.0, sodium 137, potassium 3.8, creatinine 0.75, magnesium low at 1.5 requiring replacement, troponin neg at 7.8. EKG was SR at 92 bpm. Pt placed on IV flds, started on pain mediation prn, made NPO and admitted by the hospital team.     Subjective & 24hr Events (24):   Patient lying in bed.  No family at the bedside during a.m. rounds.  He is afebrile.  Went over vital signs with patient; discussed HTN.   Went over labs with patient:  White cell count trending down from 12.1 --> 10.1  Procalcitonin neg at 0.5, lactic acid negative at 0.5.  Urinalysis  chloride (KLOR-CON M) extended release tablet 40 mEq  40 mEq Oral PRN    Or    potassium bicarb-citric acid (EFFER-K) effervescent tablet 40 mEq  40 mEq Oral PRN    Or    potassium chloride 10 mEq/100 mL IVPB (Peripheral Line)  10 mEq IntraVENous PRN    magnesium sulfate 2000 mg in 50 mL IVPB premix  2,000 mg IntraVENous PRN    ondansetron (ZOFRAN-ODT) disintegrating tablet 4 mg  4 mg Oral Q8H PRN    Or    ondansetron (ZOFRAN) injection 4 mg  4 mg IntraVENous Q6H PRN    polyethylene glycol (GLYCOLAX) packet 17 g  17 g Oral Daily PRN    acetaminophen (TYLENOL) tablet 650 mg  650 mg Oral Q6H PRN    Or    acetaminophen (TYLENOL) suppository 650 mg  650 mg Rectal Q6H PRN    enoxaparin (LOVENOX) injection 40 mg  40 mg SubCUTAneous Daily       Signed:  Dafne Howard, APRN - CNP    Part of this note may have been written by using a voice dictation software.  The note has been proof read but may still contain some grammatical/other typographical errors.

## 2024-06-16 NOTE — PROGRESS NOTES
TRANSFER - IN REPORT:    Verbal report received from   ELI Serrano on Bhupinder Tejada  being received from   [facility] for routine progression of patient care      Report consisted of patient's Situation, Background, Assessment and   Recommendations(SBAR).     Information from the following report(s) Nurse Handoff Report, Index, ED Encounter Summary, MAR, Neuro Assessment, and Event Log was reviewed with the receiving nurse.    Opportunity for questions and clarification was provided.      Assessment completed upon patient's arrival to unit and care assumed.

## 2024-06-16 NOTE — ED NOTES
Pt departed [facility] via [facility]

## 2024-06-16 NOTE — ED NOTES
Spoke with Larissa, House Supervisor, pt accepted to E med/surg bed by Dr. CATRACHITA Kaufman. She will call back with bed assigment.      domiciled with mother, two younger sisters, and older brother, full-time student at [employer], 9th grade, regular education, attends in-person, engaged in school. Parents are finalizing a divorce.

## 2024-06-17 VITALS
HEIGHT: 69 IN | WEIGHT: 180 LBS | DIASTOLIC BLOOD PRESSURE: 105 MMHG | OXYGEN SATURATION: 98 % | SYSTOLIC BLOOD PRESSURE: 157 MMHG | TEMPERATURE: 98.4 F | RESPIRATION RATE: 16 BRPM | HEART RATE: 73 BPM | BODY MASS INDEX: 26.66 KG/M2

## 2024-06-17 PROBLEM — E83.42 HYPOMAGNESEMIA: Status: RESOLVED | Noted: 2024-06-16 | Resolved: 2024-06-17

## 2024-06-17 PROBLEM — K85.90 ACUTE RECURRENT PANCREATITIS: Status: RESOLVED | Noted: 2024-06-15 | Resolved: 2024-06-17

## 2024-06-17 LAB
ALBUMIN SERPL-MCNC: 4.1 G/DL (ref 3.5–5)
ALBUMIN/GLOB SERPL: 1.4 (ref 1–1.9)
ALP SERPL-CCNC: 63 U/L (ref 40–129)
ALT SERPL-CCNC: 36 U/L (ref 12–65)
ANION GAP SERPL CALC-SCNC: 14 MMOL/L (ref 9–18)
AST SERPL-CCNC: 30 U/L (ref 15–37)
BASOPHILS # BLD: 0 K/UL (ref 0–0.2)
BASOPHILS NFR BLD: 0 % (ref 0–2)
BILIRUB SERPL-MCNC: 0.7 MG/DL (ref 0–1.2)
BUN SERPL-MCNC: 6 MG/DL (ref 6–23)
CALCIUM SERPL-MCNC: 9.3 MG/DL (ref 8.8–10.2)
CHLORIDE SERPL-SCNC: 98 MMOL/L (ref 98–107)
CO2 SERPL-SCNC: 21 MMOL/L (ref 20–28)
CREAT SERPL-MCNC: 0.77 MG/DL (ref 0.8–1.3)
DIFFERENTIAL METHOD BLD: ABNORMAL
EOSINOPHIL # BLD: 0 K/UL (ref 0–0.8)
EOSINOPHIL NFR BLD: 1 % (ref 0.5–7.8)
ERYTHROCYTE [DISTWIDTH] IN BLOOD BY AUTOMATED COUNT: 14 % (ref 11.9–14.6)
GLOBULIN SER CALC-MCNC: 3 G/DL (ref 2.3–3.5)
GLUCOSE SERPL-MCNC: 106 MG/DL (ref 70–99)
HCT VFR BLD AUTO: 44.1 % (ref 41.1–50.3)
HGB BLD-MCNC: 15.6 G/DL (ref 13.6–17.2)
IMM GRANULOCYTES # BLD AUTO: 0 K/UL (ref 0–0.5)
IMM GRANULOCYTES NFR BLD AUTO: 0 % (ref 0–5)
LIPASE SERPL-CCNC: 65 U/L (ref 13–60)
LYMPHOCYTES # BLD: 1.1 K/UL (ref 0.5–4.6)
LYMPHOCYTES NFR BLD: 15 % (ref 13–44)
MCH RBC QN AUTO: 29.9 PG (ref 26.1–32.9)
MCHC RBC AUTO-ENTMCNC: 35.4 G/DL (ref 31.4–35)
MCV RBC AUTO: 84.5 FL (ref 82–102)
MONOCYTES # BLD: 0.8 K/UL (ref 0.1–1.3)
MONOCYTES NFR BLD: 11 % (ref 4–12)
NEUTS SEG # BLD: 5.1 K/UL (ref 1.7–8.2)
NEUTS SEG NFR BLD: 72 % (ref 43–78)
NRBC # BLD: 0 K/UL (ref 0–0.2)
PLATELET # BLD AUTO: 242 K/UL (ref 150–450)
PMV BLD AUTO: 9.1 FL (ref 9.4–12.3)
POTASSIUM SERPL-SCNC: 4.2 MMOL/L (ref 3.5–5.1)
PROT SERPL-MCNC: 7.1 G/DL (ref 6.3–8.2)
RBC # BLD AUTO: 5.22 M/UL (ref 4.23–5.6)
SODIUM SERPL-SCNC: 133 MMOL/L (ref 136–145)
WBC # BLD AUTO: 7.1 K/UL (ref 4.3–11.1)

## 2024-06-17 PROCEDURE — 6370000000 HC RX 637 (ALT 250 FOR IP): Performed by: FAMILY MEDICINE

## 2024-06-17 PROCEDURE — 83690 ASSAY OF LIPASE: CPT

## 2024-06-17 PROCEDURE — 80053 COMPREHEN METABOLIC PANEL: CPT

## 2024-06-17 PROCEDURE — 85025 COMPLETE CBC W/AUTO DIFF WBC: CPT

## 2024-06-17 PROCEDURE — 36415 COLL VENOUS BLD VENIPUNCTURE: CPT

## 2024-06-17 PROCEDURE — 6370000000 HC RX 637 (ALT 250 FOR IP): Performed by: NURSE PRACTITIONER

## 2024-06-17 PROCEDURE — 6360000002 HC RX W HCPCS: Performed by: FAMILY MEDICINE

## 2024-06-17 PROCEDURE — 2580000003 HC RX 258: Performed by: NURSE PRACTITIONER

## 2024-06-17 RX ORDER — HYDROCODONE BITARTRATE AND ACETAMINOPHEN 10; 325 MG/1; MG/1
1 TABLET ORAL EVERY 8 HOURS PRN
Qty: 9 TABLET | Refills: 0 | Status: SHIPPED | OUTPATIENT
Start: 2024-06-17 | End: 2024-06-17

## 2024-06-17 RX ORDER — HYDROCODONE BITARTRATE AND ACETAMINOPHEN 10; 325 MG/1; MG/1
1 TABLET ORAL EVERY 8 HOURS PRN
Qty: 9 TABLET | Refills: 0 | Status: SHIPPED | OUTPATIENT
Start: 2024-06-17 | End: 2024-06-20

## 2024-06-17 RX ORDER — ONDANSETRON 4 MG/1
4 TABLET, ORALLY DISINTEGRATING ORAL EVERY 8 HOURS PRN
Qty: 15 TABLET | Refills: 0 | Status: SHIPPED | OUTPATIENT
Start: 2024-06-17 | End: 2024-06-22

## 2024-06-17 RX ORDER — AMLODIPINE BESYLATE 10 MG/1
10 TABLET ORAL DAILY
Qty: 30 TABLET | Refills: 1 | Status: SHIPPED | OUTPATIENT
Start: 2024-06-17 | End: 2024-08-16

## 2024-06-17 RX ADMIN — HYDROCODONE BITARTRATE AND ACETAMINOPHEN 1 TABLET: 5; 325 TABLET ORAL at 12:07

## 2024-06-17 RX ADMIN — HYDROCODONE BITARTRATE AND ACETAMINOPHEN 1 TABLET: 5; 325 TABLET ORAL at 04:06

## 2024-06-17 RX ADMIN — AMLODIPINE BESYLATE 10 MG: 10 TABLET ORAL at 08:36

## 2024-06-17 RX ADMIN — HYDROCODONE BITARTRATE AND ACETAMINOPHEN 1 TABLET: 5; 325 TABLET ORAL at 00:01

## 2024-06-17 RX ADMIN — HYDROMORPHONE HYDROCHLORIDE 1 MG: 1 INJECTION, SOLUTION INTRAMUSCULAR; INTRAVENOUS; SUBCUTANEOUS at 05:38

## 2024-06-17 RX ADMIN — HYDROCODONE BITARTRATE AND ACETAMINOPHEN 1 TABLET: 5; 325 TABLET ORAL at 08:35

## 2024-06-17 RX ADMIN — TICAGRELOR 90 MG: 90 TABLET ORAL at 08:35

## 2024-06-17 RX ADMIN — SODIUM CHLORIDE, POTASSIUM CHLORIDE, SODIUM LACTATE AND CALCIUM CHLORIDE: 600; 310; 30; 20 INJECTION, SOLUTION INTRAVENOUS at 05:42

## 2024-06-17 RX ADMIN — HYDROMORPHONE HYDROCHLORIDE 1 MG: 1 INJECTION, SOLUTION INTRAMUSCULAR; INTRAVENOUS; SUBCUTANEOUS at 09:31

## 2024-06-17 RX ADMIN — HYDROMORPHONE HYDROCHLORIDE 1 MG: 1 INJECTION, SOLUTION INTRAMUSCULAR; INTRAVENOUS; SUBCUTANEOUS at 02:07

## 2024-06-17 ASSESSMENT — PAIN DESCRIPTION - LOCATION
LOCATION: ABDOMEN;BACK
LOCATION: ABDOMEN;BACK
LOCATION: ABDOMEN
LOCATION: BACK;ABDOMEN
LOCATION: ABDOMEN
LOCATION: ABDOMEN;BACK
LOCATION: ABDOMEN;BACK
LOCATION: ABDOMEN
LOCATION: ABDOMEN

## 2024-06-17 ASSESSMENT — PAIN - FUNCTIONAL ASSESSMENT
PAIN_FUNCTIONAL_ASSESSMENT: ACTIVITIES ARE NOT PREVENTED

## 2024-06-17 ASSESSMENT — PAIN SCALES - GENERAL
PAINLEVEL_OUTOF10: 7
PAINLEVEL_OUTOF10: 9
PAINLEVEL_OUTOF10: 7
PAINLEVEL_OUTOF10: 10
PAINLEVEL_OUTOF10: 9
PAINLEVEL_OUTOF10: 8
PAINLEVEL_OUTOF10: 9
PAINLEVEL_OUTOF10: 6
PAINLEVEL_OUTOF10: 6
PAINLEVEL_OUTOF10: 10
PAINLEVEL_OUTOF10: 9
PAINLEVEL_OUTOF10: 6
PAINLEVEL_OUTOF10: 9

## 2024-06-17 ASSESSMENT — PAIN DESCRIPTION - FREQUENCY
FREQUENCY: CONTINUOUS
FREQUENCY: CONTINUOUS

## 2024-06-17 ASSESSMENT — PAIN DESCRIPTION - DESCRIPTORS
DESCRIPTORS: ACHING
DESCRIPTORS: DISCOMFORT
DESCRIPTORS: ACHING
DESCRIPTORS: DISCOMFORT
DESCRIPTORS: ACHING

## 2024-06-17 ASSESSMENT — PAIN DESCRIPTION - ONSET: ONSET: PROGRESSIVE

## 2024-06-17 ASSESSMENT — PAIN DESCRIPTION - ORIENTATION
ORIENTATION: OTHER (COMMENT)

## 2024-06-17 ASSESSMENT — PAIN DESCRIPTION - PAIN TYPE
TYPE: ACUTE PAIN;SURGICAL PAIN
TYPE: ACUTE PAIN;CHRONIC PAIN

## 2024-06-17 NOTE — CARE COORDINATION
Initial note:    Chart reviewed for updates. It was reported in IDR's that pt is medically stable for discharge. Pt has been given community resources and the financial assistance form. Spouse will provide transport. No PT/OT needs identified. No further CM needs identified. CM will remain accessible for consult incase additional CM needs arise prior d/c.     06/17/24 1055   Services At/After Discharge   Transition of Care Consult (CM Consult) Discharge Planning   Services At/After Discharge Community Resources    Resource Information Provided? No   Mode of Transport at Discharge Other (see comment)  (Family)   Confirm Follow Up Transport Family   Condition of Participation: Discharge Planning   The Plan for Transition of Care is related to the following treatment goals: Pt is discharging home with community resources and family   The Patient and/Or Patient Representative agree with the Discharge Plan? Yes     GUILLERMO Carrasco

## 2024-06-17 NOTE — DISCHARGE SUMMARY
Hospitalist Discharge Summary   Admit Date:  6/15/2024 10:07 PM   DC Note date: 2024  Name:  Bhupinder Tejada   Age:  39 y.o.  Sex:  male  :  1985   MRN:  [id number]   Room:  [facility]  PCP:  Disha Nieves MD    Presenting Complaint: No chief complaint on file.     Initial Admission Diagnosis: Acute recurrent pancreatitis [K85.90]     Problem List for this Hospitalization (present on admission):    Principal Problem (Resolved):    Acute recurrent pancreatitis  Active Problems:    Coronary artery disease involving native coronary artery of native heart    GERD (gastroesophageal reflux disease)    Primary hypertension  Resolved Problems:    Hypomagnesemia      Hospital Course:  Bhupinder Tejada is a 39 y.o. male with medical history of pancreatitis, alcohol use disorder, coronary artery disease with stent in 2022, cigarette smoker, GERD, hypertension; with a surgical history of cardiac stent, cholecystectomy, orthopedic surgery, endoscopy; who presents to the emergency room with report of severe mid abdominal pain radiating around both of sides into his back.  Patient states this is consistent with his previous bouts of pancreatitis requiring admission for IV fluids and pain medication.  Pain started yesterday afternoon 6/15, and did not resolve.  Patient has several episodes of nausea and vomiting prior to coming in.  No diarrhea.  Patient denies any fever, chills, chest pain, shortness of breath, cough, congestion, blurry vision, ataxia, headaches.   Patient had a CT scan which showed no acute CT evidence of acute pancreatitis or abnormalities of the abdomen and pelvis but patient may have caught this early this time since his lipase was elevated at 589.  Patient's white cell count elevated at 12.1, hemoglobin 16.0, sodium 137, potassium 3.8, creatinine 0.75, magnesium low at 1.5 requiring replacement, troponin neg at 7.8. EKG was SR at 92 bpm. Pt placed on IV flds, started on  - 5.0 %    Neutrophils Absolute 5.1 1.7 - 8.2 K/UL    Lymphocytes Absolute 1.1 0.5 - 4.6 K/UL    Monocytes Absolute 0.8 0.1 - 1.3 K/UL    Eosinophils Absolute 0.0 0.0 - 0.8 K/UL    Basophils Absolute 0.0 0.0 - 0.2 K/UL    Immature Granulocytes Absolute 0.0 0.0 - 0.5 K/UL   Lipase    Collection Time: 06/17/24  4:28 AM   Result Value Ref Range    Lipase 65 (H) 13 - 60 U/L       No Known Allergies  Immunization History   Administered Date(s) Administered    COVID-19, MODERNA BLUE border, Primary or Immunocompromised, (age 12y+), IM, 100 mcg/0.5mL 08/19/2021    Influenza Virus Vaccine 11/05/2015, 09/19/2018, 01/18/2020, 01/18/2020, 10/05/2020, 10/05/2020    Influenza, FLUARIX, FLULAVAL, FLUZONE (age 6 mo+) AND AFLURIA, (age 3 y+), PF, 0.5mL 11/05/2015, 01/18/2020, 10/05/2020    Pneumococcal, PPSV23, PNEUMOVAX 23, (age 2y+), SC/IM, 0.5mL 02/22/2018    TDaP, ADACEL (age 10y-64y), BOOSTRIX (age 10y+), IM, 0.5mL 10/11/2022       Recent Vital Data:  Patient Vitals for the past 24 hrs:   Temp Pulse Resp BP SpO2   06/17/24 0931 -- -- 16 -- --   06/17/24 0836 -- -- -- (!) 157/105 --   06/17/24 0834 -- -- 16 -- --   06/17/24 0730 98.4 °F (36.9 °C) 73 16 (!) 157/105 98 %   06/17/24 0608 -- -- 18 -- --   06/17/24 0436 -- -- 18 -- --   06/16/24 2331 -- -- 18 -- --   06/16/24 2031 -- -- 18 -- --   06/16/24 2006 98.6 °F (37 °C) 99 18 (!) 160/92 96 %   06/16/24 1728 -- -- 17 -- --   06/16/24 1658 -- -- 18 -- --   06/16/24 1535 -- -- 17 -- --   06/16/24 1505 -- -- 18 -- --   06/16/24 1406 -- -- 18 -- --   06/16/24 1133 -- -- 18 -- --   06/16/24 1103 -- -- 18 -- --   06/16/24 1045 -- -- 17 -- --   06/16/24 1015 -- -- 18 -- --       Oxygen Therapy  SpO2: 98 %  O2 Device: None (Room air)    Estimated body mass index is 26.58 kg/m² as calculated from the following:    Height as of this encounter: 1.753 m (5' 9\").    Weight as of this encounter: 81.6 kg (180 lb).  No intake or output data in the 24 hours ending 06/17/24 0953      Physical

## 2024-06-17 NOTE — PLAN OF CARE
Problem: Pain  Goal: Verbalizes/displays adequate comfort level or baseline comfort level  6/16/2024 2027 by Kaur Vergara, RN  Outcome: Progressing  6/16/2024 1039 by Aline Garcia, RN  Outcome: Progressing     Problem: Gastrointestinal - Adult  Goal: Minimal or absence of nausea and vomiting  Outcome: Progressing

## 2024-06-18 ENCOUNTER — TELEPHONE (OUTPATIENT)
Dept: INTERNAL MEDICINE CLINIC | Facility: CLINIC | Age: 39
End: 2024-06-18

## 2024-06-18 NOTE — TELEPHONE ENCOUNTER
Care Transitions Initial Follow Up Call    Outreach made within 2 business days of discharge: Yes    Patient: Bhupinder Tejada Patient : 1985   MRN: [id number]  Reason for Admission: There are no discharge diagnoses documented for the most recent discharge.  Discharge Date: 24       Spoke with: LMM TO CMB, LMM TO CMB     Discharge department/facility:     [facility] Interactive Patient Contact:  Was patient able to fill all prescriptions: No: unable to reach pt.    Was patient instructed to bring all medications to the follow-up visit: No: unable to reach pt  Is patient taking all medications as directed in the discharge summary? Unable to reach pt.  Does patient understand their discharge instructions: No: unable to reach pt  Does patient have questions or concerns that need addressed prior to 7-14 day follow up office visit: no   unable to reach pt.     Scheduled appointment with PCP within 7-14 days    Follow Up  Future Appointments   Date Time Provider Department Center   2024  2:45 PM Tomy Zelaya MD [facility] AMB   2024  8:20 AM Disha Nieves MD [facility] AMB       Yin Castro LPN

## 2024-06-25 ENCOUNTER — PATIENT MESSAGE (OUTPATIENT)
Dept: INTERNAL MEDICINE CLINIC | Facility: CLINIC | Age: 39
End: 2024-06-25

## 2024-06-25 DIAGNOSIS — K85.91 NECROTIZING PANCREATITIS: Primary | ICD-10-CM

## 2024-06-25 DIAGNOSIS — K21.9 GASTROESOPHAGEAL REFLUX DISEASE, UNSPECIFIED WHETHER ESOPHAGITIS PRESENT: ICD-10-CM

## 2024-06-25 RX ORDER — HYDROCODONE BITARTRATE AND ACETAMINOPHEN 10; 325 MG/1; MG/1
1 TABLET ORAL EVERY 6 HOURS PRN
Qty: 90 TABLET | Refills: 0 | Status: SHIPPED | OUTPATIENT
Start: 2024-06-25 | End: 2024-07-25

## 2024-06-25 RX ORDER — PANTOPRAZOLE SODIUM 40 MG/1
40 TABLET, DELAYED RELEASE ORAL NIGHTLY
Qty: 90 TABLET | Refills: 1 | Status: SHIPPED | OUTPATIENT
Start: 2024-06-25

## 2024-06-25 NOTE — TELEPHONE ENCOUNTER
From: Bhupinder Tejada  To: Dr. Disha Nova  Sent: 6/25/2024 8:14 AM EDT  Subject: Prescription refill     I need to request a prescription refill for my 10 mg Hydrocodone but it’s not on my list. I had a hospital stay last week and I think they removed it by accident. I also need to make a fallow up after hospital visit

## 2024-06-25 NOTE — TELEPHONE ENCOUNTER
Requested Prescriptions     Pending Prescriptions Disp Refills    pantoprazole (PROTONIX) 40 MG tablet 90 tablet 1     Sig: Take 1 tablet by mouth at bedtime    Next ov 09/17/2024. Pharmacy confirmed.

## 2024-06-25 NOTE — TELEPHONE ENCOUNTER
Requested Prescriptions     Pending Prescriptions Disp Refills    HYDROcodone-acetaminophen (NORCO)  MG per tablet 90 tablet 0     Sig: Take 1 tablet by mouth every 6 hours as needed for Pain for up to 30 days. Intended supply: 30 days Max Daily Amount: 4 tablets    Next ov 09/17/2024. Pharmacy confirmed.

## 2024-07-02 ENCOUNTER — OFFICE VISIT (OUTPATIENT)
Age: 39
End: 2024-07-02

## 2024-07-02 VITALS
DIASTOLIC BLOOD PRESSURE: 80 MMHG | WEIGHT: 187 LBS | HEART RATE: 68 BPM | SYSTOLIC BLOOD PRESSURE: 128 MMHG | HEIGHT: 69 IN | BODY MASS INDEX: 27.7 KG/M2

## 2024-07-02 DIAGNOSIS — K85.91 NECROTIZING PANCREATITIS: ICD-10-CM

## 2024-07-02 DIAGNOSIS — I21.29 ST ELEVATION MYOCARDIAL INFARCTION (STEMI) INVOLVING OTHER CORONARY ARTERY (HCC): Primary | ICD-10-CM

## 2024-07-02 DIAGNOSIS — I10 ESSENTIAL HYPERTENSION: ICD-10-CM

## 2024-07-02 PROCEDURE — 3074F SYST BP LT 130 MM HG: CPT | Performed by: INTERNAL MEDICINE

## 2024-07-02 PROCEDURE — 99214 OFFICE O/P EST MOD 30 MIN: CPT | Performed by: INTERNAL MEDICINE

## 2024-07-02 PROCEDURE — 3079F DIAST BP 80-89 MM HG: CPT | Performed by: INTERNAL MEDICINE

## 2024-07-22 ENCOUNTER — PATIENT MESSAGE (OUTPATIENT)
Dept: INTERNAL MEDICINE CLINIC | Facility: CLINIC | Age: 39
End: 2024-07-22

## 2024-07-22 RX ORDER — AMLODIPINE BESYLATE 10 MG/1
10 TABLET ORAL DAILY
Qty: 90 TABLET | Refills: 1 | Status: SHIPPED | OUTPATIENT
Start: 2024-07-22

## 2024-07-22 NOTE — TELEPHONE ENCOUNTER
Requested Prescriptions     Pending Prescriptions Disp Refills    amLODIPine (NORVASC) 10 MG tablet 90 tablet 1     Sig: Take 1 tablet by mouth daily     Dose verified and to [facility]. Patient is scheduled for follow up visit.

## 2024-07-22 NOTE — TELEPHONE ENCOUNTER
From: Bhupinder Tejada  To: Dr. Disha Nova  Sent: 7/22/2024 9:40 AM EDT  Subject: Amlodapine refill    I need to refill my Amlodapine but it won’t let me request a refill

## 2024-07-25 DIAGNOSIS — K85.91 NECROTIZING PANCREATITIS: ICD-10-CM

## 2024-07-25 RX ORDER — HYDROCODONE BITARTRATE AND ACETAMINOPHEN 10; 325 MG/1; MG/1
1 TABLET ORAL EVERY 6 HOURS PRN
Qty: 90 TABLET | Refills: 0 | Status: SHIPPED | OUTPATIENT
Start: 2024-07-25 | End: 2024-08-24

## 2024-07-25 NOTE — TELEPHONE ENCOUNTER
.  Requested Prescriptions     Pending Prescriptions Disp Refills    HYDROcodone-acetaminophen (NORCO)  MG per tablet 90 tablet 0     Sig: Take 1 tablet by mouth every 6 hours as needed for Pain for up to 30 days. Intended supply: 30 days Max Daily Amount: 4 tablets     next ov 04/19/2024. Pharmacy confirmed.

## 2024-08-26 ENCOUNTER — PATIENT MESSAGE (OUTPATIENT)
Dept: INTERNAL MEDICINE CLINIC | Facility: CLINIC | Age: 39
End: 2024-08-26

## 2024-08-26 DIAGNOSIS — K85.91 NECROTIZING PANCREATITIS: Primary | ICD-10-CM

## 2024-08-26 RX ORDER — HYDROCODONE BITARTRATE AND ACETAMINOPHEN 10; 325 MG/1; MG/1
1 TABLET ORAL EVERY 6 HOURS PRN
Qty: 90 TABLET | Refills: 0 | Status: SHIPPED | OUTPATIENT
Start: 2024-08-26 | End: 2024-09-25

## 2024-08-26 NOTE — TELEPHONE ENCOUNTER
Requested Prescriptions     Pending Prescriptions Disp Refills    HYDROcodone-acetaminophen (LORCET HD)  MG per tablet 90 tablet 0     Sig: Take 1 tablet by mouth every 6 hours as needed for Pain for up to 30 days. Intended supply: 30 days Max Daily Amount: 4 tablets    Next ov 09/17/2024. Pharmacy confirmed.

## 2024-09-12 DIAGNOSIS — K21.9 GASTROESOPHAGEAL REFLUX DISEASE, UNSPECIFIED WHETHER ESOPHAGITIS PRESENT: ICD-10-CM

## 2024-09-12 RX ORDER — PANTOPRAZOLE SODIUM 40 MG/1
40 TABLET, DELAYED RELEASE ORAL NIGHTLY
Qty: 90 TABLET | Refills: 1 | Status: SHIPPED | OUTPATIENT
Start: 2024-09-12

## 2024-09-17 ENCOUNTER — OFFICE VISIT (OUTPATIENT)
Dept: INTERNAL MEDICINE CLINIC | Facility: CLINIC | Age: 39
End: 2024-09-17

## 2024-09-17 VITALS
OXYGEN SATURATION: 98 % | HEART RATE: 80 BPM | SYSTOLIC BLOOD PRESSURE: 130 MMHG | DIASTOLIC BLOOD PRESSURE: 88 MMHG | BODY MASS INDEX: 27.85 KG/M2 | WEIGHT: 188 LBS | HEIGHT: 69 IN

## 2024-09-17 DIAGNOSIS — K86.0 ALCOHOL-INDUCED CHRONIC PANCREATITIS (HCC): ICD-10-CM

## 2024-09-17 DIAGNOSIS — Z00.00 ANNUAL PHYSICAL EXAM: Primary | ICD-10-CM

## 2024-09-17 DIAGNOSIS — I10 PRIMARY HYPERTENSION: Chronic | ICD-10-CM

## 2024-09-17 DIAGNOSIS — K21.9 GASTROESOPHAGEAL REFLUX DISEASE WITHOUT ESOPHAGITIS: ICD-10-CM

## 2024-09-17 DIAGNOSIS — K85.91 NECROTIZING PANCREATITIS: ICD-10-CM

## 2024-09-17 PROCEDURE — 99396 PREV VISIT EST AGE 40-64: CPT | Performed by: INTERNAL MEDICINE

## 2024-09-17 PROCEDURE — 3078F DIAST BP <80 MM HG: CPT | Performed by: INTERNAL MEDICINE

## 2024-09-17 PROCEDURE — 3075F SYST BP GE 130 - 139MM HG: CPT | Performed by: INTERNAL MEDICINE

## 2024-09-17 RX ORDER — HYDROCODONE BITARTRATE AND ACETAMINOPHEN 7.5; 325 MG/1; MG/1
1 TABLET ORAL EVERY 8 HOURS PRN
Qty: 90 TABLET | Refills: 0
Start: 2024-09-17 | End: 2024-10-17

## 2024-09-17 RX ORDER — AMLODIPINE BESYLATE 10 MG/1
10 TABLET ORAL DAILY
Qty: 90 TABLET | Refills: 3 | Status: SHIPPED | OUTPATIENT
Start: 2024-09-17

## 2024-09-17 ASSESSMENT — ENCOUNTER SYMPTOMS
ABDOMINAL PAIN: 1
RECTAL PAIN: 0
ABDOMINAL DISTENTION: 1

## 2024-09-23 DIAGNOSIS — K85.91 NECROTIZING PANCREATITIS: ICD-10-CM

## 2024-09-23 RX ORDER — HYDROCODONE BITARTRATE AND ACETAMINOPHEN 7.5; 325 MG/1; MG/1
1 TABLET ORAL EVERY 8 HOURS PRN
Qty: 90 TABLET | Refills: 0 | Status: SHIPPED | OUTPATIENT
Start: 2024-09-23 | End: 2024-10-23

## 2024-10-03 DIAGNOSIS — I10 PRIMARY HYPERTENSION: Chronic | ICD-10-CM

## 2024-10-04 RX ORDER — AMLODIPINE BESYLATE 10 MG/1
10 TABLET ORAL DAILY
Qty: 90 TABLET | Refills: 3 | OUTPATIENT
Start: 2024-10-04

## 2024-10-04 NOTE — TELEPHONE ENCOUNTER
Requested Prescriptions     Refused Prescriptions Disp Refills    amLODIPine (NORVASC) 10 MG tablet 90 tablet 3     Sig: Take 1 tablet by mouth daily     Refused By: PRINCESS ROBERTSON     Reason for Refusal: Patient has requested refill too soon         [id number] [facility]

## 2024-10-23 DIAGNOSIS — K85.91 NECROTIZING PANCREATITIS: ICD-10-CM

## 2024-10-23 RX ORDER — HYDROCODONE BITARTRATE AND ACETAMINOPHEN 7.5; 325 MG/1; MG/1
1 TABLET ORAL EVERY 8 HOURS PRN
Qty: 90 TABLET | Refills: 0 | Status: SHIPPED | OUTPATIENT
Start: 2024-10-23 | End: 2024-11-22

## 2024-10-23 NOTE — TELEPHONE ENCOUNTER
Requested Prescriptions     Pending Prescriptions Disp Refills    HYDROcodone-acetaminophen (NORCO) 7.5-325 MG per tablet 90 tablet 0     Sig: Take 1 tablet by mouth every 8 hours as needed for Pain for up to 30 days. Intended supply: 30 days Max Daily Amount: 3 tablets    Next ov 01/02/2025. Pharmacy confirmed.

## 2024-11-21 DIAGNOSIS — K85.91 NECROTIZING PANCREATITIS: ICD-10-CM

## 2024-11-24 ENCOUNTER — HOSPITAL ENCOUNTER (EMERGENCY)
Age: 39
Discharge: ANOTHER HOSPITAL | End: 2024-11-24
Attending: EMERGENCY MEDICINE

## 2024-11-24 ENCOUNTER — HOSPITAL ENCOUNTER (INPATIENT)
Age: 39
LOS: 3 days | Discharge: HOME | DRG: 439 | End: 2024-11-27
Attending: HOSPITALIST | Admitting: HOSPITALIST

## 2024-11-24 ENCOUNTER — APPOINTMENT (OUTPATIENT)
Dept: CT IMAGING | Age: 39
End: 2024-11-24

## 2024-11-24 VITALS
OXYGEN SATURATION: 96 % | RESPIRATION RATE: 16 BRPM | DIASTOLIC BLOOD PRESSURE: 96 MMHG | BODY MASS INDEX: 26.66 KG/M2 | HEART RATE: 90 BPM | TEMPERATURE: 98 F | WEIGHT: 180 LBS | HEIGHT: 69 IN | SYSTOLIC BLOOD PRESSURE: 156 MMHG

## 2024-11-24 DIAGNOSIS — K85.90 ACUTE PANCREATITIS, UNSPECIFIED COMPLICATION STATUS, UNSPECIFIED PANCREATITIS TYPE: Primary | ICD-10-CM

## 2024-11-24 PROBLEM — K86.1 ACUTE ON CHRONIC PANCREATITIS (HCC): Status: ACTIVE | Noted: 2024-11-24

## 2024-11-24 LAB
ALBUMIN SERPL-MCNC: 5 G/DL (ref 3.5–5)
ALBUMIN/GLOB SERPL: 1.7 (ref 1–1.9)
ALP SERPL-CCNC: 102 U/L (ref 40–129)
ALT SERPL-CCNC: 71 U/L (ref 12–65)
AMPHET UR QL SCN: NEGATIVE
ANION GAP SERPL CALC-SCNC: 14 MMOL/L (ref 7–16)
APPEARANCE UR: CLEAR
AST SERPL-CCNC: 64 U/L (ref 15–37)
BACTERIA URNS QL MICRO: 0 /HPF
BARBITURATES UR QL SCN: NEGATIVE
BASOPHILS # BLD: 0 K/UL (ref 0–0.2)
BASOPHILS NFR BLD: 1 % (ref 0–2)
BENZODIAZ UR QL: NEGATIVE
BILIRUB SERPL-MCNC: 0.8 MG/DL (ref 0–1.2)
BILIRUB UR QL: NEGATIVE
BUN SERPL-MCNC: 10 MG/DL (ref 6–23)
CALCIUM SERPL-MCNC: 9.7 MG/DL (ref 8.8–10.2)
CANNABINOIDS UR QL SCN: NEGATIVE
CHLORIDE SERPL-SCNC: 100 MMOL/L (ref 98–107)
CO2 SERPL-SCNC: 24 MMOL/L (ref 20–29)
COCAINE UR QL SCN: NEGATIVE
COLOR UR: YELLOW
CREAT SERPL-MCNC: 0.86 MG/DL (ref 0.8–1.3)
DIFFERENTIAL METHOD BLD: ABNORMAL
EOSINOPHIL # BLD: 0 K/UL (ref 0–0.8)
EOSINOPHIL NFR BLD: 0 % (ref 0.5–7.8)
EPI CELLS #/AREA URNS HPF: 0 /HPF
ERYTHROCYTE [DISTWIDTH] IN BLOOD BY AUTOMATED COUNT: 13.2 % (ref 11.9–14.6)
ETHANOL SERPL-MCNC: <10 MG/DL (ref 0–0.08)
GLOBULIN SER CALC-MCNC: 3 G/DL (ref 2.3–3.5)
GLUCOSE SERPL-MCNC: 112 MG/DL (ref 65–100)
GLUCOSE UR STRIP.AUTO-MCNC: NEGATIVE MG/DL
HCT VFR BLD AUTO: 51.3 % (ref 41.1–50.3)
HGB BLD-MCNC: 18 G/DL (ref 13.6–17.2)
HGB UR QL STRIP: ABNORMAL
IMM GRANULOCYTES # BLD AUTO: 0 K/UL (ref 0–0.5)
IMM GRANULOCYTES NFR BLD AUTO: 0 % (ref 0–5)
KETONES UR QL STRIP.AUTO: ABNORMAL MG/DL
LEUKOCYTE ESTERASE UR QL STRIP.AUTO: NEGATIVE
LIPASE SERPL-CCNC: 481 U/L (ref 13–60)
LYMPHOCYTES # BLD: 1.3 K/UL (ref 0.5–4.6)
LYMPHOCYTES NFR BLD: 17 % (ref 13–44)
MCH RBC QN AUTO: 29.8 PG (ref 26.1–32.9)
MCHC RBC AUTO-ENTMCNC: 35.1 G/DL (ref 31.4–35)
MCV RBC AUTO: 84.8 FL (ref 82–102)
METHADONE UR QL: NEGATIVE
MONOCYTES # BLD: 0.6 K/UL (ref 0.1–1.3)
MONOCYTES NFR BLD: 8 % (ref 4–12)
MUCOUS THREADS URNS QL MICRO: 0 /LPF
NEUTS SEG # BLD: 5.5 K/UL (ref 1.7–8.2)
NEUTS SEG NFR BLD: 74 % (ref 43–78)
NITRITE UR QL STRIP.AUTO: NEGATIVE
NRBC # BLD: 0 K/UL (ref 0–0.2)
OPIATES UR QL: NEGATIVE
OTHER OBSERVATIONS: NORMAL
PCP UR QL: NEGATIVE
PH UR STRIP: 8.5 (ref 5–9)
PLATELET # BLD AUTO: 274 K/UL (ref 150–450)
PMV BLD AUTO: 9.3 FL (ref 9.4–12.3)
POTASSIUM SERPL-SCNC: 4.3 MMOL/L (ref 3.5–5.1)
PROT SERPL-MCNC: 8 G/DL (ref 6.3–8.2)
PROT UR STRIP-MCNC: 100 MG/DL
RBC # BLD AUTO: 6.05 M/UL (ref 4.23–5.6)
RBC #/AREA URNS HPF: NORMAL /HPF
SODIUM SERPL-SCNC: 138 MMOL/L (ref 133–143)
SP GR UR REFRACTOMETRY: 1.01 (ref 1–1.02)
UROBILINOGEN UR QL STRIP.AUTO: 0.2 EU/DL (ref 0.2–1)
WBC # BLD AUTO: 7.5 K/UL (ref 4.3–11.1)
WBC URNS QL MICRO: 0 /HPF

## 2024-11-24 PROCEDURE — 2500000003 HC RX 250 WO HCPCS: Performed by: PHYSICIAN ASSISTANT

## 2024-11-24 PROCEDURE — 6360000002 HC RX W HCPCS: Performed by: HOSPITALIST

## 2024-11-24 PROCEDURE — 85025 COMPLETE CBC W/AUTO DIFF WBC: CPT

## 2024-11-24 PROCEDURE — 96376 TX/PRO/DX INJ SAME DRUG ADON: CPT

## 2024-11-24 PROCEDURE — 80307 DRUG TEST PRSMV CHEM ANLYZR: CPT

## 2024-11-24 PROCEDURE — 2580000003 HC RX 258: Performed by: HOSPITALIST

## 2024-11-24 PROCEDURE — 83690 ASSAY OF LIPASE: CPT

## 2024-11-24 PROCEDURE — 81001 URINALYSIS AUTO W/SCOPE: CPT

## 2024-11-24 PROCEDURE — 2500000003 HC RX 250 WO HCPCS: Performed by: HOSPITALIST

## 2024-11-24 PROCEDURE — 82077 ASSAY SPEC XCP UR&BREATH IA: CPT

## 2024-11-24 PROCEDURE — 96375 TX/PRO/DX INJ NEW DRUG ADDON: CPT

## 2024-11-24 PROCEDURE — 74177 CT ABD & PELVIS W/CONTRAST: CPT

## 2024-11-24 PROCEDURE — 80053 COMPREHEN METABOLIC PANEL: CPT

## 2024-11-24 PROCEDURE — 1100000000 HC RM PRIVATE

## 2024-11-24 PROCEDURE — 99285 EMERGENCY DEPT VISIT HI MDM: CPT

## 2024-11-24 PROCEDURE — 6360000004 HC RX CONTRAST MEDICATION: Performed by: PHYSICIAN ASSISTANT

## 2024-11-24 PROCEDURE — 6360000002 HC RX W HCPCS: Performed by: PHYSICIAN ASSISTANT

## 2024-11-24 PROCEDURE — 96374 THER/PROPH/DIAG INJ IV PUSH: CPT

## 2024-11-24 RX ORDER — HYDROCODONE BITARTRATE AND ACETAMINOPHEN 10; 325 MG/1; MG/1
1 TABLET ORAL EVERY 8 HOURS PRN
Status: DISCONTINUED | OUTPATIENT
Start: 2024-11-24 | End: 2024-11-27

## 2024-11-24 RX ORDER — HYDRALAZINE HYDROCHLORIDE 20 MG/ML
10 INJECTION INTRAMUSCULAR; INTRAVENOUS EVERY 4 HOURS PRN
Status: DISCONTINUED | OUTPATIENT
Start: 2024-11-24 | End: 2024-11-25

## 2024-11-24 RX ORDER — POLYETHYLENE GLYCOL 3350 17 G/17G
17 POWDER, FOR SOLUTION ORAL DAILY PRN
Status: DISCONTINUED | OUTPATIENT
Start: 2024-11-24 | End: 2024-11-27 | Stop reason: HOSPADM

## 2024-11-24 RX ORDER — ONDANSETRON 2 MG/ML
4 INJECTION INTRAMUSCULAR; INTRAVENOUS
Status: COMPLETED | OUTPATIENT
Start: 2024-11-24 | End: 2024-11-24

## 2024-11-24 RX ORDER — POTASSIUM CHLORIDE 7.45 MG/ML
10 INJECTION INTRAVENOUS PRN
Status: DISCONTINUED | OUTPATIENT
Start: 2024-11-24 | End: 2024-11-27 | Stop reason: HOSPADM

## 2024-11-24 RX ORDER — KETOROLAC TROMETHAMINE 15 MG/ML
15 INJECTION, SOLUTION INTRAMUSCULAR; INTRAVENOUS EVERY 6 HOURS PRN
Status: DISPENSED | OUTPATIENT
Start: 2024-11-24 | End: 2024-11-27

## 2024-11-24 RX ORDER — ACETAMINOPHEN 650 MG/1
650 SUPPOSITORY RECTAL EVERY 6 HOURS PRN
Status: DISCONTINUED | OUTPATIENT
Start: 2024-11-24 | End: 2024-11-27 | Stop reason: HOSPADM

## 2024-11-24 RX ORDER — HYDROMORPHONE HYDROCHLORIDE 1 MG/ML
1 INJECTION, SOLUTION INTRAMUSCULAR; INTRAVENOUS; SUBCUTANEOUS
Status: COMPLETED | OUTPATIENT
Start: 2024-11-24 | End: 2024-11-24

## 2024-11-24 RX ORDER — HYDROMORPHONE HYDROCHLORIDE 1 MG/ML
1 INJECTION, SOLUTION INTRAMUSCULAR; INTRAVENOUS; SUBCUTANEOUS
Status: DISCONTINUED | OUTPATIENT
Start: 2024-11-24 | End: 2024-11-24

## 2024-11-24 RX ORDER — HYDROCODONE BITARTRATE AND ACETAMINOPHEN 10; 325 MG/1; MG/1
1 TABLET ORAL EVERY 8 HOURS PRN
Status: ON HOLD | COMMUNITY
End: 2024-11-25 | Stop reason: ALTCHOICE

## 2024-11-24 RX ORDER — AMLODIPINE BESYLATE 10 MG/1
10 TABLET ORAL DAILY
Status: DISCONTINUED | OUTPATIENT
Start: 2024-11-25 | End: 2024-11-27 | Stop reason: HOSPADM

## 2024-11-24 RX ORDER — HALOPERIDOL 5 MG/ML
5 INJECTION INTRAMUSCULAR
Status: COMPLETED | OUTPATIENT
Start: 2024-11-24 | End: 2024-11-24

## 2024-11-24 RX ORDER — HYDROMORPHONE HYDROCHLORIDE 1 MG/ML
1 INJECTION, SOLUTION INTRAMUSCULAR; INTRAVENOUS; SUBCUTANEOUS
Status: DISCONTINUED | OUTPATIENT
Start: 2024-11-24 | End: 2024-11-25

## 2024-11-24 RX ORDER — ACETAMINOPHEN 325 MG/1
650 TABLET ORAL EVERY 6 HOURS PRN
Status: DISCONTINUED | OUTPATIENT
Start: 2024-11-24 | End: 2024-11-27 | Stop reason: HOSPADM

## 2024-11-24 RX ORDER — SODIUM CHLORIDE 0.9 % (FLUSH) 0.9 %
5-40 SYRINGE (ML) INJECTION EVERY 12 HOURS SCHEDULED
Status: DISCONTINUED | OUTPATIENT
Start: 2024-11-24 | End: 2024-11-27 | Stop reason: HOSPADM

## 2024-11-24 RX ORDER — ONDANSETRON 2 MG/ML
4 INJECTION INTRAMUSCULAR; INTRAVENOUS EVERY 6 HOURS PRN
Status: DISCONTINUED | OUTPATIENT
Start: 2024-11-24 | End: 2024-11-27 | Stop reason: HOSPADM

## 2024-11-24 RX ORDER — ONDANSETRON 4 MG/1
4 TABLET, ORALLY DISINTEGRATING ORAL EVERY 8 HOURS PRN
Status: DISCONTINUED | OUTPATIENT
Start: 2024-11-24 | End: 2024-11-27 | Stop reason: HOSPADM

## 2024-11-24 RX ORDER — SODIUM CHLORIDE 9 MG/ML
INJECTION, SOLUTION INTRAVENOUS PRN
Status: DISCONTINUED | OUTPATIENT
Start: 2024-11-24 | End: 2024-11-27 | Stop reason: HOSPADM

## 2024-11-24 RX ORDER — SODIUM CHLORIDE 9 MG/ML
INJECTION, SOLUTION INTRAVENOUS CONTINUOUS
Status: ACTIVE | OUTPATIENT
Start: 2024-11-24 | End: 2024-11-25

## 2024-11-24 RX ORDER — IOPAMIDOL 755 MG/ML
100 INJECTION, SOLUTION INTRAVASCULAR
Status: COMPLETED | OUTPATIENT
Start: 2024-11-24 | End: 2024-11-24

## 2024-11-24 RX ORDER — POTASSIUM CHLORIDE 1500 MG/1
40 TABLET, EXTENDED RELEASE ORAL PRN
Status: DISCONTINUED | OUTPATIENT
Start: 2024-11-24 | End: 2024-11-27 | Stop reason: HOSPADM

## 2024-11-24 RX ORDER — SODIUM CHLORIDE 0.9 % (FLUSH) 0.9 %
5-40 SYRINGE (ML) INJECTION PRN
Status: DISCONTINUED | OUTPATIENT
Start: 2024-11-24 | End: 2024-11-27 | Stop reason: HOSPADM

## 2024-11-24 RX ORDER — HYDROMORPHONE HYDROCHLORIDE 1 MG/ML
2 INJECTION, SOLUTION INTRAMUSCULAR; INTRAVENOUS; SUBCUTANEOUS
Status: COMPLETED | OUTPATIENT
Start: 2024-11-24 | End: 2024-11-24

## 2024-11-24 RX ORDER — MAGNESIUM SULFATE IN WATER 40 MG/ML
2000 INJECTION, SOLUTION INTRAVENOUS PRN
Status: DISCONTINUED | OUTPATIENT
Start: 2024-11-24 | End: 2024-11-27 | Stop reason: HOSPADM

## 2024-11-24 RX ADMIN — HYDROMORPHONE HYDROCHLORIDE 1 MG: 1 INJECTION, SOLUTION INTRAMUSCULAR; INTRAVENOUS; SUBCUTANEOUS at 23:17

## 2024-11-24 RX ADMIN — PANTOPRAZOLE SODIUM 40 MG: 40 INJECTION, POWDER, FOR SOLUTION INTRAVENOUS at 21:11

## 2024-11-24 RX ADMIN — SODIUM CHLORIDE, PRESERVATIVE FREE 10 ML: 5 INJECTION INTRAVENOUS at 21:11

## 2024-11-24 RX ADMIN — HYDROMORPHONE HYDROCHLORIDE 1 MG: 1 INJECTION, SOLUTION INTRAMUSCULAR; INTRAVENOUS; SUBCUTANEOUS at 15:02

## 2024-11-24 RX ADMIN — HALOPERIDOL LACTATE 5 MG: 5 INJECTION, SOLUTION INTRAMUSCULAR at 16:56

## 2024-11-24 RX ADMIN — HYDROMORPHONE HYDROCHLORIDE 1 MG: 1 INJECTION, SOLUTION INTRAMUSCULAR; INTRAVENOUS; SUBCUTANEOUS at 21:03

## 2024-11-24 RX ADMIN — SODIUM CHLORIDE: 9 INJECTION, SOLUTION INTRAVENOUS at 21:16

## 2024-11-24 RX ADMIN — ONDANSETRON 4 MG: 2 INJECTION, SOLUTION INTRAMUSCULAR; INTRAVENOUS at 14:37

## 2024-11-24 RX ADMIN — HYDROMORPHONE HYDROCHLORIDE 1 MG: 1 INJECTION, SOLUTION INTRAMUSCULAR; INTRAVENOUS; SUBCUTANEOUS at 16:08

## 2024-11-24 RX ADMIN — HYDROMORPHONE HYDROCHLORIDE 2 MG: 1 INJECTION, SOLUTION INTRAMUSCULAR; INTRAVENOUS; SUBCUTANEOUS at 18:06

## 2024-11-24 RX ADMIN — IOPAMIDOL 100 ML: 755 INJECTION, SOLUTION INTRAVENOUS at 16:47

## 2024-11-24 RX ADMIN — SODIUM CHLORIDE 1 MG: 9 INJECTION INTRAMUSCULAR; INTRAVENOUS; SUBCUTANEOUS at 21:11

## 2024-11-24 ASSESSMENT — PAIN DESCRIPTION - DESCRIPTORS
DESCRIPTORS: SHARP;DISCOMFORT
DESCRIPTORS: STABBING;THROBBING
DESCRIPTORS: ACHING;THROBBING;SHARP;SHOOTING
DESCRIPTORS: SPASM;THROBBING;SHARP

## 2024-11-24 ASSESSMENT — PAIN DESCRIPTION - ORIENTATION
ORIENTATION: UPPER;LOWER
ORIENTATION: UPPER;LOWER
ORIENTATION: RIGHT;MID;UPPER

## 2024-11-24 ASSESSMENT — PAIN DESCRIPTION - LOCATION
LOCATION: ABDOMEN
LOCATION: ABDOMEN
LOCATION: ABDOMEN;BACK
LOCATION: ABDOMEN
LOCATION: ABDOMEN
LOCATION: ABDOMEN;BACK
LOCATION: ABDOMEN;BACK

## 2024-11-24 ASSESSMENT — PAIN SCALES - GENERAL
PAINLEVEL_OUTOF10: 9
PAINLEVEL_OUTOF10: 10
PAINLEVEL_OUTOF10: 7
PAINLEVEL_OUTOF10: 10
PAINLEVEL_OUTOF10: 10
PAINLEVEL_OUTOF10: 7
PAINLEVEL_OUTOF10: 10
PAINLEVEL_OUTOF10: 9

## 2024-11-24 ASSESSMENT — PAIN - FUNCTIONAL ASSESSMENT
PAIN_FUNCTIONAL_ASSESSMENT: PREVENTS OR INTERFERES SOME ACTIVE ACTIVITIES AND ADLS
PAIN_FUNCTIONAL_ASSESSMENT: PREVENTS OR INTERFERES SOME ACTIVE ACTIVITIES AND ADLS
PAIN_FUNCTIONAL_ASSESSMENT: 0-10

## 2024-11-24 ASSESSMENT — LIFESTYLE VARIABLES
HOW OFTEN DO YOU HAVE A DRINK CONTAINING ALCOHOL: NEVER
HOW MANY STANDARD DRINKS CONTAINING ALCOHOL DO YOU HAVE ON A TYPICAL DAY: PATIENT DOES NOT DRINK

## 2024-11-24 NOTE — ED TRIAGE NOTES
Pt ambulatory to triage with steady gait. Pt c/o RUQ to mid abdominal pain with nausea and vomiting that started last night. Pt verbalizes hx of chronic pancreatitis and believes he is having a flare up. Pt in NAD during triage.

## 2024-11-24 NOTE — ED PROVIDER NOTES
- 37 U/L    Alk Phosphatase 102 40 - 129 U/L    Total Protein 8.0 6.3 - 8.2 g/dL    Albumin 5.0 3.5 - 5.0 g/dL    Globulin 3.0 2.3 - 3.5 g/dL    Albumin/Globulin Ratio 1.7 1.0 - 1.9     Lipase   Result Value Ref Range    Lipase 481 (H) 13 - 60 U/L   Urinalysis w rflx microscopic   Result Value Ref Range    Color, UA YELLOW      Appearance CLEAR      Specific Gravity, UA 1.015 1.001 - 1.023      pH, Urine 8.5 5.0 - 9.0      Protein,  (A) NEG mg/dL    Glucose, Ur Negative NEG mg/dL    Ketones, Urine TRACE (A) NEG mg/dL    Bilirubin, Urine Negative NEG      Blood, Urine Trace Intact (A) NEG      Urobilinogen, Urine 0.2 0.2 - 1.0 EU/dL    Nitrite, Urine Negative NEG      Leukocyte Esterase, Urine Negative NEG     Urine Drug Screen   Result Value Ref Range    Phencyclidine, Urine Negative NEG      Benzodiazepines, Urine Negative NEG      Cocaine, Urine Negative NEG      Amphetamine, Urine Negative NEG      Methadone, Urine Negative NEG      THC, TH-Cannabinol, Urine Negative NEG      Opiates, Urine Negative NEG      Barbiturates, Urine Negative NEG     Ethanol   Result Value Ref Range    Ethanol Lvl <10 (H) 0 MG/DL   Urinalysis, Micro   Result Value Ref Range    WBC, UA 0 0 /hpf    RBC, UA 0-3 0 /hpf    Epithelial Cells, UA 0 0 /hpf    BACTERIA, URINE 0 0 /hpf    Mucus, UA 0 0 /lpf    Other observations RESULTS VERIFIED MANUALLY           CT ABDOMEN PELVIS W IV CONTRAST Additional Contrast? None   Final Result   Mild ill-defined inflammatory changes about the tail of the pancreas   may represent mild/early acute pancreatitis in the appropriate clinical setting.            Electronically signed by Xavier Brown                   No results for input(s): \"COVID19\" in the last 72 hours.    Voice dictation software was used during the making of this note.  This software is not perfect and grammatical and other typographical errors may be present.  This note has not been completely proofread for errors.      Conner

## 2024-11-25 LAB
ALBUMIN SERPL-MCNC: 4.5 G/DL (ref 3.5–5)
ALBUMIN/GLOB SERPL: 1.6 (ref 1–1.9)
ALP SERPL-CCNC: 95 U/L (ref 40–129)
ALT SERPL-CCNC: 64 U/L (ref 8–55)
ANION GAP SERPL CALC-SCNC: 15 MMOL/L (ref 7–16)
ANION GAP SERPL CALC-SCNC: 16 MMOL/L (ref 7–16)
AST SERPL-CCNC: 48 U/L (ref 15–37)
BASOPHILS # BLD: 0 K/UL (ref 0–0.2)
BASOPHILS NFR BLD: 0 % (ref 0–2)
BILIRUB SERPL-MCNC: 0.6 MG/DL (ref 0–1.2)
BUN SERPL-MCNC: 10 MG/DL (ref 6–23)
BUN SERPL-MCNC: 12 MG/DL (ref 6–23)
CALCIUM SERPL-MCNC: 9.1 MG/DL (ref 8.8–10.2)
CALCIUM SERPL-MCNC: 9.7 MG/DL (ref 8.8–10.2)
CHLORIDE SERPL-SCNC: 96 MMOL/L (ref 98–107)
CHLORIDE SERPL-SCNC: 97 MMOL/L (ref 98–107)
CO2 SERPL-SCNC: 20 MMOL/L (ref 20–29)
CO2 SERPL-SCNC: 20 MMOL/L (ref 20–29)
CREAT SERPL-MCNC: 0.82 MG/DL (ref 0.8–1.3)
CREAT SERPL-MCNC: 0.86 MG/DL (ref 0.8–1.3)
DIFFERENTIAL METHOD BLD: ABNORMAL
EOSINOPHIL # BLD: 0 K/UL (ref 0–0.8)
EOSINOPHIL NFR BLD: 0 % (ref 0.5–7.8)
ERYTHROCYTE [DISTWIDTH] IN BLOOD BY AUTOMATED COUNT: 13.2 % (ref 11.9–14.6)
GLOBULIN SER CALC-MCNC: 2.8 G/DL (ref 2.3–3.5)
GLUCOSE SERPL-MCNC: 114 MG/DL (ref 70–99)
GLUCOSE SERPL-MCNC: 132 MG/DL (ref 70–99)
HCT VFR BLD AUTO: 49.8 % (ref 41.1–50.3)
HGB BLD-MCNC: 17.7 G/DL (ref 13.6–17.2)
IMM GRANULOCYTES # BLD AUTO: 0 K/UL (ref 0–0.5)
IMM GRANULOCYTES NFR BLD AUTO: 0 % (ref 0–5)
LACTATE SERPL-SCNC: 0.8 MMOL/L (ref 0.5–2)
LYMPHOCYTES # BLD: 0.5 K/UL (ref 0.5–4.6)
LYMPHOCYTES NFR BLD: 4 % (ref 13–44)
MCH RBC QN AUTO: 29.9 PG (ref 26.1–32.9)
MCHC RBC AUTO-ENTMCNC: 35.5 G/DL (ref 31.4–35)
MCV RBC AUTO: 84.1 FL (ref 82–102)
MONOCYTES # BLD: 0.8 K/UL (ref 0.1–1.3)
MONOCYTES NFR BLD: 6 % (ref 4–12)
NEUTS SEG # BLD: 11.3 K/UL (ref 1.7–8.2)
NEUTS SEG NFR BLD: 89 % (ref 43–78)
NRBC # BLD: 0 K/UL (ref 0–0.2)
PLATELET # BLD AUTO: 233 K/UL (ref 150–450)
PMV BLD AUTO: 9.7 FL (ref 9.4–12.3)
POTASSIUM SERPL-SCNC: 4.2 MMOL/L (ref 3.5–5.1)
POTASSIUM SERPL-SCNC: 4.2 MMOL/L (ref 3.5–5.1)
PROT SERPL-MCNC: 7.3 G/DL (ref 6.3–8.2)
RBC # BLD AUTO: 5.92 M/UL (ref 4.23–5.6)
SODIUM SERPL-SCNC: 132 MMOL/L (ref 136–145)
SODIUM SERPL-SCNC: 132 MMOL/L (ref 136–145)
WBC # BLD AUTO: 12.7 K/UL (ref 4.3–11.1)

## 2024-11-25 PROCEDURE — 6370000000 HC RX 637 (ALT 250 FOR IP): Performed by: HOSPITALIST

## 2024-11-25 PROCEDURE — 6360000002 HC RX W HCPCS: Performed by: HOSPITALIST

## 2024-11-25 PROCEDURE — 85025 COMPLETE CBC W/AUTO DIFF WBC: CPT

## 2024-11-25 PROCEDURE — 6370000000 HC RX 637 (ALT 250 FOR IP): Performed by: PHYSICIAN ASSISTANT

## 2024-11-25 PROCEDURE — 2500000003 HC RX 250 WO HCPCS: Performed by: PHYSICIAN ASSISTANT

## 2024-11-25 PROCEDURE — 2580000003 HC RX 258: Performed by: HOSPITALIST

## 2024-11-25 PROCEDURE — 6360000002 HC RX W HCPCS: Performed by: PHYSICIAN ASSISTANT

## 2024-11-25 PROCEDURE — 36415 COLL VENOUS BLD VENIPUNCTURE: CPT

## 2024-11-25 PROCEDURE — 2500000003 HC RX 250 WO HCPCS: Performed by: HOSPITALIST

## 2024-11-25 PROCEDURE — 1100000000 HC RM PRIVATE

## 2024-11-25 PROCEDURE — 83605 ASSAY OF LACTIC ACID: CPT

## 2024-11-25 PROCEDURE — 80053 COMPREHEN METABOLIC PANEL: CPT

## 2024-11-25 RX ORDER — HYDROCODONE BITARTRATE AND ACETAMINOPHEN 7.5; 325 MG/1; MG/1
1 TABLET ORAL EVERY 8 HOURS PRN
Qty: 90 TABLET | Refills: 0 | Status: SHIPPED | OUTPATIENT
Start: 2024-11-25 | End: 2024-12-25

## 2024-11-25 RX ORDER — HYDROMORPHONE HYDROCHLORIDE 1 MG/ML
1 INJECTION, SOLUTION INTRAMUSCULAR; INTRAVENOUS; SUBCUTANEOUS
Status: DISCONTINUED | OUTPATIENT
Start: 2024-11-25 | End: 2024-11-25

## 2024-11-25 RX ORDER — NICOTINE 21 MG/24HR
1 PATCH, TRANSDERMAL 24 HOURS TRANSDERMAL DAILY
Status: DISCONTINUED | OUTPATIENT
Start: 2024-11-25 | End: 2024-11-27 | Stop reason: HOSPADM

## 2024-11-25 RX ORDER — HYDROCODONE BITARTRATE AND ACETAMINOPHEN 7.5; 325 MG/1; MG/1
1 TABLET ORAL EVERY 6 HOURS PRN
Status: DISCONTINUED | OUTPATIENT
Start: 2024-11-25 | End: 2024-11-25

## 2024-11-25 RX ORDER — LANOLIN ALCOHOL/MO/W.PET/CERES
100 CREAM (GRAM) TOPICAL DAILY
Status: DISCONTINUED | OUTPATIENT
Start: 2024-11-25 | End: 2024-11-27 | Stop reason: HOSPADM

## 2024-11-25 RX ORDER — ENOXAPARIN SODIUM 100 MG/ML
40 INJECTION SUBCUTANEOUS EVERY 24 HOURS
Status: DISCONTINUED | OUTPATIENT
Start: 2024-11-25 | End: 2024-11-27 | Stop reason: HOSPADM

## 2024-11-25 RX ORDER — HYDROMORPHONE HYDROCHLORIDE 1 MG/ML
2 INJECTION, SOLUTION INTRAMUSCULAR; INTRAVENOUS; SUBCUTANEOUS EVERY 4 HOURS PRN
Status: DISCONTINUED | OUTPATIENT
Start: 2024-11-25 | End: 2024-11-27

## 2024-11-25 RX ORDER — LABETALOL HYDROCHLORIDE 5 MG/ML
20 INJECTION, SOLUTION INTRAVENOUS EVERY 4 HOURS PRN
Status: DISCONTINUED | OUTPATIENT
Start: 2024-11-25 | End: 2024-11-27 | Stop reason: HOSPADM

## 2024-11-25 RX ADMIN — HYDROMORPHONE HYDROCHLORIDE 1 MG: 1 INJECTION, SOLUTION INTRAMUSCULAR; INTRAVENOUS; SUBCUTANEOUS at 07:34

## 2024-11-25 RX ADMIN — ONDANSETRON 4 MG: 2 INJECTION, SOLUTION INTRAMUSCULAR; INTRAVENOUS at 11:47

## 2024-11-25 RX ADMIN — HYDROMORPHONE HYDROCHLORIDE 1 MG: 1 INJECTION, SOLUTION INTRAMUSCULAR; INTRAVENOUS; SUBCUTANEOUS at 01:18

## 2024-11-25 RX ADMIN — HYDROMORPHONE HYDROCHLORIDE 2 MG: 1 INJECTION, SOLUTION INTRAMUSCULAR; INTRAVENOUS; SUBCUTANEOUS at 21:54

## 2024-11-25 RX ADMIN — SODIUM CHLORIDE: 9 INJECTION, SOLUTION INTRAVENOUS at 03:24

## 2024-11-25 RX ADMIN — HYDROMORPHONE HYDROCHLORIDE 1 MG: 1 INJECTION, SOLUTION INTRAMUSCULAR; INTRAVENOUS; SUBCUTANEOUS at 14:58

## 2024-11-25 RX ADMIN — ENOXAPARIN SODIUM 40 MG: 100 INJECTION SUBCUTANEOUS at 11:40

## 2024-11-25 RX ADMIN — HYDROMORPHONE HYDROCHLORIDE 1 MG: 1 INJECTION, SOLUTION INTRAMUSCULAR; INTRAVENOUS; SUBCUTANEOUS at 11:39

## 2024-11-25 RX ADMIN — AMLODIPINE BESYLATE 10 MG: 10 TABLET ORAL at 08:09

## 2024-11-25 RX ADMIN — SODIUM CHLORIDE, PRESERVATIVE FREE 10 ML: 5 INJECTION INTRAVENOUS at 08:10

## 2024-11-25 RX ADMIN — Medication 100 MG: at 11:40

## 2024-11-25 RX ADMIN — HYDROMORPHONE HYDROCHLORIDE 1 MG: 1 INJECTION, SOLUTION INTRAMUSCULAR; INTRAVENOUS; SUBCUTANEOUS at 03:20

## 2024-11-25 RX ADMIN — HYDROMORPHONE HYDROCHLORIDE 1 MG: 1 INJECTION, SOLUTION INTRAMUSCULAR; INTRAVENOUS; SUBCUTANEOUS at 09:42

## 2024-11-25 RX ADMIN — PANTOPRAZOLE SODIUM 40 MG: 40 INJECTION, POWDER, FOR SOLUTION INTRAVENOUS at 07:36

## 2024-11-25 RX ADMIN — HYDRALAZINE HYDROCHLORIDE 10 MG: 20 INJECTION INTRAMUSCULAR; INTRAVENOUS at 08:16

## 2024-11-25 RX ADMIN — HYDRALAZINE HYDROCHLORIDE 10 MG: 20 INJECTION INTRAMUSCULAR; INTRAVENOUS at 11:40

## 2024-11-25 RX ADMIN — KETOROLAC TROMETHAMINE 15 MG: 15 INJECTION, SOLUTION INTRAMUSCULAR; INTRAVENOUS at 13:49

## 2024-11-25 RX ADMIN — PANTOPRAZOLE SODIUM 40 MG: 40 INJECTION, POWDER, FOR SOLUTION INTRAVENOUS at 21:54

## 2024-11-25 RX ADMIN — HYDROMORPHONE HYDROCHLORIDE 2 MG: 1 INJECTION, SOLUTION INTRAMUSCULAR; INTRAVENOUS; SUBCUTANEOUS at 17:56

## 2024-11-25 RX ADMIN — HYDROCODONE BITARTRATE AND ACETAMINOPHEN 1 TABLET: 10; 325 TABLET ORAL at 13:50

## 2024-11-25 RX ADMIN — HYDROMORPHONE HYDROCHLORIDE 1 MG: 1 INJECTION, SOLUTION INTRAMUSCULAR; INTRAVENOUS; SUBCUTANEOUS at 05:32

## 2024-11-25 ASSESSMENT — PAIN DESCRIPTION - LOCATION
LOCATION: ABDOMEN

## 2024-11-25 ASSESSMENT — PAIN DESCRIPTION - ORIENTATION
ORIENTATION: UPPER;LOWER
ORIENTATION: RIGHT;LEFT;UPPER
ORIENTATION: UPPER;MID
ORIENTATION: UPPER;LOWER
ORIENTATION: UPPER;RIGHT;LEFT
ORIENTATION: UPPER;LOWER
ORIENTATION: MID;UPPER
ORIENTATION: LOWER;UPPER
ORIENTATION: MID;UPPER

## 2024-11-25 ASSESSMENT — PAIN SCALES - GENERAL
PAINLEVEL_OUTOF10: 10
PAINLEVEL_OUTOF10: 9
PAINLEVEL_OUTOF10: 10
PAINLEVEL_OUTOF10: 5
PAINLEVEL_OUTOF10: 9
PAINLEVEL_OUTOF10: 9
PAINLEVEL_OUTOF10: 7
PAINLEVEL_OUTOF10: 9
PAINLEVEL_OUTOF10: 6
PAINLEVEL_OUTOF10: 9
PAINLEVEL_OUTOF10: 6
PAINLEVEL_OUTOF10: 9
PAINLEVEL_OUTOF10: 7
PAINLEVEL_OUTOF10: 10
PAINLEVEL_OUTOF10: 9
PAINLEVEL_OUTOF10: 10
PAINLEVEL_OUTOF10: 8
PAINLEVEL_OUTOF10: 2
PAINLEVEL_OUTOF10: 9
PAINLEVEL_OUTOF10: 10

## 2024-11-25 ASSESSMENT — PAIN DESCRIPTION - DESCRIPTORS
DESCRIPTORS: STABBING;TIGHTNESS
DESCRIPTORS: ACHING
DESCRIPTORS: TIGHTNESS;SQUEEZING
DESCRIPTORS: ACHING;STABBING;THROBBING;SHARP
DESCRIPTORS: SQUEEZING;TIGHTNESS
DESCRIPTORS: TIGHTNESS
DESCRIPTORS: ACHING;THROBBING;SORE;SHARP
DESCRIPTORS: ACHING;THROBBING;STABBING
DESCRIPTORS: ACHING;SORE;THROBBING

## 2024-11-25 NOTE — PLAN OF CARE
Problem: Pain  Goal: Verbalizes/displays adequate comfort level or baseline comfort level  Outcome: Not Progressing     Problem: Discharge Planning  Goal: Discharge to home or other facility with appropriate resources  Outcome: Progressing     Problem: Safety - Adult  Goal: Free from fall injury  Outcome: Progressing     Problem: ABCDS Injury Assessment  Goal: Absence of physical injury  Outcome: Progressing     Problem: Pain  Goal: Verbalizes/displays adequate comfort level or baseline comfort level  Outcome: Not Progressing

## 2024-11-25 NOTE — PROGRESS NOTES
Hospitalist Progress Note   Admit Date:  2024  8:01 PM   Name:  Bhupinder Tejada   Age:  39 y.o.  Sex:  male  :  1985   MRN:  [id number]   Room:  [facility]/01    Presenting/Chief Complaint: No chief complaint on file.     Reason(s) for Admission: Pancreatitis [K85.90]  Acute on chronic pancreatitis (HCC) [K85.90, K86.1]     Hospital Course:   39 years old gentleman with history of alcoholic pancreatitis multiple times in the past presented to emergency room with abdominal pain, nausea, vomiting. Patient reports last alcohol use was 2 weeks ago. Evaluated in emergency room, CT scan of abdomen was obtained CT scan of the abdomen shows evidence of mild ill-defined inflammatory changes in the pancreas. Denies any hematemesis or melena, reports abdominal pain started yesterday associate with nausea and multiple episodes of nonbilious vomiting. Patient was given pain medications in emergency room, continued to have abdominal pain, not able to tolerate diet so PA who evaluated this patient requested admission of this patient for further evaluation and management.       Subjective & 24hr Events:   Patient's abdominal pain is controlled with IV Dilaudid for approximately 1 hour after administration.  When he is having significant pain it increases to 10/10.  After they IV pain medication pain improves to approximately 7/10.  He does not feel hungry.  But no nausea or vomiting.  No bowel movements and denies passing flatus.  He denies any chest pain or shortness of breath.      Assessment & Plan:     Principal Problem:  Acute alcoholic pancreatitis:   -Patient continues to drink alcohol despite of recurrent alcoholic acute pancreatitis episodes as well as chronic pancreatitis   -Patient has been counseled on alcohol use and pancreatitis  -At home he takes Norco 7.5/325 Q8 hours as needed for pain  -Continue as needed pain meds with Norco 10/325 mg as well as 1 mg of IV Dilaudid and Toradol as needed  -Continue

## 2024-11-25 NOTE — CARE COORDINATION
CM note:    Chart reviewed for updates. It was reported in IDR's that pt is still not medically stable discharge. Pt came in for alcohol pancreatitis. MD reports pt will need his pain controlled prior d/c. ROHIT is within 48 hours. CM will continue to follow up with d/c planning.    GUILLERMO Carrasco

## 2024-11-25 NOTE — H&P
Hospitalist History and Physical   Admit Date:  2024  8:01 PM   Name:  Bhupinder Tejada   Age:  39 y.o.  Sex:  male  :  1985   MRN:  [id number]   Room:  /    Presenting/Chief Complaint: No chief complaint on file.     Reason(s) for Admission: Pancreatitis [K85.90]  Acute on chronic pancreatitis (HCC) [K85.90, K86.1]     History of Present Illness:     39 years old gentleman with history of alcoholic pancreatitis multiple times in the past presented to emergency room with abdominal pain, nausea, vomiting.  Patient reports last alcohol use was 2 weeks ago.  Evaluated in emergency room, CT scan of abdomen was obtained CT scan of the abdomen shows evidence of mild ill-defined inflammatory changes in the pancreas.  Denies any hematemesis or melena, reports abdominal pain started yesterday associate with nausea and multiple episodes of nonbilious vomiting.  Patient was given pain medications in emergency room, continued to have abdominal pain, not able to tolerate diet so PA who evaluated this patient requested admission of this patient for further evaluation and management.              Assessment & Plan:     Alcoholic alcoholic pancreatitis: Patient is not completely abstinence from alcohol despite of being having multiple episodes of pancreatitis, educated patient very well at this time relation of alcohol use and pancreatitis.  Will provide pain medications, IV fluids.  Initiated on thiamine.      Hypertension: Restarted back on amlodipine, will provide as needed IV hydralazine.    Diet: Diet NPO  VTE prophylaxis: SCD's   Code status: Full Code      Non-peripheral Lines and Tubes (if present):             Hospital Problems:  Principal Problem:    Acute on chronic pancreatitis (HCC)  Resolved Problems:    * No resolved hospital problems. *        Objective:   Patient Vitals for the past 24 hrs:   Temp Pulse Resp BP SpO2   24 99 °F (37.2 °C) 86 17 (!) 163/93 98 %       Oxygen

## 2024-11-25 NOTE — CARE COORDINATION
CM note:    Chart reviewed for updates. CM met with pt at bedside, introduced role, confirmed demographics and discussed d/c planning. Pt lives with spouse in a house. He is independent with ADL's and he does not use any DME at home. Pt is uninsured but has a PCP that he sees every 3 months. He is an active  in the community. Pt denied history of IPR/SNF/HH and OPPT. He has a good local family support system. Plan is for pt to discharge home with family support pending medical stability. Pt given financial assistance form and told to drop it at entrance B, [employer], [employer] information and community resources. CM will continue to follow up with d/c planning.     11/25/24 0901   Service Assessment   Patient Orientation Alert and Oriented   Cognition Alert   History Provided By Patient   Primary Caregiver Self   Accompanied By/Relationship N/A   Support Systems Spouse/Significant Other;Family Members   Patient's Healthcare Decision Maker is: Legal Next of Kin  (Spouse)   PCP Verified by CM Yes   Last Visit to PCP Within last 3 months   Prior Functional Level Independent in ADLs/IADLs   Current Functional Level Independent in ADLs/IADLs   Can patient return to prior living arrangement Yes   Ability to make needs known: Good   Family able to assist with home care needs: Yes   Would you like for me to discuss the discharge plan with any other family members/significant others, and if so, who? No   Financial Resources None   Community Resources None   Social/Functional History   Lives With Spouse   Type of Home House   Home Equipment None   ADL Assistance Independent   Ambulation Assistance Independent   Active  Yes   Mode of Transportation Car   Occupation Full time employment   Discharge Planning   Type of Residence House   Current Services Prior To Admission None   Potential Assistance Needed N/A   DME Ordered? No   Potential Assistance Purchasing Medications Yes   Type of Home Care Services None   Patient

## 2024-11-25 NOTE — PROGRESS NOTES
TRANSFER - IN REPORT:    Verbal report received from Sandeep BENITEZ on Bhupinder Tejada  being received from [facility] ED for routine progression of patient care      Report consisted of patient's Situation, Background, Assessment and   Recommendations(SBAR).     Information from the following report(s) Nurse Handoff Report was reviewed with the receiving nurse.    Opportunity for questions and clarification was provided.      Assessment completed upon patient's arrival to unit and care assumed.

## 2024-11-25 NOTE — PROGRESS NOTES
4 Eyes Skin Assessment     NAME:  Bhupinder Tejada  YOB: 1985  MEDICAL RECORD NUMBER:  [id number]    The patient is being assessed for  Admission    I agree that at least one RN has performed a thorough Head to Toe Skin Assessment on the patient. ALL assessment sites listed below have been assessed.      Areas assessed by both nurses:    Head, Face, Ears, Shoulders, Back, Chest, Arms, Elbows, Hands, Sacrum. Buttock, Coccyx, Ischium, and Legs. Feet and Heels        Does the Patient have a Wound? No noted wound(s)       Vikas Prevention initiated by RN: Yes  Wound Care Orders initiated by RN: No    Pressure Injury (Stage 3,4, Unstageable, DTI, NWPT, and Complex wounds) if present, place Wound referral order by RN under : No    New Ostomies, if present place, Ostomy referral order under : No     Nurse 1 eSignature: Electronically signed by Julita Domínguez RN on 11/24/24 at 9:59 PM EST    **SHARE this note so that the co-signing nurse can place an eSignature**    Nurse 2 eSignature: {Esignature:[id number]}

## 2024-11-25 NOTE — ED NOTES
TRANSFER - OUT REPORT:    Verbal report given to Julita CASH RN on Bhupinder Tejada  being transferred to [place] [id number] for routine progression of patient care       Report consisted of patient's Situation, Background, Assessment and   Recommendations(SBAR).     Information from the following report(s) ED Encounter Summary, MAR, Recent Results, and Neuro Assessment was reviewed with the receiving nurse.    Lines:   Peripheral IV 11/24/24 Left;Proximal;Anterior Forearm (Active)        Opportunity for questions and clarification was provided.      Patient transported with:  Tech

## 2024-11-25 NOTE — PLAN OF CARE
Problem: Discharge Planning  Goal: Discharge to home or other facility with appropriate resources  11/25/2024 1103 by Sejal Roth RN  Outcome: Progressing  11/24/2024 2157 by Julita Domínguez RN  Outcome: Progressing     Problem: Pain  Goal: Verbalizes/displays adequate comfort level or baseline comfort level  11/25/2024 1103 by Sejal Roth RN  Outcome: Progressing  11/24/2024 2157 by Julita Domínguez RN  Outcome: Not Progressing     Problem: Safety - Adult  Goal: Free from fall injury  11/25/2024 1103 by Sejal Roth RN  Outcome: Progressing  11/24/2024 2157 by Julita Domínguez RN  Outcome: Progressing     Problem: ABCDS Injury Assessment  Goal: Absence of physical injury  11/24/2024 2157 by Julita Domínguez RN  Outcome: Progressing     Problem: Pain  Goal: Verbalizes/displays adequate comfort level or baseline comfort level  11/25/2024 1103 by Sejal Roth RN  Outcome: Progressing  11/24/2024 2157 by Julita Domínguez RN  Outcome: Not Progressing

## 2024-11-25 NOTE — ACP (ADVANCE CARE PLANNING)
Advance Care Planning   General Advance Care Planning (ACP) Conversation    Date of Conversation: 11/24/2024  Conducted with: Patient with Decision Making Capacity  Other persons present: None    Healthcare Decision Maker:    Primary Decision Maker: Radha Jarrell - [facility] - [phone]        Length of Voluntary ACP Conversation in minutes:  <16 minutes (Non-Billable)    Marilee Rhoades

## 2024-11-26 LAB
ANION GAP SERPL CALC-SCNC: 14 MMOL/L (ref 7–16)
BASOPHILS # BLD: 0 K/UL (ref 0–0.2)
BASOPHILS NFR BLD: 0 % (ref 0–2)
BUN SERPL-MCNC: 14 MG/DL (ref 6–23)
CALCIUM SERPL-MCNC: 9.7 MG/DL (ref 8.8–10.2)
CHLORIDE SERPL-SCNC: 98 MMOL/L (ref 98–107)
CO2 SERPL-SCNC: 20 MMOL/L (ref 20–29)
CREAT SERPL-MCNC: 0.9 MG/DL (ref 0.8–1.3)
CRP SERPL HS-MCNC: 159 MG/L (ref 0–3)
DIFFERENTIAL METHOD BLD: ABNORMAL
EOSINOPHIL # BLD: 0 K/UL (ref 0–0.8)
EOSINOPHIL NFR BLD: 0 % (ref 0.5–7.8)
ERYTHROCYTE [DISTWIDTH] IN BLOOD BY AUTOMATED COUNT: 13.3 % (ref 11.9–14.6)
GLUCOSE SERPL-MCNC: 94 MG/DL (ref 70–99)
HCT VFR BLD AUTO: 50.6 % (ref 41.1–50.3)
HGB BLD-MCNC: 18.4 G/DL (ref 13.6–17.2)
IMM GRANULOCYTES # BLD AUTO: 0 K/UL (ref 0–0.5)
IMM GRANULOCYTES NFR BLD AUTO: 0 % (ref 0–5)
LYMPHOCYTES # BLD: 1 K/UL (ref 0.5–4.6)
LYMPHOCYTES NFR BLD: 9 % (ref 13–44)
MCH RBC QN AUTO: 31 PG (ref 26.1–32.9)
MCHC RBC AUTO-ENTMCNC: 36.4 G/DL (ref 31.4–35)
MCV RBC AUTO: 85.2 FL (ref 82–102)
MONOCYTES # BLD: 0.9 K/UL (ref 0.1–1.3)
MONOCYTES NFR BLD: 8 % (ref 4–12)
NEUTS SEG # BLD: 8.8 K/UL (ref 1.7–8.2)
NEUTS SEG NFR BLD: 82 % (ref 43–78)
NRBC # BLD: 0 K/UL (ref 0–0.2)
OSMOLALITY SERPL: 277 MOSM/KG H2O (ref 275–295)
PLATELET # BLD AUTO: 238 K/UL (ref 150–450)
PMV BLD AUTO: 10 FL (ref 9.4–12.3)
POTASSIUM SERPL-SCNC: 4.5 MMOL/L (ref 3.5–5.1)
RBC # BLD AUTO: 5.94 M/UL (ref 4.23–5.6)
SODIUM SERPL-SCNC: 132 MMOL/L (ref 136–145)
SODIUM UR-SCNC: 26 MMOL/L
WBC # BLD AUTO: 10.7 K/UL (ref 4.3–11.1)

## 2024-11-26 PROCEDURE — 6370000000 HC RX 637 (ALT 250 FOR IP): Performed by: PHYSICIAN ASSISTANT

## 2024-11-26 PROCEDURE — 2500000003 HC RX 250 WO HCPCS: Performed by: PHYSICIAN ASSISTANT

## 2024-11-26 PROCEDURE — 6360000002 HC RX W HCPCS: Performed by: HOSPITALIST

## 2024-11-26 PROCEDURE — 6370000000 HC RX 637 (ALT 250 FOR IP): Performed by: HOSPITALIST

## 2024-11-26 PROCEDURE — 6360000002 HC RX W HCPCS: Performed by: PHYSICIAN ASSISTANT

## 2024-11-26 PROCEDURE — 1100000000 HC RM PRIVATE

## 2024-11-26 PROCEDURE — 36415 COLL VENOUS BLD VENIPUNCTURE: CPT

## 2024-11-26 PROCEDURE — 85025 COMPLETE CBC W/AUTO DIFF WBC: CPT

## 2024-11-26 PROCEDURE — 83935 ASSAY OF URINE OSMOLALITY: CPT

## 2024-11-26 PROCEDURE — 84300 ASSAY OF URINE SODIUM: CPT

## 2024-11-26 PROCEDURE — 80048 BASIC METABOLIC PNL TOTAL CA: CPT

## 2024-11-26 PROCEDURE — 2580000003 HC RX 258: Performed by: HOSPITALIST

## 2024-11-26 PROCEDURE — 86141 C-REACTIVE PROTEIN HS: CPT

## 2024-11-26 PROCEDURE — 83930 ASSAY OF BLOOD OSMOLALITY: CPT

## 2024-11-26 RX ADMIN — HYDROMORPHONE HYDROCHLORIDE 2 MG: 1 INJECTION, SOLUTION INTRAMUSCULAR; INTRAVENOUS; SUBCUTANEOUS at 02:14

## 2024-11-26 RX ADMIN — HYDROMORPHONE HYDROCHLORIDE 2 MG: 1 INJECTION, SOLUTION INTRAMUSCULAR; INTRAVENOUS; SUBCUTANEOUS at 22:02

## 2024-11-26 RX ADMIN — HYDROMORPHONE HYDROCHLORIDE 2 MG: 1 INJECTION, SOLUTION INTRAMUSCULAR; INTRAVENOUS; SUBCUTANEOUS at 18:17

## 2024-11-26 RX ADMIN — HYDROMORPHONE HYDROCHLORIDE 2 MG: 1 INJECTION, SOLUTION INTRAMUSCULAR; INTRAVENOUS; SUBCUTANEOUS at 06:36

## 2024-11-26 RX ADMIN — Medication 100 MG: at 10:25

## 2024-11-26 RX ADMIN — HYDROMORPHONE HYDROCHLORIDE 2 MG: 1 INJECTION, SOLUTION INTRAMUSCULAR; INTRAVENOUS; SUBCUTANEOUS at 10:26

## 2024-11-26 RX ADMIN — SODIUM CHLORIDE, PRESERVATIVE FREE 10 ML: 5 INJECTION INTRAVENOUS at 21:40

## 2024-11-26 RX ADMIN — PANTOPRAZOLE SODIUM 40 MG: 40 INJECTION, POWDER, FOR SOLUTION INTRAVENOUS at 10:26

## 2024-11-26 RX ADMIN — HYDROMORPHONE HYDROCHLORIDE 2 MG: 1 INJECTION, SOLUTION INTRAMUSCULAR; INTRAVENOUS; SUBCUTANEOUS at 14:18

## 2024-11-26 RX ADMIN — AMLODIPINE BESYLATE 10 MG: 10 TABLET ORAL at 10:26

## 2024-11-26 RX ADMIN — PANTOPRAZOLE SODIUM 40 MG: 40 INJECTION, POWDER, FOR SOLUTION INTRAVENOUS at 21:39

## 2024-11-26 RX ADMIN — SODIUM CHLORIDE, PRESERVATIVE FREE 10 ML: 5 INJECTION INTRAVENOUS at 10:27

## 2024-11-26 ASSESSMENT — PAIN DESCRIPTION - LOCATION
LOCATION: ABDOMEN;BACK
LOCATION: ABDOMEN

## 2024-11-26 ASSESSMENT — PAIN DESCRIPTION - DESCRIPTORS
DESCRIPTORS: TIGHTNESS;SQUEEZING
DESCRIPTORS: ACHING;THROBBING;SHOOTING;SHARP
DESCRIPTORS: ACHING;CRAMPING
DESCRIPTORS: TIGHTNESS;SQUEEZING
DESCRIPTORS: SHARP;SHOOTING;STABBING

## 2024-11-26 ASSESSMENT — PAIN DESCRIPTION - ORIENTATION
ORIENTATION: MID;UPPER
ORIENTATION: MID
ORIENTATION: UPPER;LOWER
ORIENTATION: LOWER;UPPER
ORIENTATION: UPPER

## 2024-11-26 ASSESSMENT — PAIN SCALES - GENERAL
PAINLEVEL_OUTOF10: 9
PAINLEVEL_OUTOF10: 3
PAINLEVEL_OUTOF10: 9
PAINLEVEL_OUTOF10: 10
PAINLEVEL_OUTOF10: 10
PAINLEVEL_OUTOF10: 5
PAINLEVEL_OUTOF10: 5
PAINLEVEL_OUTOF10: 9
PAINLEVEL_OUTOF10: 0
PAINLEVEL_OUTOF10: 9
PAINLEVEL_OUTOF10: 9
PAINLEVEL_OUTOF10: 6

## 2024-11-26 ASSESSMENT — PAIN - FUNCTIONAL ASSESSMENT
PAIN_FUNCTIONAL_ASSESSMENT: PREVENTS OR INTERFERES SOME ACTIVE ACTIVITIES AND ADLS
PAIN_FUNCTIONAL_ASSESSMENT: PREVENTS OR INTERFERES SOME ACTIVE ACTIVITIES AND ADLS
PAIN_FUNCTIONAL_ASSESSMENT: ACTIVITIES ARE NOT PREVENTED
PAIN_FUNCTIONAL_ASSESSMENT: PREVENTS OR INTERFERES SOME ACTIVE ACTIVITIES AND ADLS

## 2024-11-26 NOTE — PROGRESS NOTES
Hospitalist Progress Note   Admit Date:  2024  8:01 PM   Name:  Bhupinder Tejada   Age:  39 y.o.  Sex:  male  :  1985   MRN:  [id number]   Room:  [facility]/    Presenting/Chief Complaint: No chief complaint on file.     Reason(s) for Admission: Pancreatitis [K85.90]  Acute on chronic pancreatitis (HCC) [K85.90, K86.1]     Hospital Course:   39 years old gentleman with history of alcoholic pancreatitis multiple times in the past presented to emergency room with abdominal pain, nausea, vomiting. Patient reports last alcohol use was 2 weeks ago. Evaluated in emergency room, CT scan of abdomen was obtained CT scan of the abdomen shows evidence of mild ill-defined inflammatory changes in the pancreas. Denies any hematemesis or melena, reports abdominal pain started yesterday associate with nausea and multiple episodes of nonbilious vomiting. Patient was given pain medications in emergency room, continued to have abdominal pain, not able to tolerate diet so PA who evaluated this patient requested admission of this patient for further evaluation and management.     Subjective & 24hr Events:   Patient's abdominal pain is slightly improved today.  The 2 mg of Dilaudid every 4 hours is working better.  Pain improvement typically last 2.5 hours.  He was able to get sleep intermittently last night.  No nausea or vomiting.  No bowel movements but is passing flatus.  No chest pain or shortness of breath.    Lactic acid yesterday was normal.  His tachycardia is improving.      Assessment & Plan:     Principal Problem:  Acute alcoholic pancreatitis:   -Patient continues to drink alcohol despite of recurrent alcoholic acute pancreatitis episodes as well as chronic pancreatitis   -Patient has been counseled on alcohol use and pancreatitis  -At home he takes Norco 7.5/325 Q8 hours as needed for pain, takes regularly 3 times daily  -Continue as needed pain meds with Norco 10/325 mg as well as 2 mg of IV Dilaudid  -IV

## 2024-11-26 NOTE — PLAN OF CARE
Problem: Discharge Planning  Goal: Discharge to home or other facility with appropriate resources  11/26/2024 1102 by Sejal Roth RN  Outcome: Progressing  11/25/2024 2223 by Julita Domínguez RN  Outcome: Progressing     Problem: Pain  Goal: Verbalizes/displays adequate comfort level or baseline comfort level  11/26/2024 1102 by Sejal Roth RN  Outcome: Progressing  11/25/2024 2223 by Julita Domínguez RN  Outcome: Progressing     Problem: Safety - Adult  Goal: Free from fall injury  11/26/2024 1102 by Sejal Roth RN  Outcome: Progressing  11/25/2024 2223 by Julita Domínguez RN  Outcome: Progressing     Problem: ABCDS Injury Assessment  Goal: Absence of physical injury  11/25/2024 2223 by Julita Domínguez RN  Outcome: Progressing

## 2024-11-26 NOTE — CARE COORDINATION
CM note:    Chart reviewed for updates. It was reported in IDR's that pt is still not medically stable discharge. Pt came in for alcohol pancreatitis. NP to advance his diet prior discharge. ROHIT likely tomorrow. CM will continue to follow up with d/c planning.    GUILLERMO Carrasco

## 2024-11-26 NOTE — PLAN OF CARE
Problem: Discharge Planning  Goal: Discharge to home or other facility with appropriate resources  11/25/2024 2223 by Julita Domínguez RN  Outcome: Progressing  11/25/2024 1103 by Sejal Roth RN  Outcome: Progressing     Problem: Pain  Goal: Verbalizes/displays adequate comfort level or baseline comfort level  11/25/2024 2223 by Julita Domínguez RN  Outcome: Progressing  11/25/2024 1103 by Sejal Roth RN  Outcome: Progressing     Problem: Safety - Adult  Goal: Free from fall injury  11/25/2024 2223 by Julita Domínguez RN  Outcome: Progressing  11/25/2024 1103 by eSjal Roth RN  Outcome: Progressing     Problem: ABCDS Injury Assessment  Goal: Absence of physical injury  Outcome: Progressing

## 2024-11-27 VITALS
DIASTOLIC BLOOD PRESSURE: 90 MMHG | OXYGEN SATURATION: 96 % | HEART RATE: 100 BPM | TEMPERATURE: 97.7 F | SYSTOLIC BLOOD PRESSURE: 149 MMHG | RESPIRATION RATE: 18 BRPM | BODY MASS INDEX: 26.66 KG/M2 | HEIGHT: 69 IN | WEIGHT: 180 LBS

## 2024-11-27 PROBLEM — K86.1 ACUTE ON CHRONIC PANCREATITIS (HCC): Status: RESOLVED | Noted: 2024-11-24 | Resolved: 2024-11-27

## 2024-11-27 PROBLEM — K85.90 ACUTE ON CHRONIC PANCREATITIS (HCC): Status: RESOLVED | Noted: 2024-11-24 | Resolved: 2024-11-27

## 2024-11-27 LAB
ANION GAP SERPL CALC-SCNC: 14 MMOL/L (ref 7–16)
BASOPHILS # BLD: 0 K/UL (ref 0–0.2)
BASOPHILS NFR BLD: 0 % (ref 0–2)
BUN SERPL-MCNC: 12 MG/DL (ref 6–23)
CALCIUM SERPL-MCNC: 9.2 MG/DL (ref 8.8–10.2)
CHLORIDE SERPL-SCNC: 96 MMOL/L (ref 98–107)
CO2 SERPL-SCNC: 21 MMOL/L (ref 20–29)
CREAT SERPL-MCNC: 0.9 MG/DL (ref 0.8–1.3)
DIFFERENTIAL METHOD BLD: ABNORMAL
EOSINOPHIL # BLD: 0 K/UL (ref 0–0.8)
EOSINOPHIL NFR BLD: 0 % (ref 0.5–7.8)
ERYTHROCYTE [DISTWIDTH] IN BLOOD BY AUTOMATED COUNT: 13 % (ref 11.9–14.6)
GLUCOSE SERPL-MCNC: 94 MG/DL (ref 70–99)
HCT VFR BLD AUTO: 49.9 % (ref 41.1–50.3)
HGB BLD-MCNC: 17.4 G/DL (ref 13.6–17.2)
IMM GRANULOCYTES # BLD AUTO: 0 K/UL (ref 0–0.5)
IMM GRANULOCYTES NFR BLD AUTO: 0 % (ref 0–5)
LYMPHOCYTES # BLD: 1.2 K/UL (ref 0.5–4.6)
LYMPHOCYTES NFR BLD: 12 % (ref 13–44)
MCH RBC QN AUTO: 29.7 PG (ref 26.1–32.9)
MCHC RBC AUTO-ENTMCNC: 34.9 G/DL (ref 31.4–35)
MCV RBC AUTO: 85.3 FL (ref 82–102)
MONOCYTES # BLD: 0.9 K/UL (ref 0.1–1.3)
MONOCYTES NFR BLD: 9 % (ref 4–12)
NEUTS SEG # BLD: 7.6 K/UL (ref 1.7–8.2)
NEUTS SEG NFR BLD: 79 % (ref 43–78)
NRBC # BLD: 0 K/UL (ref 0–0.2)
OSMOLALITY UR: 184 MOSM/KG H2O (ref 50–1400)
PLATELET # BLD AUTO: 242 K/UL (ref 150–450)
PMV BLD AUTO: 9.5 FL (ref 9.4–12.3)
POTASSIUM SERPL-SCNC: 4.4 MMOL/L (ref 3.5–5.1)
RBC # BLD AUTO: 5.85 M/UL (ref 4.23–5.6)
SODIUM SERPL-SCNC: 131 MMOL/L (ref 136–145)
WBC # BLD AUTO: 9.7 K/UL (ref 4.3–11.1)

## 2024-11-27 PROCEDURE — 36415 COLL VENOUS BLD VENIPUNCTURE: CPT

## 2024-11-27 PROCEDURE — 6370000000 HC RX 637 (ALT 250 FOR IP): Performed by: HOSPITALIST

## 2024-11-27 PROCEDURE — 6370000000 HC RX 637 (ALT 250 FOR IP): Performed by: PHYSICIAN ASSISTANT

## 2024-11-27 PROCEDURE — 80048 BASIC METABOLIC PNL TOTAL CA: CPT

## 2024-11-27 PROCEDURE — 6360000002 HC RX W HCPCS: Performed by: PHYSICIAN ASSISTANT

## 2024-11-27 PROCEDURE — 2580000003 HC RX 258: Performed by: HOSPITALIST

## 2024-11-27 PROCEDURE — 2500000003 HC RX 250 WO HCPCS: Performed by: PHYSICIAN ASSISTANT

## 2024-11-27 PROCEDURE — 6360000002 HC RX W HCPCS: Performed by: HOSPITALIST

## 2024-11-27 PROCEDURE — 85025 COMPLETE CBC W/AUTO DIFF WBC: CPT

## 2024-11-27 PROCEDURE — 94760 N-INVAS EAR/PLS OXIMETRY 1: CPT

## 2024-11-27 RX ORDER — HYDROCODONE BITARTRATE AND ACETAMINOPHEN 10; 325 MG/1; MG/1
1 TABLET ORAL EVERY 6 HOURS PRN
Status: DISCONTINUED | OUTPATIENT
Start: 2024-11-27 | End: 2024-11-27 | Stop reason: HOSPADM

## 2024-11-27 RX ORDER — HYDROMORPHONE HYDROCHLORIDE 1 MG/ML
1 INJECTION, SOLUTION INTRAMUSCULAR; INTRAVENOUS; SUBCUTANEOUS EVERY 4 HOURS PRN
Status: DISCONTINUED | OUTPATIENT
Start: 2024-11-27 | End: 2024-11-27 | Stop reason: HOSPADM

## 2024-11-27 RX ADMIN — HYDROCODONE BITARTRATE AND ACETAMINOPHEN 1 TABLET: 10; 325 TABLET ORAL at 09:38

## 2024-11-27 RX ADMIN — AMLODIPINE BESYLATE 10 MG: 10 TABLET ORAL at 09:38

## 2024-11-27 RX ADMIN — Medication 100 MG: at 09:38

## 2024-11-27 RX ADMIN — HYDROMORPHONE HYDROCHLORIDE 1 MG: 1 INJECTION, SOLUTION INTRAMUSCULAR; INTRAVENOUS; SUBCUTANEOUS at 11:37

## 2024-11-27 RX ADMIN — PANTOPRAZOLE SODIUM 40 MG: 40 INJECTION, POWDER, FOR SOLUTION INTRAVENOUS at 09:38

## 2024-11-27 RX ADMIN — HYDROMORPHONE HYDROCHLORIDE 2 MG: 1 INJECTION, SOLUTION INTRAMUSCULAR; INTRAVENOUS; SUBCUTANEOUS at 01:55

## 2024-11-27 RX ADMIN — HYDROMORPHONE HYDROCHLORIDE 2 MG: 1 INJECTION, SOLUTION INTRAMUSCULAR; INTRAVENOUS; SUBCUTANEOUS at 05:39

## 2024-11-27 ASSESSMENT — PAIN SCALES - GENERAL
PAINLEVEL_OUTOF10: 6
PAINLEVEL_OUTOF10: 2
PAINLEVEL_OUTOF10: 9
PAINLEVEL_OUTOF10: 9
PAINLEVEL_OUTOF10: 3
PAINLEVEL_OUTOF10: 9
PAINLEVEL_OUTOF10: 3

## 2024-11-27 ASSESSMENT — PAIN DESCRIPTION - DESCRIPTORS
DESCRIPTORS: ACHING;CRAMPING
DESCRIPTORS: ACHING;CRAMPING

## 2024-11-27 ASSESSMENT — PAIN DESCRIPTION - LOCATION
LOCATION: ABDOMEN
LOCATION: ABDOMEN

## 2024-11-27 ASSESSMENT — PAIN DESCRIPTION - ORIENTATION
ORIENTATION: MID
ORIENTATION: MID;RIGHT;LEFT

## 2024-11-27 NOTE — DISCHARGE INSTRUCTIONS
Advance diet as tolerated. If nausea/vomiting or abdominal pain recur, change back to liquid diet. If fevers or intractable pain develop, seek medical evaluation.

## 2024-11-27 NOTE — FLOWSHEET NOTE
11/27/24 1402   AVS Reviewed   AVS & discharge instructions reviewed with patient and/or representative? Yes   Reviewed instructions with Patient   Level of Understanding Questions answered;Teach back completed;Verbalized understanding

## 2024-11-27 NOTE — DISCHARGE SUMMARY
Hospitalist Discharge Summary   Admit Date:  2024  8:01 PM   DC Note date: 2024  Name:  Bhupinder Tejada   Age:  39 y.o.  Sex:  male  :  1985   MRN:  [id number]   Room:  [facility]  PCP:  Disha Nieves MD    Presenting Complaint: No chief complaint on file.     Initial Admission Diagnosis: Pancreatitis [K85.90]  Acute on chronic pancreatitis (HCC) [K85.90, K86.1]     Problem List for this Hospitalization (present on admission):    Principal Problem (Resolved):    Acute on chronic pancreatitis (HCC)  Active Problems:    Tobacco abuse [Z72.0 (ICD-10-CM)]    Primary hypertension    Chronic pancreatitis (HCC)      HPI:  Mr. Bhupinder Tejada is a 39 year old with history of alcoholic pancreatitis multiple times in the past who presented to emergency room with abdominal pain, nausea, vomiting. Patient reports last alcohol use was 2 weeks ago (10 yr anniversary). Evaluated in emergency room, CT scan of abdomen was obtained CT scan of the abdomen shows evidence of mild ill-defined inflammatory changes in the pancreas. Denies any hematemesis or melena, reports abdominal pain started yesterday associate with nausea and multiple episodes of nonbilious vomiting. Patient was given pain medications in emergency room, continued to have abdominal pain, not able to tolerate diet so PA who evaluated this patient requested admission of this patient for further evaluation and management.     Hospital Course:  He was admitted for further evaluation and management. He was initially NPO with progressive advancement of his diet as tolerated. Pain control initially with IV pain meds, then transitioned to po.  He continued to have chronic pain, but it was acceptably controlled on po medication.     On the day of discharge, he was tolerating full liquids and on oral pain meds with need for IV pain meds for breakthrough.  His vital signs and labs were stable. He stated that he felt he would be able to manage his

## 2024-12-17 DIAGNOSIS — I10 PRIMARY HYPERTENSION: Chronic | ICD-10-CM

## 2024-12-17 RX ORDER — AMLODIPINE BESYLATE 10 MG/1
10 TABLET ORAL DAILY
Qty: 90 TABLET | Refills: 1 | Status: SHIPPED | OUTPATIENT
Start: 2024-12-17

## 2024-12-17 RX ORDER — AMLODIPINE BESYLATE 10 MG/1
10 TABLET ORAL DAILY
Qty: 90 TABLET | Refills: 3 | Status: CANCELLED | OUTPATIENT
Start: 2024-12-17

## 2024-12-17 NOTE — TELEPHONE ENCOUNTER
Requested Prescriptions     Pending Prescriptions Disp Refills    amLODIPine (NORVASC) 10 MG tablet [Pharmacy Med Name: AMLODIPINE BESYLATE 10MG TABS] 90 tablet 1     Sig: TAKE ONE TABLET BY MOUTH ONCE A DAY    Next ov 01/02/2025. Pharmacy confirmed.

## 2024-12-23 DIAGNOSIS — K85.91 NECROTIZING PANCREATITIS: ICD-10-CM

## 2024-12-23 RX ORDER — HYDROCODONE BITARTRATE AND ACETAMINOPHEN 7.5; 325 MG/1; MG/1
1 TABLET ORAL EVERY 8 HOURS PRN
Qty: 90 TABLET | Refills: 0 | Status: SHIPPED | OUTPATIENT
Start: 2024-12-23 | End: 2025-01-22

## 2024-12-23 NOTE — TELEPHONE ENCOUNTER
Requested Prescriptions     Pending Prescriptions Disp Refills    HYDROcodone-acetaminophen (NORCO) 7.5-325 MG per tablet 90 tablet 0     Sig: Take 1 tablet by mouth every 8 hours as needed for Pain for up to 30 days. Indications: chronic pancreatitis Intended supply: 30 days Max Daily Amount: 3 tablets    Next ov 01/02/2025

## 2024-12-24 NOTE — ED TRIAGE NOTES
Pt arrives for complaints of LUQ pain with recent admission for alcoholic induced pancreatitis. States he left the hospital a few days ago and has had progression of symptoms.
No

## 2025-01-20 DIAGNOSIS — K85.91 NECROTIZING PANCREATITIS: ICD-10-CM

## 2025-01-21 NOTE — TELEPHONE ENCOUNTER
Requested Prescriptions     Pending Prescriptions Disp Refills    HYDROcodone-acetaminophen (NORCO) 7.5-325 MG per tablet 90 tablet 0     Sig: Take 1 tablet by mouth every 8 hours as needed for Pain for up to 30 days. Indications: chronic pancreatitis Intended supply: 30 days Max Daily Amount: 3 tablets    Next ov 04/23/2025. Pharmacy confirmed.

## 2025-01-22 RX ORDER — HYDROCODONE BITARTRATE AND ACETAMINOPHEN 7.5; 325 MG/1; MG/1
1 TABLET ORAL EVERY 8 HOURS PRN
Qty: 90 TABLET | Refills: 0 | Status: SHIPPED | OUTPATIENT
Start: 2025-01-22 | End: 2025-02-21

## 2025-02-24 ENCOUNTER — PATIENT MESSAGE (OUTPATIENT)
Dept: INTERNAL MEDICINE CLINIC | Facility: CLINIC | Age: 40
End: 2025-02-24

## 2025-02-24 DIAGNOSIS — K21.9 GASTROESOPHAGEAL REFLUX DISEASE, UNSPECIFIED WHETHER ESOPHAGITIS PRESENT: ICD-10-CM

## 2025-02-24 DIAGNOSIS — K85.91 NECROTIZING PANCREATITIS: Primary | ICD-10-CM

## 2025-02-24 DIAGNOSIS — I10 PRIMARY HYPERTENSION: Chronic | ICD-10-CM

## 2025-02-24 RX ORDER — AMLODIPINE BESYLATE 10 MG/1
10 TABLET ORAL DAILY
Qty: 90 TABLET | Refills: 1 | Status: SHIPPED | OUTPATIENT
Start: 2025-02-24 | End: 2025-02-27 | Stop reason: SDUPTHER

## 2025-02-24 RX ORDER — PANTOPRAZOLE SODIUM 40 MG/1
40 TABLET, DELAYED RELEASE ORAL NIGHTLY
Qty: 90 TABLET | Refills: 1 | Status: SHIPPED | OUTPATIENT
Start: 2025-02-24

## 2025-02-24 NOTE — TELEPHONE ENCOUNTER
Requested Prescriptions     Pending Prescriptions Disp Refills    HYDROcodone-acetaminophen (NORCO) 7.5-325 MG per tablet 90 tablet 0     Sig: Take 1 tablet by mouth every 8 hours as needed for Pain for up to 30 days. Max Daily Amount: 3 tablets    Next ov 04/23/2025. Pharmacy confirmed.

## 2025-02-24 NOTE — TELEPHONE ENCOUNTER
Noted someone has given him few pills of oxycodone, he is also was due for follow-up in January, please arrange visit, I would prefer to see him before the next refill can be given,

## 2025-02-25 ENCOUNTER — PATIENT MESSAGE (OUTPATIENT)
Dept: INTERNAL MEDICINE CLINIC | Facility: CLINIC | Age: 40
End: 2025-02-25

## 2025-02-25 NOTE — TELEPHONE ENCOUNTER
Spoke with patient. Currently added to cancellation list if any cancellations patient will be notified.

## 2025-02-25 NOTE — TELEPHONE ENCOUNTER
Spoke with patient, was recently in hospital dur to work related accident. Patient states he was prescribed oxycodone for three days, three weeks ago. Has since completely run out of [place] since Sunday. Patient asking if pcp okay with short fill until appt becomes available.

## 2025-02-26 RX ORDER — HYDROCODONE BITARTRATE AND ACETAMINOPHEN 7.5; 325 MG/1; MG/1
1 TABLET ORAL EVERY 8 HOURS PRN
Qty: 90 TABLET | Refills: 0 | Status: SHIPPED | OUTPATIENT
Start: 2025-02-26 | End: 2025-03-28

## 2025-02-27 ENCOUNTER — OFFICE VISIT (OUTPATIENT)
Dept: INTERNAL MEDICINE CLINIC | Facility: CLINIC | Age: 40
End: 2025-02-27

## 2025-02-27 VITALS
HEIGHT: 69 IN | HEART RATE: 79 BPM | SYSTOLIC BLOOD PRESSURE: 110 MMHG | WEIGHT: 184 LBS | OXYGEN SATURATION: 97 % | DIASTOLIC BLOOD PRESSURE: 84 MMHG | BODY MASS INDEX: 27.25 KG/M2

## 2025-02-27 DIAGNOSIS — F17.211 CIGARETTE NICOTINE DEPENDENCE IN REMISSION: Chronic | ICD-10-CM

## 2025-02-27 DIAGNOSIS — K86.0 ALCOHOL-INDUCED CHRONIC PANCREATITIS (HCC): ICD-10-CM

## 2025-02-27 DIAGNOSIS — K85.91 NECROTIZING PANCREATITIS: Primary | ICD-10-CM

## 2025-02-27 DIAGNOSIS — I10 PRIMARY HYPERTENSION: Chronic | ICD-10-CM

## 2025-02-27 PROCEDURE — 99214 OFFICE O/P EST MOD 30 MIN: CPT | Performed by: INTERNAL MEDICINE

## 2025-02-27 PROCEDURE — 3079F DIAST BP 80-89 MM HG: CPT | Performed by: INTERNAL MEDICINE

## 2025-02-27 PROCEDURE — 3074F SYST BP LT 130 MM HG: CPT | Performed by: INTERNAL MEDICINE

## 2025-02-27 RX ORDER — AMLODIPINE BESYLATE 10 MG/1
10 TABLET ORAL DAILY
Qty: 90 TABLET | Refills: 1 | Status: SHIPPED | OUTPATIENT
Start: 2025-02-27

## 2025-02-27 SDOH — ECONOMIC STABILITY: FOOD INSECURITY: WITHIN THE PAST 12 MONTHS, THE FOOD YOU BOUGHT JUST DIDN'T LAST AND YOU DIDN'T HAVE MONEY TO GET MORE.: NEVER TRUE

## 2025-02-27 SDOH — ECONOMIC STABILITY: FOOD INSECURITY: WITHIN THE PAST 12 MONTHS, YOU WORRIED THAT YOUR FOOD WOULD RUN OUT BEFORE YOU GOT MONEY TO BUY MORE.: NEVER TRUE

## 2025-02-27 ASSESSMENT — PATIENT HEALTH QUESTIONNAIRE - PHQ9
SUM OF ALL RESPONSES TO PHQ QUESTIONS 1-9: 0
1. LITTLE INTEREST OR PLEASURE IN DOING THINGS: NOT AT ALL
SUM OF ALL RESPONSES TO PHQ9 QUESTIONS 1 & 2: 0
SUM OF ALL RESPONSES TO PHQ QUESTIONS 1-9: 0
2. FEELING DOWN, DEPRESSED OR HOPELESS: NOT AT ALL
SUM OF ALL RESPONSES TO PHQ QUESTIONS 1-9: 0
SUM OF ALL RESPONSES TO PHQ QUESTIONS 1-9: 0

## 2025-02-27 ASSESSMENT — ENCOUNTER SYMPTOMS
ABDOMINAL DISTENTION: 0
ABDOMINAL PAIN: 1

## 2025-02-27 NOTE — PROGRESS NOTES
Chief Complaint   Patient presents with    Follow-up     F/u chronic condition.     Medication Refill        Bhupinder Tejada is a 39 y.o. male who presents today for Follow-up (F/u chronic condition. ) and Medication Refill     For follow-up in chronic conditions including hypertension, and chronic pain syndrome associated with pancreatitis and chronic abdominal pain, in regards to his pancreatitis, he has been doing well, has been taking the medication once and sometimes 3 times a day, pains comes and goes, we have been able to come down from 10 mg of hydrocodone to 7.5,  Unfortunately he was in the emergency room in January 29, with right hand trauma requiring reconstructions of the tendons, and fracture, this occurred while he was working, surgery was done in January 30, he was given some medications for pain including oxycodone, but he is now only taking the hydrocodone as prescribed by us  He is following with Ortho, yesterday they remove the pins  Had  REPAIR EXTEN TENDON,DORSUM HAND,EA   DE OPEN TX METACARPAL FRACTURE SINGLE EA BONE   DE OPEN TX PHALANGEAL SHAFT FRACTURE PROX/MIDDLE EA       He also is in the process of quitting vaping, he is no longer smoking cigarettes but occasionally use of vaping,    Wt Readings from Last 3 Encounters:   02/27/25 83.5 kg (184 lb)   11/24/24 81.6 kg (180 lb)   11/24/24 81.6 kg (180 lb)     Vitals:    02/27/25 1205   BP: 110/84   Site: Left Upper Arm   Position: Sitting   Pulse: 79   SpO2: 97%   Weight: 83.5 kg (184 lb)   Height: 1.753 m (5' 9\")        Assessment and plan:  1. Necrotizing pancreatitis  2. Primary hypertension  -     amLODIPine (NORVASC) 10 MG tablet; Take 1 tablet by mouth daily, Disp-90 tablet, R-1Normal  3. Alcohol-induced chronic pancreatitis (HCC)  4. Cigarette nicotine dependence in remission    Blood pressure is currently well-controlled, will continue with amlodipine, unfortunately he ran out of the medication, he believes he may have

## 2025-03-24 DIAGNOSIS — K85.91 NECROTIZING PANCREATITIS: ICD-10-CM

## 2025-03-24 RX ORDER — HYDROCODONE BITARTRATE AND ACETAMINOPHEN 7.5; 325 MG/1; MG/1
1 TABLET ORAL EVERY 8 HOURS PRN
Qty: 90 TABLET | Refills: 0 | Status: SHIPPED | OUTPATIENT
Start: 2025-03-24 | End: 2025-04-23

## 2025-04-23 DIAGNOSIS — K85.91 NECROTIZING PANCREATITIS: ICD-10-CM

## 2025-04-23 RX ORDER — HYDROCODONE BITARTRATE AND ACETAMINOPHEN 7.5; 325 MG/1; MG/1
1 TABLET ORAL EVERY 8 HOURS PRN
Qty: 90 TABLET | Refills: 0 | Status: SHIPPED | OUTPATIENT
Start: 2025-04-23 | End: 2025-05-23

## 2025-05-09 DIAGNOSIS — I10 PRIMARY HYPERTENSION: Chronic | ICD-10-CM

## 2025-05-09 DIAGNOSIS — K21.9 GASTROESOPHAGEAL REFLUX DISEASE, UNSPECIFIED WHETHER ESOPHAGITIS PRESENT: ICD-10-CM

## 2025-05-09 RX ORDER — AMLODIPINE BESYLATE 10 MG/1
10 TABLET ORAL DAILY
Qty: 90 TABLET | Refills: 1 | Status: SHIPPED | OUTPATIENT
Start: 2025-05-09

## 2025-05-09 RX ORDER — PANTOPRAZOLE SODIUM 40 MG/1
40 TABLET, DELAYED RELEASE ORAL NIGHTLY
Qty: 90 TABLET | Refills: 1 | Status: SHIPPED | OUTPATIENT
Start: 2025-05-09

## 2025-05-09 NOTE — TELEPHONE ENCOUNTER
Requested Prescriptions     Pending Prescriptions Disp Refills    pantoprazole (PROTONIX) 40 MG tablet 90 tablet 1     Sig: Take 1 tablet by mouth at bedtime    amLODIPine (NORVASC) 10 MG tablet 90 tablet 1     Sig: Take 1 tablet by mouth daily    Next ov 05/30/2025. Pharmacy confirmed.

## 2025-05-23 DIAGNOSIS — K85.91 NECROTIZING PANCREATITIS: ICD-10-CM

## 2025-05-27 ENCOUNTER — PATIENT MESSAGE (OUTPATIENT)
Dept: INTERNAL MEDICINE CLINIC | Facility: CLINIC | Age: 40
End: 2025-05-27

## 2025-05-27 DIAGNOSIS — K85.91 NECROTIZING PANCREATITIS: Primary | ICD-10-CM

## 2025-05-27 RX ORDER — HYDROCODONE BITARTRATE AND ACETAMINOPHEN 7.5; 325 MG/1; MG/1
1 TABLET ORAL EVERY 8 HOURS PRN
Qty: 90 TABLET | Refills: 0 | OUTPATIENT
Start: 2025-05-27 | End: 2025-06-26

## 2025-05-27 RX ORDER — HYDROCODONE BITARTRATE AND ACETAMINOPHEN 7.5; 325 MG/1; MG/1
1 TABLET ORAL EVERY 8 HOURS PRN
Qty: 90 TABLET | Refills: 0 | Status: SHIPPED | OUTPATIENT
Start: 2025-05-27 | End: 2025-06-26

## 2025-05-27 NOTE — TELEPHONE ENCOUNTER
Requested Prescriptions     Pending Prescriptions Disp Refills    HYDROcodone-acetaminophen (NORCO) 7.5-325 MG per tablet 90 tablet 0     Sig: Take 1 tablet by mouth every 8 hours as needed for Pain for up to 30 days. Intended supply: 30 days Max Daily Amount: 3 tablets    Next ov 05/30/2025. Pharmacy confirmed.

## 2025-06-23 DIAGNOSIS — K85.91 NECROTIZING PANCREATITIS: ICD-10-CM

## 2025-06-23 RX ORDER — HYDROCODONE BITARTRATE AND ACETAMINOPHEN 7.5; 325 MG/1; MG/1
1 TABLET ORAL EVERY 8 HOURS PRN
Qty: 90 TABLET | Refills: 0 | Status: ON HOLD | OUTPATIENT
Start: 2025-06-23 | End: 2025-07-23

## 2025-06-26 ENCOUNTER — HOSPITAL ENCOUNTER (EMERGENCY)
Age: 40
Discharge: ANOTHER HOSPITAL | End: 2025-06-27
Attending: EMERGENCY MEDICINE

## 2025-06-26 DIAGNOSIS — K85.20 ALCOHOL-INDUCED ACUTE PANCREATITIS, UNSPECIFIED COMPLICATION STATUS: Primary | ICD-10-CM

## 2025-06-26 DIAGNOSIS — R10.9 INTRACTABLE ABDOMINAL PAIN: ICD-10-CM

## 2025-06-26 LAB
ALBUMIN SERPL-MCNC: 5.1 G/DL (ref 3.5–5)
ALBUMIN/GLOB SERPL: 1.3 (ref 1–1.9)
ALP SERPL-CCNC: 99 U/L (ref 40–129)
ALT SERPL-CCNC: 42 U/L (ref 12–65)
ANION GAP SERPL CALC-SCNC: 17 MMOL/L (ref 7–16)
AST SERPL-CCNC: 34 U/L (ref 15–37)
BASOPHILS # BLD: 0.03 K/UL (ref 0–0.2)
BASOPHILS NFR BLD: 0.3 % (ref 0–2)
BILIRUB SERPL-MCNC: 1 MG/DL (ref 0–1.2)
BUN SERPL-MCNC: 14 MG/DL (ref 6–23)
CALCIUM SERPL-MCNC: 10 MG/DL (ref 8.8–10.2)
CHLORIDE SERPL-SCNC: 98 MMOL/L (ref 98–107)
CO2 SERPL-SCNC: 19 MMOL/L (ref 20–29)
CREAT SERPL-MCNC: 1.01 MG/DL (ref 0.8–1.3)
DIFFERENTIAL METHOD BLD: ABNORMAL
EOSINOPHIL # BLD: 0.02 K/UL (ref 0–0.8)
EOSINOPHIL NFR BLD: 0.2 % (ref 0.5–7.8)
ERYTHROCYTE [DISTWIDTH] IN BLOOD BY AUTOMATED COUNT: 12.8 % (ref 11.9–14.6)
GLOBULIN SER CALC-MCNC: 3.9 G/DL (ref 2.3–3.5)
GLUCOSE SERPL-MCNC: 118 MG/DL (ref 65–100)
HCT VFR BLD AUTO: 54.9 % (ref 41.1–50.3)
HGB BLD-MCNC: 19.3 G/DL (ref 13.6–17.2)
IMM GRANULOCYTES # BLD AUTO: 0.03 K/UL (ref 0–0.5)
IMM GRANULOCYTES NFR BLD AUTO: 0.3 % (ref 0–5)
LIPASE SERPL-CCNC: 466 U/L (ref 13–60)
LYMPHOCYTES # BLD: 0.97 K/UL (ref 0.5–4.6)
LYMPHOCYTES NFR BLD: 8.4 % (ref 13–44)
MCH RBC QN AUTO: 30.2 PG (ref 26.1–32.9)
MCHC RBC AUTO-ENTMCNC: 35.2 G/DL (ref 31.4–35)
MCV RBC AUTO: 85.9 FL (ref 82–102)
MONOCYTES # BLD: 0.84 K/UL (ref 0.1–1.3)
MONOCYTES NFR BLD: 7.3 % (ref 4–12)
NEUTS SEG # BLD: 9.63 K/UL (ref 1.7–8.2)
NEUTS SEG NFR BLD: 83.5 % (ref 43–78)
NRBC # BLD: 0 K/UL (ref 0–0.2)
PLATELET # BLD AUTO: 279 K/UL (ref 150–450)
PMV BLD AUTO: 9.6 FL (ref 9.4–12.3)
POTASSIUM SERPL-SCNC: 4.2 MMOL/L (ref 3.5–5.1)
PROT SERPL-MCNC: 9 G/DL (ref 6.3–8.2)
RBC # BLD AUTO: 6.39 M/UL (ref 4.23–5.6)
SODIUM SERPL-SCNC: 134 MMOL/L (ref 133–143)
WBC # BLD AUTO: 11.5 K/UL (ref 4.3–11.1)

## 2025-06-26 PROCEDURE — 96375 TX/PRO/DX INJ NEW DRUG ADDON: CPT

## 2025-06-26 PROCEDURE — 96374 THER/PROPH/DIAG INJ IV PUSH: CPT

## 2025-06-26 PROCEDURE — 83690 ASSAY OF LIPASE: CPT

## 2025-06-26 PROCEDURE — 6360000002 HC RX W HCPCS: Performed by: EMERGENCY MEDICINE

## 2025-06-26 PROCEDURE — 99285 EMERGENCY DEPT VISIT HI MDM: CPT

## 2025-06-26 PROCEDURE — 2580000003 HC RX 258: Performed by: EMERGENCY MEDICINE

## 2025-06-26 PROCEDURE — 80053 COMPREHEN METABOLIC PANEL: CPT

## 2025-06-26 PROCEDURE — 93005 ELECTROCARDIOGRAM TRACING: CPT | Performed by: EMERGENCY MEDICINE

## 2025-06-26 PROCEDURE — 85025 COMPLETE CBC W/AUTO DIFF WBC: CPT

## 2025-06-26 RX ORDER — SODIUM CHLORIDE, SODIUM LACTATE, POTASSIUM CHLORIDE, AND CALCIUM CHLORIDE .6; .31; .03; .02 G/100ML; G/100ML; G/100ML; G/100ML
1000 INJECTION, SOLUTION INTRAVENOUS
Status: COMPLETED | OUTPATIENT
Start: 2025-06-26 | End: 2025-06-26

## 2025-06-26 RX ORDER — HYDROMORPHONE HYDROCHLORIDE 1 MG/ML
1 INJECTION, SOLUTION INTRAMUSCULAR; INTRAVENOUS; SUBCUTANEOUS
Refills: 0 | Status: COMPLETED | OUTPATIENT
Start: 2025-06-27 | End: 2025-06-27

## 2025-06-26 RX ORDER — DIPHENHYDRAMINE HYDROCHLORIDE 50 MG/ML
25 INJECTION, SOLUTION INTRAMUSCULAR; INTRAVENOUS
Status: COMPLETED | OUTPATIENT
Start: 2025-06-26 | End: 2025-06-26

## 2025-06-26 RX ORDER — PROCHLORPERAZINE EDISYLATE 5 MG/ML
5 INJECTION INTRAMUSCULAR; INTRAVENOUS ONCE
Status: COMPLETED | OUTPATIENT
Start: 2025-06-26 | End: 2025-06-26

## 2025-06-26 RX ORDER — MORPHINE SULFATE 4 MG/ML
4 INJECTION, SOLUTION INTRAMUSCULAR; INTRAVENOUS
Refills: 0 | Status: COMPLETED | OUTPATIENT
Start: 2025-06-26 | End: 2025-06-26

## 2025-06-26 RX ORDER — HYDROMORPHONE HYDROCHLORIDE 1 MG/ML
1 INJECTION, SOLUTION INTRAMUSCULAR; INTRAVENOUS; SUBCUTANEOUS
Refills: 0 | Status: COMPLETED | OUTPATIENT
Start: 2025-06-26 | End: 2025-06-26

## 2025-06-26 RX ORDER — ONDANSETRON 2 MG/ML
4 INJECTION INTRAMUSCULAR; INTRAVENOUS ONCE
Status: COMPLETED | OUTPATIENT
Start: 2025-06-26 | End: 2025-06-26

## 2025-06-26 RX ADMIN — DIPHENHYDRAMINE HYDROCHLORIDE 25 MG: 50 INJECTION INTRAMUSCULAR; INTRAVENOUS at 21:34

## 2025-06-26 RX ADMIN — HYDROMORPHONE HYDROCHLORIDE 1 MG: 1 INJECTION, SOLUTION INTRAMUSCULAR; INTRAVENOUS; SUBCUTANEOUS at 22:46

## 2025-06-26 RX ADMIN — MORPHINE SULFATE 4 MG: 4 INJECTION INTRAVENOUS at 21:33

## 2025-06-26 RX ADMIN — ONDANSETRON 4 MG: 2 INJECTION, SOLUTION INTRAMUSCULAR; INTRAVENOUS at 20:59

## 2025-06-26 RX ADMIN — SODIUM CHLORIDE, SODIUM LACTATE, POTASSIUM CHLORIDE, AND CALCIUM CHLORIDE 1000 ML: .6; .31; .03; .02 INJECTION, SOLUTION INTRAVENOUS at 21:33

## 2025-06-26 RX ADMIN — PROCHLORPERAZINE EDISYLATE 5 MG: 5 INJECTION INTRAMUSCULAR; INTRAVENOUS at 21:33

## 2025-06-26 ASSESSMENT — PAIN SCALES - GENERAL
PAINLEVEL_OUTOF10: 9
PAINLEVEL_OUTOF10: 9
PAINLEVEL_OUTOF10: 8

## 2025-06-26 ASSESSMENT — PAIN DESCRIPTION - ORIENTATION: ORIENTATION: UPPER

## 2025-06-26 ASSESSMENT — LIFESTYLE VARIABLES
HOW MANY STANDARD DRINKS CONTAINING ALCOHOL DO YOU HAVE ON A TYPICAL DAY: 1 OR 2
HOW OFTEN DO YOU HAVE A DRINK CONTAINING ALCOHOL: MONTHLY OR LESS

## 2025-06-26 ASSESSMENT — PAIN DESCRIPTION - DESCRIPTORS: DESCRIPTORS: THROBBING;PRESSURE

## 2025-06-26 ASSESSMENT — PAIN - FUNCTIONAL ASSESSMENT: PAIN_FUNCTIONAL_ASSESSMENT: 0-10

## 2025-06-26 ASSESSMENT — PAIN DESCRIPTION - LOCATION: LOCATION: ABDOMEN

## 2025-06-27 ENCOUNTER — HOSPITAL ENCOUNTER (INPATIENT)
Age: 40
LOS: 1 days | Discharge: HOME | DRG: 440 | End: 2025-06-30
Attending: HOSPITALIST | Admitting: HOSPITALIST

## 2025-06-27 ENCOUNTER — APPOINTMENT (OUTPATIENT)
Dept: CT IMAGING | Age: 40
DRG: 440 | End: 2025-06-27
Attending: HOSPITALIST

## 2025-06-27 VITALS
WEIGHT: 185 LBS | BODY MASS INDEX: 27.4 KG/M2 | HEIGHT: 69 IN | DIASTOLIC BLOOD PRESSURE: 95 MMHG | OXYGEN SATURATION: 97 % | RESPIRATION RATE: 16 BRPM | SYSTOLIC BLOOD PRESSURE: 182 MMHG | HEART RATE: 99 BPM | TEMPERATURE: 97.5 F

## 2025-06-27 DIAGNOSIS — K85.91 NECROTIZING PANCREATITIS: ICD-10-CM

## 2025-06-27 PROBLEM — K85.90 ACUTE ON CHRONIC PANCREATITIS (HCC): Status: ACTIVE | Noted: 2025-06-27

## 2025-06-27 PROBLEM — K86.1 ACUTE ON CHRONIC PANCREATITIS (HCC): Status: ACTIVE | Noted: 2025-06-27

## 2025-06-27 LAB
EKG ATRIAL RATE: 99 BPM
EKG DIAGNOSIS: NORMAL
EKG P AXIS: 70 DEGREES
EKG P-R INTERVAL: 158 MS
EKG Q-T INTERVAL: 334 MS
EKG QRS DURATION: 112 MS
EKG QTC CALCULATION (BAZETT): 428 MS
EKG R AXIS: 25 DEGREES
EKG T AXIS: 52 DEGREES
EKG VENTRICULAR RATE: 99 BPM
LIPASE SERPL-CCNC: 292 U/L (ref 13–60)

## 2025-06-27 PROCEDURE — 2500000003 HC RX 250 WO HCPCS: Performed by: HOSPITALIST

## 2025-06-27 PROCEDURE — 96374 THER/PROPH/DIAG INJ IV PUSH: CPT

## 2025-06-27 PROCEDURE — G0378 HOSPITAL OBSERVATION PER HR: HCPCS

## 2025-06-27 PROCEDURE — 6370000000 HC RX 637 (ALT 250 FOR IP): Performed by: HOSPITALIST

## 2025-06-27 PROCEDURE — 6360000004 HC RX CONTRAST MEDICATION: Performed by: HOSPITALIST

## 2025-06-27 PROCEDURE — 2580000003 HC RX 258: Performed by: HOSPITALIST

## 2025-06-27 PROCEDURE — 6360000002 HC RX W HCPCS: Performed by: HOSPITALIST

## 2025-06-27 PROCEDURE — 96361 HYDRATE IV INFUSION ADD-ON: CPT

## 2025-06-27 PROCEDURE — 83690 ASSAY OF LIPASE: CPT

## 2025-06-27 PROCEDURE — 96372 THER/PROPH/DIAG INJ SC/IM: CPT

## 2025-06-27 PROCEDURE — 93010 ELECTROCARDIOGRAM REPORT: CPT | Performed by: INTERNAL MEDICINE

## 2025-06-27 PROCEDURE — 96375 TX/PRO/DX INJ NEW DRUG ADDON: CPT

## 2025-06-27 PROCEDURE — 74177 CT ABD & PELVIS W/CONTRAST: CPT

## 2025-06-27 PROCEDURE — 36415 COLL VENOUS BLD VENIPUNCTURE: CPT

## 2025-06-27 PROCEDURE — 96376 TX/PRO/DX INJ SAME DRUG ADON: CPT

## 2025-06-27 PROCEDURE — 6360000002 HC RX W HCPCS: Performed by: EMERGENCY MEDICINE

## 2025-06-27 RX ORDER — OXYCODONE HYDROCHLORIDE 5 MG/1
10 TABLET ORAL EVERY 4 HOURS PRN
Refills: 0 | Status: DISCONTINUED | OUTPATIENT
Start: 2025-06-27 | End: 2025-06-30 | Stop reason: HOSPADM

## 2025-06-27 RX ORDER — POTASSIUM CHLORIDE 7.45 MG/ML
10 INJECTION INTRAVENOUS PRN
Status: DISCONTINUED | OUTPATIENT
Start: 2025-06-27 | End: 2025-06-30 | Stop reason: HOSPADM

## 2025-06-27 RX ORDER — HYDROMORPHONE HYDROCHLORIDE 1 MG/ML
1 INJECTION, SOLUTION INTRAMUSCULAR; INTRAVENOUS; SUBCUTANEOUS EVERY 4 HOURS PRN
Status: DISCONTINUED | OUTPATIENT
Start: 2025-06-27 | End: 2025-06-27

## 2025-06-27 RX ORDER — SODIUM CHLORIDE 0.9 % (FLUSH) 0.9 %
5-40 SYRINGE (ML) INJECTION EVERY 12 HOURS SCHEDULED
Status: DISCONTINUED | OUTPATIENT
Start: 2025-06-27 | End: 2025-06-30 | Stop reason: HOSPADM

## 2025-06-27 RX ORDER — ONDANSETRON 2 MG/ML
4 INJECTION INTRAMUSCULAR; INTRAVENOUS EVERY 6 HOURS PRN
Status: DISCONTINUED | OUTPATIENT
Start: 2025-06-27 | End: 2025-06-27 | Stop reason: SDUPTHER

## 2025-06-27 RX ORDER — OXYCODONE HYDROCHLORIDE 5 MG/1
5 TABLET ORAL EVERY 6 HOURS PRN
Refills: 0 | Status: DISCONTINUED | OUTPATIENT
Start: 2025-06-27 | End: 2025-06-30 | Stop reason: HOSPADM

## 2025-06-27 RX ORDER — IOPAMIDOL 755 MG/ML
100 INJECTION, SOLUTION INTRAVASCULAR
Status: COMPLETED | OUTPATIENT
Start: 2025-06-27 | End: 2025-06-27

## 2025-06-27 RX ORDER — ONDANSETRON 4 MG/1
4 TABLET, ORALLY DISINTEGRATING ORAL EVERY 8 HOURS PRN
Status: DISCONTINUED | OUTPATIENT
Start: 2025-06-27 | End: 2025-06-28

## 2025-06-27 RX ORDER — PROCHLORPERAZINE EDISYLATE 5 MG/ML
10 INJECTION INTRAMUSCULAR; INTRAVENOUS EVERY 6 HOURS PRN
Status: DISCONTINUED | OUTPATIENT
Start: 2025-06-27 | End: 2025-06-30 | Stop reason: HOSPADM

## 2025-06-27 RX ORDER — POLYETHYLENE GLYCOL 3350 17 G/17G
17 POWDER, FOR SOLUTION ORAL DAILY PRN
Status: DISCONTINUED | OUTPATIENT
Start: 2025-06-27 | End: 2025-06-30 | Stop reason: HOSPADM

## 2025-06-27 RX ORDER — SODIUM CHLORIDE 9 MG/ML
INJECTION, SOLUTION INTRAVENOUS CONTINUOUS
Status: DISCONTINUED | OUTPATIENT
Start: 2025-06-27 | End: 2025-06-30 | Stop reason: HOSPADM

## 2025-06-27 RX ORDER — HYDROMORPHONE HYDROCHLORIDE 1 MG/ML
2 INJECTION, SOLUTION INTRAMUSCULAR; INTRAVENOUS; SUBCUTANEOUS
Status: DISCONTINUED | OUTPATIENT
Start: 2025-06-27 | End: 2025-06-29

## 2025-06-27 RX ORDER — ACETAMINOPHEN 650 MG/1
650 SUPPOSITORY RECTAL EVERY 6 HOURS PRN
Status: DISCONTINUED | OUTPATIENT
Start: 2025-06-27 | End: 2025-06-30 | Stop reason: HOSPADM

## 2025-06-27 RX ORDER — POTASSIUM CHLORIDE 1500 MG/1
40 TABLET, EXTENDED RELEASE ORAL PRN
Status: DISCONTINUED | OUTPATIENT
Start: 2025-06-27 | End: 2025-06-30 | Stop reason: HOSPADM

## 2025-06-27 RX ORDER — SODIUM CHLORIDE 0.9 % (FLUSH) 0.9 %
5-40 SYRINGE (ML) INJECTION PRN
Status: DISCONTINUED | OUTPATIENT
Start: 2025-06-27 | End: 2025-06-30 | Stop reason: HOSPADM

## 2025-06-27 RX ORDER — AMLODIPINE BESYLATE 10 MG/1
10 TABLET ORAL DAILY
Status: DISCONTINUED | OUTPATIENT
Start: 2025-06-27 | End: 2025-06-30 | Stop reason: HOSPADM

## 2025-06-27 RX ORDER — ENOXAPARIN SODIUM 100 MG/ML
40 INJECTION SUBCUTANEOUS DAILY
Status: DISCONTINUED | OUTPATIENT
Start: 2025-06-27 | End: 2025-06-30 | Stop reason: HOSPADM

## 2025-06-27 RX ORDER — HYDROMORPHONE HYDROCHLORIDE 1 MG/ML
1 INJECTION, SOLUTION INTRAMUSCULAR; INTRAVENOUS; SUBCUTANEOUS
Status: DISCONTINUED | OUTPATIENT
Start: 2025-06-27 | End: 2025-06-27

## 2025-06-27 RX ORDER — ACETAMINOPHEN 325 MG/1
650 TABLET ORAL EVERY 6 HOURS PRN
Status: DISCONTINUED | OUTPATIENT
Start: 2025-06-27 | End: 2025-06-30 | Stop reason: HOSPADM

## 2025-06-27 RX ORDER — ONDANSETRON 2 MG/ML
4 INJECTION INTRAMUSCULAR; INTRAVENOUS EVERY 6 HOURS PRN
Status: DISCONTINUED | OUTPATIENT
Start: 2025-06-27 | End: 2025-06-28

## 2025-06-27 RX ORDER — SODIUM CHLORIDE 9 MG/ML
INJECTION, SOLUTION INTRAVENOUS PRN
Status: DISCONTINUED | OUTPATIENT
Start: 2025-06-27 | End: 2025-06-30 | Stop reason: HOSPADM

## 2025-06-27 RX ORDER — MAGNESIUM SULFATE IN WATER 40 MG/ML
2000 INJECTION, SOLUTION INTRAVENOUS PRN
Status: DISCONTINUED | OUTPATIENT
Start: 2025-06-27 | End: 2025-06-30 | Stop reason: HOSPADM

## 2025-06-27 RX ORDER — SODIUM CHLORIDE 9 MG/ML
INJECTION, SOLUTION INTRAVENOUS CONTINUOUS
Status: DISCONTINUED | OUTPATIENT
Start: 2025-06-27 | End: 2025-06-27

## 2025-06-27 RX ADMIN — ONDANSETRON 4 MG: 4 TABLET, ORALLY DISINTEGRATING ORAL at 21:13

## 2025-06-27 RX ADMIN — HYDROMORPHONE HYDROCHLORIDE 1 MG: 1 INJECTION, SOLUTION INTRAMUSCULAR; INTRAVENOUS; SUBCUTANEOUS at 08:29

## 2025-06-27 RX ADMIN — SODIUM CHLORIDE: 0.9 INJECTION, SOLUTION INTRAVENOUS at 11:34

## 2025-06-27 RX ADMIN — OXYCODONE 10 MG: 5 TABLET ORAL at 23:37

## 2025-06-27 RX ADMIN — HYDROMORPHONE HYDROCHLORIDE 1 MG: 1 INJECTION, SOLUTION INTRAMUSCULAR; INTRAVENOUS; SUBCUTANEOUS at 02:03

## 2025-06-27 RX ADMIN — ENOXAPARIN SODIUM 40 MG: 100 INJECTION SUBCUTANEOUS at 08:33

## 2025-06-27 RX ADMIN — OXYCODONE 10 MG: 5 TABLET ORAL at 16:47

## 2025-06-27 RX ADMIN — HYDROMORPHONE HYDROCHLORIDE 1 MG: 1 INJECTION, SOLUTION INTRAMUSCULAR; INTRAVENOUS; SUBCUTANEOUS at 00:06

## 2025-06-27 RX ADMIN — SODIUM CHLORIDE 40 MG: 9 INJECTION, SOLUTION INTRAMUSCULAR; INTRAVENOUS; SUBCUTANEOUS at 08:31

## 2025-06-27 RX ADMIN — IOPAMIDOL 100 ML: 755 INJECTION, SOLUTION INTRAVENOUS at 03:34

## 2025-06-27 RX ADMIN — AMLODIPINE BESYLATE 10 MG: 10 TABLET ORAL at 08:35

## 2025-06-27 RX ADMIN — OXYCODONE 10 MG: 5 TABLET ORAL at 11:32

## 2025-06-27 RX ADMIN — HYDROMORPHONE HYDROCHLORIDE 1 MG: 1 INJECTION, SOLUTION INTRAMUSCULAR; INTRAVENOUS; SUBCUTANEOUS at 12:40

## 2025-06-27 RX ADMIN — HYDROMORPHONE HYDROCHLORIDE 1 MG: 1 INJECTION, SOLUTION INTRAMUSCULAR; INTRAVENOUS; SUBCUTANEOUS at 06:07

## 2025-06-27 RX ADMIN — HYDROMORPHONE HYDROCHLORIDE 2 MG: 1 INJECTION, SOLUTION INTRAMUSCULAR; INTRAVENOUS; SUBCUTANEOUS at 22:16

## 2025-06-27 RX ADMIN — SODIUM CHLORIDE, PRESERVATIVE FREE 10 ML: 5 INJECTION INTRAVENOUS at 08:33

## 2025-06-27 RX ADMIN — HYDROMORPHONE HYDROCHLORIDE 2 MG: 1 INJECTION, SOLUTION INTRAMUSCULAR; INTRAVENOUS; SUBCUTANEOUS at 15:45

## 2025-06-27 RX ADMIN — SODIUM CHLORIDE: 0.9 INJECTION, SOLUTION INTRAVENOUS at 03:10

## 2025-06-27 RX ADMIN — ONDANSETRON 4 MG: 2 INJECTION, SOLUTION INTRAMUSCULAR; INTRAVENOUS at 02:03

## 2025-06-27 RX ADMIN — HYDROMORPHONE HYDROCHLORIDE 1 MG: 1 INJECTION, SOLUTION INTRAMUSCULAR; INTRAVENOUS; SUBCUTANEOUS at 04:03

## 2025-06-27 RX ADMIN — SODIUM CHLORIDE, PRESERVATIVE FREE 10 ML: 5 INJECTION INTRAVENOUS at 21:14

## 2025-06-27 RX ADMIN — HYDROMORPHONE HYDROCHLORIDE 1 MG: 1 INJECTION, SOLUTION INTRAMUSCULAR; INTRAVENOUS; SUBCUTANEOUS at 10:32

## 2025-06-27 RX ADMIN — HYDROMORPHONE HYDROCHLORIDE 2 MG: 1 INJECTION, SOLUTION INTRAMUSCULAR; INTRAVENOUS; SUBCUTANEOUS at 19:05

## 2025-06-27 RX ADMIN — SODIUM CHLORIDE: 0.9 INJECTION, SOLUTION INTRAVENOUS at 18:27

## 2025-06-27 RX ADMIN — PROCHLORPERAZINE EDISYLATE 10 MG: 5 INJECTION INTRAMUSCULAR; INTRAVENOUS at 06:06

## 2025-06-27 ASSESSMENT — PAIN DESCRIPTION - LOCATION
LOCATION: ABDOMEN

## 2025-06-27 ASSESSMENT — PAIN SCALES - GENERAL
PAINLEVEL_OUTOF10: 4
PAINLEVEL_OUTOF10: 7
PAINLEVEL_OUTOF10: 9
PAINLEVEL_OUTOF10: 10
PAINLEVEL_OUTOF10: 7
PAINLEVEL_OUTOF10: 9
PAINLEVEL_OUTOF10: 6
PAINLEVEL_OUTOF10: 9
PAINLEVEL_OUTOF10: 10
PAINLEVEL_OUTOF10: 7
PAINLEVEL_OUTOF10: 6
PAINLEVEL_OUTOF10: 10
PAINLEVEL_OUTOF10: 10
PAINLEVEL_OUTOF10: 9
PAINLEVEL_OUTOF10: 10
PAINLEVEL_OUTOF10: 4
PAINLEVEL_OUTOF10: 10
PAINLEVEL_OUTOF10: 10

## 2025-06-27 ASSESSMENT — PAIN DESCRIPTION - DESCRIPTORS
DESCRIPTORS: DISCOMFORT;PRESSURE
DESCRIPTORS: SHARP
DESCRIPTORS: PRESSURE
DESCRIPTORS: SHARP

## 2025-06-27 ASSESSMENT — PAIN DESCRIPTION - ORIENTATION
ORIENTATION: MID;UPPER
ORIENTATION: MID;UPPER

## 2025-06-27 NOTE — CARE COORDINATION
Pt presented to the ED after he started drinking 3 days ago with N/V and abdo pain. Pain is epigastric and in the LUQ. PMHx includes: alcoholic pancreatitis and remote h/o alcohol use (reportedly stopped drinking alcohol for a long time but had 3-4 glasses of wine on Monday). PTA, pt indep with all his ADLs. Lives with his spouse in a one story private residence. On RA. A/Ox4. Works FT. Drives. Denies DME needs or current HH services. PCP established. Is self pay, pt provided community resources and [facility] contact information at last discharge. No anticipated discharge needs identified by CM, will remain available.     06/27/25 3776   Service Assessment   Patient Orientation Alert and Oriented   Cognition Alert   History Provided By Patient   Primary Caregiver Self   Support Systems Spouse/Significant Other;Parent;Family Members;[employer] [employer];Friends/Neighbors   Patient's Healthcare Decision Maker is: Legal Next of Kin   PCP Verified by CM Yes   Last Visit to PCP Within last year   Prior Functional Level Independent in ADLs/IADLs   Current Functional Level Independent in ADLs/IADLs   Can patient return to prior living arrangement Yes   Ability to make needs known: Good   Family able to assist with home care needs: Yes   Would you like for me to discuss the discharge plan with any other family members/significant others, and if so, who? No   Financial Resources None   Community Resources None   Social/Functional History   Lives With Spouse   Type of Home House   Bathroom Toilet Standard   Bathroom Accessibility Accessible   Home Equipment None   Receives Help From Family   Prior Level of Assist for ADLs Independent   Prior Level of Assist for Homemaking Independent   Ambulation Assistance Independent   Prior Level of Assist for Transfers Independent   Active  Yes   Occupation Full time employment   Discharge Planning   Type of Residence House   Living Arrangements Spouse/Significant Other   Current

## 2025-06-27 NOTE — ED TRIAGE NOTES
Pt walked into triage. Reports he drank 3 nights ago for a family gathering. Hx of pancreatitis. States having upper abdominal px for 2 days now. Vomiting and nausea. Denies fever. Does take BP meds but having a hard time keeping it down.

## 2025-06-27 NOTE — PLAN OF CARE
Problem: Discharge Planning  Goal: Discharge to home or other facility with appropriate resources  6/27/2025 0435 by Payton Barajas, RN  Outcome: Progressing  6/27/2025 0435 by Payton Barajas, RN  Outcome: Progressing

## 2025-06-27 NOTE — H&P
Hospitalist History and Physical   Admit Date:  2025  1:37 AM   Name:  Bhupinder Tejada   Age:  40 y.o.  Sex:  male  :  1985   MRN:  [id number]   Room:  [facility]/    Presenting/Chief Complaint: No chief complaint on file.     Reason(s) for Admission: Acute on chronic pancreatitis (HCC) [K85.90, K86.1]     History of Present Illness:     40 years old male with history of alcoholic pancreatitis x 10 in the past, history of alcohol use stopped using alcohol for long time and started drinking 3 days ago started experiencing nausea, vomiting, abdominal pain.  Pain is epigastric and left upper quadrant, not able to tolerate any diet for the past 24 hours.  Denies any fever, chills, chest pain, shortness of breath.  In emergency room patient was evaluated, patient's lab work shows evidence of slight elevation of lipase.  Patient continued to have pain despite of multiple dose of pain medication in the emergency room.  ER physician requested admission of this patient for further evaluation and management.            Assessment & Plan:     Acute on chronic alcoholic pancreatitis: Patient is admitted for symptoms management, alcohol cessation recommended.  Reports he stopped drinking but his grandparents came to the town, he had couple of drinks during the party 2 to 3 days ago.  Initiated IV fluids, provide pain medications.  CT scan of abdomen was ordered.  Chronic pain: Patient was on Norco every 8 hours.    GERD: Continue on Protonix.    Hypertension: Continue on amlodipine.              Diet: Diet NPO  VTE prophylaxis: Heparin  Code status: Full Code        Non-peripheral Lines and Tubes (if present):             Hospital Problems:  Principal Problem:    Acute on chronic pancreatitis (HCC)  Resolved Problems:    * No resolved hospital problems. *        Objective:   Patient Vitals for the past 24 hrs:   Resp   25 0203 18            Estimated body mass index is 27.4 kg/m² as calculated from the

## 2025-06-27 NOTE — ED NOTES
TRANSFER - OUT REPORT:    Verbal report given to Payton RN on Bhupinder Tejada  being transferred to [facility] 4th floor for routine progression of patient care       Report consisted of patient's Situation, Background, Assessment and   Recommendations(SBAR).     Information from the following report(s) Nurse Handoff Report, Index, and ED Encounter Summary was reviewed with the receiving nurse.    [place] Fall Assessment:    Presents to emergency department  because of falls (Syncope, seizure, or loss of consciousness): No  Age > 70: No  Altered Mental Status, Intoxication with alcohol or substance confusion (Disorientation, impaired judgment, poor safety awaremess, or inability to follow instructions): No  Impaired Mobility: Ambulates or transfers with assistive devices or assistance; Unable to ambulate or transer.: No  Nursing Judgement: No          Lines:   Peripheral IV 06/26/25 Right;Dorsal (Active)   Site Assessment Clean, dry & intact 06/26/25 2028   Line Status Blood return noted;Brisk blood return;Flushed;Normal saline locked;Specimen collected 06/26/25 2028   Phlebitis Assessment No symptoms 06/26/25 2028   Infiltration Assessment 0 06/26/25 2028        Opportunity for questions and clarification was provided.      Patient transported with:  Monitor

## 2025-06-27 NOTE — ED PROVIDER NOTES
Transportation Needs: No Transportation Needs (2/27/2025)    PRAPARE - Transportation     Lack of Transportation (Medical): No     Lack of Transportation (Non-Medical): No   Social Connections: Unknown (3/20/2021)    Received from [facility]    Social Connections     Frequency of Communication with Friends and Family: Not asked     Frequency of Social Gatherings with Friends and Family: Not asked   Intimate Partner Violence: Unknown (3/20/2021)    Received from [facility]    Intimate Partner Violence     Fear of Current or Ex-Partner: Not asked     Emotionally Abused: Not asked     Physically Abused: Not asked     Sexually Abused: Not asked   Housing Stability: Low Risk  (2/27/2025)    Housing Stability Vital Sign     Unable to Pay for Housing in the Last Year: No     Number of Times Moved in the Last Year: 0     Homeless in the Last Year: No        Previous Medications    AMLODIPINE (NORVASC) 10 MG TABLET    Take 1 tablet by mouth daily    HYDROCODONE-ACETAMINOPHEN (NORCO) 7.5-325 MG PER TABLET    Take 1 tablet by mouth every 8 hours as needed for Pain for up to 30 days. Intended supply: 30 days Max Daily Amount: 3 tablets    NALOXONE (NARCAN) 4 MG/0.1ML LIQD NASAL SPRAY    1 spray by Nasal route as needed for Opioid Reversal    PANTOPRAZOLE (PROTONIX) 40 MG TABLET    Take 1 tablet by mouth at bedtime        Results from this emergency department visit:      Results for orders placed or performed during the hospital encounter of 06/26/25   CBC with Auto Differential   Result Value Ref Range    WBC 11.5 (H) 4.3 - 11.1 K/uL    RBC 6.39 (H) 4.23 - 5.60 M/uL    Hemoglobin 19.3 (H) 13.6 - 17.2 g/dL    Hematocrit 54.9 (H) 41.1 - 50.3 %    MCV 85.9 82.0 - 102.0 FL    MCH 30.2 26.1 - 32.9 PG    MCHC 35.2 (H) 31.4 - 35.0 g/dL    RDW 12.8 11.9 - 14.6 %    Platelets 279 150 - 450 K/uL    MPV 9.6 9.4 - 12.3 FL    nRBC 0.00 0.0 - 0.2 K/uL    Differential Type AUTOMATED      Neutrophils %

## 2025-06-28 LAB
ALBUMIN SERPL-MCNC: 3.5 G/DL (ref 3.5–5)
ALBUMIN/GLOB SERPL: 0.9 (ref 1–1.9)
ALP SERPL-CCNC: 78 U/L (ref 40–129)
ALT SERPL-CCNC: 32 U/L (ref 8–55)
ANION GAP SERPL CALC-SCNC: 15 MMOL/L (ref 7–16)
AST SERPL-CCNC: 27 U/L (ref 15–37)
BASOPHILS # BLD: 0.02 K/UL (ref 0–0.2)
BASOPHILS NFR BLD: 0.2 % (ref 0–2)
BILIRUB SERPL-MCNC: 0.9 MG/DL (ref 0–1.2)
BUN SERPL-MCNC: 10 MG/DL (ref 6–23)
CALCIUM SERPL-MCNC: 9.4 MG/DL (ref 8.8–10.2)
CHLORIDE SERPL-SCNC: 97 MMOL/L (ref 98–107)
CO2 SERPL-SCNC: 20 MMOL/L (ref 20–29)
CREAT SERPL-MCNC: 0.75 MG/DL (ref 0.8–1.3)
DIFFERENTIAL METHOD BLD: ABNORMAL
EOSINOPHIL # BLD: 0.01 K/UL (ref 0–0.8)
EOSINOPHIL NFR BLD: 0.1 % (ref 0.5–7.8)
ERYTHROCYTE [DISTWIDTH] IN BLOOD BY AUTOMATED COUNT: 13 % (ref 11.9–14.6)
GLOBULIN SER CALC-MCNC: 4.1 G/DL (ref 2.3–3.5)
GLUCOSE SERPL-MCNC: 92 MG/DL (ref 70–99)
HCT VFR BLD AUTO: 52.1 % (ref 41.1–50.3)
HGB BLD-MCNC: 18.4 G/DL (ref 13.6–17.2)
IMM GRANULOCYTES # BLD AUTO: 0.03 K/UL (ref 0–0.5)
IMM GRANULOCYTES NFR BLD AUTO: 0.3 % (ref 0–5)
LIPASE SERPL-CCNC: 91 U/L (ref 13–60)
LYMPHOCYTES # BLD: 1.17 K/UL (ref 0.5–4.6)
LYMPHOCYTES NFR BLD: 11.8 % (ref 13–44)
MCH RBC QN AUTO: 30.1 PG (ref 26.1–32.9)
MCHC RBC AUTO-ENTMCNC: 35.3 G/DL (ref 31.4–35)
MCV RBC AUTO: 85.1 FL (ref 82–102)
MONOCYTES # BLD: 1.14 K/UL (ref 0.1–1.3)
MONOCYTES NFR BLD: 11.5 % (ref 4–12)
NEUTS SEG # BLD: 7.53 K/UL (ref 1.7–8.2)
NEUTS SEG NFR BLD: 76.1 % (ref 43–78)
NRBC # BLD: 0 K/UL (ref 0–0.2)
PLATELET # BLD AUTO: 202 K/UL (ref 150–450)
PMV BLD AUTO: 9.3 FL (ref 9.4–12.3)
POTASSIUM SERPL-SCNC: 4.4 MMOL/L (ref 3.5–5.1)
PROT SERPL-MCNC: 7.6 G/DL (ref 6.3–8.2)
RBC # BLD AUTO: 6.12 M/UL (ref 4.23–5.6)
SODIUM SERPL-SCNC: 131 MMOL/L (ref 136–145)
WBC # BLD AUTO: 9.9 K/UL (ref 4.3–11.1)

## 2025-06-28 PROCEDURE — 80053 COMPREHEN METABOLIC PANEL: CPT

## 2025-06-28 PROCEDURE — 85025 COMPLETE CBC W/AUTO DIFF WBC: CPT

## 2025-06-28 PROCEDURE — 2580000003 HC RX 258: Performed by: HOSPITALIST

## 2025-06-28 PROCEDURE — 96372 THER/PROPH/DIAG INJ SC/IM: CPT

## 2025-06-28 PROCEDURE — 36415 COLL VENOUS BLD VENIPUNCTURE: CPT

## 2025-06-28 PROCEDURE — 6360000002 HC RX W HCPCS: Performed by: HOSPITALIST

## 2025-06-28 PROCEDURE — 83690 ASSAY OF LIPASE: CPT

## 2025-06-28 PROCEDURE — 2500000003 HC RX 250 WO HCPCS: Performed by: HOSPITALIST

## 2025-06-28 PROCEDURE — 6370000000 HC RX 637 (ALT 250 FOR IP): Performed by: HOSPITALIST

## 2025-06-28 PROCEDURE — 96376 TX/PRO/DX INJ SAME DRUG ADON: CPT

## 2025-06-28 PROCEDURE — G0378 HOSPITAL OBSERVATION PER HR: HCPCS

## 2025-06-28 PROCEDURE — 96361 HYDRATE IV INFUSION ADD-ON: CPT

## 2025-06-28 RX ORDER — ONDANSETRON 2 MG/ML
4 INJECTION INTRAMUSCULAR; INTRAVENOUS EVERY 4 HOURS PRN
Status: DISCONTINUED | OUTPATIENT
Start: 2025-06-28 | End: 2025-06-30 | Stop reason: HOSPADM

## 2025-06-28 RX ORDER — PROMETHAZINE HYDROCHLORIDE 12.5 MG/1
25 TABLET ORAL EVERY 6 HOURS PRN
Status: DISCONTINUED | OUTPATIENT
Start: 2025-06-28 | End: 2025-06-30 | Stop reason: HOSPADM

## 2025-06-28 RX ADMIN — HYDROMORPHONE HYDROCHLORIDE 2 MG: 1 INJECTION, SOLUTION INTRAMUSCULAR; INTRAVENOUS; SUBCUTANEOUS at 18:08

## 2025-06-28 RX ADMIN — AMLODIPINE BESYLATE 10 MG: 10 TABLET ORAL at 08:15

## 2025-06-28 RX ADMIN — SODIUM CHLORIDE 40 MG: 9 INJECTION, SOLUTION INTRAMUSCULAR; INTRAVENOUS; SUBCUTANEOUS at 08:08

## 2025-06-28 RX ADMIN — OXYCODONE 10 MG: 5 TABLET ORAL at 13:03

## 2025-06-28 RX ADMIN — SODIUM CHLORIDE: 0.9 INJECTION, SOLUTION INTRAVENOUS at 01:42

## 2025-06-28 RX ADMIN — HYDROMORPHONE HYDROCHLORIDE 2 MG: 1 INJECTION, SOLUTION INTRAMUSCULAR; INTRAVENOUS; SUBCUTANEOUS at 21:34

## 2025-06-28 RX ADMIN — HYDROMORPHONE HYDROCHLORIDE 2 MG: 1 INJECTION, SOLUTION INTRAMUSCULAR; INTRAVENOUS; SUBCUTANEOUS at 14:55

## 2025-06-28 RX ADMIN — SODIUM CHLORIDE, PRESERVATIVE FREE 10 ML: 5 INJECTION INTRAVENOUS at 21:39

## 2025-06-28 RX ADMIN — HYDROMORPHONE HYDROCHLORIDE 2 MG: 1 INJECTION, SOLUTION INTRAMUSCULAR; INTRAVENOUS; SUBCUTANEOUS at 04:46

## 2025-06-28 RX ADMIN — OXYCODONE 10 MG: 5 TABLET ORAL at 22:26

## 2025-06-28 RX ADMIN — OXYCODONE 10 MG: 5 TABLET ORAL at 06:41

## 2025-06-28 RX ADMIN — SODIUM CHLORIDE, PRESERVATIVE FREE 10 ML: 5 INJECTION INTRAVENOUS at 08:10

## 2025-06-28 RX ADMIN — ENOXAPARIN SODIUM 40 MG: 100 INJECTION SUBCUTANEOUS at 08:15

## 2025-06-28 RX ADMIN — SODIUM CHLORIDE: 0.9 INJECTION, SOLUTION INTRAVENOUS at 17:47

## 2025-06-28 RX ADMIN — ONDANSETRON 4 MG: 4 TABLET, ORALLY DISINTEGRATING ORAL at 06:41

## 2025-06-28 RX ADMIN — HYDROMORPHONE HYDROCHLORIDE 2 MG: 1 INJECTION, SOLUTION INTRAMUSCULAR; INTRAVENOUS; SUBCUTANEOUS at 08:05

## 2025-06-28 RX ADMIN — HYDROMORPHONE HYDROCHLORIDE 2 MG: 1 INJECTION, SOLUTION INTRAMUSCULAR; INTRAVENOUS; SUBCUTANEOUS at 11:26

## 2025-06-28 RX ADMIN — HYDROMORPHONE HYDROCHLORIDE 2 MG: 1 INJECTION, SOLUTION INTRAMUSCULAR; INTRAVENOUS; SUBCUTANEOUS at 01:29

## 2025-06-28 RX ADMIN — SODIUM CHLORIDE: 0.9 INJECTION, SOLUTION INTRAVENOUS at 08:18

## 2025-06-28 ASSESSMENT — PAIN DESCRIPTION - LOCATION
LOCATION: ABDOMEN

## 2025-06-28 ASSESSMENT — PAIN SCALES - GENERAL
PAINLEVEL_OUTOF10: 0
PAINLEVEL_OUTOF10: 8
PAINLEVEL_OUTOF10: 0
PAINLEVEL_OUTOF10: 9
PAINLEVEL_OUTOF10: 8
PAINLEVEL_OUTOF10: 8
PAINLEVEL_OUTOF10: 7
PAINLEVEL_OUTOF10: 9
PAINLEVEL_OUTOF10: 8
PAINLEVEL_OUTOF10: 0
PAINLEVEL_OUTOF10: 9
PAINLEVEL_OUTOF10: 4
PAINLEVEL_OUTOF10: 8
PAINLEVEL_OUTOF10: 0
PAINLEVEL_OUTOF10: 8

## 2025-06-28 ASSESSMENT — PAIN DESCRIPTION - DESCRIPTORS
DESCRIPTORS: DISCOMFORT;PRESSURE
DESCRIPTORS: SHARP
DESCRIPTORS: DISCOMFORT
DESCRIPTORS: PRESSURE;DISCOMFORT
DESCRIPTORS: DISCOMFORT;PRESSURE
DESCRIPTORS: DISCOMFORT;PRESSURE
DESCRIPTORS: SHARP
DESCRIPTORS: DISCOMFORT;PRESSURE

## 2025-06-28 ASSESSMENT — PAIN DESCRIPTION - ORIENTATION
ORIENTATION: MID;UPPER
ORIENTATION: MID;LOWER

## 2025-06-29 LAB
ALBUMIN SERPL-MCNC: 3.1 G/DL (ref 3.5–5)
ALBUMIN/GLOB SERPL: 0.8 (ref 1–1.9)
ALP SERPL-CCNC: 62 U/L (ref 40–129)
ALT SERPL-CCNC: 30 U/L (ref 8–55)
ANION GAP SERPL CALC-SCNC: 10 MMOL/L (ref 7–16)
AST SERPL-CCNC: 27 U/L (ref 15–37)
BASOPHILS # BLD: 0.02 K/UL (ref 0–0.2)
BASOPHILS NFR BLD: 0.5 % (ref 0–2)
BILIRUB SERPL-MCNC: 0.6 MG/DL (ref 0–1.2)
BUN SERPL-MCNC: 10 MG/DL (ref 6–23)
CALCIUM SERPL-MCNC: 9 MG/DL (ref 8.8–10.2)
CHLORIDE SERPL-SCNC: 101 MMOL/L (ref 98–107)
CO2 SERPL-SCNC: 21 MMOL/L (ref 20–29)
CREAT SERPL-MCNC: 0.73 MG/DL (ref 0.8–1.3)
DIFFERENTIAL METHOD BLD: ABNORMAL
EOSINOPHIL # BLD: 0.04 K/UL (ref 0–0.8)
EOSINOPHIL NFR BLD: 0.9 % (ref 0.5–7.8)
ERYTHROCYTE [DISTWIDTH] IN BLOOD BY AUTOMATED COUNT: 12.9 % (ref 11.9–14.6)
GLOBULIN SER CALC-MCNC: 3.8 G/DL (ref 2.3–3.5)
GLUCOSE SERPL-MCNC: 91 MG/DL (ref 70–99)
HCT VFR BLD AUTO: 46.4 % (ref 41.1–50.3)
HGB BLD-MCNC: 16 G/DL (ref 13.6–17.2)
IMM GRANULOCYTES # BLD AUTO: 0.01 K/UL (ref 0–0.5)
IMM GRANULOCYTES NFR BLD AUTO: 0.2 % (ref 0–5)
LIPASE SERPL-CCNC: 40 U/L (ref 13–60)
LYMPHOCYTES # BLD: 0.92 K/UL (ref 0.5–4.6)
LYMPHOCYTES NFR BLD: 21.4 % (ref 13–44)
MAGNESIUM SERPL-MCNC: 2 MG/DL (ref 1.8–2.4)
MCH RBC QN AUTO: 29.6 PG (ref 26.1–32.9)
MCHC RBC AUTO-ENTMCNC: 34.5 G/DL (ref 31.4–35)
MCV RBC AUTO: 85.8 FL (ref 82–102)
MONOCYTES # BLD: 0.69 K/UL (ref 0.1–1.3)
MONOCYTES NFR BLD: 16.1 % (ref 4–12)
NEUTS SEG # BLD: 2.61 K/UL (ref 1.7–8.2)
NEUTS SEG NFR BLD: 60.9 % (ref 43–78)
NRBC # BLD: 0 K/UL (ref 0–0.2)
PLATELET # BLD AUTO: 208 K/UL (ref 150–450)
PMV BLD AUTO: 9.5 FL (ref 9.4–12.3)
POTASSIUM SERPL-SCNC: 4.1 MMOL/L (ref 3.5–5.1)
PROT SERPL-MCNC: 6.9 G/DL (ref 6.3–8.2)
RBC # BLD AUTO: 5.41 M/UL (ref 4.23–5.6)
SODIUM SERPL-SCNC: 132 MMOL/L (ref 136–145)
WBC # BLD AUTO: 4.3 K/UL (ref 4.3–11.1)

## 2025-06-29 PROCEDURE — 6370000000 HC RX 637 (ALT 250 FOR IP): Performed by: HOSPITALIST

## 2025-06-29 PROCEDURE — G0378 HOSPITAL OBSERVATION PER HR: HCPCS

## 2025-06-29 PROCEDURE — 80053 COMPREHEN METABOLIC PANEL: CPT

## 2025-06-29 PROCEDURE — 1100000000 HC RM PRIVATE

## 2025-06-29 PROCEDURE — 85025 COMPLETE CBC W/AUTO DIFF WBC: CPT

## 2025-06-29 PROCEDURE — 2500000003 HC RX 250 WO HCPCS: Performed by: HOSPITALIST

## 2025-06-29 PROCEDURE — 83735 ASSAY OF MAGNESIUM: CPT

## 2025-06-29 PROCEDURE — 96376 TX/PRO/DX INJ SAME DRUG ADON: CPT

## 2025-06-29 PROCEDURE — 36415 COLL VENOUS BLD VENIPUNCTURE: CPT

## 2025-06-29 PROCEDURE — 6360000002 HC RX W HCPCS: Performed by: HOSPITALIST

## 2025-06-29 PROCEDURE — 96361 HYDRATE IV INFUSION ADD-ON: CPT

## 2025-06-29 PROCEDURE — 83690 ASSAY OF LIPASE: CPT

## 2025-06-29 PROCEDURE — 2580000003 HC RX 258: Performed by: HOSPITALIST

## 2025-06-29 PROCEDURE — 96372 THER/PROPH/DIAG INJ SC/IM: CPT

## 2025-06-29 RX ORDER — PANTOPRAZOLE SODIUM 40 MG/1
40 TABLET, DELAYED RELEASE ORAL
Status: DISCONTINUED | OUTPATIENT
Start: 2025-06-30 | End: 2025-06-30 | Stop reason: HOSPADM

## 2025-06-29 RX ORDER — HYDROMORPHONE HYDROCHLORIDE 1 MG/ML
1 INJECTION, SOLUTION INTRAMUSCULAR; INTRAVENOUS; SUBCUTANEOUS
Status: DISCONTINUED | OUTPATIENT
Start: 2025-06-29 | End: 2025-06-30 | Stop reason: HOSPADM

## 2025-06-29 RX ADMIN — SODIUM CHLORIDE 40 MG: 9 INJECTION, SOLUTION INTRAMUSCULAR; INTRAVENOUS; SUBCUTANEOUS at 07:59

## 2025-06-29 RX ADMIN — ENOXAPARIN SODIUM 40 MG: 100 INJECTION SUBCUTANEOUS at 08:04

## 2025-06-29 RX ADMIN — HYDROMORPHONE HYDROCHLORIDE 1 MG: 1 INJECTION, SOLUTION INTRAMUSCULAR; INTRAVENOUS; SUBCUTANEOUS at 17:57

## 2025-06-29 RX ADMIN — OXYCODONE 10 MG: 5 TABLET ORAL at 23:13

## 2025-06-29 RX ADMIN — HYDROMORPHONE HYDROCHLORIDE 1 MG: 1 INJECTION, SOLUTION INTRAMUSCULAR; INTRAVENOUS; SUBCUTANEOUS at 14:27

## 2025-06-29 RX ADMIN — SODIUM CHLORIDE: 0.9 INJECTION, SOLUTION INTRAVENOUS at 04:30

## 2025-06-29 RX ADMIN — HYDROMORPHONE HYDROCHLORIDE 1 MG: 1 INJECTION, SOLUTION INTRAMUSCULAR; INTRAVENOUS; SUBCUTANEOUS at 21:17

## 2025-06-29 RX ADMIN — HYDROMORPHONE HYDROCHLORIDE 2 MG: 1 INJECTION, SOLUTION INTRAMUSCULAR; INTRAVENOUS; SUBCUTANEOUS at 11:07

## 2025-06-29 RX ADMIN — SODIUM CHLORIDE, PRESERVATIVE FREE 10 ML: 5 INJECTION INTRAVENOUS at 08:01

## 2025-06-29 RX ADMIN — OXYCODONE 10 MG: 5 TABLET ORAL at 16:47

## 2025-06-29 RX ADMIN — OXYCODONE 10 MG: 5 TABLET ORAL at 06:36

## 2025-06-29 RX ADMIN — HYDROMORPHONE HYDROCHLORIDE 2 MG: 1 INJECTION, SOLUTION INTRAMUSCULAR; INTRAVENOUS; SUBCUTANEOUS at 04:20

## 2025-06-29 RX ADMIN — HYDROMORPHONE HYDROCHLORIDE 2 MG: 1 INJECTION, SOLUTION INTRAMUSCULAR; INTRAVENOUS; SUBCUTANEOUS at 01:05

## 2025-06-29 RX ADMIN — SODIUM CHLORIDE: 0.9 INJECTION, SOLUTION INTRAVENOUS at 18:03

## 2025-06-29 RX ADMIN — AMLODIPINE BESYLATE 10 MG: 10 TABLET ORAL at 08:04

## 2025-06-29 RX ADMIN — OXYCODONE 10 MG: 5 TABLET ORAL at 12:41

## 2025-06-29 RX ADMIN — HYDROMORPHONE HYDROCHLORIDE 2 MG: 1 INJECTION, SOLUTION INTRAMUSCULAR; INTRAVENOUS; SUBCUTANEOUS at 07:55

## 2025-06-29 RX ADMIN — SODIUM CHLORIDE, PRESERVATIVE FREE 10 ML: 5 INJECTION INTRAVENOUS at 21:22

## 2025-06-29 ASSESSMENT — PAIN SCALES - GENERAL
PAINLEVEL_OUTOF10: 8
PAINLEVEL_OUTOF10: 0
PAINLEVEL_OUTOF10: 9
PAINLEVEL_OUTOF10: 8
PAINLEVEL_OUTOF10: 0
PAINLEVEL_OUTOF10: 0
PAINLEVEL_OUTOF10: 8
PAINLEVEL_OUTOF10: 8
PAINLEVEL_OUTOF10: 0
PAINLEVEL_OUTOF10: 7
PAINLEVEL_OUTOF10: 8
PAINLEVEL_OUTOF10: 9
PAINLEVEL_OUTOF10: 8

## 2025-06-29 ASSESSMENT — PAIN DESCRIPTION - LOCATION
LOCATION: ABDOMEN

## 2025-06-29 ASSESSMENT — PAIN DESCRIPTION - DESCRIPTORS
DESCRIPTORS: DISCOMFORT;PRESSURE
DESCRIPTORS: SHARP
DESCRIPTORS: DISCOMFORT;PRESSURE
DESCRIPTORS: DISCOMFORT
DESCRIPTORS: SHARP
DESCRIPTORS: DISCOMFORT
DESCRIPTORS: DISCOMFORT;PRESSURE
DESCRIPTORS: SHARP
DESCRIPTORS: DISCOMFORT;PRESSURE
DESCRIPTORS: DISCOMFORT;PRESSURE
DESCRIPTORS: SHARP

## 2025-06-29 ASSESSMENT — PAIN DESCRIPTION - ORIENTATION
ORIENTATION: MID;UPPER
ORIENTATION: MID;UPPER
ORIENTATION: UPPER
ORIENTATION: MID;UPPER

## 2025-06-29 ASSESSMENT — PAIN - FUNCTIONAL ASSESSMENT: PAIN_FUNCTIONAL_ASSESSMENT: ACTIVITIES ARE NOT PREVENTED

## 2025-06-30 VITALS
HEIGHT: 69 IN | TEMPERATURE: 97.5 F | BODY MASS INDEX: 27.4 KG/M2 | WEIGHT: 185 LBS | RESPIRATION RATE: 13 BRPM | OXYGEN SATURATION: 98 % | HEART RATE: 78 BPM | SYSTOLIC BLOOD PRESSURE: 126 MMHG | DIASTOLIC BLOOD PRESSURE: 76 MMHG

## 2025-06-30 PROCEDURE — 2500000003 HC RX 250 WO HCPCS: Performed by: HOSPITALIST

## 2025-06-30 PROCEDURE — 6370000000 HC RX 637 (ALT 250 FOR IP): Performed by: HOSPITALIST

## 2025-06-30 PROCEDURE — 6360000002 HC RX W HCPCS: Performed by: HOSPITALIST

## 2025-06-30 PROCEDURE — 2580000003 HC RX 258: Performed by: HOSPITALIST

## 2025-06-30 RX ORDER — HYDROCODONE BITARTRATE AND ACETAMINOPHEN 7.5; 325 MG/1; MG/1
1 TABLET ORAL EVERY 6 HOURS PRN
Qty: 20 TABLET | Refills: 0 | Status: SHIPPED | OUTPATIENT
Start: 2025-06-30 | End: 2025-07-05

## 2025-06-30 RX ORDER — PROMETHAZINE HYDROCHLORIDE 25 MG/1
25 TABLET ORAL EVERY 8 HOURS PRN
Qty: 21 TABLET | Refills: 0 | Status: SHIPPED | OUTPATIENT
Start: 2025-06-30 | End: 2025-07-07

## 2025-06-30 RX ORDER — PROMETHAZINE HYDROCHLORIDE 25 MG/1
25 TABLET ORAL EVERY 8 HOURS PRN
Qty: 21 TABLET | Refills: 0 | Status: SHIPPED | OUTPATIENT
Start: 2025-06-30 | End: 2025-06-30

## 2025-06-30 RX ORDER — HYDROCODONE BITARTRATE AND ACETAMINOPHEN 7.5; 325 MG/1; MG/1
1 TABLET ORAL EVERY 8 HOURS PRN
Qty: 21 TABLET | Refills: 0 | Status: SHIPPED | OUTPATIENT
Start: 2025-06-30 | End: 2025-06-30

## 2025-06-30 RX ADMIN — PANTOPRAZOLE SODIUM 40 MG: 40 TABLET, DELAYED RELEASE ORAL at 06:29

## 2025-06-30 RX ADMIN — ENOXAPARIN SODIUM 40 MG: 100 INJECTION SUBCUTANEOUS at 08:26

## 2025-06-30 RX ADMIN — OXYCODONE 10 MG: 5 TABLET ORAL at 04:58

## 2025-06-30 RX ADMIN — HYDROMORPHONE HYDROCHLORIDE 1 MG: 1 INJECTION, SOLUTION INTRAMUSCULAR; INTRAVENOUS; SUBCUTANEOUS at 07:28

## 2025-06-30 RX ADMIN — SODIUM CHLORIDE, PRESERVATIVE FREE 10 ML: 5 INJECTION INTRAVENOUS at 08:26

## 2025-06-30 RX ADMIN — SODIUM CHLORIDE: 0.9 INJECTION, SOLUTION INTRAVENOUS at 07:32

## 2025-06-30 RX ADMIN — HYDROMORPHONE HYDROCHLORIDE 1 MG: 1 INJECTION, SOLUTION INTRAMUSCULAR; INTRAVENOUS; SUBCUTANEOUS at 03:25

## 2025-06-30 RX ADMIN — HYDROMORPHONE HYDROCHLORIDE 1 MG: 1 INJECTION, SOLUTION INTRAMUSCULAR; INTRAVENOUS; SUBCUTANEOUS at 00:22

## 2025-06-30 RX ADMIN — HYDROMORPHONE HYDROCHLORIDE 1 MG: 1 INJECTION, SOLUTION INTRAMUSCULAR; INTRAVENOUS; SUBCUTANEOUS at 11:10

## 2025-06-30 RX ADMIN — OXYCODONE 10 MG: 5 TABLET ORAL at 09:28

## 2025-06-30 RX ADMIN — AMLODIPINE BESYLATE 10 MG: 10 TABLET ORAL at 08:26

## 2025-06-30 ASSESSMENT — PAIN DESCRIPTION - LOCATION
LOCATION: ABDOMEN

## 2025-06-30 ASSESSMENT — PAIN DESCRIPTION - ORIENTATION
ORIENTATION: MID
ORIENTATION: MID;UPPER
ORIENTATION: ANTERIOR
ORIENTATION: MID;UPPER

## 2025-06-30 ASSESSMENT — PAIN SCALES - GENERAL
PAINLEVEL_OUTOF10: 8
PAINLEVEL_OUTOF10: 4
PAINLEVEL_OUTOF10: 8
PAINLEVEL_OUTOF10: 6
PAINLEVEL_OUTOF10: 6

## 2025-06-30 ASSESSMENT — PAIN DESCRIPTION - DESCRIPTORS
DESCRIPTORS: PRESSURE
DESCRIPTORS: DISCOMFORT;PRESSURE
DESCRIPTORS: PRESSURE
DESCRIPTORS: DISCOMFORT;PRESSURE

## 2025-06-30 NOTE — CARE COORDINATION
Discharge order is in. Pt is discharging home today in stable condition. No discharge needs identified by matt THOMPSON goals met.     06/30/25 1051   Services At/After Discharge   Transition of Care Consult (CM Consult) Discharge Planning   Services At/After Discharge None   [place] Resource Information Provided? No   Mode of Transport at Discharge Other (see comment)  (Family)   Confirm Follow Up Transport Family   Condition of Participation: Discharge Planning   The Patient and/or Patient Representative was provided with a Choice of Provider? Patient   The Patient and/Or Patient Representative agree with the Discharge Plan? Yes   Freedom of Choice list was provided with basic dialogue that supports the patient's individualized plan of care/goals, treatment preferences, and shares the quality data associated with the providers?  Yes

## 2025-06-30 NOTE — DISCHARGE SUMMARY
Hospitalist Discharge Summary   Admit Date:  2025  1:37 AM   DC Note date: 2025  Name:  Bhupinder Tejada   Age:  40 y.o.  Sex:  male  :  1985   MRN:  [id number]   [place]  [facility] [place] [facility] [place]  PCP:  Disha Nieves MD    Presenting Complaint: No chief complaint on file.     Initial Admission Diagnosis: Acute on chronic pancreatitis (HCC) [K85.90, K86.1]     Problem List for this Hospitalization (present on admission):    Principal Problem:    Acute on chronic pancreatitis (HCC)  Active Problems:    Tobacco abuse [Z72.0 (ICD-10-CM)]    GERD (gastroesophageal reflux disease)    Primary hypertension  Resolved Problems:    * No resolved hospital problems. *      Hospital Course:  Patient is a 40-year-old  male with history of alcohol abuse, alcohol induced chronic pancreatitis admitted on  with acute on chronic pancreatitis in view of worsening abdominal pain over the past 3 days and associated nausea vomiting.  Patient reported drinking 4 glasses of wine on Monday that was followed by some abdominal discomfort and his progression of pain over the next 2 to 3 days.  Patient reported that abdominal pain has been becoming more intense involving epigastric region.  Patient was admitted for acute on chronic pancreatitis with aggressive IV fluids, bowel rest, as needed antiemetics and narcotic pain regimen.  Patient does follow-up with outpatient pain management clinic for chronic pain issues.  Patient was monitored clinically with improvement of pain and nausea in next 24 hours after admission, patient was started on clear liquid diet that was advanced as tolerated.  Patient is currently medically cleared and hemodynamically stable for discharge to home today.  Rest of the hospital course was uneventful, for further details please refer to the progress notes.    Disposition: Home  Diet: ADULT DIET; Easy to Chew; GI [place] (GERD/Peptic Ulcer)  Code Status: Full Code    Follow Ups:  Follow-up

## 2025-06-30 NOTE — PROGRESS NOTES
Hospitalist Progress Note   Admit Date:  2025  1:37 AM   Name:  Bhupinder Tejada   Age:  40 y.o.  Sex:  male  :  1985   MRN:  [id number]   Room:  [facility]/    Presenting/Chief Complaint: No chief complaint on file.     Reason(s) for Admission: Acute on chronic pancreatitis (HCC) [K85.90, K86.1]     Hospital Course:   Patient is 40-year-old  male with history of alcohol abuse, alcohol induced chronic pancreatitis, admitted on  with acute on chronic pancreatitis in view of worsening abdominal pain over the past 3 days and associated nausea vomiting.  Patient reported drinking 4 glasses of wine on Monday that was followed by some abdominal discomfort and extremity 4 hours with rapid progression over the next 2 to 3 days.  Patient reported that the abdominal pain has been becoming more intense involving epigastric region.  Patient is currently being treated for acute on chronic pancreatitis with as needed narcotic pain medications, bowel rest, IV fluids and antiemetics.  GI prophylaxis added as well.      Subjective & 24hr Events:   Patient seen and examined at bedside.  He is alert and awake, AO x 3, on room air.  Patient reports feeling slightly better, continues to have abdominal pain but is optimally controlled with pain medication regimen.  Discussed with patient about advancing to clear liquid diet for breakfast and lunch and possibly to full liquid diet by dinner.  Lipase has normalized.  Denies any fever, nausea or vomiting, diarrhea.    ROS:  10 point review of system is negative except for what mentioned above.      Assessment & Plan:     Principal Problem:    Acute on chronic pancreatitis (HCC)  Plan: -  Patient admitted on  for alcohol induced acute on chronic pancreatitis.  CTAP with evidence of minimal enlargement of the pancreas with minimal peripancreatic edema and stranding suspicious of mild/acute pancreatitis.  Plan to continue ongoing pain regimen with Dilaudid 
1100 - pt complaining of 8/10 abdominal pain and requesting dilaudid. Discharge order in place. MD Araiza notified. Okay to give Dilaudid prior to discharge per Jose G HONEYCUTT.         1200 - Discharge instructions were reviewed with patient. An opportunity was given for questions. All medications were reviewed, and information was given on the new medications. Patient verbalized understanding, and has no questions at this time. IV removed. Pain score improved, no side effects noted or reported.       
4 Eyes Skin Assessment     NAME:  Bhupinder Tejada  YOB: 1985  MEDICAL RECORD NUMBER:  [id number]    The patient is being assessed for  Admission    I agree that at least one RN has performed a thorough Head to Toe Skin Assessment on the patient. ALL assessment sites listed below have been assessed.      Areas assessed by both nurses:    Head, Face, Ears, Shoulders, Back, Chest, Arms, Elbows, Hands, Sacrum. Buttock, Coccyx, Ischium, Legs. Feet and Heels, and Under Medical Devices         Does the Patient have a Wound? No noted wound(s)       Vikas Prevention initiated by RN: Yes  Wound Care Orders initiated by RN: No    Pressure Injury (Stage 1,2,3,4, Unstageable, DTI, NWPT, and Complex wounds) if present, place Wound referral order by RN under : No    New Ostomies, if present place, Ostomy referral order under : No     Nurse 1 eSignature: Electronically signed by Payton Barajas RN on 6/27/25 at 2:26 AM EDT    **SHARE this note so that the co-signing nurse can place an eSignature**    Nurse 2 eSignature: Electronically signed by Yady Eng RN on 6/27/25 at 4:20 AM EDT   
Patient is 40-year-old  male with history of alcohol abuse, alcohol induced chronic pancreatitis, admitted on 6/27 with acute on chronic pancreatitis in view of worsening abdominal pain over the past 3 days and associated nausea vomiting.  Patient reported drinking 4 glasses of wine on Monday that was followed by some abdominal discomfort and extremity 4 hours with rapid progression over the next 2 to 3 days.  Patient reported that the abdominal pain has been becoming more intense involving epigastric region.  Patient is currently being treated for acute on chronic pancreatitis with as needed narcotic pain medications, bowel rest, IV fluids and antiemetics.  GI prophylaxis added as well.  Patient is currently hemodynamically stable.  CT shows evidence of pancreatic inflammation and surrounding edema consistent with pancreatitis.  Patient not billed for this visit.  
SPIRITUAL HEALTH  Note for Med/Surg Visit                  Room # 411/01    Name: Bhupinder Tejada           Age: 40 y.o.    Gender: male          MRN: [id number]  Yarsanism: Restoration       Preferred Language: English    Date: 06/27/25  Visit Time: Begin Time: (P) 0945 End Time : (P) 0950 Complexity of Encounter: (P) Low      Visit Summary:  offered spiritual care visit with patient. Patient declined a visit at this time, saying he was in a lot of pain.  is available for follow up at patient's request.       Referral/Consult From: (P) Rounding  Encounter Overview/Reason: (P) Spiritual/Emotional Needs  Encounter Code:     Crisis (if applicable):    Service Provided For: (P) Patient     Patient was not available. Patient declined visit but aware  can be notified as needed/desired    Desi, Belief, Meaning:   Patient unable to assess at this time  Family/Friends   Rituals (if applicable)      Importance and Influence:  Patient unable to assess at this time  Family/Friends     Community:  Patient   unable to assess at this time   Family/Friends       Assessment and Plan of Care:   Emotions Expressed by Patient:   Assessment: (P) Unable to assess    Interventions by :   Intervention: (P) Sustaining Presence/Ministry of presence     Result/ Response by Patient:   Outcome: (P) Refused/Declined    Patient Plan of Care:   Plan and Referrals  Plan/Referrals: (P) Continue Support (comment)     Electronically signed by SEBASTIEN Melendez, on 6/27/2025 at 10:58 AM.  [facility]  [facility]  [phone]     
TRANSFER - IN REPORT:    Verbal report received from Larissa Andino RN on Bhupinder Tejada  being received from [facility] ED for routine progression of patient care      Report consisted of patient's Situation, Background, Assessment and   Recommendations(SBAR).     Information from the following report(s) Nurse Handoff Report was reviewed with the receiving nurse.    Opportunity for questions and clarification was provided.      Assessment completed upon patient's arrival to unit and care assumed.    
liquid to GI soft bland diet.  Continue GI prophylaxis with Protonix 40 mg daily.  Lipase has normalized.    Active Problems:    Tobacco abuse [Z72.0 (ICD-10-CM)]  Plan: Counseled for cessation.      GERD (gastroesophageal reflux disease)  Plan: Continue Protonix 40 mg IV daily.      Primary hypertension  Plan: Blood pressure optimally controlled.  Continue amlodipine 10 mg p.o. daily.      Anticipated Discharge Arrangements:   Home    PT/OT evals ordered?  Not ordered; patient not expected to need rehab  Diet:  ADULT DIET; Easy to Chew; GI [place] (GERD/Peptic Ulcer)  VTE prophylaxis: Lovenox  Code status: Full Code      Non-peripheral Lines and Tubes (if present):          Telemetry (if present):  Cardiac/Telemetry Monitor On: Bedside monitor in use        Hospital Problems:  Principal Problem:    Acute on chronic pancreatitis (HCC)  Active Problems:    Tobacco abuse [Z72.0 (ICD-10-CM)]    GERD (gastroesophageal reflux disease)    Primary hypertension  Resolved Problems:    * No resolved hospital problems. *      Objective:   Patient Vitals for the past 24 hrs:   Temp Pulse Resp BP SpO2   06/29/25 0804 97.9 °F (36.6 °C) 88 16 137/83 99 %   06/29/25 0755 -- -- 18 -- --   06/29/25 0425 98.4 °F (36.9 °C) 72 18 137/88 98 %   06/29/25 0014 98.1 °F (36.7 °C) 90 18 134/89 99 %   06/28/25 1943 97.7 °F (36.5 °C) 92 18 134/89 96 %   06/28/25 1808 -- -- 18 -- --   06/28/25 1627 97.5 °F (36.4 °C) 80 16 (!) 125/91 97 %   06/28/25 1455 -- -- 18 -- --   06/28/25 1303 -- -- 18 -- --   06/28/25 1124 97.5 °F (36.4 °C) 93 18 (!) 149/93 99 %   06/28/25 0838 97.5 °F (36.4 °C) 95 16 120/74 96 %   06/28/25 0815 -- -- -- (!) 131/90 --       Oxygen Therapy  SpO2: 99 %  Pulse Oximeter Device Mode: Continuous  Pulse via Oximetry: 90 beats per minute  Pulse Oximeter Device Location: Left, Hand, Finger  Oximetry Probe Site Changed: Yes  O2 Device: None (Room air)    Estimated body mass index is 27.4 kg/m² as calculated from the following:

## 2025-07-08 ENCOUNTER — HOSPITAL ENCOUNTER (EMERGENCY)
Age: 40
Discharge: ANOTHER HOSPITAL | End: 2025-07-08
Attending: STUDENT IN AN ORGANIZED HEALTH CARE EDUCATION/TRAINING PROGRAM

## 2025-07-08 ENCOUNTER — HOSPITAL ENCOUNTER (INPATIENT)
Age: 40
LOS: 3 days | Discharge: HOME | End: 2025-07-11
Attending: INTERNAL MEDICINE | Admitting: STUDENT IN AN ORGANIZED HEALTH CARE EDUCATION/TRAINING PROGRAM

## 2025-07-08 ENCOUNTER — APPOINTMENT (OUTPATIENT)
Dept: CT IMAGING | Age: 40
End: 2025-07-08

## 2025-07-08 VITALS
TEMPERATURE: 97.7 F | BODY MASS INDEX: 26.87 KG/M2 | HEART RATE: 61 BPM | DIASTOLIC BLOOD PRESSURE: 57 MMHG | HEIGHT: 69 IN | OXYGEN SATURATION: 97 % | WEIGHT: 181.4 LBS | SYSTOLIC BLOOD PRESSURE: 107 MMHG | RESPIRATION RATE: 10 BRPM

## 2025-07-08 DIAGNOSIS — K85.90 ACUTE ON CHRONIC PANCREATITIS (HCC): Primary | ICD-10-CM

## 2025-07-08 DIAGNOSIS — K86.1 ACUTE ON CHRONIC PANCREATITIS (HCC): Primary | ICD-10-CM

## 2025-07-08 DIAGNOSIS — K83.8 DILATION OF COMMON BILE DUCT: ICD-10-CM

## 2025-07-08 DIAGNOSIS — E80.6 HYPERBILIRUBINEMIA: ICD-10-CM

## 2025-07-08 DIAGNOSIS — R74.8 ELEVATED LIVER ENZYMES: ICD-10-CM

## 2025-07-08 DIAGNOSIS — K86.0 ALCOHOL-INDUCED CHRONIC PANCREATITIS (HCC): Primary | ICD-10-CM

## 2025-07-08 PROBLEM — R74.01 TRANSAMINITIS: Status: ACTIVE | Noted: 2025-07-08

## 2025-07-08 LAB
ALBUMIN SERPL-MCNC: 4.1 G/DL (ref 3.5–5)
ALBUMIN/GLOB SERPL: 1.2 (ref 1–1.9)
ALP SERPL-CCNC: 325 U/L (ref 40–129)
ALT SERPL-CCNC: 824 U/L (ref 12–65)
ANION GAP SERPL CALC-SCNC: 12 MMOL/L (ref 7–16)
APPEARANCE UR: CLEAR
AST SERPL-CCNC: 403 U/L (ref 15–37)
BACTERIA URNS QL MICRO: 0 /HPF
BASOPHILS # BLD: 0.07 K/UL (ref 0–0.2)
BASOPHILS NFR BLD: 0.9 % (ref 0–2)
BILIRUB SERPL-MCNC: 3.1 MG/DL (ref 0–1.2)
BILIRUB UR QL: ABNORMAL
BUN SERPL-MCNC: 11 MG/DL (ref 6–23)
CALCIUM SERPL-MCNC: 9.1 MG/DL (ref 8.8–10.2)
CHLORIDE SERPL-SCNC: 102 MMOL/L (ref 98–107)
CO2 SERPL-SCNC: 21 MMOL/L (ref 20–29)
COLOR UR: ABNORMAL
CREAT SERPL-MCNC: 0.79 MG/DL (ref 0.8–1.3)
DIFFERENTIAL METHOD BLD: ABNORMAL
EOSINOPHIL # BLD: 0.12 K/UL (ref 0–0.8)
EOSINOPHIL NFR BLD: 1.6 % (ref 0.5–7.8)
EPI CELLS #/AREA URNS HPF: NORMAL /HPF
ERYTHROCYTE [DISTWIDTH] IN BLOOD BY AUTOMATED COUNT: 13.2 % (ref 11.9–14.6)
GLOBULIN SER CALC-MCNC: 3.3 G/DL (ref 2.3–3.5)
GLUCOSE SERPL-MCNC: 135 MG/DL (ref 65–100)
GLUCOSE UR STRIP.AUTO-MCNC: 100 MG/DL
HCT VFR BLD AUTO: 45.8 % (ref 41.1–50.3)
HGB BLD-MCNC: 15.8 G/DL (ref 13.6–17.2)
HGB UR QL STRIP: ABNORMAL
IMM GRANULOCYTES # BLD AUTO: 0.01 K/UL (ref 0–0.5)
IMM GRANULOCYTES NFR BLD AUTO: 0.1 % (ref 0–5)
KETONES UR QL STRIP.AUTO: ABNORMAL MG/DL
LEUKOCYTE ESTERASE UR QL STRIP.AUTO: NEGATIVE
LIPASE SERPL-CCNC: 39 U/L (ref 13–60)
LYMPHOCYTES # BLD: 1.4 K/UL (ref 0.5–4.6)
LYMPHOCYTES NFR BLD: 18.9 % (ref 13–44)
MCH RBC QN AUTO: 29.8 PG (ref 26.1–32.9)
MCHC RBC AUTO-ENTMCNC: 34.5 G/DL (ref 31.4–35)
MCV RBC AUTO: 86.4 FL (ref 82–102)
MONOCYTES # BLD: 0.68 K/UL (ref 0.1–1.3)
MONOCYTES NFR BLD: 9.2 % (ref 4–12)
MUCOUS THREADS URNS QL MICRO: 0 /LPF
NEUTS SEG # BLD: 5.11 K/UL (ref 1.7–8.2)
NEUTS SEG NFR BLD: 69.3 % (ref 43–78)
NITRITE UR QL STRIP.AUTO: NEGATIVE
NRBC # BLD: 0 K/UL (ref 0–0.2)
OTHER OBSERVATIONS: NORMAL
PH UR STRIP: 6 (ref 5–9)
PLATELET # BLD AUTO: 464 K/UL (ref 150–450)
PMV BLD AUTO: 9.3 FL (ref 9.4–12.3)
POTASSIUM SERPL-SCNC: 4.6 MMOL/L (ref 3.5–5.1)
PROT SERPL-MCNC: 7.4 G/DL (ref 6.3–8.2)
PROT UR STRIP-MCNC: 100 MG/DL
RBC # BLD AUTO: 5.3 M/UL (ref 4.23–5.6)
RBC #/AREA URNS HPF: NORMAL /HPF
SODIUM SERPL-SCNC: 135 MMOL/L (ref 133–143)
SP GR UR REFRACTOMETRY: 1.02 (ref 1–1.02)
TROPONIN T SERPL HS-MCNC: 6.8 NG/L (ref 0–22)
UROBILINOGEN UR QL STRIP.AUTO: 1 EU/DL (ref 0.2–1)
WBC # BLD AUTO: 7.4 K/UL (ref 4.3–11.1)
WBC URNS QL MICRO: 0 /HPF

## 2025-07-08 PROCEDURE — 2580000003 HC RX 258: Performed by: STUDENT IN AN ORGANIZED HEALTH CARE EDUCATION/TRAINING PROGRAM

## 2025-07-08 PROCEDURE — 99254 IP/OBS CNSLTJ NEW/EST MOD 60: CPT | Performed by: INTERNAL MEDICINE

## 2025-07-08 PROCEDURE — 85025 COMPLETE CBC W/AUTO DIFF WBC: CPT

## 2025-07-08 PROCEDURE — 83690 ASSAY OF LIPASE: CPT

## 2025-07-08 PROCEDURE — 74177 CT ABD & PELVIS W/CONTRAST: CPT

## 2025-07-08 PROCEDURE — 6360000002 HC RX W HCPCS: Performed by: STUDENT IN AN ORGANIZED HEALTH CARE EDUCATION/TRAINING PROGRAM

## 2025-07-08 PROCEDURE — 6360000002 HC RX W HCPCS: Performed by: EMERGENCY MEDICINE

## 2025-07-08 PROCEDURE — 96376 TX/PRO/DX INJ SAME DRUG ADON: CPT

## 2025-07-08 PROCEDURE — 96374 THER/PROPH/DIAG INJ IV PUSH: CPT

## 2025-07-08 PROCEDURE — 99285 EMERGENCY DEPT VISIT HI MDM: CPT

## 2025-07-08 PROCEDURE — 6360000004 HC RX CONTRAST MEDICATION: Performed by: STUDENT IN AN ORGANIZED HEALTH CARE EDUCATION/TRAINING PROGRAM

## 2025-07-08 PROCEDURE — 2500000003 HC RX 250 WO HCPCS: Performed by: STUDENT IN AN ORGANIZED HEALTH CARE EDUCATION/TRAINING PROGRAM

## 2025-07-08 PROCEDURE — 96375 TX/PRO/DX INJ NEW DRUG ADDON: CPT

## 2025-07-08 PROCEDURE — 1100000000 HC RM PRIVATE

## 2025-07-08 PROCEDURE — 84484 ASSAY OF TROPONIN QUANT: CPT

## 2025-07-08 PROCEDURE — 80053 COMPREHEN METABOLIC PANEL: CPT

## 2025-07-08 PROCEDURE — 81001 URINALYSIS AUTO W/SCOPE: CPT

## 2025-07-08 PROCEDURE — 96361 HYDRATE IV INFUSION ADD-ON: CPT

## 2025-07-08 RX ORDER — SODIUM CHLORIDE, SODIUM LACTATE, POTASSIUM CHLORIDE, CALCIUM CHLORIDE 600; 310; 30; 20 MG/100ML; MG/100ML; MG/100ML; MG/100ML
INJECTION, SOLUTION INTRAVENOUS CONTINUOUS
Status: DISCONTINUED | OUTPATIENT
Start: 2025-07-08 | End: 2025-07-11 | Stop reason: HOSPADM

## 2025-07-08 RX ORDER — HYDROMORPHONE HYDROCHLORIDE 1 MG/ML
1 INJECTION, SOLUTION INTRAMUSCULAR; INTRAVENOUS; SUBCUTANEOUS
Refills: 0 | Status: COMPLETED | OUTPATIENT
Start: 2025-07-08 | End: 2025-07-08

## 2025-07-08 RX ORDER — POTASSIUM CHLORIDE 1500 MG/1
40 TABLET, EXTENDED RELEASE ORAL PRN
Status: DISCONTINUED | OUTPATIENT
Start: 2025-07-08 | End: 2025-07-11 | Stop reason: HOSPADM

## 2025-07-08 RX ORDER — POTASSIUM CHLORIDE 7.45 MG/ML
10 INJECTION INTRAVENOUS PRN
Status: DISCONTINUED | OUTPATIENT
Start: 2025-07-08 | End: 2025-07-11 | Stop reason: HOSPADM

## 2025-07-08 RX ORDER — HYDRALAZINE HYDROCHLORIDE 20 MG/ML
10 INJECTION INTRAMUSCULAR; INTRAVENOUS EVERY 6 HOURS PRN
Status: DISCONTINUED | OUTPATIENT
Start: 2025-07-08 | End: 2025-07-11 | Stop reason: HOSPADM

## 2025-07-08 RX ORDER — ACETAMINOPHEN 325 MG/1
650 TABLET ORAL EVERY 6 HOURS PRN
Status: DISCONTINUED | OUTPATIENT
Start: 2025-07-08 | End: 2025-07-11 | Stop reason: HOSPADM

## 2025-07-08 RX ORDER — POLYETHYLENE GLYCOL 3350 17 G/17G
17 POWDER, FOR SOLUTION ORAL DAILY PRN
Status: DISCONTINUED | OUTPATIENT
Start: 2025-07-08 | End: 2025-07-11 | Stop reason: HOSPADM

## 2025-07-08 RX ORDER — HYDROMORPHONE HYDROCHLORIDE 1 MG/ML
1 INJECTION, SOLUTION INTRAMUSCULAR; INTRAVENOUS; SUBCUTANEOUS
Status: COMPLETED | OUTPATIENT
Start: 2025-07-08 | End: 2025-07-08

## 2025-07-08 RX ORDER — MORPHINE SULFATE 2 MG/ML
2 INJECTION, SOLUTION INTRAMUSCULAR; INTRAVENOUS
Status: DISCONTINUED | OUTPATIENT
Start: 2025-07-08 | End: 2025-07-11 | Stop reason: HOSPADM

## 2025-07-08 RX ORDER — HYDROMORPHONE HYDROCHLORIDE 1 MG/ML
0.5 INJECTION, SOLUTION INTRAMUSCULAR; INTRAVENOUS; SUBCUTANEOUS EVERY 4 HOURS PRN
Status: DISCONTINUED | OUTPATIENT
Start: 2025-07-08 | End: 2025-07-08

## 2025-07-08 RX ORDER — ONDANSETRON 2 MG/ML
4 INJECTION INTRAMUSCULAR; INTRAVENOUS EVERY 6 HOURS PRN
Status: DISCONTINUED | OUTPATIENT
Start: 2025-07-08 | End: 2025-07-11 | Stop reason: HOSPADM

## 2025-07-08 RX ORDER — 0.9 % SODIUM CHLORIDE 0.9 %
1000 INTRAVENOUS SOLUTION INTRAVENOUS
Status: COMPLETED | OUTPATIENT
Start: 2025-07-08 | End: 2025-07-08

## 2025-07-08 RX ORDER — ONDANSETRON 4 MG/1
4 TABLET, ORALLY DISINTEGRATING ORAL EVERY 8 HOURS PRN
Status: DISCONTINUED | OUTPATIENT
Start: 2025-07-08 | End: 2025-07-11 | Stop reason: HOSPADM

## 2025-07-08 RX ORDER — SODIUM CHLORIDE 0.9 % (FLUSH) 0.9 %
5-40 SYRINGE (ML) INJECTION PRN
Status: DISCONTINUED | OUTPATIENT
Start: 2025-07-08 | End: 2025-07-11 | Stop reason: HOSPADM

## 2025-07-08 RX ORDER — MORPHINE SULFATE 2 MG/ML
1 INJECTION, SOLUTION INTRAMUSCULAR; INTRAVENOUS
Status: DISCONTINUED | OUTPATIENT
Start: 2025-07-08 | End: 2025-07-08

## 2025-07-08 RX ORDER — SODIUM CHLORIDE 0.9 % (FLUSH) 0.9 %
5-40 SYRINGE (ML) INJECTION EVERY 12 HOURS SCHEDULED
Status: DISCONTINUED | OUTPATIENT
Start: 2025-07-08 | End: 2025-07-11 | Stop reason: HOSPADM

## 2025-07-08 RX ORDER — MORPHINE SULFATE 4 MG/ML
4 INJECTION, SOLUTION INTRAMUSCULAR; INTRAVENOUS
Refills: 0 | Status: COMPLETED | OUTPATIENT
Start: 2025-07-08 | End: 2025-07-08

## 2025-07-08 RX ORDER — MAGNESIUM SULFATE IN WATER 40 MG/ML
2000 INJECTION, SOLUTION INTRAVENOUS PRN
Status: DISCONTINUED | OUTPATIENT
Start: 2025-07-08 | End: 2025-07-11 | Stop reason: HOSPADM

## 2025-07-08 RX ORDER — ACETAMINOPHEN 650 MG/1
650 SUPPOSITORY RECTAL EVERY 6 HOURS PRN
Status: DISCONTINUED | OUTPATIENT
Start: 2025-07-08 | End: 2025-07-11 | Stop reason: HOSPADM

## 2025-07-08 RX ORDER — ONDANSETRON 2 MG/ML
4 INJECTION INTRAMUSCULAR; INTRAVENOUS
Status: COMPLETED | OUTPATIENT
Start: 2025-07-08 | End: 2025-07-08

## 2025-07-08 RX ORDER — SODIUM CHLORIDE 9 MG/ML
INJECTION, SOLUTION INTRAVENOUS PRN
Status: DISCONTINUED | OUTPATIENT
Start: 2025-07-08 | End: 2025-07-11 | Stop reason: HOSPADM

## 2025-07-08 RX ORDER — HYDROMORPHONE HYDROCHLORIDE 1 MG/ML
1.5 INJECTION, SOLUTION INTRAMUSCULAR; INTRAVENOUS; SUBCUTANEOUS EVERY 4 HOURS PRN
Status: DISCONTINUED | OUTPATIENT
Start: 2025-07-08 | End: 2025-07-09

## 2025-07-08 RX ORDER — HYDROCODONE BITARTRATE AND ACETAMINOPHEN 7.5; 325 MG/1; MG/1
1 TABLET ORAL EVERY 6 HOURS PRN
COMMUNITY

## 2025-07-08 RX ORDER — IOPAMIDOL 755 MG/ML
100 INJECTION, SOLUTION INTRAVASCULAR
Status: COMPLETED | OUTPATIENT
Start: 2025-07-08 | End: 2025-07-08

## 2025-07-08 RX ADMIN — HYDROMORPHONE HYDROCHLORIDE 1 MG: 1 INJECTION, SOLUTION INTRAMUSCULAR; INTRAVENOUS; SUBCUTANEOUS at 08:29

## 2025-07-08 RX ADMIN — ONDANSETRON 4 MG: 2 INJECTION, SOLUTION INTRAMUSCULAR; INTRAVENOUS at 06:13

## 2025-07-08 RX ADMIN — SODIUM CHLORIDE, SODIUM LACTATE, POTASSIUM CHLORIDE, AND CALCIUM CHLORIDE: .6; .31; .03; .02 INJECTION, SOLUTION INTRAVENOUS at 15:26

## 2025-07-08 RX ADMIN — HYDROMORPHONE HYDROCHLORIDE 1.5 MG: 1 INJECTION, SOLUTION INTRAMUSCULAR; INTRAVENOUS; SUBCUTANEOUS at 19:42

## 2025-07-08 RX ADMIN — MORPHINE SULFATE 1 MG: 2 INJECTION, SOLUTION INTRAMUSCULAR; INTRAVENOUS at 13:52

## 2025-07-08 RX ADMIN — HYDROMORPHONE HYDROCHLORIDE 1 MG: 1 INJECTION, SOLUTION INTRAMUSCULAR; INTRAVENOUS; SUBCUTANEOUS at 07:00

## 2025-07-08 RX ADMIN — MORPHINE SULFATE 2 MG: 2 INJECTION, SOLUTION INTRAMUSCULAR; INTRAVENOUS at 21:31

## 2025-07-08 RX ADMIN — MORPHINE SULFATE 4 MG: 4 INJECTION INTRAVENOUS at 06:13

## 2025-07-08 RX ADMIN — SODIUM CHLORIDE 40 MG: 9 INJECTION INTRAMUSCULAR; INTRAVENOUS; SUBCUTANEOUS at 19:41

## 2025-07-08 RX ADMIN — HYDROMORPHONE HYDROCHLORIDE 1.5 MG: 1 INJECTION, SOLUTION INTRAMUSCULAR; INTRAVENOUS; SUBCUTANEOUS at 23:43

## 2025-07-08 RX ADMIN — HYDROMORPHONE HYDROCHLORIDE 1.5 MG: 1 INJECTION, SOLUTION INTRAMUSCULAR; INTRAVENOUS; SUBCUTANEOUS at 15:29

## 2025-07-08 RX ADMIN — MORPHINE SULFATE 2 MG: 2 INJECTION, SOLUTION INTRAMUSCULAR; INTRAVENOUS at 17:35

## 2025-07-08 RX ADMIN — SODIUM CHLORIDE, PRESERVATIVE FREE 10 ML: 5 INJECTION INTRAVENOUS at 13:51

## 2025-07-08 RX ADMIN — HYDROMORPHONE HYDROCHLORIDE 1 MG: 1 INJECTION, SOLUTION INTRAMUSCULAR; INTRAVENOUS; SUBCUTANEOUS at 10:48

## 2025-07-08 RX ADMIN — IOPAMIDOL 100 ML: 755 INJECTION, SOLUTION INTRAVENOUS at 06:53

## 2025-07-08 RX ADMIN — SODIUM CHLORIDE 1000 ML: 900 INJECTION, SOLUTION INTRAVENOUS at 06:13

## 2025-07-08 RX ADMIN — SODIUM CHLORIDE, SODIUM LACTATE, POTASSIUM CHLORIDE, AND CALCIUM CHLORIDE: .6; .31; .03; .02 INJECTION, SOLUTION INTRAVENOUS at 23:46

## 2025-07-08 ASSESSMENT — PAIN SCALES - GENERAL
PAINLEVEL_OUTOF10: 4
PAINLEVEL_OUTOF10: 3
PAINLEVEL_OUTOF10: 8
PAINLEVEL_OUTOF10: 8
PAINLEVEL_OUTOF10: 9
PAINLEVEL_OUTOF10: 8
PAINLEVEL_OUTOF10: 9
PAINLEVEL_OUTOF10: 9
PAINLEVEL_OUTOF10: 8
PAINLEVEL_OUTOF10: 8
PAINLEVEL_OUTOF10: 6
PAINLEVEL_OUTOF10: 9
PAINLEVEL_OUTOF10: 9
PAINLEVEL_OUTOF10: 8
PAINLEVEL_OUTOF10: 9
PAINLEVEL_OUTOF10: 8
PAINLEVEL_OUTOF10: 5
PAINLEVEL_OUTOF10: 5
PAINLEVEL_OUTOF10: 6

## 2025-07-08 ASSESSMENT — PAIN DESCRIPTION - LOCATION
LOCATION: ABDOMEN

## 2025-07-08 ASSESSMENT — ENCOUNTER SYMPTOMS
SHORTNESS OF BREATH: 0
NAUSEA: 0
VOMITING: 0
COLOR CHANGE: 0
NAUSEA: 1
ABDOMINAL DISTENTION: 0
TROUBLE SWALLOWING: 0
CONSTIPATION: 0
SORE THROAT: 0
DIARRHEA: 0
ABDOMINAL PAIN: 1
BLOOD IN STOOL: 0
ABDOMINAL PAIN: 1
EYE REDNESS: 0
VOMITING: 0
SHORTNESS OF BREATH: 0
EYE PAIN: 0
COUGH: 0

## 2025-07-08 ASSESSMENT — PAIN DESCRIPTION - PAIN TYPE
TYPE: ACUTE PAIN
TYPE: ACUTE PAIN

## 2025-07-08 ASSESSMENT — PAIN DESCRIPTION - DESCRIPTORS
DESCRIPTORS: BURNING
DESCRIPTORS: ACHING

## 2025-07-08 ASSESSMENT — PAIN DESCRIPTION - ORIENTATION
ORIENTATION: RIGHT
ORIENTATION: RIGHT

## 2025-07-08 ASSESSMENT — PAIN SCALES - WONG BAKER
WONGBAKER_NUMERICALRESPONSE: NO HURT

## 2025-07-08 ASSESSMENT — PAIN - FUNCTIONAL ASSESSMENT
PAIN_FUNCTIONAL_ASSESSMENT: PREVENTS OR INTERFERES WITH MANY ACTIVE NOT PASSIVE ACTIVITIES
PAIN_FUNCTIONAL_ASSESSMENT: PREVENTS OR INTERFERES SOME ACTIVE ACTIVITIES AND ADLS

## 2025-07-08 ASSESSMENT — PAIN DESCRIPTION - ONSET
ONSET: ON-GOING
ONSET: ON-GOING

## 2025-07-08 NOTE — PROGRESS NOTES
Pt c/o pain throughout shift, tx per MAR      Rounds performed throughout shift. Pt denies needs at this time. Bed in low position, locked and call light/personal items within reach. Will report to night shift nurse.

## 2025-07-08 NOTE — PROGRESS NOTES
4 Eyes Skin Assessment     NAME:  Bhupinder Tejada  YOB: 1985  MEDICAL RECORD NUMBER:  [id number]    The patient is being assessed for  Admission    I agree that at least one RN has performed a thorough Head to Toe Skin Assessment on the patient. ALL assessment sites listed below have been assessed.      Areas assessed by both nurses:    Head, Face, Ears, Arms, Elbows, Hands, and Legs. Feet and Heels  Pt would like to leave clothes on and states he has no open wounds on sacrum.         Does the Patient have a Wound? No noted wound(s)       Vikas Prevention initiated by RN: No  Wound Care Orders initiated by RN: No    Pressure Injury (Stage 1,2,3,4, Unstageable, DTI, NWPT, and Complex wounds) if present, place Wound referral order by RN under : No    New Ostomies, if present place, Ostomy referral order under : No     Nurse 1 eSignature: Electronically signed by Ernestine Hyatt RN on 7/8/25 at 12:59 PM EDT    **SHARE this note so that the co-signing nurse can place an eSignature**    Nurse 2 eSignature: Electronically signed by ERA CID RN on 7/8/25 at 1:01 PM EDT

## 2025-07-08 NOTE — CONSULTS
IntraVENous Q4H Tanja Rodriguez MD        morphine (PF) injection 1 mg  1 mg IntraVENous Q2H Tanja Rodriguez MD   1 mg at 07/08/25 1352       ALLERGIES:    No Known Allergies    ROS:     Review of Systems   Constitutional:  Negative for activity change and fatigue.   HENT:  Negative for trouble swallowing.    Respiratory:  Negative for shortness of breath.    Cardiovascular:  Negative for chest pain.   Gastrointestinal:  Positive for abdominal pain. Negative for abdominal distention, blood in stool, constipation, diarrhea, nausea and vomiting.   Skin:  Negative for color change.   Neurological:  Negative for weakness.   Psychiatric/Behavioral:  Negative for confusion.         PHYSICAL EXAMINATION    /89   Pulse 61   Temp 97.3 °F (36.3 °C) (Oral)   Resp 14   Ht 1.753 m (5' 9\")   Wt 82.3 kg (181 lb 7 oz)   BMI 26.79 kg/m²     Physical Exam  Constitutional:       Appearance: Normal appearance.   HENT:      Head: Normocephalic and atraumatic.      Nose: Nose normal.   Eyes:      General: No scleral icterus.  Pulmonary:      Effort: No respiratory distress.   Abdominal:      General: There is no distension.   Skin:     Coloration: Skin is not jaundiced.   Neurological:      Mental Status: He is alert and oriented to person, place, and time.   Psychiatric:         Behavior: Behavior normal.         HPI:  This is a 40-year-old male with history of alcohol induced pancreatitis.  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  He was just discharged on 6/30/2025 and a couple days ago started to experience worsening epigastric pain and noted dark urine.    Jose M Westfall DO

## 2025-07-08 NOTE — ED NOTES
TRANSFER - OUT REPORT:    Verbal report given to ELI Sinha on Bhupinder Tejada  being transferred to [facility] for routine progression of patient care     Report consisted of patient's Situation, Background, Assessment and   Recommendations(SBAR).     Information from the following report(s) Nurse Handoff Report was reviewed with the receiving nurse.    Lines:   Peripheral IV 07/08/25 Right;Dorsal Forearm (Active)   Site Assessment Clean, dry & intact 07/08/25 0603   Line Status Normal saline locked 07/08/25 0603   Phlebitis Assessment No symptoms 07/08/25 0603   Infiltration Assessment 0 07/08/25 0603   Alcohol Cap Used Yes 07/08/25 0603   Dressing Status Clean, dry & intact 07/08/25 0603   Dressing Type Transparent 07/08/25 0603        Opportunity for questions and clarification was provided.      Patient transported with:  Monitor

## 2025-07-08 NOTE — PERIOP NOTE
6th floor staff informed of patient's procedure on 7/9 with Dr. Colindres. Pre-op arrival of 1600 for procedure time of 1730.

## 2025-07-08 NOTE — PROGRESS NOTES
TRANSFER - IN REPORT:    Verbal report received from SED on Bhupinder Tejada  being received from RN for routine progression of patient care      Report consisted of patient's Situation, Background, Assessment and   Recommendations(SBAR).     Information from the following report(s) Adult Overview, Intake/Output, MAR, and Recent Results was reviewed with the receiving nurse.    Opportunity for questions and clarification was provided.      Assessment will be completed upon patient's arrival to unit and care will be assumed once pt arrives to unit.

## 2025-07-08 NOTE — ED PROVIDER NOTES
Emergency Department Provider Signout / Continuation of Care Note         DISPOSITION Decision To Transfer 07/08/2025 09:30:40 AM       ICD-10-CM    1. Alcohol-induced chronic pancreatitis (HCC)  K86.0       2. Elevated liver enzymes  R74.8       3. Hyperbilirubinemia  E80.6           The patient's care was signed out to me at shift change.      Final Plan      Patient was left to me to follow-up on his CT scan.  He has chronic pancreatitis.  Just discharged from the hospital June 30.  His liver enzymes and bilirubin are up.  He has had a previous cholecystectomy.  CT shows dilation of the intra and extrahepatic ducts.  Discussed case with GI who recommends ERCP.  Will transfer downtown to the hospitalist and consult GI for further treatment.       1 or more acute illnesses that pose a threat to life or bodily function.   Prescription drug management performed.  Parental controlled substances given in the ED.  Drug therapy given requiring intensive monitoring for toxicity.  Discussion with external consultants.  Shared medical decision making was utilized in creating the patients health plan today.  I reviewed external records: provider visit note from outside specialist.       I interpreted the CT Scan no free air.    The patient was admitted and I have discussed patient management with the admitting provider.  The management of this patient was discussed with an external consultant.                Bj Dawson III, MD  07/08/25 0991    
([facility]) 08/21/2019    CAD (coronary artery disease)     Carpal tunnel syndrome, left     Cigarette smoker     Coronary artery disease involving native coronary artery of native heart 11/13/2022    Dependence on nicotine from cigarettes 11/20/2022    GERD (gastroesophageal reflux disease)     controlled with med    History of pancreatitis     x 4 or 5 times-- last time admitted to hospital 10/10/26/22 x 7 days    Hypertension     controlled with med    STEMI (ST elevation myocardial infarction) ([facility]) 11/02/2022        Past Surgical History:   Procedure Laterality Date    CARDIAC PROCEDURE N/A 11/02/2022    LEFT HEART CATH / CORONARY ANGIOGRAPHY performed by Tomy Zelaya MD at [facility] CARDIAC CATH LAB    CARDIAC PROCEDURE N/A 11/02/2022    Percutaneous coronary intervention performed by Tomy Zelaya MD at [facility] CARDIAC CATH LAB    CHOLECYSTECTOMY  2012    HAND LIGAMENT RECONSTRUCTION  01/30/2025    REPAIR EXTEN TENDON,DORSUM HAND,EA  NH OPEN TX METACARPAL FRACTURE SINGLE EA BONE  NH OPEN TX PHALANGEAL SHAFT FRACTURE PROX/MIDDLE EA    ORTHOPEDIC SURGERY Left     wrist surg--nerve surg    ORTHOPEDIC SURGERY Left     foot surg as child    UPPER GASTROINTESTINAL ENDOSCOPY N/A 11/09/2022    EGD/ENDOSCOPIC ULTRASOUND/AXIOS STENT ROOM 2226 **Rep will be present performed by Thomas Kearney MD at [facility] ENDOSCOPY    UPPER GASTROINTESTINAL ENDOSCOPY N/A 11/30/2022    EGD/ENDOSCOPIC ULTRASOUND stent removal  performed by Thomas Kearney MD at [facility] ENDOSCOPY    UPPER GASTROINTESTINAL ENDOSCOPY N/A 11/30/2022    EGD ESOPHAGOGASTRODUODENOSCOPY performed by Thomas Kearney MD at [facility] ENDOSCOPY    UPPER GASTROINTESTINAL ENDOSCOPY N/A 01/11/2023    ENDOSCOPIC ULTRASOUND, biopsy performed by Erlin Villegas MD at [facility] ENDOSCOPY        Social History     Socioeconomic History    Marital status:    Tobacco Use    Smoking status: Former     Current packs/day: 0.00     Average packs/day: 1 pack/day for 20.0 years

## 2025-07-08 NOTE — ED TRIAGE NOTES
Pt ambulatory to room 11 from [facility]. Reports he was released from the hospital a week ago for pancreatitis. He endorses that since his release from the hospital he has had abdominal pain in his right side with brown urine. Pt endorses vomiting yesterday. Pt endorses pain with eating.

## 2025-07-08 NOTE — H&P
Hospitalist History and Physical   Admit Date:  2025 12:23 PM   Name:  Bhupinder Tejada   Age:  40 y.o.  Sex:  male  :  1985   MRN:  [id number]   Room:  [facility]    Presenting/Chief Complaint: No chief complaint on file.     Reason(s) for Admission: Chronic pancreatitis, unspecified pancreatitis type (HCC) [K86.1]     History of Present Illness:   Bhupinder Tejada is a 40 y.o. male with medical history of alcohol induced chronic pancreatitis who presented with abdominal pain.  He was recently hospitalized for pancreatitis  - .  After discharge, he continued to have abdominal pain, especially on the RUQ.  He also noted brown-colored urine.  Decreased appetite due to severe pain.  No fever, chills, nausea/vomiting, or diarrhea.    In ER, vitals were relatively unremarkable.  CBC and CMP relatively unremarkable, except  , , bilirubin 3.1.  Lipase 39.  UA showed large bilirubin.  CT showed intra and extrahepatic ductal dilatation, new since 2025, with an evidence of localized pancreatitis involving the pancreatic head.  GI was contacted in ER, and recommended transfer to [facility] for ERCP.  Therefore, the patient was transferred from [place] [facility] to [facility].    Patient was seen while he was in his room.  Still having significant pain, although he would like to try ice chips and a sip of water.  He has been taking a high dose of narcotics chronically for the past 3 years due to pancreatitis.  Currently slowly tapering down his narcotics.      Assessment & Plan:     Chronic pancreatitis  - Noted in CT, with intra and extrahepatic duct dilatation, which was new since 2025  - Consulted GI.  Planning on EUS and ERCP tomorrow  - Advance diet as tolerated, but n.p.o. after midnight  - Pain control with narcotics  - IV fluids      Alcohol abuse  - Patient stated that he would have severe abdominal pain whenever he has alcohol intake.  He is determined to quit  - ADRENALS: Normal. - KIDNEYS/URETERS: No hydronephrosis or significant mass. - BLADDER: Normal. - REPRODUCTIVE ORGANS: No pelvic masses. - BOWEL: Normal caliber.  No inflammatory changes. - LYMPH NODES: No significant retroperitoneal, mesenteric, or pelvic adenopathy. - BONES: No fracture or significant bone lesion. - VASCULATURE: Normal - OTHER: No ascites.     Intra and extrahepatic duct dilatation, new since 6/27/2025 likely due to localized pancreatitis involving the pancreatic head. Common duct stricture related to chronic pancreatitis could result in similar appearance. If providers have any questions about this report, I can be reached on [employer]. Electronically signed by Jose M Baldwin        Signed:  Tanja Mejia MD    Part of this note may have been written by using a voice dictation software.  The note has been proof read but may still contain some grammatical/other typographical errors.

## 2025-07-08 NOTE — PLAN OF CARE
Problem: Discharge Planning  Goal: Discharge to home or other facility with appropriate resources  Outcome: Progressing  Flowsheets (Taken 7/8/2025 1336)  Discharge to home or other facility with appropriate resources:   Identify barriers to discharge with patient and caregiver   Arrange for needed discharge resources and transportation as appropriate   Identify discharge learning needs (meds, wound care, etc)   Arrange for interpreters to assist at discharge as needed   Refer to discharge planning if patient needs post-hospital services based on physician order or complex needs related to functional status, cognitive ability or social support system     Problem: Pain  Goal: Verbalizes/displays adequate comfort level or baseline comfort level  Outcome: Progressing  Flowsheets (Taken 7/8/2025 1336)  Verbalizes/displays adequate comfort level or baseline comfort level:   Encourage patient to monitor pain and request assistance   Assess pain using appropriate pain scale   Administer analgesics based on type and severity of pain and evaluate response   Implement non-pharmacological measures as appropriate and evaluate response   Consider cultural and social influences on pain and pain management   Notify Licensed Independent Practitioner if interventions unsuccessful or patient reports new pain

## 2025-07-09 ENCOUNTER — ANESTHESIA EVENT (OUTPATIENT)
Dept: SURGERY | Age: 40
End: 2025-07-09

## 2025-07-09 ENCOUNTER — ANESTHESIA (OUTPATIENT)
Dept: SURGERY | Age: 40
End: 2025-07-09

## 2025-07-09 ENCOUNTER — APPOINTMENT (OUTPATIENT)
Dept: GENERAL RADIOLOGY | Age: 40
End: 2025-07-09
Attending: INTERNAL MEDICINE

## 2025-07-09 LAB
ALBUMIN SERPL-MCNC: 3.3 G/DL (ref 3.5–5)
ALBUMIN/GLOB SERPL: 0.9 (ref 1–1.9)
ALP SERPL-CCNC: 335 U/L (ref 40–129)
ALT SERPL-CCNC: 691 U/L (ref 8–55)
ANION GAP SERPL CALC-SCNC: 11 MMOL/L (ref 7–16)
AST SERPL-CCNC: 216 U/L (ref 15–37)
BASOPHILS # BLD: 0.04 K/UL (ref 0–0.2)
BASOPHILS NFR BLD: 0.6 % (ref 0–2)
BILIRUB SERPL-MCNC: 1.5 MG/DL (ref 0–1.2)
BUN SERPL-MCNC: 6 MG/DL (ref 6–23)
CALCIUM SERPL-MCNC: 9.6 MG/DL (ref 8.8–10.2)
CHLORIDE SERPL-SCNC: 101 MMOL/L (ref 98–107)
CO2 SERPL-SCNC: 24 MMOL/L (ref 20–29)
CREAT SERPL-MCNC: 0.9 MG/DL (ref 0.8–1.3)
DIFFERENTIAL METHOD BLD: ABNORMAL
EOSINOPHIL # BLD: 0.1 K/UL (ref 0–0.8)
EOSINOPHIL NFR BLD: 1.5 % (ref 0.5–7.8)
ERYTHROCYTE [DISTWIDTH] IN BLOOD BY AUTOMATED COUNT: 13.4 % (ref 11.9–14.6)
GLOBULIN SER CALC-MCNC: 3.8 G/DL (ref 2.3–3.5)
GLUCOSE SERPL-MCNC: 101 MG/DL (ref 70–99)
HCT VFR BLD AUTO: 47.7 % (ref 41.1–50.3)
HGB BLD-MCNC: 16 G/DL (ref 13.6–17.2)
IMM GRANULOCYTES # BLD AUTO: 0.01 K/UL (ref 0–0.5)
IMM GRANULOCYTES NFR BLD AUTO: 0.1 % (ref 0–5)
LYMPHOCYTES # BLD: 1.7 K/UL (ref 0.5–4.6)
LYMPHOCYTES NFR BLD: 25.1 % (ref 13–44)
MCH RBC QN AUTO: 29.5 PG (ref 26.1–32.9)
MCHC RBC AUTO-ENTMCNC: 33.5 G/DL (ref 31.4–35)
MCV RBC AUTO: 87.8 FL (ref 82–102)
MONOCYTES # BLD: 0.7 K/UL (ref 0.1–1.3)
MONOCYTES NFR BLD: 10.3 % (ref 4–12)
NEUTS SEG # BLD: 4.22 K/UL (ref 1.7–8.2)
NEUTS SEG NFR BLD: 62.4 % (ref 43–78)
NRBC # BLD: 0 K/UL (ref 0–0.2)
PLATELET # BLD AUTO: 426 K/UL (ref 150–450)
PMV BLD AUTO: 9.1 FL (ref 9.4–12.3)
POTASSIUM SERPL-SCNC: 4.5 MMOL/L (ref 3.5–5.1)
PROT SERPL-MCNC: 7.1 G/DL (ref 6.3–8.2)
RBC # BLD AUTO: 5.43 M/UL (ref 4.23–5.6)
SODIUM SERPL-SCNC: 137 MMOL/L (ref 136–145)
WBC # BLD AUTO: 6.8 K/UL (ref 4.3–11.1)

## 2025-07-09 PROCEDURE — 43274 ERCP DUCT STENT PLACEMENT: CPT | Performed by: INTERNAL MEDICINE

## 2025-07-09 PROCEDURE — 3700000000 HC ANESTHESIA ATTENDED CARE: Performed by: INTERNAL MEDICINE

## 2025-07-09 PROCEDURE — 2500000003 HC RX 250 WO HCPCS: Performed by: STUDENT IN AN ORGANIZED HEALTH CARE EDUCATION/TRAINING PROGRAM

## 2025-07-09 PROCEDURE — 3609018800 HC ERCP DX COLLECTION SPECIMEN BRUSHING/WASHING: Performed by: INTERNAL MEDICINE

## 2025-07-09 PROCEDURE — 6360000002 HC RX W HCPCS: Performed by: STUDENT IN AN ORGANIZED HEALTH CARE EDUCATION/TRAINING PROGRAM

## 2025-07-09 PROCEDURE — 80053 COMPREHEN METABOLIC PANEL: CPT

## 2025-07-09 PROCEDURE — C1874 STENT, COATED/COV W/DEL SYS: HCPCS | Performed by: INTERNAL MEDICINE

## 2025-07-09 PROCEDURE — 2580000003 HC RX 258: Performed by: STUDENT IN AN ORGANIZED HEALTH CARE EDUCATION/TRAINING PROGRAM

## 2025-07-09 PROCEDURE — C1769 GUIDE WIRE: HCPCS | Performed by: INTERNAL MEDICINE

## 2025-07-09 PROCEDURE — 7100000001 HC PACU RECOVERY - ADDTL 15 MIN: Performed by: INTERNAL MEDICINE

## 2025-07-09 PROCEDURE — 7100000000 HC PACU RECOVERY - FIRST 15 MIN: Performed by: INTERNAL MEDICINE

## 2025-07-09 PROCEDURE — 6360000002 HC RX W HCPCS: Performed by: ANESTHESIOLOGY

## 2025-07-09 PROCEDURE — BF101ZZ FLUOROSCOPY OF BILE DUCTS USING LOW OSMOLAR CONTRAST: ICD-10-PCS | Performed by: INTERNAL MEDICINE

## 2025-07-09 PROCEDURE — 1100000000 HC RM PRIVATE

## 2025-07-09 PROCEDURE — 85025 COMPLETE CBC W/AUTO DIFF WBC: CPT

## 2025-07-09 PROCEDURE — 2709999900 HC NON-CHARGEABLE SUPPLY: Performed by: INTERNAL MEDICINE

## 2025-07-09 PROCEDURE — 6360000004 HC RX CONTRAST MEDICATION: Performed by: INTERNAL MEDICINE

## 2025-07-09 PROCEDURE — 43259 EGD US EXAM DUODENUM/JEJUNUM: CPT | Performed by: INTERNAL MEDICINE

## 2025-07-09 PROCEDURE — 74328 X-RAY BILE DUCT ENDOSCOPY: CPT | Performed by: INTERNAL MEDICINE

## 2025-07-09 PROCEDURE — 2720000010 HC SURG SUPPLY STERILE: Performed by: INTERNAL MEDICINE

## 2025-07-09 PROCEDURE — 0DJ08ZZ INSPECTION OF UPPER INTESTINAL TRACT, VIA NATURAL OR ARTIFICIAL OPENING ENDOSCOPIC: ICD-10-PCS | Performed by: INTERNAL MEDICINE

## 2025-07-09 PROCEDURE — 2580000003 HC RX 258: Performed by: INTERNAL MEDICINE

## 2025-07-09 PROCEDURE — 0F798DZ DILATION OF COMMON BILE DUCT WITH INTRALUMINAL DEVICE, VIA NATURAL OR ARTIFICIAL OPENING ENDOSCOPIC: ICD-10-PCS | Performed by: INTERNAL MEDICINE

## 2025-07-09 PROCEDURE — 3700000001 HC ADD 15 MINUTES (ANESTHESIA): Performed by: INTERNAL MEDICINE

## 2025-07-09 PROCEDURE — 3609018500 HC EGD US SCOPE W/ADJACENT STRUCTURES: Performed by: INTERNAL MEDICINE

## 2025-07-09 PROCEDURE — 43264 ERCP REMOVE DUCT CALCULI: CPT | Performed by: INTERNAL MEDICINE

## 2025-07-09 PROCEDURE — 36415 COLL VENOUS BLD VENIPUNCTURE: CPT

## 2025-07-09 DEVICE — STENT SYSTEM RMV
Type: IMPLANTABLE DEVICE | Status: FUNCTIONAL
Brand: WALLFLEX BILIARY

## 2025-07-09 RX ORDER — SODIUM CHLORIDE 9 MG/ML
INJECTION, SOLUTION INTRAVENOUS PRN
Status: CANCELLED | OUTPATIENT
Start: 2025-07-09

## 2025-07-09 RX ORDER — HYDROMORPHONE HYDROCHLORIDE 1 MG/ML
2 INJECTION, SOLUTION INTRAMUSCULAR; INTRAVENOUS; SUBCUTANEOUS EVERY 4 HOURS PRN
Status: DISCONTINUED | OUTPATIENT
Start: 2025-07-09 | End: 2025-07-09

## 2025-07-09 RX ORDER — CIPROFLOXACIN 2 MG/ML
INJECTION, SOLUTION INTRAVENOUS
Status: DISCONTINUED | OUTPATIENT
Start: 2025-07-09 | End: 2025-07-09 | Stop reason: SDUPTHER

## 2025-07-09 RX ORDER — SODIUM CHLORIDE 0.9 % (FLUSH) 0.9 %
5-40 SYRINGE (ML) INJECTION PRN
Status: DISCONTINUED | OUTPATIENT
Start: 2025-07-09 | End: 2025-07-09 | Stop reason: HOSPADM

## 2025-07-09 RX ORDER — LIDOCAINE HYDROCHLORIDE 20 MG/ML
INJECTION, SOLUTION EPIDURAL; INFILTRATION; INTRACAUDAL; PERINEURAL
Status: DISCONTINUED | OUTPATIENT
Start: 2025-07-09 | End: 2025-07-09 | Stop reason: SDUPTHER

## 2025-07-09 RX ORDER — SODIUM CHLORIDE 0.9 % (FLUSH) 0.9 %
5-40 SYRINGE (ML) INJECTION PRN
Status: CANCELLED | OUTPATIENT
Start: 2025-07-09

## 2025-07-09 RX ORDER — SODIUM CHLORIDE 0.9 % (FLUSH) 0.9 %
5-40 SYRINGE (ML) INJECTION EVERY 12 HOURS SCHEDULED
Status: CANCELLED | OUTPATIENT
Start: 2025-07-09

## 2025-07-09 RX ORDER — LIDOCAINE HYDROCHLORIDE 10 MG/ML
1 INJECTION, SOLUTION INFILTRATION; PERINEURAL
Status: CANCELLED | OUTPATIENT
Start: 2025-07-09

## 2025-07-09 RX ORDER — SODIUM CHLORIDE 0.9 % (FLUSH) 0.9 %
5-40 SYRINGE (ML) INJECTION EVERY 12 HOURS SCHEDULED
Status: DISCONTINUED | OUTPATIENT
Start: 2025-07-09 | End: 2025-07-09 | Stop reason: HOSPADM

## 2025-07-09 RX ORDER — OXYCODONE HYDROCHLORIDE 5 MG/1
5 TABLET ORAL PRN
Status: DISCONTINUED | OUTPATIENT
Start: 2025-07-09 | End: 2025-07-09 | Stop reason: HOSPADM

## 2025-07-09 RX ORDER — IOPAMIDOL 755 MG/ML
INJECTION, SOLUTION INTRAVASCULAR PRN
Status: DISCONTINUED | OUTPATIENT
Start: 2025-07-09 | End: 2025-07-09 | Stop reason: ALTCHOICE

## 2025-07-09 RX ORDER — PROPOFOL 10 MG/ML
INJECTION, EMULSION INTRAVENOUS
Status: DISCONTINUED | OUTPATIENT
Start: 2025-07-09 | End: 2025-07-09 | Stop reason: SDUPTHER

## 2025-07-09 RX ORDER — PROCHLORPERAZINE EDISYLATE 5 MG/ML
5 INJECTION INTRAMUSCULAR; INTRAVENOUS
Status: DISCONTINUED | OUTPATIENT
Start: 2025-07-09 | End: 2025-07-09 | Stop reason: HOSPADM

## 2025-07-09 RX ORDER — SODIUM CHLORIDE 9 MG/ML
INJECTION, SOLUTION INTRAVENOUS PRN
Status: DISCONTINUED | OUTPATIENT
Start: 2025-07-09 | End: 2025-07-09 | Stop reason: HOSPADM

## 2025-07-09 RX ORDER — SUCCINYLCHOLINE CHLORIDE 20 MG/ML
INJECTION INTRAMUSCULAR; INTRAVENOUS
Status: DISCONTINUED | OUTPATIENT
Start: 2025-07-09 | End: 2025-07-09 | Stop reason: SDUPTHER

## 2025-07-09 RX ORDER — DEXAMETHASONE SODIUM PHOSPHATE 4 MG/ML
INJECTION, SOLUTION INTRA-ARTICULAR; INTRALESIONAL; INTRAMUSCULAR; INTRAVENOUS; SOFT TISSUE
Status: DISCONTINUED | OUTPATIENT
Start: 2025-07-09 | End: 2025-07-09 | Stop reason: SDUPTHER

## 2025-07-09 RX ORDER — MIDAZOLAM HYDROCHLORIDE 2 MG/2ML
2 INJECTION, SOLUTION INTRAMUSCULAR; INTRAVENOUS
Status: CANCELLED | OUTPATIENT
Start: 2025-07-09

## 2025-07-09 RX ORDER — DEXMEDETOMIDINE HYDROCHLORIDE 100 UG/ML
INJECTION, SOLUTION INTRAVENOUS
Status: DISCONTINUED | OUTPATIENT
Start: 2025-07-09 | End: 2025-07-09 | Stop reason: SDUPTHER

## 2025-07-09 RX ORDER — SODIUM CHLORIDE, SODIUM LACTATE, POTASSIUM CHLORIDE, AND CALCIUM CHLORIDE .6; .31; .03; .02 G/100ML; G/100ML; G/100ML; G/100ML
1000 INJECTION, SOLUTION INTRAVENOUS ONCE
Status: COMPLETED | OUTPATIENT
Start: 2025-07-09 | End: 2025-07-09

## 2025-07-09 RX ORDER — ONDANSETRON 2 MG/ML
4 INJECTION INTRAMUSCULAR; INTRAVENOUS
Status: COMPLETED | OUTPATIENT
Start: 2025-07-09 | End: 2025-07-09

## 2025-07-09 RX ORDER — ONDANSETRON 2 MG/ML
INJECTION INTRAMUSCULAR; INTRAVENOUS
Status: DISCONTINUED | OUTPATIENT
Start: 2025-07-09 | End: 2025-07-09 | Stop reason: SDUPTHER

## 2025-07-09 RX ORDER — OXYCODONE HYDROCHLORIDE 5 MG/1
10 TABLET ORAL PRN
Status: DISCONTINUED | OUTPATIENT
Start: 2025-07-09 | End: 2025-07-09 | Stop reason: HOSPADM

## 2025-07-09 RX ORDER — HYDROMORPHONE HYDROCHLORIDE 1 MG/ML
2 INJECTION, SOLUTION INTRAMUSCULAR; INTRAVENOUS; SUBCUTANEOUS
Status: DISCONTINUED | OUTPATIENT
Start: 2025-07-09 | End: 2025-07-11 | Stop reason: HOSPADM

## 2025-07-09 RX ORDER — SODIUM CHLORIDE, SODIUM LACTATE, POTASSIUM CHLORIDE, CALCIUM CHLORIDE 600; 310; 30; 20 MG/100ML; MG/100ML; MG/100ML; MG/100ML
INJECTION, SOLUTION INTRAVENOUS CONTINUOUS
Status: CANCELLED | OUTPATIENT
Start: 2025-07-09

## 2025-07-09 RX ADMIN — SODIUM CHLORIDE, SODIUM LACTATE, POTASSIUM CHLORIDE, AND CALCIUM CHLORIDE: .6; .31; .03; .02 INJECTION, SOLUTION INTRAVENOUS at 08:37

## 2025-07-09 RX ADMIN — DEXAMETHASONE SODIUM PHOSPHATE 10 MG: 4 INJECTION INTRA-ARTICULAR; INTRALESIONAL; INTRAMUSCULAR; INTRAVENOUS; SOFT TISSUE at 16:30

## 2025-07-09 RX ADMIN — MORPHINE SULFATE 2 MG: 2 INJECTION, SOLUTION INTRAMUSCULAR; INTRAVENOUS at 19:11

## 2025-07-09 RX ADMIN — HYDROMORPHONE HYDROCHLORIDE 2 MG: 1 INJECTION, SOLUTION INTRAMUSCULAR; INTRAVENOUS; SUBCUTANEOUS at 14:41

## 2025-07-09 RX ADMIN — MORPHINE SULFATE 2 MG: 2 INJECTION, SOLUTION INTRAMUSCULAR; INTRAVENOUS at 13:12

## 2025-07-09 RX ADMIN — MORPHINE SULFATE 2 MG: 2 INJECTION, SOLUTION INTRAMUSCULAR; INTRAVENOUS at 22:03

## 2025-07-09 RX ADMIN — HYDROMORPHONE HYDROCHLORIDE 2 MG: 1 INJECTION, SOLUTION INTRAMUSCULAR; INTRAVENOUS; SUBCUTANEOUS at 23:25

## 2025-07-09 RX ADMIN — DEXMEDETOMIDINE 20 MCG: 100 INJECTION, SOLUTION, CONCENTRATE INTRAVENOUS at 16:18

## 2025-07-09 RX ADMIN — SODIUM CHLORIDE, PRESERVATIVE FREE 10 ML: 5 INJECTION INTRAVENOUS at 08:35

## 2025-07-09 RX ADMIN — HYDROMORPHONE HYDROCHLORIDE 0.5 MG: 1 INJECTION, SOLUTION INTRAMUSCULAR; INTRAVENOUS; SUBCUTANEOUS at 17:44

## 2025-07-09 RX ADMIN — MORPHINE SULFATE 2 MG: 2 INJECTION, SOLUTION INTRAMUSCULAR; INTRAVENOUS at 05:51

## 2025-07-09 RX ADMIN — MORPHINE SULFATE 2 MG: 2 INJECTION, SOLUTION INTRAMUSCULAR; INTRAVENOUS at 01:52

## 2025-07-09 RX ADMIN — LIDOCAINE HYDROCHLORIDE 100 MG: 20 INJECTION, SOLUTION EPIDURAL; INFILTRATION; INTRACAUDAL; PERINEURAL at 16:22

## 2025-07-09 RX ADMIN — ONDANSETRON 4 MG: 2 INJECTION, SOLUTION INTRAMUSCULAR; INTRAVENOUS at 17:43

## 2025-07-09 RX ADMIN — HYDROMORPHONE HYDROCHLORIDE 0.5 MG: 1 INJECTION, SOLUTION INTRAMUSCULAR; INTRAVENOUS; SUBCUTANEOUS at 17:34

## 2025-07-09 RX ADMIN — SODIUM CHLORIDE, SODIUM LACTATE, POTASSIUM CHLORIDE, AND CALCIUM CHLORIDE 1000 ML: .6; .31; .03; .02 INJECTION, SOLUTION INTRAVENOUS at 18:44

## 2025-07-09 RX ADMIN — HYDROMORPHONE HYDROCHLORIDE 2 MG: 1 INJECTION, SOLUTION INTRAMUSCULAR; INTRAVENOUS; SUBCUTANEOUS at 11:48

## 2025-07-09 RX ADMIN — SODIUM CHLORIDE 40 MG: 9 INJECTION INTRAMUSCULAR; INTRAVENOUS; SUBCUTANEOUS at 20:34

## 2025-07-09 RX ADMIN — ONDANSETRON 4 MG: 2 INJECTION, SOLUTION INTRAMUSCULAR; INTRAVENOUS at 16:30

## 2025-07-09 RX ADMIN — HYDROMORPHONE HYDROCHLORIDE 2 MG: 1 INJECTION, SOLUTION INTRAMUSCULAR; INTRAVENOUS; SUBCUTANEOUS at 20:35

## 2025-07-09 RX ADMIN — Medication 120 MG: at 16:22

## 2025-07-09 RX ADMIN — HYDROMORPHONE HYDROCHLORIDE 0.5 MG: 1 INJECTION, SOLUTION INTRAMUSCULAR; INTRAVENOUS; SUBCUTANEOUS at 17:29

## 2025-07-09 RX ADMIN — HYDROMORPHONE HYDROCHLORIDE 0.5 MG: 1 INJECTION, SOLUTION INTRAMUSCULAR; INTRAVENOUS; SUBCUTANEOUS at 17:39

## 2025-07-09 RX ADMIN — CIPROFLOXACIN 400 MG: 400 INJECTION, SOLUTION INTRAVENOUS at 16:52

## 2025-07-09 RX ADMIN — HYDROMORPHONE HYDROCHLORIDE 2 MG: 1 INJECTION, SOLUTION INTRAMUSCULAR; INTRAVENOUS; SUBCUTANEOUS at 08:32

## 2025-07-09 RX ADMIN — HYDROMORPHONE HYDROCHLORIDE 1.5 MG: 1 INJECTION, SOLUTION INTRAMUSCULAR; INTRAVENOUS; SUBCUTANEOUS at 04:26

## 2025-07-09 RX ADMIN — MORPHINE SULFATE 2 MG: 2 INJECTION, SOLUTION INTRAMUSCULAR; INTRAVENOUS at 09:53

## 2025-07-09 RX ADMIN — PROPOFOL 200 MG: 10 INJECTION, EMULSION INTRAVENOUS at 16:22

## 2025-07-09 ASSESSMENT — PAIN SCALES - GENERAL
PAINLEVEL_OUTOF10: 10
PAINLEVEL_OUTOF10: 6
PAINLEVEL_OUTOF10: 6
PAINLEVEL_OUTOF10: 2
PAINLEVEL_OUTOF10: 4
PAINLEVEL_OUTOF10: 5
PAINLEVEL_OUTOF10: 8
PAINLEVEL_OUTOF10: 10
PAINLEVEL_OUTOF10: 10
PAINLEVEL_OUTOF10: 9
PAINLEVEL_OUTOF10: 7
PAINLEVEL_OUTOF10: 5
PAINLEVEL_OUTOF10: 10
PAINLEVEL_OUTOF10: 6
PAINLEVEL_OUTOF10: 6

## 2025-07-09 ASSESSMENT — PAIN DESCRIPTION - DESCRIPTORS
DESCRIPTORS: BURNING
DESCRIPTORS: SHARP
DESCRIPTORS: BURNING;SHARP
DESCRIPTORS: ACHING
DESCRIPTORS: ACHING
DESCRIPTORS: BURNING
DESCRIPTORS: ACHING
DESCRIPTORS: BURNING
DESCRIPTORS: SHARP

## 2025-07-09 ASSESSMENT — PAIN DESCRIPTION - LOCATION
LOCATION: ABDOMEN

## 2025-07-09 ASSESSMENT — PAIN DESCRIPTION - ORIENTATION
ORIENTATION: MID

## 2025-07-09 ASSESSMENT — PAIN - FUNCTIONAL ASSESSMENT: PAIN_FUNCTIONAL_ASSESSMENT: 0-10

## 2025-07-09 NOTE — OP NOTE
Procedure: ERCP with sphincterotomy and stent placement    Indication: Elevated LFT ; necrotizing pancreatitis    Date of Procedure: 7/9/2025    Patient profile: Refer to patient note in chart for documentation of history and physical.    Providers: Oliva Colindres MD    Referring MD: Dr. Westfall    PCP: Disha Nieves MD    Medicines: General Anesthesia, ciprofloxacin 400mg IV x 1    Complication: No immediate complications.     Estimated blood loss: Minimal    Procedure: After the risks including, but not limited to medication reaction, infection, bleeding, perforation, missed lesions, benefits and alternatives of the procedure were discussed with the patient and all questions were answered, informed consent was obtained. The duodenoscope was passed under direct vision throughout the procedure, the patient's blood pressure pulse and oxygen saturation were monitored continuously. The scope was introduced through the mouth and advanced to the second part of duodenum. The endoscopy was performed without difficulty. The patient tolerated the procedure well. The ERCP was performed with the patient in the  Prone position.    Findings:     films obtained prior to start the procedure was normal.    The duodenoscope was introduced from the mouth advanced into the esophagus into the stomach, and into the level of the ampulla. The ampulla appeared normal. Next, a Rx44  sphincterotome with 0.035 inch semi-stiff guidewire was introduced, and cannulation of the bile duct was attempted using the wire first cannulation technique. This was unsuccessful. Next, a Rx 39 sphincterotome and 0.025in wire was used and cannulation attempted. This was also unsuccessful.   Ultimately, a needle-knife sphincterotome with a 0.035in wire was used and small needle knife sphincterotomy was performed. This was successful. The wire was advanced deep into the intrahepatic ducts, followed by the sphincterotome over the wire. A

## 2025-07-09 NOTE — ANESTHESIA PRE PROCEDURE
Department of Anesthesiology  Preprocedure Note       Name:  Bhupinder Tejada   Age:  40 y.o.  :  1985                                          MRN:  [id number]         Date:  2025      Surgeon: Surgeon(s):  Oliva Colindres MD    Procedure: Procedure(s):  ENDOSCOPIC RETROGRADE CHOLANGIOPANCREATOGRAPHY  ESOPHAGOGASTRODUODENOSCOPY ULTRASOUND    Medications prior to admission:   Prior to Admission medications    Medication Sig Start Date End Date Taking? Authorizing Provider   HYDROcodone-acetaminophen (NORCO) 7.5-325 MG per tablet Take 1 tablet by mouth every 6 hours as needed for Pain.   Yes Provider, MD Alana   pantoprazole (PROTONIX) 40 MG tablet Take 1 tablet by mouth at bedtime 25  Yes Disha Nieves MD   amLODIPine (NORVASC) 10 MG tablet Take 1 tablet by mouth daily 25  Yes Disha Nieves MD   naloxone (NARCAN) 4 MG/0.1ML LIQD nasal spray 1 spray by Nasal route as needed for Opioid Reversal 23   Hema Carlisle MD   thiamine 100 MG tablet Take 1 tablet by mouth in the morning. 8/3/22 8/2/22  Tucker Hinojosa MD       Current medications:    Current Facility-Administered Medications   Medication Dose Route Frequency Provider Last Rate Last Admin   • HYDROmorphone HCl PF (DILAUDID) injection 2 mg  2 mg IntraVENous Q3H PRN Tanja Mejia MD   2 mg at 25 1441   • sodium chloride flush 0.9 % injection 5-40 mL  5-40 mL IntraVENous 2 times per day Tanja Mejia MD   10 mL at 25 0835   • sodium chloride flush 0.9 % injection 5-40 mL  5-40 mL IntraVENous PRN Tanja Mejia MD   10 mL at 25 1351   • 0.9 % sodium chloride infusion   IntraVENous PRN Tanaj Mejia MD       • potassium chloride (KLOR-CON M) extended release tablet 40 mEq  40 mEq Oral PRN Tanja Mejia MD        Or   • potassium bicarb-citric acid (EFFER-K) effervescent tablet 40 mEq  40 mEq Oral PRN Tanja Mejia MD        Or   • potassium chloride 10 mEq/100 mL IVPB (Peripheral Line)  10

## 2025-07-09 NOTE — PROGRESS NOTES
TRANSFER - OUT REPORT:    Verbal report given to ELI Guzman on Bhupinder Tejada  being transferred to Preop for ordered procedure       Report consisted of patient's Situation, Background, Assessment and   Recommendations(SBAR).     Information from the following report(s) Nurse Handoff Report was reviewed with the receiving nurse.           Lines:   Peripheral IV 07/08/25 Right;Dorsal Forearm (Active)   Site Assessment Clean, dry & intact 07/09/25 0721   Line Status Infusing 07/09/25 0721   Line Care Connections checked and tightened 07/08/25 1935   Phlebitis Assessment No symptoms 07/09/25 0721   Infiltration Assessment 0 07/09/25 0721   Alcohol Cap Used Yes 07/09/25 0721   Dressing Status Clean, dry & intact 07/09/25 0721   Dressing Type Transparent 07/09/25 0721        Opportunity for questions and clarification was provided.      Patient transported with:  Tech

## 2025-07-09 NOTE — PROGRESS NOTES
Hospitalist Progress Note   Admit Date:  2025 12:23 PM   Name:  Bhupinder Tejada   Age:  40 y.o.  Sex:  male  :  1985   MRN:  [id number]   Room:  [facility]/    Presenting/Chief Complaint: No chief complaint on file.     Reason(s) for Admission: Chronic pancreatitis, unspecified pancreatitis type (HCC) [K86.1]     Hospital Course:   Bhupinder Tejada is a 40 y.o. male with medical history of alcohol induced chronic pancreatitis who presented with abdominal pain.  He was recently hospitalized for pancreatitis  - .  After discharge, he continued to have abdominal pain, especially on the RUQ.  GI was contacted in ER, and recommended transfer to [facility] for ERCP.     Of note, he has been taking a high dose of narcotics chronically for the past 3 years due to pancreatitis.  Currently slowly tapering down his narcotics.      Subjective & 24hr Events:   No acute overnight events    Still having great pain in his abdomen.  No nausea or vomiting.  No fever or chills.      Assessment & Plan:     Chronic pancreatitis  - Noted in CT, with intra and extrahepatic duct dilatation, which was new since 2025  - Consulted GI.  Planning on EUS and ERCP today  - Pain control with narcotics  - IV fluids        Alcohol abuse  - Patient stated that he would have severe abdominal pain whenever he has alcohol intake.  He is determined to quit alcohol.  - No evidence of alcohol withdrawal at this moment.  Continue to monitor clinically        Essential hypertension  -Hold home meds amlodipine for now        GERD  -Continue PPI IV      Anticipated Discharge Arrangements:   Home    PT/OT evals ordered?  Not ordered; patient not expected to need rehab  Diet:  Diet NPO  VTE prophylaxis: SCD's   Code status: Full Code      Non-peripheral Lines and Tubes (if present):          Telemetry (if present):  Cardiac/Telemetry Monitor On: No        Hospital Problems:  Principal Problem:    Chronic pancreatitis (HCC)  Active

## 2025-07-09 NOTE — PERIOP NOTE
TRANSFER - OUT REPORT:    Verbal report given to ELI Fuentes on Bhupinder Tejada  being transferred to [facility] for routine progression of patient care       Report consisted of patient's Situation, Background, Assessment and   Recommendations(SBAR).     Information from the following report(s) Nurse Handoff Report, Adult Overview, Surgery Report, and MAR was reviewed with the receiving nurse.           Lines:   Peripheral IV 07/08/25 Right;Dorsal Forearm (Active)   Site Assessment Clean, dry & intact 07/09/25 1717   Line Status Infusing;Flushed 07/09/25 1717   Line Care Connections checked and tightened 07/08/25 1935   Phlebitis Assessment No symptoms 07/09/25 1717   Infiltration Assessment 0 07/09/25 1717   Alcohol Cap Used Yes 07/09/25 0721   Dressing Status Clean, dry & intact 07/09/25 1717   Dressing Type Transparent 07/09/25 1717        Opportunity for questions and clarification was provided.

## 2025-07-09 NOTE — PROGRESS NOTES
Brief GI Note  Doing an absolutely unnecessary note to appease Anesthesia team,  to state the same plan as Dr. Westfall's prior note--EUS/ERCP today    Oliva Colindres MD  Gastroenterology and Interventional Endoscopy

## 2025-07-09 NOTE — CARE COORDINATION
Assessment completed via chart review. Patient lives with significant other.. He is IND with ADLS at baseline. No active health insurance. Discharge plan is to return home. No anticipated discharge needs.    Janee COLLINS, WALTER HammerCarbon           07/09/25 [id number]   Service Assessment   Patient Orientation Alert and Oriented   History Provided By Medical Record   Support Systems Spouse/Significant Other;Family Members;Parent   Patient's Healthcare Decision Maker is: Legal Next of Kin   PCP Verified by CM No   Prior Functional Level Independent in ADLs/IADLs   Current Functional Level Independent in ADLs/IADLs   Can patient return to prior living arrangement Yes   Ability to make needs known: Good   Family able to assist with home care needs: Yes   Would you like for me to discuss the discharge plan with any other family members/significant others, and if so, who? No   Financial Resources None   Community Resources None   Social/Functional History   Lives With Spouse   Type of Home House   Bathroom Toilet Standard   Bathroom Accessibility Accessible   Home Equipment None   Receives Help From Family   Prior Level of Assist for ADLs Independent   Prior Level of Assist for Homemaking Independent   Ambulation Assistance Independent   Prior Level of Assist for Transfers Independent   Active  Yes   Occupation Full time employment   Condition of Participation: Discharge Planning   The Plan for Transition of Care is related to the following treatment goals: return home   The Patient and/or Patient Representative was provided with a Choice of Provider? Patient   The Patient and/Or Patient Representative agree with the Discharge Plan? Yes   Freedom of Choice list was provided with basic dialogue that supports the patient's individualized plan of care/goals, treatment preferences, and shares the quality data associated with the providers?  Yes

## 2025-07-09 NOTE — OP NOTE
Procedure: EGD, Endoscopic Ultrasound (EUS)    Indication: Necrotizing pancreatitis. Concern for fluid collection potentially causing a mass effect on CBD    Date of Procedure: 7/9/2025    Patient profile: Refer to patient note in chart for documentation of history and physical    Providers: Oliva Colindres MD    Referring MD: No ref. provider found    PCP: Disha Nieves MD    Medicines: General anesthesia    Complication: No immediate complications.     Estimated blood loss: Minimal    Procedure: After the risks including, but not limited to medication reaction, infection, bleeding, perforation, missed lesions, benefits and alternatives of the procedure were discussed with the patient and all questions were answered, informed consent was obtained. The gastroscope and the linear echoendoscope were passed under direct vision throughout the procedure, the patient's blood pressure pulse and oxygen saturation were monitored continuously. The scopes were introduced through the mouth and advanced to the second part of duodenum. The endoscopy was performed without difficulty. The patient tolerated the procedure well.       Findings:   Endoscopic Exam  --The adult gastroscope was introduced from the mouth and advanced through the esophagus to the GE junction. The esophagus and GE junction were both normal.   --Scope advanced to the stomach. The stomach was normal on forward and retroflexed views.   --The scope was then advanced to the duodenum, appearing normal to the second portion.   --The EGD scope was removed, and the linear EUS scope was introduced.     EUS Exam  Fuji Arietta Precision Ultrasound System was utilized for EUS imaging.     The linear echoendoscope (Olympus 180) was introduced from the mouth and advanced through the esophagus into the stomach to the level of the celiac axis.  The celiac axis was normal. There was no evidence of clinically significant celiac lymphadenopathy. The scope was then

## 2025-07-09 NOTE — PROGRESS NOTES
TRANSFER - IN REPORT:    Verbal report received from ELI Santana on Bhupinder Tejada  being received from PACU for routine progression of patient care      Report consisted of patient's Situation, Background, Assessment and   Recommendations(SBAR).     Information from the following report(s) Nurse Handoff Report was reviewed with the receiving nurse.    Opportunity for questions and clarification was provided.      Assessment completed upon patient's arrival to unit and care assumed.

## 2025-07-10 PROBLEM — K83.1 COMMON BILE DUCT (CBD) STRICTURE (HCC): Status: ACTIVE | Noted: 2025-07-10

## 2025-07-10 LAB
ALBUMIN SERPL-MCNC: 3.5 G/DL (ref 3.5–5)
ALBUMIN/GLOB SERPL: 0.8 (ref 1–1.9)
ALP SERPL-CCNC: 317 U/L (ref 40–129)
ALT SERPL-CCNC: 538 U/L (ref 8–55)
ANION GAP SERPL CALC-SCNC: 12 MMOL/L (ref 7–16)
AST SERPL-CCNC: 98 U/L (ref 15–37)
BASOPHILS # BLD: 0.02 K/UL (ref 0–0.2)
BASOPHILS NFR BLD: 0.3 % (ref 0–2)
BILIRUB SERPL-MCNC: 1.2 MG/DL (ref 0–1.2)
BUN SERPL-MCNC: 9 MG/DL (ref 6–23)
CALCIUM SERPL-MCNC: 9.7 MG/DL (ref 8.8–10.2)
CHLORIDE SERPL-SCNC: 97 MMOL/L (ref 98–107)
CO2 SERPL-SCNC: 25 MMOL/L (ref 20–29)
CREAT SERPL-MCNC: 0.94 MG/DL (ref 0.8–1.3)
DIFFERENTIAL METHOD BLD: ABNORMAL
EOSINOPHIL # BLD: 0.02 K/UL (ref 0–0.8)
EOSINOPHIL NFR BLD: 0.3 % (ref 0.5–7.8)
ERYTHROCYTE [DISTWIDTH] IN BLOOD BY AUTOMATED COUNT: 13.2 % (ref 11.9–14.6)
GLOBULIN SER CALC-MCNC: 4.3 G/DL (ref 2.3–3.5)
GLUCOSE SERPL-MCNC: 109 MG/DL (ref 70–99)
HCT VFR BLD AUTO: 47.8 % (ref 41.1–50.3)
HGB BLD-MCNC: 16.2 G/DL (ref 13.6–17.2)
IMM GRANULOCYTES # BLD AUTO: 0.03 K/UL (ref 0–0.5)
IMM GRANULOCYTES NFR BLD AUTO: 0.4 % (ref 0–5)
LYMPHOCYTES # BLD: 1.46 K/UL (ref 0.5–4.6)
LYMPHOCYTES NFR BLD: 20.8 % (ref 13–44)
MCH RBC QN AUTO: 29.7 PG (ref 26.1–32.9)
MCHC RBC AUTO-ENTMCNC: 33.9 G/DL (ref 31.4–35)
MCV RBC AUTO: 87.5 FL (ref 82–102)
MONOCYTES # BLD: 0.66 K/UL (ref 0.1–1.3)
MONOCYTES NFR BLD: 9.4 % (ref 4–12)
NEUTS SEG # BLD: 4.84 K/UL (ref 1.7–8.2)
NEUTS SEG NFR BLD: 68.8 % (ref 43–78)
NRBC # BLD: 0 K/UL (ref 0–0.2)
PLATELET # BLD AUTO: 440 K/UL (ref 150–450)
PMV BLD AUTO: 9 FL (ref 9.4–12.3)
POTASSIUM SERPL-SCNC: 4.7 MMOL/L (ref 3.5–5.1)
PROT SERPL-MCNC: 7.9 G/DL (ref 6.3–8.2)
RBC # BLD AUTO: 5.46 M/UL (ref 4.23–5.6)
SODIUM SERPL-SCNC: 134 MMOL/L (ref 136–145)
WBC # BLD AUTO: 7 K/UL (ref 4.3–11.1)

## 2025-07-10 PROCEDURE — 85025 COMPLETE CBC W/AUTO DIFF WBC: CPT

## 2025-07-10 PROCEDURE — 36415 COLL VENOUS BLD VENIPUNCTURE: CPT

## 2025-07-10 PROCEDURE — 1100000000 HC RM PRIVATE

## 2025-07-10 PROCEDURE — 2500000003 HC RX 250 WO HCPCS: Performed by: STUDENT IN AN ORGANIZED HEALTH CARE EDUCATION/TRAINING PROGRAM

## 2025-07-10 PROCEDURE — 80053 COMPREHEN METABOLIC PANEL: CPT

## 2025-07-10 PROCEDURE — 99232 SBSQ HOSP IP/OBS MODERATE 35: CPT | Performed by: INTERNAL MEDICINE

## 2025-07-10 PROCEDURE — 6360000002 HC RX W HCPCS: Performed by: STUDENT IN AN ORGANIZED HEALTH CARE EDUCATION/TRAINING PROGRAM

## 2025-07-10 PROCEDURE — 6370000000 HC RX 637 (ALT 250 FOR IP): Performed by: STUDENT IN AN ORGANIZED HEALTH CARE EDUCATION/TRAINING PROGRAM

## 2025-07-10 RX ORDER — KETOROLAC TROMETHAMINE 30 MG/ML
15 INJECTION, SOLUTION INTRAMUSCULAR; INTRAVENOUS ONCE
Status: COMPLETED | OUTPATIENT
Start: 2025-07-11 | End: 2025-07-11

## 2025-07-10 RX ORDER — KETOROLAC TROMETHAMINE 30 MG/ML
15 INJECTION, SOLUTION INTRAMUSCULAR; INTRAVENOUS ONCE
Status: COMPLETED | OUTPATIENT
Start: 2025-07-10 | End: 2025-07-10

## 2025-07-10 RX ORDER — AMLODIPINE BESYLATE 10 MG/1
10 TABLET ORAL DAILY
Status: DISCONTINUED | OUTPATIENT
Start: 2025-07-10 | End: 2025-07-11 | Stop reason: HOSPADM

## 2025-07-10 RX ADMIN — MORPHINE SULFATE 2 MG: 2 INJECTION, SOLUTION INTRAMUSCULAR; INTRAVENOUS at 12:56

## 2025-07-10 RX ADMIN — HYDROMORPHONE HYDROCHLORIDE 2 MG: 1 INJECTION, SOLUTION INTRAMUSCULAR; INTRAVENOUS; SUBCUTANEOUS at 20:48

## 2025-07-10 RX ADMIN — MORPHINE SULFATE 2 MG: 2 INJECTION, SOLUTION INTRAMUSCULAR; INTRAVENOUS at 04:19

## 2025-07-10 RX ADMIN — HYDROMORPHONE HYDROCHLORIDE 2 MG: 1 INJECTION, SOLUTION INTRAMUSCULAR; INTRAVENOUS; SUBCUTANEOUS at 02:39

## 2025-07-10 RX ADMIN — SODIUM CHLORIDE, PRESERVATIVE FREE 10 ML: 5 INJECTION INTRAVENOUS at 20:56

## 2025-07-10 RX ADMIN — HYDROMORPHONE HYDROCHLORIDE 2 MG: 1 INJECTION, SOLUTION INTRAMUSCULAR; INTRAVENOUS; SUBCUTANEOUS at 14:25

## 2025-07-10 RX ADMIN — HYDROMORPHONE HYDROCHLORIDE 2 MG: 1 INJECTION, SOLUTION INTRAMUSCULAR; INTRAVENOUS; SUBCUTANEOUS at 17:49

## 2025-07-10 RX ADMIN — HYDROMORPHONE HYDROCHLORIDE 2 MG: 1 INJECTION, SOLUTION INTRAMUSCULAR; INTRAVENOUS; SUBCUTANEOUS at 23:41

## 2025-07-10 RX ADMIN — MORPHINE SULFATE 2 MG: 2 INJECTION, SOLUTION INTRAMUSCULAR; INTRAVENOUS at 10:06

## 2025-07-10 RX ADMIN — MORPHINE SULFATE 2 MG: 2 INJECTION, SOLUTION INTRAMUSCULAR; INTRAVENOUS at 19:12

## 2025-07-10 RX ADMIN — HYDROMORPHONE HYDROCHLORIDE 2 MG: 1 INJECTION, SOLUTION INTRAMUSCULAR; INTRAVENOUS; SUBCUTANEOUS at 11:53

## 2025-07-10 RX ADMIN — MORPHINE SULFATE 2 MG: 2 INJECTION, SOLUTION INTRAMUSCULAR; INTRAVENOUS at 16:19

## 2025-07-10 RX ADMIN — MORPHINE SULFATE 2 MG: 2 INJECTION, SOLUTION INTRAMUSCULAR; INTRAVENOUS at 22:19

## 2025-07-10 RX ADMIN — SODIUM CHLORIDE, PRESERVATIVE FREE 10 ML: 5 INJECTION INTRAVENOUS at 10:05

## 2025-07-10 RX ADMIN — HYDROMORPHONE HYDROCHLORIDE 2 MG: 1 INJECTION, SOLUTION INTRAMUSCULAR; INTRAVENOUS; SUBCUTANEOUS at 08:40

## 2025-07-10 RX ADMIN — MORPHINE SULFATE 2 MG: 2 INJECTION, SOLUTION INTRAMUSCULAR; INTRAVENOUS at 01:35

## 2025-07-10 RX ADMIN — AMLODIPINE BESYLATE 10 MG: 10 TABLET ORAL at 14:18

## 2025-07-10 RX ADMIN — KETOROLAC TROMETHAMINE 15 MG: 30 INJECTION, SOLUTION INTRAMUSCULAR at 14:20

## 2025-07-10 RX ADMIN — MORPHINE SULFATE 2 MG: 2 INJECTION, SOLUTION INTRAMUSCULAR; INTRAVENOUS at 07:12

## 2025-07-10 RX ADMIN — SODIUM CHLORIDE 40 MG: 9 INJECTION INTRAMUSCULAR; INTRAVENOUS; SUBCUTANEOUS at 19:18

## 2025-07-10 RX ADMIN — HYDROMORPHONE HYDROCHLORIDE 2 MG: 1 INJECTION, SOLUTION INTRAMUSCULAR; INTRAVENOUS; SUBCUTANEOUS at 05:26

## 2025-07-10 ASSESSMENT — PAIN SCALES - GENERAL
PAINLEVEL_OUTOF10: 6
PAINLEVEL_OUTOF10: 9
PAINLEVEL_OUTOF10: 4
PAINLEVEL_OUTOF10: 9
PAINLEVEL_OUTOF10: 10
PAINLEVEL_OUTOF10: 8
PAINLEVEL_OUTOF10: 9
PAINLEVEL_OUTOF10: 7
PAINLEVEL_OUTOF10: 7
PAINLEVEL_OUTOF10: 9
PAINLEVEL_OUTOF10: 8
PAINLEVEL_OUTOF10: 2
PAINLEVEL_OUTOF10: 9
PAINLEVEL_OUTOF10: 10
PAINLEVEL_OUTOF10: 7
PAINLEVEL_OUTOF10: 6
PAINLEVEL_OUTOF10: 9
PAINLEVEL_OUTOF10: 9
PAINLEVEL_OUTOF10: 8
PAINLEVEL_OUTOF10: 9
PAINLEVEL_OUTOF10: 4
PAINLEVEL_OUTOF10: 10

## 2025-07-10 ASSESSMENT — PAIN DESCRIPTION - ORIENTATION
ORIENTATION: RIGHT;UPPER
ORIENTATION: RIGHT
ORIENTATION: MID
ORIENTATION: RIGHT
ORIENTATION: ANTERIOR;MID
ORIENTATION: RIGHT

## 2025-07-10 ASSESSMENT — PAIN - FUNCTIONAL ASSESSMENT
PAIN_FUNCTIONAL_ASSESSMENT: PREVENTS OR INTERFERES SOME ACTIVE ACTIVITIES AND ADLS
PAIN_FUNCTIONAL_ASSESSMENT: ACTIVITIES ARE NOT PREVENTED

## 2025-07-10 ASSESSMENT — PAIN DESCRIPTION - PAIN TYPE
TYPE: ACUTE PAIN

## 2025-07-10 ASSESSMENT — PAIN DESCRIPTION - FREQUENCY
FREQUENCY: CONTINUOUS

## 2025-07-10 ASSESSMENT — PAIN DESCRIPTION - ONSET
ONSET: ON-GOING

## 2025-07-10 ASSESSMENT — PAIN DESCRIPTION - LOCATION
LOCATION: ABDOMEN

## 2025-07-10 ASSESSMENT — PAIN DESCRIPTION - DESCRIPTORS
DESCRIPTORS: ACHING
DESCRIPTORS: CRUSHING
DESCRIPTORS: ACHING;BURNING
DESCRIPTORS: CRUSHING
DESCRIPTORS: BURNING
DESCRIPTORS: CRUSHING
DESCRIPTORS: BURNING
DESCRIPTORS: ACHING;BURNING
DESCRIPTORS: ACHING;BURNING
DESCRIPTORS: BURNING
DESCRIPTORS: ACHING
DESCRIPTORS: CRUSHING
DESCRIPTORS: ACHING;BURNING
DESCRIPTORS: ACHING

## 2025-07-10 NOTE — PROGRESS NOTES
Pt resting at this time, NAD. Narcotics given for pain throughout shift, pt reports that Toradol IV given with pain meds was effective relief., see MAR. Hourly rounds completed. Bed in low and locked position, call light and personal items within reach. Will give bedside report to oncoming nurse.

## 2025-07-10 NOTE — PROGRESS NOTES
Bhupinder Tejada is 40 y.o. y/o male     Mr. Bhupinder Tejada is a 40 y.o. male presenting with epigastric pain and dark urine.  Has a history of alcohol induced pancreatitis CT completed in the ER revealed intra and extrahepatic ductal dilation as well as pancreatitis involving the pancreatic head. 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 He was just discharged on 6/30/2025 and a couple days ago started to experience worsening epigastric pain and noted dark urine.     EUS/ERCP completed yesterday which showed CBD stricture where sludge and pus removed and stent was placed. Pancreatitis was also noted on exam. TB/LFT's trending down.    Interim:   Today patient states he is feeling okay. He is still having abdominal pain and states he feels burning in his abdomen now. He denies any nausea, vomiting and has not had a BM. He is tolerating a clear liquid diet.    Labs: Bili 1.2 (1.5),  (691), AST 98 (216),  (335)    PE:   Vitals:    07/10/25 1153   BP:    Pulse:    Resp: 18   Temp:    SpO2:       General:  The patient appears well-nourished, and is in no acute distress.    HEENT:  Normocephalic, atraumatic. No sclerae icterus.   Abdomen:  Soft, tender to palpation. No distention. Normoactive bowel sounds present.    Neurologic:  Alert and oriented x3.  Psychiatric: Appropriate mood and affect.    Assessment and Plan:   #CBD stricture: ERCP showed CBD stricture where sludge and pus was removed and stent was placed. Pancreatitis also noted. Tolerating clear liquid diet but having some burning pain in the abdomen. No nausea or vomiting. He will follow up with us in 2-3 months to have EUS/ERCP to have stent exchanged/removed. Educated the importance of no alcohol going forward.     Damaris Reyes, APRN - CNP

## 2025-07-10 NOTE — ANESTHESIA POSTPROCEDURE EVALUATION
Department of Anesthesiology  Postprocedure Note    Patient: Bhupinder Tejada  MRN: [id number]  YOB: 1985  Date of evaluation: 7/9/2025    Procedure Summary       Date: 07/09/25 Room / Location: [facility] MAIN OR 09 / [facility] MAIN OR    Anesthesia Start: 1613 Anesthesia Stop: 1715    Procedures:       ENDOSCOPIC RETROGRADE CHOLANGIOPANCREATOGRAPHY with sphincterotomy, stent placement (Upper GI Region)      ESOPHAGOGASTRODUODENOSCOPY ULTRASOUND Diagnosis:       Chronic pancreatitis (HCC)      Dilation of common bile duct      (Chronic pancreatitis (HCC) [K86.1])      (Dilation of common bile duct [K83.8])    Providers: Oliva Colindres MD Responsible Provider: Douglas Florez MD    Anesthesia Type: General ASA Status: 3 - Emergent            Anesthesia Type: General    Mack Phase I: Mack Score: 10    Mack Phase II:      Anesthesia Post Evaluation    Patient location during evaluation: PACU  Patient participation: complete - patient participated  Level of consciousness: awake and alert  Pain scale: pain adequately controlled.  Airway patency: patent  Nausea & Vomiting: no nausea and no vomiting  Cardiovascular status: hemodynamically stable  Respiratory status: acceptable  Hydration status: euvolemic  Multimodal analgesia pain management approach  Pain management: adequate        No notable events documented.

## 2025-07-10 NOTE — PROGRESS NOTES
Hospitalist Progress Note   Admit Date:  2025 12:23 PM   Name:  Bhupinder Tejada   Age:  40 y.o.  Sex:  male  :  1985   MRN:  [id number]   Room:  [facility]/    Presenting/Chief Complaint: No chief complaint on file.     Reason(s) for Admission: Chronic pancreatitis, unspecified pancreatitis type (HCC) [K86.1]     Hospital Course:   Bhupinder Tejada is a 40 y.o. male with medical history of alcohol induced chronic pancreatitis who presented with abdominal pain.  He was recently hospitalized for pancreatitis  - .  After discharge, he continued to have abdominal pain, especially on the RUQ.  GI was contacted in ER, and recommended transfer to [facility] for ERCP.     Of note, he has been taking a high dose of narcotics chronically for the past 3 years due to pancreatitis.  Currently slowly tapering down his narcotics.      Subjective & 24hr Events:   No acute overnight events    S/p ERCP yesterday.  Still having great pain in his abdomen.  No nausea or vomiting.  No fever or chills.      Assessment & Plan:     Chronic pancreatitis  CBD stricture s/p stent placement  - Noted in CT, with intra and extrahepatic duct dilatation, which was new since 2025  - Consulted GI.  S/p EUS and ERCP .  Revealed CBD stricture when sludge and pus removed.  S/p stent placement  - Pain control with narcotics.  Will order toradol as well.  - IV fluids  - Advance diet as tolerated.  Will discharge if he can tolerate diet.  Still having great pain.        Alcohol abuse  - Patient stated that he would have severe abdominal pain whenever he has alcohol intake.  He is determined to quit alcohol.  - No evidence of alcohol withdrawal at this moment.  Continue to monitor clinically        Essential hypertension  - Resume home med amlodipine        GERD  -Continue PPI IV      Anticipated Discharge Arrangements:   Home    PT/OT evals ordered?  Not ordered; patient not expected to need rehab  Diet:  ADULT DIET; Clear

## 2025-07-10 NOTE — PROGRESS NOTES
Pain medication given per MAR throughout shift.     Rounds performed throughout shift. Pt denies needs at this time. Bed in low position, locked and call light/personal items within reach.

## 2025-07-11 ENCOUNTER — TELEPHONE (OUTPATIENT)
Dept: GASTROENTEROLOGY | Age: 40
End: 2025-07-11

## 2025-07-11 VITALS
BODY MASS INDEX: 26.81 KG/M2 | DIASTOLIC BLOOD PRESSURE: 85 MMHG | TEMPERATURE: 97.7 F | HEIGHT: 69 IN | HEART RATE: 70 BPM | SYSTOLIC BLOOD PRESSURE: 123 MMHG | RESPIRATION RATE: 16 BRPM | WEIGHT: 181 LBS | OXYGEN SATURATION: 97 %

## 2025-07-11 LAB
ALBUMIN SERPL-MCNC: 3.2 G/DL (ref 3.5–5)
ALBUMIN/GLOB SERPL: 1 (ref 1–1.9)
ALP SERPL-CCNC: 241 U/L (ref 40–129)
ALT SERPL-CCNC: 387 U/L (ref 8–55)
ANION GAP SERPL CALC-SCNC: 9 MMOL/L (ref 7–16)
AST SERPL-CCNC: 67 U/L (ref 15–37)
BASOPHILS # BLD: 0.03 K/UL (ref 0–0.2)
BASOPHILS NFR BLD: 0.6 % (ref 0–2)
BILIRUB SERPL-MCNC: 1.1 MG/DL (ref 0–1.2)
BUN SERPL-MCNC: 10 MG/DL (ref 6–23)
CALCIUM SERPL-MCNC: 9.1 MG/DL (ref 8.8–10.2)
CHLORIDE SERPL-SCNC: 98 MMOL/L (ref 98–107)
CO2 SERPL-SCNC: 25 MMOL/L (ref 20–29)
CREAT SERPL-MCNC: 0.87 MG/DL (ref 0.8–1.3)
DIFFERENTIAL METHOD BLD: ABNORMAL
EOSINOPHIL # BLD: 0.06 K/UL (ref 0–0.8)
EOSINOPHIL NFR BLD: 1.1 % (ref 0.5–7.8)
ERYTHROCYTE [DISTWIDTH] IN BLOOD BY AUTOMATED COUNT: 13.2 % (ref 11.9–14.6)
GLOBULIN SER CALC-MCNC: 3.3 G/DL (ref 2.3–3.5)
GLUCOSE SERPL-MCNC: 111 MG/DL (ref 70–99)
HCT VFR BLD AUTO: 42.8 % (ref 41.1–50.3)
HGB BLD-MCNC: 15 G/DL (ref 13.6–17.2)
IMM GRANULOCYTES # BLD AUTO: 0.01 K/UL (ref 0–0.5)
IMM GRANULOCYTES NFR BLD AUTO: 0.2 % (ref 0–5)
LYMPHOCYTES # BLD: 1.6 K/UL (ref 0.5–4.6)
LYMPHOCYTES NFR BLD: 29.4 % (ref 13–44)
MCH RBC QN AUTO: 30.8 PG (ref 26.1–32.9)
MCHC RBC AUTO-ENTMCNC: 35 G/DL (ref 31.4–35)
MCV RBC AUTO: 87.9 FL (ref 82–102)
MONOCYTES # BLD: 0.66 K/UL (ref 0.1–1.3)
MONOCYTES NFR BLD: 12.1 % (ref 4–12)
NEUTS SEG # BLD: 3.09 K/UL (ref 1.7–8.2)
NEUTS SEG NFR BLD: 56.6 % (ref 43–78)
NRBC # BLD: 0 K/UL (ref 0–0.2)
PLATELET # BLD AUTO: 334 K/UL (ref 150–450)
PMV BLD AUTO: 8.9 FL (ref 9.4–12.3)
POTASSIUM SERPL-SCNC: 4.4 MMOL/L (ref 3.5–5.1)
PROT SERPL-MCNC: 6.5 G/DL (ref 6.3–8.2)
RBC # BLD AUTO: 4.87 M/UL (ref 4.23–5.6)
SODIUM SERPL-SCNC: 132 MMOL/L (ref 136–145)
WBC # BLD AUTO: 5.5 K/UL (ref 4.3–11.1)

## 2025-07-11 PROCEDURE — 2500000003 HC RX 250 WO HCPCS: Performed by: STUDENT IN AN ORGANIZED HEALTH CARE EDUCATION/TRAINING PROGRAM

## 2025-07-11 PROCEDURE — 36415 COLL VENOUS BLD VENIPUNCTURE: CPT

## 2025-07-11 PROCEDURE — 80053 COMPREHEN METABOLIC PANEL: CPT

## 2025-07-11 PROCEDURE — 6370000000 HC RX 637 (ALT 250 FOR IP): Performed by: STUDENT IN AN ORGANIZED HEALTH CARE EDUCATION/TRAINING PROGRAM

## 2025-07-11 PROCEDURE — 6360000002 HC RX W HCPCS: Performed by: STUDENT IN AN ORGANIZED HEALTH CARE EDUCATION/TRAINING PROGRAM

## 2025-07-11 PROCEDURE — 6360000002 HC RX W HCPCS: Performed by: NURSE PRACTITIONER

## 2025-07-11 PROCEDURE — 85025 COMPLETE CBC W/AUTO DIFF WBC: CPT

## 2025-07-11 RX ORDER — OXYCODONE HYDROCHLORIDE 5 MG/1
5 TABLET ORAL EVERY 6 HOURS PRN
Qty: 12 TABLET | Refills: 0 | Status: SHIPPED | OUTPATIENT
Start: 2025-07-11 | End: 2025-07-14

## 2025-07-11 RX ORDER — KETOROLAC TROMETHAMINE 30 MG/ML
15 INJECTION, SOLUTION INTRAMUSCULAR; INTRAVENOUS ONCE
Status: COMPLETED | OUTPATIENT
Start: 2025-07-11 | End: 2025-07-11

## 2025-07-11 RX ORDER — NAPROXEN 500 MG/1
500 TABLET ORAL 2 TIMES DAILY WITH MEALS
Qty: 60 TABLET | Refills: 0 | Status: SHIPPED | OUTPATIENT
Start: 2025-07-11

## 2025-07-11 RX ADMIN — KETOROLAC TROMETHAMINE 15 MG: 30 INJECTION, SOLUTION INTRAMUSCULAR at 00:03

## 2025-07-11 RX ADMIN — MORPHINE SULFATE 2 MG: 2 INJECTION, SOLUTION INTRAMUSCULAR; INTRAVENOUS at 01:51

## 2025-07-11 RX ADMIN — SODIUM CHLORIDE, PRESERVATIVE FREE 10 ML: 5 INJECTION INTRAVENOUS at 08:59

## 2025-07-11 RX ADMIN — HYDROMORPHONE HYDROCHLORIDE 2 MG: 1 INJECTION, SOLUTION INTRAMUSCULAR; INTRAVENOUS; SUBCUTANEOUS at 09:00

## 2025-07-11 RX ADMIN — MORPHINE SULFATE 2 MG: 2 INJECTION, SOLUTION INTRAMUSCULAR; INTRAVENOUS at 04:22

## 2025-07-11 RX ADMIN — AMLODIPINE BESYLATE 10 MG: 10 TABLET ORAL at 08:59

## 2025-07-11 RX ADMIN — HYDROMORPHONE HYDROCHLORIDE 2 MG: 1 INJECTION, SOLUTION INTRAMUSCULAR; INTRAVENOUS; SUBCUTANEOUS at 02:50

## 2025-07-11 RX ADMIN — MORPHINE SULFATE 2 MG: 2 INJECTION, SOLUTION INTRAMUSCULAR; INTRAVENOUS at 17:03

## 2025-07-11 RX ADMIN — KETOROLAC TROMETHAMINE 15 MG: 30 INJECTION, SOLUTION INTRAMUSCULAR at 11:12

## 2025-07-11 RX ADMIN — MORPHINE SULFATE 2 MG: 2 INJECTION, SOLUTION INTRAMUSCULAR; INTRAVENOUS at 11:31

## 2025-07-11 RX ADMIN — MORPHINE SULFATE 2 MG: 2 INJECTION, SOLUTION INTRAMUSCULAR; INTRAVENOUS at 14:19

## 2025-07-11 RX ADMIN — MORPHINE SULFATE 2 MG: 2 INJECTION, SOLUTION INTRAMUSCULAR; INTRAVENOUS at 07:33

## 2025-07-11 RX ADMIN — HYDROMORPHONE HYDROCHLORIDE 2 MG: 1 INJECTION, SOLUTION INTRAMUSCULAR; INTRAVENOUS; SUBCUTANEOUS at 15:48

## 2025-07-11 RX ADMIN — HYDROMORPHONE HYDROCHLORIDE 2 MG: 1 INJECTION, SOLUTION INTRAMUSCULAR; INTRAVENOUS; SUBCUTANEOUS at 06:05

## 2025-07-11 RX ADMIN — HYDROMORPHONE HYDROCHLORIDE 2 MG: 1 INJECTION, SOLUTION INTRAMUSCULAR; INTRAVENOUS; SUBCUTANEOUS at 12:56

## 2025-07-11 ASSESSMENT — PAIN DESCRIPTION - ORIENTATION
ORIENTATION: RIGHT;UPPER
ORIENTATION: RIGHT
ORIENTATION: RIGHT;UPPER

## 2025-07-11 ASSESSMENT — PAIN SCALES - GENERAL
PAINLEVEL_OUTOF10: 9
PAINLEVEL_OUTOF10: 9
PAINLEVEL_OUTOF10: 7
PAINLEVEL_OUTOF10: 8
PAINLEVEL_OUTOF10: 9
PAINLEVEL_OUTOF10: 8
PAINLEVEL_OUTOF10: 7
PAINLEVEL_OUTOF10: 7
PAINLEVEL_OUTOF10: 9
PAINLEVEL_OUTOF10: 4
PAINLEVEL_OUTOF10: 7
PAINLEVEL_OUTOF10: 4
PAINLEVEL_OUTOF10: 4
PAINLEVEL_OUTOF10: 8
PAINLEVEL_OUTOF10: 7
PAINLEVEL_OUTOF10: 9
PAINLEVEL_OUTOF10: 9
PAINLEVEL_OUTOF10: 4
PAINLEVEL_OUTOF10: 4

## 2025-07-11 ASSESSMENT — PAIN DESCRIPTION - LOCATION
LOCATION: ABDOMEN
LOCATION: ABDOMEN
LOCATION: ABDOMEN;BACK
LOCATION: ABDOMEN;BACK
LOCATION: ABDOMEN
LOCATION: ABDOMEN
LOCATION: ABDOMEN;BACK
LOCATION: ABDOMEN

## 2025-07-11 ASSESSMENT — PAIN DESCRIPTION - DESCRIPTORS
DESCRIPTORS: ACHING;BURNING
DESCRIPTORS: BURNING
DESCRIPTORS: ACHING;BURNING

## 2025-07-11 ASSESSMENT — PAIN DESCRIPTION - ONSET
ONSET: ON-GOING

## 2025-07-11 ASSESSMENT — PAIN DESCRIPTION - PAIN TYPE
TYPE: ACUTE PAIN

## 2025-07-11 ASSESSMENT — PAIN DESCRIPTION - FREQUENCY
FREQUENCY: CONTINUOUS

## 2025-07-11 NOTE — CARE COORDINATION
Chart reviewed and patient discussed in IDT rounds this AM. Discharge plan is to return home when medically stable.     Janee COLLINS, ACM  [place]

## 2025-07-11 NOTE — PROGRESS NOTES
Up ad claudia. Continuous PO on with 02 SAT >92%.  Calls for pain med about every 1 1/2 hours for RUQ pain/burning. Taking in clear liquids.  Call light in reach.

## 2025-07-11 NOTE — DISCHARGE SUMMARY
Hospitalist Discharge Summary   Admit Date:  2025 12:23 PM   DC Note date: 2025  Name:  Bhupinder Tejada   Age:  40 y.o.  Sex:  male  :  1985   MRN:  [id number]   [place]  [facility] [place]  PCP:  Disha Nieves MD    Presenting Complaint: No chief complaint on file.     Initial Admission Diagnosis: Chronic pancreatitis, unspecified pancreatitis type (HCC) [K86.1]     Problem List for this Hospitalization (present on admission):    Principal Problem:    Chronic pancreatitis (HCC)  Active Problems:    Alcohol abuse    GERD (gastroesophageal reflux disease)    Primary hypertension    Dilation of common bile duct    Transaminitis    Hyperbilirubinemia    Common bile duct (CBD) stricture (HCC)  Resolved Problems:    * No resolved hospital problems. *      Hospital Course:  Bhupinder Tejada is a 40 y.o. male with medical history of alcohol induced chronic pancreatitis who presented with abdominal pain.  He was recently hospitalized for pancreatitis  - .  After discharge, he continued to have abdominal pain, especially on the RUQ.  GI was contacted in ER, and recommended transfer to [facility] for ERCP.     Of note, he has been taking a high dose of narcotics chronically for the past 3 years due to pancreatitis.  Currently slowly tapering down his narcotics.        Chronic pancreatitis  CBD stricture s/p stent placement  - Noted in CT, with intra and extrahepatic duct dilatation, which was new since 2025  - Consulted GI.  S/p EUS and ERCP .  Revealed CBD stricture when sludge and pus removed.  S/p stent placement  - Pain control with narcotics.  Toradol was ordered, and pt thought that it was helpful.  Ordered Naproxen upon discharge, as the recommended duration of toradol is only for 5 days.  - He received IV fluids  - Advanced diet as tolerated.  Discharged after he was able to tolerate solid with a manageable pain level.         Alcohol abuse  - Patient stated that he would have

## 2025-07-11 NOTE — TELEPHONE ENCOUNTER
Called patient to schedule  / lmovm     Damaris Reyes, APRN - CNP  Vanna Jiménez. Can we please schedule this patient in 2-3 months for EUS/ERCP with Dr. Colindres for CBD stricture with stent placement!! Thank you!!

## 2025-07-16 ENCOUNTER — TELEPHONE (OUTPATIENT)
Dept: GASTROENTEROLOGY | Age: 40
End: 2025-07-16

## 2025-07-23 ENCOUNTER — PATIENT MESSAGE (OUTPATIENT)
Dept: INTERNAL MEDICINE CLINIC | Facility: CLINIC | Age: 40
End: 2025-07-23

## 2025-07-23 DIAGNOSIS — K85.91 NECROTIZING PANCREATITIS: Primary | ICD-10-CM

## 2025-07-23 RX ORDER — HYDROCODONE BITARTRATE AND ACETAMINOPHEN 7.5; 325 MG/1; MG/1
1 TABLET ORAL EVERY 6 HOURS PRN
Qty: 90 TABLET | Refills: 0 | OUTPATIENT
Start: 2025-07-23 | End: 2025-08-22

## 2025-07-23 NOTE — TELEPHONE ENCOUNTER
Requested Prescriptions     Pending Prescriptions Disp Refills    HYDROcodone-acetaminophen (NORCO) 7.5-325 MG per tablet 90 tablet 0     Sig: Take 1 tablet by mouth every 6 hours as needed for Pain for up to 30 days. Max Daily Amount: 4 tablets    Next ov 09/03/2025. Pharmacy confirmed.

## 2025-07-24 NOTE — TELEPHONE ENCOUNTER
Requested Prescriptions     Pending Prescriptions Disp Refills    HYDROcodone-acetaminophen (NORCO) 7.5-325 MG per tablet 20 tablet 0     Sig: Take 1 tablet by mouth every 6 hours as needed for Pain for up to 5 days. Max Daily Amount: 4 tablets     Refused Prescriptions Disp Refills    HYDROcodone-acetaminophen (NORCO) 7.5-325 MG per tablet 90 tablet 0     Sig: Take 1 tablet by mouth every 6 hours as needed for Pain for up to 30 days. Max Daily Amount: 4 tablets     Refused By: DERRICK CAMP     Reason for Refusal: Not the prescriber of this medication    5 day supplies ordered.

## 2025-07-25 DIAGNOSIS — I10 PRIMARY HYPERTENSION: Chronic | ICD-10-CM

## 2025-07-25 RX ORDER — HYDROCODONE BITARTRATE AND ACETAMINOPHEN 7.5; 325 MG/1; MG/1
1 TABLET ORAL EVERY 6 HOURS PRN
Qty: 20 TABLET | Refills: 0 | Status: SHIPPED | OUTPATIENT
Start: 2025-07-25 | End: 2025-07-30

## 2025-07-25 NOTE — TELEPHONE ENCOUNTER
Requested Prescriptions     Pending Prescriptions Disp Refills    amLODIPine (NORVASC) 10 MG tablet 90 tablet 1     Sig: Take 1 tablet by mouth daily     Dose verified and to [facility]. Patient is scheduled for follow up visit.

## 2025-07-28 DIAGNOSIS — K85.91 NECROTIZING PANCREATITIS: ICD-10-CM

## 2025-07-28 RX ORDER — AMLODIPINE BESYLATE 10 MG/1
10 TABLET ORAL DAILY
Qty: 90 TABLET | Refills: 1 | Status: SHIPPED | OUTPATIENT
Start: 2025-07-28

## 2025-07-29 RX ORDER — HYDROCODONE BITARTRATE AND ACETAMINOPHEN 7.5; 325 MG/1; MG/1
1 TABLET ORAL EVERY 6 HOURS PRN
Qty: 90 TABLET | Refills: 0 | Status: SHIPPED | OUTPATIENT
Start: 2025-07-29 | End: 2025-08-28

## 2025-08-09 DIAGNOSIS — K21.9 GASTROESOPHAGEAL REFLUX DISEASE, UNSPECIFIED WHETHER ESOPHAGITIS PRESENT: ICD-10-CM

## 2025-08-11 RX ORDER — PANTOPRAZOLE SODIUM 40 MG/1
40 TABLET, DELAYED RELEASE ORAL NIGHTLY
Qty: 90 TABLET | Refills: 1 | Status: SHIPPED | OUTPATIENT
Start: 2025-08-11

## 2025-09-03 ENCOUNTER — OFFICE VISIT (OUTPATIENT)
Dept: INTERNAL MEDICINE CLINIC | Facility: CLINIC | Age: 40
End: 2025-09-03

## 2025-09-03 VITALS
SYSTOLIC BLOOD PRESSURE: 122 MMHG | HEART RATE: 83 BPM | WEIGHT: 175 LBS | DIASTOLIC BLOOD PRESSURE: 86 MMHG | BODY MASS INDEX: 25.92 KG/M2 | HEIGHT: 69 IN | OXYGEN SATURATION: 98 %

## 2025-09-03 DIAGNOSIS — K21.9 GASTROESOPHAGEAL REFLUX DISEASE, UNSPECIFIED WHETHER ESOPHAGITIS PRESENT: ICD-10-CM

## 2025-09-03 DIAGNOSIS — K86.0 ALCOHOL-INDUCED CHRONIC PANCREATITIS (HCC): ICD-10-CM

## 2025-09-03 DIAGNOSIS — I10 PRIMARY HYPERTENSION: Primary | ICD-10-CM

## 2025-09-03 DIAGNOSIS — Z11.9 SCREENING EXAMINATION FOR INFECTIOUS DISEASE: ICD-10-CM

## 2025-09-03 PROCEDURE — 99214 OFFICE O/P EST MOD 30 MIN: CPT | Performed by: INTERNAL MEDICINE

## 2025-09-03 PROCEDURE — 3074F SYST BP LT 130 MM HG: CPT | Performed by: INTERNAL MEDICINE

## 2025-09-03 PROCEDURE — 3079F DIAST BP 80-89 MM HG: CPT | Performed by: INTERNAL MEDICINE

## 2025-09-03 RX ORDER — PANTOPRAZOLE SODIUM 20 MG/1
20 TABLET, DELAYED RELEASE ORAL NIGHTLY
Qty: 90 TABLET | Refills: 0 | Status: SHIPPED | OUTPATIENT
Start: 2025-09-03

## 2025-09-03 ASSESSMENT — ENCOUNTER SYMPTOMS
ABDOMINAL DISTENTION: 1
SHORTNESS OF BREATH: 0
COUGH: 0

## (undated) DEVICE — MOUTHPIECE ENDOSCP L CTRL OPN AND SIDE PORTS DISP

## (undated) DEVICE — FORCEPS GRASPING RAT TOOTH 2.4X7MMX230CM RESCUE

## (undated) DEVICE — AIRLIFE™ OXYGEN TUBING 7 FEET (2.1 M) CRUSH RESISTANT OXYGEN TUBING, VINYL TIPPED: Brand: AIRLIFE™

## (undated) DEVICE — SINGLE PORT MANIFOLD: Brand: NEPTUNE 2

## (undated) DEVICE — CATH BLLN ANGIO 2.50X20MM SC EUPHORA RX

## (undated) DEVICE — ELECTRODE PT RET AD L9FT HI MOIST COND ADH HYDRGEL CORDED

## (undated) DEVICE — ENDOSCOPIC KIT 1.1+ OP4 CA DE 2 GWN AAMI LEVEL 3

## (undated) DEVICE — CONNECTOR TBNG OD5-7MM O2 END DISP

## (undated) DEVICE — LUBE JELLY FOIL PACK 1.4 OZ: Brand: MEDLINE INDUSTRIES, INC.

## (undated) DEVICE — Device: Brand: BALLOON3

## (undated) DEVICE — CATHETER GUID 6FR L100CM BLU PTFE JL3.5 TRUELUMEN HYBRID

## (undated) DEVICE — CANNULA NSL ORAL AD FOR CAPNOFLEX CO2 O2 AIRLFE

## (undated) DEVICE — CATH BLLN ANGIO 3.50X15MM NC EUPHORA RX

## (undated) DEVICE — CONTAINER FORMALIN PREFILLED 10% NBF 60ML

## (undated) DEVICE — YANKAUER,BULB TIP,W/O VENT,RIGID,STERILE: Brand: MEDLINE

## (undated) DEVICE — SOLUTION IRRIG 1000ML H2O PIC PLAS SHATTERPROOF CONTAINER

## (undated) DEVICE — TRIPLE LUMEN NEEDLE KNIFE: Brand: RX NEEDLE KNIFE XL

## (undated) DEVICE — FORCEPS BX L240CM JAW DIA2.8MM L CAP W/ NDL MIC MESH TOOTH

## (undated) DEVICE — GARMENT,MEDLINE,DVT,INT,CALF,LG, GEN2: Brand: MEDLINE

## (undated) DEVICE — ENDOSCOPIC ULTRASOUND FINE NEEDLE BIOPSY (FNB) DEVICE: Brand: ACQUIRE

## (undated) DEVICE — Device: Brand: AIR/WATER VALVE

## (undated) DEVICE — CATHETER DIAG AD 5FR L110CM 145DEG COR GRY HYDRPHLC NYL

## (undated) DEVICE — GLIDESHEATH SLENDER STAINLESS STEEL KIT: Brand: GLIDESHEATH SLENDER

## (undated) DEVICE — Device: Brand: SUCTION VALVE

## (undated) DEVICE — SYRINGE MED 10ML LUERLOCK TIP W/O SFTY DISP

## (undated) DEVICE — KENDALL RADIOLUCENT FOAM MONITORING ELECTRODE RECTANGULAR SHAPE: Brand: KENDALL

## (undated) DEVICE — NEEDLE SYRINGE 18GA L1.5IN RED PLAS HUB S STL BLNT FILL W/O

## (undated) DEVICE — 1200 GUARD II KIT W/5MM TUBE W/O VAC TUBE: Brand: GUARDIAN

## (undated) DEVICE — CATHETER DIAG AD 5FR L100CM COR NYL JUDKINS R 5 DILATED

## (undated) DEVICE — SPHINCTEROTOME: Brand: JAGTOME RX 39

## (undated) DEVICE — GUIDEWIRE VASC L260CM DIA0.035IN RAD 3MM J TIP L7CM PTFE

## (undated) DEVICE — RADIFOCUS OPTITORQUE ANGIOGRAPHIC CATHETER: Brand: OPTITORQUE

## (undated) DEVICE — DISPOSABLE BIOPSY VALVE MAJ-1555: Brand: SINGLE USE BIOPSY VALVE (STERILE)

## (undated) DEVICE — BLOCK BITE AD 60FR W/ VELC STRP ADDRESSES MOST PT AND

## (undated) DEVICE — GAUZE,SPONGE,4"X4",12PLY,WOVEN,NS,LF: Brand: MEDLINE

## (undated) DEVICE — COPILOT BLEEDBACK CONTROL VALVE: Brand: COPILOT

## (undated) DEVICE — DEVICE COMPR REG 24 CM VASC BND

## (undated) DEVICE — Device: Brand: PROWATER

## (undated) DEVICE — SYRINGE MEDICAL 3ML CLEAR PLASTIC STANDARD NON CONTROL LUERLOCK TIP DISPOSABLE